# Patient Record
Sex: MALE | Race: WHITE | Employment: OTHER | URBAN - NONMETROPOLITAN AREA
[De-identification: names, ages, dates, MRNs, and addresses within clinical notes are randomized per-mention and may not be internally consistent; named-entity substitution may affect disease eponyms.]

---

## 2017-02-27 ENCOUNTER — OFFICE VISIT (OUTPATIENT)
Dept: NEUROLOGY | Age: 77
End: 2017-02-27
Payer: MEDICARE

## 2017-02-27 VITALS
SYSTOLIC BLOOD PRESSURE: 144 MMHG | BODY MASS INDEX: 33.64 KG/M2 | HEIGHT: 70 IN | WEIGHT: 235 LBS | HEART RATE: 78 BPM | DIASTOLIC BLOOD PRESSURE: 73 MMHG | RESPIRATION RATE: 20 BRPM

## 2017-02-27 DIAGNOSIS — G56.03 BILATERAL CARPAL TUNNEL SYNDROME: ICD-10-CM

## 2017-02-27 DIAGNOSIS — M48.061 LUMBAR SPINAL STENOSIS: ICD-10-CM

## 2017-02-27 DIAGNOSIS — G25.81 RESTLESS LEGS SYNDROME: ICD-10-CM

## 2017-02-27 DIAGNOSIS — E11.42 DIABETIC POLYNEUROPATHY ASSOCIATED WITH TYPE 2 DIABETES MELLITUS (HCC): Primary | ICD-10-CM

## 2017-02-27 DIAGNOSIS — R55 SYNCOPE AND COLLAPSE: ICD-10-CM

## 2017-02-27 DIAGNOSIS — G47.33 SLEEP APNEA, OBSTRUCTIVE: ICD-10-CM

## 2017-02-27 PROCEDURE — G8417 CALC BMI ABV UP PARAM F/U: HCPCS | Performed by: PSYCHIATRY & NEUROLOGY

## 2017-02-27 PROCEDURE — G8484 FLU IMMUNIZE NO ADMIN: HCPCS | Performed by: PSYCHIATRY & NEUROLOGY

## 2017-02-27 PROCEDURE — 4040F PNEUMOC VAC/ADMIN/RCVD: CPT | Performed by: PSYCHIATRY & NEUROLOGY

## 2017-02-27 PROCEDURE — 1123F ACP DISCUSS/DSCN MKR DOCD: CPT | Performed by: PSYCHIATRY & NEUROLOGY

## 2017-02-27 PROCEDURE — 99214 OFFICE O/P EST MOD 30 MIN: CPT | Performed by: PSYCHIATRY & NEUROLOGY

## 2017-02-27 PROCEDURE — G8427 DOCREV CUR MEDS BY ELIG CLIN: HCPCS | Performed by: PSYCHIATRY & NEUROLOGY

## 2017-02-27 PROCEDURE — 1036F TOBACCO NON-USER: CPT | Performed by: PSYCHIATRY & NEUROLOGY

## 2017-02-27 RX ORDER — PRAMIPEXOLE DIHYDROCHLORIDE 1.5 MG/1
1.5 TABLET ORAL 3 TIMES DAILY
Qty: 270 TABLET | Refills: 1 | Status: SHIPPED | OUTPATIENT
Start: 2017-02-27 | End: 2017-10-11 | Stop reason: SDUPTHER

## 2017-02-27 RX ORDER — HYDROCODONE BITARTRATE AND ACETAMINOPHEN 5; 325 MG/1; MG/1
1 TABLET ORAL EVERY 8 HOURS PRN
Qty: 60 TABLET | Refills: 0 | Status: SHIPPED | OUTPATIENT
Start: 2017-02-27 | End: 2017-03-29

## 2017-03-07 ENCOUNTER — TELEPHONE (OUTPATIENT)
Dept: NEUROLOGY | Age: 77
End: 2017-03-07

## 2017-03-10 ENCOUNTER — HOSPITAL ENCOUNTER (OUTPATIENT)
Dept: SLEEP CENTER | Age: 77
Discharge: HOME | End: 2017-03-10
Payer: MEDICARE

## 2017-03-10 PROCEDURE — 95811 POLYSOM 6/>YRS CPAP 4/> PARM: CPT

## 2017-03-10 PROCEDURE — 95811 POLYSOM 6/>YRS CPAP 4/> PARM: CPT | Performed by: PSYCHIATRY & NEUROLOGY

## 2017-03-15 ENCOUNTER — HOSPITAL ENCOUNTER (OUTPATIENT)
Dept: NEUROLOGY | Age: 77
Discharge: HOME | End: 2017-03-15
Payer: MEDICARE

## 2017-03-15 DIAGNOSIS — G56.03 BILATERAL CARPAL TUNNEL SYNDROME: ICD-10-CM

## 2017-03-15 DIAGNOSIS — E11.42 DIABETIC POLYNEUROPATHY ASSOCIATED WITH TYPE 2 DIABETES MELLITUS (HCC): ICD-10-CM

## 2017-03-15 PROCEDURE — 95886 MUSC TEST DONE W/N TEST COMP: CPT | Performed by: PSYCHIATRY & NEUROLOGY

## 2017-03-15 PROCEDURE — 95911 NRV CNDJ TEST 9-10 STUDIES: CPT

## 2017-03-15 PROCEDURE — 95911 NRV CNDJ TEST 9-10 STUDIES: CPT | Performed by: PSYCHIATRY & NEUROLOGY

## 2017-03-15 PROCEDURE — 95886 MUSC TEST DONE W/N TEST COMP: CPT

## 2017-03-19 DIAGNOSIS — G47.33 SLEEP APNEA, OBSTRUCTIVE: Primary | ICD-10-CM

## 2017-03-24 ENCOUNTER — TELEPHONE (OUTPATIENT)
Dept: NEUROLOGY | Age: 77
End: 2017-03-24

## 2017-08-23 ENCOUNTER — OFFICE VISIT (OUTPATIENT)
Dept: ENDOCRINOLOGY | Facility: CLINIC | Age: 77
End: 2017-08-23

## 2017-08-23 ENCOUNTER — TREATMENT (OUTPATIENT)
Dept: ENDOCRINOLOGY | Facility: CLINIC | Age: 77
End: 2017-08-23

## 2017-08-23 VITALS
BODY MASS INDEX: 33.46 KG/M2 | HEART RATE: 80 BPM | HEIGHT: 69 IN | DIASTOLIC BLOOD PRESSURE: 80 MMHG | SYSTOLIC BLOOD PRESSURE: 140 MMHG | WEIGHT: 225.9 LBS

## 2017-08-23 DIAGNOSIS — E55.9 VITAMIN D DEFICIENCY: ICD-10-CM

## 2017-08-23 DIAGNOSIS — E11.59 HYPERTENSION ASSOCIATED WITH DIABETES (HCC): ICD-10-CM

## 2017-08-23 DIAGNOSIS — E11.69 MIXED DIABETIC HYPERLIPIDEMIA ASSOCIATED WITH TYPE 2 DIABETES MELLITUS (HCC): ICD-10-CM

## 2017-08-23 DIAGNOSIS — E53.8 B12 DEFICIENCY: ICD-10-CM

## 2017-08-23 DIAGNOSIS — E11.65 TYPE 2 DIABETES MELLITUS WITH HYPERGLYCEMIA, WITH LONG-TERM CURRENT USE OF INSULIN (HCC): Primary | ICD-10-CM

## 2017-08-23 DIAGNOSIS — Z79.4 TYPE 2 DIABETES MELLITUS WITH HYPERGLYCEMIA, WITH LONG-TERM CURRENT USE OF INSULIN (HCC): Primary | ICD-10-CM

## 2017-08-23 DIAGNOSIS — E78.2 MIXED DIABETIC HYPERLIPIDEMIA ASSOCIATED WITH TYPE 2 DIABETES MELLITUS (HCC): ICD-10-CM

## 2017-08-23 DIAGNOSIS — I15.2 HYPERTENSION ASSOCIATED WITH DIABETES (HCC): ICD-10-CM

## 2017-08-23 LAB — GLUCOSE BLDC GLUCOMTR-MCNC: 205 MG/DL (ref 70–130)

## 2017-08-23 PROCEDURE — PTNOCHG PR CUSTOM PT NO CHARGE VISIT: Performed by: INTERNAL MEDICINE

## 2017-08-23 PROCEDURE — 99204 OFFICE O/P NEW MOD 45 MIN: CPT | Performed by: INTERNAL MEDICINE

## 2017-08-23 PROCEDURE — 95250 CONT GLUC MNTR PHYS/QHP EQP: CPT | Performed by: INTERNAL MEDICINE

## 2017-08-23 PROCEDURE — 82962 GLUCOSE BLOOD TEST: CPT | Performed by: INTERNAL MEDICINE

## 2017-08-23 RX ORDER — METOCLOPRAMIDE 5 MG/1
TABLET ORAL
COMMUNITY
Start: 2016-08-16 | End: 2019-04-18

## 2017-08-23 RX ORDER — ROSUVASTATIN CALCIUM 10 MG/1
TABLET, COATED ORAL
COMMUNITY
End: 2020-02-19

## 2017-08-23 RX ORDER — ESOMEPRAZOLE MAGNESIUM 40 MG/1
40 CAPSULE, DELAYED RELEASE ORAL
COMMUNITY

## 2017-08-23 RX ORDER — FUROSEMIDE 20 MG/1
20 TABLET ORAL EVERY MORNING
COMMUNITY
Start: 2016-02-19

## 2017-08-23 RX ORDER — BENAZEPRIL HYDROCHLORIDE 10 MG/1
TABLET ORAL
COMMUNITY
Start: 2016-08-22 | End: 2019-04-18

## 2017-08-23 RX ORDER — PRAMIPEXOLE DIHYDROCHLORIDE 1.5 MG/1
1.5 TABLET ORAL 3 TIMES DAILY
COMMUNITY
Start: 2017-02-27

## 2017-08-23 RX ORDER — CITALOPRAM 10 MG/1
20 TABLET ORAL DAILY
COMMUNITY

## 2017-08-23 NOTE — PROGRESS NOTES
Atul Jeong is a 77 y.o. male who presents for  evaluation of   Chief Complaint   Patient presents with   • Diabetes       Referring provider    Sang Gayle MD  [address] [id number]    Primary Care Provider    GABRIELLE Tena    Duration more than 10 years    Timing - Diabetes is Constant    Quality -  needs improvement    Severity -  moderate    Complications - peripheral neuropathy    Current symptoms/problems  increase appetite, polydipsia and polyuria     Alleviating Factors: Compliance      Side Effects  none    Current diet  High carbs     Current exercise walking    Current monitoring regimen: home blood tests - 3 times daily  Home blood sugar records:     Hypoglycemia none    Past Medical History:   Diagnosis Date   • B12 deficiency 8/23/2017   • Hypertension associated with diabetes 8/23/2017   • Mixed diabetic hyperlipidemia associated with type 2 diabetes mellitus 8/23/2017   • Type 2 diabetes mellitus with hyperglycemia, with long-term current use of insulin 8/23/2017   • Vitamin D deficiency 8/23/2017     Family History   Problem Relation Age of Onset   • Diabetes Mother      Social History   Substance Use Topics   • Smoking status: Former Smoker   • Smokeless tobacco: Never Used   • Alcohol use None         Current Outpatient Prescriptions:   •  benazepril (LOTENSIN) 10 MG tablet, Take  by mouth., Disp: , Rfl:   •  furosemide (LASIX) 20 MG tablet, Take  by mouth., Disp: , Rfl:   •  Insulin Glargine (LANTUS SOLOSTAR) 100 UNIT/ML injection pen, , Disp: , Rfl:   •  Insulin Lispro (HUMALOG KWIKPEN) 100 UNIT/ML solution pen-injector, , Disp: , Rfl:   •  Insulin Pen Needle (B-D UF III MINI PEN NEEDLES) 31G X 5 MM misc, , Disp: , Rfl:   •  metoclopramide (REGLAN) 5 MG tablet, Take  by mouth., Disp: , Rfl:   •  pramipexole (MIRAPEX) 1.5 MG tablet, Take  by mouth., Disp: , Rfl:   •  citalopram (CeleXA) 10 MG tablet, Take  by mouth., Disp: , Rfl:   •   esomeprazole (nexIUM) 40 MG capsule, Take  by mouth., Disp: , Rfl:   •  rosuvastatin (CRESTOR) 10 MG tablet, Take  by mouth., Disp: , Rfl:     Review of Systems    Review of Systems   Constitutional: Negative for activity change, appetite change, chills, diaphoresis, fatigue, fever and unexpected weight change.   HENT: Negative for congestion, dental problem, drooling, ear discharge, ear pain, facial swelling, mouth sores, postnasal drip, rhinorrhea, sinus pressure, sore throat, tinnitus, trouble swallowing and voice change.    Eyes: Negative for photophobia, pain, discharge, redness, itching and visual disturbance.   Respiratory: Negative for apnea, cough, choking, chest tightness, shortness of breath, wheezing and stridor.    Cardiovascular: Negative for chest pain, palpitations and leg swelling.   Gastrointestinal: Negative for abdominal distention, abdominal pain, constipation, diarrhea, nausea and vomiting.   Endocrine: Negative for cold intolerance, heat intolerance, polydipsia, polyphagia and polyuria.   Genitourinary: Negative for decreased urine volume, difficulty urinating, dysuria, flank pain, frequency, hematuria and urgency.   Musculoskeletal: Negative for arthralgias, back pain, gait problem, joint swelling, myalgias, neck pain and neck stiffness.   Skin: Negative for color change, pallor, rash and wound.   Allergic/Immunologic: Negative for immunocompromised state.   Neurological: Negative for dizziness, tremors, seizures, syncope, facial asymmetry, speech difficulty, weakness, light-headedness, numbness and headaches.   Hematological: Negative for adenopathy.   Psychiatric/Behavioral: Negative for agitation, behavioral problems, confusion, decreased concentration, dysphoric mood, hallucinations, self-injury, sleep disturbance and suicidal ideas. The patient is not nervous/anxious and is not hyperactive.         Objective:   /80 (BP Location: Right arm, Patient Position: Lying, Cuff Size: Adult)  " Pulse 80  Ht 69\" (175.3 cm)  Wt 225 lb 14.4 oz (102 kg)  BMI 33.36 kg/m2    Physical Exam   Constitutional: He is oriented to person, place, and time. He appears well-developed and well-nourished. He is cooperative.   HENT:   Head: Normocephalic and atraumatic.   Right Ear: External ear normal.   Left Ear: External ear normal.   Nose: Nose normal.   Mouth/Throat: Oropharynx is clear and moist. No oropharyngeal exudate.   Eyes: Conjunctivae and EOM are normal. Pupils are equal, round, and reactive to light. No scleral icterus. Right eye exhibits normal extraocular motion. Left eye exhibits normal extraocular motion.   Neck: Neck supple. No JVD present. No muscular tenderness present. No tracheal deviation, no edema and no erythema present. No thyromegaly present.   Cardiovascular: Normal rate, regular rhythm, normal heart sounds and intact distal pulses.  Exam reveals no gallop and no friction rub.    No murmur heard.  Pulmonary/Chest: Effort normal and breath sounds normal. No stridor. No respiratory distress. He has no decreased breath sounds. He has no wheezes. He has no rhonchi. He has no rales. He exhibits no tenderness.   Abdominal: Soft. Bowel sounds are normal. He exhibits no distension and no mass. There is no hepatomegaly. There is no tenderness. There is no rebound and no guarding. No hernia.   Musculoskeletal: Normal range of motion. He exhibits no edema, tenderness or deformity.   Lymphadenopathy:     He has no cervical adenopathy.   Neurological: He is alert and oriented to person, place, and time. He has normal reflexes. No cranial nerve deficit. He exhibits normal muscle tone. Coordination normal.   Skin: Skin is warm. No rash noted. No erythema. No pallor.   Psychiatric: He has a normal mood and affect. His behavior is normal. Judgment and thought content normal.   Nursing note and vitals reviewed.      Lab Review    Results for orders placed or performed in visit on 08/23/17   POC Glucose " Fingerstick   Result Value Ref Range    Glucose 205 (A) 70 - 130 mg/dL           Assessment/Plan       ICD-10-CM ICD-9-CM   1. Type 2 diabetes mellitus with hyperglycemia, with long-term current use of insulin E11.65 250.00    Z79.4 790.29     V58.67   2. Hypertension associated with diabetes E11.59 250.80    I10 401.9   3. Mixed diabetic hyperlipidemia associated with type 2 diabetes mellitus E11.69 250.80    E78.2 272.2   4. Vitamin D deficiency E55.9 268.9   5. B12 deficiency E53.8 266.2       Glycemic Management:   No results found for: HGBA1C  No results found for: GLUCOSE, BUN, CREATININE, EGFRIFNONA, EGFRIFAFRI, BCR, K, CO2, CALCIUM, PROTENTOTREF, ALBUMIN, LABIL2, AST, ALT, ANIONGAP  No results found for: WBC, HGB, HCT, MCV, PLT    lantus 80 twice daily   And humalog 12 units tid  Stop above and     Change to U500 insulin     Do 80 units a.m. 60 units w lunch and 40 units w supper     -----    Has gastroparesis so I am not using glp-1    ----    Start synjardy xr 10/1000 mg w bkfast and next appt consider  januvia           Lipid Management  No results found for: CHOL  No results found for: TRIG  No results found for: HDL  No results found for: LDLCALC  No results found for: LDL  No results found for: LDLDIRECT    On crestor 10 mg three times per week     Last ldl from March 2017, 54     Blood Pressure Management:    Vitals:    08/23/17 0831   BP: 140/80   Pulse: 80     No results found for: GLUCOSE, CALCIUM, NA, K, CO2, CL, BUN, CREATININE, EGFRIFAFRI, EGFRIFNONA, BCR, ANIONGAP      On benazepril    Lasix listed but not taking        Microvascular Complication Monitoring:      Eye Exam Evaluation, June 2017 , no retinopathy    -----------    Last Microalbumin-Proteinuria Assessment    No results found for: MALBCRERATIO    No results found for: UTPCR    -----------      Neuropathy, has neuropathy   I intend to start neurontin 300 mg po tid prn       Weight Related:   Wt Readings from Last 3 Encounters:    08/23/17 225 lb 14.4 oz (102 kg)     Body mass index is 33.36 kg/(m^2).        Diet interventions: moderate (500 kCal/d) deficit diet.      Bone Health    No results found for: PTH, CALCIUM, CAION, PHOS, [id number]    Thyroid Health    No results found for: TSH      Other Diabetes Related Aspects       No results found for: [id number]        Last celiac panel     No results found for: GDPABIGA, TTRANSGLIGA, IGA, [id number]      I reviewed and summarized records from GABRIELLE Tena from 2017 and I reviewed / ordered labs.     Orders Placed This Encounter   Procedures   • POC Glucose Fingerstick         A copy of my note was sent to GABRIELLE Tena    Please see my above opinion and suggestions.

## 2017-08-23 NOTE — PROGRESS NOTES
Atul Jeong is a 77 y.o. male seen by diabetes educator 08/23/2017 for insertion of Michael diagnostic continuous glucose monitor.     Sensor inserted in office. Instructed patient to wear sensor up to 14 days. Patient is to return to clinic in 2 weeks for sensor removal and results. Instructed patient to remove sensor if he has a CT scan, MRI, or X-ray, or if skin irritation occurs.     Birgit Sanders MS, RD, LD, CDE

## 2017-08-28 ENCOUNTER — OFFICE VISIT (OUTPATIENT)
Dept: NEUROLOGY | Age: 77
End: 2017-08-28
Payer: MEDICARE

## 2017-08-28 VITALS
DIASTOLIC BLOOD PRESSURE: 65 MMHG | HEIGHT: 70 IN | BODY MASS INDEX: 32.01 KG/M2 | SYSTOLIC BLOOD PRESSURE: 138 MMHG | WEIGHT: 223.6 LBS | HEART RATE: 82 BPM

## 2017-08-28 DIAGNOSIS — G56.03 BILATERAL CARPAL TUNNEL SYNDROME: ICD-10-CM

## 2017-08-28 DIAGNOSIS — G47.33 OBSTRUCTIVE SLEEP APNEA: Chronic | ICD-10-CM

## 2017-08-28 DIAGNOSIS — E11.42 DIABETIC POLYNEUROPATHY ASSOCIATED WITH TYPE 2 DIABETES MELLITUS (HCC): ICD-10-CM

## 2017-08-28 DIAGNOSIS — G25.81 RESTLESS LEG SYNDROME: Primary | Chronic | ICD-10-CM

## 2017-08-28 PROCEDURE — G8427 DOCREV CUR MEDS BY ELIG CLIN: HCPCS | Performed by: PSYCHIATRY & NEUROLOGY

## 2017-08-28 PROCEDURE — 1036F TOBACCO NON-USER: CPT | Performed by: PSYCHIATRY & NEUROLOGY

## 2017-08-28 PROCEDURE — G8417 CALC BMI ABV UP PARAM F/U: HCPCS | Performed by: PSYCHIATRY & NEUROLOGY

## 2017-08-28 PROCEDURE — 99213 OFFICE O/P EST LOW 20 MIN: CPT | Performed by: PSYCHIATRY & NEUROLOGY

## 2017-08-28 PROCEDURE — 4040F PNEUMOC VAC/ADMIN/RCVD: CPT | Performed by: PSYCHIATRY & NEUROLOGY

## 2017-08-28 PROCEDURE — 1123F ACP DISCUSS/DSCN MKR DOCD: CPT | Performed by: PSYCHIATRY & NEUROLOGY

## 2017-08-28 RX ORDER — HYDROCODONE BITARTRATE AND ACETAMINOPHEN 10; 325 MG/1; MG/1
1 TABLET ORAL EVERY 8 HOURS PRN
COMMUNITY
End: 2017-08-28 | Stop reason: SDUPTHER

## 2017-08-28 RX ORDER — HYDROCODONE BITARTRATE AND ACETAMINOPHEN 10; 325 MG/1; MG/1
1 TABLET ORAL EVERY 8 HOURS PRN
Qty: 90 TABLET | Refills: 0 | Status: ON HOLD | OUTPATIENT
Start: 2017-08-28 | End: 2018-06-11 | Stop reason: HOSPADM

## 2017-08-28 RX ORDER — CARBIDOPA AND LEVODOPA 50; 200 MG/1; MG/1
1 TABLET, EXTENDED RELEASE ORAL 2 TIMES DAILY
Qty: 60 TABLET | Refills: 5 | Status: SHIPPED | OUTPATIENT
Start: 2017-08-28 | End: 2018-03-28 | Stop reason: SDUPTHER

## 2017-09-06 ENCOUNTER — OFFICE VISIT (OUTPATIENT)
Dept: ENDOCRINOLOGY | Facility: CLINIC | Age: 77
End: 2017-09-06

## 2017-09-06 VITALS
DIASTOLIC BLOOD PRESSURE: 64 MMHG | WEIGHT: 224 LBS | HEART RATE: 82 BPM | HEIGHT: 70 IN | SYSTOLIC BLOOD PRESSURE: 120 MMHG | BODY MASS INDEX: 32.07 KG/M2

## 2017-09-06 DIAGNOSIS — E55.9 VITAMIN D DEFICIENCY: ICD-10-CM

## 2017-09-06 DIAGNOSIS — E11.69 MIXED DIABETIC HYPERLIPIDEMIA ASSOCIATED WITH TYPE 2 DIABETES MELLITUS (HCC): ICD-10-CM

## 2017-09-06 DIAGNOSIS — I15.2 HYPERTENSION ASSOCIATED WITH DIABETES (HCC): ICD-10-CM

## 2017-09-06 DIAGNOSIS — E11.65 TYPE 2 DIABETES MELLITUS WITH HYPERGLYCEMIA, WITH LONG-TERM CURRENT USE OF INSULIN (HCC): Primary | ICD-10-CM

## 2017-09-06 DIAGNOSIS — Z79.4 TYPE 2 DIABETES MELLITUS WITH HYPERGLYCEMIA, WITH LONG-TERM CURRENT USE OF INSULIN (HCC): Primary | ICD-10-CM

## 2017-09-06 DIAGNOSIS — E78.2 MIXED DIABETIC HYPERLIPIDEMIA ASSOCIATED WITH TYPE 2 DIABETES MELLITUS (HCC): ICD-10-CM

## 2017-09-06 DIAGNOSIS — E11.59 HYPERTENSION ASSOCIATED WITH DIABETES (HCC): ICD-10-CM

## 2017-09-06 LAB — GLUCOSE BLDC GLUCOMTR-MCNC: 120 MG/DL (ref 70–130)

## 2017-09-06 PROCEDURE — 95250 CONT GLUC MNTR PHYS/QHP EQP: CPT | Performed by: NURSE PRACTITIONER

## 2017-09-06 PROCEDURE — 99214 OFFICE O/P EST MOD 30 MIN: CPT | Performed by: NURSE PRACTITIONER

## 2017-09-06 RX ORDER — HYDROCODONE BITARTRATE AND ACETAMINOPHEN 10; 325 MG/1; MG/1
TABLET ORAL
Status: ON HOLD | COMMUNITY
Start: 2017-08-28 | End: 2020-09-24

## 2017-09-06 RX ORDER — CARBIDOPA AND LEVODOPA 50; 200 MG/1; MG/1
TABLET, EXTENDED RELEASE ORAL
COMMUNITY
Start: 2017-08-28 | End: 2019-08-19 | Stop reason: SINTOL

## 2017-09-06 NOTE — PROGRESS NOTES
Subjective    Atul Jeong is a 77 y.o. male. he is here today for follow-up.    History of Present Illness         Primary Care Provider     GABRIELLE Tena     Duration more than 10 years     Timing - Diabetes is Constant     Quality -  needs improvement     Severity -  moderate     Complications - peripheral neuropathy     Current symptoms/problems  increase appetite, polydipsia and polyuria      Alleviating Factors: Compliance       Side Effects  none     Current diet  High carbs      Current exercise walking     Current monitoring regimen: home blood tests - 3 times daily  Home blood sugar records:     120 up to 160    In office 120     Hypoglycemia none               The following portions of the patient's history were reviewed and updated as appropriate:   Past Medical History:   Diagnosis Date   • B12 deficiency 8/23/2017   • Hypertension associated with diabetes 8/23/2017   • Mixed diabetic hyperlipidemia associated with type 2 diabetes mellitus 8/23/2017   • Type 2 diabetes mellitus with hyperglycemia, with long-term current use of insulin 8/23/2017   • Vitamin D deficiency 8/23/2017     History reviewed. No pertinent surgical history.  Family History   Problem Relation Age of Onset   • Diabetes Mother        Current Outpatient Prescriptions   Medication Sig Dispense Refill   • carbidopa-levodopa CR (SINEMET CR)  MG per CR tablet Take  by mouth.     • HYDROcodone-acetaminophen (NORCO)  MG per tablet Take  by mouth.     • benazepril (LOTENSIN) 10 MG tablet Take  by mouth.     • citalopram (CeleXA) 10 MG tablet Take  by mouth.     • Empagliflozin-Metformin HCl ER  MG tablet sustained-release 24 hour Take 1 tablet by mouth Daily With Breakfast. 30 tablet 11   • esomeprazole (nexIUM) 40 MG capsule Take  by mouth.     • furosemide (LASIX) 20 MG tablet Take  by mouth.     • Insulin Pen Needle (B-D UF III MINI PEN NEEDLES) 31G X 5 MM misc      • Insulin Regular Human, Conc, (HUMULIN  R U-500 KWIKPEN) 500 UNIT/ML solution pen-injector CONCENTRATED injection 80 units w bkfast, 60 units w lunch and 40 units w supper 4 pen 11   • metoclopramide (REGLAN) 5 MG tablet Take  by mouth.     • pramipexole (MIRAPEX) 1.5 MG tablet Take  by mouth.     • rosuvastatin (CRESTOR) 10 MG tablet Take  by mouth.       No current facility-administered medications for this visit.      Allergies   Allergen Reactions   • Metformin      Social History     Social History   • Marital status:      Spouse name: N/A   • Number of children: N/A   • Years of education: N/A     Social History Main Topics   • Smoking status: Former Smoker   • Smokeless tobacco: Never Used   • Alcohol use None   • Drug use: None   • Sexual activity: Not Asked     Other Topics Concern   • None     Social History Narrative       Review of Systems  Review of Systems   Constitutional: Negative for activity change, appetite change, diaphoresis and fatigue.   HENT: Negative for congestion, dental problem, drooling, facial swelling, sneezing, sore throat, tinnitus, trouble swallowing and voice change.    Eyes: Negative for photophobia, pain, discharge, redness, itching and visual disturbance.   Respiratory: Negative for apnea, cough, choking, chest tightness and shortness of breath.    Cardiovascular: Negative for chest pain, palpitations and leg swelling.   Gastrointestinal: Negative for abdominal distention, abdominal pain, constipation, diarrhea, nausea and vomiting.   Endocrine: Negative for cold intolerance, heat intolerance, polydipsia, polyphagia and polyuria.   Genitourinary: Negative for difficulty urinating, dysuria, frequency, hematuria and urgency.   Musculoskeletal: Negative for arthralgias, back pain, gait problem, joint swelling, myalgias, neck pain and neck stiffness.   Skin: Negative for color change, pallor, rash and wound.   Allergic/Immunologic: Negative for environmental allergies, food allergies and immunocompromised state.  "  Neurological: Negative for dizziness, tremors, weakness, light-headedness, numbness and headaches.   Hematological: Negative for adenopathy. Does not bruise/bleed easily.   Psychiatric/Behavioral: Negative for agitation, behavioral problems, confusion and sleep disturbance.        Objective    /64 (BP Location: Right arm, Patient Position: Sitting, Cuff Size: Adult)  Pulse 82  Ht 70\" (177.8 cm)  Wt 224 lb (102 kg)  BMI 32.14 kg/m2  Physical Exam   Constitutional: He is oriented to person, place, and time. He appears well-developed and well-nourished. No distress.   HENT:   Head: Normocephalic and atraumatic.   Right Ear: External ear normal.   Left Ear: External ear normal.   Nose: Nose normal.   Eyes: Conjunctivae and EOM are normal. Pupils are equal, round, and reactive to light.   Neck: Normal range of motion. Neck supple. No tracheal deviation present. No thyromegaly present.   Cardiovascular: Normal rate, regular rhythm and normal heart sounds.    No murmur heard.  Pulmonary/Chest: Effort normal and breath sounds normal. No respiratory distress. He has no wheezes.   Abdominal: Soft. Bowel sounds are normal. There is no tenderness. There is no rebound and no guarding.   Musculoskeletal: Normal range of motion. He exhibits no edema, tenderness or deformity.   Neurological: He is alert and oriented to person, place, and time. No cranial nerve deficit.   Skin: Skin is warm and dry. No rash noted.   Psychiatric: He has a normal mood and affect. His behavior is normal. Judgment and thought content normal.       Lab Review  No results found for: GLUCOSE, NA, K, CL, CO2, BUN, CREATININE, HGBA1C, TRIG, LDL    Assessment/Plan      1. Type 2 diabetes mellitus with hyperglycemia, with long-term current use of insulin    2. Hypertension associated with diabetes    3. Mixed diabetic hyperlipidemia associated with type 2 diabetes mellitus    4. Vitamin D deficiency    .    Medications prescribed:  Outpatient " Encounter Prescriptions as of 9/6/2017   Medication Sig Dispense Refill   • carbidopa-levodopa CR (SINEMET CR)  MG per CR tablet Take  by mouth.     • HYDROcodone-acetaminophen (NORCO)  MG per tablet Take  by mouth.     • benazepril (LOTENSIN) 10 MG tablet Take  by mouth.     • citalopram (CeleXA) 10 MG tablet Take  by mouth.     • Empagliflozin-Metformin HCl ER  MG tablet sustained-release 24 hour Take 1 tablet by mouth Daily With Breakfast. 30 tablet 11   • esomeprazole (nexIUM) 40 MG capsule Take  by mouth.     • furosemide (LASIX) 20 MG tablet Take  by mouth.     • Insulin Pen Needle (B-D UF III MINI PEN NEEDLES) 31G X 5 MM misc      • Insulin Regular Human, Conc, (HUMULIN R U-500 KWIKPEN) 500 UNIT/ML solution pen-injector CONCENTRATED injection 80 units w bkfast, 60 units w lunch and 40 units w supper 4 pen 11   • metoclopramide (REGLAN) 5 MG tablet Take  by mouth.     • pramipexole (MIRAPEX) 1.5 MG tablet Take  by mouth.     • rosuvastatin (CRESTOR) 10 MG tablet Take  by mouth.       No facility-administered encounter medications on file as of 9/6/2017.        Orders placed during this encounter include:  Orders Placed This Encounter   Procedures   • POC Glucose Fingerstick       Glycemic management       lantus 80 twice daily   And humalog 12 units tid  Stop above and      Change to U500 insulin      Do 80 units a.m. 60 units w lunch and 40 units w supper      -----     Has gastroparesis so I am not using glp-1     ----      synjardy xr 10/1000 mg w bkfast          ginger did not read    Wear ginger again today and return in 2 weeks     Lipid Management       On crestor 10 mg three times per week      Last ldl from March 2017, 54      Blood Pressure Management:         On benazepril     Lasix listed but not taking          Microvascular Complication Monitoring:       Eye Exam Evaluation, June 2017 , no retinopathy     -----------     Last Microalbumin-Proteinuria Assessment     No results  found for: MALBCRERAJINGO     No results found for: UTPCR     -----------        Neuropathy, has neuropathy   I intend to start neurontin 300 mg po tid prn         Weight Related:       Wt Readings from Last 3 Encounters:   08/23/17 225 lb 14.4 oz (102 kg)      Body mass index is 33.36 kg/(m^2).           Diet interventions: moderate (500 kCal/d) deficit diet.        Bone Health          Thyroid Health     No results found for: TSH        Other Diabetes Related Aspects                    Last celiac panel         refer to podiatry for foot pain         4. Follow-up: Return in about 6 weeks (around 10/18/2017) for Recheck.

## 2017-09-08 NOTE — PROGRESS NOTES
ICD-10-CM ICD-9-CM   1. Type 2 diabetes mellitus with hyperglycemia, with long-term current use of insulin E11.65 250.00    Z79.4 790.29     V58.67         Uncontrolled diabetes  Hypoglycemia and Hyperglycemia    Insertion of continuous glucose monitor to define pattern    The continuous glucose monitor that was inserted is a ginger glucose monitor

## 2017-09-26 ENCOUNTER — TELEPHONE (OUTPATIENT)
Dept: ENDOCRINOLOGY | Facility: CLINIC | Age: 77
End: 2017-09-26

## 2017-09-26 ENCOUNTER — TREATMENT (OUTPATIENT)
Dept: ENDOCRINOLOGY | Facility: CLINIC | Age: 77
End: 2017-09-26

## 2017-09-26 DIAGNOSIS — E11.65 TYPE 2 DIABETES MELLITUS WITH HYPERGLYCEMIA, WITH LONG-TERM CURRENT USE OF INSULIN (HCC): ICD-10-CM

## 2017-09-26 DIAGNOSIS — Z79.4 TYPE 2 DIABETES MELLITUS WITH HYPERGLYCEMIA, WITH LONG-TERM CURRENT USE OF INSULIN (HCC): ICD-10-CM

## 2017-09-26 PROCEDURE — 95251 CONT GLUC MNTR ANALYSIS I&R: CPT | Performed by: NURSE PRACTITIONER

## 2017-09-26 NOTE — PROGRESS NOTES
Diabetes Mellitus type 2 uncontrolled    Michael downloaded and reviewed    Sept 6 - 15, 2017    Average reading 196     Estimated A1c 8.5%     No low events     Waking up with at goal     Highs at lunch and supper    He takes U- 500    Breakfast 80 units -- increase to 90 units    Lunch 60 units - increase to 65 units      Dinner 40 units - keep

## 2017-09-26 NOTE — TELEPHONE ENCOUNTER
----- Message from GABRIELLE Thibodeaux sent at 9/26/2017  9:37 AM CDT -----  Michael downloaded and reviewed    Sept 6 - 15, 2017    Average reading 196     Estimated A1c 8.5%     No low events     Waking up with at goal     Highs at lunch and supper    He takes U- 500    Breakfast 80 units -- increase to 90 units    Lunch 60 units - increase to 65 units      Dinner 40 units - keep

## 2017-10-18 ENCOUNTER — OFFICE VISIT (OUTPATIENT)
Dept: PODIATRY | Facility: CLINIC | Age: 77
End: 2017-10-18

## 2017-10-18 VITALS
HEIGHT: 70 IN | BODY MASS INDEX: 33.3 KG/M2 | HEART RATE: 83 BPM | WEIGHT: 232.6 LBS | DIASTOLIC BLOOD PRESSURE: 76 MMHG | SYSTOLIC BLOOD PRESSURE: 136 MMHG | OXYGEN SATURATION: 94 %

## 2017-10-18 DIAGNOSIS — B35.1 ONYCHOMYCOSIS: ICD-10-CM

## 2017-10-18 DIAGNOSIS — M79.675 CHRONIC TOE PAIN, BILATERAL: ICD-10-CM

## 2017-10-18 DIAGNOSIS — M79.674 CHRONIC TOE PAIN, BILATERAL: ICD-10-CM

## 2017-10-18 DIAGNOSIS — G89.29 CHRONIC TOE PAIN, BILATERAL: ICD-10-CM

## 2017-10-18 DIAGNOSIS — E11.42 TYPE 2 DIABETES MELLITUS WITH PERIPHERAL NEUROPATHY (HCC): Primary | ICD-10-CM

## 2017-10-18 PROCEDURE — 99203 OFFICE O/P NEW LOW 30 MIN: CPT | Performed by: PODIATRIST

## 2017-10-18 PROCEDURE — 11721 DEBRIDE NAIL 6 OR MORE: CPT | Performed by: PODIATRIST

## 2017-10-18 NOTE — PROGRESS NOTES
Atul Jeong  1940  77 y.o. male   TRACY Jeffrey 09/06/2017  BS-131 per pt    Patient presents today for diabetic foot care.    10/18/2017  Chief Complaint   Patient presents with   • Left Foot - diabetic foot care   • Right Foot - diabetic foot care           History of Present Illness    Atul Jeong is a 77 y.o.male who presents to clinic today for diabetic foot care.  Patient states that his blood sugars are well controlled.  His last glucose was 131 mg/dL.  He admits to occasional numbness, burning and tingling in his lower extremities.  He states his nails the become long and thickened and painful.  They are hard for him to trim because he cannot reach them and they are too thick. He describes the pain as achy. He denies any injuries or trauma to his feet.  He has no other pedal complaints.          Past Medical History:   Diagnosis Date   • B12 deficiency 8/23/2017   • Coronary artery disease    • Foot ulcer    • Hypertension associated with diabetes 8/23/2017   • Ingrown toenail    • Mixed diabetic hyperlipidemia associated with type 2 diabetes mellitus 8/23/2017   • Neuropathy in diabetes    • Type 2 diabetes mellitus with hyperglycemia, with long-term current use of insulin 8/23/2017   • Vitamin D deficiency 8/23/2017         Past Surgical History:   Procedure Laterality Date   • BACK SURGERY     • CATARACT EXTRACTION     • CHOLECYSTECTOMY     • PACEMAKER IMPLANTATION           Family History   Problem Relation Age of Onset   • Diabetes Mother    • Cancer Father    • Cancer Sister    • Cancer Brother    • Heart disease Brother    • Diabetes Brother        Allergies   Allergen Reactions   • Metformin        Social History     Social History   • Marital status:      Spouse name: N/A   • Number of children: N/A   • Years of education: N/A     Occupational History   • Not on file.     Social History Main Topics   • Smoking status: Former Smoker   • Smokeless tobacco: Never Used   • Alcohol use Yes   •  "Drug use: No   • Sexual activity: Defer     Other Topics Concern   • Not on file     Social History Narrative         Current Outpatient Prescriptions   Medication Sig Dispense Refill   • benazepril (LOTENSIN) 10 MG tablet Take  by mouth.     • carbidopa-levodopa CR (SINEMET CR)  MG per CR tablet Take  by mouth.     • citalopram (CeleXA) 10 MG tablet Take  by mouth.     • Empagliflozin-Metformin HCl ER  MG tablet sustained-release 24 hour Take 1 tablet by mouth Daily With Breakfast. 30 tablet 11   • esomeprazole (nexIUM) 40 MG capsule Take  by mouth.     • furosemide (LASIX) 20 MG tablet Take  by mouth.     • HYDROcodone-acetaminophen (NORCO)  MG per tablet Take  by mouth.     • Insulin Pen Needle (B-D UF III MINI PEN NEEDLES) 31G X 5 MM misc      • Insulin Regular Human, Conc, (HUMULIN R U-500 KWIKPEN) 500 UNIT/ML solution pen-injector CONCENTRATED injection 80 units w bkfast, 60 units w lunch and 40 units w supper 4 pen 11   • metoclopramide (REGLAN) 5 MG tablet Take  by mouth.     • pramipexole (MIRAPEX) 1.5 MG tablet Take  by mouth.     • rosuvastatin (CRESTOR) 10 MG tablet Take  by mouth.       No current facility-administered medications for this visit.          OBJECTIVE    /76  Pulse 83  Ht 70\" (177.8 cm)  Wt 232 lb 9.6 oz (106 kg)  SpO2 94%  BMI 33.37 kg/m2      Review of Systems   Constitutional: Negative.    Eyes: Negative.    Respiratory: Negative.    Cardiovascular: Positive for leg swelling.   Gastrointestinal: Negative.    Endocrine: Negative.    Genitourinary: Negative.    Musculoskeletal: Positive for arthralgias and back pain.   Skin: Positive for rash.   Allergic/Immunologic: Negative.    Neurological: Positive for dizziness, numbness and headaches.   Hematological: Negative.    Psychiatric/Behavioral: Negative.          Constitutional: well developed, well nourished    HEENT: Normocephalic and atraumatic, normal hearing    Respiratory: Non labored respirations " noted    Cardiovascular:    DP/PT pulses palpable    CFT brisk  to all digits  Skin temp is warm to warm from proximal tibia to distal digits  Pedal hair growthDiminished.   No erythema or edema noted     Musculoskeletal:  Muscle strength is 5/5 for all muscle groups tested   ROM of the 1st MTP is decreased without pain or crepitus  ROM of the ankle joint is decreased without pain or crepitus    POP to the toenails  Rectus foot type     Dermatological:   Nails 1-5 are thickened, discolored, elongated with subungual debris    Skin is warm, dry and intact    Third and fourth webspaces bilateral are slightly macerated   No subcutaneous nodules or masses noted    No open wounds noted     Neurological:   Protective sensation diminished   Sensation intact to light touch    DTR intact    Psychiatric: A&O x 3 with normal mood and affect. NAD.         Procedures        ASSESSMENT AND PLAN    Atul was seen today for diabetic foot care and diabetic foot care.    Diagnoses and all orders for this visit:    Type 2 diabetes mellitus with peripheral neuropathy    Onychomycosis    Chronic toe pain, bilateral    - A diabetic foot screening exam was performed and the patient was educated on the foot complications related to diabetes,  preventative foot care and what signs and symptoms to watch for.  Instructed to contact our office if any foot problems develop before next visit.  - Nails 1 through 5 bilateral were debrided in length and thickness without incident utilizing nail nippers.  - applied betadine to macerated webspaces. Advised pt to keep webspaces dry  - all questions were answered  - RTC in 3 months            This document has been electronically signed by Elmo Qiu DPM on October 18, 2017 4:24 PM     10/18/2017  4:24 PM

## 2017-11-03 ENCOUNTER — OFFICE VISIT (OUTPATIENT)
Dept: ENDOCRINOLOGY | Facility: CLINIC | Age: 77
End: 2017-11-03

## 2017-11-03 VITALS
SYSTOLIC BLOOD PRESSURE: 138 MMHG | DIASTOLIC BLOOD PRESSURE: 82 MMHG | WEIGHT: 230.4 LBS | BODY MASS INDEX: 32.99 KG/M2 | HEART RATE: 88 BPM | HEIGHT: 70 IN

## 2017-11-03 DIAGNOSIS — E11.69 MIXED DIABETIC HYPERLIPIDEMIA ASSOCIATED WITH TYPE 2 DIABETES MELLITUS (HCC): ICD-10-CM

## 2017-11-03 DIAGNOSIS — E11.59 HYPERTENSION ASSOCIATED WITH DIABETES (HCC): ICD-10-CM

## 2017-11-03 DIAGNOSIS — Z79.4 TYPE 2 DIABETES MELLITUS WITH HYPERGLYCEMIA, WITH LONG-TERM CURRENT USE OF INSULIN (HCC): Primary | ICD-10-CM

## 2017-11-03 DIAGNOSIS — E53.8 B12 DEFICIENCY: ICD-10-CM

## 2017-11-03 DIAGNOSIS — E78.2 MIXED DIABETIC HYPERLIPIDEMIA ASSOCIATED WITH TYPE 2 DIABETES MELLITUS (HCC): ICD-10-CM

## 2017-11-03 DIAGNOSIS — E11.65 TYPE 2 DIABETES MELLITUS WITH HYPERGLYCEMIA, WITH LONG-TERM CURRENT USE OF INSULIN (HCC): Primary | ICD-10-CM

## 2017-11-03 DIAGNOSIS — E55.9 VITAMIN D DEFICIENCY: ICD-10-CM

## 2017-11-03 DIAGNOSIS — I15.2 HYPERTENSION ASSOCIATED WITH DIABETES (HCC): ICD-10-CM

## 2017-11-03 LAB — GLUCOSE BLDC GLUCOMTR-MCNC: 115 MG/DL (ref 70–130)

## 2017-11-03 PROCEDURE — 99214 OFFICE O/P EST MOD 30 MIN: CPT | Performed by: INTERNAL MEDICINE

## 2017-11-03 PROCEDURE — 82962 GLUCOSE BLOOD TEST: CPT | Performed by: INTERNAL MEDICINE

## 2017-11-03 NOTE — PROGRESS NOTES
Atul Jeong is a 77 y.o. male who presents for  evaluation of   Chief Complaint   Patient presents with   • Diabetes       Referring provider    No referring provider defined for this encounter.    Primary Care Provider    [facility]    Duration more than 10 years    Timing - Diabetes is Constant    Quality -  needs improvement - improved     Severity -  moderate    Complications - peripheral neuropathy    Current symptoms/problems  increase appetite, polydipsia and polyuria     Alleviating Factors: Compliance      Side Effects  none    Current diet  High carbs     Current exercise walking    Current monitoring regimen: home blood tests - 3 times daily  Home blood sugar records:  when we met, now mainly in the 100s    Hypoglycemia none    Past Medical History:   Diagnosis Date   • B12 deficiency 8/23/2017   • Coronary artery disease    • Foot ulcer    • Hypertension associated with diabetes 8/23/2017   • Ingrown toenail    • Mixed diabetic hyperlipidemia associated with type 2 diabetes mellitus 8/23/2017   • Neuropathy in diabetes    • Type 2 diabetes mellitus with hyperglycemia, with long-term current use of insulin 8/23/2017   • Vitamin D deficiency 8/23/2017     Family History   Problem Relation Age of Onset   • Diabetes Mother    • Cancer Father    • Cancer Sister    • Cancer Brother    • Heart disease Brother    • Diabetes Brother      Social History   Substance Use Topics   • Smoking status: Former Smoker   • Smokeless tobacco: Never Used   • Alcohol use Yes         Current Outpatient Prescriptions:   •  benazepril (LOTENSIN) 10 MG tablet, Take  by mouth., Disp: , Rfl:   •  carbidopa-levodopa CR (SINEMET CR)  MG per CR tablet, Take  by mouth., Disp: , Rfl:   •  citalopram (CeleXA) 10 MG tablet, Take  by mouth., Disp: , Rfl:   •  Empagliflozin-Metformin HCl ER  MG tablet sustained-release 24 hour, Take 1 tablet by mouth Daily With Breakfast., Disp: 90 tablet, Rfl: 11  •   esomeprazole (nexIUM) 40 MG capsule, Take  by mouth., Disp: , Rfl:   •  furosemide (LASIX) 20 MG tablet, Take  by mouth., Disp: , Rfl:   •  HYDROcodone-acetaminophen (NORCO)  MG per tablet, Take  by mouth., Disp: , Rfl:   •  Insulin Pen Needle (B-D UF III MINI PEN NEEDLES) 31G X 5 MM misc, , Disp: , Rfl:   •  metoclopramide (REGLAN) 5 MG tablet, Take  by mouth., Disp: , Rfl:   •  pramipexole (MIRAPEX) 1.5 MG tablet, Take  by mouth., Disp: , Rfl:   •  rosuvastatin (CRESTOR) 10 MG tablet, Take  by mouth., Disp: , Rfl:   •  Insulin Regular Human, Conc, (HUMULIN R U-500 KWIKPEN) 500 UNIT/ML solution pen-injector CONCENTRATED injection, 80 units w bkfast, 60 units w lunch and 40 units w supper, Disp: 4 pen, Rfl: 11    Review of Systems    Review of Systems   Constitutional: Negative for activity change, appetite change, chills, diaphoresis, fatigue, fever and unexpected weight change.   HENT: Negative for congestion, dental problem, drooling, ear discharge, ear pain, facial swelling, mouth sores, postnasal drip, rhinorrhea, sinus pressure, sore throat, tinnitus, trouble swallowing and voice change.    Eyes: Negative for photophobia, pain, discharge, redness, itching and visual disturbance.   Respiratory: Negative for apnea, cough, choking, chest tightness, shortness of breath, wheezing and stridor.    Cardiovascular: Negative for chest pain, palpitations and leg swelling.   Gastrointestinal: Negative for abdominal distention, abdominal pain, constipation, diarrhea, nausea and vomiting.   Endocrine: Negative for cold intolerance, heat intolerance, polydipsia, polyphagia and polyuria.   Genitourinary: Negative for decreased urine volume, difficulty urinating, dysuria, flank pain, frequency, hematuria and urgency.   Musculoskeletal: Negative for arthralgias, back pain, gait problem, joint swelling, myalgias, neck pain and neck stiffness.   Skin: Negative for color change, pallor, rash and wound.  "  Allergic/Immunologic: Negative for immunocompromised state.   Neurological: Negative for dizziness, tremors, seizures, syncope, facial asymmetry, speech difficulty, weakness, light-headedness, numbness and headaches.   Hematological: Negative for adenopathy.   Psychiatric/Behavioral: Negative for agitation, behavioral problems, confusion, decreased concentration, dysphoric mood, hallucinations, self-injury, sleep disturbance and suicidal ideas. The patient is not nervous/anxious and is not hyperactive.         Objective:   /82 (BP Location: Left arm, Patient Position: Sitting, Cuff Size: Adult)  Pulse 88  Ht 70\" (177.8 cm)  Wt 230 lb 6.4 oz (105 kg)  BMI 33.06 kg/m2    Physical Exam   Constitutional: He is oriented to person, place, and time. He appears well-developed and well-nourished. He is cooperative.   HENT:   Head: Normocephalic and atraumatic.   Right Ear: External ear normal.   Left Ear: External ear normal.   Nose: Nose normal.   Mouth/Throat: Oropharynx is clear and moist. No oropharyngeal exudate.   Eyes: Conjunctivae and EOM are normal. Pupils are equal, round, and reactive to light. No scleral icterus. Right eye exhibits normal extraocular motion. Left eye exhibits normal extraocular motion.   Neck: Neck supple. No JVD present. No muscular tenderness present. No tracheal deviation, no edema and no erythema present. No thyromegaly present.   Cardiovascular: Normal rate, regular rhythm, normal heart sounds and intact distal pulses.  Exam reveals no gallop and no friction rub.    No murmur heard.  Pulmonary/Chest: Effort normal and breath sounds normal. No stridor. No respiratory distress. He has no decreased breath sounds. He has no wheezes. He has no rhonchi. He has no rales. He exhibits no tenderness.   Abdominal: Soft. Bowel sounds are normal. He exhibits no distension and no mass. There is no hepatomegaly. There is no tenderness. There is no rebound and no guarding. No hernia. "   Musculoskeletal: Normal range of motion. He exhibits no edema, tenderness or deformity.   Lymphadenopathy:     He has no cervical adenopathy.   Neurological: He is alert and oriented to person, place, and time. He has normal reflexes. No cranial nerve deficit. He exhibits normal muscle tone. Coordination normal.   Skin: Skin is warm. No rash noted. No erythema. No pallor.   Psychiatric: He has a normal mood and affect. His behavior is normal. Judgment and thought content normal.   Nursing note and vitals reviewed.      Lab Review    Results for orders placed or performed in visit on 11/03/17   POC Glucose Fingerstick   Result Value Ref Range    Glucose 115 70 - 130 mg/dL           Assessment/Plan       ICD-10-CM ICD-9-CM   1. Type 2 diabetes mellitus with hyperglycemia, with long-term current use of insulin E11.65 250.00    Z79.4 790.29     V58.67   2. Hypertension associated with diabetes E11.59 250.80    I10 401.9   3. Mixed diabetic hyperlipidemia associated with type 2 diabetes mellitus E11.69 250.80    E78.2 272.2   4. Vitamin D deficiency E55.9 268.9   5. B12 deficiency E53.8 266.2       Glycemic Management:   No results found for: HGBA1C  No results found for: GLUCOSE, BUN, CREATININE, EGFRIFNONA, EGFRIFAFRI, BCR, K, CO2, CALCIUM, PROTENTOTREF, ALBUMIN, LABIL2, AST, ALT, ANIONGAP  No results found for: WBC, HGB, HCT, MCV, PLT    lantus 80 twice daily   And humalog 12 units tid  Stop above and     Change to U500 insulin     Do 80 units a.m. 60 units w lunch and 40 units w supper     80-90 , 60- 70 , 40    -----    Has gastroparesis so I am not using glp-1    ----    Start synjardy xr 10/1000 mg w bkfast and next appt consider  januvia           Lipid Management  No results found for: CHOL  No results found for: TRIG  No results found for: HDL  No results found for: LDLCALC  No results found for: LDL  No results found for: LDLDIRECT    On crestor 10 mg three times per week     Last ldl from March 2017, 54      Blood Pressure Management:    Vitals:    11/03/17 1519   BP: 138/82   Pulse: 88     No results found for: GLUCOSE, CALCIUM, NA, K, CO2, CL, BUN, CREATININE, EGFRIFAFRI, EGFRIFNONA, BCR, ANIONGAP      On benazepril    Lasix listed but not taking        Microvascular Complication Monitoring:      Eye Exam Evaluation, June 2017 , no retinopathy    -----------    Last Microalbumin-Proteinuria Assessment    No results found for: MALBCRERATIO    No results found for: UTPCR    -----------      Neuropathy, has neuropathy   I intend to start neurontin 300 mg po tid prn       Weight Related:   Wt Readings from Last 3 Encounters:   11/03/17 230 lb 6.4 oz (105 kg)   10/18/17 232 lb 9.6 oz (106 kg)   09/06/17 224 lb (102 kg)     Body mass index is 33.06 kg/(m^2).        Diet interventions: moderate (500 kCal/d) deficit diet.      Bone Health    No results found for: PTH, CALCIUM, CAION, PHOS, [id number]    Thyroid Health    No results found for: TSH      Other Diabetes Related Aspects       No results found for: [id number]        Last celiac panel     No results found for: GDPABIGA, TTRANSGLIGA, IGA, [id number]      I reviewed and summarized records from [facility] from 2017 and I reviewed / ordered labs.     Orders Placed This Encounter   Procedures   • CBC Auto Differential   • Comprehensive Metabolic Panel   • Hemoglobin A1c   • Lipid Panel   • Microalbumin / Creatinine Urine Ratio - Urine, Clean Catch   • TSH   • Vitamin B12   • Vitamin D 25 Hydroxy   • POC Glucose Fingerstick         A copy of my note was sent to [facility]    Please see my above opinion and suggestions.

## 2018-01-04 ENCOUNTER — TELEPHONE (OUTPATIENT)
Dept: PODIATRY | Facility: CLINIC | Age: 78
End: 2018-01-04

## 2018-01-04 NOTE — TELEPHONE ENCOUNTER
PT RIPPED HIS BIG TOE NAIL OFF YESTERDAY. HE WENT TO THE ER LAST NIGHT AND THEY CLEANED IT. BUT THEY TOLD HIM TO GET IN WITH DR MARISOL LOCKHART. HE DOES NOT WANT TO WAIT UNTIL Monday. COULD DR PATTERSON SEE HIM TODAY?

## 2018-01-05 ENCOUNTER — OFFICE VISIT (OUTPATIENT)
Dept: PODIATRY | Facility: CLINIC | Age: 78
End: 2018-01-05

## 2018-01-05 VITALS — HEIGHT: 70 IN | BODY MASS INDEX: 33.07 KG/M2 | WEIGHT: 231 LBS

## 2018-01-05 DIAGNOSIS — S91.209A TRAUMATIC AVULSION OF NAIL PLATE OF TOE, INITIAL ENCOUNTER: Primary | ICD-10-CM

## 2018-01-05 DIAGNOSIS — M79.675 PAIN OF TOE OF LEFT FOOT: ICD-10-CM

## 2018-01-05 DIAGNOSIS — E11.42 TYPE 2 DIABETES MELLITUS WITH PERIPHERAL NEUROPATHY (HCC): ICD-10-CM

## 2018-01-05 DIAGNOSIS — Z79.4 TYPE 2 DIABETES MELLITUS WITH HYPERGLYCEMIA, WITH LONG-TERM CURRENT USE OF INSULIN (HCC): Primary | ICD-10-CM

## 2018-01-05 DIAGNOSIS — E11.65 TYPE 2 DIABETES MELLITUS WITH HYPERGLYCEMIA, WITH LONG-TERM CURRENT USE OF INSULIN (HCC): Primary | ICD-10-CM

## 2018-01-05 PROCEDURE — 99213 OFFICE O/P EST LOW 20 MIN: CPT | Performed by: PODIATRIST

## 2018-01-05 NOTE — PROGRESS NOTES
Atul Jeong  1940  77 y.o. male   TRACY Jeffrey 09/06/2017  BS- 79 this morning per pt        1/14/2018  Chief Complaint   Patient presents with   • Left Foot - Follow-up           History of Present Illness    Atul Jeong is a 77 y.o.male who presents to clinic today for f/u from ED for traumatic avulsion of right hallux nail. Previously seen by Dr. Qiu.        Past Medical History:   Diagnosis Date   • B12 deficiency 8/23/2017   • Coronary artery disease    • Foot ulcer    • Hypertension associated with diabetes 8/23/2017   • Ingrown toenail    • Mixed diabetic hyperlipidemia associated with type 2 diabetes mellitus 8/23/2017   • Neuropathy in diabetes    • Type 2 diabetes mellitus with hyperglycemia, with long-term current use of insulin 8/23/2017   • Vitamin D deficiency 8/23/2017         Past Surgical History:   Procedure Laterality Date   • BACK SURGERY     • CATARACT EXTRACTION     • CHOLECYSTECTOMY     • PACEMAKER IMPLANTATION           Family History   Problem Relation Age of Onset   • Diabetes Mother    • Cancer Father    • Cancer Sister    • Cancer Brother    • Heart disease Brother    • Diabetes Brother        Allergies   Allergen Reactions   • Metformin        Social History     Social History   • Marital status:      Spouse name: N/A   • Number of children: N/A   • Years of education: N/A     Occupational History   • Not on file.     Social History Main Topics   • Smoking status: Former Smoker   • Smokeless tobacco: Never Used   • Alcohol use Yes   • Drug use: No   • Sexual activity: Defer     Other Topics Concern   • Not on file     Social History Narrative         Current Outpatient Prescriptions   Medication Sig Dispense Refill   • benazepril (LOTENSIN) 10 MG tablet Take  by mouth.     • carbidopa-levodopa CR (SINEMET CR)  MG per CR tablet Take  by mouth.     • citalopram (CeleXA) 10 MG tablet Take  by mouth.     • Empagliflozin-Metformin HCl ER  MG tablet sustained-release  "24 hour Take 1 tablet by mouth Daily With Breakfast. 90 tablet 11   • esomeprazole (nexIUM) 40 MG capsule Take  by mouth.     • furosemide (LASIX) 20 MG tablet Take  by mouth.     • HYDROcodone-acetaminophen (NORCO)  MG per tablet Take  by mouth.     • Insulin Pen Needle (B-D UF III MINI PEN NEEDLES) 31G X 5 MM misc      • Insulin Regular Human, Conc, (HUMULIN R U-500 KWIKPEN) 500 UNIT/ML solution pen-injector CONCENTRATED injection 80 units w bkfast, 60 units w lunch and 40 units w supper 4 pen 11   • metoclopramide (REGLAN) 5 MG tablet Take  by mouth.     • pramipexole (MIRAPEX) 1.5 MG tablet Take  by mouth.     • rosuvastatin (CRESTOR) 10 MG tablet Take  by mouth.       No current facility-administered medications for this visit.          OBJECTIVE    Ht 177.8 cm (70\")  Wt 105 kg (231 lb)  BMI 33.15 kg/m2      Review of Systems   Constitutional: Negative.    Eyes: Negative.    Respiratory: Negative.    Cardiovascular: Positive for leg swelling.   Gastrointestinal: Negative.    Endocrine: Negative.    Genitourinary: Negative.    Musculoskeletal: Positive for arthralgias and back pain.   Skin: Positive for rash.   Allergic/Immunologic: Negative.    Neurological: Positive for dizziness, numbness and headaches.   Hematological: Negative.    Psychiatric/Behavioral: Negative.          Constitutional: well developed, well nourished    HEENT: Normocephalic and atraumatic, normal hearing    Respiratory: Non labored respirations noted    Cardiovascular:    DP/PT pulses palpable    CFT brisk  to all digits  Skin temp is warm to warm from proximal tibia to distal digits  Pedal hair growthDiminished.   No erythema or edema noted     Musculoskeletal:  Muscle strength is 5/5 for all muscle groups tested   ROM of the 1st MTP is decreased without pain or crepitus  ROM of the ankle joint is decreased without pain or crepitus    POP to the toenails  Rectus foot type     Dermatological:   Right hallux traumatic nail " avulsion. Nail bed c/d/i. No SOI  Skin is warm, dry and intact    No subcutaneous nodules or masses noted    No open wounds noted     Neurological:   Protective sensation diminished   Sensation intact to light touch    DTR intact    Psychiatric: A&O x 3 with normal mood and affect. NAD.     Right foot radiographs- Normal osseous density. No acute fractures, subluxations or erosions.      Procedures        ASSESSMENT AND PLAN    Atul was seen today for follow-up.    Diagnoses and all orders for this visit:    Traumatic avulsion of nail plate of toe, initial encounter    Pain of toe of left foot  -     XR Foot 3+ View Left    Type 2 diabetes mellitus with peripheral neuropathy    -Traumatic nail avulsion healing well. No nail bed laceration or underlying fracture.  -Advised soaks and bandaging as needed  -Has appt with Dr Qiu in 2 weeks            This document has been electronically signed by Maik Jeong DPM on January 14, 2018 7:27 PM     1/14/2018  7:25 PM

## 2018-01-17 ENCOUNTER — TELEPHONE (OUTPATIENT)
Dept: ENDOCRINOLOGY | Facility: CLINIC | Age: 78
End: 2018-01-17

## 2018-01-24 ENCOUNTER — OFFICE VISIT (OUTPATIENT)
Dept: PODIATRY | Facility: CLINIC | Age: 78
End: 2018-01-24

## 2018-01-24 VITALS — BODY MASS INDEX: 33.07 KG/M2 | WEIGHT: 231 LBS | HEIGHT: 70 IN

## 2018-01-24 DIAGNOSIS — M79.675 CHRONIC TOE PAIN, BILATERAL: Primary | ICD-10-CM

## 2018-01-24 DIAGNOSIS — G89.29 CHRONIC TOE PAIN, BILATERAL: Primary | ICD-10-CM

## 2018-01-24 DIAGNOSIS — B35.1 ONYCHOMYCOSIS: ICD-10-CM

## 2018-01-24 DIAGNOSIS — M79.674 CHRONIC TOE PAIN, BILATERAL: Primary | ICD-10-CM

## 2018-01-24 DIAGNOSIS — E11.42 TYPE 2 DIABETES MELLITUS WITH PERIPHERAL NEUROPATHY (HCC): ICD-10-CM

## 2018-01-24 DIAGNOSIS — E11.65 TYPE 2 DIABETES MELLITUS WITH HYPERGLYCEMIA, WITH LONG-TERM CURRENT USE OF INSULIN (HCC): ICD-10-CM

## 2018-01-24 DIAGNOSIS — Z79.4 TYPE 2 DIABETES MELLITUS WITH HYPERGLYCEMIA, WITH LONG-TERM CURRENT USE OF INSULIN (HCC): ICD-10-CM

## 2018-01-24 PROCEDURE — 11721 DEBRIDE NAIL 6 OR MORE: CPT | Performed by: PODIATRIST

## 2018-01-24 NOTE — PROGRESS NOTES
Atul Jeong  1940  77 y.o. male   TRACY Jeffrey 09/06/2017  BS- 121 this morning per pt,      1/24/2018  Chief Complaint   Patient presents with   • Left Foot - Follow-up           History of Present Illness    Atul Jeong is a 77 y.o.male who presents to clinic today for Routine nail care.  States that his nails become long and painful.  Pain really with debridement.  He has no other pedal complaints        Past Medical History:   Diagnosis Date   • B12 deficiency 8/23/2017   • Coronary artery disease    • Foot ulcer    • Hypertension associated with diabetes 8/23/2017   • Ingrown toenail    • Mixed diabetic hyperlipidemia associated with type 2 diabetes mellitus 8/23/2017   • Neuropathy in diabetes    • Type 2 diabetes mellitus with hyperglycemia, with long-term current use of insulin 8/23/2017   • Vitamin D deficiency 8/23/2017         Past Surgical History:   Procedure Laterality Date   • BACK SURGERY     • CATARACT EXTRACTION     • CHOLECYSTECTOMY     • PACEMAKER IMPLANTATION           Family History   Problem Relation Age of Onset   • Diabetes Mother    • Cancer Father    • Cancer Sister    • Cancer Brother    • Heart disease Brother    • Diabetes Brother        Allergies   Allergen Reactions   • Metformin        Social History     Social History   • Marital status:      Spouse name: N/A   • Number of children: N/A   • Years of education: N/A     Occupational History   • Not on file.     Social History Main Topics   • Smoking status: Former Smoker   • Smokeless tobacco: Never Used   • Alcohol use Yes   • Drug use: No   • Sexual activity: Defer     Other Topics Concern   • Not on file     Social History Narrative         Current Outpatient Prescriptions   Medication Sig Dispense Refill   • benazepril (LOTENSIN) 10 MG tablet Take  by mouth.     • carbidopa-levodopa CR (SINEMET CR)  MG per CR tablet Take  by mouth.     • citalopram (CeleXA) 10 MG tablet Take  by mouth.     •  "Dapagliflozin-Metformin HCl ER  MG tablet sustained-release 24 hour Take 1 tablet by mouth Daily. 30 tablet 11   • Empagliflozin-Metformin HCl ER  MG tablet sustained-release 24 hour Take 1 tablet by mouth Daily With Breakfast. 90 tablet 11   • esomeprazole (nexIUM) 40 MG capsule Take  by mouth.     • furosemide (LASIX) 20 MG tablet Take  by mouth.     • HYDROcodone-acetaminophen (NORCO)  MG per tablet Take  by mouth.     • Insulin Pen Needle (B-D UF III MINI PEN NEEDLES) 31G X 5 MM misc      • Insulin Regular Human, Conc, (HUMULIN R U-500 KWIKPEN) 500 UNIT/ML solution pen-injector CONCENTRATED injection 80 units w bkfast, 60 units w lunch and 40 units w supper 4 pen 11   • metoclopramide (REGLAN) 5 MG tablet Take  by mouth.     • pramipexole (MIRAPEX) 1.5 MG tablet Take  by mouth.     • rosuvastatin (CRESTOR) 10 MG tablet Take  by mouth.       No current facility-administered medications for this visit.          OBJECTIVE    Ht 177.8 cm (70\")  Wt 105 kg (231 lb)  BMI 33.15 kg/m2      Review of Systems   Constitutional: Negative.    Eyes: Negative.    Respiratory: Negative.    Cardiovascular: Positive for leg swelling.   Gastrointestinal: Negative.    Endocrine: Negative.    Genitourinary: Negative.    Musculoskeletal: Positive for arthralgias and back pain.   Skin: Positive for rash.   Allergic/Immunologic: Negative.    Neurological: Positive for dizziness, numbness and headaches.   Hematological: Negative.    Psychiatric/Behavioral: Negative.          Constitutional: well developed, well nourished    HEENT: Normocephalic and atraumatic, normal hearing    Respiratory: Non labored respirations noted    Cardiovascular:    DP/PT pulses palpable    CFT brisk  to all digits  Skin temp is warm to warm from proximal tibia to distal digits  Pedal hair growthDiminished.   No erythema or edema noted     Musculoskeletal:  Muscle strength is 5/5 for all muscle groups tested   ROM of the 1st MTP is " decreased without pain or crepitus  ROM of the ankle joint is decreased without pain or crepitus    POP to the toenails  Rectus foot type     Dermatological:   Nails 1 through 5 right and 2 through 5 left are thickened, discolored and elongated with subungual debris  Previous traumatic nail bed laceration to left hallux nail is healing well with no signs of infection  Skin is warm, dry and intact    No subcutaneous nodules or masses noted    No open wounds noted     Neurological:   Protective sensation diminished   Sensation intact to light touch    DTR intact    Psychiatric: A&O x 3 with normal mood and affect. NAD.       Procedures        ASSESSMENT AND PLAN    Atul was seen today for follow-up.    Diagnoses and all orders for this visit:    Chronic toe pain, bilateral    Onychomycosis    Type 2 diabetes mellitus with peripheral neuropathy    Type 2 diabetes mellitus with hyperglycemia, with long-term current use of insulin    - Nails 1-5 bilateral were debrided in length and thickness with nail nipper and electric  to decrease fungal load and risk of infection.  - all questions were answered  - Return to clinic in 3 months            This document has been electronically signed by Elmo Qiu DPM on January 24, 2018 4:55 PM     1/24/2018  4:55 PM

## 2018-03-01 ENCOUNTER — TELEPHONE (OUTPATIENT)
Dept: ENDOCRINOLOGY | Facility: CLINIC | Age: 78
End: 2018-03-01

## 2018-03-02 ENCOUNTER — TELEPHONE (OUTPATIENT)
Dept: ENDOCRINOLOGY | Facility: CLINIC | Age: 78
End: 2018-03-02

## 2018-03-08 ENCOUNTER — OFFICE VISIT (OUTPATIENT)
Dept: NEUROLOGY | Age: 78
End: 2018-03-08
Payer: MEDICARE

## 2018-03-08 VITALS
SYSTOLIC BLOOD PRESSURE: 135 MMHG | DIASTOLIC BLOOD PRESSURE: 59 MMHG | BODY MASS INDEX: 33.36 KG/M2 | OXYGEN SATURATION: 94 % | WEIGHT: 233 LBS | HEIGHT: 70 IN | HEART RATE: 70 BPM

## 2018-03-08 DIAGNOSIS — G47.33 OBSTRUCTIVE SLEEP APNEA: Primary | ICD-10-CM

## 2018-03-08 DIAGNOSIS — E11.42 DIABETIC POLYNEUROPATHY ASSOCIATED WITH TYPE 2 DIABETES MELLITUS (HCC): ICD-10-CM

## 2018-03-08 DIAGNOSIS — Z99.89 CPAP (CONTINUOUS POSITIVE AIRWAY PRESSURE) DEPENDENCE: ICD-10-CM

## 2018-03-08 DIAGNOSIS — G25.81 RESTLESS LEGS SYNDROME: ICD-10-CM

## 2018-03-08 PROCEDURE — G8417 CALC BMI ABV UP PARAM F/U: HCPCS | Performed by: PHYSICIAN ASSISTANT

## 2018-03-08 PROCEDURE — 1123F ACP DISCUSS/DSCN MKR DOCD: CPT | Performed by: PHYSICIAN ASSISTANT

## 2018-03-08 PROCEDURE — 1036F TOBACCO NON-USER: CPT | Performed by: PHYSICIAN ASSISTANT

## 2018-03-08 PROCEDURE — G8427 DOCREV CUR MEDS BY ELIG CLIN: HCPCS | Performed by: PHYSICIAN ASSISTANT

## 2018-03-08 PROCEDURE — 4040F PNEUMOC VAC/ADMIN/RCVD: CPT | Performed by: PHYSICIAN ASSISTANT

## 2018-03-08 PROCEDURE — G8484 FLU IMMUNIZE NO ADMIN: HCPCS | Performed by: PHYSICIAN ASSISTANT

## 2018-03-08 PROCEDURE — 99213 OFFICE O/P EST LOW 20 MIN: CPT | Performed by: PHYSICIAN ASSISTANT

## 2018-03-08 RX ORDER — HYDROCHLOROTHIAZIDE 12.5 MG/1
12.5 CAPSULE, GELATIN COATED ORAL DAILY
Status: ON HOLD | COMMUNITY
End: 2020-09-21 | Stop reason: HOSPADM

## 2018-03-08 NOTE — PATIENT INSTRUCTIONS
Patient Education   Patient education: Sleep apnea in adults       INTRODUCTION  Normally during sleep, air moves through the throat and in and out of the lungs at a regular rhythm. In a person with sleep apnea, air movement is periodically diminished or stopped. There are two types of sleep apnea: obstructive sleep apnea and central sleep apnea. In obstructive sleep apnea, breathing is abnormal because of narrowing or closure of the throat. In central sleep apnea, breathing is abnormal because of a change in the breathing control and rhythm. Sleep apnea is a serious condition that can affect a person's ability to safely perform normal daily activities and can affect long term health. Approximately 25 percent of adults are at risk for sleep apnea of some degree. Men are more commonly affected than women. Other risk factors include middle and older age, being overweight or obese, and having a small mouth and throat. This topic review focuses on the most common type of sleep apnea in adults, obstructive sleep apnea (SEBASTIEN). HOW SLEEP APNEA OCCURS  The throat is surrounded by muscles that control the airway for speaking, swallowing, and breathing. During sleep, these muscles are less active, and this causes the throat to narrow. In most people, this narrowing does not affect breathing. In others, it can cause snoring, sometimes with reduced or completely blocked airflow. A completely blocked airway without airflow is called an obstructive apnea. Partial obstruction with diminished airflow is called a hypopnea. A person may have apnea and hypopnea during sleep. Insufficient breathing due to apnea or hypopnea causes oxygen levels to fall and carbon dioxide to rise. Because the airway is blocked, breathing faster or harder does not help to improve oxygen levels until the airway is reopened. Typically, the obstruction requires the person to awaken to activate the upper airway muscles.  Once the airway is opened, the management and close monitoring to reduce the risk of a blocked airway. Dental devices  A dental device, called an oral appliance or mandibular advancement device, can reposition the jaw (mandible), bringing the tongue and soft palate forward as well. This may relieve obstruction in some people. This treatment is excellent for reducing snoring, although the effect on SEBASTIEN is sometimes more limited. As a result, dental devices are best used for mild cases of SEBASTIEN when relief of snoring is the main goal. Failure to tolerate and accept CPAP is another indication for dental devices. While dental devices are not as effective as CPAP for SEBASTIEN, some patients prefer a dental device to CPAP. Side effects of dental devices are generally minor but may include changes to the bite with prolonged use. Surgical treatment  Surgery is an alternative therapy for patients who cannot tolerate or do not improve with nonsurgical treatments such as CPAP or oral devices. Surgery can also be used in combination with other nonsurgical treatments. Surgical procedures reshape structures in the upper airways or surgically reposition bone or soft tissue. Uvulopalatopharyngoplasty (UPPP) removes the uvula and excessive tissue in the throat, including the tonsils, if present. Other procedures, such as maxillomandibular advancement (MMA), address both the upper and lower pharyngeal airway more globally. UPPP alone has limited success rates (less than 50 percent) and people can relapse (when SEBASTIEN symptoms return after surgery). As a result, this surgery is only recommended in a minority of people and should be considered with caution. MMA may have a higher success rate, particularly in people with abnormal jaw (maxilla and mandible) anatomy, but it is the most complicated procedure. A newer surgical approach, nerve stimulation to protrude the tongue, has promising success rates in very selected people.   Tracheostomy creates a permanent opening in is called a compliance download. Your PAP supplier will assist you in this matter. Reminder:  Please bring a copy of the compliance download to your next office visit or have your supplier fax it to our office prior to your office visit. Note:  Where applicable, we will utilize PAP device efficiency reports, additional testing, and face-to-face  clinical evaluation subsequent to any treatment, changes in treatment, and continued treatment. Caution:  Please abstain from driving or engaging in other activities which may be hazardous in the presence of diminished alertness or daytime drowsiness. And avoid the use of sedatives or alcohol, which can worsen sleep apnea and daytime drowsiness. Mask suggestions:  - [employer]med Airfit N20 (Nasal) or F20 (Full face mask). They conform to your face, thus decreasing the potential for mask leakage. You might like the [employer] (full face mask). It has a \"memory foam\" like cushion. You might also like to try a nasal mask called a Dreamwear nasal mask or the Dreamwear nasal pillow. One other suggestion is the [facility], it is a minimal contact full face mask. The [employer] incredible under the nose design makes it the only full face mask that won't cause red marks on the bridge of your nose when compared to other Full Face Masks        Patient Education        Neuropathic Pain: Care Instructions  Your Care Instructions  Neuropathic pain is caused by pressure on or damage to your nerves. It's often simply called nerve pain. Some people feel this type of pain all the time. For others, it comes and goes. Diabetes, shingles, or an injury can cause nerve pain. Many people say the pain feels sharp, burning, or stabbing. But some people feel it as a dull ache. In some cases, it makes your skin very sensitive. So touch, pressure, and other sensations that did not hurt before may now cause pain.   It's important to know that this kind of pain is real and can exercises. · Try heat, cold packs, and massage. · Get enough sleep. Constant pain can make you more tired. If the pain makes it hard to sleep, talk with your doctor. · Think positively. Your thoughts can affect your pain. Do fun things to distract yourself from the pain. See a movie, read a book, listen to music, or spend time with a friend. · Keep a pain diary. Try to write down how strong your pain is and what it feels like. Also try to notice and write down how your moods, thoughts, sleep, activities, and medicine affect your pain. These notes can help you and your doctor find the best ways to treat your pain. Reducing constipation caused by pain medicine  Pain medicines often cause constipation. To reduce constipation:  · Include fruits, vegetables, beans, and whole grains in your diet each day. These foods are high in fiber. · Drink plenty of fluids, enough so that your urine is light yellow or clear like water. If you have kidney, heart, or liver disease and have to limit fluids, talk with your doctor before you increase the amount of fluids you drink. · Get some exercise every day. Build up slowly to 30 to 60 minutes a day on 5 or more days of the week. · Take a fiber supplement, such as Citrucel or Metamucil, every day if needed. Read and follow all instructions on the label. · Schedule time each day for a bowel movement. Having a daily routine may help. Take your time and do not strain when having a bowel movement. · Ask your doctor about a laxative. The goal is to have one easy bowel movement every 1 to 2 days. Do not let constipation go untreated for more than 3 days. When should you call for help? Call your doctor now or seek immediate medical care if:  ? · You feel sad, anxious, or hopeless for more than a few days. This could mean you are depressed. Depression is common in people who have a lot of pain. But it can be treated.    ? · You have trouble with bowel movements, such as:  ¨ No bowel

## 2018-04-13 ENCOUNTER — HOSPITAL ENCOUNTER (EMERGENCY)
Age: 78
Discharge: HOME | End: 2018-04-13
Attending: EMERGENCY MEDICINE
Payer: MEDICARE

## 2018-04-13 ENCOUNTER — APPOINTMENT (OUTPATIENT)
Dept: GENERAL RADIOLOGY | Age: 78
End: 2018-04-13
Payer: MEDICARE

## 2018-04-13 ENCOUNTER — APPOINTMENT (OUTPATIENT)
Dept: CT IMAGING | Age: 78
End: 2018-04-13
Payer: MEDICARE

## 2018-04-13 VITALS
BODY MASS INDEX: 33.36 KG/M2 | OXYGEN SATURATION: 91 % | WEIGHT: 233 LBS | DIASTOLIC BLOOD PRESSURE: 77 MMHG | TEMPERATURE: 98 F | HEIGHT: 70 IN | RESPIRATION RATE: 17 BRPM | SYSTOLIC BLOOD PRESSURE: 143 MMHG | HEART RATE: 66 BPM

## 2018-04-13 DIAGNOSIS — M54.2 NECK PAIN: Primary | ICD-10-CM

## 2018-04-13 DIAGNOSIS — M79.2 RADICULAR PAIN IN LEFT ARM: ICD-10-CM

## 2018-04-13 DIAGNOSIS — I35.0 NONRHEUMATIC AORTIC VALVE STENOSIS: ICD-10-CM

## 2018-04-13 LAB
ALBUMIN SERPL-MCNC: 4.5 G/DL (ref 3.5–5.2)
ALP BLD-CCNC: 66 U/L (ref 40–130)
ALT SERPL-CCNC: 7 U/L (ref 5–41)
ANION GAP SERPL CALCULATED.3IONS-SCNC: 16 MMOL/L (ref 7–19)
AST SERPL-CCNC: 25 U/L (ref 5–40)
BASOPHILS ABSOLUTE: 0 K/UL (ref 0–0.2)
BASOPHILS RELATIVE PERCENT: 0.2 % (ref 0–1)
BILIRUB SERPL-MCNC: 0.6 MG/DL (ref 0.2–1.2)
BILIRUBIN URINE: NEGATIVE
BLOOD, URINE: NEGATIVE
BUN BLDV-MCNC: 31 MG/DL (ref 8–23)
CALCIUM SERPL-MCNC: 10 MG/DL (ref 8.8–10.2)
CHLORIDE BLD-SCNC: 98 MMOL/L (ref 98–111)
CLARITY: CLEAR
CO2: 22 MMOL/L (ref 22–29)
COLOR: YELLOW
CREAT SERPL-MCNC: 0.9 MG/DL (ref 0.5–1.2)
EOSINOPHILS ABSOLUTE: 0.2 K/UL (ref 0–0.6)
EOSINOPHILS RELATIVE PERCENT: 2.1 % (ref 0–5)
GFR NON-AFRICAN AMERICAN: >60
GLUCOSE BLD-MCNC: 227 MG/DL (ref 74–109)
GLUCOSE URINE: >=1000 MG/DL
HCT VFR BLD CALC: 51.4 % (ref 42–52)
HEMOGLOBIN: 17.6 G/DL (ref 14–18)
INR BLD: 1.02 (ref 0.88–1.18)
KETONES, URINE: NEGATIVE MG/DL
LEUKOCYTE ESTERASE, URINE: NEGATIVE
LIPASE: 27 U/L (ref 13–60)
LYMPHOCYTES ABSOLUTE: 1.3 K/UL (ref 1.1–4.5)
LYMPHOCYTES RELATIVE PERCENT: 14.4 % (ref 20–40)
MCH RBC QN AUTO: 30.7 PG (ref 27–31)
MCHC RBC AUTO-ENTMCNC: 34.2 G/DL (ref 33–37)
MCV RBC AUTO: 89.7 FL (ref 80–94)
MONOCYTES ABSOLUTE: 0.8 K/UL (ref 0–0.9)
MONOCYTES RELATIVE PERCENT: 9.6 % (ref 0–10)
NEUTROPHILS ABSOLUTE: 6.4 K/UL (ref 1.5–7.5)
NEUTROPHILS RELATIVE PERCENT: 72.7 % (ref 50–65)
NITRITE, URINE: NEGATIVE
PDW BLD-RTO: 13.9 % (ref 11.5–14.5)
PH UA: 6
PLATELET # BLD: 147 K/UL (ref 130–400)
PMV BLD AUTO: 9.8 FL (ref 9.4–12.4)
POTASSIUM SERPL-SCNC: 4.5 MMOL/L (ref 3.5–5)
PRO-BNP: 222 PG/ML (ref 0–1800)
PROTEIN UA: NEGATIVE MG/DL
PROTHROMBIN TIME: 13.3 SEC (ref 12–14.6)
RBC # BLD: 5.73 M/UL (ref 4.7–6.1)
SODIUM BLD-SCNC: 136 MMOL/L (ref 136–145)
SPECIFIC GRAVITY UA: 1.02
TOTAL PROTEIN: 7.3 G/DL (ref 6.6–8.7)
TROPONIN: <0.01 NG/ML (ref 0–0.03)
UROBILINOGEN, URINE: 1 E.U./DL
WBC # BLD: 8.8 K/UL (ref 4.8–10.8)

## 2018-04-13 PROCEDURE — 83690 ASSAY OF LIPASE: CPT

## 2018-04-13 PROCEDURE — 96374 THER/PROPH/DIAG INJ IV PUSH: CPT

## 2018-04-13 PROCEDURE — 85610 PROTHROMBIN TIME: CPT

## 2018-04-13 PROCEDURE — 80053 COMPREHEN METABOLIC PANEL: CPT

## 2018-04-13 PROCEDURE — 83880 ASSAY OF NATRIURETIC PEPTIDE: CPT

## 2018-04-13 PROCEDURE — 93005 ELECTROCARDIOGRAM TRACING: CPT

## 2018-04-13 PROCEDURE — 6360000002 HC RX W HCPCS: Performed by: EMERGENCY MEDICINE

## 2018-04-13 PROCEDURE — 96375 TX/PRO/DX INJ NEW DRUG ADDON: CPT

## 2018-04-13 PROCEDURE — 81003 URINALYSIS AUTO W/O SCOPE: CPT

## 2018-04-13 PROCEDURE — 85025 COMPLETE CBC W/AUTO DIFF WBC: CPT

## 2018-04-13 PROCEDURE — 84484 ASSAY OF TROPONIN QUANT: CPT

## 2018-04-13 PROCEDURE — 36415 COLL VENOUS BLD VENIPUNCTURE: CPT

## 2018-04-13 PROCEDURE — 99284 EMERGENCY DEPT VISIT MOD MDM: CPT

## 2018-04-13 PROCEDURE — 71046 X-RAY EXAM CHEST 2 VIEWS: CPT

## 2018-04-13 PROCEDURE — 72125 CT NECK SPINE W/O DYE: CPT

## 2018-04-13 PROCEDURE — 99285 EMERGENCY DEPT VISIT HI MDM: CPT | Performed by: EMERGENCY MEDICINE

## 2018-04-13 RX ORDER — ATENOLOL 25 MG/1
25 TABLET ORAL DAILY
COMMUNITY

## 2018-04-13 RX ORDER — MORPHINE SULFATE 10 MG/ML
4 INJECTION, SOLUTION INTRAMUSCULAR; INTRAVENOUS ONCE
Status: COMPLETED | OUTPATIENT
Start: 2018-04-13 | End: 2018-04-13

## 2018-04-13 RX ORDER — ONDANSETRON 2 MG/ML
4 INJECTION INTRAMUSCULAR; INTRAVENOUS ONCE
Status: COMPLETED | OUTPATIENT
Start: 2018-04-13 | End: 2018-04-13

## 2018-04-13 RX ADMIN — ONDANSETRON 4 MG: 2 INJECTION INTRAMUSCULAR; INTRAVENOUS at 11:45

## 2018-04-13 RX ADMIN — MORPHINE SULFATE 4 MG: 10 INJECTION INTRAVENOUS at 11:45

## 2018-04-13 ASSESSMENT — ENCOUNTER SYMPTOMS
COUGH: 0
ABDOMINAL PAIN: 0
PHOTOPHOBIA: 0
SHORTNESS OF BREATH: 0
VOMITING: 0

## 2018-04-13 ASSESSMENT — PAIN SCALES - WONG BAKER
WONGBAKER_NUMERICALRESPONSE: 0
WONGBAKER_NUMERICALRESPONSE: 0

## 2018-04-13 ASSESSMENT — PAIN SCALES - GENERAL
PAINLEVEL_OUTOF10: 5
PAINLEVEL_OUTOF10: 8

## 2018-04-13 ASSESSMENT — PAIN DESCRIPTION - PAIN TYPE: TYPE: ACUTE PAIN

## 2018-04-13 ASSESSMENT — PAIN DESCRIPTION - ORIENTATION: ORIENTATION: LEFT

## 2018-04-13 ASSESSMENT — PAIN DESCRIPTION - LOCATION: LOCATION: ARM;NECK

## 2018-04-16 LAB
EKG P AXIS: NORMAL DEGREES
EKG P-R INTERVAL: NORMAL MS
EKG Q-T INTERVAL: 470 MS
EKG QRS DURATION: 184 MS
EKG QTC CALCULATION (BAZETT): 485 MS
EKG T AXIS: 112 DEGREES

## 2018-04-18 ENCOUNTER — HOSPITAL ENCOUNTER (OUTPATIENT)
Dept: GENERAL RADIOLOGY | Age: 78
Discharge: HOME | End: 2018-04-18
Payer: MEDICARE

## 2018-04-18 ENCOUNTER — OFFICE VISIT (OUTPATIENT)
Dept: CARDIOLOGY | Age: 78
End: 2018-04-18
Payer: MEDICARE

## 2018-04-18 ENCOUNTER — HOSPITAL ENCOUNTER (OUTPATIENT)
Dept: NON INVASIVE DIAGNOSTICS | Age: 78
Discharge: HOME | End: 2018-04-18
Payer: MEDICARE

## 2018-04-18 VITALS
HEART RATE: 66 BPM | DIASTOLIC BLOOD PRESSURE: 68 MMHG | WEIGHT: 236 LBS | SYSTOLIC BLOOD PRESSURE: 112 MMHG | HEIGHT: 70 IN | BODY MASS INDEX: 33.79 KG/M2

## 2018-04-18 DIAGNOSIS — I34.2 NON-RHEUMATIC MITRAL VALVE STENOSIS: ICD-10-CM

## 2018-04-18 DIAGNOSIS — R06.09 DOE (DYSPNEA ON EXERTION): ICD-10-CM

## 2018-04-18 DIAGNOSIS — R55 BLACKOUT SPELL: ICD-10-CM

## 2018-04-18 DIAGNOSIS — E11.9 TYPE 2 DIABETES MELLITUS WITHOUT COMPLICATION, UNSPECIFIED LONG TERM INSULIN USE STATUS: ICD-10-CM

## 2018-04-18 DIAGNOSIS — I10 ESSENTIAL HYPERTENSION: Primary | Chronic | ICD-10-CM

## 2018-04-18 DIAGNOSIS — G58.9 PINCHED NERVE IN NECK: ICD-10-CM

## 2018-04-18 DIAGNOSIS — I35.0 NONRHEUMATIC AORTIC VALVE STENOSIS: ICD-10-CM

## 2018-04-18 LAB
ALBUMIN SERPL-MCNC: 4.3 G/DL (ref 3.5–5.2)
ANION GAP SERPL CALCULATED.3IONS-SCNC: 17 MMOL/L (ref 7–19)
BASOPHILS ABSOLUTE: 0 K/UL (ref 0–0.2)
BASOPHILS RELATIVE PERCENT: 0.3 % (ref 0–1)
BUN BLDV-MCNC: 25 MG/DL (ref 8–23)
CALCIUM SERPL-MCNC: 9.8 MG/DL (ref 8.8–10.2)
CHLORIDE BLD-SCNC: 99 MMOL/L (ref 98–111)
CO2: 21 MMOL/L (ref 22–29)
CREAT SERPL-MCNC: 0.9 MG/DL (ref 0.5–1.2)
EOSINOPHILS ABSOLUTE: 0.1 K/UL (ref 0–0.6)
EOSINOPHILS RELATIVE PERCENT: 1.4 % (ref 0–5)
GFR NON-AFRICAN AMERICAN: >60
GLUCOSE BLD-MCNC: 151 MG/DL (ref 74–109)
HBA1C MFR BLD: 7.6 %
HCT VFR BLD CALC: 48.2 % (ref 42–52)
HEMOGLOBIN: 16.3 G/DL (ref 14–18)
INR BLD: 1.03 (ref 0.88–1.18)
LV EF: 50 %
LVEF MODALITY: NORMAL
LYMPHOCYTES ABSOLUTE: 1.1 K/UL (ref 1.1–4.5)
LYMPHOCYTES RELATIVE PERCENT: 16.9 % (ref 20–40)
MCH RBC QN AUTO: 30.8 PG (ref 27–31)
MCHC RBC AUTO-ENTMCNC: 33.8 G/DL (ref 33–37)
MCV RBC AUTO: 91.1 FL (ref 80–94)
MONOCYTES ABSOLUTE: 0.9 K/UL (ref 0–0.9)
MONOCYTES RELATIVE PERCENT: 13.1 % (ref 0–10)
NEUTROPHILS ABSOLUTE: 4.4 K/UL (ref 1.5–7.5)
NEUTROPHILS RELATIVE PERCENT: 66.9 % (ref 50–65)
PDW BLD-RTO: 13.6 % (ref 11.5–14.5)
PLATELET # BLD: 165 K/UL (ref 130–400)
PMV BLD AUTO: 10 FL (ref 9.4–12.4)
POTASSIUM SERPL-SCNC: 4.8 MMOL/L (ref 3.5–5)
PROTHROMBIN TIME: 13.4 SEC (ref 12–14.6)
RBC # BLD: 5.29 M/UL (ref 4.7–6.1)
SODIUM BLD-SCNC: 137 MMOL/L (ref 136–145)
WBC # BLD: 6.6 K/UL (ref 4.8–10.8)

## 2018-04-18 PROCEDURE — 99215 OFFICE O/P EST HI 40 MIN: CPT | Performed by: INTERNAL MEDICINE

## 2018-04-18 PROCEDURE — 93306 TTE W/DOPPLER COMPLETE: CPT

## 2018-04-18 PROCEDURE — 1123F ACP DISCUSS/DSCN MKR DOCD: CPT | Performed by: INTERNAL MEDICINE

## 2018-04-18 PROCEDURE — 93000 ELECTROCARDIOGRAM COMPLETE: CPT | Performed by: INTERNAL MEDICINE

## 2018-04-18 PROCEDURE — G8417 CALC BMI ABV UP PARAM F/U: HCPCS | Performed by: INTERNAL MEDICINE

## 2018-04-18 PROCEDURE — 1036F TOBACCO NON-USER: CPT | Performed by: INTERNAL MEDICINE

## 2018-04-18 PROCEDURE — 4040F PNEUMOC VAC/ADMIN/RCVD: CPT | Performed by: INTERNAL MEDICINE

## 2018-04-18 PROCEDURE — 71046 X-RAY EXAM CHEST 2 VIEWS: CPT

## 2018-04-18 PROCEDURE — G8427 DOCREV CUR MEDS BY ELIG CLIN: HCPCS | Performed by: INTERNAL MEDICINE

## 2018-05-05 ENCOUNTER — APPOINTMENT (OUTPATIENT)
Dept: GENERAL RADIOLOGY | Age: 78
End: 2018-05-05
Payer: MEDICARE

## 2018-05-05 ENCOUNTER — HOSPITAL ENCOUNTER (EMERGENCY)
Age: 78
Discharge: HOME | End: 2018-05-05
Attending: EMERGENCY MEDICINE
Payer: MEDICARE

## 2018-05-05 VITALS
BODY MASS INDEX: 33.36 KG/M2 | HEART RATE: 78 BPM | RESPIRATION RATE: 16 BRPM | HEIGHT: 70 IN | WEIGHT: 233 LBS | SYSTOLIC BLOOD PRESSURE: 126 MMHG | OXYGEN SATURATION: 93 % | DIASTOLIC BLOOD PRESSURE: 78 MMHG | TEMPERATURE: 98.7 F

## 2018-05-05 DIAGNOSIS — I35.0 NONRHEUMATIC AORTIC VALVE STENOSIS: ICD-10-CM

## 2018-05-05 DIAGNOSIS — R42 DIZZINESS: Primary | ICD-10-CM

## 2018-05-05 LAB
ALBUMIN SERPL-MCNC: 4.2 G/DL (ref 3.5–5.2)
ALP BLD-CCNC: 64 U/L (ref 40–130)
ALT SERPL-CCNC: 44 U/L (ref 5–41)
ANION GAP SERPL CALCULATED.3IONS-SCNC: 14 MMOL/L (ref 7–19)
AST SERPL-CCNC: 24 U/L (ref 5–40)
BASOPHILS ABSOLUTE: 0 K/UL (ref 0–0.2)
BASOPHILS RELATIVE PERCENT: 0.3 % (ref 0–1)
BILIRUB SERPL-MCNC: 0.5 MG/DL (ref 0.2–1.2)
BUN BLDV-MCNC: 26 MG/DL (ref 8–23)
CALCIUM SERPL-MCNC: 9.6 MG/DL (ref 8.8–10.2)
CHLORIDE BLD-SCNC: 97 MMOL/L (ref 98–111)
CO2: 23 MMOL/L (ref 22–29)
CREAT SERPL-MCNC: 0.9 MG/DL (ref 0.5–1.2)
EOSINOPHILS ABSOLUTE: 0.2 K/UL (ref 0–0.6)
EOSINOPHILS RELATIVE PERCENT: 2.5 % (ref 0–5)
GFR NON-AFRICAN AMERICAN: >60
GLUCOSE BLD-MCNC: 214 MG/DL (ref 74–109)
HCT VFR BLD CALC: 47.8 % (ref 42–52)
HEMOGLOBIN: 16.3 G/DL (ref 14–18)
LYMPHOCYTES ABSOLUTE: 1 K/UL (ref 1.1–4.5)
LYMPHOCYTES RELATIVE PERCENT: 15.6 % (ref 20–40)
MCH RBC QN AUTO: 31 PG (ref 27–31)
MCHC RBC AUTO-ENTMCNC: 34.1 G/DL (ref 33–37)
MCV RBC AUTO: 90.9 FL (ref 80–94)
MONOCYTES ABSOLUTE: 0.8 K/UL (ref 0–0.9)
MONOCYTES RELATIVE PERCENT: 12.4 % (ref 0–10)
NEUTROPHILS ABSOLUTE: 4.3 K/UL (ref 1.5–7.5)
NEUTROPHILS RELATIVE PERCENT: 68.1 % (ref 50–65)
PDW BLD-RTO: 13.6 % (ref 11.5–14.5)
PERFORMED ON: NORMAL
PERFORMED ON: NORMAL
PLATELET # BLD: 132 K/UL (ref 130–400)
PMV BLD AUTO: 10.4 FL (ref 9.4–12.4)
POC TROPONIN I: 0.01 NG/ML (ref 0–0.08)
POC TROPONIN I: 0.01 NG/ML (ref 0–0.08)
POTASSIUM SERPL-SCNC: 4.5 MMOL/L (ref 3.5–5)
PRO-BNP: 204 PG/ML (ref 0–1800)
RBC # BLD: 5.26 M/UL (ref 4.7–6.1)
SODIUM BLD-SCNC: 134 MMOL/L (ref 136–145)
TOTAL PROTEIN: 6.7 G/DL (ref 6.6–8.7)
WBC # BLD: 6.4 K/UL (ref 4.8–10.8)

## 2018-05-05 PROCEDURE — 83880 ASSAY OF NATRIURETIC PEPTIDE: CPT

## 2018-05-05 PROCEDURE — 93005 ELECTROCARDIOGRAM TRACING: CPT

## 2018-05-05 PROCEDURE — 36415 COLL VENOUS BLD VENIPUNCTURE: CPT

## 2018-05-05 PROCEDURE — 84484 ASSAY OF TROPONIN QUANT: CPT

## 2018-05-05 PROCEDURE — 2580000003 HC RX 258: Performed by: EMERGENCY MEDICINE

## 2018-05-05 PROCEDURE — 99284 EMERGENCY DEPT VISIT MOD MDM: CPT

## 2018-05-05 PROCEDURE — 80053 COMPREHEN METABOLIC PANEL: CPT

## 2018-05-05 PROCEDURE — 99285 EMERGENCY DEPT VISIT HI MDM: CPT | Performed by: EMERGENCY MEDICINE

## 2018-05-05 PROCEDURE — 71045 X-RAY EXAM CHEST 1 VIEW: CPT

## 2018-05-05 PROCEDURE — 85025 COMPLETE CBC W/AUTO DIFF WBC: CPT

## 2018-05-05 RX ORDER — 0.9 % SODIUM CHLORIDE 0.9 %
500 INTRAVENOUS SOLUTION INTRAVENOUS ONCE
Status: COMPLETED | OUTPATIENT
Start: 2018-05-05 | End: 2018-05-05

## 2018-05-05 RX ADMIN — SODIUM CHLORIDE 500 ML: 9 INJECTION, SOLUTION INTRAVENOUS at 07:42

## 2018-05-05 ASSESSMENT — ENCOUNTER SYMPTOMS
COUGH: 0
SHORTNESS OF BREATH: 0
RHINORRHEA: 0
PHOTOPHOBIA: 0
BACK PAIN: 0
VOMITING: 0
SORE THROAT: 0
DIARRHEA: 0
NAUSEA: 0
ABDOMINAL PAIN: 0

## 2018-05-08 ENCOUNTER — OFFICE VISIT (OUTPATIENT)
Dept: CARDIOTHORACIC SURGERY | Age: 78
End: 2018-05-08
Payer: MEDICARE

## 2018-05-08 VITALS
DIASTOLIC BLOOD PRESSURE: 87 MMHG | SYSTOLIC BLOOD PRESSURE: 138 MMHG | WEIGHT: 238 LBS | HEIGHT: 70 IN | HEART RATE: 69 BPM | BODY MASS INDEX: 34.07 KG/M2 | OXYGEN SATURATION: 97 %

## 2018-05-08 DIAGNOSIS — Z87.891 HISTORY OF CIGARETTE SMOKING: ICD-10-CM

## 2018-05-08 DIAGNOSIS — I05.0 RHEUMATIC MITRAL STENOSIS: ICD-10-CM

## 2018-05-08 DIAGNOSIS — I35.0 NONRHEUMATIC AORTIC VALVE STENOSIS: Primary | ICD-10-CM

## 2018-05-08 DIAGNOSIS — I06.0 RHEUMATIC AORTIC STENOSIS: Primary | ICD-10-CM

## 2018-05-08 LAB
EKG P AXIS: NORMAL DEGREES
EKG P-R INTERVAL: NORMAL MS
EKG Q-T INTERVAL: 348 MS
EKG QRS DURATION: 176 MS
EKG QTC CALCULATION (BAZETT): 381 MS
EKG T AXIS: -55 DEGREES

## 2018-05-08 PROCEDURE — G8417 CALC BMI ABV UP PARAM F/U: HCPCS | Performed by: THORACIC SURGERY (CARDIOTHORACIC VASCULAR SURGERY)

## 2018-05-08 PROCEDURE — G8427 DOCREV CUR MEDS BY ELIG CLIN: HCPCS | Performed by: THORACIC SURGERY (CARDIOTHORACIC VASCULAR SURGERY)

## 2018-05-08 PROCEDURE — 1123F ACP DISCUSS/DSCN MKR DOCD: CPT | Performed by: THORACIC SURGERY (CARDIOTHORACIC VASCULAR SURGERY)

## 2018-05-08 PROCEDURE — 1036F TOBACCO NON-USER: CPT | Performed by: THORACIC SURGERY (CARDIOTHORACIC VASCULAR SURGERY)

## 2018-05-08 PROCEDURE — 99205 OFFICE O/P NEW HI 60 MIN: CPT | Performed by: THORACIC SURGERY (CARDIOTHORACIC VASCULAR SURGERY)

## 2018-05-08 PROCEDURE — 4040F PNEUMOC VAC/ADMIN/RCVD: CPT | Performed by: THORACIC SURGERY (CARDIOTHORACIC VASCULAR SURGERY)

## 2018-05-08 RX ORDER — METOCLOPRAMIDE 10 MG/1
10 TABLET ORAL 3 TIMES DAILY
Status: ON HOLD | COMMUNITY
End: 2019-02-22

## 2018-05-10 ENCOUNTER — TELEPHONE (OUTPATIENT)
Dept: CARDIOLOGY | Age: 78
End: 2018-05-10

## 2018-05-14 ENCOUNTER — HOSPITAL ENCOUNTER (OUTPATIENT)
Dept: PULMONOLOGY | Age: 78
Discharge: HOME | End: 2018-05-14
Payer: MEDICARE

## 2018-05-14 DIAGNOSIS — I35.0 NONRHEUMATIC AORTIC VALVE STENOSIS: ICD-10-CM

## 2018-05-14 DIAGNOSIS — Z87.891 HISTORY OF CIGARETTE SMOKING: ICD-10-CM

## 2018-05-14 LAB
BASE EXCESS ARTERIAL: 0.2 MMOL/L (ref -2–2)
CARBOXYHEMOGLOBIN ARTERIAL: 0 % (ref 0–5)
HCO3 ARTERIAL: 24.7 MMOL/L (ref 22–26)
HEMOGLOBIN, ART, EXTENDED: 17 G/DL (ref 14–18)
METHEMOGLOBIN ARTERIAL: 0.2 %
O2 CONTENT ARTERIAL: 22.5 ML/DL
O2 SAT, ARTERIAL: 94.1 %
O2 THERAPY: NORMAL
PCO2 ARTERIAL: 39 MMHG (ref 35–45)
PH ARTERIAL: 7.41 (ref 7.35–7.45)
PO2 ARTERIAL: 81 MMHG (ref 80–100)
POTASSIUM, WHOLE BLOOD: 4.5

## 2018-05-14 PROCEDURE — 94729 DIFFUSING CAPACITY: CPT

## 2018-05-14 PROCEDURE — 82803 BLOOD GASES ANY COMBINATION: CPT

## 2018-05-14 PROCEDURE — 36600 WITHDRAWAL OF ARTERIAL BLOOD: CPT

## 2018-05-14 PROCEDURE — 6360000002 HC RX W HCPCS: Performed by: THORACIC SURGERY (CARDIOTHORACIC VASCULAR SURGERY)

## 2018-05-14 PROCEDURE — 84132 ASSAY OF SERUM POTASSIUM: CPT

## 2018-05-14 PROCEDURE — 94060 EVALUATION OF WHEEZING: CPT

## 2018-05-14 PROCEDURE — 94727 GAS DIL/WSHOT DETER LNG VOL: CPT

## 2018-05-14 RX ORDER — ALBUTEROL SULFATE 2.5 MG/3ML
2.5 SOLUTION RESPIRATORY (INHALATION) EVERY 6 HOURS PRN
Status: DISCONTINUED | OUTPATIENT
Start: 2018-05-14 | End: 2018-05-16 | Stop reason: HOSPADM

## 2018-05-14 RX ADMIN — ALBUTEROL SULFATE 2.5 MG: 2.5 SOLUTION RESPIRATORY (INHALATION) at 09:24

## 2018-05-15 DIAGNOSIS — I35.9 AORTIC VALVE DISEASE: Primary | Chronic | ICD-10-CM

## 2018-05-15 DIAGNOSIS — I35.0 NONRHEUMATIC AORTIC VALVE STENOSIS: ICD-10-CM

## 2018-05-16 ENCOUNTER — HOSPITAL ENCOUNTER (OUTPATIENT)
Dept: CARDIAC CATH/INVASIVE PROCEDURES | Age: 78
Discharge: HOME | End: 2018-05-16
Attending: INTERNAL MEDICINE | Admitting: INTERNAL MEDICINE
Payer: MEDICARE

## 2018-05-16 VITALS
HEIGHT: 70 IN | HEART RATE: 60 BPM | WEIGHT: 237 LBS | RESPIRATION RATE: 19 BRPM | DIASTOLIC BLOOD PRESSURE: 60 MMHG | SYSTOLIC BLOOD PRESSURE: 110 MMHG | BODY MASS INDEX: 33.93 KG/M2 | TEMPERATURE: 98.6 F | OXYGEN SATURATION: 97 %

## 2018-05-16 LAB
GLUCOSE BLD-MCNC: 136 MG/DL (ref 70–99)
PERFORMED ON: ABNORMAL

## 2018-05-16 PROCEDURE — 93312 ECHO TRANSESOPHAGEAL: CPT

## 2018-05-16 PROCEDURE — 93325 DOPPLER ECHO COLOR FLOW MAPG: CPT

## 2018-05-16 PROCEDURE — 6360000002 HC RX W HCPCS

## 2018-05-16 PROCEDURE — 2580000003 HC RX 258: Performed by: INTERNAL MEDICINE

## 2018-05-16 PROCEDURE — 6370000000 HC RX 637 (ALT 250 FOR IP)

## 2018-05-16 PROCEDURE — 82948 REAGENT STRIP/BLOOD GLUCOSE: CPT

## 2018-05-16 RX ORDER — SODIUM CHLORIDE 9 MG/ML
INJECTION, SOLUTION INTRAVENOUS CONTINUOUS
Status: DISCONTINUED | OUTPATIENT
Start: 2018-05-16 | End: 2018-05-16 | Stop reason: HOSPADM

## 2018-05-16 RX ADMIN — SODIUM CHLORIDE: 9 INJECTION, SOLUTION INTRAVENOUS at 10:52

## 2018-05-18 ENCOUNTER — TELEPHONE (OUTPATIENT)
Dept: CARDIOLOGY | Age: 78
End: 2018-05-18

## 2018-05-29 ENCOUNTER — OFFICE VISIT (OUTPATIENT)
Dept: CARDIOTHORACIC SURGERY | Age: 78
End: 2018-05-29
Payer: MEDICARE

## 2018-05-29 VITALS
WEIGHT: 238 LBS | HEART RATE: 68 BPM | SYSTOLIC BLOOD PRESSURE: 138 MMHG | OXYGEN SATURATION: 97 % | BODY MASS INDEX: 34.07 KG/M2 | DIASTOLIC BLOOD PRESSURE: 85 MMHG | HEIGHT: 70 IN

## 2018-05-29 DIAGNOSIS — I06.0 RHEUMATIC AORTIC STENOSIS: Primary | ICD-10-CM

## 2018-05-29 PROCEDURE — G8427 DOCREV CUR MEDS BY ELIG CLIN: HCPCS | Performed by: THORACIC SURGERY (CARDIOTHORACIC VASCULAR SURGERY)

## 2018-05-29 PROCEDURE — 4040F PNEUMOC VAC/ADMIN/RCVD: CPT | Performed by: THORACIC SURGERY (CARDIOTHORACIC VASCULAR SURGERY)

## 2018-05-29 PROCEDURE — G8417 CALC BMI ABV UP PARAM F/U: HCPCS | Performed by: THORACIC SURGERY (CARDIOTHORACIC VASCULAR SURGERY)

## 2018-05-29 PROCEDURE — 1123F ACP DISCUSS/DSCN MKR DOCD: CPT | Performed by: THORACIC SURGERY (CARDIOTHORACIC VASCULAR SURGERY)

## 2018-05-29 PROCEDURE — 99213 OFFICE O/P EST LOW 20 MIN: CPT | Performed by: THORACIC SURGERY (CARDIOTHORACIC VASCULAR SURGERY)

## 2018-05-29 PROCEDURE — 1036F TOBACCO NON-USER: CPT | Performed by: THORACIC SURGERY (CARDIOTHORACIC VASCULAR SURGERY)

## 2018-05-31 ENCOUNTER — TELEPHONE (OUTPATIENT)
Dept: CARDIOTHORACIC SURGERY | Age: 78
End: 2018-05-31

## 2018-06-04 ENCOUNTER — PREP FOR PROCEDURE (OUTPATIENT)
Dept: CARDIOTHORACIC SURGERY | Age: 78
End: 2018-06-04

## 2018-06-04 DIAGNOSIS — I06.0 RHEUMATIC AORTIC STENOSIS: Primary | ICD-10-CM

## 2018-06-04 RX ORDER — SODIUM CHLORIDE, SODIUM LACTATE, POTASSIUM CHLORIDE, CALCIUM CHLORIDE 600; 310; 30; 20 MG/100ML; MG/100ML; MG/100ML; MG/100ML
INJECTION, SOLUTION INTRAVENOUS CONTINUOUS
Status: CANCELLED | OUTPATIENT
Start: 2018-06-04

## 2018-06-04 RX ORDER — CHLORHEXIDINE GLUCONATE 4 G/100ML
SOLUTION TOPICAL ONCE
Status: CANCELLED | OUTPATIENT
Start: 2018-06-06

## 2018-06-05 ENCOUNTER — HOSPITAL ENCOUNTER (OUTPATIENT)
Dept: PREADMISSION TESTING | Age: 78
Discharge: HOME | DRG: 220 | End: 2018-06-09
Payer: MEDICARE

## 2018-06-05 ENCOUNTER — HOSPITAL ENCOUNTER (OUTPATIENT)
Dept: GENERAL RADIOLOGY | Age: 78
Discharge: HOME | DRG: 220 | End: 2018-06-05
Payer: MEDICARE

## 2018-06-05 VITALS — WEIGHT: 240 LBS | HEIGHT: 70 IN | BODY MASS INDEX: 34.36 KG/M2

## 2018-06-05 DIAGNOSIS — I06.0 RHEUMATIC AORTIC STENOSIS: ICD-10-CM

## 2018-06-05 LAB
ABO/RH: NORMAL
ALBUMIN SERPL-MCNC: 4.3 G/DL (ref 3.5–5.2)
ALP BLD-CCNC: 76 U/L (ref 40–130)
ALT SERPL-CCNC: 9 U/L (ref 5–41)
ANION GAP SERPL CALCULATED.3IONS-SCNC: 14 MMOL/L (ref 7–19)
ANTIBODY SCREEN: NORMAL
AST SERPL-CCNC: 28 U/L (ref 5–40)
BASOPHILS ABSOLUTE: 0 K/UL (ref 0–0.2)
BASOPHILS RELATIVE PERCENT: 0.5 % (ref 0–1)
BILIRUB SERPL-MCNC: 0.4 MG/DL (ref 0.2–1.2)
BILIRUBIN URINE: NEGATIVE
BLOOD, URINE: NEGATIVE
BUN BLDV-MCNC: 24 MG/DL (ref 8–23)
CALCIUM SERPL-MCNC: 9.8 MG/DL (ref 8.8–10.2)
CHLORIDE BLD-SCNC: 95 MMOL/L (ref 98–111)
CLARITY: CLEAR
CO2: 26 MMOL/L (ref 22–29)
COLOR: ABNORMAL
CREAT SERPL-MCNC: 1 MG/DL (ref 0.5–1.2)
EOSINOPHILS ABSOLUTE: 0.1 K/UL (ref 0–0.6)
EOSINOPHILS RELATIVE PERCENT: 1.8 % (ref 0–5)
FIBRINOGEN: 364 MG/DL (ref 238–505)
GFR NON-AFRICAN AMERICAN: >60
GLUCOSE BLD-MCNC: 181 MG/DL (ref 74–109)
GLUCOSE URINE: 500 MG/DL
HCT VFR BLD CALC: 48.3 % (ref 42–52)
HEMOGLOBIN: 16.1 G/DL (ref 14–18)
INR BLD: 0.99 (ref 0.88–1.18)
KETONES, URINE: NEGATIVE MG/DL
LEUKOCYTE ESTERASE, URINE: NEGATIVE
LYMPHOCYTES ABSOLUTE: 1.1 K/UL (ref 1.1–4.5)
LYMPHOCYTES RELATIVE PERCENT: 17.6 % (ref 20–40)
MAGNESIUM: 1.9 MG/DL (ref 1.6–2.4)
MCH RBC QN AUTO: 30.7 PG (ref 27–31)
MCHC RBC AUTO-ENTMCNC: 33.3 G/DL (ref 33–37)
MCV RBC AUTO: 92.2 FL (ref 80–94)
MONOCYTES ABSOLUTE: 0.9 K/UL (ref 0–0.9)
MONOCYTES RELATIVE PERCENT: 14.1 % (ref 0–10)
NEUTROPHILS ABSOLUTE: 4 K/UL (ref 1.5–7.5)
NEUTROPHILS RELATIVE PERCENT: 64.9 % (ref 50–65)
NITRITE, URINE: NEGATIVE
PDW BLD-RTO: 14.6 % (ref 11.5–14.5)
PH UA: 6.5
PLATELET # BLD: 146 K/UL (ref 130–400)
PMV BLD AUTO: 10.3 FL (ref 9.4–12.4)
POTASSIUM REFLEX MAGNESIUM: 4.1 MMOL/L (ref 3.5–5)
PROTEIN UA: NEGATIVE MG/DL
PROTHROMBIN TIME: 13 SEC (ref 12–14.6)
RBC # BLD: 5.24 M/UL (ref 4.7–6.1)
SODIUM BLD-SCNC: 135 MMOL/L (ref 136–145)
SPECIFIC GRAVITY UA: 1.02
TOTAL PROTEIN: 7.1 G/DL (ref 6.6–8.7)
UROBILINOGEN, URINE: 1 E.U./DL
WBC # BLD: 6.2 K/UL (ref 4.8–10.8)

## 2018-06-05 PROCEDURE — 87070 CULTURE OTHR SPECIMN AEROBIC: CPT

## 2018-06-05 PROCEDURE — 93005 ELECTROCARDIOGRAM TRACING: CPT

## 2018-06-05 PROCEDURE — 86901 BLOOD TYPING SEROLOGIC RH(D): CPT

## 2018-06-05 PROCEDURE — 85610 PROTHROMBIN TIME: CPT

## 2018-06-05 PROCEDURE — 80053 COMPREHEN METABOLIC PANEL: CPT

## 2018-06-05 PROCEDURE — 85384 FIBRINOGEN ACTIVITY: CPT

## 2018-06-05 PROCEDURE — 85025 COMPLETE CBC W/AUTO DIFF WBC: CPT

## 2018-06-05 PROCEDURE — 71046 X-RAY EXAM CHEST 2 VIEWS: CPT

## 2018-06-05 PROCEDURE — 86850 RBC ANTIBODY SCREEN: CPT

## 2018-06-05 PROCEDURE — 83735 ASSAY OF MAGNESIUM: CPT

## 2018-06-05 PROCEDURE — 81003 URINALYSIS AUTO W/O SCOPE: CPT

## 2018-06-05 PROCEDURE — 86900 BLOOD TYPING SEROLOGIC ABO: CPT

## 2018-06-05 RX ORDER — CHLORHEXIDINE GLUCONATE 4 G/100ML
SOLUTION TOPICAL ONCE
Status: DISCONTINUED | OUTPATIENT
Start: 2018-06-06 | End: 2018-06-11 | Stop reason: HOSPADM

## 2018-06-06 LAB
EKG P AXIS: NORMAL DEGREES
EKG P-R INTERVAL: NORMAL MS
EKG Q-T INTERVAL: 498 MS
EKG QRS DURATION: 190 MS
EKG QTC CALCULATION (BAZETT): 499 MS
EKG T AXIS: 110 DEGREES
MRSA CULTURE ONLY: ABNORMAL
MRSA CULTURE ONLY: ABNORMAL
ORGANISM: ABNORMAL

## 2018-06-07 ENCOUNTER — ANESTHESIA EVENT (OUTPATIENT)
Dept: OPERATING ROOM | Age: 78
DRG: 220 | End: 2018-06-07
Payer: MEDICARE

## 2018-06-07 ENCOUNTER — HOSPITAL ENCOUNTER (INPATIENT)
Age: 78
LOS: 4 days | Discharge: HOME HEALTH | DRG: 220 | End: 2018-06-11
Attending: THORACIC SURGERY (CARDIOTHORACIC VASCULAR SURGERY) | Admitting: THORACIC SURGERY (CARDIOTHORACIC VASCULAR SURGERY)
Payer: MEDICARE

## 2018-06-07 ENCOUNTER — APPOINTMENT (OUTPATIENT)
Dept: GENERAL RADIOLOGY | Age: 78
DRG: 220 | End: 2018-06-07
Attending: THORACIC SURGERY (CARDIOTHORACIC VASCULAR SURGERY)
Payer: MEDICARE

## 2018-06-07 ENCOUNTER — ANESTHESIA (OUTPATIENT)
Dept: OPERATING ROOM | Age: 78
DRG: 220 | End: 2018-06-07
Payer: MEDICARE

## 2018-06-07 VITALS
OXYGEN SATURATION: 100 % | RESPIRATION RATE: 3 BRPM | DIASTOLIC BLOOD PRESSURE: 64 MMHG | SYSTOLIC BLOOD PRESSURE: 146 MMHG | TEMPERATURE: 98.8 F

## 2018-06-07 DIAGNOSIS — G89.18 POSTOPERATIVE PAIN: Primary | ICD-10-CM

## 2018-06-07 PROBLEM — I35.0 CALCIFIC AORTIC STENOSIS: Status: ACTIVE | Noted: 2018-06-07

## 2018-06-07 LAB
ANION GAP SERPL CALCULATED.3IONS-SCNC: 12 MMOL/L (ref 7–19)
APTT: 34.4 SEC (ref 26–36.2)
BASE EXCESS ARTERIAL: -1 (ref -3–3)
BASE EXCESS ARTERIAL: -1.8 MMOL/L (ref -2–2)
BASE EXCESS ARTERIAL: -2 (ref -3–3)
BASE EXCESS ARTERIAL: -2 MMOL/L (ref -2–2)
BASE EXCESS ARTERIAL: 1 (ref -3–3)
BASE EXCESS VENOUS: -1
BUN BLDV-MCNC: 22 MG/DL (ref 8–23)
CALCIUM IONIZED: 1.06 MMOL/L (ref 1.1–1.3)
CALCIUM IONIZED: 1.09 MMOL/L (ref 1.1–1.3)
CALCIUM IONIZED: 1.11 MMOL/L (ref 1.1–1.3)
CALCIUM IONIZED: 1.19 MMOL/L (ref 1.1–1.3)
CALCIUM IONIZED: 1.3 MMOL/L (ref 1.1–1.3)
CALCIUM IONIZED: 1.46 MMOL/L (ref 1.1–1.3)
CALCIUM SERPL-MCNC: 9.5 MG/DL (ref 8.8–10.2)
CARBOXYHEMOGLOBIN ARTERIAL: 0 % (ref 0–5)
CARBOXYHEMOGLOBIN ARTERIAL: 0 % (ref 0–5)
CHLORIDE BLD-SCNC: 103 MMOL/L (ref 98–111)
CO2: 23 MEQ/L (ref 21–32)
CO2: 23 MMOL/L (ref 22–29)
CO2: 24 MEQ/L (ref 21–32)
CO2: 24 MEQ/L (ref 21–32)
CO2: 25 MEQ/L (ref 21–32)
CO2: 26 MEQ/L (ref 21–32)
CO2: 26 MEQ/L (ref 21–32)
CREAT SERPL-MCNC: 1 MG/DL (ref 0.5–1.2)
GFR NON-AFRICAN AMERICAN: >60
GFR NON-AFRICAN AMERICAN: >60
GLUCOSE BLD-MCNC: 114 MG/DL (ref 70–99)
GLUCOSE BLD-MCNC: 120 MG/DL (ref 70–99)
GLUCOSE BLD-MCNC: 129 MG/DL (ref 70–99)
GLUCOSE BLD-MCNC: 132 MG/DL (ref 70–99)
GLUCOSE BLD-MCNC: 138 MG/DL (ref 70–99)
GLUCOSE BLD-MCNC: 138 MG/DL (ref 70–99)
GLUCOSE BLD-MCNC: 139 MG/DL (ref 70–99)
GLUCOSE BLD-MCNC: 139 MG/DL (ref 70–99)
GLUCOSE BLD-MCNC: 167 MG/DL (ref 70–99)
GLUCOSE BLD-MCNC: 182 MG/DL (ref 70–99)
GLUCOSE BLD-MCNC: 203 MG/DL (ref 70–99)
GLUCOSE BLD-MCNC: 222 MG/DL (ref 70–99)
GLUCOSE BLD-MCNC: 255 MG/DL (ref 70–99)
GLUCOSE BLD-MCNC: 265 MG/DL (ref 74–109)
GLUCOSE BLD-MCNC: 266 MG/DL (ref 70–99)
GLUCOSE BLD-MCNC: 279 MG/DL (ref 70–99)
GLUCOSE BLD-MCNC: 281 MG/DL (ref 70–99)
GLUCOSE BLD-MCNC: 288 MG/DL (ref 70–99)
GLUCOSE BLD-MCNC: 304 MG/DL (ref 70–99)
GLUCOSE BLD-MCNC: 308 MG/DL (ref 70–99)
GLUCOSE BLD-MCNC: 339 MG/DL (ref 70–99)
HCO3 ARTERIAL: 24.3 MMOL/L (ref 22–26)
HCO3 ARTERIAL: 24.7 MMOL/L (ref 22–26)
HCT VFR BLD CALC: 40 % (ref 42–52)
HCT VFR BLD CALC: 46.3 % (ref 42–52)
HEMOGLOBIN, ART, EXTENDED: 13.4 G/DL (ref 14–18)
HEMOGLOBIN, ART, EXTENDED: 15.8 G/DL (ref 14–18)
HEMOGLOBIN: 10.9 GM/DL (ref 12–18)
HEMOGLOBIN: 11.1 GM/DL (ref 12–18)
HEMOGLOBIN: 11.3 GM/DL (ref 12–18)
HEMOGLOBIN: 11.7 GM/DL (ref 12–18)
HEMOGLOBIN: 13.1 G/DL (ref 14–18)
HEMOGLOBIN: 14 GM/DL (ref 12–18)
HEMOGLOBIN: 15.3 G/DL (ref 14–18)
HEMOGLOBIN: 15.3 GM/DL (ref 12–18)
MAGNESIUM: 2 MG/DL (ref 1.6–2.4)
MCH RBC QN AUTO: 30.1 PG (ref 27–31)
MCH RBC QN AUTO: 30.4 PG (ref 27–31)
MCHC RBC AUTO-ENTMCNC: 32.8 G/DL (ref 33–37)
MCHC RBC AUTO-ENTMCNC: 33 G/DL (ref 33–37)
MCV RBC AUTO: 92 FL (ref 80–94)
MCV RBC AUTO: 92 FL (ref 80–94)
METHEMOGLOBIN ARTERIAL: 0.1 %
METHEMOGLOBIN ARTERIAL: 0.4 %
O2 CONTENT ARTERIAL: 19 ML/DL
O2 CONTENT ARTERIAL: 20.8 ML/DL
O2 SAT, ARTERIAL: 100 % (ref 93–100)
O2 SAT, ARTERIAL: 93.5 %
O2 SAT, ARTERIAL: 95.9 %
O2 SAT, VEN: 84 %
O2 THERAPY: ABNORMAL
O2 THERAPY: ABNORMAL
PCO2 ARTERIAL: 35 MM HG (ref 35–48)
PCO2 ARTERIAL: 40 MM HG (ref 35–48)
PCO2 ARTERIAL: 42 MM HG (ref 35–48)
PCO2 ARTERIAL: 42 MM HG (ref 35–48)
PCO2 ARTERIAL: 45 MMHG (ref 35–45)
PCO2 ARTERIAL: 46 MM HG (ref 35–48)
PCO2 ARTERIAL: 49 MMHG (ref 35–45)
PCO2, VEN: 50.6 MM HG (ref 40–50)
PDW BLD-RTO: 14.2 % (ref 11.5–14.5)
PDW BLD-RTO: 14.6 % (ref 11.5–14.5)
PERFORMED ON: ABNORMAL
PH ARTERIAL: 7.31 (ref 7.35–7.45)
PH ARTERIAL: 7.34 (ref 7.35–7.45)
PH ARTERIAL: 7.34 (ref 7.3–7.5)
PH ARTERIAL: 7.37 (ref 7.3–7.5)
PH ARTERIAL: 7.38 (ref 7.3–7.5)
PH ARTERIAL: 7.4 (ref 7.3–7.5)
PH ARTERIAL: 7.43 (ref 7.3–7.5)
PH VENOUS: 7.32
PLATELET # BLD: 114 K/UL (ref 130–400)
PLATELET # BLD: 146 K/UL (ref 130–400)
PMV BLD AUTO: 10.2 FL (ref 9.4–12.4)
PMV BLD AUTO: 10.4 FL (ref 9.4–12.4)
PO2 ARTERIAL: 239 MM HG (ref 83–108)
PO2 ARTERIAL: 278 MM HG (ref 83–108)
PO2 ARTERIAL: 347 MMHG (ref 80–100)
PO2 ARTERIAL: 463 MM HG (ref 83–108)
PO2 ARTERIAL: 510 MM HG (ref 83–108)
PO2 ARTERIAL: 622 MM HG (ref 83–108)
PO2 ARTERIAL: 80 MMHG (ref 80–100)
PO2, VEN: 53.9 MM HG
POC CREATININE: 1.1 MG/DL (ref 0.3–1.3)
POC HEMATOCRIT: 32 % (ref 37–52)
POC HEMATOCRIT: 33 % (ref 37–52)
POC HEMATOCRIT: 33 % (ref 37–52)
POC HEMATOCRIT: 34 % (ref 37–52)
POC HEMATOCRIT: 41 % (ref 37–52)
POC HEMATOCRIT: 45 % (ref 37–52)
POC POTASSIUM: 4.7 MEQ/L (ref 3.5–5.1)
POC POTASSIUM: 4.7 MEQ/L (ref 3.5–5.1)
POC POTASSIUM: 4.8 MEQ/L (ref 3.5–5.1)
POC POTASSIUM: 5.4 MEQ/L (ref 3.5–5.1)
POC POTASSIUM: 6.3 MEQ/L (ref 3.5–5.1)
POC POTASSIUM: 6.4 MEQ/L (ref 3.5–5.1)
POC SAMPLE TYPE: ABNORMAL
POC SODIUM: 136 MEQ/L (ref 136–145)
POC SODIUM: 136 MEQ/L (ref 136–145)
POC SODIUM: 138 MEQ/L (ref 136–145)
POC SODIUM: 139 MEQ/L (ref 136–145)
POC SODIUM: 140 MEQ/L (ref 136–145)
POC SODIUM: 140 MEQ/L (ref 136–145)
POTASSIUM SERPL-SCNC: 4.9 MMOL/L (ref 3.5–5)
POTASSIUM SERPL-SCNC: 4.9 MMOL/L (ref 3.5–5)
POTASSIUM, WHOLE BLOOD: 4.7
POTASSIUM, WHOLE BLOOD: 4.7
RBC # BLD: 4.35 M/UL (ref 4.7–6.1)
RBC # BLD: 5.03 M/UL (ref 4.7–6.1)
SODIUM BLD-SCNC: 138 MMOL/L (ref 136–145)
TCO2 ARTERIAL: 24 MMOL/L
TCO2 ARTERIAL: 25 MMOL/L
TCO2 ARTERIAL: 25 MMOL/L
TCO2 ARTERIAL: 26 MMOL/L
TCO2 ARTERIAL: 28 MMOL/L
TCO2 CALC VENOUS: 27 MMOL/L
WBC # BLD: 14.2 K/UL (ref 4.8–10.8)
WBC # BLD: 17.4 K/UL (ref 4.8–10.8)

## 2018-06-07 PROCEDURE — 6360000002 HC RX W HCPCS: Performed by: THORACIC SURGERY (CARDIOTHORACIC VASCULAR SURGERY)

## 2018-06-07 PROCEDURE — 94002 VENT MGMT INPAT INIT DAY: CPT

## 2018-06-07 PROCEDURE — 5A1221Z PERFORMANCE OF CARDIAC OUTPUT, CONTINUOUS: ICD-10-PCS | Performed by: THORACIC SURGERY (CARDIOTHORACIC VASCULAR SURGERY)

## 2018-06-07 PROCEDURE — 36600 WITHDRAWAL OF ARTERIAL BLOOD: CPT

## 2018-06-07 PROCEDURE — 2720000010 HC SURG SUPPLY STERILE: Performed by: THORACIC SURGERY (CARDIOTHORACIC VASCULAR SURGERY)

## 2018-06-07 PROCEDURE — 2000000000 HC ICU R&B

## 2018-06-07 PROCEDURE — 3700000001 HC ADD 15 MINUTES (ANESTHESIA): Performed by: THORACIC SURGERY (CARDIOTHORACIC VASCULAR SURGERY)

## 2018-06-07 PROCEDURE — 3600000090 HC PERFUSION PUMP ON: Performed by: THORACIC SURGERY (CARDIOTHORACIC VASCULAR SURGERY)

## 2018-06-07 PROCEDURE — B246ZZ4 ULTRASONOGRAPHY OF RIGHT AND LEFT HEART, TRANSESOPHAGEAL: ICD-10-PCS | Performed by: THORACIC SURGERY (CARDIOTHORACIC VASCULAR SURGERY)

## 2018-06-07 PROCEDURE — 82803 BLOOD GASES ANY COMBINATION: CPT

## 2018-06-07 PROCEDURE — 82565 ASSAY OF CREATININE: CPT

## 2018-06-07 PROCEDURE — 2580000003 HC RX 258: Performed by: NURSE ANESTHETIST, CERTIFIED REGISTERED

## 2018-06-07 PROCEDURE — C1894 INTRO/SHEATH, NON-LASER: HCPCS | Performed by: THORACIC SURGERY (CARDIOTHORACIC VASCULAR SURGERY)

## 2018-06-07 PROCEDURE — 80048 BASIC METABOLIC PNL TOTAL CA: CPT

## 2018-06-07 PROCEDURE — 2580000003 HC RX 258: Performed by: THORACIC SURGERY (CARDIOTHORACIC VASCULAR SURGERY)

## 2018-06-07 PROCEDURE — 6370000000 HC RX 637 (ALT 250 FOR IP): Performed by: THORACIC SURGERY (CARDIOTHORACIC VASCULAR SURGERY)

## 2018-06-07 PROCEDURE — 82800 BLOOD PH: CPT

## 2018-06-07 PROCEDURE — 2780000006 HC MISC HEART VALVE: Performed by: THORACIC SURGERY (CARDIOTHORACIC VASCULAR SURGERY)

## 2018-06-07 PROCEDURE — 83735 ASSAY OF MAGNESIUM: CPT

## 2018-06-07 PROCEDURE — 88305 TISSUE EXAM BY PATHOLOGIST: CPT

## 2018-06-07 PROCEDURE — 85014 HEMATOCRIT: CPT

## 2018-06-07 PROCEDURE — 82948 REAGENT STRIP/BLOOD GLUCOSE: CPT

## 2018-06-07 PROCEDURE — 2500000003 HC RX 250 WO HCPCS: Performed by: THORACIC SURGERY (CARDIOTHORACIC VASCULAR SURGERY)

## 2018-06-07 PROCEDURE — 6360000002 HC RX W HCPCS: Performed by: NURSE ANESTHETIST, CERTIFIED REGISTERED

## 2018-06-07 PROCEDURE — 85730 THROMBOPLASTIN TIME PARTIAL: CPT

## 2018-06-07 PROCEDURE — 6370000000 HC RX 637 (ALT 250 FOR IP): Performed by: NURSE ANESTHETIST, CERTIFIED REGISTERED

## 2018-06-07 PROCEDURE — 84132 ASSAY OF SERUM POTASSIUM: CPT

## 2018-06-07 PROCEDURE — 85027 COMPLETE CBC AUTOMATED: CPT

## 2018-06-07 PROCEDURE — 3600000018 HC SURGERY OHS ADDTL 15MIN: Performed by: THORACIC SURGERY (CARDIOTHORACIC VASCULAR SURGERY)

## 2018-06-07 PROCEDURE — 2500000003 HC RX 250 WO HCPCS: Performed by: NURSE ANESTHETIST, CERTIFIED REGISTERED

## 2018-06-07 PROCEDURE — 71045 X-RAY EXAM CHEST 1 VIEW: CPT

## 2018-06-07 PROCEDURE — 33405 REPLACEMENT AORTIC VALVE OPN: CPT | Performed by: THORACIC SURGERY (CARDIOTHORACIC VASCULAR SURGERY)

## 2018-06-07 PROCEDURE — 2720000001 HC MISC SURG SUPPLY STERILE $51-500: Performed by: THORACIC SURGERY (CARDIOTHORACIC VASCULAR SURGERY)

## 2018-06-07 PROCEDURE — 36592 COLLECT BLOOD FROM PICC: CPT

## 2018-06-07 PROCEDURE — 84295 ASSAY OF SERUM SODIUM: CPT

## 2018-06-07 PROCEDURE — 3700000000 HC ANESTHESIA ATTENDED CARE: Performed by: THORACIC SURGERY (CARDIOTHORACIC VASCULAR SURGERY)

## 2018-06-07 PROCEDURE — 85347 COAGULATION TIME ACTIVATED: CPT | Performed by: THORACIC SURGERY (CARDIOTHORACIC VASCULAR SURGERY)

## 2018-06-07 PROCEDURE — 82374 ASSAY BLOOD CARBON DIOXIDE: CPT

## 2018-06-07 PROCEDURE — C1729 CATH, DRAINAGE: HCPCS | Performed by: THORACIC SURGERY (CARDIOTHORACIC VASCULAR SURGERY)

## 2018-06-07 PROCEDURE — 02RF08Z REPLACEMENT OF AORTIC VALVE WITH ZOOPLASTIC TISSUE, OPEN APPROACH: ICD-10-PCS | Performed by: THORACIC SURGERY (CARDIOTHORACIC VASCULAR SURGERY)

## 2018-06-07 PROCEDURE — 82810 BLOOD GASES O2 SAT ONLY: CPT

## 2018-06-07 PROCEDURE — 2700000000 HC OXYGEN THERAPY PER DAY

## 2018-06-07 PROCEDURE — 82330 ASSAY OF CALCIUM: CPT

## 2018-06-07 PROCEDURE — 85530 HEPARIN-PROTAMINE TOLERANCE: CPT | Performed by: THORACIC SURGERY (CARDIOTHORACIC VASCULAR SURGERY)

## 2018-06-07 PROCEDURE — 94640 AIRWAY INHALATION TREATMENT: CPT

## 2018-06-07 PROCEDURE — 88300 SURGICAL PATH GROSS: CPT

## 2018-06-07 PROCEDURE — 2720000002 HC MISC SURG SUPPLY STERILE >$500: Performed by: THORACIC SURGERY (CARDIOTHORACIC VASCULAR SURGERY)

## 2018-06-07 PROCEDURE — 94664 DEMO&/EVAL PT USE INHALER: CPT

## 2018-06-07 PROCEDURE — P9045 ALBUMIN (HUMAN), 5%, 250 ML: HCPCS | Performed by: THORACIC SURGERY (CARDIOTHORACIC VASCULAR SURGERY)

## 2018-06-07 PROCEDURE — 3600000008 HC SURGERY OHS BASE: Performed by: THORACIC SURGERY (CARDIOTHORACIC VASCULAR SURGERY)

## 2018-06-07 DEVICE — VALVE AORT DIA21MM PERICARD SUP ANNULAR BOV PERICARD CO: Type: IMPLANTABLE DEVICE | Site: HEART | Status: FUNCTIONAL

## 2018-06-07 RX ORDER — LIDOCAINE HYDROCHLORIDE 10 MG/ML
1 INJECTION, SOLUTION EPIDURAL; INFILTRATION; INTRACAUDAL; PERINEURAL
Status: DISCONTINUED | OUTPATIENT
Start: 2018-06-07 | End: 2018-06-07

## 2018-06-07 RX ORDER — PROTAMINE SULFATE 10 MG/ML
INJECTION, SOLUTION INTRAVENOUS PRN
Status: DISCONTINUED | OUTPATIENT
Start: 2018-06-07 | End: 2018-06-07 | Stop reason: SDUPTHER

## 2018-06-07 RX ORDER — IPRATROPIUM BROMIDE AND ALBUTEROL SULFATE 2.5; .5 MG/3ML; MG/3ML
1 SOLUTION RESPIRATORY (INHALATION)
Status: DISCONTINUED | OUTPATIENT
Start: 2018-06-07 | End: 2018-06-11 | Stop reason: HOSPADM

## 2018-06-07 RX ORDER — SUFENTANIL CITRATE 50 UG/ML
INJECTION EPIDURAL; INTRAVENOUS PRN
Status: DISCONTINUED | OUTPATIENT
Start: 2018-06-07 | End: 2018-06-07 | Stop reason: SDUPTHER

## 2018-06-07 RX ORDER — SCOLOPAMINE TRANSDERMAL SYSTEM 1 MG/1
1 PATCH, EXTENDED RELEASE TRANSDERMAL ONCE
Status: DISCONTINUED | OUTPATIENT
Start: 2018-06-07 | End: 2018-06-07

## 2018-06-07 RX ORDER — LIDOCAINE HYDROCHLORIDE 10 MG/ML
1 INJECTION, SOLUTION EPIDURAL; INFILTRATION; INTRACAUDAL; PERINEURAL ONCE
Status: COMPLETED | OUTPATIENT
Start: 2018-06-07 | End: 2018-06-07

## 2018-06-07 RX ORDER — ASPIRIN 81 MG/1
81 TABLET ORAL DAILY
Status: DISCONTINUED | OUTPATIENT
Start: 2018-06-07 | End: 2018-06-11 | Stop reason: HOSPADM

## 2018-06-07 RX ORDER — MORPHINE SULFATE 4 MG/ML
4 INJECTION, SOLUTION INTRAMUSCULAR; INTRAVENOUS
Status: DISCONTINUED | OUTPATIENT
Start: 2018-06-07 | End: 2018-06-07

## 2018-06-07 RX ORDER — NICOTINE POLACRILEX 4 MG
15 LOZENGE BUCCAL PRN
Status: DISCONTINUED | OUTPATIENT
Start: 2018-06-07 | End: 2018-06-11 | Stop reason: HOSPADM

## 2018-06-07 RX ORDER — DEXTROSE MONOHYDRATE 50 MG/ML
100 INJECTION, SOLUTION INTRAVENOUS PRN
Status: DISCONTINUED | OUTPATIENT
Start: 2018-06-07 | End: 2018-06-11 | Stop reason: HOSPADM

## 2018-06-07 RX ORDER — SODIUM CHLORIDE 0.9 % (FLUSH) 0.9 %
10 SYRINGE (ML) INJECTION PRN
Status: DISCONTINUED | OUTPATIENT
Start: 2018-06-07 | End: 2018-06-11 | Stop reason: HOSPADM

## 2018-06-07 RX ORDER — CLOPIDOGREL BISULFATE 75 MG/1
75 TABLET ORAL DAILY
Status: DISCONTINUED | OUTPATIENT
Start: 2018-06-08 | End: 2018-06-11 | Stop reason: HOSPADM

## 2018-06-07 RX ORDER — POTASSIUM CHLORIDE 29.8 MG/ML
10 INJECTION INTRAVENOUS PRN
Status: DISCONTINUED | OUTPATIENT
Start: 2018-06-07 | End: 2018-06-11 | Stop reason: HOSPADM

## 2018-06-07 RX ORDER — ZOLPIDEM TARTRATE 5 MG/1
5 TABLET ORAL NIGHTLY PRN
Status: DISCONTINUED | OUTPATIENT
Start: 2018-06-08 | End: 2018-06-11 | Stop reason: HOSPADM

## 2018-06-07 RX ORDER — FAMOTIDINE 20 MG/1
20 TABLET, FILM COATED ORAL 2 TIMES DAILY
Status: DISCONTINUED | OUTPATIENT
Start: 2018-06-07 | End: 2018-06-11 | Stop reason: HOSPADM

## 2018-06-07 RX ORDER — MIDAZOLAM HYDROCHLORIDE 1 MG/ML
INJECTION INTRAMUSCULAR; INTRAVENOUS PRN
Status: DISCONTINUED | OUTPATIENT
Start: 2018-06-07 | End: 2018-06-07 | Stop reason: SDUPTHER

## 2018-06-07 RX ORDER — MORPHINE SULFATE 4 MG/ML
2 INJECTION, SOLUTION INTRAMUSCULAR; INTRAVENOUS
Status: DISCONTINUED | OUTPATIENT
Start: 2018-06-07 | End: 2018-06-07 | Stop reason: HOSPADM

## 2018-06-07 RX ORDER — MORPHINE SULFATE 1 MG/ML
4 INJECTION, SOLUTION EPIDURAL; INTRATHECAL; INTRAVENOUS
Status: DISCONTINUED | OUTPATIENT
Start: 2018-06-07 | End: 2018-06-09

## 2018-06-07 RX ORDER — SODIUM CHLORIDE 9 MG/ML
INJECTION, SOLUTION INTRAVENOUS CONTINUOUS
Status: DISCONTINUED | OUTPATIENT
Start: 2018-06-07 | End: 2018-06-09

## 2018-06-07 RX ORDER — SODIUM CHLORIDE 0.9 % (FLUSH) 0.9 %
10 SYRINGE (ML) INJECTION EVERY 12 HOURS SCHEDULED
Status: DISCONTINUED | OUTPATIENT
Start: 2018-06-07 | End: 2018-06-07

## 2018-06-07 RX ORDER — DOCUSATE SODIUM 100 MG/1
100 CAPSULE, LIQUID FILLED ORAL 2 TIMES DAILY
Status: DISCONTINUED | OUTPATIENT
Start: 2018-06-07 | End: 2018-06-11 | Stop reason: HOSPADM

## 2018-06-07 RX ORDER — OXYCODONE HYDROCHLORIDE 5 MG/1
5 TABLET ORAL EVERY 4 HOURS PRN
Status: DISCONTINUED | OUTPATIENT
Start: 2018-06-07 | End: 2018-06-11 | Stop reason: HOSPADM

## 2018-06-07 RX ORDER — 0.9 % SODIUM CHLORIDE 0.9 %
500 INTRAVENOUS SOLUTION INTRAVENOUS CONTINUOUS PRN
Status: DISCONTINUED | OUTPATIENT
Start: 2018-06-07 | End: 2018-06-09

## 2018-06-07 RX ORDER — ONDANSETRON 2 MG/ML
4 INJECTION INTRAMUSCULAR; INTRAVENOUS EVERY 8 HOURS PRN
Status: DISCONTINUED | OUTPATIENT
Start: 2018-06-07 | End: 2018-06-09

## 2018-06-07 RX ORDER — HEPARIN SODIUM 1000 [USP'U]/ML
INJECTION, SOLUTION INTRAVENOUS; SUBCUTANEOUS PRN
Status: DISCONTINUED | OUTPATIENT
Start: 2018-06-07 | End: 2018-06-07 | Stop reason: SDUPTHER

## 2018-06-07 RX ORDER — SODIUM CHLORIDE 9 MG/ML
INJECTION, SOLUTION INTRAVENOUS CONTINUOUS PRN
Status: DISCONTINUED | OUTPATIENT
Start: 2018-06-07 | End: 2018-06-07 | Stop reason: SDUPTHER

## 2018-06-07 RX ORDER — MIDAZOLAM HYDROCHLORIDE 1 MG/ML
2 INJECTION INTRAMUSCULAR; INTRAVENOUS
Status: DISCONTINUED | OUTPATIENT
Start: 2018-06-07 | End: 2018-06-07

## 2018-06-07 RX ORDER — EPHEDRINE SULFATE 50 MG/ML
INJECTION, SOLUTION INTRAVENOUS PRN
Status: DISCONTINUED | OUTPATIENT
Start: 2018-06-07 | End: 2018-06-07 | Stop reason: SDUPTHER

## 2018-06-07 RX ORDER — DEXTROSE MONOHYDRATE 25 G/50ML
12.5 INJECTION, SOLUTION INTRAVENOUS PRN
Status: DISCONTINUED | OUTPATIENT
Start: 2018-06-07 | End: 2018-06-11 | Stop reason: HOSPADM

## 2018-06-07 RX ORDER — ROCURONIUM BROMIDE 10 MG/ML
INJECTION, SOLUTION INTRAVENOUS PRN
Status: DISCONTINUED | OUTPATIENT
Start: 2018-06-07 | End: 2018-06-07 | Stop reason: SDUPTHER

## 2018-06-07 RX ORDER — SODIUM CHLORIDE, SODIUM LACTATE, POTASSIUM CHLORIDE, CALCIUM CHLORIDE 600; 310; 30; 20 MG/100ML; MG/100ML; MG/100ML; MG/100ML
INJECTION, SOLUTION INTRAVENOUS CONTINUOUS
Status: DISCONTINUED | OUTPATIENT
Start: 2018-06-07 | End: 2018-06-07

## 2018-06-07 RX ORDER — ALBUMIN, HUMAN INJ 5% 5 %
12.5 SOLUTION INTRAVENOUS
Status: COMPLETED | OUTPATIENT
Start: 2018-06-07 | End: 2018-06-07

## 2018-06-07 RX ORDER — PROTAMINE SULFATE 10 MG/ML
50 INJECTION, SOLUTION INTRAVENOUS
Status: DISPENSED | OUTPATIENT
Start: 2018-06-07 | End: 2018-06-07

## 2018-06-07 RX ORDER — MEPERIDINE HYDROCHLORIDE 50 MG/ML
25 INJECTION INTRAMUSCULAR; INTRAVENOUS; SUBCUTANEOUS
Status: ACTIVE | OUTPATIENT
Start: 2018-06-07 | End: 2018-06-07

## 2018-06-07 RX ORDER — MORPHINE SULFATE 1 MG/ML
2 INJECTION, SOLUTION EPIDURAL; INTRATHECAL; INTRAVENOUS
Status: DISCONTINUED | OUTPATIENT
Start: 2018-06-07 | End: 2018-06-09

## 2018-06-07 RX ORDER — FENTANYL CITRATE 50 UG/ML
50 INJECTION, SOLUTION INTRAMUSCULAR; INTRAVENOUS
Status: DISCONTINUED | OUTPATIENT
Start: 2018-06-07 | End: 2018-06-07

## 2018-06-07 RX ORDER — DOBUTAMINE HYDROCHLORIDE 200 MG/100ML
INJECTION INTRAVENOUS CONTINUOUS PRN
Status: DISCONTINUED | OUTPATIENT
Start: 2018-06-07 | End: 2018-06-07 | Stop reason: SDUPTHER

## 2018-06-07 RX ORDER — LIDOCAINE HYDROCHLORIDE 10 MG/ML
INJECTION, SOLUTION EPIDURAL; INFILTRATION; INTRACAUDAL; PERINEURAL PRN
Status: DISCONTINUED | OUTPATIENT
Start: 2018-06-07 | End: 2018-06-07 | Stop reason: SDUPTHER

## 2018-06-07 RX ORDER — SODIUM CHLORIDE 0.9 % (FLUSH) 0.9 %
10 SYRINGE (ML) INJECTION PRN
Status: DISCONTINUED | OUTPATIENT
Start: 2018-06-07 | End: 2018-06-07

## 2018-06-07 RX ORDER — INSULIN GLARGINE 100 [IU]/ML
0.15 INJECTION, SOLUTION SUBCUTANEOUS NIGHTLY
Status: DISCONTINUED | OUTPATIENT
Start: 2018-06-08 | End: 2018-06-11 | Stop reason: HOSPADM

## 2018-06-07 RX ORDER — SODIUM CHLORIDE 0.9 % (FLUSH) 0.9 %
10 SYRINGE (ML) INJECTION EVERY 12 HOURS SCHEDULED
Status: DISCONTINUED | OUTPATIENT
Start: 2018-06-07 | End: 2018-06-11 | Stop reason: HOSPADM

## 2018-06-07 RX ORDER — CALCIUM CHLORIDE 100 MG/ML
INJECTION INTRAVENOUS; INTRAVENTRICULAR PRN
Status: DISCONTINUED | OUTPATIENT
Start: 2018-06-07 | End: 2018-06-07 | Stop reason: SDUPTHER

## 2018-06-07 RX ORDER — ACETAMINOPHEN 325 MG/1
650 TABLET ORAL EVERY 4 HOURS PRN
Status: DISCONTINUED | OUTPATIENT
Start: 2018-06-07 | End: 2018-06-11 | Stop reason: HOSPADM

## 2018-06-07 RX ORDER — OXYCODONE HYDROCHLORIDE 5 MG/1
10 TABLET ORAL EVERY 4 HOURS PRN
Status: DISCONTINUED | OUTPATIENT
Start: 2018-06-07 | End: 2018-06-11 | Stop reason: HOSPADM

## 2018-06-07 RX ORDER — MORPHINE SULFATE 4 MG/ML
4 INJECTION, SOLUTION INTRAMUSCULAR; INTRAVENOUS
Status: DISCONTINUED | OUTPATIENT
Start: 2018-06-07 | End: 2018-06-07 | Stop reason: HOSPADM

## 2018-06-07 RX ORDER — ATORVASTATIN CALCIUM 20 MG/1
20 TABLET, FILM COATED ORAL NIGHTLY
Status: DISCONTINUED | OUTPATIENT
Start: 2018-06-08 | End: 2018-06-11 | Stop reason: HOSPADM

## 2018-06-07 RX ORDER — PROPOFOL 10 MG/ML
INJECTION, EMULSION INTRAVENOUS PRN
Status: DISCONTINUED | OUTPATIENT
Start: 2018-06-07 | End: 2018-06-07 | Stop reason: SDUPTHER

## 2018-06-07 RX ADMIN — SODIUM CHLORIDE 500 ML: 9 INJECTION, SOLUTION INTRAVENOUS at 20:11

## 2018-06-07 RX ADMIN — SUFENTANIL CITRATE 30 MCG: 50 INJECTION EPIDURAL; INTRAVENOUS at 11:16

## 2018-06-07 RX ADMIN — SUFENTANIL CITRATE 10 MCG: 50 INJECTION EPIDURAL; INTRAVENOUS at 10:40

## 2018-06-07 RX ADMIN — SUFENTANIL CITRATE 10 MCG: 50 INJECTION EPIDURAL; INTRAVENOUS at 10:50

## 2018-06-07 RX ADMIN — SUFENTANIL CITRATE 10 MCG: 50 INJECTION EPIDURAL; INTRAVENOUS at 07:40

## 2018-06-07 RX ADMIN — EPHEDRINE SULFATE 10 MG: 50 INJECTION, SOLUTION INTRAMUSCULAR; INTRAVENOUS; SUBCUTANEOUS at 10:19

## 2018-06-07 RX ADMIN — EPHEDRINE SULFATE 5 MG: 50 INJECTION, SOLUTION INTRAMUSCULAR; INTRAVENOUS; SUBCUTANEOUS at 08:30

## 2018-06-07 RX ADMIN — HEPARIN SODIUM 40000 UNITS: 1000 INJECTION, SOLUTION INTRAVENOUS; SUBCUTANEOUS at 08:20

## 2018-06-07 RX ADMIN — ROCURONIUM BROMIDE 20 MG: 10 INJECTION INTRAVENOUS at 08:08

## 2018-06-07 RX ADMIN — EPHEDRINE SULFATE 5 MG: 50 INJECTION, SOLUTION INTRAMUSCULAR; INTRAVENOUS; SUBCUTANEOUS at 08:34

## 2018-06-07 RX ADMIN — MIDAZOLAM HYDROCHLORIDE 3 MG: 1 INJECTION, SOLUTION INTRAMUSCULAR; INTRAVENOUS at 09:33

## 2018-06-07 RX ADMIN — EPHEDRINE SULFATE 10 MG: 50 INJECTION, SOLUTION INTRAMUSCULAR; INTRAVENOUS; SUBCUTANEOUS at 10:01

## 2018-06-07 RX ADMIN — SUFENTANIL CITRATE 20 MCG: 50 INJECTION EPIDURAL; INTRAVENOUS at 10:56

## 2018-06-07 RX ADMIN — SODIUM CHLORIDE, POTASSIUM CHLORIDE, SODIUM LACTATE AND CALCIUM CHLORIDE: 600; 310; 30; 20 INJECTION, SOLUTION INTRAVENOUS at 06:46

## 2018-06-07 RX ADMIN — DOCUSATE SODIUM 100 MG: 100 CAPSULE, LIQUID FILLED ORAL at 20:42

## 2018-06-07 RX ADMIN — SUFENTANIL CITRATE 10 MCG: 50 INJECTION EPIDURAL; INTRAVENOUS at 10:25

## 2018-06-07 RX ADMIN — VANCOMYCIN HYDROCHLORIDE 2000 MG: 1 INJECTION, POWDER, LYOPHILIZED, FOR SOLUTION INTRAVENOUS at 07:44

## 2018-06-07 RX ADMIN — SUFENTANIL CITRATE 10 MCG: 50 INJECTION EPIDURAL; INTRAVENOUS at 07:32

## 2018-06-07 RX ADMIN — EPHEDRINE SULFATE 10 MG: 50 INJECTION, SOLUTION INTRAMUSCULAR; INTRAVENOUS; SUBCUTANEOUS at 10:37

## 2018-06-07 RX ADMIN — EPHEDRINE SULFATE 5 MG: 50 INJECTION, SOLUTION INTRAMUSCULAR; INTRAVENOUS; SUBCUTANEOUS at 08:33

## 2018-06-07 RX ADMIN — MIDAZOLAM HYDROCHLORIDE 2 MG: 1 INJECTION, SOLUTION INTRAMUSCULAR; INTRAVENOUS at 09:48

## 2018-06-07 RX ADMIN — MIDAZOLAM HYDROCHLORIDE 1 MG: 1 INJECTION, SOLUTION INTRAMUSCULAR; INTRAVENOUS at 07:25

## 2018-06-07 RX ADMIN — CALCIUM CHLORIDE 0.5 G: 100 INJECTION, SOLUTION INTRAVENOUS at 10:25

## 2018-06-07 RX ADMIN — VANCOMYCIN HYDROCHLORIDE 1250 MG: 10 INJECTION, POWDER, LYOPHILIZED, FOR SOLUTION INTRAVENOUS at 19:42

## 2018-06-07 RX ADMIN — SUFENTANIL CITRATE 20 MCG: 50 INJECTION EPIDURAL; INTRAVENOUS at 08:10

## 2018-06-07 RX ADMIN — ROCURONIUM BROMIDE 30 MG: 10 INJECTION INTRAVENOUS at 07:36

## 2018-06-07 RX ADMIN — ROCURONIUM BROMIDE 20 MG: 10 INJECTION INTRAVENOUS at 07:47

## 2018-06-07 RX ADMIN — SUFENTANIL CITRATE 10 MCG: 50 INJECTION EPIDURAL; INTRAVENOUS at 10:10

## 2018-06-07 RX ADMIN — ALBUMIN (HUMAN) 12.5 G: 12.5 INJECTION, SOLUTION INTRAVENOUS at 21:33

## 2018-06-07 RX ADMIN — SODIUM CHLORIDE: 9 INJECTION, SOLUTION INTRAVENOUS at 08:27

## 2018-06-07 RX ADMIN — PHENYLEPHRINE HYDROCHLORIDE 5 MCG/MIN: 10 INJECTION INTRAVENOUS at 10:41

## 2018-06-07 RX ADMIN — SODIUM CHLORIDE: 9 INJECTION, SOLUTION INTRAVENOUS at 11:50

## 2018-06-07 RX ADMIN — IPRATROPIUM BROMIDE AND ALBUTEROL SULFATE 1 AMPULE: .5; 3 SOLUTION RESPIRATORY (INHALATION) at 14:39

## 2018-06-07 RX ADMIN — SODIUM CHLORIDE: 9 INJECTION, SOLUTION INTRAVENOUS at 08:00

## 2018-06-07 RX ADMIN — SUFENTANIL CITRATE 20 MCG: 50 INJECTION EPIDURAL; INTRAVENOUS at 07:55

## 2018-06-07 RX ADMIN — PROTAMINE SULFATE 250 MG: 10 INJECTION, SOLUTION INTRAVENOUS at 10:21

## 2018-06-07 RX ADMIN — SODIUM CHLORIDE 7.8 UNITS/HR: 9 INJECTION, SOLUTION INTRAVENOUS at 23:38

## 2018-06-07 RX ADMIN — SUFENTANIL CITRATE 10 MCG: 50 INJECTION EPIDURAL; INTRAVENOUS at 07:50

## 2018-06-07 RX ADMIN — PROPOFOL 100 MG: 10 INJECTION, EMULSION INTRAVENOUS at 07:36

## 2018-06-07 RX ADMIN — MIDAZOLAM HYDROCHLORIDE 2 MG: 1 INJECTION, SOLUTION INTRAMUSCULAR; INTRAVENOUS at 07:18

## 2018-06-07 RX ADMIN — ONDANSETRON 4 MG: 2 INJECTION INTRAMUSCULAR; INTRAVENOUS at 13:36

## 2018-06-07 RX ADMIN — ALBUMIN (HUMAN) 12.5 G: 12.5 INJECTION, SOLUTION INTRAVENOUS at 20:43

## 2018-06-07 RX ADMIN — IPRATROPIUM BROMIDE AND ALBUTEROL SULFATE 1 AMPULE: .5; 3 SOLUTION RESPIRATORY (INHALATION) at 19:06

## 2018-06-07 RX ADMIN — CALCIUM CHLORIDE 0.5 G: 100 INJECTION, SOLUTION INTRAVENOUS at 10:27

## 2018-06-07 RX ADMIN — Medication 4 MG: at 13:25

## 2018-06-07 RX ADMIN — EPHEDRINE SULFATE 5 MG: 50 INJECTION, SOLUTION INTRAMUSCULAR; INTRAVENOUS; SUBCUTANEOUS at 08:17

## 2018-06-07 RX ADMIN — DOBUTAMINE HYDROCHLORIDE 5 MCG/KG/MIN: 200 INJECTION INTRAVENOUS at 10:18

## 2018-06-07 RX ADMIN — LIDOCAINE HYDROCHLORIDE 100 MG: 10 INJECTION, SOLUTION EPIDURAL; INFILTRATION; INTRACAUDAL; PERINEURAL at 07:36

## 2018-06-07 RX ADMIN — FAMOTIDINE 20 MG: 20 TABLET ORAL at 20:42

## 2018-06-07 RX ADMIN — SODIUM CHLORIDE 2 UNITS/HR: 9 INJECTION, SOLUTION INTRAVENOUS at 08:46

## 2018-06-07 RX ADMIN — LIDOCAINE HYDROCHLORIDE 1 ML: 10 INJECTION, SOLUTION EPIDURAL; INFILTRATION; INTRACAUDAL; PERINEURAL at 06:46

## 2018-06-07 RX ADMIN — SUFENTANIL CITRATE 10 MCG: 50 INJECTION EPIDURAL; INTRAVENOUS at 10:02

## 2018-06-07 RX ADMIN — ROCURONIUM BROMIDE 30 MG: 10 INJECTION INTRAVENOUS at 09:48

## 2018-06-07 RX ADMIN — SUFENTANIL CITRATE 30 MCG: 50 INJECTION EPIDURAL; INTRAVENOUS at 11:13

## 2018-06-07 ASSESSMENT — PAIN SCALES - GENERAL
PAINLEVEL_OUTOF10: 0
PAINLEVEL_OUTOF10: 10

## 2018-06-07 ASSESSMENT — ENCOUNTER SYMPTOMS: SHORTNESS OF BREATH: 1

## 2018-06-07 ASSESSMENT — PAIN - FUNCTIONAL ASSESSMENT: PAIN_FUNCTIONAL_ASSESSMENT: 0-10

## 2018-06-08 ENCOUNTER — APPOINTMENT (OUTPATIENT)
Dept: GENERAL RADIOLOGY | Age: 78
DRG: 220 | End: 2018-06-08
Attending: THORACIC SURGERY (CARDIOTHORACIC VASCULAR SURGERY)
Payer: MEDICARE

## 2018-06-08 LAB
ANION GAP SERPL CALCULATED.3IONS-SCNC: 13 MMOL/L (ref 7–19)
BASE EXCESS ARTERIAL: -0.5 MMOL/L (ref -2–2)
BUN BLDV-MCNC: 22 MG/DL (ref 8–23)
CALCIUM SERPL-MCNC: 8.7 MG/DL (ref 8.8–10.2)
CARBOXYHEMOGLOBIN ARTERIAL: 0.1 % (ref 0–5)
CHLORIDE BLD-SCNC: 105 MMOL/L (ref 98–111)
CO2: 23 MMOL/L (ref 22–29)
CREAT SERPL-MCNC: 0.9 MG/DL (ref 0.5–1.2)
GFR NON-AFRICAN AMERICAN: >60
GLUCOSE BLD-MCNC: 106 MG/DL (ref 70–99)
GLUCOSE BLD-MCNC: 110 MG/DL (ref 70–99)
GLUCOSE BLD-MCNC: 110 MG/DL (ref 74–109)
GLUCOSE BLD-MCNC: 112 MG/DL (ref 70–99)
GLUCOSE BLD-MCNC: 113 MG/DL (ref 70–99)
GLUCOSE BLD-MCNC: 114 MG/DL (ref 70–99)
GLUCOSE BLD-MCNC: 114 MG/DL (ref 70–99)
GLUCOSE BLD-MCNC: 128 MG/DL (ref 70–99)
GLUCOSE BLD-MCNC: 137 MG/DL (ref 70–99)
GLUCOSE BLD-MCNC: 230 MG/DL (ref 70–99)
GLUCOSE BLD-MCNC: 290 MG/DL (ref 70–99)
GLUCOSE BLD-MCNC: 322 MG/DL (ref 70–99)
HCO3 ARTERIAL: 24.2 MMOL/L (ref 22–26)
HCT VFR BLD CALC: 38.3 % (ref 42–52)
HEMOGLOBIN, ART, EXTENDED: 13 G/DL (ref 14–18)
HEMOGLOBIN: 12.7 G/DL (ref 14–18)
MCH RBC QN AUTO: 30.8 PG (ref 27–31)
MCHC RBC AUTO-ENTMCNC: 33.2 G/DL (ref 33–37)
MCV RBC AUTO: 93 FL (ref 80–94)
METHEMOGLOBIN ARTERIAL: 0.1 %
O2 CONTENT ARTERIAL: 17.2 ML/DL
O2 SAT, ARTERIAL: 94 %
O2 THERAPY: ABNORMAL
PCO2 ARTERIAL: 39 MMHG (ref 35–45)
PDW BLD-RTO: 14.8 % (ref 11.5–14.5)
PERFORMED ON: ABNORMAL
PH ARTERIAL: 7.4 (ref 7.35–7.45)
PLATELET # BLD: 98 K/UL (ref 130–400)
PMV BLD AUTO: 10.4 FL (ref 9.4–12.4)
PO2 ARTERIAL: 75 MMHG (ref 80–100)
POTASSIUM SERPL-SCNC: 4.5 MMOL/L (ref 3.5–5)
POTASSIUM, WHOLE BLOOD: 4.3
RBC # BLD: 4.12 M/UL (ref 4.7–6.1)
SODIUM BLD-SCNC: 141 MMOL/L (ref 136–145)
WBC # BLD: 10.9 K/UL (ref 4.8–10.8)

## 2018-06-08 PROCEDURE — 6360000002 HC RX W HCPCS: Performed by: THORACIC SURGERY (CARDIOTHORACIC VASCULAR SURGERY)

## 2018-06-08 PROCEDURE — 82948 REAGENT STRIP/BLOOD GLUCOSE: CPT

## 2018-06-08 PROCEDURE — 94640 AIRWAY INHALATION TREATMENT: CPT

## 2018-06-08 PROCEDURE — 36600 WITHDRAWAL OF ARTERIAL BLOOD: CPT

## 2018-06-08 PROCEDURE — 71045 X-RAY EXAM CHEST 1 VIEW: CPT

## 2018-06-08 PROCEDURE — 6370000000 HC RX 637 (ALT 250 FOR IP): Performed by: THORACIC SURGERY (CARDIOTHORACIC VASCULAR SURGERY)

## 2018-06-08 PROCEDURE — 93005 ELECTROCARDIOGRAM TRACING: CPT

## 2018-06-08 PROCEDURE — 84132 ASSAY OF SERUM POTASSIUM: CPT

## 2018-06-08 PROCEDURE — 80048 BASIC METABOLIC PNL TOTAL CA: CPT

## 2018-06-08 PROCEDURE — 2000000000 HC ICU R&B

## 2018-06-08 PROCEDURE — 85027 COMPLETE CBC AUTOMATED: CPT

## 2018-06-08 PROCEDURE — 36592 COLLECT BLOOD FROM PICC: CPT

## 2018-06-08 PROCEDURE — 2700000000 HC OXYGEN THERAPY PER DAY

## 2018-06-08 PROCEDURE — 99024 POSTOP FOLLOW-UP VISIT: CPT | Performed by: THORACIC SURGERY (CARDIOTHORACIC VASCULAR SURGERY)

## 2018-06-08 PROCEDURE — 2580000003 HC RX 258: Performed by: THORACIC SURGERY (CARDIOTHORACIC VASCULAR SURGERY)

## 2018-06-08 PROCEDURE — 82803 BLOOD GASES ANY COMBINATION: CPT

## 2018-06-08 RX ORDER — CITALOPRAM 10 MG/1
10 TABLET ORAL DAILY
Status: DISCONTINUED | OUTPATIENT
Start: 2018-06-08 | End: 2018-06-11 | Stop reason: HOSPADM

## 2018-06-08 RX ORDER — METOCLOPRAMIDE 5 MG/1
5 TABLET ORAL DAILY
Status: DISCONTINUED | OUTPATIENT
Start: 2018-06-08 | End: 2018-06-11 | Stop reason: HOSPADM

## 2018-06-08 RX ORDER — ATENOLOL 25 MG/1
25 TABLET ORAL DAILY
Status: DISCONTINUED | OUTPATIENT
Start: 2018-06-08 | End: 2018-06-11 | Stop reason: HOSPADM

## 2018-06-08 RX ORDER — FUROSEMIDE 20 MG/1
20 TABLET ORAL PRN
Status: DISCONTINUED | OUTPATIENT
Start: 2018-06-08 | End: 2018-06-09

## 2018-06-08 RX ORDER — HYDROCHLOROTHIAZIDE 12.5 MG/1
12.5 CAPSULE, GELATIN COATED ORAL DAILY
Status: DISCONTINUED | OUTPATIENT
Start: 2018-06-08 | End: 2018-06-11 | Stop reason: HOSPADM

## 2018-06-08 RX ORDER — CARBIDOPA AND LEVODOPA 50; 200 MG/1; MG/1
1 TABLET, EXTENDED RELEASE ORAL 2 TIMES DAILY
Status: DISCONTINUED | OUTPATIENT
Start: 2018-06-08 | End: 2018-06-11 | Stop reason: HOSPADM

## 2018-06-08 RX ADMIN — Medication 10 ML: at 19:56

## 2018-06-08 RX ADMIN — FAMOTIDINE 20 MG: 20 TABLET ORAL at 08:46

## 2018-06-08 RX ADMIN — MUPIROCIN: 20 OINTMENT TOPICAL at 09:02

## 2018-06-08 RX ADMIN — INSULIN LISPRO 4 UNITS: 100 INJECTION, SOLUTION INTRAVENOUS; SUBCUTANEOUS at 20:46

## 2018-06-08 RX ADMIN — HYDROCHLOROTHIAZIDE 12.5 MG: 12.5 CAPSULE ORAL at 08:46

## 2018-06-08 RX ADMIN — FAMOTIDINE 20 MG: 20 TABLET ORAL at 20:44

## 2018-06-08 RX ADMIN — MUPIROCIN: 20 OINTMENT TOPICAL at 14:16

## 2018-06-08 RX ADMIN — IPRATROPIUM BROMIDE AND ALBUTEROL SULFATE 1 AMPULE: .5; 3 SOLUTION RESPIRATORY (INHALATION) at 14:44

## 2018-06-08 RX ADMIN — CITALOPRAM HYDROBROMIDE 10 MG: 10 TABLET ORAL at 08:45

## 2018-06-08 RX ADMIN — ATORVASTATIN CALCIUM 20 MG: 20 TABLET, FILM COATED ORAL at 20:45

## 2018-06-08 RX ADMIN — VANCOMYCIN HYDROCHLORIDE 1250 MG: 10 INJECTION, POWDER, LYOPHILIZED, FOR SOLUTION INTRAVENOUS at 08:02

## 2018-06-08 RX ADMIN — ASPIRIN 81 MG: 81 TABLET, COATED ORAL at 08:46

## 2018-06-08 RX ADMIN — OXYCODONE HYDROCHLORIDE 5 MG: 5 TABLET ORAL at 13:06

## 2018-06-08 RX ADMIN — DOCUSATE SODIUM 100 MG: 100 CAPSULE, LIQUID FILLED ORAL at 08:46

## 2018-06-08 RX ADMIN — IPRATROPIUM BROMIDE AND ALBUTEROL SULFATE 1 AMPULE: .5; 3 SOLUTION RESPIRATORY (INHALATION) at 18:11

## 2018-06-08 RX ADMIN — IPRATROPIUM BROMIDE AND ALBUTEROL SULFATE 1 AMPULE: .5; 3 SOLUTION RESPIRATORY (INHALATION) at 06:22

## 2018-06-08 RX ADMIN — Medication 10 ML: at 08:56

## 2018-06-08 RX ADMIN — CLOPIDOGREL BISULFATE 75 MG: 75 TABLET ORAL at 08:46

## 2018-06-08 RX ADMIN — DOCUSATE SODIUM 100 MG: 100 CAPSULE, LIQUID FILLED ORAL at 20:44

## 2018-06-08 RX ADMIN — INSULIN GLARGINE 16 UNITS: 100 INJECTION, SOLUTION SUBCUTANEOUS at 20:45

## 2018-06-08 RX ADMIN — MUPIROCIN: 20 OINTMENT TOPICAL at 20:45

## 2018-06-08 RX ADMIN — IPRATROPIUM BROMIDE AND ALBUTEROL SULFATE 1 AMPULE: .5; 3 SOLUTION RESPIRATORY (INHALATION) at 11:00

## 2018-06-08 RX ADMIN — ACETAMINOPHEN 650 MG: 325 TABLET ORAL at 20:58

## 2018-06-08 RX ADMIN — OXYCODONE HYDROCHLORIDE 5 MG: 5 TABLET ORAL at 19:07

## 2018-06-08 RX ADMIN — CARBIDOPA AND LEVODOPA 1 TABLET: 50; 200 TABLET, EXTENDED RELEASE ORAL at 20:44

## 2018-06-08 RX ADMIN — CARBIDOPA AND LEVODOPA 1 TABLET: 50; 200 TABLET, EXTENDED RELEASE ORAL at 11:31

## 2018-06-08 RX ADMIN — OXYCODONE HYDROCHLORIDE 5 MG: 5 TABLET ORAL at 09:02

## 2018-06-08 RX ADMIN — INSULIN LISPRO 6 UNITS: 100 INJECTION, SOLUTION INTRAVENOUS; SUBCUTANEOUS at 16:58

## 2018-06-08 RX ADMIN — VANCOMYCIN HYDROCHLORIDE 1250 MG: 10 INJECTION, POWDER, LYOPHILIZED, FOR SOLUTION INTRAVENOUS at 19:56

## 2018-06-08 RX ADMIN — METOCLOPRAMIDE 5 MG: 5 TABLET ORAL at 08:46

## 2018-06-08 RX ADMIN — INSULIN LISPRO 4 UNITS: 100 INJECTION, SOLUTION INTRAVENOUS; SUBCUTANEOUS at 11:56

## 2018-06-08 RX ADMIN — SODIUM CHLORIDE: 9 INJECTION, SOLUTION INTRAVENOUS at 19:01

## 2018-06-08 RX ADMIN — SODIUM CHLORIDE: 9 INJECTION, SOLUTION INTRAVENOUS at 03:12

## 2018-06-08 RX ADMIN — OXYCODONE HYDROCHLORIDE 5 MG: 5 TABLET ORAL at 05:12

## 2018-06-08 RX ADMIN — ATENOLOL 25 MG: 25 TABLET ORAL at 08:45

## 2018-06-08 ASSESSMENT — PAIN DESCRIPTION - PAIN TYPE
TYPE: SURGICAL PAIN

## 2018-06-08 ASSESSMENT — PAIN SCALES - GENERAL
PAINLEVEL_OUTOF10: 3
PAINLEVEL_OUTOF10: 4
PAINLEVEL_OUTOF10: 0
PAINLEVEL_OUTOF10: 0
PAINLEVEL_OUTOF10: 3
PAINLEVEL_OUTOF10: 4
PAINLEVEL_OUTOF10: 8
PAINLEVEL_OUTOF10: 3
PAINLEVEL_OUTOF10: 6
PAINLEVEL_OUTOF10: 0
PAINLEVEL_OUTOF10: 0
PAINLEVEL_OUTOF10: 4
PAINLEVEL_OUTOF10: 7
PAINLEVEL_OUTOF10: 8

## 2018-06-08 ASSESSMENT — PAIN DESCRIPTION - ORIENTATION
ORIENTATION: MID

## 2018-06-08 ASSESSMENT — PAIN DESCRIPTION - LOCATION
LOCATION: CHEST

## 2018-06-08 ASSESSMENT — PAIN DESCRIPTION - DESCRIPTORS
DESCRIPTORS: ACHING;SORE
DESCRIPTORS: ACHING;SORE

## 2018-06-09 ENCOUNTER — APPOINTMENT (OUTPATIENT)
Dept: GENERAL RADIOLOGY | Age: 78
DRG: 220 | End: 2018-06-09
Attending: THORACIC SURGERY (CARDIOTHORACIC VASCULAR SURGERY)
Payer: MEDICARE

## 2018-06-09 LAB
ANION GAP SERPL CALCULATED.3IONS-SCNC: 14 MMOL/L (ref 7–19)
BUN BLDV-MCNC: 34 MG/DL (ref 8–23)
CALCIUM SERPL-MCNC: 8.3 MG/DL (ref 8.8–10.2)
CHLORIDE BLD-SCNC: 100 MMOL/L (ref 98–111)
CO2: 21 MMOL/L (ref 22–29)
CREAT SERPL-MCNC: 1 MG/DL (ref 0.5–1.2)
EKG P AXIS: NORMAL DEGREES
EKG P-R INTERVAL: NORMAL MS
EKG Q-T INTERVAL: 436 MS
EKG QRS DURATION: 184 MS
EKG QTC CALCULATION (BAZETT): 471 MS
EKG T AXIS: 113 DEGREES
GFR NON-AFRICAN AMERICAN: >60
GLUCOSE BLD-MCNC: 193 MG/DL (ref 70–99)
GLUCOSE BLD-MCNC: 281 MG/DL (ref 70–99)
GLUCOSE BLD-MCNC: 315 MG/DL (ref 70–99)
GLUCOSE BLD-MCNC: 327 MG/DL (ref 74–109)
GLUCOSE BLD-MCNC: 342 MG/DL (ref 70–99)
GLUCOSE BLD-MCNC: 351 MG/DL (ref 70–99)
HCT VFR BLD CALC: 36.3 % (ref 42–52)
HEMOGLOBIN: 12 G/DL (ref 14–18)
MCH RBC QN AUTO: 30.5 PG (ref 27–31)
MCHC RBC AUTO-ENTMCNC: 33.1 G/DL (ref 33–37)
MCV RBC AUTO: 92.4 FL (ref 80–94)
PDW BLD-RTO: 14.8 % (ref 11.5–14.5)
PERFORMED ON: ABNORMAL
PLATELET # BLD: 93 K/UL (ref 130–400)
PMV BLD AUTO: 10.5 FL (ref 9.4–12.4)
POTASSIUM SERPL-SCNC: 4.8 MMOL/L (ref 3.5–5)
RBC # BLD: 3.93 M/UL (ref 4.7–6.1)
SODIUM BLD-SCNC: 135 MMOL/L (ref 136–145)
WBC # BLD: 11.7 K/UL (ref 4.8–10.8)

## 2018-06-09 PROCEDURE — 2700000000 HC OXYGEN THERAPY PER DAY

## 2018-06-09 PROCEDURE — 6370000000 HC RX 637 (ALT 250 FOR IP): Performed by: THORACIC SURGERY (CARDIOTHORACIC VASCULAR SURGERY)

## 2018-06-09 PROCEDURE — 80048 BASIC METABOLIC PNL TOTAL CA: CPT

## 2018-06-09 PROCEDURE — 2140000000 HC CCU INTERMEDIATE R&B

## 2018-06-09 PROCEDURE — 2580000003 HC RX 258: Performed by: THORACIC SURGERY (CARDIOTHORACIC VASCULAR SURGERY)

## 2018-06-09 PROCEDURE — 82948 REAGENT STRIP/BLOOD GLUCOSE: CPT

## 2018-06-09 PROCEDURE — 36592 COLLECT BLOOD FROM PICC: CPT

## 2018-06-09 PROCEDURE — 71045 X-RAY EXAM CHEST 1 VIEW: CPT

## 2018-06-09 PROCEDURE — 99024 POSTOP FOLLOW-UP VISIT: CPT | Performed by: THORACIC SURGERY (CARDIOTHORACIC VASCULAR SURGERY)

## 2018-06-09 PROCEDURE — 94640 AIRWAY INHALATION TREATMENT: CPT

## 2018-06-09 PROCEDURE — 85027 COMPLETE CBC AUTOMATED: CPT

## 2018-06-09 RX ORDER — METFORMIN HYDROCHLORIDE 500 MG/1
1000 TABLET, EXTENDED RELEASE ORAL DAILY
Status: DISCONTINUED | OUTPATIENT
Start: 2018-06-09 | End: 2018-06-11 | Stop reason: HOSPADM

## 2018-06-09 RX ORDER — NAPROXEN 500 MG/1
500 TABLET ORAL 2 TIMES DAILY WITH MEALS
Status: ON HOLD | COMMUNITY
End: 2018-06-11 | Stop reason: HOSPADM

## 2018-06-09 RX ORDER — BENAZEPRIL HYDROCHLORIDE 10 MG/1
10 TABLET ORAL DAILY
Status: ON HOLD | COMMUNITY
End: 2018-06-11 | Stop reason: HOSPADM

## 2018-06-09 RX ADMIN — ATENOLOL 25 MG: 25 TABLET ORAL at 08:56

## 2018-06-09 RX ADMIN — INSULIN HUMAN 60 UNITS: 100 INJECTION, SOLUTION PARENTERAL at 10:47

## 2018-06-09 RX ADMIN — ASPIRIN 81 MG: 81 TABLET, COATED ORAL at 08:57

## 2018-06-09 RX ADMIN — Medication 10 ML: at 08:58

## 2018-06-09 RX ADMIN — IPRATROPIUM BROMIDE AND ALBUTEROL SULFATE 1 AMPULE: .5; 3 SOLUTION RESPIRATORY (INHALATION) at 15:34

## 2018-06-09 RX ADMIN — INSULIN LISPRO 2 UNITS: 100 INJECTION, SOLUTION INTRAVENOUS; SUBCUTANEOUS at 17:45

## 2018-06-09 RX ADMIN — CLOPIDOGREL BISULFATE 75 MG: 75 TABLET ORAL at 08:56

## 2018-06-09 RX ADMIN — MUPIROCIN: 20 OINTMENT TOPICAL at 08:57

## 2018-06-09 RX ADMIN — OXYCODONE HYDROCHLORIDE 10 MG: 5 TABLET ORAL at 15:17

## 2018-06-09 RX ADMIN — INSULIN GLARGINE 16 UNITS: 100 INJECTION, SOLUTION SUBCUTANEOUS at 22:05

## 2018-06-09 RX ADMIN — ATORVASTATIN CALCIUM 20 MG: 20 TABLET, FILM COATED ORAL at 20:00

## 2018-06-09 RX ADMIN — DOCUSATE SODIUM 100 MG: 100 CAPSULE, LIQUID FILLED ORAL at 20:00

## 2018-06-09 RX ADMIN — IPRATROPIUM BROMIDE AND ALBUTEROL SULFATE 1 AMPULE: .5; 3 SOLUTION RESPIRATORY (INHALATION) at 19:01

## 2018-06-09 RX ADMIN — DOCUSATE SODIUM 100 MG: 100 CAPSULE, LIQUID FILLED ORAL at 08:56

## 2018-06-09 RX ADMIN — INSULIN LISPRO 10 UNITS: 100 INJECTION, SOLUTION INTRAVENOUS; SUBCUTANEOUS at 07:18

## 2018-06-09 RX ADMIN — METOCLOPRAMIDE 5 MG: 5 TABLET ORAL at 08:56

## 2018-06-09 RX ADMIN — CARBIDOPA AND LEVODOPA 1 TABLET: 50; 200 TABLET, EXTENDED RELEASE ORAL at 08:56

## 2018-06-09 RX ADMIN — PRAMIPEXOLE DIHYDROCHLORIDE 1.5 MG: 1 TABLET ORAL at 10:50

## 2018-06-09 RX ADMIN — MUPIROCIN: 20 OINTMENT TOPICAL at 15:11

## 2018-06-09 RX ADMIN — PRAMIPEXOLE DIHYDROCHLORIDE 1.5 MG: 1 TABLET ORAL at 15:11

## 2018-06-09 RX ADMIN — MUPIROCIN: 20 OINTMENT TOPICAL at 22:06

## 2018-06-09 RX ADMIN — IPRATROPIUM BROMIDE AND ALBUTEROL SULFATE 1 AMPULE: .5; 3 SOLUTION RESPIRATORY (INHALATION) at 10:09

## 2018-06-09 RX ADMIN — OXYCODONE HYDROCHLORIDE 5 MG: 5 TABLET ORAL at 01:46

## 2018-06-09 RX ADMIN — OXYCODONE HYDROCHLORIDE 5 MG: 5 TABLET ORAL at 10:56

## 2018-06-09 RX ADMIN — FAMOTIDINE 20 MG: 20 TABLET ORAL at 08:56

## 2018-06-09 RX ADMIN — FAMOTIDINE 20 MG: 20 TABLET ORAL at 20:00

## 2018-06-09 RX ADMIN — SACUBITRIL AND VALSARTAN 1 TABLET: 24; 26 TABLET, FILM COATED ORAL at 08:56

## 2018-06-09 RX ADMIN — METFORMIN HYDROCHLORIDE 1000 MG: 500 TABLET, EXTENDED RELEASE ORAL at 08:56

## 2018-06-09 RX ADMIN — CARBIDOPA AND LEVODOPA 1 TABLET: 50; 200 TABLET, EXTENDED RELEASE ORAL at 19:59

## 2018-06-09 RX ADMIN — PRAMIPEXOLE DIHYDROCHLORIDE 1.5 MG: 1 TABLET ORAL at 20:00

## 2018-06-09 RX ADMIN — HYDROCHLOROTHIAZIDE 12.5 MG: 12.5 CAPSULE ORAL at 08:57

## 2018-06-09 RX ADMIN — IPRATROPIUM BROMIDE AND ALBUTEROL SULFATE 1 AMPULE: .5; 3 SOLUTION RESPIRATORY (INHALATION) at 06:14

## 2018-06-09 RX ADMIN — CITALOPRAM HYDROBROMIDE 10 MG: 10 TABLET ORAL at 08:57

## 2018-06-09 RX ADMIN — Medication 10 ML: at 22:06

## 2018-06-09 RX ADMIN — INSULIN LISPRO 8 UNITS: 100 INJECTION, SOLUTION INTRAVENOUS; SUBCUTANEOUS at 12:38

## 2018-06-09 ASSESSMENT — PAIN DESCRIPTION - PAIN TYPE: TYPE: SURGICAL PAIN

## 2018-06-09 ASSESSMENT — PAIN SCALES - GENERAL
PAINLEVEL_OUTOF10: 8
PAINLEVEL_OUTOF10: 0
PAINLEVEL_OUTOF10: 3

## 2018-06-09 ASSESSMENT — PAIN DESCRIPTION - LOCATION: LOCATION: CHEST

## 2018-06-09 ASSESSMENT — PAIN DESCRIPTION - FREQUENCY: FREQUENCY: INTERMITTENT

## 2018-06-10 ENCOUNTER — APPOINTMENT (OUTPATIENT)
Dept: GENERAL RADIOLOGY | Age: 78
DRG: 220 | End: 2018-06-10
Attending: THORACIC SURGERY (CARDIOTHORACIC VASCULAR SURGERY)
Payer: MEDICARE

## 2018-06-10 LAB
ALBUMIN SERPL-MCNC: 2.6 G/DL (ref 3.5–5.2)
ALP BLD-CCNC: 56 U/L (ref 40–130)
ALT SERPL-CCNC: 15 U/L (ref 5–41)
ANION GAP SERPL CALCULATED.3IONS-SCNC: 14 MMOL/L (ref 7–19)
AST SERPL-CCNC: 46 U/L (ref 5–40)
BILIRUB SERPL-MCNC: 1 MG/DL (ref 0.2–1.2)
BUN BLDV-MCNC: 39 MG/DL (ref 8–23)
CALCIUM SERPL-MCNC: 8.3 MG/DL (ref 8.8–10.2)
CHLORIDE BLD-SCNC: 97 MMOL/L (ref 98–111)
CO2: 16 MMOL/L (ref 22–29)
CREAT SERPL-MCNC: 0.9 MG/DL (ref 0.5–1.2)
GFR NON-AFRICAN AMERICAN: >60
GLUCOSE BLD-MCNC: 110 MG/DL (ref 70–99)
GLUCOSE BLD-MCNC: 205 MG/DL (ref 70–99)
GLUCOSE BLD-MCNC: 215 MG/DL (ref 74–109)
GLUCOSE BLD-MCNC: 262 MG/DL (ref 70–99)
GLUCOSE BLD-MCNC: 64 MG/DL (ref 70–99)
GLUCOSE BLD-MCNC: 69 MG/DL (ref 70–99)
GLUCOSE BLD-MCNC: 91 MG/DL (ref 70–99)
HCT VFR BLD CALC: 40.4 % (ref 42–52)
HEMOGLOBIN: 12.2 G/DL (ref 14–18)
MCH RBC QN AUTO: 31 PG (ref 27–31)
MCHC RBC AUTO-ENTMCNC: 30.2 G/DL (ref 33–37)
MCV RBC AUTO: 102.5 FL (ref 80–94)
PDW BLD-RTO: 14.8 % (ref 11.5–14.5)
PERFORMED ON: ABNORMAL
PERFORMED ON: NORMAL
PLATELET # BLD: 102 K/UL (ref 130–400)
PMV BLD AUTO: 10.7 FL (ref 9.4–12.4)
POTASSIUM SERPL-SCNC: 4.7 MMOL/L (ref 3.5–5)
RBC # BLD: 3.94 M/UL (ref 4.7–6.1)
SODIUM BLD-SCNC: 127 MMOL/L (ref 136–145)
TOTAL PROTEIN: 5.7 G/DL (ref 6.6–8.7)
WBC # BLD: 11.4 K/UL (ref 4.8–10.8)

## 2018-06-10 PROCEDURE — 6370000000 HC RX 637 (ALT 250 FOR IP): Performed by: THORACIC SURGERY (CARDIOTHORACIC VASCULAR SURGERY)

## 2018-06-10 PROCEDURE — 36415 COLL VENOUS BLD VENIPUNCTURE: CPT

## 2018-06-10 PROCEDURE — 2580000003 HC RX 258: Performed by: THORACIC SURGERY (CARDIOTHORACIC VASCULAR SURGERY)

## 2018-06-10 PROCEDURE — 80053 COMPREHEN METABOLIC PANEL: CPT

## 2018-06-10 PROCEDURE — 71046 X-RAY EXAM CHEST 2 VIEWS: CPT

## 2018-06-10 PROCEDURE — 99024 POSTOP FOLLOW-UP VISIT: CPT | Performed by: THORACIC SURGERY (CARDIOTHORACIC VASCULAR SURGERY)

## 2018-06-10 PROCEDURE — 2140000000 HC CCU INTERMEDIATE R&B

## 2018-06-10 PROCEDURE — 85027 COMPLETE CBC AUTOMATED: CPT

## 2018-06-10 PROCEDURE — 94640 AIRWAY INHALATION TREATMENT: CPT

## 2018-06-10 PROCEDURE — 82948 REAGENT STRIP/BLOOD GLUCOSE: CPT

## 2018-06-10 RX ORDER — SODIUM CHLORIDE 9 MG/ML
INJECTION, SOLUTION INTRAVENOUS CONTINUOUS
Status: DISCONTINUED | OUTPATIENT
Start: 2018-06-10 | End: 2018-06-11 | Stop reason: HOSPADM

## 2018-06-10 RX ADMIN — MUPIROCIN: 20 OINTMENT TOPICAL at 22:10

## 2018-06-10 RX ADMIN — SACUBITRIL AND VALSARTAN 1 TABLET: 24; 26 TABLET, FILM COATED ORAL at 08:51

## 2018-06-10 RX ADMIN — IPRATROPIUM BROMIDE AND ALBUTEROL SULFATE 1 AMPULE: .5; 3 SOLUTION RESPIRATORY (INHALATION) at 07:25

## 2018-06-10 RX ADMIN — OXYCODONE HYDROCHLORIDE 5 MG: 5 TABLET ORAL at 22:09

## 2018-06-10 RX ADMIN — SODIUM CHLORIDE 75 ML/HR: 9 INJECTION, SOLUTION INTRAVENOUS at 07:50

## 2018-06-10 RX ADMIN — IPRATROPIUM BROMIDE AND ALBUTEROL SULFATE 1 AMPULE: .5; 3 SOLUTION RESPIRATORY (INHALATION) at 11:00

## 2018-06-10 RX ADMIN — DOCUSATE SODIUM 100 MG: 100 CAPSULE, LIQUID FILLED ORAL at 21:24

## 2018-06-10 RX ADMIN — HYDROCHLOROTHIAZIDE 12.5 MG: 12.5 CAPSULE ORAL at 08:51

## 2018-06-10 RX ADMIN — METOCLOPRAMIDE 5 MG: 5 TABLET ORAL at 08:51

## 2018-06-10 RX ADMIN — ASPIRIN 81 MG: 81 TABLET, COATED ORAL at 08:51

## 2018-06-10 RX ADMIN — DOCUSATE SODIUM 100 MG: 100 CAPSULE, LIQUID FILLED ORAL at 08:51

## 2018-06-10 RX ADMIN — CITALOPRAM HYDROBROMIDE 10 MG: 10 TABLET ORAL at 08:51

## 2018-06-10 RX ADMIN — SODIUM CHLORIDE: 9 INJECTION, SOLUTION INTRAVENOUS at 21:26

## 2018-06-10 RX ADMIN — Medication 10 ML: at 21:24

## 2018-06-10 RX ADMIN — MUPIROCIN: 20 OINTMENT TOPICAL at 09:08

## 2018-06-10 RX ADMIN — ATENOLOL 25 MG: 25 TABLET ORAL at 08:51

## 2018-06-10 RX ADMIN — ATORVASTATIN CALCIUM 20 MG: 20 TABLET, FILM COATED ORAL at 21:21

## 2018-06-10 RX ADMIN — INSULIN HUMAN 90 UNITS: 100 INJECTION, SOLUTION PARENTERAL at 08:58

## 2018-06-10 RX ADMIN — Medication 10 ML: at 09:11

## 2018-06-10 RX ADMIN — OXYCODONE HYDROCHLORIDE 10 MG: 5 TABLET ORAL at 09:25

## 2018-06-10 RX ADMIN — METFORMIN HYDROCHLORIDE 1000 MG: 500 TABLET, EXTENDED RELEASE ORAL at 08:51

## 2018-06-10 RX ADMIN — INSULIN HUMAN 70 UNITS: 100 INJECTION, SOLUTION PARENTERAL at 13:07

## 2018-06-10 RX ADMIN — IPRATROPIUM BROMIDE AND ALBUTEROL SULFATE 1 AMPULE: .5; 3 SOLUTION RESPIRATORY (INHALATION) at 20:03

## 2018-06-10 RX ADMIN — OXYCODONE HYDROCHLORIDE 10 MG: 5 TABLET ORAL at 04:34

## 2018-06-10 RX ADMIN — CLOPIDOGREL BISULFATE 75 MG: 75 TABLET ORAL at 08:51

## 2018-06-10 RX ADMIN — PRAMIPEXOLE DIHYDROCHLORIDE 1.5 MG: 1 TABLET ORAL at 13:07

## 2018-06-10 RX ADMIN — OXYCODONE HYDROCHLORIDE 5 MG: 5 TABLET ORAL at 23:37

## 2018-06-10 RX ADMIN — FAMOTIDINE 20 MG: 20 TABLET ORAL at 08:51

## 2018-06-10 RX ADMIN — CARBIDOPA AND LEVODOPA 1 TABLET: 50; 200 TABLET, EXTENDED RELEASE ORAL at 21:21

## 2018-06-10 RX ADMIN — FAMOTIDINE 20 MG: 20 TABLET ORAL at 21:21

## 2018-06-10 RX ADMIN — PRAMIPEXOLE DIHYDROCHLORIDE 1.5 MG: 1 TABLET ORAL at 21:21

## 2018-06-10 RX ADMIN — PRAMIPEXOLE DIHYDROCHLORIDE 1.5 MG: 1 TABLET ORAL at 08:51

## 2018-06-10 RX ADMIN — INSULIN LISPRO 6 UNITS: 100 INJECTION, SOLUTION INTRAVENOUS; SUBCUTANEOUS at 12:18

## 2018-06-10 RX ADMIN — CARBIDOPA AND LEVODOPA 1 TABLET: 50; 200 TABLET, EXTENDED RELEASE ORAL at 08:51

## 2018-06-10 ASSESSMENT — PAIN SCALES - GENERAL
PAINLEVEL_OUTOF10: 5
PAINLEVEL_OUTOF10: 0
PAINLEVEL_OUTOF10: 0
PAINLEVEL_OUTOF10: 7
PAINLEVEL_OUTOF10: 8
PAINLEVEL_OUTOF10: 5
PAINLEVEL_OUTOF10: 8

## 2018-06-11 VITALS
HEIGHT: 70 IN | RESPIRATION RATE: 16 BRPM | HEART RATE: 62 BPM | TEMPERATURE: 97.2 F | BODY MASS INDEX: 35.45 KG/M2 | SYSTOLIC BLOOD PRESSURE: 114 MMHG | OXYGEN SATURATION: 96 % | DIASTOLIC BLOOD PRESSURE: 64 MMHG | WEIGHT: 247.6 LBS

## 2018-06-11 PROBLEM — E87.1 HYPONATREMIA: Chronic | Status: ACTIVE | Noted: 2018-06-11

## 2018-06-11 PROBLEM — E78.2 HYPERCHOLESTEROLEMIA WITH HYPERTRIGLYCERIDEMIA: Chronic | Status: ACTIVE | Noted: 2018-06-11

## 2018-06-11 PROBLEM — E87.1 HYPONATREMIA: Status: ACTIVE | Noted: 2018-06-11

## 2018-06-11 LAB
ANION GAP SERPL CALCULATED.3IONS-SCNC: 14 MMOL/L (ref 7–19)
BUN BLDV-MCNC: 33 MG/DL (ref 8–23)
CALCIUM SERPL-MCNC: 8.4 MG/DL (ref 8.8–10.2)
CHLORIDE BLD-SCNC: 97 MMOL/L (ref 98–111)
CHOLESTEROL, TOTAL: 94 MG/DL (ref 160–199)
CO2: 22 MMOL/L (ref 22–29)
CREAT SERPL-MCNC: 0.9 MG/DL (ref 0.5–1.2)
GFR NON-AFRICAN AMERICAN: >60
GLUCOSE BLD-MCNC: 144 MG/DL (ref 70–99)
GLUCOSE BLD-MCNC: 71 MG/DL (ref 70–99)
GLUCOSE BLD-MCNC: 90 MG/DL (ref 74–109)
HDLC SERPL-MCNC: 40 MG/DL (ref 55–121)
LDL CHOLESTEROL CALCULATED: 33 MG/DL
PERFORMED ON: ABNORMAL
PERFORMED ON: NORMAL
POTASSIUM SERPL-SCNC: 4.1 MMOL/L (ref 3.5–5)
SODIUM BLD-SCNC: 133 MMOL/L (ref 136–145)
TRIGL SERPL-MCNC: 103 MG/DL (ref 0–149)

## 2018-06-11 PROCEDURE — 36415 COLL VENOUS BLD VENIPUNCTURE: CPT

## 2018-06-11 PROCEDURE — 94640 AIRWAY INHALATION TREATMENT: CPT

## 2018-06-11 PROCEDURE — 99024 POSTOP FOLLOW-UP VISIT: CPT | Performed by: THORACIC SURGERY (CARDIOTHORACIC VASCULAR SURGERY)

## 2018-06-11 PROCEDURE — 80061 LIPID PANEL: CPT

## 2018-06-11 PROCEDURE — 80048 BASIC METABOLIC PNL TOTAL CA: CPT

## 2018-06-11 PROCEDURE — 6370000000 HC RX 637 (ALT 250 FOR IP): Performed by: THORACIC SURGERY (CARDIOTHORACIC VASCULAR SURGERY)

## 2018-06-11 PROCEDURE — 82948 REAGENT STRIP/BLOOD GLUCOSE: CPT

## 2018-06-11 RX ORDER — OXYCODONE HYDROCHLORIDE 5 MG/1
5 TABLET ORAL EVERY 6 HOURS PRN
Qty: 50 TABLET | Refills: 0
Start: 2018-06-11 | End: 2018-06-25

## 2018-06-11 RX ORDER — ASPIRIN 81 MG/1
81 TABLET ORAL DAILY
Qty: 30 TABLET | Refills: 3 | Status: ON HOLD | COMMUNITY
Start: 2018-06-11 | End: 2020-09-14 | Stop reason: SDUPTHER

## 2018-06-11 RX ORDER — CLOPIDOGREL BISULFATE 75 MG/1
75 TABLET ORAL DAILY
Qty: 30 TABLET | Refills: 1 | Status: ON HOLD | OUTPATIENT
Start: 2018-06-11 | End: 2019-02-21

## 2018-06-11 RX ADMIN — DOCUSATE SODIUM 100 MG: 100 CAPSULE, LIQUID FILLED ORAL at 08:35

## 2018-06-11 RX ADMIN — PRAMIPEXOLE DIHYDROCHLORIDE 1.5 MG: 1 TABLET ORAL at 08:35

## 2018-06-11 RX ADMIN — ATENOLOL 25 MG: 25 TABLET ORAL at 08:35

## 2018-06-11 RX ADMIN — ASPIRIN 81 MG: 81 TABLET, COATED ORAL at 08:35

## 2018-06-11 RX ADMIN — MUPIROCIN: 20 OINTMENT TOPICAL at 08:36

## 2018-06-11 RX ADMIN — OXYCODONE HYDROCHLORIDE 5 MG: 5 TABLET ORAL at 08:41

## 2018-06-11 RX ADMIN — INSULIN HUMAN 90 UNITS: 100 INJECTION, SOLUTION PARENTERAL at 08:42

## 2018-06-11 RX ADMIN — CLOPIDOGREL BISULFATE 75 MG: 75 TABLET ORAL at 08:36

## 2018-06-11 RX ADMIN — OXYCODONE HYDROCHLORIDE 5 MG: 5 TABLET ORAL at 10:22

## 2018-06-11 RX ADMIN — METFORMIN HYDROCHLORIDE 1000 MG: 500 TABLET, EXTENDED RELEASE ORAL at 08:37

## 2018-06-11 RX ADMIN — SACUBITRIL AND VALSARTAN 1 TABLET: 24; 26 TABLET, FILM COATED ORAL at 08:35

## 2018-06-11 RX ADMIN — HYDROCHLOROTHIAZIDE 12.5 MG: 12.5 CAPSULE ORAL at 08:35

## 2018-06-11 RX ADMIN — CARBIDOPA AND LEVODOPA 1 TABLET: 50; 200 TABLET, EXTENDED RELEASE ORAL at 08:34

## 2018-06-11 RX ADMIN — IPRATROPIUM BROMIDE AND ALBUTEROL SULFATE 1 AMPULE: .5; 3 SOLUTION RESPIRATORY (INHALATION) at 07:45

## 2018-06-11 RX ADMIN — CITALOPRAM HYDROBROMIDE 10 MG: 10 TABLET ORAL at 08:34

## 2018-06-11 RX ADMIN — METOCLOPRAMIDE 5 MG: 5 TABLET ORAL at 08:35

## 2018-06-11 RX ADMIN — FAMOTIDINE 20 MG: 20 TABLET ORAL at 08:36

## 2018-06-11 ASSESSMENT — PAIN SCALES - GENERAL
PAINLEVEL_OUTOF10: 0
PAINLEVEL_OUTOF10: 6
PAINLEVEL_OUTOF10: 5

## 2018-06-27 ENCOUNTER — OFFICE VISIT (OUTPATIENT)
Dept: ENDOCRINOLOGY | Facility: CLINIC | Age: 78
End: 2018-06-27

## 2018-06-27 VITALS
WEIGHT: 231.7 LBS | BODY MASS INDEX: 33.17 KG/M2 | HEART RATE: 84 BPM | OXYGEN SATURATION: 97 % | SYSTOLIC BLOOD PRESSURE: 128 MMHG | DIASTOLIC BLOOD PRESSURE: 64 MMHG | HEIGHT: 70 IN

## 2018-06-27 DIAGNOSIS — E78.2 MIXED DIABETIC HYPERLIPIDEMIA ASSOCIATED WITH TYPE 2 DIABETES MELLITUS (HCC): Primary | ICD-10-CM

## 2018-06-27 DIAGNOSIS — E11.59 HYPERTENSION ASSOCIATED WITH DIABETES (HCC): ICD-10-CM

## 2018-06-27 DIAGNOSIS — E11.69 MIXED DIABETIC HYPERLIPIDEMIA ASSOCIATED WITH TYPE 2 DIABETES MELLITUS (HCC): Primary | ICD-10-CM

## 2018-06-27 DIAGNOSIS — E11.65 TYPE 2 DIABETES MELLITUS WITH HYPERGLYCEMIA, WITH LONG-TERM CURRENT USE OF INSULIN (HCC): ICD-10-CM

## 2018-06-27 DIAGNOSIS — E53.8 B12 DEFICIENCY: ICD-10-CM

## 2018-06-27 DIAGNOSIS — Z79.4 TYPE 2 DIABETES MELLITUS WITH HYPERGLYCEMIA, WITH LONG-TERM CURRENT USE OF INSULIN (HCC): ICD-10-CM

## 2018-06-27 DIAGNOSIS — I15.2 HYPERTENSION ASSOCIATED WITH DIABETES (HCC): ICD-10-CM

## 2018-06-27 DIAGNOSIS — E55.9 VITAMIN D DEFICIENCY: ICD-10-CM

## 2018-06-27 LAB — GLUCOSE BLDC GLUCOMTR-MCNC: 125 MG/DL (ref 70–130)

## 2018-06-27 PROCEDURE — 99214 OFFICE O/P EST MOD 30 MIN: CPT | Performed by: INTERNAL MEDICINE

## 2018-06-27 PROCEDURE — 82962 GLUCOSE BLOOD TEST: CPT | Performed by: INTERNAL MEDICINE

## 2018-06-27 RX ORDER — CLOPIDOGREL BISULFATE 75 MG/1
75 TABLET ORAL DAILY
COMMUNITY
End: 2019-04-18

## 2018-06-27 RX ORDER — ASPIRIN 81 MG/1
81 TABLET, CHEWABLE ORAL DAILY
COMMUNITY
End: 2019-04-18

## 2018-06-29 ENCOUNTER — HOSPITAL ENCOUNTER (EMERGENCY)
Age: 78
Discharge: HOME | End: 2018-06-29
Attending: EMERGENCY MEDICINE
Payer: MEDICARE

## 2018-06-29 ENCOUNTER — APPOINTMENT (OUTPATIENT)
Dept: GENERAL RADIOLOGY | Age: 78
End: 2018-06-29
Payer: MEDICARE

## 2018-06-29 VITALS
BODY MASS INDEX: 32.64 KG/M2 | HEIGHT: 70 IN | HEART RATE: 62 BPM | TEMPERATURE: 98.6 F | WEIGHT: 228 LBS | DIASTOLIC BLOOD PRESSURE: 52 MMHG | SYSTOLIC BLOOD PRESSURE: 126 MMHG | RESPIRATION RATE: 15 BRPM | OXYGEN SATURATION: 93 %

## 2018-06-29 DIAGNOSIS — R07.89 ATYPICAL CHEST PAIN: ICD-10-CM

## 2018-06-29 DIAGNOSIS — R07.89 CHEST WALL PAIN: Primary | ICD-10-CM

## 2018-06-29 LAB
ALBUMIN SERPL-MCNC: 3.7 G/DL (ref 3.5–5.2)
ALP BLD-CCNC: 105 U/L (ref 40–130)
ALT SERPL-CCNC: 28 U/L (ref 5–41)
ANION GAP SERPL CALCULATED.3IONS-SCNC: 15 MMOL/L (ref 7–19)
AST SERPL-CCNC: 27 U/L (ref 5–40)
BASOPHILS ABSOLUTE: 0 K/UL (ref 0–0.2)
BASOPHILS RELATIVE PERCENT: 0.5 % (ref 0–1)
BILIRUB SERPL-MCNC: 0.4 MG/DL (ref 0.2–1.2)
BILIRUBIN URINE: NEGATIVE
BLOOD, URINE: NEGATIVE
BUN BLDV-MCNC: 21 MG/DL (ref 8–23)
CALCIUM SERPL-MCNC: 9.2 MG/DL (ref 8.8–10.2)
CHLORIDE BLD-SCNC: 99 MMOL/L (ref 98–111)
CLARITY: CLEAR
CO2: 23 MMOL/L (ref 22–29)
COLOR: YELLOW
CREAT SERPL-MCNC: 1 MG/DL (ref 0.5–1.2)
EOSINOPHILS ABSOLUTE: 0.3 K/UL (ref 0–0.6)
EOSINOPHILS RELATIVE PERCENT: 5 % (ref 0–5)
GFR NON-AFRICAN AMERICAN: >60
GLUCOSE BLD-MCNC: 242 MG/DL (ref 74–109)
GLUCOSE URINE: >=1000 MG/DL
HCT VFR BLD CALC: 39.4 % (ref 42–52)
HEMOGLOBIN: 12.9 G/DL (ref 14–18)
INR BLD: 1.08 (ref 0.88–1.18)
KETONES, URINE: NEGATIVE MG/DL
LEUKOCYTE ESTERASE, URINE: NEGATIVE
LIPASE: 32 U/L (ref 13–60)
LYMPHOCYTES ABSOLUTE: 0.9 K/UL (ref 1.1–4.5)
LYMPHOCYTES RELATIVE PERCENT: 14.7 % (ref 20–40)
MCH RBC QN AUTO: 29.8 PG (ref 27–31)
MCHC RBC AUTO-ENTMCNC: 32.7 G/DL (ref 33–37)
MCV RBC AUTO: 91 FL (ref 80–94)
MONOCYTES ABSOLUTE: 0.7 K/UL (ref 0–0.9)
MONOCYTES RELATIVE PERCENT: 10.9 % (ref 0–10)
NEUTROPHILS ABSOLUTE: 4.1 K/UL (ref 1.5–7.5)
NEUTROPHILS RELATIVE PERCENT: 68.4 % (ref 50–65)
NITRITE, URINE: NEGATIVE
PDW BLD-RTO: 15.1 % (ref 11.5–14.5)
PERFORMED ON: NORMAL
PH UA: 7
PLATELET # BLD: 140 K/UL (ref 130–400)
PMV BLD AUTO: 9.6 FL (ref 9.4–12.4)
POC TROPONIN I: 0.03 NG/ML (ref 0–0.08)
POTASSIUM SERPL-SCNC: 4.8 MMOL/L (ref 3.5–5)
PRO-BNP: 657 PG/ML (ref 0–1800)
PROTEIN UA: NEGATIVE MG/DL
PROTHROMBIN TIME: 13.9 SEC (ref 12–14.6)
RBC # BLD: 4.33 M/UL (ref 4.7–6.1)
SODIUM BLD-SCNC: 137 MMOL/L (ref 136–145)
SPECIFIC GRAVITY UA: 1.02
TOTAL PROTEIN: 6.6 G/DL (ref 6.6–8.7)
TROPONIN: 0.04 NG/ML (ref 0–0.03)
URINE REFLEX TO CULTURE: ABNORMAL
UROBILINOGEN, URINE: 1 E.U./DL
WBC # BLD: 6 K/UL (ref 4.8–10.8)

## 2018-06-29 PROCEDURE — 36415 COLL VENOUS BLD VENIPUNCTURE: CPT

## 2018-06-29 PROCEDURE — 71046 X-RAY EXAM CHEST 2 VIEWS: CPT

## 2018-06-29 PROCEDURE — 84484 ASSAY OF TROPONIN QUANT: CPT

## 2018-06-29 PROCEDURE — 85610 PROTHROMBIN TIME: CPT

## 2018-06-29 PROCEDURE — 83690 ASSAY OF LIPASE: CPT

## 2018-06-29 PROCEDURE — 99285 EMERGENCY DEPT VISIT HI MDM: CPT

## 2018-06-29 PROCEDURE — 80053 COMPREHEN METABOLIC PANEL: CPT

## 2018-06-29 PROCEDURE — 85025 COMPLETE CBC W/AUTO DIFF WBC: CPT

## 2018-06-29 PROCEDURE — 99285 EMERGENCY DEPT VISIT HI MDM: CPT | Performed by: INTERNAL MEDICINE

## 2018-06-29 PROCEDURE — 93005 ELECTROCARDIOGRAM TRACING: CPT

## 2018-06-29 PROCEDURE — 83880 ASSAY OF NATRIURETIC PEPTIDE: CPT

## 2018-06-29 PROCEDURE — 81003 URINALYSIS AUTO W/O SCOPE: CPT

## 2018-06-29 PROCEDURE — 99285 EMERGENCY DEPT VISIT HI MDM: CPT | Performed by: EMERGENCY MEDICINE

## 2018-06-29 ASSESSMENT — ENCOUNTER SYMPTOMS
ABDOMINAL PAIN: 0
BACK PAIN: 0
SHORTNESS OF BREATH: 0

## 2018-06-29 NOTE — CONSULTS
[facility]       Cardiology Consultation      I personally saw the patient and rounded with:  Deyvi Lopez RN nurse in the ED      Date of Admission:  6/29/2018  2:28 PM    Date of Initially Being Seen / Consultation:  6/29/18    Cardiologist:  Bill Balderrama MD     Cardiology Attending: [facility] Attending: ED     PCP:  RITESH Houser CNP    Reason for Consultation or Admission / Chief Complaint:  Chest pain    SUBJECTIVE AND HISTORY OF PRESENT ILLNESS:    Source of the history:  Patient, family, previous inpatient and outpatient records in [place] is a 77 y.o. male who presents to [facility] ED # 9 with symptoms / signs / problem or diagnosis of chest discomfort. He just underwent OHS ( On 06/07/2018, Aortic Valve Replacement, Implanting a 21 mm [place] Magna Ease Pericardial Tissue Valve by Dr. Leora Najjar). This morning, the home health care nurse as him to come to the ED when he reported right sided chest discomfort. It was a sharp like pain. Perhaps worse with a deep breath. He says its \"not bad\". It is also a burning like quality. From the discharge summary from the OHS, it is noted that there was no significant coronary artery disease. When being examined this afternoon, he has had no symptoms of exertional chest discomfort, unusual or change in shortness of air, presyncope or syncope.        Family present:  Yes: wife and son      CARDIAC RISK PROFILE:    Risk Factor Yes / No / Unknown       Gender Male   Cigarette Use No, but did use in the past    Family History of Cardiovascular Disease Yes: heart disease and diabetes   Diabetes Mellitus yes   Hypercholesteremia yes   Hypertension yes          Cardiac Specific Problems:    Specialty Problems        Cardiology Problems    Essential hypertension        Orthostasis        Calcific aortic stenosis        Rheumatic aortic valve insufficiency        Rheumatic mitral stenosis PRIOR CARDIAC PROBLEM LIST  (IF APPLICABLE):    7/69/79  cardiolite  Positive for inferior myocardial ischemia, EF 52%  4/30/12  Cath  Mild to moderate CAD, normal LVFX  4/9/2015  Echo  Moderate AI, normal LVFX  2/17/2016  Echo  Moderate AI, DEE 1.3 cm2  4/18/2018  Echo AS / AI  5/16/2018  AS with moderate AI, decreased LVFX  Cath  Mild CAD  06/07/2018, Aortic Valve Replacement, Implanting a 21 mm [place] Magna Ease Pericardial Tissue Valve by Dr. Carson Girard        Past Medical History:    Past Medical History:   Diagnosis Date    Aortic valve stenosis     Chest tightness, discomfort, or pressure 4/30/2012    Chronic back pain     CKD (chronic kidney disease)     CPAP (continuous positive airway pressure) dependence     13cm    Diabetes ([facility]     Diabetic polyneuropathy associated with type 2 diabetes mellitus ([facility] 8/29/2016    HTN (hypertension)     Hyperlipemia     Left ventricular diastolic dysfunction     Mitral stenosis     Mitral valve stenosis     Obstructive sleep apnea     AHI: 34.5 per PSG, 6/2013; AHI: 36.9 per PSG, 7/2015; AHI: 54.5 per PSG, 3/2017    Pinched nerve in neck     Restless legs syndrome 2/17/2016         Past Surgical History:    Past Surgical History:   Procedure Laterality Date    APPENDECTOMY      BACK SURGERY      4 Back surgeries    BLADDER SURGERY      CARDIAC CATHETERIZATION  4/30/12    EF > 50%    CATARACT REMOVAL      CHOLECYSTECTOMY      PACEMAKER PLACEMENT      DC RPLCMT PROST AORTIC VALVE OPEN XCP HOMOGRF/STENT N/A 6/7/2018    AORTIC VALVE REPLACEMENT TRANSESOPHAGEAL ECHOCARDIOGRAM performed by Carlos Zaman MD at [facility] Medications:   Prior to Admission medications    Medication Sig Start Date End Date Taking?  Authorizing Provider   aspirin 81 MG EC tablet Take 1 tablet by mouth daily 6/11/18   RITESH Mack   clopidogrel (PLAVIX) 75 MG tablet Take 1 tablet by mouth daily 6/11/18   Adiel Jacome status: Former Smoker     Quit date: 7/16/1977    Smokeless tobacco: Never Used    Alcohol use No    Drug use: No    Sexual activity: Not on file     Other Topics Concern    Not on file     Social History Narrative    No narrative on file       Family History:     Family History   Problem Relation Age of Onset    Diabetes Mother     Cancer Father     Cancer Sister     Heart Disease Brother     Cancer Brother          REVIEW OF SYSTEMS:     Except as noted in the HPI, all other systems are negative        PHYSICAL EXAMINATION:     BP (!) 139/58   Pulse 72   Temp 98.2 °F (36.8 °C)   Resp 16   Ht 5' 10\" (1.778 m)   Wt 228 lb (103.4 kg)   SpO2 92%   BMI 32.71 kg/m²     GENERAL - well developed and well nourished, in mild amount of generalized distress  HEENT -  PERRLA, Hearing appears normal, conjunctiva and lids are normal, ears and nose appear normal  NECK - no thyromegaly, no JVD, trachea is in the midline  CARDIOVASCULAR - PMI is in the left mid line clavicular position, Normal S1 and S2 with a grade 1/6 systolic murmur. No S3 or S4    PULMONARY - No respiratory distress. scattered wheezes and rales.   Breath sounds in both  lung fields are Decreased  ABDOMEN  - soft, non tender, no rebound, no hepatomegaly or splenomegaly  MUSCULOSKELETAL  - Prone/Supine, digitals and nails are without clubbing or cyanosis  EXTREMITIES - trace edema  NEUROLOGIC - cranial nerves, II-XII, are normal  SKIN - turgor is normal, no rash  PSYCHIATRIC - normal mood and affect, alert and orientated x 3, judgement and insight appear appropriate      LABORATORY EVALUATION & TESTING:    I have personally reviewed and interpreted the results of the following diagnostic testing      EKG and or Telemetry:  which was personally reviewed me:  Paced rhythm,   Pulse Readings from Last 1 Encounters:   06/29/18 72    bpm,  without Acute changes    Troponin:  positive for  myocardial necrosis (  0.04); the creatinine is normal    CBC:

## 2018-06-29 NOTE — ED NOTES
Patient placed in a gown Patient placed on cardiac monitor, continuous pulse oximeter, and NIBP monitor.  Monitor alarms on.       Esther Villeda RN  06/29/18 [id number]

## 2018-06-29 NOTE — ED PROVIDER NOTES
[facility]  eMERGENCY dEPARTMENT eNCOUnter      Pt Name: Betito Aquino  MRN: [id number]  [place] 1940  Date of evaluation: 6/29/2018  Provider: Manny Olivares MD    CHIEF COMPLAINT       Chief Complaint   Patient presents with    Chest Pain         HISTORY OF PRESENT ILLNESS   (Location/Symptom, Timing/Onset, Context/Setting, Quality, Duration, Modifying Factors, Severity)  Note limiting factors. Betito Aquino is a 77 y.o. male who presents to the emergency department With chest pain. This patient states he has sharp burning left sided chest pain is the 1st of this week. Last 4-5 days. He states his been constant since exam this morning. The patient denies any shortness of breath nausea vomiting abdominal pain diaphoresis. He has a minimal cough. The patient denies any fevers. He  Has been doing his rehab and states that he's walking 4. He states \"I am breathing well. \" The patient had bowel surgery with Dr. Ricardo Lawrence on the 7th. The patient was told come to the ER for further evaluation based on having chest pain. The history is provided by the patient, medical records and a relative. Nursing Notes were reviewed. REVIEW OF SYSTEMS    (2-9 systems for level 4, 10 or more for level 5)     Review of Systems   Constitutional: Negative for fever. Respiratory: Negative for shortness of breath. Cardiovascular: Positive for chest pain. Negative for palpitations. Gastrointestinal: Negative for abdominal pain. Musculoskeletal: Negative for back pain. Skin: Negative for rash. Neurological: Negative for seizures and syncope. Psychiatric/Behavioral: Negative for confusion. A complete review of systems was performed and is negative except as noted above in the HPI.        PAST MEDICAL HISTORY     Past Medical History:   Diagnosis Date    Aortic valve stenosis     Chest tightness, discomfort, or pressure 4/30/2012    Chronic back pain     CKD (chronic kidney disease)     CPAP TABLET    Take 10 mg by mouth 3 times daily     MUPIROCIN (BACTROBAN) 2 % NASAL OINTMENT    by Nasal route 3 times daily Take by Nasal route 2 times daily. PRAMIPEXOLE (MIRAPEX) 1.5 MG TABLET    TAKE ONE TABLET BY MOUTH THREE TIMES DAILY ** MAY CAUSE DROWSINESS    ROSUVASTATIN (CRESTOR) 10 MG TABLET    Take 10 mg by mouth daily Takes Mon, Weds, Fri    SACUBITRIL-VALSARTAN (ENTRESTO) 24-26 MG PER TABLET    Take 1 tablet by mouth daily       ALLERGIES     Patient has no known allergies. FAMILY HISTORY       Family History   Problem Relation Age of Onset    Diabetes Mother     Cancer Father     Cancer Sister     Heart Disease Brother     Cancer Brother           SOCIAL HISTORY       Social History     Social History    Marital status:      Spouse name: N/A    Number of children: N/A    Years of education: N/A     Social History Main Topics    Smoking status: Former Smoker     Quit date: 7/16/1977    Smokeless tobacco: Never Used    Alcohol use No    Drug use: No    Sexual activity: Not Asked     Other Topics Concern    None     Social History Narrative    None       SCREENINGS             PHYSICAL EXAM    (up to 7 for level 4, 8 or more for level 5)     ED Triage Vitals [06/29/18 1428]   BP Temp Temp src Pulse Resp SpO2 Height Weight   (!) 139/58 98.2 °F (36.8 °C) -- 72 16 92 % 5' 10\" (1.778 m) 228 lb (103.4 kg)       Physical Exam   Constitutional: He is oriented to person, place, and time. He appears well-developed and well-nourished. No distress. HENT:   Head: Normocephalic and atraumatic. Eyes: EOM are normal. Pupils are equal, round, and reactive to light. Neck: Normal range of motion. Neck supple. Cardiovascular: Normal rate and regular rhythm. Pulmonary/Chest: Effort normal and breath sounds normal. No respiratory distress. He has no wheezes. Midline scar healing well Steri-Strips no sign of drainage. Abdominal: Soft. Bowel sounds are normal. He exhibits no distension. There is no tenderness. Musculoskeletal: Normal range of motion. He exhibits no edema or tenderness. Neurological: He is alert and oriented to person, place, and time. Skin: Skin is warm and dry. Psychiatric: He has a normal mood and affect. His behavior is normal.   Nursing note and vitals reviewed. DIAGNOSTIC RESULTS     EKG: All EKG's are interpreted by the Emergency Department Physician who either signs or Co-signs this chart in the absence of a cardiologist.    Paced   Good capture      RADIOLOGY:   Non-plain film images such as CT, Ultrasound and MRI are read by the radiologist. Plain radiographic images are visualized and preliminarily interpreted by the emergency physician with the below findings:        Interpretation per the Radiologist below, if available at the time of this note:    XR CHEST STANDARD (2 VW)   Final Result   Impression:   1. Generous heart size. No acute lung infiltrates or radiographic   evidence of heart failure.    Signed by Dr Elmer Grimm on 6/29/2018 3:30 PM            ED BEDSIDE ULTRASOUND:   Performed by ED Physician - none    LABS:  Labs Reviewed   COMPREHENSIVE METABOLIC PANEL - Abnormal; Notable for the following:        Result Value    Glucose 242 (*)     All other components within normal limits   CBC WITH AUTO DIFFERENTIAL - Abnormal; Notable for the following:     RBC 4.33 (*)     Hemoglobin 12.9 (*)     Hematocrit 39.4 (*)     MCHC 32.7 (*)     RDW 15.1 (*)     Neutrophils % 68.4 (*)     Lymphocytes % 14.7 (*)     Monocytes % 10.9 (*)     Lymphocytes # 0.9 (*)     All other components within normal limits   URINE RT REFLEX TO CULTURE - Abnormal; Notable for the following:     Glucose, Ur >=1000 (*)     All other components within normal limits   TROPONIN - Abnormal; Notable for the following:     Troponin 0.04 (*)     All other components within normal limits   LIPASE   PROTIME-INR   BRAIN NATRIURETIC PEPTIDE   POCT VENOUS       All other labs were within

## 2018-07-02 ENCOUNTER — OFFICE VISIT (OUTPATIENT)
Dept: NEUROLOGY | Age: 78
End: 2018-07-02
Payer: MEDICARE

## 2018-07-02 VITALS
BODY MASS INDEX: 32.35 KG/M2 | HEIGHT: 70 IN | WEIGHT: 226 LBS | DIASTOLIC BLOOD PRESSURE: 66 MMHG | HEART RATE: 89 BPM | SYSTOLIC BLOOD PRESSURE: 119 MMHG

## 2018-07-02 DIAGNOSIS — G25.81 RESTLESS LEGS SYNDROME: Chronic | ICD-10-CM

## 2018-07-02 DIAGNOSIS — G47.33 OBSTRUCTIVE SLEEP APNEA: Chronic | ICD-10-CM

## 2018-07-02 DIAGNOSIS — E11.42 DIABETIC POLYNEUROPATHY ASSOCIATED WITH TYPE 2 DIABETES MELLITUS (HCC): Primary | Chronic | ICD-10-CM

## 2018-07-02 LAB
ALBUMIN SERPL-MCNC: 4.4 G/DL (ref 3.5–5.2)
ANION GAP SERPL CALCULATED.3IONS-SCNC: 14 MMOL/L (ref 7–19)
BUN BLDV-MCNC: 24 MG/DL (ref 8–23)
CALCIUM SERPL-MCNC: 9.9 MG/DL (ref 8.8–10.2)
CHLORIDE BLD-SCNC: 97 MMOL/L (ref 98–111)
CO2: 26 MMOL/L (ref 22–29)
CREAT SERPL-MCNC: 0.9 MG/DL (ref 0.5–1.2)
CREATININE URINE: 60.4 MG/DL (ref 4.2–622)
GFR NON-AFRICAN AMERICAN: >60
GLUCOSE BLD-MCNC: 117 MG/DL (ref 74–109)
PHOSPHORUS: 3.9 MG/DL (ref 2.5–4.5)
POTASSIUM SERPL-SCNC: 4.4 MMOL/L (ref 3.5–5)
PROTEIN PROTEIN: 8 MG/DL (ref 15–45)
SODIUM BLD-SCNC: 137 MMOL/L (ref 136–145)
URIC ACID, SERUM: 5.3 MG/DL (ref 3.4–7)

## 2018-07-02 PROCEDURE — 1101F PT FALLS ASSESS-DOCD LE1/YR: CPT | Performed by: PSYCHIATRY & NEUROLOGY

## 2018-07-02 PROCEDURE — G8427 DOCREV CUR MEDS BY ELIG CLIN: HCPCS | Performed by: PSYCHIATRY & NEUROLOGY

## 2018-07-02 PROCEDURE — G8417 CALC BMI ABV UP PARAM F/U: HCPCS | Performed by: PSYCHIATRY & NEUROLOGY

## 2018-07-02 PROCEDURE — 99213 OFFICE O/P EST LOW 20 MIN: CPT | Performed by: PSYCHIATRY & NEUROLOGY

## 2018-07-02 PROCEDURE — 4040F PNEUMOC VAC/ADMIN/RCVD: CPT | Performed by: PSYCHIATRY & NEUROLOGY

## 2018-07-02 PROCEDURE — 1123F ACP DISCUSS/DSCN MKR DOCD: CPT | Performed by: PSYCHIATRY & NEUROLOGY

## 2018-07-02 PROCEDURE — 1036F TOBACCO NON-USER: CPT | Performed by: PSYCHIATRY & NEUROLOGY

## 2018-07-02 PROCEDURE — 1111F DSCHRG MED/CURRENT MED MERGE: CPT | Performed by: PSYCHIATRY & NEUROLOGY

## 2018-07-02 NOTE — PROGRESS NOTES
[id number] [facility] Neurology  [address] [place] [address], [address]  [place], [address]  Phone [phone]  Fax [phone] [address] [facility] Neurology Follow Up Encounter  2018 2:03 PM    Information:   Patient Name: Erick Christopher  :   1940  Age:   77 y.o. MRN:   [id number]  Account #:  [de-identified]  Today:  18    Provider: Marcela Green M.D. Chief Complaint:   Chief Complaint   Patient presents with    3 Month Follow-Up       Subjective: Erick Christopher is a 77 y.o. man with a history of diabetic peripheral neuropathy, restless legs syndrome, and sleep apnea  who is following up. He has been doing well. He has lost some weight. His RLS is doing reasonably well. He has chronic numbness in her feet and lower legs. He had AVR and feels much better. He has had no syncope. His breathing is better. He is attending cardiac rehab.         Objective:     Past Medical History:  Past Medical History:   Diagnosis Date    Aortic valve stenosis     Chest tightness, discomfort, or pressure 2012    Chronic back pain     CKD (chronic kidney disease)     CPAP (continuous positive airway pressure) dependence     13cm    Diabetes [facility] [address]     Diabetic polyneuropathy associated with type 2 diabetes mellitus ([facility] [address] 2016    HTN (hypertension)     Hyperlipemia     Left ventricular diastolic dysfunction     Mitral stenosis     Mitral valve stenosis     Obstructive sleep apnea     AHI: 34.5 per PSG, 2013; AHI: 36.9 per PSG, 2015; AHI: 54.5 per PSG, 3/2017    Pinched nerve in neck     Restless legs syndrome 2016       Past Surgical History:   Procedure Laterality Date    APPENDECTOMY      BACK SURGERY      4 Back surgeries    BLADDER SURGERY      CARDIAC CATHETERIZATION  12    EF > 50%    CATARACT REMOVAL      CHOLECYSTECTOMY      PACEMAKER PLACEMENT      [place] RPLCMT PROST AORTIC VALVE OPEN XCP HOMOGRF/STENT N/A 2018    AORTIC VALVE REPLACEMENT TRANSESOPHAGEAL ECHOCARDIOGRAM performed Weds, Fri      esomeprazole (NEXIUM) 40 MG capsule Take 40 mg by mouth every morning (before breakfast). No current facility-administered medications for this visit. Allergies: Allergies as of 07/02/2018    (No Known Allergies)       Review of Systems:  General ROS: negative for - chills or fever  Psychological ROS: negative for - anxiety or depression  ENT ROS: negative for - headaches or sinus pain  Hematological and Lymphatic ROS: negative for - bleeding problems, bruising or swollen lymph nodes  Respiratory ROS: negative for - cough, hemoptysis or shortness of breath  Cardiovascular ROS: negative for - chest pain or palpitations  Gastrointestinal ROS: negative for - blood in stools, constipation, diarrhea or nausea/vomiting  Genito-Urinary ROS: negative for - hematuria or urinary frequency/urgency  Musculoskeletal ROS: positive for - joint pain, joint stiffness or joint swelling  Neurological ROS: negative for - memory loss, weakness   Positive for - numbness and tingling    Examination:  Vitals:  /66   Pulse 89   Ht 5' 10\" (1.778 m)   Wt 226 lb (102.5 kg)   BMI 32.43 kg/m²   General appearance: alert and cooperative with exam  HEENT: Sclera clear, anicteric, Oropharynx clear, no lesions, Neck supple with midline trachea, Thyroid without masses and Trachea midline  Heart:: regular rate and rhythm, S1, S2 normal, no murmur, click, rub or gallop  Lungs: clear to auscultation bilaterally  Extremities: extremities normal, atraumatic, no cyanosis or edema  Neurologic: Extraocular movements are intact without nystagmus. Visual fields are full to confrontation. Facial movements are symmetrical and normal. Speech is precise. Extremity strength is normal in both uppers and lowers. Deep tendon reflexes are absent. Rapid alternating movements are unimpaired. Finger-to-nose testing is performed well, without dysmetria.  Gait is normal.    Pertinent Diagnostic Studies:  [facility]

## 2018-07-03 ENCOUNTER — TELEPHONE (OUTPATIENT)
Dept: ENDOCRINOLOGY | Facility: CLINIC | Age: 78
End: 2018-07-03

## 2018-07-04 LAB
EKG P AXIS: NORMAL DEGREES
EKG P-R INTERVAL: NORMAL MS
EKG Q-T INTERVAL: 404 MS
EKG QRS DURATION: 166 MS
EKG QTC CALCULATION (BAZETT): 453 MS
EKG T AXIS: 105 DEGREES

## 2018-07-12 ENCOUNTER — TELEPHONE (OUTPATIENT)
Dept: ENDOCRINOLOGY | Facility: CLINIC | Age: 78
End: 2018-07-12

## 2018-07-12 NOTE — TELEPHONE ENCOUNTER
Spoke with patient 7/12/18. Patient's supper readings are elevated. Fasting readings were at goal.     Instructed patient to increase breakfast dose of Humulin R U-500 from 55 units to 60 units and continue 55 units for supper.

## 2018-07-17 ENCOUNTER — OFFICE VISIT (OUTPATIENT)
Dept: CARDIOTHORACIC SURGERY | Age: 78
End: 2018-07-17

## 2018-07-17 VITALS
HEART RATE: 69 BPM | BODY MASS INDEX: 32.93 KG/M2 | OXYGEN SATURATION: 97 % | WEIGHT: 230 LBS | SYSTOLIC BLOOD PRESSURE: 139 MMHG | DIASTOLIC BLOOD PRESSURE: 76 MMHG | HEIGHT: 70 IN

## 2018-07-17 DIAGNOSIS — Z95.3 STATUS POST AORTIC VALVE REPLACEMENT WITH BIOPROSTHETIC VALVE: ICD-10-CM

## 2018-07-17 DIAGNOSIS — I06.0 RHEUMATIC AORTIC STENOSIS: Primary | ICD-10-CM

## 2018-07-17 PROCEDURE — 99024 POSTOP FOLLOW-UP VISIT: CPT | Performed by: THORACIC SURGERY (CARDIOTHORACIC VASCULAR SURGERY)

## 2018-07-17 RX ORDER — OXYCODONE HYDROCHLORIDE 5 MG/1
5 TABLET ORAL EVERY 4 HOURS PRN
Status: ON HOLD | COMMUNITY
End: 2020-09-21 | Stop reason: HOSPADM

## 2018-07-18 NOTE — PROGRESS NOTES
carbidopa-levodopa (SINEMET CR)  MG per extended release tablet TAKE ONE TABLET BY MOUTH TWICE A DAY 60 tablet 5    hydrochlorothiazide (MICROZIDE) 12.5 MG capsule Take 12.5 mg by mouth daily      pramipexole (MIRAPEX) 1.5 MG tablet TAKE ONE TABLET BY MOUTH THREE TIMES DAILY ** MAY CAUSE DROWSINESS 270 tablet 3    B-D UF III MINI PEN NEEDLES 31G X 5 MM MISC       citalopram (CELEXA) 10 MG tablet Take 10 mg by mouth daily      furosemide (LASIX) 20 MG tablet Take 1 tablet by mouth as needed (For weight gain greater than 2.5 lbs in 24 hours. ) 30 tablet 0    rosuvastatin (CRESTOR) 10 MG tablet Take 10 mg by mouth daily Takes Mon, Weds, Fri      esomeprazole (NEXIUM) 40 MG capsule Take 40 mg by mouth every morning (before breakfast).  metoclopramide (REGLAN) 10 MG tablet Take 10 mg by mouth 3 times daily        No current facility-administered medications for this visit. He is to stop the Plavix after 60 days. Review of Systems:   General: Denies any fever or chills. Energy level and endurance are returning to                              normal.    Respiratory: Denies any cough or hoarseness. Denies any SOB or JETER. Cardiac: Denies any chest pain or pressure. Denies any palpitations. Denies any                             presyncope or syncope. Denies any orthopnea or PND. Physical Examination:   Vital signs: Blood pressure 139/76, pulse 69, height 5' 10\" (1.778 m), weight 230 lb (104.3 kg), SpO2 97 %. Lungs: Both lungs are clear with equal breath sounds. Cardiac: Demonstrates a regular rate and rhythm without murmurs, rubs, or                              gallops. Chest: The sternal incision has healed well, and the sternum appears to be                           stable. Lower Extremities: No edema    Assessment: At this time, Mr. Jono Jeong is progressing very well following his aortic valve replacement surgery.         Plan:   He may resume driving and may lift

## 2018-08-08 ENCOUNTER — OFFICE VISIT (OUTPATIENT)
Dept: CARDIOLOGY | Age: 78
End: 2018-08-08
Payer: MEDICARE

## 2018-08-08 VITALS
HEART RATE: 65 BPM | SYSTOLIC BLOOD PRESSURE: 122 MMHG | BODY MASS INDEX: 33.07 KG/M2 | DIASTOLIC BLOOD PRESSURE: 68 MMHG | WEIGHT: 231 LBS | HEIGHT: 70 IN

## 2018-08-08 DIAGNOSIS — I10 ESSENTIAL HYPERTENSION: Primary | Chronic | ICD-10-CM

## 2018-08-08 PROCEDURE — G8417 CALC BMI ABV UP PARAM F/U: HCPCS | Performed by: INTERNAL MEDICINE

## 2018-08-08 PROCEDURE — G8427 DOCREV CUR MEDS BY ELIG CLIN: HCPCS | Performed by: INTERNAL MEDICINE

## 2018-08-08 PROCEDURE — 1036F TOBACCO NON-USER: CPT | Performed by: INTERNAL MEDICINE

## 2018-08-08 PROCEDURE — 4040F PNEUMOC VAC/ADMIN/RCVD: CPT | Performed by: INTERNAL MEDICINE

## 2018-08-08 PROCEDURE — 1123F ACP DISCUSS/DSCN MKR DOCD: CPT | Performed by: INTERNAL MEDICINE

## 2018-08-08 PROCEDURE — 93000 ELECTROCARDIOGRAM COMPLETE: CPT | Performed by: INTERNAL MEDICINE

## 2018-08-08 PROCEDURE — 1101F PT FALLS ASSESS-DOCD LE1/YR: CPT | Performed by: INTERNAL MEDICINE

## 2018-08-08 PROCEDURE — 99214 OFFICE O/P EST MOD 30 MIN: CPT | Performed by: INTERNAL MEDICINE

## 2018-08-08 ASSESSMENT — ENCOUNTER SYMPTOMS: SHORTNESS OF BREATH: 0

## 2018-08-08 NOTE — PROGRESS NOTES
daily , Disp: , Rfl:     carbidopa-levodopa (SINEMET CR)  MG per extended release tablet, TAKE ONE TABLET BY MOUTH TWICE A DAY, Disp: 60 tablet, Rfl: 5    hydrochlorothiazide (MICROZIDE) 12.5 MG capsule, Take 12.5 mg by mouth daily, Disp: , Rfl:     pramipexole (MIRAPEX) 1.5 MG tablet, TAKE ONE TABLET BY MOUTH THREE TIMES DAILY ** MAY CAUSE DROWSINESS, Disp: 270 tablet, Rfl: 3    B-D UF III MINI PEN NEEDLES 31G X 5 MM MISC, , Disp: , Rfl:     citalopram (CELEXA) 10 MG tablet, Take 10 mg by mouth daily, Disp: , Rfl:     furosemide (LASIX) 20 MG tablet, Take 1 tablet by mouth as needed (For weight gain greater than 2.5 lbs in 24 hours.), Disp: 30 tablet, Rfl: 0    rosuvastatin (CRESTOR) 10 MG tablet, Take 10 mg by mouth daily Takes Mon, Weds, Fri, Disp: , Rfl:     esomeprazole (NEXIUM) 40 MG capsule, Take 40 mg by mouth every morning (before breakfast). , Disp: , Rfl:     PE:  Vitals:    08/08/18 1516   BP: 122/68   Pulse: 65       Estimated body mass index is 33.15 kg/m² as calculated from the following:    Height as of this encounter: 5' 10\" (1.778 m). Weight as of this encounter: 231 lb (104.8 kg).     General - No acute distress  Eyes - PERRL, anicteric sclerae; no lid-lag  ENMT - Atraumatic; Mucous membranes moist, oropharynx clear  Neck - trachea midline, thyroid non-tender  Cardio - No jugular venous distension, decreased carotid impulse is                 Clear s1 s2, No gallops, rubs, 1/6 systolic ejection murmur             No edema, normal pulses  Resp - Normal effort, Clear to auscultation bilaterally  GI - abdomen soft, non-tender, no hepatosplenomegaly  Skin - warm and dry; no rashes  Psych - A+O x 3, normal affect    Lab Results   Component Value Date    CREATININE 0.9 07/02/2018    CREATININE 1.0 06/29/2018    CREATININE 0.9 06/11/2018    CREATININE 1.0 06/02/2012    CREATININE 1.2 06/01/2012    CREATININE 1.5 05/31/2012    HGB 12.9 06/29/2018    HGB 12.2 06/10/2018    HGB 12.0

## 2018-08-13 RX ORDER — EMPAGLIFLOZIN, METFORMIN HYDROCHLORIDE 10; 1000 MG/1; MG/1
TABLET, EXTENDED RELEASE ORAL
Qty: 30 TABLET | Refills: 11 | Status: SHIPPED | OUTPATIENT
Start: 2018-08-13 | End: 2019-08-03 | Stop reason: SDUPTHER

## 2018-08-21 ENCOUNTER — TELEPHONE (OUTPATIENT)
Dept: ENDOCRINOLOGY | Facility: CLINIC | Age: 78
End: 2018-08-21

## 2018-08-21 NOTE — TELEPHONE ENCOUNTER
Patient called 8/21/18 c/o hyperglycemia. Discussed bg logs and patient is 160-330 mg/dl consistently. Patient takes Humulin R U-500 insulin with Kwikpens.     Instructed patient to increase breakfast dose to 65 units and supper dose to 60 units.

## 2018-09-07 ENCOUNTER — TELEPHONE (OUTPATIENT)
Dept: ENDOCRINOLOGY | Facility: CLINIC | Age: 78
End: 2018-09-07

## 2018-09-07 NOTE — TELEPHONE ENCOUNTER
Patient states sugars are staying 180-200's in morning and evening. Patient has been using Humulin R U-500 kwikpens and has been taking 65 units qam and 60 units pm. Instructed patient to increase both doses by 5 units.

## 2018-09-15 ENCOUNTER — APPOINTMENT (OUTPATIENT)
Dept: CT IMAGING | Age: 78
End: 2018-09-15
Payer: MEDICARE

## 2018-09-15 ENCOUNTER — HOSPITAL ENCOUNTER (EMERGENCY)
Age: 78
Discharge: HOME | End: 2018-09-15
Attending: EMERGENCY MEDICINE
Payer: MEDICARE

## 2018-09-15 VITALS
SYSTOLIC BLOOD PRESSURE: 120 MMHG | BODY MASS INDEX: 32.93 KG/M2 | DIASTOLIC BLOOD PRESSURE: 74 MMHG | RESPIRATION RATE: 20 BRPM | HEART RATE: 80 BPM | OXYGEN SATURATION: 94 % | TEMPERATURE: 97.4 F | HEIGHT: 70 IN | WEIGHT: 230 LBS

## 2018-09-15 DIAGNOSIS — R73.9 HYPERGLYCEMIA: ICD-10-CM

## 2018-09-15 DIAGNOSIS — R42 DIZZINESS: Primary | ICD-10-CM

## 2018-09-15 LAB
ALBUMIN SERPL-MCNC: 4.6 G/DL (ref 3.5–5.2)
ALP BLD-CCNC: 78 U/L (ref 40–130)
ALT SERPL-CCNC: 27 U/L (ref 5–41)
ANION GAP SERPL CALCULATED.3IONS-SCNC: 12 MMOL/L (ref 7–19)
AST SERPL-CCNC: 19 U/L (ref 5–40)
BILIRUB SERPL-MCNC: 0.4 MG/DL (ref 0.2–1.2)
BILIRUBIN URINE: NEGATIVE
BLOOD, URINE: NEGATIVE
BUN BLDV-MCNC: 20 MG/DL (ref 8–23)
CALCIUM SERPL-MCNC: 9.6 MG/DL (ref 8.8–10.2)
CHLORIDE BLD-SCNC: 99 MMOL/L (ref 98–111)
CLARITY: CLEAR
CO2: 25 MMOL/L (ref 22–29)
COLOR: YELLOW
CREAT SERPL-MCNC: 1 MG/DL (ref 0.5–1.2)
GFR NON-AFRICAN AMERICAN: >60
GLUCOSE BLD-MCNC: 199 MG/DL (ref 74–109)
GLUCOSE URINE: >=1000 MG/DL
HCT VFR BLD CALC: 48.5 % (ref 42–52)
HEMOGLOBIN: 16.4 G/DL (ref 14–18)
KETONES, URINE: NEGATIVE MG/DL
LEUKOCYTE ESTERASE, URINE: NEGATIVE
MCH RBC QN AUTO: 29.8 PG (ref 27–31)
MCHC RBC AUTO-ENTMCNC: 33.8 G/DL (ref 33–37)
MCV RBC AUTO: 88.2 FL (ref 80–94)
NITRITE, URINE: NEGATIVE
PDW BLD-RTO: 14.2 % (ref 11.5–14.5)
PH UA: 6.5
PLATELET # BLD: 120 K/UL (ref 130–400)
PMV BLD AUTO: 10.1 FL (ref 9.4–12.4)
POTASSIUM SERPL-SCNC: 4.5 MMOL/L (ref 3.5–5)
PROTEIN UA: NEGATIVE MG/DL
RBC # BLD: 5.5 M/UL (ref 4.7–6.1)
SODIUM BLD-SCNC: 136 MMOL/L (ref 136–145)
SPECIFIC GRAVITY UA: 1.02
TOTAL PROTEIN: 7.3 G/DL (ref 6.6–8.7)
TROPONIN: 0.03 NG/ML (ref 0–0.03)
TSH SERPL DL<=0.05 MIU/L-ACNC: 3.61 UIU/ML (ref 0.27–4.2)
URINE REFLEX TO CULTURE: ABNORMAL
UROBILINOGEN, URINE: 1 E.U./DL
WBC # BLD: 4.5 K/UL (ref 4.8–10.8)

## 2018-09-15 PROCEDURE — 99284 EMERGENCY DEPT VISIT MOD MDM: CPT

## 2018-09-15 PROCEDURE — 70450 CT HEAD/BRAIN W/O DYE: CPT

## 2018-09-15 PROCEDURE — 93005 ELECTROCARDIOGRAM TRACING: CPT

## 2018-09-15 PROCEDURE — 84484 ASSAY OF TROPONIN QUANT: CPT

## 2018-09-15 PROCEDURE — 85027 COMPLETE CBC AUTOMATED: CPT

## 2018-09-15 PROCEDURE — 80053 COMPREHEN METABOLIC PANEL: CPT

## 2018-09-15 PROCEDURE — 6370000000 HC RX 637 (ALT 250 FOR IP): Performed by: EMERGENCY MEDICINE

## 2018-09-15 PROCEDURE — 99285 EMERGENCY DEPT VISIT HI MDM: CPT | Performed by: EMERGENCY MEDICINE

## 2018-09-15 PROCEDURE — 36415 COLL VENOUS BLD VENIPUNCTURE: CPT

## 2018-09-15 PROCEDURE — 84443 ASSAY THYROID STIM HORMONE: CPT

## 2018-09-15 PROCEDURE — 81003 URINALYSIS AUTO W/O SCOPE: CPT

## 2018-09-15 RX ORDER — MECLIZINE HYDROCHLORIDE 25 MG/1
25 TABLET ORAL 3 TIMES DAILY PRN
Qty: 20 TABLET | Refills: 0 | Status: SHIPPED | OUTPATIENT
Start: 2018-09-15 | End: 2018-09-25

## 2018-09-15 RX ORDER — MECLIZINE HCL 12.5 MG/1
25 TABLET ORAL ONCE
Status: COMPLETED | OUTPATIENT
Start: 2018-09-15 | End: 2018-09-15

## 2018-09-15 RX ADMIN — PRAMIPEXOLE DIHYDROCHLORIDE 1.5 MG: 1 TABLET ORAL at 05:03

## 2018-09-15 RX ADMIN — MECLIZINE 25 MG: 12.5 TABLET ORAL at 04:18

## 2018-09-15 ASSESSMENT — ENCOUNTER SYMPTOMS
VOMITING: 0
DIARRHEA: 0
ABDOMINAL PAIN: 0
SHORTNESS OF BREATH: 0
EYE PAIN: 0

## 2018-09-15 NOTE — ED PROVIDER NOTES
[facility] EMERGENCY DEPT  eMERGENCY dEPARTMENT eNCOUnter      Pt Name: Eliud Mcwilliams  MRN: [id number]  [place] 1940  Date of evaluation: 9/15/2018  Provider: Hiram aCbrera MD    [address] [place] [address]       Chief Complaint   Patient presents with    Dizziness         HISTORY OF PRESENT ILLNESS   (Location/Symptom, Timing/Onset, Context/Setting, Quality, Duration, Modifying Factors, Severity)  Note limiting factors. Eliud Mcwilliams is a 77 y.o. male who presents to the emergency department Complaining of dizziness. Patient said he felt fine when he went to bed around 10 PM last night. Said he got up around 3 AM to use the bathroom this morning and felt like he was off balance. Has had problems with dizziness previously but said those symptoms were different. He said he felt lightheaded and was found to have aortic valve problem. Underwent aortic valve replacement earlier this year. Current symptoms feel different. Describes it as a sensation of being off balance when he walks. No vision changes. No numbness or weakness. No headache. No room spinning sensation. No chest pain. No palpitations. No difficulty breathing. No abdominal pain. No vomiting or diarrhea. Has history of having a mass removed from his inner ear many years ago. 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 HPI    Nursing Notes were reviewed. REVIEW OF SYSTEMS    (2-9 systems for level 4, 10 or more for level 5)     Review of Systems   Constitutional: Positive for diaphoresis (none currently but states he's has been sweating more than usual for past 2 weeks). Negative for fever. Eyes: Negative for pain and visual disturbance. Respiratory: Negative for shortness of breath. Cardiovascular: Negative for chest pain and palpitations. Gastrointestinal: Negative for abdominal pain, diarrhea and vomiting. Genitourinary: Negative for dysuria. Skin: Negative for rash. Neurological: Positive for dizziness.  Negative for seizures, syncope, facial EMPAGLIFLOZIN-METFORMIN HCL ER (SYNJARDY XR)  MG TB24    Take 1 tablet by mouth daily (with breakfast)    ESOMEPRAZOLE (NEXIUM) 40 MG CAPSULE    Take 40 mg by mouth every morning (before breakfast). FUROSEMIDE (LASIX) 20 MG TABLET    Take 1 tablet by mouth as needed (For weight gain greater than 2.5 lbs in 24 hours.)    HYDROCHLOROTHIAZIDE (MICROZIDE) 12.5 MG CAPSULE    Take 12.5 mg by mouth daily    INSULIN REGULAR (HUMULIN R;NOVOLIN R) 100 UNIT/ML INJECTION    Inject into the skin See Admin Instructions 60 in am and 55 in pm    METOCLOPRAMIDE (REGLAN) 10 MG TABLET    Take 10 mg by mouth 3 times daily     MUPIROCIN (BACTROBAN) 2 % NASAL OINTMENT    by Nasal route 3 times daily Take by Nasal route 2 times daily. OXYCODONE (ROXICODONE) 5 MG IMMEDIATE RELEASE TABLET    Take 5 mg by mouth every 4 hours as needed for Pain. Yaquelin Mo PRAMIPEXOLE (MIRAPEX) 1.5 MG TABLET    TAKE ONE TABLET BY MOUTH THREE TIMES DAILY ** MAY CAUSE DROWSINESS    ROSUVASTATIN (CRESTOR) 10 MG TABLET    Take 10 mg by mouth daily Takes Mon, Weds, Fri    SACUBITRIL-VALSARTAN (ENTRESTO) 24-26 MG PER TABLET    Take 1 tablet by mouth daily       ALLERGIES     Patient has no known allergies. FAMILY HISTORY       Family History   Problem Relation Age of Onset    Diabetes Mother     Cancer Father     Cancer Sister     Heart Disease Brother     Cancer Brother           SOCIAL HISTORY       Social History     Social History    Marital status:       Spouse name: N/A    Number of children: N/A    Years of education: N/A     Social History Main Topics    Smoking status: Former Smoker     Quit date: 7/16/1977    Smokeless tobacco: Never Used    Alcohol use No    Drug use: No    Sexual activity: Not Asked     Other Topics Concern    None     Social History Narrative    None       SCREENINGS    Mather Coma Scale  Eye Opening: Spontaneous  Best Verbal Response: Oriented  Best Motor Response: Obeys commands  Mather Coma Scale preliminarily interpreted by the emergency physician with the below findings:    Interpretation per the Radiologist below, if available at the time of this note:    CT Head WO Contrast    (Results Pending)     CT HEAD:  Comparison 7/23/16    No acute intracranial hemorrhage, edema, or mass effect. Global cortical involutional changes. Stable postsurgical changes of the left mastoid. Radiologist: Isabell Aceves MD         LABS:  Labs Reviewed   CBC - Abnormal; Notable for the following:        Result Value    WBC 4.5 (*)     Platelets 683 (*)     All other components within normal limits   COMPREHENSIVE METABOLIC PANEL - Abnormal; Notable for the following:     Glucose 199 (*)     All other components within normal limits   URINE RT REFLEX TO CULTURE - Abnormal; Notable for the following:     Glucose, Ur >=1000 (*)     All other components within normal limits   TROPONIN   TSH WITHOUT REFLEX       All other labs were within normal range or not returned as of this dictation. EMERGENCY DEPARTMENT COURSE and DIFFERENTIAL DIAGNOSIS/MDM:   Vitals:    Vitals:    09/15/18 0354   BP: (!) 149/82   Pulse: 82   Resp: 20   Temp: 97.4 °F (36.3 °C)   TempSrc: Oral   SpO2: 94%   Weight: 230 lb (104.3 kg)   Height: 5' 10\" (1.778 m)       MDM  Patient's pacer interrogated. Spoke with [facility] after this. They said that since last device check on August 1 there have been no cardiac events recorded. Patient requesting a dose of his Mirapex which he takes for restless legs. We will order this. Patient resting comfortably. Dizziness resolved. He is asymptomatic. Patient ambulating without any problems at all. No neurologic deficits on exam. I feel he is stable for discharge. I will discharge him home with prescription for Antivert as this seemed to help his dizziness quite a bit. Told him I cannot be certain but I suspect his dizziness is probably related to inner ear issues.  Told him to follow up with his primary

## 2018-09-17 LAB
EKG P AXIS: NORMAL DEGREES
EKG P-R INTERVAL: NORMAL MS
EKG Q-T INTERVAL: 452 MS
EKG QRS DURATION: 170 MS
EKG QTC CALCULATION (BAZETT): 466 MS
EKG T AXIS: 116 DEGREES

## 2018-10-23 RX ORDER — CARBIDOPA AND LEVODOPA 50; 200 MG/1; MG/1
TABLET, EXTENDED RELEASE ORAL
Qty: 60 TABLET | Refills: 5 | Status: SHIPPED | OUTPATIENT
Start: 2018-10-23 | End: 2019-07-05 | Stop reason: SDUPTHER

## 2018-12-13 ENCOUNTER — OFFICE VISIT (OUTPATIENT)
Dept: ENDOCRINOLOGY | Facility: CLINIC | Age: 78
End: 2018-12-13

## 2018-12-13 VITALS
SYSTOLIC BLOOD PRESSURE: 126 MMHG | HEART RATE: 74 BPM | HEIGHT: 70 IN | DIASTOLIC BLOOD PRESSURE: 74 MMHG | BODY MASS INDEX: 34.59 KG/M2 | WEIGHT: 241.6 LBS | OXYGEN SATURATION: 96 %

## 2018-12-13 DIAGNOSIS — E78.2 MIXED DIABETIC HYPERLIPIDEMIA ASSOCIATED WITH TYPE 2 DIABETES MELLITUS (HCC): ICD-10-CM

## 2018-12-13 DIAGNOSIS — E11.65 TYPE 2 DIABETES MELLITUS WITH HYPERGLYCEMIA, WITH LONG-TERM CURRENT USE OF INSULIN (HCC): Primary | ICD-10-CM

## 2018-12-13 DIAGNOSIS — E11.59 HYPERTENSION ASSOCIATED WITH DIABETES (HCC): ICD-10-CM

## 2018-12-13 DIAGNOSIS — Z79.4 TYPE 2 DIABETES MELLITUS WITH HYPERGLYCEMIA, WITH LONG-TERM CURRENT USE OF INSULIN (HCC): Primary | ICD-10-CM

## 2018-12-13 DIAGNOSIS — E11.69 MIXED DIABETIC HYPERLIPIDEMIA ASSOCIATED WITH TYPE 2 DIABETES MELLITUS (HCC): ICD-10-CM

## 2018-12-13 DIAGNOSIS — I15.2 HYPERTENSION ASSOCIATED WITH DIABETES (HCC): ICD-10-CM

## 2018-12-13 DIAGNOSIS — E55.9 VITAMIN D DEFICIENCY: ICD-10-CM

## 2018-12-13 DIAGNOSIS — E53.8 B12 DEFICIENCY: ICD-10-CM

## 2018-12-13 LAB
GLUCOSE BLDC GLUCOMTR-MCNC: 159 MG/DL (ref 70–130)
HBA1C MFR BLD: 8.2 %

## 2018-12-13 PROCEDURE — 82962 GLUCOSE BLOOD TEST: CPT | Performed by: INTERNAL MEDICINE

## 2018-12-13 PROCEDURE — 99214 OFFICE O/P EST MOD 30 MIN: CPT | Performed by: INTERNAL MEDICINE

## 2018-12-13 NOTE — PROGRESS NOTES
Atul Jeong is a 78 y.o. male who presents for  evaluation of   Chief Complaint   Patient presents with   • Diabetes       Referring provider    Primary Care Provider    Margareth Muniz APRN    Duration more than 10 years    Timing - Diabetes is Constant    Quality -  needs improvement - improved     Severity -  moderate    Complications - peripheral neuropathy     Had AVR 2018     Current symptoms/problems  increase appetite, polydipsia and polyuria     Alleviating Factors: Compliance      Side Effects  none    Current diet  High carbs     Current exercise walking    Current monitoring regimen: home blood tests - 3 times daily  Home blood sugar records:  when we met, now mainly in the 100s    Hypoglycemia none    Past Medical History:   Diagnosis Date   • B12 deficiency 8/23/2017   • Coronary artery disease    • Foot ulcer (CMS/[facility])    • Hypertension associated with diabetes (CMS/[facility]) 8/23/2017   • Ingrown toenail    • Mixed diabetic hyperlipidemia associated with type 2 diabetes mellitus (CMS/[facility]) 8/23/2017   • Neuropathy in diabetes (CMS/[facility])    • Type 2 diabetes mellitus with hyperglycemia, with long-term current use of insulin (CMS/[facility]) 8/23/2017   • Vitamin D deficiency 8/23/2017     Family History   Problem Relation Age of Onset   • Diabetes Mother    • Cancer Father    • Cancer Sister    • Cancer Brother    • Heart disease Brother    • Diabetes Brother      Social History     Tobacco Use   • Smoking status: Former Smoker   • Smokeless tobacco: Never Used   Substance Use Topics   • Alcohol use: Yes   • Drug use: No         Current Outpatient Medications:   •  aspirin 81 MG chewable tablet, Chew 81 mg Daily., Disp: , Rfl:   •  benazepril (LOTENSIN) 10 MG tablet, Take  by mouth., Disp: , Rfl:   •  carbidopa-levodopa CR (SINEMET CR)  MG per CR tablet, Take  by mouth., Disp: , Rfl:   •  citalopram (CeleXA) 10 MG tablet, Take  by mouth., Disp: , Rfl:   •  clopidogrel (PLAVIX) 75 MG tablet, Take  75 mg by mouth Daily., Disp: , Rfl:   •  esomeprazole (nexIUM) 40 MG capsule, Take  by mouth., Disp: , Rfl:   •  furosemide (LASIX) 20 MG tablet, Take  by mouth., Disp: , Rfl:   •  HYDROcodone-acetaminophen (NORCO)  MG per tablet, Take  by mouth., Disp: , Rfl:   •  Insulin Pen Needle (B-D UF III MINI PEN NEEDLES) 31G X 5 MM misc, , Disp: , Rfl:   •  Insulin Regular Human, Conc, (HUMULIN R U-500 KWIKPEN) 500 UNIT/ML solution pen-injector CONCENTRATED injection, 70 units , 30 minutes before bkfast and 65 units, 30 minutes before supper (Patient taking differently: Up to 100 units , 30 minutes before bkfast and up to 85 units, 30 minutes before supper), Disp: 3 pen, Rfl: 11  •  metoclopramide (REGLAN) 5 MG tablet, Take  by mouth., Disp: , Rfl:   •  pramipexole (MIRAPEX) 1.5 MG tablet, Take  by mouth., Disp: , Rfl:   •  rosuvastatin (CRESTOR) 10 MG tablet, Take  by mouth., Disp: , Rfl:   •  SYNJARDY XR  MG tablet sustained-release 24 hour, TAKE ONE TABLET BY MOUTH DAILY WITH BREAKFAST, Disp: 30 tablet, Rfl: 11    Review of Systems    Review of Systems   Constitutional: Negative for activity change, appetite change, chills, diaphoresis, fatigue, fever and unexpected weight change.   HENT: Negative for congestion, dental problem, drooling, ear discharge, ear pain, facial swelling, mouth sores, postnasal drip, rhinorrhea, sinus pressure, sore throat, tinnitus, trouble swallowing and voice change.    Eyes: Negative for photophobia, pain, discharge, redness, itching and visual disturbance.   Respiratory: Negative for apnea, cough, choking, chest tightness, shortness of breath, wheezing and stridor.    Cardiovascular: Negative for chest pain, palpitations and leg swelling.   Gastrointestinal: Negative for abdominal distention, abdominal pain, constipation, diarrhea, nausea and vomiting.   Endocrine: Negative for cold intolerance, heat intolerance, polydipsia, polyphagia and polyuria.   Genitourinary: Negative for  "decreased urine volume, difficulty urinating, dysuria, flank pain, frequency, hematuria and urgency.   Musculoskeletal: Negative for arthralgias, back pain, gait problem, joint swelling, myalgias, neck pain and neck stiffness.   Skin: Negative for color change, pallor, rash and wound.   Allergic/Immunologic: Negative for immunocompromised state.   Neurological: Negative for dizziness, tremors, seizures, syncope, facial asymmetry, speech difficulty, weakness, light-headedness, numbness and headaches.   Hematological: Negative for adenopathy.   Psychiatric/Behavioral: Negative for agitation, behavioral problems, confusion, decreased concentration, dysphoric mood, hallucinations, self-injury, sleep disturbance and suicidal ideas. The patient is not nervous/anxious and is not hyperactive.         Objective:   /74   Pulse 74   Ht 177.8 cm (70\")   Wt 110 kg (241 lb 9.6 oz)   SpO2 96%   BMI 34.67 kg/m²     Physical Exam   Constitutional: He is oriented to person, place, and time. He appears well-developed and well-nourished. He is cooperative.   HENT:   Head: Normocephalic and atraumatic.   Right Ear: External ear normal.   Left Ear: External ear normal.   Nose: Nose normal.   Mouth/Throat: Oropharynx is clear and moist. No oropharyngeal exudate.   Eyes: Conjunctivae and EOM are normal. Pupils are equal, round, and reactive to light. No scleral icterus. Right eye exhibits normal extraocular motion. Left eye exhibits normal extraocular motion.   Neck: Neck supple. No JVD present. No muscular tenderness present. No tracheal deviation, no edema and no erythema present. No thyromegaly present.   Cardiovascular: Normal rate, regular rhythm, normal heart sounds and intact distal pulses. Exam reveals no gallop and no friction rub.   No murmur heard.  Pulmonary/Chest: Effort normal and breath sounds normal. No stridor. No respiratory distress. He has no decreased breath sounds. He has no wheezes. He has no rhonchi. He " has no rales. He exhibits no tenderness.   Abdominal: Soft. Bowel sounds are normal. He exhibits no distension and no mass. There is no hepatomegaly. There is no tenderness. There is no rebound and no guarding. No hernia.   Musculoskeletal: Normal range of motion. He exhibits no edema, tenderness or deformity.   Lymphadenopathy:     He has no cervical adenopathy.   Neurological: He is alert and oriented to person, place, and time. He has normal reflexes. No cranial nerve deficit. He exhibits normal muscle tone. Coordination normal.   Skin: Skin is warm. No rash noted. No erythema. No pallor.   Psychiatric: He has a normal mood and affect. His behavior is normal. Judgment and thought content normal.   Nursing note and vitals reviewed.      Lab Review    Results for orders placed or performed in visit on 12/13/18   POC Glucose   Result Value Ref Range    Glucose 159 (A) 70 - 130 mg/dL           Assessment/Plan       ICD-10-CM ICD-9-CM   1. Type 2 diabetes mellitus with hyperglycemia, with long-term current use of insulin (CMS/[facility]) E11.65 250.00    Z79.4 790.29     V58.67   2. Hypertension associated with diabetes (CMS/[facility]) E11.59 250.80    I10 401.9   3. Mixed diabetic hyperlipidemia associated with type 2 diabetes mellitus (CMS/[facility]) E11.69 250.80    E78.2 272.2   4. B12 deficiency E53.8 266.2   5. Vitamin D deficiency E55.9 268.9       Glycemic Management:   No results found for: HGBA1C  No results found for: GLUCOSE, BUN, CREATININE, EGFRIFNONA, EGFRIFAFRI, BCR, K, CO2, CALCIUM, PROTENTOTREF, ALBUMIN, LABIL2, AST, ALT, ANIONGAP  No results found for: WBC, HGB, HCT, MCV, PLT      U500    70 units for bkfast --80     65 units for supper - 70       -----    Has gastroparesis so I am not using glp-1    ----    Synjardy xr 10/1000 mg w bkfast and next appt consider  januvia           Lipid Management  No results found for: CHOL  No results found for: TRIG  No results found for: HDL  No components found for: LDLCALC  No  results found for: LDL  No results found for: LDLDIRECT    On crestor 10 mg three times per week     Last ldl from March 2018, 54     Blood Pressure Management:    Vitals:    12/13/18 0935   BP: 126/74   Pulse: 74   SpO2: 96%     No results found for: GLUCOSE, CALCIUM, NA, K, CO2, CL, BUN, CREATININE, EGFRIFAFRI, EGFRIFNONA, BCR, ANIONGAP      On benazepril    Lasix listed but not taking        Microvascular Complication Monitoring:      Eye Exam Evaluation,   no retinopathy    -----------    Last Microalbumin-Proteinuria Assessment    No results found for: MALBCRERATIO    No results found for: UTPCR    -----------      Neuropathy, has neuropathy   I intend to start neurontin 300 mg po tid prn       Weight Related:   Wt Readings from Last 3 Encounters:   12/13/18 110 kg (241 lb 9.6 oz)   06/27/18 105 kg (231 lb 11.2 oz)   01/24/18 105 kg (231 lb)     Body mass index is 34.67 kg/m².        Diet interventions: moderate (500 kCal/d) deficit diet.      Bone Health    No results found for: PTH, CALCIUM, CAION, PHOS, [id number]    Thyroid Health    No results found for: TSH      Other Diabetes Related Aspects       No results found for: [id number]        Last celiac panel     No results found for: GDPABIGA, TTRANSGLIGA, IGA, [id number]      I reviewed and summarized records from Margareth Muniz APRN from 2017 and I reviewed / ordered labs.     Orders Placed This Encounter   Procedures   • POC Glucose         A copy of my note was sent to Margareth Muniz APRN    Please see my above opinion and suggestions.

## 2019-01-03 ENCOUNTER — OFFICE VISIT (OUTPATIENT)
Dept: NEUROLOGY | Age: 79
End: 2019-01-03
Payer: MEDICARE

## 2019-01-03 VITALS
DIASTOLIC BLOOD PRESSURE: 68 MMHG | OXYGEN SATURATION: 91 % | BODY MASS INDEX: 34.07 KG/M2 | SYSTOLIC BLOOD PRESSURE: 124 MMHG | WEIGHT: 238 LBS | HEIGHT: 70 IN | HEART RATE: 75 BPM

## 2019-01-03 DIAGNOSIS — G25.81 RESTLESS LEGS SYNDROME: Chronic | ICD-10-CM

## 2019-01-03 DIAGNOSIS — G47.33 OBSTRUCTIVE SLEEP APNEA: Chronic | ICD-10-CM

## 2019-01-03 DIAGNOSIS — E11.42 DIABETIC POLYNEUROPATHY ASSOCIATED WITH TYPE 2 DIABETES MELLITUS (HCC): Primary | Chronic | ICD-10-CM

## 2019-01-03 PROCEDURE — 99213 OFFICE O/P EST LOW 20 MIN: CPT | Performed by: PSYCHIATRY & NEUROLOGY

## 2019-01-03 PROCEDURE — G8484 FLU IMMUNIZE NO ADMIN: HCPCS | Performed by: PSYCHIATRY & NEUROLOGY

## 2019-01-03 PROCEDURE — 4040F PNEUMOC VAC/ADMIN/RCVD: CPT | Performed by: PSYCHIATRY & NEUROLOGY

## 2019-01-03 PROCEDURE — 1036F TOBACCO NON-USER: CPT | Performed by: PSYCHIATRY & NEUROLOGY

## 2019-01-03 PROCEDURE — G8417 CALC BMI ABV UP PARAM F/U: HCPCS | Performed by: PSYCHIATRY & NEUROLOGY

## 2019-01-03 PROCEDURE — G8427 DOCREV CUR MEDS BY ELIG CLIN: HCPCS | Performed by: PSYCHIATRY & NEUROLOGY

## 2019-01-03 PROCEDURE — 1123F ACP DISCUSS/DSCN MKR DOCD: CPT | Performed by: PSYCHIATRY & NEUROLOGY

## 2019-01-03 PROCEDURE — 1101F PT FALLS ASSESS-DOCD LE1/YR: CPT | Performed by: PSYCHIATRY & NEUROLOGY

## 2019-01-04 RX ORDER — PREGABALIN 75 MG/1
75 CAPSULE ORAL 3 TIMES DAILY
Qty: 60 CAPSULE | Refills: 5 | Status: ON HOLD | OUTPATIENT
Start: 2019-01-04 | End: 2019-02-21

## 2019-01-08 RX ORDER — PRAMIPEXOLE DIHYDROCHLORIDE 1.5 MG/1
TABLET ORAL
Qty: 270 TABLET | Refills: 3 | Status: SHIPPED | OUTPATIENT
Start: 2019-01-08 | End: 2020-08-05

## 2019-01-16 ENCOUNTER — TELEPHONE (OUTPATIENT)
Dept: NEUROLOGY | Age: 79
End: 2019-01-16

## 2019-01-16 DIAGNOSIS — G89.29 CHRONIC BILATERAL LOW BACK PAIN WITHOUT SCIATICA: Chronic | ICD-10-CM

## 2019-01-16 DIAGNOSIS — M54.50 CHRONIC BILATERAL LOW BACK PAIN WITHOUT SCIATICA: Chronic | ICD-10-CM

## 2019-01-16 DIAGNOSIS — E11.42 DIABETIC POLYNEUROPATHY ASSOCIATED WITH TYPE 2 DIABETES MELLITUS (HCC): Primary | Chronic | ICD-10-CM

## 2019-01-20 RX ORDER — HYDROCODONE BITARTRATE AND ACETAMINOPHEN 10; 325 MG/1; MG/1
1 TABLET ORAL EVERY 8 HOURS PRN
Qty: 90 TABLET | Refills: 0 | Status: SHIPPED | OUTPATIENT
Start: 2019-01-20 | End: 2019-12-04 | Stop reason: SDUPTHER

## 2019-02-14 ENCOUNTER — TELEPHONE (OUTPATIENT)
Dept: NEUROLOGY | Age: 79
End: 2019-02-14

## 2019-02-21 ENCOUNTER — APPOINTMENT (OUTPATIENT)
Dept: GENERAL RADIOLOGY | Age: 79
End: 2019-02-21
Payer: MEDICARE

## 2019-02-21 ENCOUNTER — APPOINTMENT (OUTPATIENT)
Dept: CT IMAGING | Age: 79
End: 2019-02-21
Payer: MEDICARE

## 2019-02-21 ENCOUNTER — HOSPITAL ENCOUNTER (OUTPATIENT)
Age: 79
Setting detail: OBSERVATION
Discharge: HOME | End: 2019-02-23
Attending: EMERGENCY MEDICINE | Admitting: FAMILY MEDICINE
Payer: MEDICARE

## 2019-02-21 DIAGNOSIS — R55 SYNCOPE AND COLLAPSE: Primary | ICD-10-CM

## 2019-02-21 LAB
ALBUMIN SERPL-MCNC: 4.7 G/DL (ref 3.5–5.2)
ALP BLD-CCNC: 70 U/L (ref 40–130)
ALT SERPL-CCNC: 6 U/L (ref 5–41)
ANION GAP SERPL CALCULATED.3IONS-SCNC: 14 MMOL/L (ref 7–19)
AST SERPL-CCNC: 21 U/L (ref 5–40)
BASOPHILS ABSOLUTE: 0 K/UL (ref 0–0.2)
BASOPHILS RELATIVE PERCENT: 0.4 % (ref 0–1)
BILIRUB SERPL-MCNC: 0.4 MG/DL (ref 0.2–1.2)
BUN BLDV-MCNC: 23 MG/DL (ref 8–23)
CALCIUM SERPL-MCNC: 9.8 MG/DL (ref 8.8–10.2)
CHLORIDE BLD-SCNC: 100 MMOL/L (ref 98–111)
CO2: 24 MMOL/L (ref 22–29)
CREAT SERPL-MCNC: 1 MG/DL (ref 0.5–1.2)
EOSINOPHILS ABSOLUTE: 0.1 K/UL (ref 0–0.6)
EOSINOPHILS RELATIVE PERCENT: 1.5 % (ref 0–5)
GFR NON-AFRICAN AMERICAN: >60
GLUCOSE BLD-MCNC: 143 MG/DL (ref 70–99)
GLUCOSE BLD-MCNC: 157 MG/DL (ref 70–99)
GLUCOSE BLD-MCNC: 165 MG/DL (ref 74–109)
HCT VFR BLD CALC: 47.9 % (ref 42–52)
HEMOGLOBIN: 16.2 G/DL (ref 14–18)
LYMPHOCYTES ABSOLUTE: 1.7 K/UL (ref 1.1–4.5)
LYMPHOCYTES RELATIVE PERCENT: 22.8 % (ref 20–40)
MCH RBC QN AUTO: 30.8 PG (ref 27–31)
MCHC RBC AUTO-ENTMCNC: 33.8 G/DL (ref 33–37)
MCV RBC AUTO: 91.1 FL (ref 80–94)
MONOCYTES ABSOLUTE: 0.9 K/UL (ref 0–0.9)
MONOCYTES RELATIVE PERCENT: 12.5 % (ref 0–10)
NEUTROPHILS ABSOLUTE: 4.5 K/UL (ref 1.5–7.5)
NEUTROPHILS RELATIVE PERCENT: 61.6 % (ref 50–65)
PDW BLD-RTO: 13.6 % (ref 11.5–14.5)
PERFORMED ON: ABNORMAL
PERFORMED ON: ABNORMAL
PLATELET # BLD: 139 K/UL (ref 130–400)
PMV BLD AUTO: 10.4 FL (ref 9.4–12.4)
POTASSIUM SERPL-SCNC: 4.4 MMOL/L (ref 3.5–5)
PRO-BNP: 315 PG/ML (ref 0–1800)
RBC # BLD: 5.26 M/UL (ref 4.7–6.1)
SODIUM BLD-SCNC: 138 MMOL/L (ref 136–145)
TOTAL PROTEIN: 7.3 G/DL (ref 6.6–8.7)
TROPONIN: <0.01 NG/ML (ref 0–0.03)
WBC # BLD: 7.3 K/UL (ref 4.8–10.8)

## 2019-02-21 PROCEDURE — 99219 PR INITIAL OBSERVATION CARE/DAY 50 MINUTES: CPT | Performed by: FAMILY MEDICINE

## 2019-02-21 PROCEDURE — 99285 EMERGENCY DEPT VISIT HI MDM: CPT | Performed by: EMERGENCY MEDICINE

## 2019-02-21 PROCEDURE — 6360000002 HC RX W HCPCS: Performed by: FAMILY MEDICINE

## 2019-02-21 PROCEDURE — 36415 COLL VENOUS BLD VENIPUNCTURE: CPT

## 2019-02-21 PROCEDURE — 2580000003 HC RX 258: Performed by: FAMILY MEDICINE

## 2019-02-21 PROCEDURE — 93005 ELECTROCARDIOGRAM TRACING: CPT

## 2019-02-21 PROCEDURE — 71045 X-RAY EXAM CHEST 1 VIEW: CPT

## 2019-02-21 PROCEDURE — 84484 ASSAY OF TROPONIN QUANT: CPT

## 2019-02-21 PROCEDURE — G0378 HOSPITAL OBSERVATION PER HR: HCPCS

## 2019-02-21 PROCEDURE — 70450 CT HEAD/BRAIN W/O DYE: CPT

## 2019-02-21 PROCEDURE — 80053 COMPREHEN METABOLIC PANEL: CPT

## 2019-02-21 PROCEDURE — 6370000000 HC RX 637 (ALT 250 FOR IP): Performed by: FAMILY MEDICINE

## 2019-02-21 PROCEDURE — 82948 REAGENT STRIP/BLOOD GLUCOSE: CPT

## 2019-02-21 PROCEDURE — 99285 EMERGENCY DEPT VISIT HI MDM: CPT

## 2019-02-21 PROCEDURE — 85025 COMPLETE CBC W/AUTO DIFF WBC: CPT

## 2019-02-21 PROCEDURE — 83880 ASSAY OF NATRIURETIC PEPTIDE: CPT

## 2019-02-21 PROCEDURE — 2580000003 HC RX 258: Performed by: EMERGENCY MEDICINE

## 2019-02-21 PROCEDURE — 96372 THER/PROPH/DIAG INJ SC/IM: CPT

## 2019-02-21 RX ORDER — INSULIN GLARGINE 100 [IU]/ML
60 INJECTION, SOLUTION SUBCUTANEOUS NIGHTLY
Status: DISCONTINUED | OUTPATIENT
Start: 2019-02-21 | End: 2019-02-22

## 2019-02-21 RX ORDER — SODIUM CHLORIDE 0.9 % (FLUSH) 0.9 %
10 SYRINGE (ML) INJECTION PRN
Status: DISCONTINUED | OUTPATIENT
Start: 2019-02-21 | End: 2019-02-23 | Stop reason: HOSPADM

## 2019-02-21 RX ORDER — CITALOPRAM 10 MG/1
10 TABLET ORAL DAILY
Status: DISCONTINUED | OUTPATIENT
Start: 2019-02-22 | End: 2019-02-23 | Stop reason: HOSPADM

## 2019-02-21 RX ORDER — PREGABALIN 75 MG/1
75 CAPSULE ORAL 3 TIMES DAILY
Status: DISCONTINUED | OUTPATIENT
Start: 2019-02-21 | End: 2019-02-23 | Stop reason: HOSPADM

## 2019-02-21 RX ORDER — ATENOLOL 25 MG/1
25 TABLET ORAL DAILY
Status: DISCONTINUED | OUTPATIENT
Start: 2019-02-22 | End: 2019-02-23 | Stop reason: HOSPADM

## 2019-02-21 RX ORDER — ONDANSETRON 2 MG/ML
4 INJECTION INTRAMUSCULAR; INTRAVENOUS EVERY 6 HOURS PRN
Status: DISCONTINUED | OUTPATIENT
Start: 2019-02-21 | End: 2019-02-23 | Stop reason: HOSPADM

## 2019-02-21 RX ORDER — 0.9 % SODIUM CHLORIDE 0.9 %
500 INTRAVENOUS SOLUTION INTRAVENOUS ONCE
Status: COMPLETED | OUTPATIENT
Start: 2019-02-21 | End: 2019-02-21

## 2019-02-21 RX ORDER — PANTOPRAZOLE SODIUM 40 MG/1
40 TABLET, DELAYED RELEASE ORAL
Status: DISCONTINUED | OUTPATIENT
Start: 2019-02-22 | End: 2019-02-23 | Stop reason: HOSPADM

## 2019-02-21 RX ORDER — LABETALOL HYDROCHLORIDE 5 MG/ML
10 INJECTION, SOLUTION INTRAVENOUS EVERY 10 MIN PRN
Status: DISCONTINUED | OUTPATIENT
Start: 2019-02-21 | End: 2019-02-23 | Stop reason: HOSPADM

## 2019-02-21 RX ORDER — CLOPIDOGREL BISULFATE 75 MG/1
75 TABLET ORAL DAILY
Status: DISCONTINUED | OUTPATIENT
Start: 2019-02-22 | End: 2019-02-23 | Stop reason: HOSPADM

## 2019-02-21 RX ORDER — ASPIRIN 81 MG/1
81 TABLET ORAL DAILY
Status: DISCONTINUED | OUTPATIENT
Start: 2019-02-22 | End: 2019-02-23 | Stop reason: HOSPADM

## 2019-02-21 RX ORDER — SODIUM CHLORIDE 0.9 % (FLUSH) 0.9 %
10 SYRINGE (ML) INJECTION EVERY 12 HOURS SCHEDULED
Status: DISCONTINUED | OUTPATIENT
Start: 2019-02-21 | End: 2019-02-23 | Stop reason: HOSPADM

## 2019-02-21 RX ORDER — ROSUVASTATIN CALCIUM 10 MG/1
10 TABLET, COATED ORAL DAILY
Status: DISCONTINUED | OUTPATIENT
Start: 2019-02-21 | End: 2019-02-23 | Stop reason: HOSPADM

## 2019-02-21 RX ORDER — ACETAMINOPHEN 325 MG/1
650 TABLET ORAL EVERY 4 HOURS PRN
Status: DISCONTINUED | OUTPATIENT
Start: 2019-02-21 | End: 2019-02-23 | Stop reason: HOSPADM

## 2019-02-21 RX ADMIN — Medication 10 ML: at 22:18

## 2019-02-21 RX ADMIN — Medication 60 UNITS: at 22:11

## 2019-02-21 RX ADMIN — SODIUM CHLORIDE 500 ML: 9 INJECTION, SOLUTION INTRAVENOUS at 14:25

## 2019-02-21 RX ADMIN — ENOXAPARIN SODIUM 40 MG: 40 INJECTION SUBCUTANEOUS at 22:11

## 2019-02-21 ASSESSMENT — ENCOUNTER SYMPTOMS
NAUSEA: 0
SHORTNESS OF BREATH: 0
BACK PAIN: 0
ABDOMINAL PAIN: 0
SORE THROAT: 0
DIARRHEA: 0
RHINORRHEA: 0
VOMITING: 0

## 2019-02-22 ENCOUNTER — APPOINTMENT (OUTPATIENT)
Dept: MRI IMAGING | Age: 79
End: 2019-02-22
Payer: MEDICARE

## 2019-02-22 LAB
ANION GAP SERPL CALCULATED.3IONS-SCNC: 16 MMOL/L (ref 7–19)
BASOPHILS ABSOLUTE: 0 K/UL (ref 0–0.2)
BASOPHILS RELATIVE PERCENT: 0.3 % (ref 0–1)
BUN BLDV-MCNC: 23 MG/DL (ref 8–23)
CALCIUM SERPL-MCNC: 9.4 MG/DL (ref 8.8–10.2)
CHLORIDE BLD-SCNC: 97 MMOL/L (ref 98–111)
CHOLESTEROL, TOTAL: 139 MG/DL (ref 160–199)
CO2: 23 MMOL/L (ref 22–29)
CREAT SERPL-MCNC: 1.1 MG/DL (ref 0.5–1.2)
EKG P AXIS: NORMAL DEGREES
EKG P-R INTERVAL: NORMAL MS
EKG Q-T INTERVAL: 434 MS
EKG QRS DURATION: 176 MS
EKG QTC CALCULATION (BAZETT): 455 MS
EKG T AXIS: 115 DEGREES
EOSINOPHILS ABSOLUTE: 0.1 K/UL (ref 0–0.6)
EOSINOPHILS RELATIVE PERCENT: 1.3 % (ref 0–5)
GFR NON-AFRICAN AMERICAN: >60
GLUCOSE BLD-MCNC: 197 MG/DL (ref 70–99)
GLUCOSE BLD-MCNC: 237 MG/DL (ref 70–99)
GLUCOSE BLD-MCNC: 260 MG/DL (ref 70–99)
GLUCOSE BLD-MCNC: 268 MG/DL (ref 74–109)
GLUCOSE BLD-MCNC: 271 MG/DL (ref 70–99)
HCT VFR BLD CALC: 46 % (ref 42–52)
HDLC SERPL-MCNC: 42 MG/DL (ref 55–121)
HEMOGLOBIN: 15.5 G/DL (ref 14–18)
LDL CHOLESTEROL CALCULATED: 22 MG/DL
LV EF: 53 %
LVEF MODALITY: NORMAL
LYMPHOCYTES ABSOLUTE: 1.1 K/UL (ref 1.1–4.5)
LYMPHOCYTES RELATIVE PERCENT: 17 % (ref 20–40)
MCH RBC QN AUTO: 30.9 PG (ref 27–31)
MCHC RBC AUTO-ENTMCNC: 33.7 G/DL (ref 33–37)
MCV RBC AUTO: 91.6 FL (ref 80–94)
MONOCYTES ABSOLUTE: 0.8 K/UL (ref 0–0.9)
MONOCYTES RELATIVE PERCENT: 12.1 % (ref 0–10)
NEUTROPHILS ABSOLUTE: 4.3 K/UL (ref 1.5–7.5)
NEUTROPHILS RELATIVE PERCENT: 68.2 % (ref 50–65)
PDW BLD-RTO: 13.6 % (ref 11.5–14.5)
PERFORMED ON: ABNORMAL
PLATELET # BLD: 122 K/UL (ref 130–400)
PMV BLD AUTO: 10.6 FL (ref 9.4–12.4)
POTASSIUM REFLEX MAGNESIUM: 4.5 MMOL/L (ref 3.5–5)
RBC # BLD: 5.02 M/UL (ref 4.7–6.1)
SODIUM BLD-SCNC: 136 MMOL/L (ref 136–145)
T4 FREE: 1 NG/DL (ref 0.9–1.7)
TRIGL SERPL-MCNC: 377 MG/DL (ref 0–149)
TSH SERPL DL<=0.05 MIU/L-ACNC: 3.92 UIU/ML (ref 0.27–4.2)
VITAMIN B-12: 487 PG/ML (ref 211–946)
WBC # BLD: 6.4 K/UL (ref 4.8–10.8)

## 2019-02-22 PROCEDURE — 70553 MRI BRAIN STEM W/O & W/DYE: CPT

## 2019-02-22 PROCEDURE — 6370000000 HC RX 637 (ALT 250 FOR IP): Performed by: PSYCHIATRY & NEUROLOGY

## 2019-02-22 PROCEDURE — 99220 PR INITIAL OBSERVATION CARE/DAY 70 MINUTES: CPT | Performed by: PSYCHIATRY & NEUROLOGY

## 2019-02-22 PROCEDURE — 96372 THER/PROPH/DIAG INJ SC/IM: CPT

## 2019-02-22 PROCEDURE — G0378 HOSPITAL OBSERVATION PER HR: HCPCS

## 2019-02-22 PROCEDURE — 6360000004 HC RX CONTRAST MEDICATION: Performed by: PSYCHIATRY & NEUROLOGY

## 2019-02-22 PROCEDURE — C8929 TTE W OR WO FOL WCON,DOPPLER: HCPCS

## 2019-02-22 PROCEDURE — 85025 COMPLETE CBC W/AUTO DIFF WBC: CPT

## 2019-02-22 PROCEDURE — 99226 PR SBSQ OBSERVATION CARE/DAY 35 MINUTES: CPT | Performed by: FAMILY MEDICINE

## 2019-02-22 PROCEDURE — 80061 LIPID PANEL: CPT

## 2019-02-22 PROCEDURE — 36415 COLL VENOUS BLD VENIPUNCTURE: CPT

## 2019-02-22 PROCEDURE — 80048 BASIC METABOLIC PNL TOTAL CA: CPT

## 2019-02-22 PROCEDURE — 84443 ASSAY THYROID STIM HORMONE: CPT

## 2019-02-22 PROCEDURE — 6360000002 HC RX W HCPCS: Performed by: FAMILY MEDICINE

## 2019-02-22 PROCEDURE — 6370000000 HC RX 637 (ALT 250 FOR IP): Performed by: FAMILY MEDICINE

## 2019-02-22 PROCEDURE — 82607 VITAMIN B-12: CPT

## 2019-02-22 PROCEDURE — A9577 INJ MULTIHANCE: HCPCS | Performed by: PSYCHIATRY & NEUROLOGY

## 2019-02-22 PROCEDURE — 84439 ASSAY OF FREE THYROXINE: CPT

## 2019-02-22 PROCEDURE — 2580000003 HC RX 258: Performed by: FAMILY MEDICINE

## 2019-02-22 PROCEDURE — 92610 EVALUATE SWALLOWING FUNCTION: CPT

## 2019-02-22 PROCEDURE — 82948 REAGENT STRIP/BLOOD GLUCOSE: CPT

## 2019-02-22 RX ORDER — DEXTROSE MONOHYDRATE 25 G/50ML
12.5 INJECTION, SOLUTION INTRAVENOUS PRN
Status: DISCONTINUED | OUTPATIENT
Start: 2019-02-22 | End: 2019-02-23 | Stop reason: HOSPADM

## 2019-02-22 RX ORDER — INSULIN GLARGINE 100 [IU]/ML
85 INJECTION, SOLUTION SUBCUTANEOUS NIGHTLY
Status: DISCONTINUED | OUTPATIENT
Start: 2019-02-22 | End: 2019-02-23 | Stop reason: HOSPADM

## 2019-02-22 RX ORDER — CARBIDOPA AND LEVODOPA 50; 200 MG/1; MG/1
1 TABLET, EXTENDED RELEASE ORAL 2 TIMES DAILY
Status: DISCONTINUED | OUTPATIENT
Start: 2019-02-22 | End: 2019-02-23 | Stop reason: HOSPADM

## 2019-02-22 RX ORDER — DEXTROSE MONOHYDRATE 50 MG/ML
100 INJECTION, SOLUTION INTRAVENOUS PRN
Status: DISCONTINUED | OUTPATIENT
Start: 2019-02-22 | End: 2019-02-23 | Stop reason: HOSPADM

## 2019-02-22 RX ORDER — PRAMIPEXOLE DIHYDROCHLORIDE 1 MG/1
1.5 TABLET ORAL 3 TIMES DAILY
Status: DISCONTINUED | OUTPATIENT
Start: 2019-02-22 | End: 2019-02-23 | Stop reason: HOSPADM

## 2019-02-22 RX ORDER — CLOPIDOGREL BISULFATE 75 MG/1
75 TABLET ORAL DAILY
COMMUNITY
End: 2019-05-14 | Stop reason: SDUPTHER

## 2019-02-22 RX ORDER — NICOTINE POLACRILEX 4 MG
15 LOZENGE BUCCAL PRN
Status: DISCONTINUED | OUTPATIENT
Start: 2019-02-22 | End: 2019-02-23 | Stop reason: HOSPADM

## 2019-02-22 RX ADMIN — INSULIN LISPRO 6 UNITS: 100 INJECTION, SOLUTION INTRAVENOUS; SUBCUTANEOUS at 13:39

## 2019-02-22 RX ADMIN — ENOXAPARIN SODIUM 40 MG: 40 INJECTION SUBCUTANEOUS at 18:51

## 2019-02-22 RX ADMIN — GADOBENATE DIMEGLUMINE 20 ML: 529 INJECTION, SOLUTION INTRAVENOUS at 10:43

## 2019-02-22 RX ADMIN — Medication 10 ML: at 08:04

## 2019-02-22 RX ADMIN — Medication 10 ML: at 21:34

## 2019-02-22 RX ADMIN — INSULIN LISPRO 2 UNITS: 100 INJECTION, SOLUTION INTRAVENOUS; SUBCUTANEOUS at 16:37

## 2019-02-22 RX ADMIN — INSULIN GLARGINE 85 UNITS: 100 INJECTION, SOLUTION SUBCUTANEOUS at 21:34

## 2019-02-22 RX ADMIN — PANTOPRAZOLE SODIUM 40 MG: 40 TABLET, DELAYED RELEASE ORAL at 06:25

## 2019-02-22 RX ADMIN — ROSUVASTATIN CALCIUM 10 MG: 10 TABLET, FILM COATED ORAL at 18:51

## 2019-02-22 RX ADMIN — CLOPIDOGREL BISULFATE 75 MG: 75 TABLET, FILM COATED ORAL at 13:40

## 2019-02-22 RX ADMIN — PRAMIPEXOLE DIHYDROCHLORIDE 1.5 MG: 1 TABLET ORAL at 21:36

## 2019-02-22 RX ADMIN — ASPIRIN 81 MG: 81 TABLET ORAL at 08:04

## 2019-02-22 RX ADMIN — ATENOLOL 25 MG: 25 TABLET ORAL at 08:04

## 2019-02-22 RX ADMIN — CARBIDOPA AND LEVODOPA 1 TABLET: 50; 200 TABLET, EXTENDED RELEASE ORAL at 18:51

## 2019-02-22 RX ADMIN — CITALOPRAM HYDROBROMIDE 10 MG: 10 TABLET ORAL at 08:04

## 2019-02-22 RX ADMIN — PRAMIPEXOLE DIHYDROCHLORIDE 1.5 MG: 1 TABLET ORAL at 14:47

## 2019-02-23 VITALS
SYSTOLIC BLOOD PRESSURE: 129 MMHG | RESPIRATION RATE: 16 BRPM | TEMPERATURE: 97.8 F | DIASTOLIC BLOOD PRESSURE: 62 MMHG | OXYGEN SATURATION: 95 % | BODY MASS INDEX: 34.36 KG/M2 | HEIGHT: 70 IN | HEART RATE: 60 BPM | WEIGHT: 240 LBS

## 2019-02-23 LAB
ANION GAP SERPL CALCULATED.3IONS-SCNC: 15 MMOL/L (ref 7–19)
BASOPHILS ABSOLUTE: 0 K/UL (ref 0–0.2)
BASOPHILS RELATIVE PERCENT: 0.4 % (ref 0–1)
BUN BLDV-MCNC: 23 MG/DL (ref 8–23)
CALCIUM SERPL-MCNC: 9.4 MG/DL (ref 8.8–10.2)
CHLORIDE BLD-SCNC: 97 MMOL/L (ref 98–111)
CO2: 23 MMOL/L (ref 22–29)
CREAT SERPL-MCNC: 1 MG/DL (ref 0.5–1.2)
EOSINOPHILS ABSOLUTE: 0.1 K/UL (ref 0–0.6)
EOSINOPHILS RELATIVE PERCENT: 1.3 % (ref 0–5)
GFR NON-AFRICAN AMERICAN: >60
GLUCOSE BLD-MCNC: 193 MG/DL (ref 70–99)
GLUCOSE BLD-MCNC: 230 MG/DL (ref 74–109)
GLUCOSE BLD-MCNC: 271 MG/DL (ref 70–99)
GLUCOSE BLD-MCNC: 278 MG/DL (ref 70–99)
HCT VFR BLD CALC: 46.3 % (ref 42–52)
HEMOGLOBIN: 15.8 G/DL (ref 14–18)
LYMPHOCYTES ABSOLUTE: 0.6 K/UL (ref 1.1–4.5)
LYMPHOCYTES RELATIVE PERCENT: 11.2 % (ref 20–40)
MCH RBC QN AUTO: 30.7 PG (ref 27–31)
MCHC RBC AUTO-ENTMCNC: 34.1 G/DL (ref 33–37)
MCV RBC AUTO: 89.9 FL (ref 80–94)
MONOCYTES ABSOLUTE: 0.7 K/UL (ref 0–0.9)
MONOCYTES RELATIVE PERCENT: 13.6 % (ref 0–10)
NEUTROPHILS ABSOLUTE: 3.9 K/UL (ref 1.5–7.5)
NEUTROPHILS RELATIVE PERCENT: 72.4 % (ref 50–65)
PDW BLD-RTO: 13.6 % (ref 11.5–14.5)
PERFORMED ON: ABNORMAL
PLATELET # BLD: 123 K/UL (ref 130–400)
PMV BLD AUTO: 10.6 FL (ref 9.4–12.4)
POTASSIUM REFLEX MAGNESIUM: 4.3 MMOL/L (ref 3.5–5)
RBC # BLD: 5.15 M/UL (ref 4.7–6.1)
SODIUM BLD-SCNC: 135 MMOL/L (ref 136–145)
WBC # BLD: 5.4 K/UL (ref 4.8–10.8)

## 2019-02-23 PROCEDURE — 2580000003 HC RX 258: Performed by: FAMILY MEDICINE

## 2019-02-23 PROCEDURE — 97161 PT EVAL LOW COMPLEX 20 MIN: CPT

## 2019-02-23 PROCEDURE — 95816 EEG AWAKE AND DROWSY: CPT

## 2019-02-23 PROCEDURE — 82948 REAGENT STRIP/BLOOD GLUCOSE: CPT

## 2019-02-23 PROCEDURE — 85025 COMPLETE CBC W/AUTO DIFF WBC: CPT

## 2019-02-23 PROCEDURE — G0378 HOSPITAL OBSERVATION PER HR: HCPCS

## 2019-02-23 PROCEDURE — 95819 EEG AWAKE AND ASLEEP: CPT | Performed by: PSYCHIATRY & NEUROLOGY

## 2019-02-23 PROCEDURE — 97750 PHYSICAL PERFORMANCE TEST: CPT

## 2019-02-23 PROCEDURE — 36415 COLL VENOUS BLD VENIPUNCTURE: CPT

## 2019-02-23 PROCEDURE — 6370000000 HC RX 637 (ALT 250 FOR IP): Performed by: PSYCHIATRY & NEUROLOGY

## 2019-02-23 PROCEDURE — 99217 PR OBSERVATION CARE DISCHARGE MANAGEMENT: CPT | Performed by: FAMILY MEDICINE

## 2019-02-23 PROCEDURE — 99225 PR SBSQ OBSERVATION CARE/DAY 25 MINUTES: CPT | Performed by: PSYCHIATRY & NEUROLOGY

## 2019-02-23 PROCEDURE — 80048 BASIC METABOLIC PNL TOTAL CA: CPT

## 2019-02-23 PROCEDURE — 6370000000 HC RX 637 (ALT 250 FOR IP): Performed by: FAMILY MEDICINE

## 2019-02-23 RX ORDER — INSULIN GLARGINE 100 [IU]/ML
85 INJECTION, SOLUTION SUBCUTANEOUS NIGHTLY
Qty: 3 VIAL | Refills: 0 | Status: ON HOLD | OUTPATIENT
Start: 2019-02-23 | End: 2020-09-21 | Stop reason: HOSPADM

## 2019-02-23 RX ADMIN — ASPIRIN 81 MG: 81 TABLET ORAL at 10:02

## 2019-02-23 RX ADMIN — INSULIN LISPRO 6 UNITS: 100 INJECTION, SOLUTION INTRAVENOUS; SUBCUTANEOUS at 12:46

## 2019-02-23 RX ADMIN — SACUBITRIL AND VALSARTAN 1 TABLET: 24; 26 TABLET, FILM COATED ORAL at 10:01

## 2019-02-23 RX ADMIN — PRAMIPEXOLE DIHYDROCHLORIDE 1.5 MG: 1 TABLET ORAL at 15:14

## 2019-02-23 RX ADMIN — CITALOPRAM HYDROBROMIDE 10 MG: 10 TABLET ORAL at 10:01

## 2019-02-23 RX ADMIN — PANTOPRAZOLE SODIUM 40 MG: 40 TABLET, DELAYED RELEASE ORAL at 06:36

## 2019-02-23 RX ADMIN — CARBIDOPA AND LEVODOPA 1 TABLET: 50; 200 TABLET, EXTENDED RELEASE ORAL at 10:01

## 2019-02-23 RX ADMIN — PRAMIPEXOLE DIHYDROCHLORIDE 1.5 MG: 1 TABLET ORAL at 10:01

## 2019-02-23 RX ADMIN — Medication 10 ML: at 10:03

## 2019-02-23 RX ADMIN — CLOPIDOGREL BISULFATE 75 MG: 75 TABLET, FILM COATED ORAL at 10:01

## 2019-02-25 ENCOUNTER — TELEPHONE (OUTPATIENT)
Dept: NEUROSURGERY | Age: 79
End: 2019-02-25

## 2019-02-25 NOTE — TELEPHONE ENCOUNTER
pt called stated he was to diya several test per Dr Qureshi Lipoma D/C note in chart. Please call the pt and advise.

## 2019-03-08 ENCOUNTER — TELEPHONE (OUTPATIENT)
Dept: NEUROLOGY | Age: 79
End: 2019-03-08

## 2019-03-13 ENCOUNTER — TELEPHONE (OUTPATIENT)
Dept: NEUROSURGERY | Age: 79
End: 2019-03-13

## 2019-03-13 RX ORDER — CARBIDOPA AND LEVODOPA 50; 200 MG/1; MG/1
TABLET, EXTENDED RELEASE ORAL
Qty: 60 TABLET | Refills: 5 | Status: CANCELLED | OUTPATIENT
Start: 2019-03-13

## 2019-03-26 ENCOUNTER — OFFICE VISIT (OUTPATIENT)
Dept: NEUROLOGY | Age: 79
End: 2019-03-26
Payer: MEDICARE

## 2019-03-26 VITALS
HEIGHT: 70 IN | BODY MASS INDEX: 34.5 KG/M2 | HEART RATE: 81 BPM | SYSTOLIC BLOOD PRESSURE: 130 MMHG | DIASTOLIC BLOOD PRESSURE: 78 MMHG | WEIGHT: 241 LBS | RESPIRATION RATE: 20 BRPM

## 2019-03-26 DIAGNOSIS — E11.42 DIABETIC POLYNEUROPATHY ASSOCIATED WITH TYPE 2 DIABETES MELLITUS (HCC): Primary | Chronic | ICD-10-CM

## 2019-03-26 DIAGNOSIS — G25.81 RESTLESS LEGS SYNDROME: Chronic | ICD-10-CM

## 2019-03-26 DIAGNOSIS — G47.33 OBSTRUCTIVE SLEEP APNEA: Chronic | ICD-10-CM

## 2019-03-26 DIAGNOSIS — G45.0 VERTEBRO BASILAR INSUFFICIENCY: ICD-10-CM

## 2019-03-26 DIAGNOSIS — R55 SYNCOPE AND COLLAPSE: ICD-10-CM

## 2019-03-26 PROCEDURE — 99214 OFFICE O/P EST MOD 30 MIN: CPT | Performed by: PSYCHIATRY & NEUROLOGY

## 2019-03-26 PROCEDURE — 4040F PNEUMOC VAC/ADMIN/RCVD: CPT | Performed by: PSYCHIATRY & NEUROLOGY

## 2019-03-26 PROCEDURE — G8417 CALC BMI ABV UP PARAM F/U: HCPCS | Performed by: PSYCHIATRY & NEUROLOGY

## 2019-03-26 PROCEDURE — G8484 FLU IMMUNIZE NO ADMIN: HCPCS | Performed by: PSYCHIATRY & NEUROLOGY

## 2019-03-26 PROCEDURE — G8427 DOCREV CUR MEDS BY ELIG CLIN: HCPCS | Performed by: PSYCHIATRY & NEUROLOGY

## 2019-03-26 PROCEDURE — 1036F TOBACCO NON-USER: CPT | Performed by: PSYCHIATRY & NEUROLOGY

## 2019-03-26 PROCEDURE — 1123F ACP DISCUSS/DSCN MKR DOCD: CPT | Performed by: PSYCHIATRY & NEUROLOGY

## 2019-03-26 RX ORDER — HYDROCODONE BITARTRATE AND ACETAMINOPHEN 10; 325 MG/1; MG/1
TABLET ORAL
COMMUNITY
Start: 2017-08-28 | End: 2019-08-22

## 2019-03-26 NOTE — PROGRESS NOTES
[facility]  [address] [place] [address] Drive, [address]  [place], [place]  Phone [phone]  Fax [phone]     [facility] Follow Up Encounter  3/26/2019 10:42 AM    Information:   Patient Name: Jose Alfaro  :   1940  Age:   77 y.o. MRN:   [id number]  Account #:  [de-identified]  Today:  3/26/19    Provider: Renita Lomeli M.D. Chief Complaint:   Chief Complaint   Patient presents with    Follow-Up from Hospital       Subjective: Jose Alfaro is a 77 y.o. man  with a history of diabetic PN, SEBASTIEN, RLS, recurrent syncope, and pacemaker who is following up. He is using his CPAP. He has severe RLS and has been on a number of medications over the years - Sinemet, Requip, Mirapex, gabapentin, Lyrica, hydrocodone. He has had intermittent syncope for about 7 years and has been previously evaluated. He did get a pacemaker and AVR. He continued to have syncope. He has never had a documented low blood pressure or orthostasis and some events have occurred sitting. He has remotely had MRI, MRA, NICS. He had a negative tilt table test years ago and again in . He was admitted in 2019 after having sudden vertigo. Evaluation including MRI head, echocardiogram, pacemaker interrogation was negative. He has had no further vertigo. He denies dysarthria, dysphagia, diplopia. He was taken off Sinemet for fear of orthostatic hypotension although none has been documented.         Objective:     Past Medical History:  Past Medical History:   Diagnosis Date    Aortic valve stenosis     Chest tightness, discomfort, or pressure 2012    Chronic back pain     CKD (chronic kidney disease)     CPAP (continuous positive airway pressure) dependence     13cm    [facility]     Diabetic polyneuropathy associated with type 2 diabetes mellitus ([facility] 2016    HTN (hypertension)     Hyperlipemia     Left ventricular diastolic dysfunction     Mitral stenosis     Mitral valve stenosis     Obstructive sleep apnea     AHI: 34.5 per PSG, 6/2013; AHI: 36.9 per PSG, 7/2015; AHI: 54.5 per PSG, 3/2017    Pinched nerve in neck     Restless legs syndrome 2/17/2016       Past Surgical History:   Procedure Laterality Date    APPENDECTOMY      BACK SURGERY      4 Back surgeries    BLADDER SURGERY      CARDIAC CATHETERIZATION  4/30/12    EF > 50%    CATARACT REMOVAL      CHOLECYSTECTOMY      PACEMAKER PLACEMENT      MA RPLCMT PROST AORTIC VALVE OPEN XCP HOMOGRF/STENT N/A 6/7/2018    AORTIC VALVE REPLACEMENT TRANSESOPHAGEAL ECHOCARDIOGRAM performed by Natali Bass MD at [address] [place] [address]  · None    Significant Injuries  · None    Habits  Esau Brown reports that he quit smoking about 41 years ago. He has never used smokeless tobacco. He reports that he does not drink alcohol or use drugs. Family History   Problem Relation Age of Onset    Diabetes Mother     Cancer Father     Cancer Sister     Heart Disease Brother     Cancer Brother        Social History  Esau Brown is single, lives in [place], [place], and is retired. Medications:  Current Outpatient Medications   Medication Sig Dispense Refill    HYDROcodone-acetaminophen (NORCO)  MG per tablet hydrocodone 10 mg-acetaminophen 325 mg tablet   prn      insulin glargine (LANTUS) 100 UNIT/ML injection vial Inject 85 Units into the skin nightly 3 vial 0    pramipexole (MIRAPEX) 1.5 MG tablet TAKE ONE TABLET BY MOUTH THREE TIMES DAILY ** MAY CAUSE DROWSINESS (Patient taking differently: Taking BID) 270 tablet 3    Empagliflozin-Metformin HCl ER (SYNJARDY XR)  MG TB24 Take 1 tablet by mouth daily (with breakfast)      oxyCODONE (ROXICODONE) 5 MG immediate release tablet Take 5 mg by mouth every 4 hours as needed for Pain. Marisa Larios aspirin 81 MG EC tablet Take 1 tablet by mouth daily 30 tablet 3    atenolol (TENORMIN) 25 MG tablet Take 25 mg by mouth daily      hydrochlorothiazide (MICROZIDE) 12.5 MG capsule Take 12.5 mg by mouth daily      B-D UF III MINI PEN NEEDLES 31G X 5 MM MISC       citalopram (CELEXA) 10 MG tablet Take 10 mg by mouth daily      furosemide (LASIX) 20 MG tablet Take 1 tablet by mouth as needed (For weight gain greater than 2.5 lbs in 24 hours. ) 30 tablet 0    rosuvastatin (CRESTOR) 10 MG tablet Take 10 mg by mouth daily Takes Mon, Weds, Fri      esomeprazole (NEXIUM) 40 MG capsule Take 40 mg by mouth every morning (before breakfast).  clopidogrel (PLAVIX) 75 MG tablet Take 75 mg by mouth daily      carbidopa-levodopa (SINEMET CR)  MG per extended release tablet TAKE ONE TABLET BY MOUTH TWICE A DAY 60 tablet 5     No current facility-administered medications for this visit. Allergies:   Allergies as of 03/26/2019    (No Known Allergies)       Review of Systems:  General ROS: negative for - chills or fever  Psychological ROS: negative for - anxiety or depression  ENT ROS: negative for - headaches or sinus pain  Hematological and Lymphatic ROS: negative for - bleeding problems, bruising or swollen lymph nodes  Respiratory ROS: negative for - cough, hemoptysis or shortness of breath  Cardiovascular ROS: negative for - chest pain or palpitations  Gastrointestinal ROS: negative for - blood in stools, constipation, diarrhea or nausea/vomiting  Genito-Urinary ROS: negative for - hematuria or urinary frequency/urgency  Musculoskeletal ROS: negative for - joint pain, joint stiffness or joint swelling  Neurological ROS: negative for - memory loss, numbness/tingling or weakness     Examination:  Vitals:  /78   Pulse 81   Resp 20   Ht 5' 10\" (1.778 m)   Wt 241 lb (109.3 kg)   BMI 34.58 kg/m²   General appearance: alert and cooperative with exam  HEENT: Sclera clear, anicteric, Oropharynx clear, no lesions, Neck supple with midline trachea, Thyroid without masses and Trachea midline  Heart:: regular rate and rhythm, S1, S2 normal, no murmur, click, rub or gallop  Lungs: clear to auscultation bilaterally  Extremities: extremities normal, atraumatic, no cyanosis or edema  Neurologic: Extraocular movements are intact without nystagmus. Visual fields are full to confrontation. Facial movements are symmetrical and normal. Speech is precise. Extremity strength is normal in both uppers and lowers. Deep tendon reflexes are absent. Rapid alternating movements are unimpaired. Finger-to-nose testing is performed well, without dysmetria. Gait is normal.    Pertinent Diagnostic Studies:  CARDIOLOGY DEPARTMENT REPORT     CARDIOLOGIST:  Sigifredo Newton MD     PROCEDURE DATE:  02/18/2016     PROCEDURE   Tilt table test.       METHODS   Baseline monitoring was carried out for 10 minutes in the supine position. The patient was then placed in the upright position for 10 minutes of  monitoring. Sublingual nitroglycerin was instilled, followed by initial 10  minutes of monitoring.     RESULTS   1. Sinus rhythm, with dual-chamber pacing on demand throughout the test.    2.  No syncope or presyncope. 3.  Normal heart rate and blood pressure response. 4.  No significant orthostatic hypotension. 5.  No cardio inhibition exhibited (patient known to have AV sequential  pacemaker). 6.  No significant vasodilatory response.       IMPRESSION   Normal tilt table test in a patient with atrioventricular sequential  pacemaker. ________________________________  Sigifredo Newton MD    Narrative   MRI BRAIN W WO CONTRAST 2/22/2019 8:00 AM   HISTORY: Vertigo   Comparison: CT scan dated 2/21/2019     Technique: Multiplanar imaging of the brain was performed in a routine   fashion before and after the intravenous injection of gadolinium   contrast. 20 mL MultiHance IV contrast.   Findings: There is mild global cortical atrophy with prominence of the   subarachnoid space. There is no diffusion signal abnormality to   suggest acute infarct.  There is no intra-axial or extra-axial   hemorrhage. No visualized mass lesion or pathologic enhancement. Normal size and configuration of the ventricular system for patient   age. The posterior fossa structures are unremarkable. The pituitary   gland and sella are unremarkable. The major intracranial flow voids   are preserved. The orbits are unremarkable. The paranasal sinuses and mastoids are   clear. Marrow signal in the calvarium and skull base appears normal.       Impression   Impression:    1. No acute intracranial process. No evidence of acute ischemia. 2. Underlying mild global cortical atrophy with prominence of the   subarachnoid space. Signed by Dr Theresa Riggs on 2/22/2019 10:53 AM     Narrative     Transthoracic Echocardiography Report (TTE)     Demographics      Patient Name Loree Estrada Date of Study         02/22/2019      MRN           [id number]          Gender                Male      Date of Birth 1940      Room Number           [id number]      Age           78 year(s)      Height:       70 inches       Referring Physician   Sandrine Palacios      Weight:       240.01 pounds   Sonographer           [facility]      BSA:          2.26 m^2        Interpreting          Segun Cameron MD                                 Physician      BMI:          34.44 kg/m^2     Procedure    Type of Study      TTE procedure:ECHO W DOP/COLOR W OR W/WO CON.     Study Location: Echo Lab  Technical Quality: Limited visualization due to body habitus. Patient Status: Inpatient    Contrast Medium: Definity.     HR: 60 bpm    Indications:S/P AVR and Syncope.     Conclusions      Summary   LV ejection fraction 50-55%   Paradoxical septal motion noted   Pacemaker lead noted in right ventricle   Grade 1 diastolic dysfunction   Aortic bioprosthesis with no significant aortic regurgitation   Mild aortic stenosis possibly normal for bioprosthesis   Mitral valve leaflets are thickened, calcified with reduced

## 2019-04-11 ENCOUNTER — TELEPHONE (OUTPATIENT)
Dept: FAMILY MEDICINE CLINIC | Facility: CLINIC | Age: 79
End: 2019-04-11

## 2019-04-11 NOTE — TELEPHONE ENCOUNTER
DASHA PATTERSON IS WANTING TO GET HIS LABS DONE AT THE [facility] TOMORROW.,,HE IS NEEDING ORDERS FX OVER THERE PLEASE . AND CALL HIM BACK  [phone]

## 2019-04-18 ENCOUNTER — OFFICE VISIT (OUTPATIENT)
Dept: ENDOCRINOLOGY | Facility: CLINIC | Age: 79
End: 2019-04-18

## 2019-04-18 VITALS
SYSTOLIC BLOOD PRESSURE: 126 MMHG | HEART RATE: 78 BPM | HEIGHT: 70 IN | BODY MASS INDEX: 35.73 KG/M2 | OXYGEN SATURATION: 97 % | DIASTOLIC BLOOD PRESSURE: 72 MMHG | WEIGHT: 249.6 LBS

## 2019-04-18 DIAGNOSIS — Z79.4 TYPE 2 DIABETES MELLITUS WITH HYPERGLYCEMIA, WITH LONG-TERM CURRENT USE OF INSULIN (HCC): Primary | ICD-10-CM

## 2019-04-18 DIAGNOSIS — E55.9 VITAMIN D DEFICIENCY: ICD-10-CM

## 2019-04-18 DIAGNOSIS — E11.69 MIXED DIABETIC HYPERLIPIDEMIA ASSOCIATED WITH TYPE 2 DIABETES MELLITUS (HCC): ICD-10-CM

## 2019-04-18 DIAGNOSIS — I15.2 HYPERTENSION ASSOCIATED WITH DIABETES (HCC): ICD-10-CM

## 2019-04-18 DIAGNOSIS — E78.2 MIXED DIABETIC HYPERLIPIDEMIA ASSOCIATED WITH TYPE 2 DIABETES MELLITUS (HCC): ICD-10-CM

## 2019-04-18 DIAGNOSIS — E11.59 HYPERTENSION ASSOCIATED WITH DIABETES (HCC): ICD-10-CM

## 2019-04-18 DIAGNOSIS — E53.8 B12 DEFICIENCY: ICD-10-CM

## 2019-04-18 DIAGNOSIS — E11.65 TYPE 2 DIABETES MELLITUS WITH HYPERGLYCEMIA, WITH LONG-TERM CURRENT USE OF INSULIN (HCC): Primary | ICD-10-CM

## 2019-04-18 LAB — HBA1C MFR BLD: 7.9 %

## 2019-04-18 PROCEDURE — 99214 OFFICE O/P EST MOD 30 MIN: CPT | Performed by: INTERNAL MEDICINE

## 2019-04-18 RX ORDER — ATENOLOL 25 MG/1
25 TABLET ORAL DAILY
COMMUNITY
End: 2020-02-19

## 2019-04-18 RX ORDER — ASPIRIN 81 MG/1
81 TABLET, CHEWABLE ORAL DAILY
COMMUNITY

## 2019-04-18 NOTE — PROGRESS NOTES
Atul Jeong is a 78 y.o. male who presents for  evaluation of   Chief Complaint   Patient presents with   • Diabetes     bs 121 this am       Referring provider    Primary Care Provider    Margareth Muniz APRN    Duration more than 10 years    Timing - Diabetes is Constant    Quality -  needs improvement - improved     Severity -  moderate    Complications - peripheral neuropathy     Had AVR 2018     Current symptoms/problems  increase appetite, polydipsia and polyuria     Alleviating Factors: Compliance      Side Effects  none    Current diet  High carbs     Current exercise walking    Current monitoring regimen: home blood tests - 3 times daily  Home blood sugar records:  when we met, now mainly in the 100s    Hypoglycemia rarely , nocturnal     Past Medical History:   Diagnosis Date   • B12 deficiency 8/23/2017   • Coronary artery disease    • Foot ulcer (CMS/[facility])    • Hypertension associated with diabetes (CMS/[facility]) 8/23/2017   • Ingrown toenail    • Mixed diabetic hyperlipidemia associated with type 2 diabetes mellitus (CMS/[facility]) 8/23/2017   • Neuropathy in diabetes (CMS/[facility])    • Type 2 diabetes mellitus with hyperglycemia, with long-term current use of insulin (CMS/[facility]) 8/23/2017   • Vitamin D deficiency 8/23/2017     Family History   Problem Relation Age of Onset   • Diabetes Mother    • Cancer Father    • Cancer Sister    • Cancer Brother    • Heart disease Brother    • Diabetes Brother      Social History     Tobacco Use   • Smoking status: Former Smoker   • Smokeless tobacco: Never Used   Substance Use Topics   • Alcohol use: Yes   • Drug use: No         Current Outpatient Medications:   •  aspirin 81 MG chewable tablet, Chew 81 mg Daily., Disp: , Rfl:   •  atenolol (TENORMIN) 25 MG tablet, Take 25 mg by mouth Daily., Disp: , Rfl:   •  carbidopa-levodopa CR (SINEMET CR)  MG per CR tablet, Take  by mouth., Disp: , Rfl:   •  citalopram (CeleXA) 10 MG tablet, Take  by mouth., Disp: , Rfl:    •  esomeprazole (nexIUM) 40 MG capsule, Take  by mouth., Disp: , Rfl:   •  furosemide (LASIX) 20 MG tablet, Take  by mouth., Disp: , Rfl:   •  HYDROcodone-acetaminophen (NORCO)  MG per tablet, Take  by mouth., Disp: , Rfl:   •  Insulin Pen Needle (B-D UF III MINI PEN NEEDLES) 31G X 5 MM misc, , Disp: , Rfl:   •  Insulin Regular Human, Conc, (HUMULIN R U-500 KWIKPEN) 500 UNIT/ML solution pen-injector CONCENTRATED injection, Up to 100 units , 30 minutes before bkfast and up to 85 units, 30 minutes before supper, Disp: 4 pen, Rfl: 11  •  pramipexole (MIRAPEX) 1.5 MG tablet, Take  by mouth., Disp: , Rfl:   •  rosuvastatin (CRESTOR) 10 MG tablet, Take  by mouth., Disp: , Rfl:   •  SYNJARDY XR  MG tablet sustained-release 24 hour, TAKE ONE TABLET BY MOUTH DAILY WITH BREAKFAST, Disp: 30 tablet, Rfl: 11    Review of Systems    Review of Systems   Constitutional: Negative for activity change, appetite change, chills, diaphoresis, fatigue, fever and unexpected weight change.   HENT: Negative for congestion, dental problem, drooling, ear discharge, ear pain, facial swelling, mouth sores, postnasal drip, rhinorrhea, sinus pressure, sore throat, tinnitus, trouble swallowing and voice change.    Eyes: Negative for photophobia, pain, discharge, redness, itching and visual disturbance.   Respiratory: Negative for apnea, cough, choking, chest tightness, shortness of breath, wheezing and stridor.    Cardiovascular: Negative for chest pain, palpitations and leg swelling.   Gastrointestinal: Negative for abdominal distention, abdominal pain, constipation, diarrhea, nausea and vomiting.   Endocrine: Negative for cold intolerance, heat intolerance, polydipsia, polyphagia and polyuria.   Genitourinary: Negative for decreased urine volume, difficulty urinating, dysuria, flank pain, frequency, hematuria and urgency.   Musculoskeletal: Negative for arthralgias, back pain, gait problem, joint swelling, myalgias, neck pain and  "neck stiffness.   Skin: Negative for color change, pallor, rash and wound.   Allergic/Immunologic: Negative for immunocompromised state.   Neurological: Negative for dizziness, tremors, seizures, syncope, facial asymmetry, speech difficulty, weakness, light-headedness, numbness and headaches.   Hematological: Negative for adenopathy.   Psychiatric/Behavioral: Negative for agitation, behavioral problems, confusion, decreased concentration, dysphoric mood, hallucinations, self-injury, sleep disturbance and suicidal ideas. The patient is not nervous/anxious and is not hyperactive.         Objective:   /72   Pulse 78   Ht 177.8 cm (70\")   Wt 113 kg (249 lb 9.6 oz)   SpO2 97%   BMI 35.81 kg/m²     Physical Exam   Constitutional: He is oriented to person, place, and time. He appears well-developed and well-nourished. He is cooperative.   HENT:   Head: Normocephalic and atraumatic.   Right Ear: External ear normal.   Left Ear: External ear normal.   Nose: Nose normal.   Mouth/Throat: Oropharynx is clear and moist. No oropharyngeal exudate.   Eyes: Conjunctivae and EOM are normal. Pupils are equal, round, and reactive to light. No scleral icterus. Right eye exhibits normal extraocular motion. Left eye exhibits normal extraocular motion.   Neck: Neck supple. No JVD present. No muscular tenderness present. No tracheal deviation, no edema and no erythema present. No thyromegaly present.   Cardiovascular: Normal rate, regular rhythm, normal heart sounds and intact distal pulses. Exam reveals no gallop and no friction rub.   No murmur heard.  Pulmonary/Chest: Effort normal and breath sounds normal. No stridor. No respiratory distress. He has no decreased breath sounds. He has no wheezes. He has no rhonchi. He has no rales. He exhibits no tenderness.   Abdominal: Soft. Bowel sounds are normal. He exhibits no distension and no mass. There is no hepatomegaly. There is no tenderness. There is no rebound and no guarding. " No hernia.   Musculoskeletal: Normal range of motion. He exhibits no edema, tenderness or deformity.   Lymphadenopathy:     He has no cervical adenopathy.   Neurological: He is alert and oriented to person, place, and time. He has normal reflexes. No cranial nerve deficit. He exhibits normal muscle tone. Coordination normal.   Skin: Skin is warm. No rash noted. No erythema. No pallor.   Psychiatric: He has a normal mood and affect. His behavior is normal. Judgment and thought content normal.   Nursing note and vitals reviewed.      Lab Review    Results for orders placed or performed in visit on 04/18/19   Hemoglobin A1c   Result Value Ref Range    Hemoglobin A1C 7.9            Assessment/Plan       ICD-10-CM ICD-9-CM   1. Type 2 diabetes mellitus with hyperglycemia, with long-term current use of insulin (CMS/HCC) E11.65 250.00    Z79.4 790.29     V58.67   2. Hypertension associated with diabetes (CMS/HCC) E11.59 250.80    I10 401.9   3. Mixed diabetic hyperlipidemia associated with type 2 diabetes mellitus (CMS/HCC) E11.69 250.80    E78.2 272.2   4. B12 deficiency E53.8 266.2   5. Vitamin D deficiency E55.9 268.9       Glycemic Management:   Lab Results   Component Value Date    HGBA1C 7.9 04/13/2019    HGBA1C 8.2 10/15/2018     No results found for: GLUCOSE, BUN, CREATININE, EGFRIFNONA, EGFRIFAFRI, BCR, K, CO2, CALCIUM, PROTENTOTREF, ALBUMIN, LABIL2, AST, ALT, ANIONGAP  No results found for: WBC, HGB, HCT, MCV, PLT      U500    70 units for bkfast --80     65 units for supper - 70     Doing 85 BID       -----    Has gastroparesis so I am not using glp-1    ----    Synjardy xr 10/1000 mg w bkfast and next appt consider  januvia           Lipid Management  No results found for: CHOL  No results found for: TRIG  No results found for: HDL  No components found for: LDLCALC  No results found for: LDL  No results found for: LDLDIRECT    On crestor 10 mg three times per week     LDL from 4/19  Was 67    Blood Pressure  Management:    Vitals:    04/18/19 0939   BP: 126/72   Pulse: 78   SpO2: 97%     No results found for: GLUCOSE, CALCIUM, NA, K, CO2, CL, BUN, CREATININE, EGFRIFAFRI, EGFRIFNONA, BCR, ANIONGAP      On benazepril    Lasix listed but not taking        Microvascular Complication Monitoring:      Eye Exam Evaluation,   no retinopathy    -----------    Last Microalbumin-Proteinuria Assessment    No results found for: MALBCRERATIO    No results found for: UTPCR    -----------      Neuropathy, has neuropathy   I intend to start neurontin 300 mg po tid prn       Weight Related:   Wt Readings from Last 3 Encounters:   04/18/19 113 kg (249 lb 9.6 oz)   12/13/18 110 kg (241 lb 9.6 oz)   06/27/18 105 kg (231 lb 11.2 oz)     Body mass index is 35.81 kg/m².        Diet interventions: moderate (500 kCal/d) deficit diet.      Bone Health    No results found for: PTH, CALCIUM, CAION, PHOS, [id number]     Vit D 28 from 4-19     Start vit D 50 th u monthly     Thyroid Health    No results found for: TSH     TSH from 4-19 and normal at 3.6       Other Diabetes Related Aspects       No results found for: [id number]        Last celiac panel     No results found for: GDPABIGA, TTRANSGLIGA, IGA, [id number]      I reviewed and summarized records from Margareth Muniz APRN from 2017 and I reviewed / ordered labs.     Orders Placed This Encounter   Procedures   • Hemoglobin A1c     This order was created through External Result Entry         A copy of my note was sent to Margareth Muniz APRN    Please see my above opinion and suggestions.

## 2019-04-19 ENCOUNTER — HOSPITAL ENCOUNTER (OUTPATIENT)
Dept: MRI IMAGING | Age: 79
Discharge: HOME | End: 2019-04-09
Payer: MEDICARE

## 2019-04-19 ENCOUNTER — HOSPITAL ENCOUNTER (OUTPATIENT)
Dept: MRI IMAGING | Age: 79
Discharge: HOME | End: 2019-04-19
Payer: MEDICARE

## 2019-04-19 DIAGNOSIS — G45.0 VERTEBRO BASILAR INSUFFICIENCY: ICD-10-CM

## 2019-04-19 DIAGNOSIS — R55 SYNCOPE AND COLLAPSE: ICD-10-CM

## 2019-04-19 DIAGNOSIS — G45.0 VERTEBRAL ARTERY INSUFFICIENCY: ICD-10-CM

## 2019-04-19 PROCEDURE — 70544 MR ANGIOGRAPHY HEAD W/O DYE: CPT

## 2019-04-19 PROCEDURE — 70547 MR ANGIOGRAPHY NECK W/O DYE: CPT

## 2019-04-22 ENCOUNTER — TELEPHONE (OUTPATIENT)
Dept: NEUROLOGY | Age: 79
End: 2019-04-22

## 2019-04-23 ENCOUNTER — OFFICE VISIT (OUTPATIENT)
Dept: NEUROLOGY | Age: 79
End: 2019-04-23
Payer: MEDICARE

## 2019-04-23 VITALS
SYSTOLIC BLOOD PRESSURE: 137 MMHG | BODY MASS INDEX: 35.22 KG/M2 | HEART RATE: 80 BPM | RESPIRATION RATE: 20 BRPM | HEIGHT: 70 IN | DIASTOLIC BLOOD PRESSURE: 84 MMHG | WEIGHT: 246 LBS

## 2019-04-23 DIAGNOSIS — G25.81 RESTLESS LEGS SYNDROME: Chronic | ICD-10-CM

## 2019-04-23 DIAGNOSIS — E11.42 DIABETIC POLYNEUROPATHY ASSOCIATED WITH TYPE 2 DIABETES MELLITUS (HCC): Chronic | ICD-10-CM

## 2019-04-23 DIAGNOSIS — R55 SYNCOPE AND COLLAPSE: ICD-10-CM

## 2019-04-23 DIAGNOSIS — G45.0 VERTEBROBASILAR INSUFFICIENCY: Primary | ICD-10-CM

## 2019-04-23 DIAGNOSIS — G47.33 OBSTRUCTIVE SLEEP APNEA: Chronic | ICD-10-CM

## 2019-04-23 PROCEDURE — 1036F TOBACCO NON-USER: CPT | Performed by: PSYCHIATRY & NEUROLOGY

## 2019-04-23 PROCEDURE — 4040F PNEUMOC VAC/ADMIN/RCVD: CPT | Performed by: PSYCHIATRY & NEUROLOGY

## 2019-04-23 PROCEDURE — G8427 DOCREV CUR MEDS BY ELIG CLIN: HCPCS | Performed by: PSYCHIATRY & NEUROLOGY

## 2019-04-23 PROCEDURE — 99214 OFFICE O/P EST MOD 30 MIN: CPT | Performed by: PSYCHIATRY & NEUROLOGY

## 2019-04-23 PROCEDURE — G8417 CALC BMI ABV UP PARAM F/U: HCPCS | Performed by: PSYCHIATRY & NEUROLOGY

## 2019-04-23 PROCEDURE — 1123F ACP DISCUSS/DSCN MKR DOCD: CPT | Performed by: PSYCHIATRY & NEUROLOGY

## 2019-04-23 RX ORDER — CHOLECALCIFEROL (VITAMIN D3) 1250 MCG
CAPSULE ORAL
Refills: 11 | COMMUNITY
Start: 2019-04-18

## 2019-04-23 RX ORDER — CITALOPRAM 20 MG/1
20 TABLET ORAL DAILY
Qty: 30 TABLET | Refills: 5 | Status: SHIPPED | OUTPATIENT
Start: 2019-04-23 | End: 2019-09-26 | Stop reason: SDUPTHER

## 2019-04-23 RX ORDER — INSULIN HUMAN 500 [IU]/ML
INJECTION, SOLUTION SUBCUTANEOUS
Refills: 11 | COMMUNITY
Start: 2019-04-20

## 2019-04-23 NOTE — PROGRESS NOTES
TABLET BY MOUTH TWICE A DAY 60 tablet 5    Empagliflozin-Metformin HCl ER (SYNJARDY XR)  MG TB24 Take 1 tablet by mouth daily (with breakfast)      oxyCODONE (ROXICODONE) 5 MG immediate release tablet Take 5 mg by mouth every 4 hours as needed for Pain. Nile Dark aspirin 81 MG EC tablet Take 1 tablet by mouth daily 30 tablet 3    atenolol (TENORMIN) 25 MG tablet Take 25 mg by mouth daily      hydrochlorothiazide (MICROZIDE) 12.5 MG capsule Take 12.5 mg by mouth daily      B-D UF III MINI PEN NEEDLES 31G X 5 MM MISC       citalopram (CELEXA) 10 MG tablet Take 10 mg by mouth daily      furosemide (LASIX) 20 MG tablet Take 1 tablet by mouth as needed (For weight gain greater than 2.5 lbs in 24 hours. ) 30 tablet 0    rosuvastatin (CRESTOR) 10 MG tablet Take 10 mg by mouth daily Takes Mon, Weds, Fri      esomeprazole (NEXIUM) 40 MG capsule Take 40 mg by mouth every morning (before breakfast).  HYDROcodone-acetaminophen (NORCO)  MG per tablet hydrocodone 10 mg-acetaminophen 325 mg tablet   prn       No current facility-administered medications for this visit. Allergies:   Allergies as of 04/23/2019    (No Known Allergies)       Review of Systems:  General ROS: negative for - chills or fever  Psychological ROS: negative for - anxiety or depression  ENT ROS: negative for - headaches or sinus pain  Hematological and Lymphatic ROS: negative for - bleeding problems, bruising or swollen lymph nodes  Respiratory ROS: negative for - cough, hemoptysis or shortness of breath  Cardiovascular ROS: negative for - chest pain or palpitations  Gastrointestinal ROS: negative for - blood in stools, constipation, diarrhea or nausea/vomiting  Genito-Urinary ROS: negative for - hematuria or urinary frequency/urgency  Musculoskeletal ROS: positive for - joint pain, joint stiffness or joint swelling  Neurological ROS: positive for - memory loss, numbness/tingling or weakness     Examination:  Vitals:  /84 Pulse 80   Resp 20   Ht 5' 10\" (1.778 m)   Wt 246 lb (111.6 kg)   BMI 35.30 kg/m²   General appearance: alert and cooperative with exam  HEENT: Sclera clear, anicteric, Oropharynx clear, no lesions, Neck supple with midline trachea, Thyroid without masses and Trachea midline  Heart:: regular rate and rhythm, S1, S2 normal, no murmur, click, rub or gallop  Lungs: clear to auscultation bilaterally  Extremities: extremities normal, atraumatic, no cyanosis or edema  Neurologic: Extraocular movements are intact without nystagmus. Visual fields are full to confrontation. Facial movements are symmetrical and normal. Speech is precise. Extremity strength is normal in both uppers and lowers. Deep tendon reflexes are absent. Rapid alternating movements are unimpaired. Finger-to-nose testing is performed well, without dysmetria. Gait is normal.    Pertinent Diagnostic Studies:  Narrative   EXAMINATION: MR brain angiogram on 4/19/2019   COMPARISON:   None. HISTORY:  Syncope, collapse   TECHNIQUE: 3-D time of flight images were obtained of the arteries of   the [place].  MIP images of the right and left carotid   arteries and the vertebral basilar arteries were then generated.      FINDINGS:    The right internal carotid artery demonstrates normal course and   caliber without evidence of stenosis or occlusion. The major branch   vessels to the right anterior and middle cerebral arteries are seen   without evidence of stenosis or occlusion. There is no evidence of   aneurysm or vascular malformation. The left internal carotid artery demonstrates normal course and   caliber without evidence of stenosis or occlusion. The left A1 is   hypoplastic and there is an anterior communicating artery supplied the   left A2. The remaining major branch vessels to the left anterior and   middle cerebral arteries are seen without evidence of stenosis or   occlusion. There is no evidence of aneurysm or vascular malformation. Fetal-type left PCA. Evaluation of the remaining posterior circulation   demonstrates normal caliber of the vertebral arteries, basilar artery,   and major branch vessels of the posterior cerebral arteries   bilaterally without evidence of stenosis or occlusion. There is no   evidence of aneurysm or vascular malformation.        Impression   1.  No focal flow-limiting stenosis or occlusion. 2.  Hypoplastic left A1 and P1 as described above. Signed by Dr Jeannie Chilel on 4/19/2019 2:57 PM     Narrative   MRA NECK WO CONTRAST 4/19/2019 11:42 AM   HISTORY: R55   COMPARISON: NONE    Technique: Noncontrast 3D TOF images of the neck were obtained with   3-D and MIP post processing and reconstruction. Findings: Aortic arch appears to be normal in caliber. Three-vessel branching   pattern off the aortic arch. Poor signal at the left common carotid   and left subclavian artery origins, however, they appear to be normal   in caliber. Just distal to this the common carotid arteries appear to   be normal in caliber and symmetric in signal. No flow-limiting   stenosis at the carotid bulbs. The internal carotid arteries are   normal in caliber. The vertebral arteries arise from the subclavian   arteries. The right vertebral artery is dominant. The left vertebral   artery is faint at its origin and the cervical segment of the left   vertebral artery is essentially completely attenuated.       Impression   Impression:    1. No flow-limiting stenosis in the carotid circulation. 2. The left vertebral artery signal is completely attenuated at and   beyond the foraminal segment. This may be secondary to atheromatous   occlusion or it could be aplastic. 3. Right vertebral artery is unremarkable. Signed by Dr Cyndie Lawrence on 4/19/2019 2:53 PM       Assessment:       ICD-10-CM    1. Vertebrobasilar insufficiency G45.0    2. Diabetic polyneuropathy associated with type 2 diabetes mellitus (HCC) E11.42    3.  Restless legs syndrome G25.81    4. Obstructive sleep apnea G47.33    5. Syncope and collapse R55    He says his new heart doctor in [facility] took him off Plavix. He does not really know what he is taking and we spent much time trying to figure it out. Plan:   1. Occluded left vertebral artery/vertebrobasilar insufficiency  2. Diabetic polyneuropathy, contributes to the balance difficulties  3. Increase the Celexa, citalopram to 20 mg daily  4. Restart Plavix 75 mg daily. Will have you check a blood test to make sure the Plavix is working. 5. May have to consider anticoagulation  6. Restart Sinemet ER 50/200 BID.       Electronically signed by Amaris Sanchez MD on 4/23/2019

## 2019-04-23 NOTE — PATIENT INSTRUCTIONS
Diagnoses:  1. Occluded left vertebral artery/vertebrobasilar insufficiency  2. Diabetic polyneuropathy, contributes to the balance difficulties  3. Restless legs syndrome    Instructions:  1. Increase citalopram (Celexa) to 20 mg daily  2. Restart Plavix (clopidigrel) 75 mg every day  3.  Will have you check a blood test to make sure the Plavix is working after you are back on it for a week or more

## 2019-04-30 ENCOUNTER — TELEPHONE (OUTPATIENT)
Dept: FAMILY MEDICINE CLINIC | Facility: CLINIC | Age: 79
End: 2019-04-30

## 2019-05-10 ENCOUNTER — TELEPHONE (OUTPATIENT)
Dept: NEUROLOGY | Age: 79
End: 2019-05-10

## 2019-05-10 ENCOUNTER — LAB REQUISITION (OUTPATIENT)
Dept: LAB | Facility: HOSPITAL | Age: 79
End: 2019-05-10

## 2019-05-10 DIAGNOSIS — G45.0 VERTEBROBASILAR INSUFFICIENCY: ICD-10-CM

## 2019-05-10 DIAGNOSIS — Z00.00 ROUTINE GENERAL MEDICAL EXAMINATION AT A HEALTH CARE FACILITY: ICD-10-CM

## 2019-05-10 LAB
ALBUMIN SERPL-MCNC: 5 G/DL (ref 3.5–5.2)
ALP BLD-CCNC: 78 U/L (ref 40–130)
ALT SERPL-CCNC: 47 U/L (ref 5–41)
ANION GAP SERPL CALCULATED.3IONS-SCNC: 13 MMOL/L (ref 7–19)
AST SERPL-CCNC: 48 U/L (ref 5–40)
BASOPHILS ABSOLUTE: 0 K/UL (ref 0–0.2)
BASOPHILS RELATIVE PERCENT: 0.3 % (ref 0–1)
BILIRUB SERPL-MCNC: 0.8 MG/DL (ref 0.2–1.2)
BUN BLDV-MCNC: 28 MG/DL (ref 8–23)
CALCIUM SERPL-MCNC: 10.3 MG/DL (ref 8.8–10.2)
CHLORIDE BLD-SCNC: 99 MMOL/L (ref 98–111)
CHOLESTEROL, TOTAL: 159 MG/DL (ref 160–199)
CO2: 28 MMOL/L (ref 22–29)
CREAT SERPL-MCNC: 1.1 MG/DL (ref 0.5–1.2)
EOSINOPHILS ABSOLUTE: 0.1 K/UL (ref 0–0.6)
EOSINOPHILS RELATIVE PERCENT: 2 % (ref 0–5)
GFR NON-AFRICAN AMERICAN: >60
GLUCOSE BLD-MCNC: 132 MG/DL (ref 74–109)
HBA1C MFR BLD: 7.7 % (ref 4–6)
HCT VFR BLD AUTO: NORMAL % (ref 37.5–51)
HCT VFR BLD CALC: 52.9 % (ref 42–52)
HCT VFR BLD CALC: 53.2 % (ref 42–52)
HDLC SERPL-MCNC: 54 MG/DL (ref 55–121)
HEMOGLOBIN: 18.2 G/DL (ref 14–18)
LDL CHOLESTEROL CALCULATED: 79 MG/DL
LYMPHOCYTES ABSOLUTE: 1 K/UL (ref 1.1–4.5)
LYMPHOCYTES RELATIVE PERCENT: 15.7 % (ref 20–40)
MCH RBC QN AUTO: 31.3 PG (ref 27–31)
MCHC RBC AUTO-ENTMCNC: 34.2 G/DL (ref 33–37)
MCV RBC AUTO: 91.6 FL (ref 80–94)
MONOCYTES ABSOLUTE: 0.7 K/UL (ref 0–0.9)
MONOCYTES RELATIVE PERCENT: 11.9 % (ref 0–10)
NEUTROPHILS ABSOLUTE: 4.2 K/UL (ref 1.5–7.5)
NEUTROPHILS RELATIVE PERCENT: 69.4 % (ref 50–65)
P2Y12 RESULT: 111 PRU (ref 194–418)
PA ADP PRP-ACNC: 111 PRU
PDW BLD-RTO: 13.6 % (ref 11.5–14.5)
PLATELET # BLD AUTO: NORMAL 10*3/MM3 (ref 140–450)
PLATELET # BLD: 152 K/UL (ref 130–400)
PLATELET # BLD: 154 K/UL (ref 130–400)
PMV BLD AUTO: 10.3 FL (ref 9.4–12.4)
POTASSIUM SERPL-SCNC: 5.4 MMOL/L (ref 3.5–5)
RBC # BLD: 5.81 M/UL (ref 4.7–6.1)
SODIUM BLD-SCNC: 140 MMOL/L (ref 136–145)
TOTAL PROTEIN: 8.1 G/DL (ref 6.6–8.7)
TRIGL SERPL-MCNC: 129 MG/DL (ref 0–149)
WBC # BLD: 6.1 K/UL (ref 4.8–10.8)

## 2019-05-10 PROCEDURE — 85576 BLOOD PLATELET AGGREGATION: CPT

## 2019-05-13 ENCOUNTER — TELEPHONE (OUTPATIENT)
Dept: NEUROLOGY | Age: 79
End: 2019-05-13

## 2019-05-14 RX ORDER — CLOPIDOGREL BISULFATE 75 MG/1
75 TABLET ORAL DAILY
Qty: 30 TABLET | Refills: 5 | Status: SHIPPED | OUTPATIENT
Start: 2019-05-14 | End: 2019-09-16 | Stop reason: SDUPTHER

## 2019-05-28 ENCOUNTER — OFFICE VISIT (OUTPATIENT)
Dept: NEUROLOGY | Age: 79
End: 2019-05-28
Payer: MEDICARE

## 2019-05-28 VITALS
HEIGHT: 70 IN | HEART RATE: 83 BPM | DIASTOLIC BLOOD PRESSURE: 76 MMHG | SYSTOLIC BLOOD PRESSURE: 136 MMHG | BODY MASS INDEX: 33.5 KG/M2 | WEIGHT: 234 LBS

## 2019-05-28 DIAGNOSIS — E11.42 DIABETIC POLYNEUROPATHY ASSOCIATED WITH TYPE 2 DIABETES MELLITUS (HCC): Chronic | ICD-10-CM

## 2019-05-28 DIAGNOSIS — R55 SYNCOPE AND COLLAPSE: ICD-10-CM

## 2019-05-28 DIAGNOSIS — G25.81 RESTLESS LEGS SYNDROME: Chronic | ICD-10-CM

## 2019-05-28 DIAGNOSIS — G45.0 VERTEBROBASILAR ARTERY INSUFFICIENCY: Primary | ICD-10-CM

## 2019-05-28 DIAGNOSIS — G47.33 OBSTRUCTIVE SLEEP APNEA: Chronic | ICD-10-CM

## 2019-05-28 PROCEDURE — 4040F PNEUMOC VAC/ADMIN/RCVD: CPT | Performed by: PSYCHIATRY & NEUROLOGY

## 2019-05-28 PROCEDURE — G8427 DOCREV CUR MEDS BY ELIG CLIN: HCPCS | Performed by: PSYCHIATRY & NEUROLOGY

## 2019-05-28 PROCEDURE — 1123F ACP DISCUSS/DSCN MKR DOCD: CPT | Performed by: PSYCHIATRY & NEUROLOGY

## 2019-05-28 PROCEDURE — G8417 CALC BMI ABV UP PARAM F/U: HCPCS | Performed by: PSYCHIATRY & NEUROLOGY

## 2019-05-28 PROCEDURE — 99213 OFFICE O/P EST LOW 20 MIN: CPT | Performed by: PSYCHIATRY & NEUROLOGY

## 2019-05-28 PROCEDURE — 1036F TOBACCO NON-USER: CPT | Performed by: PSYCHIATRY & NEUROLOGY
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
## 2019-07-05 RX ORDER — CARBIDOPA AND LEVODOPA 50; 200 MG/1; MG/1
TABLET, EXTENDED RELEASE ORAL
Qty: 60 TABLET | Refills: 5 | Status: SHIPPED | OUTPATIENT
Start: 2019-07-05 | End: 2019-08-22

## 2019-08-05 RX ORDER — EMPAGLIFLOZIN, METFORMIN HYDROCHLORIDE 10; 1000 MG/1; MG/1
TABLET, EXTENDED RELEASE ORAL
Qty: 30 TABLET | Refills: 11 | Status: SHIPPED | OUTPATIENT
Start: 2019-08-05 | End: 2021-06-08

## 2019-08-19 ENCOUNTER — OFFICE VISIT (OUTPATIENT)
Dept: ENDOCRINOLOGY | Facility: CLINIC | Age: 79
End: 2019-08-19

## 2019-08-19 VITALS
SYSTOLIC BLOOD PRESSURE: 120 MMHG | OXYGEN SATURATION: 98 % | HEART RATE: 77 BPM | BODY MASS INDEX: 33.63 KG/M2 | WEIGHT: 234.9 LBS | HEIGHT: 70 IN | DIASTOLIC BLOOD PRESSURE: 64 MMHG

## 2019-08-19 DIAGNOSIS — E78.2 MIXED DIABETIC HYPERLIPIDEMIA ASSOCIATED WITH TYPE 2 DIABETES MELLITUS (HCC): ICD-10-CM

## 2019-08-19 DIAGNOSIS — E11.65 TYPE 2 DIABETES MELLITUS WITH HYPERGLYCEMIA, WITH LONG-TERM CURRENT USE OF INSULIN (HCC): Primary | ICD-10-CM

## 2019-08-19 DIAGNOSIS — E55.9 VITAMIN D DEFICIENCY: ICD-10-CM

## 2019-08-19 DIAGNOSIS — Z79.4 TYPE 2 DIABETES MELLITUS WITH HYPERGLYCEMIA, WITH LONG-TERM CURRENT USE OF INSULIN (HCC): Primary | ICD-10-CM

## 2019-08-19 DIAGNOSIS — E11.69 MIXED DIABETIC HYPERLIPIDEMIA ASSOCIATED WITH TYPE 2 DIABETES MELLITUS (HCC): ICD-10-CM

## 2019-08-19 DIAGNOSIS — E11.59 HYPERTENSION ASSOCIATED WITH DIABETES (HCC): ICD-10-CM

## 2019-08-19 DIAGNOSIS — E53.8 B12 DEFICIENCY: ICD-10-CM

## 2019-08-19 DIAGNOSIS — I15.2 HYPERTENSION ASSOCIATED WITH DIABETES (HCC): ICD-10-CM

## 2019-08-19 LAB — HBA1C MFR BLD: 7.4 %

## 2019-08-19 PROCEDURE — 99214 OFFICE O/P EST MOD 30 MIN: CPT | Performed by: INTERNAL MEDICINE

## 2019-08-19 RX ORDER — HYDROCHLOROTHIAZIDE 12.5 MG/1
12.5 TABLET ORAL DAILY
Status: ON HOLD | COMMUNITY
End: 2020-09-24

## 2019-08-19 NOTE — PROGRESS NOTES
Atul Jeong is a 79 y.o. male who presents for  evaluation of   Chief Complaint   Patient presents with   • Diabetes       Referring provider    Primary Care Provider    Margareth Muniz APRN    Duration more than 10 years    Timing - Diabetes is Constant    Quality -  Near goal     Severity -  moderate    Complications - peripheral neuropathy     Had AVR 2018     Current symptoms/problems  increase appetite, polydipsia and polyuria     Alleviating Factors: Compliance      Side Effects  none    Current diet  High carbs     Current exercise walking    Current monitoring regimen: home blood tests - 3 times daily  Home blood sugar records:  when we met, now mainly in the 100s    Hypoglycemia rarely , nocturnal     Past Medical History:   Diagnosis Date   • B12 deficiency 8/23/2017   • Coronary artery disease    • Foot ulcer (CMS/[facility])    • Hypertension associated with diabetes (CMS/[facility]) 8/23/2017   • Ingrown toenail    • Mixed diabetic hyperlipidemia associated with type 2 diabetes mellitus (CMS/[facility]) 8/23/2017   • Neuropathy in diabetes (CMS/[facility])    • Type 2 diabetes mellitus with hyperglycemia, with long-term current use of insulin (CMS/[facility]) 8/23/2017   • Vitamin D deficiency 8/23/2017     Family History   Problem Relation Age of Onset   • Diabetes Mother    • Cancer Father    • Cancer Sister    • Cancer Brother    • Heart disease Brother    • Diabetes Brother      Social History     Tobacco Use   • Smoking status: Former Smoker   • Smokeless tobacco: Never Used   Substance Use Topics   • Alcohol use: Yes   • Drug use: No         Current Outpatient Medications:   •  aspirin 81 MG chewable tablet, Chew 81 mg Daily., Disp: , Rfl:   •  atenolol (TENORMIN) 25 MG tablet, Take 25 mg by mouth Daily., Disp: , Rfl:   •  carbidopa-levodopa CR (SINEMET CR)  MG per CR tablet, Take  by mouth., Disp: , Rfl:   •  Cholecalciferol 19497 units tablet, 50 thousand units po q 4 weeks, Disp: 1 tablet, Rfl: 11  •   citalopram (CeleXA) 10 MG tablet, Take  by mouth., Disp: , Rfl:   •  esomeprazole (nexIUM) 40 MG capsule, Take  by mouth., Disp: , Rfl:   •  furosemide (LASIX) 20 MG tablet, Take  by mouth., Disp: , Rfl:   •  HYDROcodone-acetaminophen (NORCO)  MG per tablet, Take  by mouth., Disp: , Rfl:   •  Insulin Pen Needle (B-D UF III MINI PEN NEEDLES) 31G X 5 MM misc, , Disp: , Rfl:   •  Insulin Regular Human, Conc, (HUMULIN R U-500 KWIKPEN) 500 UNIT/ML solution pen-injector CONCENTRATED injection, Up to 100 units , 30 minutes before bkfast and up to 85 units, 30 minutes before supper, Disp: 4 pen, Rfl: 11  •  pramipexole (MIRAPEX) 1.5 MG tablet, Take  by mouth., Disp: , Rfl:   •  rosuvastatin (CRESTOR) 10 MG tablet, Take  by mouth., Disp: , Rfl:   •  SYNJARDY XR  MG tablet sustained-release 24 hour, TAKE ONE TABLET BY MOUTH DAILY WITH BREAKFAST, Disp: 30 tablet, Rfl: 11    Review of Systems    Review of Systems   Constitutional: Negative for activity change, appetite change, chills, diaphoresis, fatigue, fever and unexpected weight change.   HENT: Negative for congestion, dental problem, drooling, ear discharge, ear pain, facial swelling, mouth sores, postnasal drip, rhinorrhea, sinus pressure, sore throat, tinnitus, trouble swallowing and voice change.    Eyes: Negative for photophobia, pain, discharge, redness, itching and visual disturbance.   Respiratory: Negative for apnea, cough, choking, chest tightness, shortness of breath, wheezing and stridor.    Cardiovascular: Negative for chest pain, palpitations and leg swelling.   Gastrointestinal: Negative for abdominal distention, abdominal pain, constipation, diarrhea, nausea and vomiting.   Endocrine: Negative for cold intolerance, heat intolerance, polydipsia, polyphagia and polyuria.   Genitourinary: Negative for decreased urine volume, difficulty urinating, dysuria, flank pain, frequency, hematuria and urgency.   Musculoskeletal: Negative for arthralgias,  "back pain, gait problem, joint swelling, myalgias, neck pain and neck stiffness.   Skin: Negative for color change, pallor, rash and wound.   Allergic/Immunologic: Negative for immunocompromised state.   Neurological: Negative for dizziness, tremors, seizures, syncope, facial asymmetry, speech difficulty, weakness, light-headedness, numbness and headaches.   Hematological: Negative for adenopathy.   Psychiatric/Behavioral: Negative for agitation, behavioral problems, confusion, decreased concentration, dysphoric mood, hallucinations, self-injury, sleep disturbance and suicidal ideas. The patient is not nervous/anxious and is not hyperactive.         Objective:   /64   Pulse 77   Ht 177.8 cm (70\")   Wt 107 kg (234 lb 14.4 oz)   SpO2 98%   BMI 33.70 kg/m²     Physical Exam   Constitutional: He is oriented to person, place, and time. He appears well-developed and well-nourished. He is cooperative.   HENT:   Head: Normocephalic and atraumatic.   Right Ear: External ear normal.   Left Ear: External ear normal.   Nose: Nose normal.   Mouth/Throat: Oropharynx is clear and moist. No oropharyngeal exudate.   Eyes: Conjunctivae and EOM are normal. Pupils are equal, round, and reactive to light. No scleral icterus. Right eye exhibits normal extraocular motion. Left eye exhibits normal extraocular motion.   Neck: Neck supple. No JVD present. No muscular tenderness present. No tracheal deviation, no edema and no erythema present. No thyromegaly present.   Cardiovascular: Normal rate, regular rhythm, normal heart sounds and intact distal pulses. Exam reveals no gallop and no friction rub.   No murmur heard.  Pulmonary/Chest: Effort normal and breath sounds normal. No stridor. No respiratory distress. He has no decreased breath sounds. He has no wheezes. He has no rhonchi. He has no rales. He exhibits no tenderness.   Abdominal: Soft. Bowel sounds are normal. He exhibits no distension and no mass. There is no " hepatomegaly. There is no tenderness. There is no rebound and no guarding. No hernia.   Musculoskeletal: Normal range of motion. He exhibits no edema, tenderness or deformity.   Lymphadenopathy:     He has no cervical adenopathy.   Neurological: He is alert and oriented to person, place, and time. He has normal reflexes. No cranial nerve deficit. He exhibits normal muscle tone. Coordination normal.   Skin: Skin is warm. No rash noted. No erythema. No pallor.   Psychiatric: He has a normal mood and affect. His behavior is normal. Judgment and thought content normal.   Nursing note and vitals reviewed.      Lab Review    Results for orders placed or performed in visit on 08/19/19   Hemoglobin A1c   Result Value Ref Range    Hemoglobin A1C 7.4            Assessment/Plan       ICD-10-CM ICD-9-CM   1. Type 2 diabetes mellitus with hyperglycemia, with long-term current use of insulin (CMS/HCC) E11.65 250.00    Z79.4 790.29     V58.67   2. Hypertension associated with diabetes (CMS/HCC) E11.59 250.80    I10 401.9   3. Mixed diabetic hyperlipidemia associated with type 2 diabetes mellitus (CMS/HCC) E11.69 250.80    E78.2 272.2   4. B12 deficiency E53.8 266.2   5. Vitamin D deficiency E55.9 268.9       Glycemic Management:   Lab Results   Component Value Date    HGBA1C 7.4 08/14/2019    HGBA1C 7.7 (H) 05/10/2019    HGBA1C 7.9 04/13/2019     Lab Results   Component Value Date    GLUCOSE 132 (H) 05/10/2019    BUN 28 (H) 05/10/2019    CREATININE 1.1 05/10/2019    EGFRIFNONA >60 05/10/2019    K 5.4 (H) 05/10/2019    CO2 28 05/10/2019    CALCIUM 10.3 (H) 05/10/2019    ALBUMIN 5 05/10/2019    AST 48 (H) 05/10/2019    ALT 47 (H) 05/10/2019    ANIONGAP 13 05/10/2019     Lab Results   Component Value Date    WBC 6.1 05/10/2019    HGB 18.2 (H) 05/10/2019    HCT  05/10/2019      Comment:      Performed at [facility]    MCV 91.6 05/10/2019    PLT  05/10/2019      Comment:      Performed at [facility]         U500    40-65 units for bkfast      40-65  units for supper -            -----    Has gastroparesis so I am not using glp-1    ----    Synjardy xr 10/1000 mg w bkfast and next appt consider  januvia     Low b12, start otc b12 complex       Lipid Management  No results found for: CHOL  Lab Results   Component Value Date    TRIG 129 05/10/2019    TRIG 103 06/11/2018     Lab Results   Component Value Date    HDL 54 (L) 05/10/2019    HDL 40 (L) 06/11/2018     No components found for: LDLCALC  Lab Results   Component Value Date    LDL 79 05/10/2019    LDL 33 06/11/2018     No results found for: LDLDIRECT    On crestor 10 mg three times per week     LDL from 4/19  Was 67    Blood Pressure Management:    Vitals:    08/19/19 1048   BP: 120/64   Pulse: 77   SpO2: 98%     Lab Results   Component Value Date    GLUCOSE 132 (H) 05/10/2019    CALCIUM 10.3 (H) 05/10/2019     05/10/2019    K 5.4 (H) 05/10/2019    CO2 28 05/10/2019    CL 99 05/10/2019    BUN 28 (H) 05/10/2019    CREATININE 1.1 05/10/2019    EGFRIFNONA >60 05/10/2019    ANIONGAP 13 05/10/2019         On benazepril    Lasix listed but not taking      Calcium from 8-19 nl       Microvascular Complication Monitoring:      Eye Exam Evaluation,   no retinopathy    -----------    Last Microalbumin-Proteinuria Assessment    No results found for: MALBCRERATIO    No results found for: UTPCR    -----------      Neuropathy, has neuropathy   I intend to start neurontin 300 mg po tid prn       Weight Related:   Wt Readings from Last 3 Encounters:   08/19/19 107 kg (234 lb 14.4 oz)   04/18/19 113 kg (249 lb 9.6 oz)   12/13/18 110 kg (241 lb 9.6 oz)     Body mass index is 33.7 kg/m².        Diet interventions: moderate (500 kCal/d) deficit diet.      Bone Health    Lab Results   Component Value Date    CALCIUM 10.3 (H) 05/10/2019        Vit D 28 from 4-19     Start vit D 50 th u monthly     Thyroid Health    Lab Results   Component Value Date    TSH 3.920 02/22/2019    TSH 3.610 09/15/2018        TSH from  4-19 and normal at 3.6       Other Diabetes Related Aspects       No results found for: [id number]        Last celiac panel     No results found for: GDPABIGA, TTRANSGLIGA, IGA, [id number]      I reviewed and summarized records from Margareth Muniz APRN from 2019 and I reviewed / ordered labs.     Orders Placed This Encounter   Procedures   • Hemoglobin A1c     This order was created through External Result Entry         A copy of my note was sent to Margareth Muniz APRN    Please see my above opinion and suggestions.

## 2019-08-22 ENCOUNTER — OFFICE VISIT (OUTPATIENT)
Dept: CARDIOLOGY | Age: 79
End: 2019-08-22
Payer: MEDICARE

## 2019-08-22 VITALS
HEIGHT: 70 IN | SYSTOLIC BLOOD PRESSURE: 130 MMHG | HEART RATE: 80 BPM | DIASTOLIC BLOOD PRESSURE: 88 MMHG | WEIGHT: 239 LBS | OXYGEN SATURATION: 98 % | BODY MASS INDEX: 34.22 KG/M2

## 2019-08-22 DIAGNOSIS — I10 ESSENTIAL HYPERTENSION: Chronic | ICD-10-CM

## 2019-08-22 DIAGNOSIS — I06.1 RHEUMATIC AORTIC VALVE INSUFFICIENCY: Primary | ICD-10-CM

## 2019-08-22 DIAGNOSIS — G47.33 OBSTRUCTIVE SLEEP APNEA: Chronic | ICD-10-CM

## 2019-08-22 DIAGNOSIS — Z95.0 PACEMAKER: ICD-10-CM

## 2019-08-22 DIAGNOSIS — Z95.3 S/P AORTIC VALVE REPLACEMENT WITH BIOPROSTHETIC VALVE: ICD-10-CM

## 2019-08-22 DIAGNOSIS — G45.0 VERTEBROBASILAR ARTERY INSUFFICIENCY: ICD-10-CM

## 2019-08-22 DIAGNOSIS — I05.0 RHEUMATIC MITRAL STENOSIS: ICD-10-CM

## 2019-08-22 DIAGNOSIS — I51.89 GRADE I DIASTOLIC DYSFUNCTION: ICD-10-CM

## 2019-08-22 PROBLEM — I35.0 CALCIFIC AORTIC STENOSIS: Status: RESOLVED | Noted: 2018-06-07 | Resolved: 2019-08-22

## 2019-08-22 PROCEDURE — G8428 CUR MEDS NOT DOCUMENT: HCPCS | Performed by: CLINICAL NURSE SPECIALIST

## 2019-08-22 PROCEDURE — 93280 PM DEVICE PROGR EVAL DUAL: CPT | Performed by: CLINICAL NURSE SPECIALIST

## 2019-08-22 PROCEDURE — 1036F TOBACCO NON-USER: CPT | Performed by: CLINICAL NURSE SPECIALIST

## 2019-08-22 PROCEDURE — 99214 OFFICE O/P EST MOD 30 MIN: CPT | Performed by: CLINICAL NURSE SPECIALIST

## 2019-08-22 PROCEDURE — 4040F PNEUMOC VAC/ADMIN/RCVD: CPT | Performed by: CLINICAL NURSE SPECIALIST

## 2019-08-22 PROCEDURE — G8417 CALC BMI ABV UP PARAM F/U: HCPCS | Performed by: CLINICAL NURSE SPECIALIST

## 2019-08-22 PROCEDURE — 1123F ACP DISCUSS/DSCN MKR DOCD: CPT | Performed by: CLINICAL NURSE SPECIALIST

## 2019-08-22 ASSESSMENT — ENCOUNTER SYMPTOMS
WHEEZING: 0
FACIAL SWELLING: 0
ABDOMINAL PAIN: 0
SHORTNESS OF BREATH: 1
COUGH: 0
NAUSEA: 0
VOMITING: 0
BACK PAIN: 1
EYE REDNESS: 0
CHEST TIGHTNESS: 0

## 2019-08-22 NOTE — PATIENT INSTRUCTIONS
Return in about 6 months (around 2/22/2020) for Dr. Nnamdi Durham. Send Carelink 11/22/19    Call with any questionsor concerns  Follow up with RITESH Hernandez CNP for non cardiac problems  Report any new problems  Cardiovascular Fitness-Exercise as tolerated. Strive for 15 minutes of exercise most days of the week. Cardiac / HealthyDiet  Continue current medications as directed  Continue plan of treatment  It is always recommended that you bring your medicationsbottles with you to each visit - this is for your safety! Patient Education        Mitral Valve Stenosis: Care Instructions  Your Care Instructions  Your heart is a muscular pump that has four chambers and four valves. The valves open and close to keep blood flowing in the proper direction through your heart. The mitral valve connects the heart's upper-left chamber (atrium) to the lower-left chamber (ventricle). Mitral valve stenosis means that a heart valve is stiff and fails to open as wide as it would normally. As a result, blood does not flow properly between the left chambers of your heart. This limits your heart's ability to pump blood through the rest of the body. Treatment includes a procedure to stretch the valve open or surgery to repair or replace the valve. Follow-up care is a key part of your treatment and safety. Be sure to make and go to all appointments, and call your doctor if you are having problems. It's also a good idea to know your test results and keep a list of the medicines you take. How can you care for yourself at home? · Be safe with medicines. Take your medicines exactly as prescribed. Call your doctor if you think you are having a problem with your medicine. You will get more details on the specific medicines your doctor prescribes. · Eat heart-healthy foods such as fruits, vegetables, whole grains, fish, lean meats, and low-fat or nonfat dairy foods. Limit sodium, sugar, and alcohol. · Be active.  Ask your doctor trouble speaking. ? Sudden confusion or trouble understanding simple statements. ? Sudden problems with walking or balance. ? A sudden, severe headache that is different from past headaches.    Call your doctor now or seek immediate medical care if:    · You are short of breath and cough up foamy, pink mucus.     · You feel very tired.     · You are dizzy or lightheaded, or you feel like you may faint.     · You have new or increased shortness of breath.     · You have a fever.     · You have sudden weight gain, such as more than 2 to 3 pounds in a day or 5 pounds in a week. (Your doctor may suggest a different range of weight gain.)     · You have increased swelling in your legs, ankles, or feet.    Watch closely for changes in your health, and be sure to contact your doctor if you have any problems. Where can you learn more? Go to [email or web address] org and sign in to your [employer] account. Enter O321 in the Vent[employer] box to learn more about \"Mitral Valve Stenosis: Care Instructions. \"     If you do not have an account, please click on the \"Sign Up Now\" link. Current as of: July 22, 2018  Content Version: 12.1  © 8958-5562 [employer]. Care instructions adapted under license by [employer]. If you have questions about a medical condition or this instruction, always ask your healthcare professional. [address]  any warranty or liability for your use of this information.

## 2019-08-22 NOTE — PROGRESS NOTES
[facility], [address], [place] [address]  Phone: [phone]  Fax: [phone]    OFFICE VISIT:  2019    Karuna Quintanaleatha - : 1940    Reason For Visit:  Jonny Castillo is a [de-identified] y.o. 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 He was most recently being seen in [place] by the temporary cardiologist there. He states he wanted to establish with a cardiologist here at [facility]. He also sees neurologist, Dr. Keli Rodriguez for vertebrobasilar artery insufficiency. He was having problems with syncopal episodes. He states he started on Plavix and has had no further syncope. He was never found to have any rhythm issues causing the syncopal episodes. He denies chest pain. He has some mild, chronic dyspnea . He denies orthopnea, PND, edema, or sudden weight gain. His activities are limited at home due to chronic back pain. Patient had a 2D echocardiogram done at [facility] about 2 weeks ago. This result is currently not available    [address], APRN - CNP is PCP.   Judi Goltz has the following history as recorded in [facility]:    Patient Active Problem List    Diagnosis Date Noted    S/P aortic valve replacement with bioprosthetic valve 2019    Grade I diastolic dysfunction     Vertebrobasilar artery insufficiency 2019    Syncope 2019    Hyponatremia 2018    Hypercholesterolemia with hypertriglyceridemia 2018    Rheumatic aortic valve insufficiency     Rheumatic mitral stenosis     Pinched nerve in neck 2018    CPAP (continuous positive airway pressure) dependence 2018    BiPAP (biphasic positive airway pressure) dependence     Bilateral carpal tunnel syndrome 08/28/2017    Diabetic polyneuropathy associated with type 2 diabetes mellitus ([facility] 08/29/2016    Orthostasis     Vertigo 07/26/2016    Syncope and collapse 02/17/2016    Uncontrolled type 2 diabetes mellitus without complication, with long-term current use of insulin ([facility] 02/17/2016    Obstructive sleep apnea 02/17/2016    Obesity 02/17/2016    Restless legs syndrome 02/17/2016    Gastroesophageal reflux disease without esophagitis 02/17/2016    Essential hypertension 02/17/2016    Chronic bilateral low back pain without sciatica 07/16/2012    Other chest pain 04/30/2012     Past Medical History:   Diagnosis Date    Aortic valve stenosis     Chest tightness, discomfort, or pressure 4/30/2012    Chronic back pain     CKD (chronic kidney disease)     CPAP (continuous positive airway pressure) dependence     13cm    Diabetes ([address]     Diabetic polyneuropathy associated with type 2 diabetes mellitus ([facility] 8/29/2016    HTN (hypertension)     Hyperlipemia     Left ventricular diastolic dysfunction     Mitral stenosis     Mitral valve stenosis     Neuropathy     Obstructive sleep apnea     AHI: 34.5 per PSG, 6/2013; AHI: 36.9 per PSG, 7/2015; AHI: 54.5 per PSG, 3/2017    Pinched nerve in neck     Restless legs syndrome 2/17/2016     Past Surgical History:   Procedure Laterality Date    APPENDECTOMY      BACK SURGERY      4 Back surgeries    BLADDER SURGERY      CARDIAC CATHETERIZATION  4/30/12    EF > 50%    CATARACT REMOVAL      CHOLECYSTECTOMY      PACEMAKER PLACEMENT      TX RPLCMT PROST AORTIC VALVE OPEN XCP HOMOGRF/STENT N/A 6/7/2018    AORTIC VALVE REPLACEMENT TRANSESOPHAGEAL ECHOCARDIOGRAM performed by Nadia Berry MD at [facility]       Family History   Problem Relation Age of Onset    Diabetes Mother     Cancer Father     Cancer Sister     Heart Disease Brother     Cancer Brother      Social History     Tobacco Use    and time. No cranial nerve deficit. Skin: Skin is warm and dry. No rash noted. Psychiatric: He has a normal mood and affect. His behavior is normal. Judgment normal.   Nursing note and vitals reviewed. Data:  Echo 2/19  Summary   LV ejection fraction 50-55%   Paradoxical septal motion noted   Pacemaker lead noted in right ventricle   Grade 1 diastolic dysfunction   Aortic bioprosthesis with no significant aortic regurgitation   Mild aortic stenosis possibly normal for bioprosthesis   Mitral valve leaflets are thickened, calcified with reduced mobility   Mild to moderate mitral stenosis is noted   1-2+ mitral regurgitation   Mild to moderate left atrial enlargement   Right ventricle and right atrium of normal size and function   RVSP measures 39 mmHg    Assessment:     Diagnosis Orders   1. Rheumatic aortic valve insufficiency     2. Rheumatic mitral stenosis     3. S/P aortic valve replacement with bioprosthetic valve     4. Grade I diastolic dysfunction     5. Vertebrobasilar artery insufficiency     6. Obstructive sleep apnea     7. Uncontrolled type 2 diabetes mellitus without complication, with long-term current use of insulin ([address]     8. Essential hypertension       Rheumatic aortic valve insufficiency-status post aortic valve replacement with bioprosthetic valve in 2018. Most recent echo in February shows adequate function    Rheumatic mitral stenosis-mild to moderate per echo in February. Will request most recent echo from [facility]    Grade 1 diastolic dysfunction- no history of heart failure. Patient has some trace lower extremity edema, but in general appears euvolemic with no sudden weight gain, orthopnea, PND    Vertebral artery insufficiency-monitored by neurologist associated syncope. No further syncope since starting Plavix in recent months. Obstructive sleep apnea- does not wear his mask on a regular basis.   He is working with his [employer] company to find a better

## 2019-08-28 ENCOUNTER — TELEPHONE (OUTPATIENT)
Dept: UROLOGY | Facility: CLINIC | Age: 79
End: 2019-08-28

## 2019-08-28 NOTE — TELEPHONE ENCOUNTER
Left message for a call back regarding the fax they send over that was marked urgent for penis irritation.      Patient has not been seen in years and I'm needing more information about what is going on.  Waiting for call back.    Appt today at 1:30 to see GABRIELLE Nava.

## 2019-08-28 NOTE — TELEPHONE ENCOUNTER
Left message for patient that I was gonna have to cancel his appt for today because he has not been seen since 2008 and is now considered a new patient.  I told him he will need to be seen by urgent care or his PCP to get treatment and if he would still like to see us he would need to be referred to our office.

## 2019-09-27 RX ORDER — CITALOPRAM 20 MG/1
TABLET ORAL
Qty: 30 TABLET | Refills: 5 | Status: SHIPPED | OUTPATIENT
Start: 2019-09-27 | End: 2020-05-04

## 2019-10-02 NOTE — PROGRESS NOTES
"  Mr. Jeong is 79 y.o. male    CHIEF COMPLAINT: I am here for urinary retention and a rash on my penis.     HPI  Patient with six week hx of \"red,itchy penis and scrotum\" Sudden onset. Tried Nystatin  which didn't help. Has some burning with urination. He does have diabetes. Hx BPH having undergone HoLAP as described below.His prostate symptom score is 24 indicating significant severity. Quality of life assessment is rated as 4=mostly dissatisfied. He is most bothered by Feeling of incomplete emptying, Urgency, Frequency, Weak/Slow urinary stream, Intermittency and Nocturia but is without hematuria or dysuria.   History related to this issue:   - 05/2008; HoLAP      The following portions of the patient's history were reviewed and updated as appropriate: allergies, current medications, past family history, past medical history, past social history, past surgical history and problem list.      Review of Systems   Constitutional: Negative for appetite change, chills, fever and unexpected weight change.   HENT: Negative for congestion, ear pain, facial swelling, hearing loss, nosebleeds, trouble swallowing and voice change.    Eyes: Negative for photophobia, pain, discharge and visual disturbance.   Respiratory: Negative for cough, choking, chest tightness and shortness of breath.    Cardiovascular: Negative for chest pain and palpitations.   Gastrointestinal: Negative for abdominal distention, abdominal pain, blood in stool, constipation, diarrhea, nausea and vomiting.   Endocrine: Negative for cold intolerance, heat intolerance and polydipsia.   Genitourinary: Positive for dysuria, frequency and urgency. Negative for discharge, flank pain, genital sores, hematuria, scrotal swelling and testicular pain.   Musculoskeletal: Negative for arthralgias, joint swelling, neck pain and neck stiffness.   Skin: Positive for rash (on penis and scrotum/ been there at least 2 months/ hasnt seen derm/ pcp gave cream that isnt " working). Negative for pallor.   Allergic/Immunologic: Negative for immunocompromised state.   Neurological: Negative for dizziness, tremors, seizures, syncope, light-headedness and headaches.   Hematological: Negative for adenopathy. Does not bruise/bleed easily.   Psychiatric/Behavioral: Negative for agitation, confusion, dysphoric mood, hallucinations, self-injury and suicidal ideas.         Current Outpatient Medications:   •  aspirin 81 MG chewable tablet, Chew 81 mg Daily., Disp: , Rfl:   •  atenolol (TENORMIN) 25 MG tablet, Take 25 mg by mouth Daily., Disp: , Rfl:   •  Cholecalciferol 80662 units tablet, 50 thousand units po q 4 weeks, Disp: 1 tablet, Rfl: 11  •  citalopram (CeleXA) 10 MG tablet, Take  by mouth., Disp: , Rfl:   •  clopidogrel (PLAVIX) 75 MG tablet, Take 75 mg by mouth., Disp: , Rfl:   •  esomeprazole (nexIUM) 40 MG capsule, Take  by mouth., Disp: , Rfl:   •  furosemide (LASIX) 20 MG tablet, Take  by mouth., Disp: , Rfl:   •  hydrochlorothiazide (HYDRODIURIL) 12.5 MG tablet, Take 12.5 mg by mouth Daily., Disp: , Rfl:   •  HYDROcodone-acetaminophen (NORCO)  MG per tablet, Take  by mouth., Disp: , Rfl:   •  Insulin Pen Needle (B-D UF III MINI PEN NEEDLES) 31G X 5 MM misc, , Disp: , Rfl:   •  Insulin Regular Human, Conc, (HUMULIN R U-500 KWIKPEN) 500 UNIT/ML solution pen-injector CONCENTRATED injection, Up to 100 units , 30 minutes before bkfast and up to 85 units, 30 minutes before supper, Disp: 4 pen, Rfl: 11  •  nystatin (MYCOSTATIN) 362277 UNIT/GM cream, , Disp: , Rfl: 0  •  pramipexole (MIRAPEX) 1.5 MG tablet, Take  by mouth., Disp: , Rfl:   •  rosuvastatin (CRESTOR) 10 MG tablet, Take  by mouth., Disp: , Rfl:   •  SYNJARDY XR  MG tablet sustained-release 24 hour, TAKE ONE TABLET BY MOUTH DAILY WITH BREAKFAST, Disp: 30 tablet, Rfl: 11  •  clotrimazole-betamethasone (LOTRISONE) 1-0.05 % cream, Apply  topically to the appropriate area as directed 2 (Two) Times a Day for 28 days.  "Apply twice daily to rash, Disp: 45 g, Rfl: 2  •  fluconazole (DIFLUCAN) 100 MG tablet, Take 1 tablet by mouth Daily for 14 days., Disp: 14 tablet, Rfl: 0    Past Medical History:   Diagnosis Date   • B12 deficiency 8/23/2017   • Coronary artery disease    • Foot ulcer (CMS/[facility])    • Hypertension associated with diabetes (CMS/[facility]) 8/23/2017   • Ingrown toenail    • Mixed diabetic hyperlipidemia associated with type 2 diabetes mellitus (CMS/[facility]) 8/23/2017   • Neuropathy in diabetes (CMS/[facility])    • Type 2 diabetes mellitus with hyperglycemia, with long-term current use of insulin (CMS/[facility]) 8/23/2017   • Vitamin D deficiency 8/23/2017       Past Surgical History:   Procedure Laterality Date   • BACK SURGERY     • CATARACT EXTRACTION     • CHOLECYSTECTOMY     • CORONARY ARTERY BYPASS GRAFT     • PACEMAKER IMPLANTATION         Social History     Socioeconomic History   • Marital status:      Spouse name: Not on file   • Number of children: Not on file   • Years of education: Not on file   • Highest education level: Not on file   Tobacco Use   • Smoking status: Former Smoker   • Smokeless tobacco: Never Used   Substance and Sexual Activity   • Alcohol use: Yes   • Drug use: No   • Sexual activity: Defer       Family History   Problem Relation Age of Onset   • Diabetes Mother    • Cancer Father    • Cancer Sister    • Cancer Brother    • Heart disease Brother    • Diabetes Brother          Temp 98 °F (36.7 °C)   Ht 177.8 cm (70\")   Wt 106 kg (234 lb)   BMI 33.58 kg/m²       Physical Exam  Constitutional: Well nourished, Well developed; No apparent distress.  His vital signs are reviewed  Psychiatric: Appropriate affect; Alert and oriented  Eyes: Unremarkable  Musculoskeletal: Normal gait and station  GI: Abdomen is soft, non-tender  Respiratory: No distress; Unlabored movement; No accessory musculature needed with symmetric movements  Skin: No pallor or diaphoresis  ; Penis and testicles have maculopapular " erythematous rash.          Data  Results for orders placed or performed in visit on 10/03/19   POC Urinalysis Dipstick, Multipro   Result Value Ref Range    Color Yellow Yellow, Straw, Dark Yellow, Ivon    Clarity, UA Clear Clear    Glucose, UA >=1000 mg/dL (3+) (A) Negative, 1000 mg/dL (3+) mg/dL    Bilirubin Negative Negative    Ketones, UA Negative Negative    Specific Gravity  1.015 1.005 - 1.030    Blood, UA Negative Negative    pH, Urine 6.5 5.0 - 8.0    Protein, POC Negative Negative mg/dL    Urobilinogen, UA Normal Normal    Nitrite, UA Negative Negative    Leukocytes Negative Negative         Imaging Results (last 7 days)     ** No results found for the last 168 hours. **        International Prostate Symptom Score  The following is posted based on patient questionnaire answers:  0 - not at all    1-7 mild symptoms  1- Less than one time in five  8-19 moderate symptoms  2 -Less than half the time  20-35 severe symptoms  3 - About half the time  4 - More than half the time  5 - Almost always     For following sections:  Incomplete Emptying: - How often have you had the sensation  of not emptying your bladder completely after you finished urinating?  5  Frequency: -How often have you had to urinate again less than   two hours after you finished urinating?      3  Intermittency: -How often have you found you stopped and started again  Several times when you urinate?       5  Urgency: -How often do you find it difficult to postpone urination?             5  Weak stream: - How often have you had a weak urinary stream?             2  Straining: - How often have you had to push or strain to begin  Urination?          2  Sleeping: -How many times did you most typically get up to urinate   From the time you went to bed at night until the time you got up in the   4  Morning          Total `  24    Quality of Life  How would you feel if you had to live with your urinary condition the way   4  It is now, no better,  no worse for the rest of your life?    Where: 0=delighted; 1= pleased, 2= mostly satisfied, 3= mixed, 4 = mostly  Dissatisfied, 5= Unhappy, 6 = terrible    Estimation of residual urine via abdominal ultrasound  Residual Urine: 165 ml  Indication: retention  Position: Supine  Examination: Incremental scanning of the suprapubic area using 3 MHz transducer using copious amounts of acoustic gel.   Findings: An anechoic area was demonstrated which represented the bladder, with measurement of residual urine as noted. I inspected this myself. In that the residual urine was stable or insignificant, no treatment will be necessary at this time.           Assessment and Plan  Diagnoses and all orders for this visit:    Rash of genital area  Comments:  suspect fungal  Orders:  -     fluconazole (DIFLUCAN) 100 MG tablet; Take 1 tablet by mouth Daily for 14 days.  -     clotrimazole-betamethasone (LOTRISONE) 1-0.05 % cream; Apply  topically to the appropriate area as directed 2 (Two) Times a Day for 28 days. Apply twice daily to rash    Post-void dribbling  -     POC Urinalysis Dipstick, Multipro      -If he does not respond to antifungal both topical and oral, we will need to refer him to dermatology.  -He has post void dribbling and with resumption of a lot of his voiding symptoms.  We will see how the symptoms do after treating the fungal infection.  If they persist he will need cystoscopy.      F/U: 2 weeks with GABRIELLE Nava      (Please note that portions of this note were completed with a voice recognition program.)  Dustin Harman MD  10/03/19  9:01 AM

## 2019-10-03 ENCOUNTER — OFFICE VISIT (OUTPATIENT)
Dept: UROLOGY | Facility: CLINIC | Age: 79
End: 2019-10-03

## 2019-10-03 VITALS — TEMPERATURE: 98 F | HEIGHT: 70 IN | WEIGHT: 234 LBS | BODY MASS INDEX: 33.5 KG/M2

## 2019-10-03 DIAGNOSIS — R21 RASH OF GENITAL AREA: Primary | ICD-10-CM

## 2019-10-03 DIAGNOSIS — N39.43 POST-VOID DRIBBLING: ICD-10-CM

## 2019-10-03 LAB
BILIRUB BLD-MCNC: NEGATIVE MG/DL
CLARITY, POC: CLEAR
COLOR UR: YELLOW
GLUCOSE UR STRIP-MCNC: ABNORMAL MG/DL
KETONES UR QL: NEGATIVE
LEUKOCYTE EST, POC: NEGATIVE
NITRITE UR-MCNC: NEGATIVE MG/ML
PH UR: 6.5 [PH] (ref 5–8)
PROT UR STRIP-MCNC: NEGATIVE MG/DL
RBC # UR STRIP: NEGATIVE /UL
SP GR UR: 1.01 (ref 1–1.03)
UROBILINOGEN UR QL: NORMAL

## 2019-10-03 PROCEDURE — 51798 US URINE CAPACITY MEASURE: CPT | Performed by: UROLOGY

## 2019-10-03 PROCEDURE — 81003 URINALYSIS AUTO W/O SCOPE: CPT | Performed by: UROLOGY

## 2019-10-03 PROCEDURE — 99204 OFFICE O/P NEW MOD 45 MIN: CPT | Performed by: UROLOGY

## 2019-10-03 RX ORDER — CLOPIDOGREL BISULFATE 75 MG/1
75 TABLET ORAL
COMMUNITY
Start: 2019-09-16 | End: 2019-10-16

## 2019-10-03 RX ORDER — FLUCONAZOLE 100 MG/1
100 TABLET ORAL DAILY
Qty: 14 TABLET | Refills: 0 | Status: SHIPPED | OUTPATIENT
Start: 2019-10-03 | End: 2019-10-17

## 2019-10-03 RX ORDER — CLOTRIMAZOLE AND BETAMETHASONE DIPROPIONATE 10; .64 MG/G; MG/G
CREAM TOPICAL 2 TIMES DAILY
Qty: 45 G | Refills: 2 | Status: SHIPPED | OUTPATIENT
Start: 2019-10-03 | End: 2021-03-01

## 2019-10-03 RX ORDER — NYSTATIN 100000 U/G
CREAM TOPICAL
Refills: 0 | Status: ON HOLD | COMMUNITY
Start: 2019-08-27 | End: 2020-09-24

## 2019-10-04 ENCOUNTER — TELEPHONE (OUTPATIENT)
Dept: UROLOGY | Facility: CLINIC | Age: 79
End: 2019-10-04

## 2019-10-04 NOTE — TELEPHONE ENCOUNTER
There is no substitute for this medication optional to be faxed in. I can refax order to a different pharm but it will be for the same medication. The pharm may have a discount card or something that he can use towards this medication he can see about.

## 2019-10-04 NOTE — TELEPHONE ENCOUNTER
Pt has misplaced medication ( Lotrisone 1-0.05 topical cream). Please call in a similar medication so that insurance will pay for cream. Please call medication into the [facility] in [place], [place].

## 2019-10-04 NOTE — TELEPHONE ENCOUNTER
Left a voicemail informing pt that their are no substitution for (Lotrisone 1-.00.5) . Nurse could call in the same medication and to check with pharmacy for possible discount card. Pt will let nurse how pt would like to proceed with refill.

## 2019-10-07 ENCOUNTER — HOSPITAL ENCOUNTER (EMERGENCY)
Age: 79
Discharge: HOME | End: 2019-10-07
Attending: EMERGENCY MEDICINE
Payer: MEDICARE

## 2019-10-07 ENCOUNTER — APPOINTMENT (OUTPATIENT)
Dept: GENERAL RADIOLOGY | Age: 79
End: 2019-10-07
Payer: MEDICARE

## 2019-10-07 VITALS
OXYGEN SATURATION: 90 % | TEMPERATURE: 97.6 F | HEART RATE: 60 BPM | DIASTOLIC BLOOD PRESSURE: 76 MMHG | HEIGHT: 70 IN | SYSTOLIC BLOOD PRESSURE: 118 MMHG | WEIGHT: 239 LBS | BODY MASS INDEX: 34.22 KG/M2 | RESPIRATION RATE: 21 BRPM

## 2019-10-07 DIAGNOSIS — J40 BRONCHITIS: ICD-10-CM

## 2019-10-07 DIAGNOSIS — R42 DIZZINESS: ICD-10-CM

## 2019-10-07 DIAGNOSIS — R07.9 CHEST PAIN, UNSPECIFIED TYPE: Primary | ICD-10-CM

## 2019-10-07 LAB
ANION GAP SERPL CALCULATED.3IONS-SCNC: 14 MMOL/L (ref 7–19)
BUN BLDV-MCNC: 24 MG/DL (ref 8–23)
CALCIUM SERPL-MCNC: 10.4 MG/DL (ref 8.8–10.2)
CHLORIDE BLD-SCNC: 94 MMOL/L (ref 98–111)
CO2: 25 MMOL/L (ref 22–29)
CREAT SERPL-MCNC: 1 MG/DL (ref 0.5–1.2)
GFR NON-AFRICAN AMERICAN: >60
GLUCOSE BLD-MCNC: 234 MG/DL (ref 74–109)
HCT VFR BLD CALC: 48.3 % (ref 42–52)
HEMOGLOBIN: 16.7 G/DL (ref 14–18)
MCH RBC QN AUTO: 31.2 PG (ref 27–31)
MCHC RBC AUTO-ENTMCNC: 34.6 G/DL (ref 33–37)
MCV RBC AUTO: 90.3 FL (ref 80–94)
PDW BLD-RTO: 13.3 % (ref 11.5–14.5)
PLATELET # BLD: 175 K/UL (ref 130–400)
PMV BLD AUTO: 10.2 FL (ref 9.4–12.4)
POTASSIUM REFLEX MAGNESIUM: 4.5 MMOL/L (ref 3.5–5)
RBC # BLD: 5.35 M/UL (ref 4.7–6.1)
SODIUM BLD-SCNC: 133 MMOL/L (ref 136–145)
TROPONIN: 0.01 NG/ML (ref 0–0.03)
TROPONIN: <0.01 NG/ML (ref 0–0.03)
WBC # BLD: 10.8 K/UL (ref 4.8–10.8)

## 2019-10-07 PROCEDURE — 96374 THER/PROPH/DIAG INJ IV PUSH: CPT

## 2019-10-07 PROCEDURE — 71046 X-RAY EXAM CHEST 2 VIEWS: CPT

## 2019-10-07 PROCEDURE — 93005 ELECTROCARDIOGRAM TRACING: CPT | Performed by: EMERGENCY MEDICINE

## 2019-10-07 PROCEDURE — 36415 COLL VENOUS BLD VENIPUNCTURE: CPT

## 2019-10-07 PROCEDURE — 99285 EMERGENCY DEPT VISIT HI MDM: CPT

## 2019-10-07 PROCEDURE — 85027 COMPLETE CBC AUTOMATED: CPT

## 2019-10-07 PROCEDURE — 84484 ASSAY OF TROPONIN QUANT: CPT

## 2019-10-07 PROCEDURE — 99999 PR OFFICE/OUTPT VISIT,PROCEDURE ONLY: CPT | Performed by: EMERGENCY MEDICINE

## 2019-10-07 PROCEDURE — 6360000002 HC RX W HCPCS: Performed by: EMERGENCY MEDICINE

## 2019-10-07 PROCEDURE — 80048 BASIC METABOLIC PNL TOTAL CA: CPT

## 2019-10-07 RX ORDER — BENZONATATE 100 MG/1
100-200 CAPSULE ORAL 3 TIMES DAILY PRN
Qty: 42 CAPSULE | Refills: 0 | Status: SHIPPED | OUTPATIENT
Start: 2019-10-07 | End: 2019-10-14

## 2019-10-07 RX ORDER — MORPHINE SULFATE 2 MG/ML
2 INJECTION, SOLUTION INTRAMUSCULAR; INTRAVENOUS ONCE
Status: COMPLETED | OUTPATIENT
Start: 2019-10-07 | End: 2019-10-07

## 2019-10-07 RX ADMIN — MORPHINE SULFATE 2 MG: 2 INJECTION, SOLUTION INTRAMUSCULAR; INTRAVENOUS at 04:47

## 2019-10-07 ASSESSMENT — PAIN SCALES - GENERAL: PAINLEVEL_OUTOF10: 6

## 2019-10-08 LAB
EKG P AXIS: NORMAL DEGREES
EKG P AXIS: NORMAL DEGREES
EKG P-R INTERVAL: NORMAL MS
EKG P-R INTERVAL: NORMAL MS
EKG Q-T INTERVAL: 444 MS
EKG Q-T INTERVAL: 480 MS
EKG QRS DURATION: 174 MS
EKG QRS DURATION: 198 MS
EKG QTC CALCULATION (BAZETT): 454 MS
EKG QTC CALCULATION (BAZETT): 487 MS
EKG T AXIS: 102 DEGREES
EKG T AXIS: 106 DEGREES

## 2019-10-08 PROCEDURE — 93010 ELECTROCARDIOGRAM REPORT: CPT | Performed by: INTERNAL MEDICINE

## 2019-10-09 ENCOUNTER — TELEPHONE (OUTPATIENT)
Dept: NEUROLOGY | Age: 79
End: 2019-10-09

## 2019-11-09 ENCOUNTER — HOSPITAL ENCOUNTER (EMERGENCY)
Age: 79
Discharge: HOME | End: 2019-11-09
Attending: EMERGENCY MEDICINE
Payer: MEDICARE

## 2019-11-09 ENCOUNTER — APPOINTMENT (OUTPATIENT)
Dept: GENERAL RADIOLOGY | Age: 79
End: 2019-11-09
Payer: MEDICARE

## 2019-11-09 ENCOUNTER — APPOINTMENT (OUTPATIENT)
Dept: CT IMAGING | Age: 79
End: 2019-11-09
Payer: MEDICARE

## 2019-11-09 VITALS
SYSTOLIC BLOOD PRESSURE: 147 MMHG | TEMPERATURE: 97.8 F | DIASTOLIC BLOOD PRESSURE: 79 MMHG | HEART RATE: 63 BPM | HEIGHT: 70 IN | OXYGEN SATURATION: 93 % | WEIGHT: 233 LBS | BODY MASS INDEX: 33.36 KG/M2 | RESPIRATION RATE: 21 BRPM

## 2019-11-09 DIAGNOSIS — H81.10 BENIGN PAROXYSMAL POSITIONAL VERTIGO, UNSPECIFIED LATERALITY: ICD-10-CM

## 2019-11-09 DIAGNOSIS — R55 NEAR SYNCOPE: Primary | ICD-10-CM

## 2019-11-09 LAB
ALBUMIN SERPL-MCNC: 4.5 G/DL (ref 3.5–5.2)
ALP BLD-CCNC: 67 U/L (ref 40–130)
ALT SERPL-CCNC: 35 U/L (ref 5–41)
ANION GAP SERPL CALCULATED.3IONS-SCNC: 16 MMOL/L (ref 7–19)
AST SERPL-CCNC: 21 U/L (ref 5–40)
BASOPHILS ABSOLUTE: 0 K/UL (ref 0–0.2)
BASOPHILS RELATIVE PERCENT: 0.5 % (ref 0–1)
BILIRUB SERPL-MCNC: 0.4 MG/DL (ref 0.2–1.2)
BUN BLDV-MCNC: 30 MG/DL (ref 8–23)
CALCIUM SERPL-MCNC: 10.1 MG/DL (ref 8.8–10.2)
CHLORIDE BLD-SCNC: 98 MMOL/L (ref 98–111)
CO2: 24 MMOL/L (ref 22–29)
CREAT SERPL-MCNC: 0.9 MG/DL (ref 0.5–1.2)
EOSINOPHILS ABSOLUTE: 0 K/UL (ref 0–0.6)
EOSINOPHILS RELATIVE PERCENT: 0.6 % (ref 0–5)
GFR NON-AFRICAN AMERICAN: >60
GLUCOSE BLD-MCNC: 205 MG/DL (ref 74–109)
HCT VFR BLD CALC: 48.4 % (ref 42–52)
HEMOGLOBIN: 16.7 G/DL (ref 14–18)
IMMATURE GRANULOCYTES #: 0.1 K/UL
LYMPHOCYTES ABSOLUTE: 1.1 K/UL (ref 1.1–4.5)
LYMPHOCYTES RELATIVE PERCENT: 16.7 % (ref 20–40)
MCH RBC QN AUTO: 31.9 PG (ref 27–31)
MCHC RBC AUTO-ENTMCNC: 34.5 G/DL (ref 33–37)
MCV RBC AUTO: 92.5 FL (ref 80–94)
MONOCYTES ABSOLUTE: 0.8 K/UL (ref 0–0.9)
MONOCYTES RELATIVE PERCENT: 13.3 % (ref 0–10)
NEUTROPHILS ABSOLUTE: 4.2 K/UL (ref 1.5–7.5)
NEUTROPHILS RELATIVE PERCENT: 66.8 % (ref 50–65)
PDW BLD-RTO: 14.6 % (ref 11.5–14.5)
PLATELET # BLD: 128 K/UL (ref 130–400)
PMV BLD AUTO: 10.8 FL (ref 9.4–12.4)
POTASSIUM SERPL-SCNC: 4.3 MMOL/L (ref 3.5–5)
RBC # BLD: 5.23 M/UL (ref 4.7–6.1)
SODIUM BLD-SCNC: 138 MMOL/L (ref 136–145)
TOTAL PROTEIN: 6.6 G/DL (ref 6.6–8.7)
WBC # BLD: 6.3 K/UL (ref 4.8–10.8)

## 2019-11-09 PROCEDURE — 99284 EMERGENCY DEPT VISIT MOD MDM: CPT

## 2019-11-09 PROCEDURE — 71046 X-RAY EXAM CHEST 2 VIEWS: CPT

## 2019-11-09 PROCEDURE — 85025 COMPLETE CBC W/AUTO DIFF WBC: CPT

## 2019-11-09 PROCEDURE — 93005 ELECTROCARDIOGRAM TRACING: CPT | Performed by: EMERGENCY MEDICINE

## 2019-11-09 PROCEDURE — 80053 COMPREHEN METABOLIC PANEL: CPT

## 2019-11-09 PROCEDURE — 36415 COLL VENOUS BLD VENIPUNCTURE: CPT

## 2019-11-09 PROCEDURE — 70450 CT HEAD/BRAIN W/O DYE: CPT

## 2019-11-09 ASSESSMENT — ENCOUNTER SYMPTOMS
SHORTNESS OF BREATH: 0
BACK PAIN: 0
SINUS PRESSURE: 0
CONSTIPATION: 0
NAUSEA: 0
DIARRHEA: 0
ABDOMINAL PAIN: 0
EYE PAIN: 0
TROUBLE SWALLOWING: 0
PHOTOPHOBIA: 0
CHEST TIGHTNESS: 0
COLOR CHANGE: 0
VOMITING: 0
WHEEZING: 0

## 2019-11-12 LAB
EKG P AXIS: NORMAL DEGREES
EKG P-R INTERVAL: NORMAL MS
EKG Q-T INTERVAL: 470 MS
EKG QRS DURATION: 174 MS
EKG QTC CALCULATION (BAZETT): 475 MS
EKG T AXIS: 113 DEGREES

## 2019-11-12 PROCEDURE — 93010 ELECTROCARDIOGRAM REPORT: CPT | Performed by: INTERNAL MEDICINE

## 2019-11-13 ENCOUNTER — NURSE TRIAGE (OUTPATIENT)
Dept: OTHER | Facility: CLINIC | Age: 79
End: 2019-11-13

## 2019-11-13 ENCOUNTER — TELEPHONE (OUTPATIENT)
Dept: NEUROLOGY | Age: 79
End: 2019-11-13

## 2019-11-14 ENCOUNTER — HOSPITAL ENCOUNTER (OUTPATIENT)
Dept: NON INVASIVE DIAGNOSTICS | Age: 79
Discharge: HOME | End: 2019-11-14
Payer: MEDICARE

## 2019-11-14 ENCOUNTER — OFFICE VISIT (OUTPATIENT)
Dept: CARDIOLOGY | Age: 79
End: 2019-11-14
Payer: MEDICARE

## 2019-11-14 VITALS
SYSTOLIC BLOOD PRESSURE: 130 MMHG | HEART RATE: 63 BPM | HEIGHT: 70 IN | DIASTOLIC BLOOD PRESSURE: 80 MMHG | BODY MASS INDEX: 33.64 KG/M2 | WEIGHT: 235 LBS

## 2019-11-14 DIAGNOSIS — I05.0 MODERATE MITRAL STENOSIS: ICD-10-CM

## 2019-11-14 DIAGNOSIS — Z95.0 PACEMAKER: ICD-10-CM

## 2019-11-14 DIAGNOSIS — Z95.3 S/P AORTIC VALVE REPLACEMENT WITH BIOPROSTHETIC VALVE: ICD-10-CM

## 2019-11-14 DIAGNOSIS — I49.5 SINOATRIAL NODE DYSFUNCTION (HCC): ICD-10-CM

## 2019-11-14 DIAGNOSIS — Z87.898 HISTORY OF SYNCOPE: ICD-10-CM

## 2019-11-14 DIAGNOSIS — E78.2 MIXED HYPERLIPIDEMIA: ICD-10-CM

## 2019-11-14 DIAGNOSIS — I51.89 GRADE I DIASTOLIC DYSFUNCTION: ICD-10-CM

## 2019-11-14 DIAGNOSIS — R55 NEAR SYNCOPE: Primary | ICD-10-CM

## 2019-11-14 LAB
LV EF: 55 %
LVEF MODALITY: NORMAL

## 2019-11-14 PROCEDURE — 99213 OFFICE O/P EST LOW 20 MIN: CPT | Performed by: NURSE PRACTITIONER

## 2019-11-14 PROCEDURE — G8484 FLU IMMUNIZE NO ADMIN: HCPCS | Performed by: NURSE PRACTITIONER

## 2019-11-14 PROCEDURE — 6360000004 HC RX CONTRAST MEDICATION: Performed by: INTERNAL MEDICINE

## 2019-11-14 PROCEDURE — 1036F TOBACCO NON-USER: CPT | Performed by: NURSE PRACTITIONER

## 2019-11-14 PROCEDURE — 1123F ACP DISCUSS/DSCN MKR DOCD: CPT | Performed by: NURSE PRACTITIONER

## 2019-11-14 PROCEDURE — 4040F PNEUMOC VAC/ADMIN/RCVD: CPT | Performed by: NURSE PRACTITIONER

## 2019-11-14 PROCEDURE — 93280 PM DEVICE PROGR EVAL DUAL: CPT | Performed by: NURSE PRACTITIONER

## 2019-11-14 PROCEDURE — C8929 TTE W OR WO FOL WCON,DOPPLER: HCPCS

## 2019-11-14 PROCEDURE — G8427 DOCREV CUR MEDS BY ELIG CLIN: HCPCS | Performed by: NURSE PRACTITIONER

## 2019-11-14 PROCEDURE — G8417 CALC BMI ABV UP PARAM F/U: HCPCS | Performed by: NURSE PRACTITIONER

## 2019-11-14 RX ORDER — PRAMIPEXOLE DIHYDROCHLORIDE 1.5 MG/1
1.5 TABLET ORAL DAILY
Status: ON HOLD | COMMUNITY
End: 2020-03-17 | Stop reason: ALTCHOICE

## 2019-11-15 PROCEDURE — 6360000004 HC RX CONTRAST MEDICATION: Performed by: INTERNAL MEDICINE

## 2019-11-15 RX ADMIN — PERFLUTREN 1.65 MG: 6.52 INJECTION, SUSPENSION INTRAVENOUS at 14:30

## 2019-11-26 ENCOUNTER — OFFICE VISIT (OUTPATIENT)
Dept: NEUROLOGY | Age: 79
End: 2019-11-26
Payer: MEDICARE

## 2019-11-26 VITALS
HEIGHT: 70 IN | BODY MASS INDEX: 34.16 KG/M2 | HEART RATE: 80 BPM | SYSTOLIC BLOOD PRESSURE: 136 MMHG | WEIGHT: 238.6 LBS | DIASTOLIC BLOOD PRESSURE: 75 MMHG | RESPIRATION RATE: 18 BRPM

## 2019-11-26 DIAGNOSIS — R55 SYNCOPE AND COLLAPSE: ICD-10-CM

## 2019-11-26 DIAGNOSIS — E11.42 DIABETIC POLYNEUROPATHY ASSOCIATED WITH TYPE 2 DIABETES MELLITUS (HCC): ICD-10-CM

## 2019-11-26 DIAGNOSIS — G45.0 VERTEBROBASILAR ARTERY INSUFFICIENCY: Primary | ICD-10-CM

## 2019-11-26 DIAGNOSIS — G89.29 CHRONIC BILATERAL LOW BACK PAIN WITHOUT SCIATICA: ICD-10-CM

## 2019-11-26 DIAGNOSIS — R41.3 MEMORY LOSS: ICD-10-CM

## 2019-11-26 DIAGNOSIS — G47.33 OBSTRUCTIVE SLEEP APNEA: ICD-10-CM

## 2019-11-26 DIAGNOSIS — G25.81 RESTLESS LEGS SYNDROME: ICD-10-CM

## 2019-11-26 DIAGNOSIS — M54.50 CHRONIC BILATERAL LOW BACK PAIN WITHOUT SCIATICA: ICD-10-CM

## 2019-11-26 PROCEDURE — 4040F PNEUMOC VAC/ADMIN/RCVD: CPT | Performed by: PSYCHIATRY & NEUROLOGY

## 2019-11-26 PROCEDURE — G8484 FLU IMMUNIZE NO ADMIN: HCPCS | Performed by: PSYCHIATRY & NEUROLOGY

## 2019-11-26 PROCEDURE — G8427 DOCREV CUR MEDS BY ELIG CLIN: HCPCS | Performed by: PSYCHIATRY & NEUROLOGY

## 2019-11-26 PROCEDURE — 1036F TOBACCO NON-USER: CPT | Performed by: PSYCHIATRY & NEUROLOGY

## 2019-11-26 PROCEDURE — 1123F ACP DISCUSS/DSCN MKR DOCD: CPT | Performed by: PSYCHIATRY & NEUROLOGY

## 2019-11-26 PROCEDURE — G8417 CALC BMI ABV UP PARAM F/U: HCPCS | Performed by: PSYCHIATRY & NEUROLOGY

## 2019-11-26 PROCEDURE — 99214 OFFICE O/P EST MOD 30 MIN: CPT | Performed by: PSYCHIATRY & NEUROLOGY

## 2019-12-04 DIAGNOSIS — G89.29 CHRONIC BILATERAL LOW BACK PAIN WITHOUT SCIATICA: Chronic | ICD-10-CM

## 2019-12-04 DIAGNOSIS — E11.42 DIABETIC POLYNEUROPATHY ASSOCIATED WITH TYPE 2 DIABETES MELLITUS (HCC): Chronic | ICD-10-CM

## 2019-12-04 DIAGNOSIS — M54.50 CHRONIC BILATERAL LOW BACK PAIN WITHOUT SCIATICA: Chronic | ICD-10-CM

## 2019-12-04 RX ORDER — HYDROCODONE BITARTRATE AND ACETAMINOPHEN 10; 325 MG/1; MG/1
1 TABLET ORAL EVERY 8 HOURS PRN
Qty: 90 TABLET | Refills: 0 | Status: SHIPPED | OUTPATIENT
Start: 2019-12-04 | End: 2019-12-30

## 2019-12-06 ENCOUNTER — HOSPITAL ENCOUNTER (OUTPATIENT)
Dept: NEUROLOGY | Age: 79
Discharge: HOME | End: 2019-12-06
Payer: MEDICARE

## 2019-12-06 DIAGNOSIS — R55 SYNCOPE AND COLLAPSE: ICD-10-CM

## 2019-12-06 PROCEDURE — 95819 EEG AWAKE AND ASLEEP: CPT | Performed by: PSYCHIATRY & NEUROLOGY

## 2019-12-06 PROCEDURE — 95819 EEG AWAKE AND ASLEEP: CPT

## 2019-12-10 ENCOUNTER — TELEPHONE (OUTPATIENT)
Dept: NEUROLOGY | Age: 79
End: 2019-12-10

## 2019-12-17 ENCOUNTER — TELEPHONE (OUTPATIENT)
Dept: CARDIOLOGY | Age: 79
End: 2019-12-17

## 2019-12-18 DIAGNOSIS — Z95.0 PACEMAKER: Primary | ICD-10-CM

## 2019-12-18 DIAGNOSIS — I49.5 SINOATRIAL NODE DYSFUNCTION (HCC): ICD-10-CM

## 2019-12-18 PROCEDURE — 93296 REM INTERROG EVL PM/IDS: CPT | Performed by: NURSE PRACTITIONER

## 2019-12-18 PROCEDURE — 93294 REM INTERROG EVL PM/LDLS PM: CPT | Performed by: NURSE PRACTITIONER

## 2019-12-30 ENCOUNTER — OFFICE VISIT (OUTPATIENT)
Dept: CARDIOLOGY | Age: 79
End: 2019-12-30
Payer: MEDICARE

## 2019-12-30 VITALS
SYSTOLIC BLOOD PRESSURE: 130 MMHG | HEART RATE: 80 BPM | DIASTOLIC BLOOD PRESSURE: 78 MMHG | HEIGHT: 70 IN | WEIGHT: 238 LBS | BODY MASS INDEX: 34.07 KG/M2

## 2019-12-30 PROCEDURE — 93280 PM DEVICE PROGR EVAL DUAL: CPT | Performed by: NURSE PRACTITIONER

## 2019-12-30 PROCEDURE — 1123F ACP DISCUSS/DSCN MKR DOCD: CPT | Performed by: NURSE PRACTITIONER

## 2019-12-30 PROCEDURE — 1036F TOBACCO NON-USER: CPT | Performed by: NURSE PRACTITIONER

## 2019-12-30 PROCEDURE — G8417 CALC BMI ABV UP PARAM F/U: HCPCS | Performed by: NURSE PRACTITIONER

## 2019-12-30 PROCEDURE — 99213 OFFICE O/P EST LOW 20 MIN: CPT | Performed by: NURSE PRACTITIONER

## 2019-12-30 PROCEDURE — 4040F PNEUMOC VAC/ADMIN/RCVD: CPT | Performed by: NURSE PRACTITIONER

## 2019-12-30 PROCEDURE — G8484 FLU IMMUNIZE NO ADMIN: HCPCS | Performed by: NURSE PRACTITIONER

## 2019-12-30 PROCEDURE — G8427 DOCREV CUR MEDS BY ELIG CLIN: HCPCS | Performed by: NURSE PRACTITIONER

## 2019-12-30 RX ORDER — CITALOPRAM 20 MG/1
20 TABLET ORAL DAILY
Status: ON HOLD | COMMUNITY
End: 2020-03-17 | Stop reason: ALTCHOICE

## 2019-12-30 RX ORDER — FLUCONAZOLE 100 MG/1
100 TABLET ORAL DAILY
Status: ON HOLD | COMMUNITY
End: 2020-03-20 | Stop reason: HOSPADM

## 2019-12-30 RX ORDER — PRAMIPEXOLE DIHYDROCHLORIDE 1.5 MG/1
1.5 TABLET ORAL DAILY
Status: ON HOLD | COMMUNITY
End: 2020-03-17 | Stop reason: ALTCHOICE

## 2019-12-30 NOTE — PATIENT INSTRUCTIONS
New instructions for today:    Patient Instructions:  Continue current medications as prescribed. Always keep a current medication list. Bring your medications to every office visit. Continue to follow up with primary care provider for non cardiac medical problems. Call the office with any problems, questions or concerns at [phone]. If you have been asked to keep a blood pressure log, do so for 2 weeks. Call the office to report readings to the triage nurse at [phone]. Follow up with cardiologist as scheduled. The following educational material has been included in this after visit summary for your review: Life simple 7. Heart health. Life simple 7  1) Manage blood pressure - high blood pressure is a major risk factor for heart disease and stroke. Keeping blood pressure in health range reduces strain on your heart, arteries and kidneys. Blood pressure goal is less than 130/80. 2) Control cholesterol - contributes to plaque, which can clog arteries and lead to heart disease and stroke. When you control your cholesterol you are giving your arteries their best chance to remain clear. It is recommended that you get cholesterol lab work done once a year. 3) Reduce blood sugar - most of the food we eat is turning into glucose or blood sugar that our body uses for energy. Over time, high levels of blood sugar can damage your heart, kidneys, eyes and nerves. 4) Get active - living an active life is one of the most rewarding gifts you can give yourself and those you love. Simply put, daily physical activity increases your length and quality of life. Strive to exercise 15 minutes most days of the week. 5)  Eat better - A healthy diet is one of your best weapons for fighting cardiovascular disease. When you eat a heart healthy diet, you improve your chances for feeling good and staying healthy for life.   6)  Lose weight - when you shed extra fat an unnecessary pounds, you reduce the burden on your hear, lungs, blood vessels and skeleton. You give yourself the gift of active living, you lower your blood pressure and help yourself feel better. 7) Stop smoking - cigarette smokers have a higher risk of developing cardiovascular disease. If  You smoke, quitting is the best thing you can do for your health. Check American Heart Association on line for more information on Life's Simple 7 and tips for healthy living. Carelink pacemaker transmission:    1)  Your next scheduled transmission from home is 3/30/19. The pacemaker nurse will call you after the report has been reviewed. Follow the steps for sending your first transmission every time you are scheduled to send information to the clinic. 2) If you have questions about your monitor, call the office during hours of operation at [phone]. You can also call [facility] at [phone], Monday - Friday 7AM-6PM Central Time. 3)  If you have a new pacemaker, you should receive the monitor within a few weeks. If you have not received the box within 30 days, notify the office. Instructions for use will be included in the box. Patient Education        A Healthy Heart: Care Instructions  Your Care Instructions    Heart disease occurs when a substance called plaque builds up in the vessels that supply oxygen-rich blood to your heart. This can narrow the blood vessels and reduce blood flow. A heart attack happens when blood flow is completely blocked. A high-fat diet, smoking, and other factors increase the risk of heart disease. Your doctor has found that you have a chance of having heart disease. You can do lots of things to keep your heart healthy. It may not be easy, but you can change your diet, exercise more, and quit smoking. These steps really work to lower your chance of heart disease. Follow-up care is a key part of your treatment and safety.  Be sure to make and go to all appointments, and call your doctor if you are having problems. It's also a good idea to know your test results and keep a list of the medicines you take. How can you care for yourself at home? Diet    · Use less salt when you cook and eat. This helps lower your blood pressure. Taste food before salting. Add only a little salt when you think you need it. With time, your taste buds will adjust to less salt.     · Eat fewer snack items, fast foods, canned soups, and other high-salt, high-fat, processed foods.     · Read food labels and try to avoid saturated and trans fats. They increase your risk of heart disease by raising cholesterol levels.     · Limit the amount of solid fat-butter, margarine, and shortening-you eat. Use olive, peanut, or canola oil when you cook. Bake, broil, and steam foods instead of frying them.     · Eating fish can lower your risk for heart disease. Eat at least 2 servings of fish a week. [place], mackerel, herring, sardines, and chunk light tuna are very good choices. These fish contain omega-3 fatty acids.     · Eat a variety of fruit and vegetables every day. Dark green, deep orange, red, or yellow fruits and vegetables are especially good for you. Examples include spinach, carrots, peaches, and berries.     · Foods high in fiber can reduce your cholesterol and provide important vitamins and minerals. High-fiber foods include whole-grain cereals and breads, oatmeal, beans, brown rice, citrus fruits, and apples.     · Limit drinks and foods with added sugar. These include candy, desserts, and soda pop.    Lifestyle changes    · If your doctor recommends it, get more exercise. Walking is a good choice. Bit by bit, increase the amount you walk every day. Try for at least 30 minutes on most days of the week. You also may want to swim, bike, or do other activities.     · Do not smoke. If you need help quitting, talk to your doctor about stop-smoking programs and medicines. These can increase your chances of quitting for good. Quitting smoking may be the most important step you can take to protect your heart. It is never too late to quit. You will get health benefits right away.     · Limit alcohol to 2 drinks a day for men and 1 drink a day for women. Too much alcohol can cause health problems. Medicines    · Take your medicines exactly as prescribed. Call your doctor if you think you are having a problem with your medicine.     · If your doctor recommends aspirin, take the amount directed each day. Make sure you take aspirin and not another kind of pain reliever, such as acetaminophen (Tylenol). If you take ibuprofen (such as Advil or Motrin) for other problems, take aspirin at least 2 hours before taking ibuprofen. When should you call for help? Call 911 if you have symptoms of a heart attack. These may include:    · Chest pain or pressure, or a strange feeling in the chest.     · Sweating.     · Shortness of breath.     · Pain, pressure, or a strange feeling in the back, neck, jaw, or upper belly or in one or both shoulders or arms.     · Lightheadedness or sudden weakness.     · A fast or irregular heartbeat.    After you call 911, the  may tell you to chew 1 adult-strength or 2 to 4 low-dose aspirin. Wait for an ambulance. Do not try to drive yourself.   Watch closely for changes in your health, and be sure to contact your doctor if you have any problems. Where can you learn more? Go to https://[employer]. org and sign in to your [employer] account. Enter C067 in the [facility] box to learn more about \"A Healthy Heart: Care Instructions. \"     If you do not have an account, please click on the \"Sign Up Now\" link. Current as of: July 22, 2018  Content Version: 12.1  © 5354-9261 [employer]. Care instructions adapted under license by [facility] ([facility]).  If you have questions about a medical condition or this instruction, always ask your healthcare professional. Read [employer] disclaims any warranty or liability for your use of this information.

## 2019-12-30 NOTE — PROGRESS NOTES
[facility], [place]. [address] [address], [address] [facility]  [phone] office  [phone] fax      OFFICE VISIT:  2019    Cruz Nunes - : 1940  Reason For Visit:  Ja Castano is a [de-identified] y.o. male who is here for 1 Month Follow-Up (no cardiac symptoms) and Hypertension    History:   Diagnosis Orders   1. Essential hypertension     2. Sinoatrial node dysfunction (HCC)     3. S/P aortic valve replacement with bioprosthetic valve     4. Pacemaker     5. Mixed hyperlipidemia       The patient presents today for cardiology follow up and pacer check doing well. The patient denies symptoms to suggest myocardial ischemia, heart failure or arrhythmia. BP is well controlled on current regimen. The patient's PCP monitors cholesterol. Betty Clark denies exertional chest pain, shortness of breath, orthopnea, paroxysmal nocturnal dyspnea, syncope, presyncope, sensed arrhythmia, edema and fatigue. The patient denies numbness or weakness to suggest cerebrovascular accident or transient ischemic attack.     Cruz Nunes has the following history as recorded in [facility]:  Patient Active Problem List   Diagnosis Code    Other chest pain R07.89    Chronic bilateral low back pain without sciatica M54.5, G89.29    Syncope and collapse R55    Uncontrolled type 2 diabetes mellitus without complication, with long-term current use of insulin ([facility]) E11.65, Z79.4    Obstructive sleep apnea G47.33    Obesity E66.9    Restless legs syndrome G25.81    Gastroesophageal reflux disease without esophagitis K21.9    Essential hypertension I10    Vertigo R42    Orthostasis I95.1    Diabetic polyneuropathy associated with type 2 diabetes mellitus (HCC) E11.42    Bilateral carpal tunnel syndrome G56.03    CPAP (continuous positive airway pressure) dependence Z99.89    BiPAP (biphasic positive airway pressure) dependence Z99.89    Pinched User   Substance Use Topics    Alcohol use: No      Current Outpatient Medications   Medication Sig Dispense Refill    pramipexole (MIRAPEX) 1.5 MG tablet Take 1.5 mg by mouth daily      fluconazole (DIFLUCAN) 100 MG tablet Take 100 mg by mouth daily      pramipexole (MIRAPEX) 1.5 MG tablet Take 1.5 mg by mouth daily      citalopram (CELEXA) 20 MG tablet TAKE ONE TABLET BY MOUTH DAILY 30 tablet 5    clopidogrel (PLAVIX) 75 MG tablet TAKE 1 TABLET BY MOUTH DAILY 30 tablet 11    Cholecalciferol (VITAMIN D3) 50033 units CAPS   11    HUMULIN R U-500 KWIKPEN 500 UNIT/ML SOPN concentrated injection pen   11    insulin glargine (LANTUS) 100 UNIT/ML injection vial Inject 85 Units into the skin nightly 3 vial 0    Empagliflozin-Metformin HCl ER (SYNJARDY XR)  MG TB24 Take 1 tablet by mouth daily (with breakfast)      oxyCODONE (ROXICODONE) 5 MG immediate release tablet Take 5 mg by mouth every 4 hours as needed for Pain. Ric Warren aspirin 81 MG EC tablet Take 1 tablet by mouth daily 30 tablet 3    atenolol (TENORMIN) 25 MG tablet Take 25 mg by mouth daily      hydrochlorothiazide (MICROZIDE) 12.5 MG capsule Take 12.5 mg by mouth daily      B-D UF III MINI PEN NEEDLES 31G X 5 MM MISC       esomeprazole (NEXIUM) 40 MG capsule Take 40 mg by mouth every morning (before breakfast).  citalopram (CELEXA) 20 MG tablet Take 20 mg by mouth daily      HYDROcodone-acetaminophen (LORCET HD)  MG per tablet Take 1 tablet by mouth every 8 hours as needed for Pain for up to 30 days.  Intended supply: 30 days (Patient not taking: Reported on 12/30/2019) 90 tablet 0    pramipexole (MIRAPEX) 1.5 MG tablet TAKE ONE TABLET BY MOUTH THREE TIMES DAILY ** MAY CAUSE DROWSINESS (Patient not taking: No sig reported) 270 tablet 3    furosemide (LASIX) 20 MG tablet Take 1 tablet by mouth as needed (For weight gain greater than 2.5 lbs in 24 hours.) (Patient not taking: Reported on 12/30/2019) 30 tablet 0    rosuvastatin (CRESTOR) 10 MG tablet Take 10 mg by mouth daily Takes Mon, Weds, Fri       No current facility-administered medications for this visit. Allergies: Patient has no known allergies. Review of Systems  Constitutional - no appetite change, or unexpected weight change. No fever, chills or diaphoresis. No significant change in activity level or new onset of fatigue. HEENT - no significant rhinorrhea or epistaxis. No tinnitus or significant hearing loss. Eyes - no sudden vision change or amaurosis. No corneal arcus, xantholasma, subconjunctival hemorrhage or discharge. Respiratory - no significant wheezing, stridor, apnea or cough. No dyspnea on exertion or shortness of air. Cardiovascular - no exertional chest pain to suggest myocardial ischemia. No orthopnea or PND. No sensation of sustained arrythmia. No occurrence of slow heart rate. No palpitations. No claudication. Gastrointestinal - no abdominal swelling or pain. No blood in stool. No severe constipation, diarrhea, nausea, or vomiting. Genitourinary - no dysuria, frequency, or urgency. No flank pain or hematuria. Musculoskeletal - no back pain or myalgia. No problems with gait. Extremities - no clubbing, cyanosis or extremity edema. Skin - no color change or rash. No pallor. No new surgical incision. Neurologic - no speech difficulty, facial asymmetry or lateralizing weakness. No seizures, presyncope or syncope. No significant dizziness. Hematologic - no easy bruising or excessive bleeding. Psychiatric - no severe anxiety or insomnia. No confusion. All other review of systems are negative. Objective  Vital Signs - /78   Pulse 80   Ht 5' 10\" (1.778 m)   Wt 238 lb (108 kg)   BMI 34.15 kg/m²   General - Joanie Naval is alert, cooperative, and pleasant. Well groomed. No acute distress. Body habitus - Body mass index is 34.15 kg/m². HEENT - Head is normocephalic. No circumoral cyanosis.   Dentition is normal.  EYES mitral stenosis   [calculated valve area by the continuity equation 1.55 cm^2]   Mild mitral regurgitation   Pulmonic valve not well visualized   Mildly dilated right atrium with normal right ventricular size and   systolic function   Pacer lead seen traversing right sided chambers   Mild tricuspid regurgitation with normal RVSP estimate 28 mmHg   Normal IVC consistent with normal right atrial filling pressures of 5-10   mmHg   No pericardial effusion and aortic dimensions are within normal limits    Signature    ----------------------------------------------------------------   Electronically signed by Neil Gallagher MD(Interpreting physician)   on 2019 03:42 PM   ----------------------------------------------------------------    Pacemaker interrogation:  Pacemaker check showed adequate battery status @ 2.88 V. Mode: DDDR. Lead impedances are stable. Pacin% . Reprogramming for sensitivity and threshold testing. Appropriate diagnostics and safety margins noted. A output 1.00 V at 0.40 ms; P wave 1.4 mV. V output 1.00 V at 0.40 ms, R wave >20 mV. Sustained arrythmia:  none. Next Care[facility] remote transmission:  3 months. Stable CV status without overt heart failure, sensed arrhythmia or angina. HTN - good BP control on current regimen. S/p AVR - follow with periodic echo. Recent echo mild to moderate AS and mild MS. Pacer - interrogation showed appropriate battery status, diagnostics and safety margins. Hyperlipidemia - followed by PCP. Patient is compliant with medication regimen. BP Readings from Last 3 Encounters:   19 130/78   19 136/75   19 130/80    Pulse Readings from Last 3 Encounters:   19 80   19 80   19 63        Wt Readings from Last 3 Encounters:   19 238 lb (108 kg)   19 238 lb 9.6 oz (108.2 kg)   19 235 lb (106.6 kg)     Plan  Previous cardiac history and records reviewed. Continue current medical management.

## 2020-01-16 ENCOUNTER — APPOINTMENT (OUTPATIENT)
Dept: CT IMAGING | Age: 80
End: 2020-01-16
Payer: MEDICARE

## 2020-01-16 ENCOUNTER — HOSPITAL ENCOUNTER (EMERGENCY)
Age: 80
Discharge: HOME | End: 2020-01-16
Attending: EMERGENCY MEDICINE
Payer: MEDICARE

## 2020-01-16 ENCOUNTER — APPOINTMENT (OUTPATIENT)
Dept: GENERAL RADIOLOGY | Age: 80
End: 2020-01-16
Payer: MEDICARE

## 2020-01-16 VITALS
DIASTOLIC BLOOD PRESSURE: 86 MMHG | WEIGHT: 240 LBS | TEMPERATURE: 98.2 F | HEART RATE: 82 BPM | HEIGHT: 70 IN | SYSTOLIC BLOOD PRESSURE: 152 MMHG | RESPIRATION RATE: 18 BRPM | OXYGEN SATURATION: 98 % | BODY MASS INDEX: 34.36 KG/M2

## 2020-01-16 LAB
ANION GAP SERPL CALCULATED.3IONS-SCNC: 13 MMOL/L (ref 7–19)
APTT: 26.4 SEC (ref 26–36.2)
BASOPHILS ABSOLUTE: 0 K/UL (ref 0–0.2)
BASOPHILS RELATIVE PERCENT: 0.4 % (ref 0–1)
BUN BLDV-MCNC: 24 MG/DL (ref 8–23)
CALCIUM SERPL-MCNC: 9.4 MG/DL (ref 8.8–10.2)
CHLORIDE BLD-SCNC: 98 MMOL/L (ref 98–111)
CO2: 24 MMOL/L (ref 22–29)
CREAT SERPL-MCNC: 1.2 MG/DL (ref 0.5–1.2)
EOSINOPHILS ABSOLUTE: 0.1 K/UL (ref 0–0.6)
EOSINOPHILS RELATIVE PERCENT: 1.5 % (ref 0–5)
GFR NON-AFRICAN AMERICAN: 58
GLUCOSE BLD-MCNC: 187 MG/DL (ref 74–109)
HCT VFR BLD CALC: 48.5 % (ref 42–52)
HEMOGLOBIN: 16.5 G/DL (ref 14–18)
IMMATURE GRANULOCYTES #: 0.1 K/UL
INR BLD: 1.07 (ref 0.88–1.18)
LYMPHOCYTES ABSOLUTE: 0.7 K/UL (ref 1.1–4.5)
LYMPHOCYTES RELATIVE PERCENT: 13.4 % (ref 20–40)
MCH RBC QN AUTO: 31.7 PG (ref 27–31)
MCHC RBC AUTO-ENTMCNC: 34 G/DL (ref 33–37)
MCV RBC AUTO: 93.1 FL (ref 80–94)
MONOCYTES ABSOLUTE: 0.7 K/UL (ref 0–0.9)
MONOCYTES RELATIVE PERCENT: 12.3 % (ref 0–10)
NEUTROPHILS ABSOLUTE: 3.9 K/UL (ref 1.5–7.5)
NEUTROPHILS RELATIVE PERCENT: 71.3 % (ref 50–65)
PDW BLD-RTO: 13.6 % (ref 11.5–14.5)
PLATELET # BLD: 123 K/UL (ref 130–400)
PMV BLD AUTO: 10.7 FL (ref 9.4–12.4)
POTASSIUM REFLEX MAGNESIUM: 4.4 MMOL/L (ref 3.5–5)
PROTHROMBIN TIME: 13.3 SEC (ref 12–14.6)
RBC # BLD: 5.21 M/UL (ref 4.7–6.1)
SODIUM BLD-SCNC: 135 MMOL/L (ref 136–145)
TROPONIN: <0.01 NG/ML (ref 0–0.03)
TROPONIN: <0.01 NG/ML (ref 0–0.03)
TSH REFLEX FT4: 1.71 UIU/ML (ref 0.35–5.5)
WBC # BLD: 5.5 K/UL (ref 4.8–10.8)

## 2020-01-16 PROCEDURE — 84443 ASSAY THYROID STIM HORMONE: CPT

## 2020-01-16 PROCEDURE — 36415 COLL VENOUS BLD VENIPUNCTURE: CPT

## 2020-01-16 PROCEDURE — 84484 ASSAY OF TROPONIN QUANT: CPT

## 2020-01-16 PROCEDURE — 85730 THROMBOPLASTIN TIME PARTIAL: CPT

## 2020-01-16 PROCEDURE — 71045 X-RAY EXAM CHEST 1 VIEW: CPT

## 2020-01-16 PROCEDURE — 99285 EMERGENCY DEPT VISIT HI MDM: CPT

## 2020-01-16 PROCEDURE — 80048 BASIC METABOLIC PNL TOTAL CA: CPT

## 2020-01-16 PROCEDURE — 93005 ELECTROCARDIOGRAM TRACING: CPT | Performed by: EMERGENCY MEDICINE

## 2020-01-16 PROCEDURE — 2580000003 HC RX 258: Performed by: EMERGENCY MEDICINE

## 2020-01-16 PROCEDURE — 85025 COMPLETE CBC W/AUTO DIFF WBC: CPT

## 2020-01-16 PROCEDURE — 85610 PROTHROMBIN TIME: CPT

## 2020-01-16 PROCEDURE — 70450 CT HEAD/BRAIN W/O DYE: CPT

## 2020-01-16 RX ORDER — SODIUM CHLORIDE, SODIUM LACTATE, POTASSIUM CHLORIDE, CALCIUM CHLORIDE 600; 310; 30; 20 MG/100ML; MG/100ML; MG/100ML; MG/100ML
1000 INJECTION, SOLUTION INTRAVENOUS ONCE
Status: COMPLETED | OUTPATIENT
Start: 2020-01-16 | End: 2020-01-16

## 2020-01-16 RX ADMIN — SODIUM CHLORIDE, POTASSIUM CHLORIDE, SODIUM LACTATE AND CALCIUM CHLORIDE 1000 ML: 600; 310; 30; 20 INJECTION, SOLUTION INTRAVENOUS at 18:38

## 2020-01-16 ASSESSMENT — ENCOUNTER SYMPTOMS
CHEST TIGHTNESS: 0
ABDOMINAL DISTENTION: 0
COUGH: 0
SORE THROAT: 0
TROUBLE SWALLOWING: 0
BACK PAIN: 0
ABDOMINAL PAIN: 0
NAUSEA: 0
SHORTNESS OF BREATH: 0
CONSTIPATION: 0
VOMITING: 0
BLOOD IN STOOL: 0
RHINORRHEA: 0
DIARRHEA: 0

## 2020-01-17 LAB
EKG P AXIS: NORMAL DEGREES
EKG P AXIS: NORMAL DEGREES
EKG P-R INTERVAL: NORMAL MS
EKG P-R INTERVAL: NORMAL MS
EKG Q-T INTERVAL: 470 MS
EKG Q-T INTERVAL: 472 MS
EKG QRS DURATION: 168 MS
EKG QRS DURATION: 176 MS
EKG QTC CALCULATION (BAZETT): 478 MS
EKG QTC CALCULATION (BAZETT): 480 MS
EKG T AXIS: 112 DEGREES
EKG T AXIS: 114 DEGREES

## 2020-01-17 PROCEDURE — 93010 ELECTROCARDIOGRAM REPORT: CPT | Performed by: INTERNAL MEDICINE

## 2020-01-17 NOTE — ED PROVIDER NOTES
[facility] EMERGENCY DEPT  EMERGENCY DEPARTMENT ENCOUNTER      Pt Name: Artemio Nazario  MRN: [id number]  [place] 1940  Date of evaluation: 1/16/2020  Provider: Souleymane Walls MD    CHIEF COMPLAINT       Chief Complaint   Patient presents with    Chest Pain     presents with c/o c/p       HISTORY OF PRESENT ILLNESS   (Location/Symptom, Timing/Onset, Context/Setting, Quality, Duration, Modifying Factors, Severity)  Note limiting factors. Artemio Nazario is a [de-identified] y.o. male who presents to the emergency department with     Weakness and near syncopal event. Patient was playing cards at the table approximately 5 PM this evening started to feel lightheaded as though he was going to pass out. He started to sweat and felt like he needed to go the bathroom. When he tried to get up to the bathroom he felt really weak and could not get up by himself. He needed help to go to the bathroom. He had a bowel movement in the bathroom. Started to feel somewhat better. EMS was called. Patient to 2 aspirins. He was given nitroglycerin under the tongue by EMS. He states that he started having chest pain when he was in the ambulance. It is central chest pain. It did not radiate. Nothing he did made it better or worse. It went away on its own. Patient denies any pain or lightheadedness at this point in time. Nursing Notes were reviewed. REVIEW OF SYSTEMS    (2-9 systems for level 4,10 or more for level 5)     Review of Systems   Constitutional: Negative for activity change, appetite change, chills and fever. HENT: Negative for congestion, ear pain, nosebleeds, rhinorrhea, sore throat and trouble swallowing. Respiratory: Negative for cough, chest tightness and shortness of breath. Cardiovascular: Positive for chest pain. Negative for palpitations. Gastrointestinal: Negative for abdominal distention, abdominal pain, blood in stool, constipation, diarrhea, nausea and vomiting.    Genitourinary: Negative for difficulty urinating, dysuria and hematuria. Musculoskeletal: Negative for arthralgias, back pain, joint swelling, myalgias and neck pain. Skin: Negative for rash. Neurological: Positive for weakness and light-headedness. Negative for dizziness and headaches. Psychiatric/Behavioral: Negative for confusion, hallucinations, self-injury and suicidal ideas.         PAST MEDICAL HISTORY     Past Medical History:   Diagnosis Date    Aortic valve stenosis     Chest tightness, discomfort, or pressure 4/30/2012    Chronic back pain     CKD (chronic kidney disease)     CPAP (continuous positive airway pressure) dependence     13cm    Diabetes ([facility]     Diabetic polyneuropathy associated with type 2 diabetes mellitus ([facility] [address] 8/29/2016    HTN (hypertension)     Hyperlipemia     Left ventricular diastolic dysfunction     Mitral stenosis     Mitral valve stenosis     Neuropathy     Obstructive sleep apnea     AHI: 34.5 per PSG, 6/2013; AHI: 36.9 per PSG, 7/2015; AHI: 54.5 per PSG, 3/2017    Pinched nerve in neck     Restless legs syndrome 2/17/2016       SURGICAL HISTORY       Past Surgical History:   Procedure Laterality Date    APPENDECTOMY      BACK SURGERY      4 Back surgeries    BLADDER SURGERY      CARDIAC CATHETERIZATION  4/30/12    EF > 50%    CATARACT REMOVAL      CHOLECYSTECTOMY      PACEMAKER PLACEMENT      IA RPLCMT PROST AORTIC VALVE OPEN XCP HOMOGRF/STENT N/A 6/7/2018    AORTIC VALVE REPLACEMENT TRANSESOPHAGEAL ECHOCARDIOGRAM performed by Klever White MD at [address] [place] [address]       Discharge Medication List as of 1/16/2020 10:04 PM      CONTINUE these medications which have NOT CHANGED    Details   !! citalopram (CELEXA) 20 MG tablet TAKE ONE TABLET BY MOUTH DAILY, Disp-30 tablet, R-5Normal      clopidogrel (PLAVIX) 75 MG tablet TAKE 1 TABLET BY MOUTH DAILY, Disp-30 tablet, R-11Normal      HUMULIN R U-500 KWIKPEN 500 UNIT/ML SOPN concentrated injection pen R-11, DAWHistorical Med      insulin glargine (LANTUS) 100 UNIT/ML injection vial Inject 85 Units into the skin nightly, Disp-3 vial, R-0Normal      !! pramipexole (MIRAPEX) 1.5 MG tablet TAKE ONE TABLET BY MOUTH THREE TIMES DAILY ** MAY CAUSE DROWSINESS, Disp-270 tablet, R-3Normal      Empagliflozin-Metformin HCl ER (SYNJARDY XR)  MG TB24 Take 1 tablet by mouth daily (with breakfast)Historical Med      oxyCODONE (ROXICODONE) 5 MG immediate release tablet Take 5 mg by mouth every 4 hours as needed for Pain. Mikaela Phelps Historical Med      aspirin 81 MG EC tablet Take 1 tablet by mouth daily, Disp-30 tablet, R-3OTC      atenolol (TENORMIN) 25 MG tablet Take 25 mg by mouth dailyHistorical Med      furosemide (LASIX) 20 MG tablet Take 1 tablet by mouth as needed (For weight gain greater than 2.5 lbs in 24 hours.), Disp-30 tablet, R-0      esomeprazole (NEXIUM) 40 MG capsule Take 40 mg by mouth every morning (before breakfast). !! pramipexole (MIRAPEX) 1.5 MG tablet Take 1.5 mg by mouth dailyHistorical Med      fluconazole (DIFLUCAN) 100 MG tablet Take 100 mg by mouth dailyHistorical Med      !! citalopram (CELEXA) 20 MG tablet Take 20 mg by mouth dailyHistorical Med      !! pramipexole (MIRAPEX) 1.5 MG tablet Take 1.5 mg by mouth dailyHistorical Med      Cholecalciferol (VITAMIN D3) 80706 units CAPS R-11Historical Med      hydrochlorothiazide (MICROZIDE) 12.5 MG capsule Take 12.5 mg by mouth dailyHistorical Med      B-D UF III MINI PEN NEEDLES 31G X 5 MM MISC Starting 8/20/2016, Until Discontinued, SHARRI, Historical Med       !! - Potential duplicate medications found. Please discuss with provider. ALLERGIES     Patient has no known allergies.     FAMILY HISTORY       Family History   Problem Relation Age of Onset    Diabetes Mother     Cancer Father     Cancer Sister     Heart Disease Brother     Cancer Brother        SOCIAL HISTORY       Social History     Socioeconomic History    Marital status:      Spouse name: None    Number of children: None    Years of education: None    Highest education level: None   Occupational History    None   Social Needs    Financial resource strain: None    Food insecurity:     Worry: None     Inability: None    Transportation needs:     Medical: None     Non-medical: None   Tobacco Use    Smoking status: Former Smoker     Last attempt to quit: 1977     Years since quittin.5    Smokeless tobacco: Former User   Substance and Sexual Activity    Alcohol use: No    Drug use: No    Sexual activity: None   Lifestyle    Physical activity:     Days per week: None     Minutes per session: None    Stress: None   Relationships    Social connections:     Talks on phone: None     Gets together: None     Attends [employer] service: None     Active member of club or organization: None     Attends meetings of clubs or organizations: None     Relationship status: None    Intimate partner violence:     Fear of current or ex partner: None     Emotionally abused: None     Physically abused: None     Forced sexual activity: None   Other Topics Concern    None   Social History Narrative    None       SCREENINGS           PHYSICAL EXAM    (up to 7 for level 4, 8 or more for level 5)     ED Triage Vitals [20 1809]   BP Temp Temp src Pulse Resp SpO2 Height Weight   (!) 152/86 98.2 °F (36.8 °C) -- 82 18 98 % 5' 10\" (1.778 m) 240 lb (108.9 kg)       Physical Exam  Vitals signs and nursing note reviewed. Constitutional:       General: He is not in acute distress. Appearance: He is well-developed. HENT:      Head: Normocephalic and atraumatic. Right Ear: External ear normal.      Left Ear: External ear normal.      Nose: Nose normal.      Mouth/Throat:      Mouth: Mucous membranes are moist.      Pharynx: Oropharynx is clear. Eyes:      General: No scleral icterus. Pupils: Pupils are equal, round, and reactive to light.    Neck: cardiomegaly, no infiltrates, osseous structures appear intact without any abnormalities. Pacer is intact. LABS:  Labs Reviewed   BASIC METABOLIC PANEL W/ REFLEX TO MG FOR LOW K - Abnormal; Notable for the following components:       Result Value    Sodium 135 (*)     Glucose 187 (*)     BUN 24 (*)     GFR Non- 58 (*)     All other components within normal limits   CBC WITH AUTO DIFFERENTIAL - Abnormal; Notable for the following components:    MCH 31.7 (*)     Platelets 531 (*)     Neutrophils % 71.3 (*)     Lymphocytes % 13.4 (*)     Monocytes % 12.3 (*)     Lymphocytes Absolute 0.7 (*)     All other components within normal limits   TROPONIN   TROPONIN   APTT   PROTIME-INR   TSH WITH REFLEX TO FT4     other labs were within normal range or not returned as of this dictation. EMERGENCY DEPARTMENT COURSE and DIFFERENTIAL DIAGNOSIS/MDM:   Vitals:    Vitals:    01/16/20 1809   BP: (!) 152/86   Pulse: 82   Resp: 18   Temp: 98.2 °F (36.8 °C)   SpO2: 98%   Weight: 240 lb (108.9 kg)   Height: 5' 10\" (1.778 m)       MDM  Number of Diagnoses or Management Options  Diagnosis management comments: 21-year-old male coming in with near syncope, generalized weakness, and episode of chest pain. Plan for serial EKGs, serial troponins, chest x-ray patient took aspirin prior to coming to the ED. No pain or symptoms currently. Will interrogate pacemaker and check orthostatics. ED Course  ED Course as of Jan 16 2345   Thu Jan 16, 2020 1940 Pacemaker interrogated. Last check was on December 30. No arrhythmias since last interrogation.    [EP]   1940 Orthostatics negative    [EP]      ED Course User Index  [EP] Digna Bundy MD       Patient evaluated for above stated complaints. Work-up initiated. On reevaluation patient mentioned that he restarted gabapentin.   He states that when he was off this medication for 2 months he was feeling good and had no problems and then restarted it at 600 mg rather

## 2020-01-17 NOTE — DISCHARGE INSTR - COC
Bilateral carpal tunnel syndrome G56.03    CPAP (continuous positive airway pressure) dependence Z99.89    BiPAP (biphasic positive airway pressure) dependence Z99.89    Pinched nerve in neck S14. 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    S/P aortic valve replacement with bioprosthetic valve Z95.3    Grade I diastolic dysfunction I09.9    Pacemaker Z95.0    Near syncope R55    History of syncope Z87.898    Sinoatrial node dysfunction (HCC) I49.5    Mixed hyperlipidemia E78.2       Isolation/Infection:   Isolation          No Isolation        Patient Infection Status     Infection Onset Added Last Indicated Last Indicated By Review Planned Expiration Resolved Resolved By    MRSA 06/05/18 06/07/18 06/07/18 Tatiana Corea RN 06/07/18 6/5/2018 MRSA Screening Culture (nares):  MRSA          Nurse Assessment:  Last Vital Signs: BP (!) 152/86   Pulse 82   Temp 98.2 °F (36.8 °C)   Resp 18   Ht 5' 10\" (1.778 m)   Wt 240 lb (108.9 kg)   SpO2 98%   BMI 34.44 kg/m²     Last documented pain score (0-10 scale):    Last Weight:   Wt Readings from Last 1 Encounters:   01/16/20 240 lb (108.9 kg)     Mental Status:  {IP PT MENTAL STATUS:20030}    IV Access:  {[facility] IV ACCESS:065397079}    Nursing Mobility/ADLs:  Walking   {[facility] DME MCSF:298219797}  Transfer  {[facility] DME PFXN:642594878}  Bathing  {[facility] DME YYZZ:733695651}  Dressing  {[facility] DME CJYR:742976949}  Toileting  {[facility] DME OMLD:926473059}  Feeding  {[facility] DME REAI:348146990}  Med Admin  {[facility] DME WZQD:470608831}  Med Delivery   { EREN MED Delivery:049529122}    Wound Care Documentation and Therapy:  Incision 06/07/18 Sternum (Active)   Number of days: 588        Elimination:  Continence:   · Bowel: {YES / LV:05854}  · Bladder: {YES / PL:28857}  Urinary Catheter: {Urinary Catheter:183503157}   Colostomy/Ileostomy/Ileal Conduit: {YES / XM:07545}       Date of Last BM: ***  No intake or output data in the 24 hours ending 20 2343  No intake/output data recorded.     Safety Concerns:     [address] Safety Concerns:170933902}    Impairments/Disabilities:      [address] Impairments/Disabilities:238046910}    Nutrition Therapy:  Current Nutrition Therapy:   [address] [facility] Diet List:967662421}    Routes of Feeding: {CHP DME Other Feedings:590424671}  Liquids: {Slp liquid thickness:56108}  Daily Fluid Restriction: {CHP DME Yes amt example:491677314}  Last Modified Barium Swallow with Video (Video Swallowing Test): {Done Not Done ORDM:247369140}    Treatments at the Time of Hospital Discharge:   Respiratory Treatments: ***  Oxygen Therapy:  {Therapy; copd oxygen:38146}  Ventilator:    { CC Vent ZLWZ:966634343}    Rehab Therapies: {THERAPEUTIC INTERVENTION:7768892631}  Weight Bearing Status/Restrictions: [address]  Weight Bearin}  Other Medical Equipment (for information only, NOT a DME order):  {EQUIPMENT:866688645}  Other Treatments: ***    Patient's personal belongings (please select all that are sent with patient):  {P DME Belongings:933203902}    RN SIGNATURE:  {Esignature:666973911}    CASE MANAGEMENT/SOCIAL WORK SECTION    Inpatient Status Date: ***    Readmission Risk Assessment Score:  Readmission Risk              Risk of Unplanned Readmission:        0           Discharging to Facility/ Agency   · Name:   · Address:  · Phone:  · Fax:    Dialysis Facility (if applicable)   · Name:  · Address:  · Dialysis Schedule:  · Phone:  · Fax:    / signature: {Esignature:208457303}    PHYSICIAN SECTION    Prognosis: {Prognosis:0922355715}    Condition at Discharge: [address] Patient Condition:687245300}    Rehab Potential (if transferring to Rehab): {Prognosis:4998183490}    Recommended Labs or Other Treatments After Discharge: ***    Physician Certification: I certify the above information and transfer of Hallie Goldberg  is necessary for the continuing treatment of the diagnosis listed and that he requires {Admit to Appropriate Level of Care:86877} for {GREATER/LESS:156741189} 30 days.      Update Admission H&P: {CHP DME Changes in [facility]:110529528}    PHYSICIAN SIGNATURE:  {Esignature:056400078}

## 2020-02-13 ENCOUNTER — TELEPHONE (OUTPATIENT)
Dept: ENDOCRINOLOGY | Facility: CLINIC | Age: 80
End: 2020-02-13

## 2020-02-19 ENCOUNTER — OFFICE VISIT (OUTPATIENT)
Dept: ENDOCRINOLOGY | Facility: CLINIC | Age: 80
End: 2020-02-19

## 2020-02-19 VITALS
WEIGHT: 239.9 LBS | BODY MASS INDEX: 34.35 KG/M2 | DIASTOLIC BLOOD PRESSURE: 78 MMHG | OXYGEN SATURATION: 97 % | HEIGHT: 70 IN | HEART RATE: 81 BPM | SYSTOLIC BLOOD PRESSURE: 130 MMHG

## 2020-02-19 DIAGNOSIS — E11.59 HYPERTENSION ASSOCIATED WITH DIABETES (HCC): ICD-10-CM

## 2020-02-19 DIAGNOSIS — E78.2 MIXED DIABETIC HYPERLIPIDEMIA ASSOCIATED WITH TYPE 2 DIABETES MELLITUS (HCC): ICD-10-CM

## 2020-02-19 DIAGNOSIS — E11.65 TYPE 2 DIABETES MELLITUS WITH HYPERGLYCEMIA, WITH LONG-TERM CURRENT USE OF INSULIN (HCC): Primary | ICD-10-CM

## 2020-02-19 DIAGNOSIS — E53.8 B12 DEFICIENCY: ICD-10-CM

## 2020-02-19 DIAGNOSIS — Z79.4 TYPE 2 DIABETES MELLITUS WITH HYPERGLYCEMIA, WITH LONG-TERM CURRENT USE OF INSULIN (HCC): Primary | ICD-10-CM

## 2020-02-19 DIAGNOSIS — E55.9 VITAMIN D DEFICIENCY: ICD-10-CM

## 2020-02-19 DIAGNOSIS — I15.2 HYPERTENSION ASSOCIATED WITH DIABETES (HCC): ICD-10-CM

## 2020-02-19 DIAGNOSIS — E11.69 MIXED DIABETIC HYPERLIPIDEMIA ASSOCIATED WITH TYPE 2 DIABETES MELLITUS (HCC): ICD-10-CM

## 2020-02-19 LAB — HBA1C MFR BLD: 8.1 %

## 2020-02-19 PROCEDURE — 99214 OFFICE O/P EST MOD 30 MIN: CPT | Performed by: INTERNAL MEDICINE

## 2020-02-19 RX ORDER — EZETIMIBE 10 MG/1
10 TABLET ORAL DAILY
COMMUNITY

## 2020-02-19 NOTE — PROGRESS NOTES
Atul Jeong is a 79 y.o. male who presents for  evaluation of   Chief Complaint   Patient presents with   • Diabetes       Referring provider    Primary Care Provider    Margareth Muniz APRN    Duration more than 10 years    Timing - Diabetes is Constant    Quality -  Near goal     Severity -  moderate    Complications - peripheral neuropathy     Had AVR 2018     Current symptoms/problems  increase appetite, polydipsia and polyuria     Alleviating Factors: Compliance      Side Effects  none    Current diet  High carbs     Current exercise walking    Current monitoring regimen: home blood tests - 3 times daily  Home blood sugar records:  when we met, now mainly in the 100s    Hypoglycemia rarely , nocturnal     Past Medical History:   Diagnosis Date   • B12 deficiency 8/23/2017   • Coronary artery disease    • Foot ulcer (CMS/[facility])    • Hypertension associated with diabetes (CMS/[facility]ly [facility]) 8/23/2017   • Ingrown toenail    • Mixed diabetic hyperlipidemia associated with type 2 diabetes mellitus (CMS/[facility]) 8/23/2017   • Neuropathy in diabetes (CMS/[facility])    • Type 2 diabetes mellitus with hyperglycemia, with long-term current use of insulin (CMS/[facility]) 8/23/2017   • Vitamin D deficiency 8/23/2017     Family History   Problem Relation Age of Onset   • Diabetes Mother    • Cancer Father    • Cancer Sister    • Cancer Brother    • Heart disease Brother    • Diabetes Brother      Social History     Tobacco Use   • Smoking status: Former Smoker   • Smokeless tobacco: Never Used   Substance Use Topics   • Alcohol use: Yes   • Drug use: No         Current Outpatient Medications:   •  aspirin 81 MG chewable tablet, Chew 81 mg Daily., Disp: , Rfl:   •  citalopram (CeleXA) 10 MG tablet, Take  by mouth., Disp: , Rfl:   •  esomeprazole (nexIUM) 40 MG capsule, Take  by mouth., Disp: , Rfl:   •  ezetimibe (ZETIA) 10 MG tablet, Take 10 mg by mouth Daily., Disp: , Rfl:   •  furosemide (LASIX) 20 MG tablet, Take  by mouth., Disp: ,  Rfl:   •  hydrochlorothiazide (HYDRODIURIL) 12.5 MG tablet, Take 12.5 mg by mouth Daily., Disp: , Rfl:   •  HYDROcodone-acetaminophen (NORCO)  MG per tablet, Take  by mouth., Disp: , Rfl:   •  Insulin Pen Needle (B-D UF III MINI PEN NEEDLES) 31G X 5 MM misc, , Disp: , Rfl:   •  Insulin Regular Human, Conc, (HUMULIN R U-500 KWIKPEN) 500 UNIT/ML solution pen-injector CONCENTRATED injection, Up to 100 units , 30 minutes before bkfast and up to 85 units, 30 minutes before supper, Disp: 4 pen, Rfl: 5  •  pramipexole (MIRAPEX) 1.5 MG tablet, Take  by mouth., Disp: , Rfl:   •  SYNJARDY XR  MG tablet sustained-release 24 hour, TAKE ONE TABLET BY MOUTH DAILY WITH BREAKFAST, Disp: 30 tablet, Rfl: 11  •  nystatin (MYCOSTATIN) 798571 UNIT/GM cream, , Disp: , Rfl: 0    Review of Systems    Review of Systems   Constitutional: Negative for activity change, appetite change, chills, diaphoresis, fatigue, fever and unexpected weight change.   HENT: Negative for congestion, dental problem, drooling, ear discharge, ear pain, facial swelling, mouth sores, postnasal drip, rhinorrhea, sinus pressure, sore throat, tinnitus, trouble swallowing and voice change.    Eyes: Negative for photophobia, pain, discharge, redness, itching and visual disturbance.   Respiratory: Negative for apnea, cough, choking, chest tightness, shortness of breath, wheezing and stridor.    Cardiovascular: Negative for chest pain, palpitations and leg swelling.   Gastrointestinal: Negative for abdominal distention, abdominal pain, constipation, diarrhea, nausea and vomiting.   Endocrine: Negative for cold intolerance, heat intolerance, polydipsia, polyphagia and polyuria.   Genitourinary: Negative for decreased urine volume, difficulty urinating, dysuria, flank pain, frequency, hematuria and urgency.   Musculoskeletal: Negative for arthralgias, back pain, gait problem, joint swelling, myalgias, neck pain and neck stiffness.   Skin: Negative for color  "change, pallor, rash and wound.   Allergic/Immunologic: Negative for immunocompromised state.   Neurological: Negative for dizziness, tremors, seizures, syncope, facial asymmetry, speech difficulty, weakness, light-headedness, numbness and headaches.   Hematological: Negative for adenopathy.   Psychiatric/Behavioral: Negative for agitation, behavioral problems, confusion, decreased concentration, dysphoric mood, hallucinations, self-injury, sleep disturbance and suicidal ideas. The patient is not nervous/anxious and is not hyperactive.         Objective:   /78   Pulse 81   Ht 177.8 cm (70\")   Wt 109 kg (239 lb 14.4 oz)   SpO2 97%   BMI 34.42 kg/m²     Physical Exam   Constitutional: He is oriented to person, place, and time. He appears well-developed and well-nourished. He is cooperative.   HENT:   Head: Normocephalic and atraumatic.   Right Ear: External ear normal.   Left Ear: External ear normal.   Nose: Nose normal.   Mouth/Throat: Oropharynx is clear and moist. No oropharyngeal exudate.   Eyes: Pupils are equal, round, and reactive to light. Conjunctivae and EOM are normal. No scleral icterus. Right eye exhibits normal extraocular motion. Left eye exhibits normal extraocular motion.   Neck: Neck supple. No JVD present. No muscular tenderness present. No tracheal deviation, no edema and no erythema present. No thyromegaly present.   Cardiovascular: Normal rate, regular rhythm, normal heart sounds and intact distal pulses. Exam reveals no gallop and no friction rub.   No murmur heard.  Pulmonary/Chest: Effort normal and breath sounds normal. No stridor. No respiratory distress. He has no decreased breath sounds. He has no wheezes. He has no rhonchi. He has no rales. He exhibits no tenderness.   Abdominal: Soft. Bowel sounds are normal. He exhibits no distension and no mass. There is no hepatomegaly. There is no tenderness. There is no rebound and no guarding. No hernia.   Musculoskeletal: Normal range " of motion. He exhibits no edema, tenderness or deformity.   Lymphadenopathy:     He has no cervical adenopathy.   Neurological: He is alert and oriented to person, place, and time. He has normal reflexes. No cranial nerve deficit. He exhibits normal muscle tone. Coordination normal.   Skin: Skin is warm. No rash noted. No erythema. No pallor.   Psychiatric: He has a normal mood and affect. His behavior is normal. Judgment and thought content normal.   Nursing note and vitals reviewed.      Lab Review    Results for orders placed or performed in visit on 10/03/19   POC Urinalysis Dipstick, Multipro   Result Value Ref Range    Color Yellow Yellow, Straw, Dark Yellow, Ivon    Clarity, UA Clear Clear    Glucose, UA >=1000 mg/dL (3+) (A) Negative, 1000 mg/dL (3+) mg/dL    Bilirubin Negative Negative    Ketones, UA Negative Negative    Specific Gravity  1.015 1.005 - 1.030    Blood, UA Negative Negative    pH, Urine 6.5 5.0 - 8.0    Protein, POC Negative Negative mg/dL    Urobilinogen, UA Normal Normal    Nitrite, UA Negative Negative    Leukocytes Negative Negative           Assessment/Plan       ICD-10-CM ICD-9-CM   1. Type 2 diabetes mellitus with hyperglycemia, with long-term current use of insulin (CMS/HCC) E11.65 250.00    Z79.4 790.29     V58.67   2. Hypertension associated with diabetes (CMS/HCC) E11.59 250.80    I10 401.9   3. Mixed diabetic hyperlipidemia associated with type 2 diabetes mellitus (CMS/HCC) E11.69 250.80    E78.2 272.2   4. B12 deficiency E53.8 266.2   5. Vitamin D deficiency E55.9 268.9       Glycemic Management:   Lab Results   Component Value Date    HGBA1C 7.4 08/14/2019    HGBA1C 7.7 (H) 05/10/2019    HGBA1C 7.9 04/13/2019     Lab Results   Component Value Date    GLUCOSE 187 (H) 01/16/2020    BUN 24 (H) 01/16/2020    CREATININE 1.2 01/16/2020    EGFRIFNONA 58 (A) 01/16/2020    K 4.3 11/09/2019    CO2 24 01/16/2020    CALCIUM 9.4 01/16/2020    ALBUMIN 4.5 11/09/2019    AST 21 11/09/2019     ALT 35 11/09/2019    ANIONGAP 13 01/16/2020     Lab Results   Component Value Date    WBC 5.5 01/16/2020    HGB 16.5 01/16/2020    HCT 48.5 01/16/2020    MCV 93.1 01/16/2020     (L) 01/16/2020         U500    40-80 units for bkfast     40-80 units for supper -            -----    Has gastroparesis so I am not using glp-1    ----    Synjardy xr 10/1000 mg w bkfast and next appt consider  januvia     Low b12, start otc b12 complex       Lipid Management  No results found for: CHOL  Lab Results   Component Value Date    TRIG 129 05/10/2019    TRIG 377 (H) 02/22/2019    TRIG 103 06/11/2018     Lab Results   Component Value Date    HDL 54 (L) 05/10/2019    HDL 42 (L) 02/22/2019    HDL 40 (L) 06/11/2018     No components found for: LDLCALC  Lab Results   Component Value Date    LDL 79 05/10/2019    LDL 22 02/22/2019    LDL 33 06/11/2018     No results found for: LDLDIRECT    On crestor 10 mg three times per week - had to stop due to myalgias     LDL from 4/19  Was 67    Now on zetia 10 mg daily     LDL now 98 from Feb 2020    Blood Pressure Management:    Vitals:    02/19/20 0849   BP: 130/78   Pulse: 81   SpO2: 97%     Lab Results   Component Value Date    GLUCOSE 187 (H) 01/16/2020    CALCIUM 9.4 01/16/2020     (L) 01/16/2020    K 4.3 11/09/2019    CO2 24 01/16/2020    CL 98 01/16/2020    BUN 24 (H) 01/16/2020    CREATININE 1.2 01/16/2020    EGFRIFNONA 58 (A) 01/16/2020    ANIONGAP 13 01/16/2020         On benazepril    Lasix listed but not taking      Calcium from 8-19 nl       Microvascular Complication Monitoring:      Eye Exam Evaluation,   no retinopathy    -----------    Last Microalbumin-Proteinuria Assessment    No results found for: MALBCRERATIO    No results found for: UTPCR    -----------      Neuropathy, has neuropathy   I intend to start neurontin 300 mg po tid prn       Weight Related:   Wt Readings from Last 3 Encounters:   02/19/20 109 kg (239 lb 14.4 oz)   10/03/19 106 kg (234 lb)    08/19/19 107 kg (234 lb 14.4 oz)     Body mass index is 34.42 kg/m².        Diet interventions: moderate (500 kCal/d) deficit diet.      Bone Health    Lab Results   Component Value Date    CALCIUM 9.4 01/16/2020        Vit D of 148 from Feb 2020   I wrote 50 th weekly     He took 5 th daily and now off of it     Thyroid Health    Lab Results   Component Value Date    TSH 3.920 02/22/2019    TSH 3.610 09/15/2018        TSH from 2-20 and normal at 2.7      Other Diabetes Related Aspects       Lab Results   Component Value Date    ABDCFWTL11 487 02/22/2019           Last celiac panel     No results found for: GDPABIGA, TTRANSGLIGA, IGA, PFWGF19GGVT      I reviewed and summarized records from Margareth Muniz APRN from 2019 and I reviewed / ordered labs.     No orders of the defined types were placed in this encounter.        A copy of my note was sent to Margareth Muniz APRN    Please see my above opinion and suggestions.

## 2020-02-26 ENCOUNTER — TELEPHONE (OUTPATIENT)
Dept: NEUROLOGY | Age: 80
End: 2020-02-26

## 2020-02-27 ENCOUNTER — TELEPHONE (OUTPATIENT)
Dept: NEUROLOGY | Age: 80
End: 2020-02-27

## 2020-02-27 NOTE — TELEPHONE ENCOUNTER
Patient called and was wanting to be released back to driving. He stated that he has not had a seizure since Nov 2019.

## 2020-03-02 NOTE — TELEPHONE ENCOUNTER
Called patient and left vm regarding release of driving. Instructed pt to call back with any questions or concerns.

## 2020-03-17 ENCOUNTER — APPOINTMENT (OUTPATIENT)
Dept: GENERAL RADIOLOGY | Age: 80
DRG: 247 | End: 2020-03-17
Payer: MEDICARE

## 2020-03-17 ENCOUNTER — HOSPITAL ENCOUNTER (INPATIENT)
Age: 80
LOS: 1 days | Discharge: HOME | DRG: 247 | End: 2020-03-20
Attending: EMERGENCY MEDICINE | Admitting: INTERNAL MEDICINE
Payer: MEDICARE

## 2020-03-17 PROBLEM — R55 PRE-SYNCOPE: Status: ACTIVE | Noted: 2020-03-17

## 2020-03-17 PROBLEM — R00.1 BRADYCARDIA: Status: ACTIVE | Noted: 2020-03-17

## 2020-03-17 LAB
ALBUMIN SERPL-MCNC: 4.3 G/DL (ref 3.5–5.2)
ALP BLD-CCNC: 75 U/L (ref 40–130)
ALT SERPL-CCNC: 63 U/L (ref 5–41)
ANION GAP SERPL CALCULATED.3IONS-SCNC: 17 MMOL/L (ref 7–19)
AST SERPL-CCNC: 54 U/L (ref 5–40)
BASOPHILS ABSOLUTE: 0 K/UL (ref 0–0.2)
BASOPHILS RELATIVE PERCENT: 0.5 % (ref 0–1)
BILIRUB SERPL-MCNC: 0.4 MG/DL (ref 0.2–1.2)
BUN BLDV-MCNC: 31 MG/DL (ref 8–23)
CALCIUM SERPL-MCNC: 9.3 MG/DL (ref 8.8–10.2)
CHLORIDE BLD-SCNC: 99 MMOL/L (ref 98–111)
CO2: 19 MMOL/L (ref 22–29)
CREAT SERPL-MCNC: 1.4 MG/DL (ref 0.5–1.2)
EKG P AXIS: NORMAL DEGREES
EKG P-R INTERVAL: NORMAL MS
EKG Q-T INTERVAL: 636 MS
EKG QRS DURATION: 200 MS
EKG QTC CALCULATION (BAZETT): 579 MS
EKG T AXIS: 156 DEGREES
EOSINOPHILS ABSOLUTE: 0.1 K/UL (ref 0–0.6)
EOSINOPHILS RELATIVE PERCENT: 1.6 % (ref 0–5)
GFR NON-AFRICAN AMERICAN: 49
GLUCOSE BLD-MCNC: 256 MG/DL (ref 70–99)
GLUCOSE BLD-MCNC: 278 MG/DL (ref 74–109)
HCT VFR BLD CALC: 49.9 % (ref 42–52)
HEMOGLOBIN: 16.8 G/DL (ref 14–18)
IMMATURE GRANULOCYTES #: 0.1 K/UL
LACTIC ACID: 2 MMOL/L (ref 0.5–1.9)
LYMPHOCYTES ABSOLUTE: 1.1 K/UL (ref 1.1–4.5)
LYMPHOCYTES RELATIVE PERCENT: 13.7 % (ref 20–40)
MCH RBC QN AUTO: 31.1 PG (ref 27–31)
MCHC RBC AUTO-ENTMCNC: 33.7 G/DL (ref 33–37)
MCV RBC AUTO: 92.4 FL (ref 80–94)
MONOCYTES ABSOLUTE: 1.1 K/UL (ref 0–0.9)
MONOCYTES RELATIVE PERCENT: 12.9 % (ref 0–10)
NEUTROPHILS ABSOLUTE: 5.8 K/UL (ref 1.5–7.5)
NEUTROPHILS RELATIVE PERCENT: 70.3 % (ref 50–65)
PDW BLD-RTO: 14.1 % (ref 11.5–14.5)
PERFORMED ON: ABNORMAL
PLATELET # BLD: 145 K/UL (ref 130–400)
PMV BLD AUTO: 10.7 FL (ref 9.4–12.4)
POTASSIUM SERPL-SCNC: 4.3 MMOL/L (ref 3.5–5)
PRO-BNP: 459 PG/ML (ref 0–1800)
RBC # BLD: 5.4 M/UL (ref 4.7–6.1)
SODIUM BLD-SCNC: 135 MMOL/L (ref 136–145)
TOTAL PROTEIN: 6.6 G/DL (ref 6.6–8.7)
TROPONIN: 0.02 NG/ML (ref 0–0.03)
TROPONIN: 0.02 NG/ML (ref 0–0.03)
TROPONIN: <0.01 NG/ML (ref 0–0.03)
WBC # BLD: 8.3 K/UL (ref 4.8–10.8)

## 2020-03-17 PROCEDURE — 93010 ELECTROCARDIOGRAM REPORT: CPT | Performed by: INTERNAL MEDICINE

## 2020-03-17 PROCEDURE — 93005 ELECTROCARDIOGRAM TRACING: CPT | Performed by: EMERGENCY MEDICINE

## 2020-03-17 PROCEDURE — G0378 HOSPITAL OBSERVATION PER HR: HCPCS

## 2020-03-17 PROCEDURE — 82947 ASSAY GLUCOSE BLOOD QUANT: CPT

## 2020-03-17 PROCEDURE — 85025 COMPLETE CBC W/AUTO DIFF WBC: CPT

## 2020-03-17 PROCEDURE — 83880 ASSAY OF NATRIURETIC PEPTIDE: CPT

## 2020-03-17 PROCEDURE — 99285 EMERGENCY DEPT VISIT HI MDM: CPT

## 2020-03-17 PROCEDURE — 84484 ASSAY OF TROPONIN QUANT: CPT

## 2020-03-17 PROCEDURE — 6370000000 HC RX 637 (ALT 250 FOR IP): Performed by: INTERNAL MEDICINE

## 2020-03-17 PROCEDURE — 93005 ELECTROCARDIOGRAM TRACING: CPT | Performed by: INTERNAL MEDICINE

## 2020-03-17 PROCEDURE — 2580000003 HC RX 258: Performed by: INTERNAL MEDICINE

## 2020-03-17 PROCEDURE — 99223 1ST HOSP IP/OBS HIGH 75: CPT | Performed by: INTERNAL MEDICINE

## 2020-03-17 PROCEDURE — 6360000002 HC RX W HCPCS: Performed by: INTERNAL MEDICINE

## 2020-03-17 PROCEDURE — 36415 COLL VENOUS BLD VENIPUNCTURE: CPT

## 2020-03-17 PROCEDURE — 80053 COMPREHEN METABOLIC PANEL: CPT

## 2020-03-17 PROCEDURE — 83605 ASSAY OF LACTIC ACID: CPT

## 2020-03-17 PROCEDURE — 96372 THER/PROPH/DIAG INJ SC/IM: CPT

## 2020-03-17 PROCEDURE — 71045 X-RAY EXAM CHEST 1 VIEW: CPT

## 2020-03-17 RX ORDER — SODIUM CHLORIDE 0.9 % (FLUSH) 0.9 %
10 SYRINGE (ML) INJECTION PRN
Status: DISCONTINUED | OUTPATIENT
Start: 2020-03-17 | End: 2020-03-20 | Stop reason: HOSPADM

## 2020-03-17 RX ORDER — CLOPIDOGREL BISULFATE 75 MG/1
75 TABLET ORAL DAILY
Status: DISCONTINUED | OUTPATIENT
Start: 2020-03-17 | End: 2020-03-20 | Stop reason: HOSPADM

## 2020-03-17 RX ORDER — ASPIRIN 81 MG/1
81 TABLET, CHEWABLE ORAL DAILY
Status: DISCONTINUED | OUTPATIENT
Start: 2020-03-18 | End: 2020-03-17 | Stop reason: SDUPTHER

## 2020-03-17 RX ORDER — POLYETHYLENE GLYCOL 3350 17 G/17G
17 POWDER, FOR SOLUTION ORAL DAILY PRN
Status: DISCONTINUED | OUTPATIENT
Start: 2020-03-17 | End: 2020-03-20 | Stop reason: HOSPADM

## 2020-03-17 RX ORDER — ATORVASTATIN CALCIUM 40 MG/1
40 TABLET, FILM COATED ORAL NIGHTLY
Status: DISCONTINUED | OUTPATIENT
Start: 2020-03-17 | End: 2020-03-20 | Stop reason: HOSPADM

## 2020-03-17 RX ORDER — METFORMIN HYDROCHLORIDE 500 MG/1
1000 TABLET, EXTENDED RELEASE ORAL
Status: DISCONTINUED | OUTPATIENT
Start: 2020-03-18 | End: 2020-03-20 | Stop reason: HOSPADM

## 2020-03-17 RX ORDER — INSULIN GLARGINE 100 [IU]/ML
85 INJECTION, SOLUTION SUBCUTANEOUS NIGHTLY
Status: DISCONTINUED | OUTPATIENT
Start: 2020-03-17 | End: 2020-03-20 | Stop reason: HOSPADM

## 2020-03-17 RX ORDER — HYDROCHLOROTHIAZIDE 12.5 MG/1
12.5 CAPSULE, GELATIN COATED ORAL DAILY
Status: DISCONTINUED | OUTPATIENT
Start: 2020-03-17 | End: 2020-03-20 | Stop reason: HOSPADM

## 2020-03-17 RX ORDER — ACETAMINOPHEN 650 MG/1
650 SUPPOSITORY RECTAL EVERY 6 HOURS PRN
Status: DISCONTINUED | OUTPATIENT
Start: 2020-03-17 | End: 2020-03-20 | Stop reason: HOSPADM

## 2020-03-17 RX ORDER — PANTOPRAZOLE SODIUM 40 MG/1
40 TABLET, DELAYED RELEASE ORAL
Status: DISCONTINUED | OUTPATIENT
Start: 2020-03-18 | End: 2020-03-20 | Stop reason: HOSPADM

## 2020-03-17 RX ORDER — PRAMIPEXOLE DIHYDROCHLORIDE 0.25 MG/1
1 TABLET ORAL DAILY
Status: DISCONTINUED | OUTPATIENT
Start: 2020-03-17 | End: 2020-03-19

## 2020-03-17 RX ORDER — CITALOPRAM 20 MG/1
20 TABLET ORAL DAILY
Status: DISCONTINUED | OUTPATIENT
Start: 2020-03-17 | End: 2020-03-17 | Stop reason: SDUPTHER

## 2020-03-17 RX ORDER — ASPIRIN 81 MG/1
81 TABLET ORAL DAILY
Status: DISCONTINUED | OUTPATIENT
Start: 2020-03-18 | End: 2020-03-20 | Stop reason: HOSPADM

## 2020-03-17 RX ORDER — SODIUM CHLORIDE 0.9 % (FLUSH) 0.9 %
10 SYRINGE (ML) INJECTION EVERY 12 HOURS SCHEDULED
Status: DISCONTINUED | OUTPATIENT
Start: 2020-03-17 | End: 2020-03-20 | Stop reason: HOSPADM

## 2020-03-17 RX ORDER — FUROSEMIDE 20 MG/1
20 TABLET ORAL PRN
Status: DISCONTINUED | OUTPATIENT
Start: 2020-03-17 | End: 2020-03-20 | Stop reason: HOSPADM

## 2020-03-17 RX ORDER — ACETAMINOPHEN 325 MG/1
650 TABLET ORAL EVERY 4 HOURS PRN
Status: DISCONTINUED | OUTPATIENT
Start: 2020-03-17 | End: 2020-03-17 | Stop reason: SDUPTHER

## 2020-03-17 RX ORDER — ATENOLOL 25 MG/1
25 TABLET ORAL DAILY
Status: DISCONTINUED | OUTPATIENT
Start: 2020-03-17 | End: 2020-03-20 | Stop reason: HOSPADM

## 2020-03-17 RX ORDER — ONDANSETRON 2 MG/ML
4 INJECTION INTRAMUSCULAR; INTRAVENOUS EVERY 6 HOURS PRN
Status: DISCONTINUED | OUTPATIENT
Start: 2020-03-17 | End: 2020-03-20 | Stop reason: HOSPADM

## 2020-03-17 RX ORDER — FLUCONAZOLE 100 MG/1
100 TABLET ORAL DAILY
Status: DISCONTINUED | OUTPATIENT
Start: 2020-03-18 | End: 2020-03-20 | Stop reason: HOSPADM

## 2020-03-17 RX ORDER — ACETAMINOPHEN 325 MG/1
650 TABLET ORAL EVERY 6 HOURS PRN
Status: DISCONTINUED | OUTPATIENT
Start: 2020-03-17 | End: 2020-03-20 | Stop reason: HOSPADM

## 2020-03-17 RX ORDER — CITALOPRAM 20 MG/1
20 TABLET ORAL DAILY
Status: DISCONTINUED | OUTPATIENT
Start: 2020-03-18 | End: 2020-03-20 | Stop reason: HOSPADM

## 2020-03-17 RX ORDER — PROMETHAZINE HYDROCHLORIDE 25 MG/1
12.5 TABLET ORAL EVERY 6 HOURS PRN
Status: DISCONTINUED | OUTPATIENT
Start: 2020-03-17 | End: 2020-03-20 | Stop reason: HOSPADM

## 2020-03-17 RX ADMIN — SODIUM CHLORIDE, PRESERVATIVE FREE 10 ML: 5 INJECTION INTRAVENOUS at 20:01

## 2020-03-17 RX ADMIN — SODIUM CHLORIDE, PRESERVATIVE FREE 10 ML: 5 INJECTION INTRAVENOUS at 19:58

## 2020-03-17 RX ADMIN — ENOXAPARIN SODIUM 40 MG: 40 INJECTION SUBCUTANEOUS at 19:58

## 2020-03-17 RX ADMIN — PRAMIPEXOLE DIHYDROCHLORIDE 1 MG: 0.25 TABLET ORAL at 20:14

## 2020-03-17 RX ADMIN — ATORVASTATIN CALCIUM 40 MG: 40 TABLET, FILM COATED ORAL at 19:58

## 2020-03-17 RX ADMIN — ACETAMINOPHEN 650 MG: 325 TABLET ORAL at 19:58

## 2020-03-17 RX ADMIN — INSULIN GLARGINE 85 UNITS: 100 INJECTION, SOLUTION SUBCUTANEOUS at 20:01

## 2020-03-17 ASSESSMENT — PAIN SCALES - GENERAL
PAINLEVEL_OUTOF10: 0
PAINLEVEL_OUTOF10: 2
PAINLEVEL_OUTOF10: 3

## 2020-03-17 ASSESSMENT — ENCOUNTER SYMPTOMS
DIARRHEA: 0
BACK PAIN: 0
VOMITING: 0
ABDOMINAL PAIN: 0
COUGH: 0
ABDOMINAL DISTENTION: 0
WHEEZING: 0
SHORTNESS OF BREATH: 0
BLOOD IN STOOL: 0

## 2020-03-17 ASSESSMENT — PAIN DESCRIPTION - PAIN TYPE: TYPE: CHRONIC PAIN

## 2020-03-17 ASSESSMENT — PAIN DESCRIPTION - LOCATION: LOCATION: LEG

## 2020-03-17 NOTE — PROGRESS NOTES
Lewis Crump arrived to room # [id number]. Presented with: bradycardia  Mental Status: Patient is oriented, alert, thought processes intact and able to concentrate and follow conversation. Vitals:    03/17/20 1801   BP: (!) 140/68   Pulse: 73   Resp: 20   Temp: 97.6 °F (36.4 °C)   SpO2: 94%     Patient safety contract and falls prevention contract reviewed with patient Yes and No.  Oriented Patient to room. Call light within reach. Yes.   Needs, issues or concerns expressed at this time: no.      Electronically signed by Jeffrey Xiao RN on 3/17/2020 at 6:08 PM

## 2020-03-17 NOTE — PROGRESS NOTES
4 Eyes Skin Assessment    Gerard Dumont is being assessed upon: Admission    I agree that I, Marta Hernandez, along with Gideon Wolfe RN (either 2 RN's or 1 LPN and 1 RN) have performed a thorough Head to Toe Skin Assessment on the patient. ALL assessment sites listed below have been assessed. Areas assessed by both nurses:     [x]   Head, Face, and Ears   [x]   Shoulders, Back, and Chest  [x]   Arms, Elbows, and Hands   [x]   Coccyx, Sacrum, and Ischium  [x]   Legs, Feet, and Heels    Does the Patient have Skin Breakdown?  No    Johnny Prevention initiated: NA  Wound Care Orders initiated: NA    WOC nurse consulted for Pressure Injury (Stage 3,4, Unstageable, DTI, NWPT, and Complex wounds) and New or Established Ostomies: NA        Primary Nurse eSignature: Marta Hernandez RN on 3/17/2020 at 6:07 PM      Co-Signer eSignature: Electronically signed by Eusebio Pope RN on 3/17/20 at 7:46 PM CDT

## 2020-03-17 NOTE — H&P
Patient:   Hunter Márquez                  1940  MRN: [id number]    PROBLEM LIST:    Patient Active Problem List    Diagnosis Date Noted    Near syncope 11/14/2019     Priority: Low    History of syncope 11/14/2019     Priority: Low    [facility] node [facility] 11/14/2019     Priority: Low    Mixed hyperlipidemia 11/14/2019     Priority: Low    S/P aortic valve replacement with bioprosthetic valve 08/22/2019     Priority: Low    Grade I diastolic dysfunction 98/46/3304     Priority: Low    Pacemaker 08/22/2019     Priority: Low    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     Priority: Low    Syncope 02/21/2019     Priority: Low    Hyponatremia 06/11/2018     Priority: Low    Hypercholesterolemia with hypertriglyceridemia 06/11/2018     Priority: Low    Rheumatic aortic valve insufficiency      Priority: Low    Moderate mitral stenosis      Priority: Low    Pinched nerve in neck 04/18/2018     Priority: Low    CPAP (continuous positive airway pressure) dependence 03/08/2018     Priority: Low    BiPAP (biphasic positive airway pressure) dependence      Priority: Low     Overview Note:     16cm/12cm      Bilateral carpal tunnel syndrome 08/28/2017     Priority: Low    Diabetic polyneuropathy associated with type 2 diabetes mellitus ([facility] 08/29/2016     Priority: Low    Orthostasis      Priority: Low    Vertigo 07/26/2016     Priority: Low    Syncope and collapse 02/17/2016     Priority: Low    Uncontrolled type 2 diabetes mellitus without complication, with long-term current use of insulin ([facility] 02/17/2016     Priority: Low    Obstructive sleep apnea 02/17/2016     Priority: Low    Obesity 02/17/2016     Priority: Low    Restless legs syndrome 02/17/2016     Priority: Low    Gastroesophageal reflux disease without esophagitis 02/17/2016     Priority: Low    Essential hypertension 02/17/2016     Priority: Low    Chronic bilateral low back pain without sciatica 07/16/2012 A0OCBSIX, CARBOXHGBART, 02THERAPY in the last 72 hours. INR: No results for input(s): INR in the last 72 hours. A1c:Invalid input(s): HEMOGLOBIN A1C  URINALYSIS:   Lab Results   Component Value Date    NITRU Negative 09/15/2018    BLOODU Negative 09/15/2018    SPECGRAV 1.022 09/15/2018    GLUCOSEU >=1000 09/15/2018     -----------------------------------------------------------------  IMAGING:  XR CHEST PORTABLE   Final Result   1. Cardiomegaly without acute cardiopulmonary process. Signed by Dr Deepak Marley on 3/17/2020 2:11 PM      NM MYOCARDIAL SPECT REST EXERCISE OR RX    (Results Pending)         Assessment and Recommendations: This is a [de-identified]y.o. year old male with past medical history of bioprosthetic AVR 6/2018, permanent pacemaker placement 12/2013, diabetes mellitus, CK D stage III, obesity presenting with acute onset of lightheadedness with loss of capture of ventricular lead due to increased thresholds for unclear reasons. 1. Pacemaker is recent interrogation had shown a threshold of 1.375 V which has since increased to 2.75 V. His electrolytes are normal. No significant chest pain or ischemia demonstrated clinically. Lead impedances are normal. Battery life of 9 months. Have asked [employer] representatives to have their  contacted. Will admit overnight. Thresholds were increased to 5 V maximal with good capture. No orthostatic changes noted and currently asymptomatic. Follow serial troponins, echocardiogram as well as a Lexiscan study to rule out ischemia.             Electronically signed by Vargas Hooks MD on 3/17/2020 at 4:04 PM

## 2020-03-18 ENCOUNTER — APPOINTMENT (OUTPATIENT)
Dept: NUCLEAR MEDICINE | Age: 80
DRG: 247 | End: 2020-03-18
Payer: MEDICARE

## 2020-03-18 ENCOUNTER — TELEPHONE (OUTPATIENT)
Dept: UROLOGY | Facility: CLINIC | Age: 80
End: 2020-03-18

## 2020-03-18 LAB
EKG P AXIS: NORMAL DEGREES
EKG P-R INTERVAL: NORMAL MS
EKG Q-T INTERVAL: 442 MS
EKG QRS DURATION: 200 MS
EKG QTC CALCULATION (BAZETT): 470 MS
EKG T AXIS: 114 DEGREES
GLUCOSE BLD-MCNC: 167 MG/DL (ref 70–99)
GLUCOSE BLD-MCNC: 169 MG/DL (ref 70–99)
GLUCOSE BLD-MCNC: 179 MG/DL (ref 70–99)
GLUCOSE BLD-MCNC: 278 MG/DL (ref 70–99)
GLUCOSE BLD-MCNC: 327 MG/DL (ref 70–99)
HCT VFR BLD CALC: 50.4 % (ref 42–52)
HEMOGLOBIN: 17 G/DL (ref 14–18)
LV EF: 50 %
LVEF MODALITY: NORMAL
MCH RBC QN AUTO: 30.9 PG (ref 27–31)
MCHC RBC AUTO-ENTMCNC: 33.7 G/DL (ref 33–37)
MCV RBC AUTO: 91.6 FL (ref 80–94)
PDW BLD-RTO: 13.7 % (ref 11.5–14.5)
PERFORMED ON: ABNORMAL
PLATELET # BLD: 122 K/UL (ref 130–400)
PMV BLD AUTO: 10.8 FL (ref 9.4–12.4)
RBC # BLD: 5.5 M/UL (ref 4.7–6.1)
TROPONIN: 0.02 NG/ML (ref 0–0.03)
TROPONIN: <0.01 NG/ML (ref 0–0.03)
WBC # BLD: 5.6 K/UL (ref 4.8–10.8)

## 2020-03-18 PROCEDURE — 2580000003 HC RX 258: Performed by: INTERNAL MEDICINE

## 2020-03-18 PROCEDURE — 93010 ELECTROCARDIOGRAM REPORT: CPT | Performed by: INTERNAL MEDICINE

## 2020-03-18 PROCEDURE — 82947 ASSAY GLUCOSE BLOOD QUANT: CPT

## 2020-03-18 PROCEDURE — 93005 ELECTROCARDIOGRAM TRACING: CPT | Performed by: INTERNAL MEDICINE

## 2020-03-18 PROCEDURE — C8929 TTE W OR WO FOL WCON,DOPPLER: HCPCS

## 2020-03-18 PROCEDURE — 6360000004 HC RX CONTRAST MEDICATION: Performed by: INTERNAL MEDICINE

## 2020-03-18 PROCEDURE — 99232 SBSQ HOSP IP/OBS MODERATE 35: CPT | Performed by: INTERNAL MEDICINE

## 2020-03-18 PROCEDURE — 84484 ASSAY OF TROPONIN QUANT: CPT

## 2020-03-18 PROCEDURE — 3430000000 HC RX DIAGNOSTIC RADIOPHARMACEUTICAL: Performed by: INTERNAL MEDICINE

## 2020-03-18 PROCEDURE — 6360000002 HC RX W HCPCS: Performed by: INTERNAL MEDICINE

## 2020-03-18 PROCEDURE — G0378 HOSPITAL OBSERVATION PER HR: HCPCS

## 2020-03-18 PROCEDURE — 6370000000 HC RX 637 (ALT 250 FOR IP): Performed by: INTERNAL MEDICINE

## 2020-03-18 PROCEDURE — 96372 THER/PROPH/DIAG INJ SC/IM: CPT

## 2020-03-18 PROCEDURE — 78452 HT MUSCLE IMAGE SPECT MULT: CPT

## 2020-03-18 PROCEDURE — 36415 COLL VENOUS BLD VENIPUNCTURE: CPT

## 2020-03-18 PROCEDURE — A9500 TC99M SESTAMIBI: HCPCS | Performed by: INTERNAL MEDICINE

## 2020-03-18 PROCEDURE — 85027 COMPLETE CBC AUTOMATED: CPT

## 2020-03-18 RX ADMIN — SODIUM CHLORIDE, PRESERVATIVE FREE 10 ML: 5 INJECTION INTRAVENOUS at 21:34

## 2020-03-18 RX ADMIN — ASPIRIN 81 MG: 81 TABLET, COATED ORAL at 13:25

## 2020-03-18 RX ADMIN — INSULIN LISPRO 8 UNITS: 100 INJECTION, SOLUTION INTRAVENOUS; SUBCUTANEOUS at 16:39

## 2020-03-18 RX ADMIN — CLOPIDOGREL BISULFATE 75 MG: 75 TABLET ORAL at 13:25

## 2020-03-18 RX ADMIN — INSULIN GLARGINE 85 UNITS: 100 INJECTION, SOLUTION SUBCUTANEOUS at 21:35

## 2020-03-18 RX ADMIN — TETRAKIS(2-METHOXYISOBUTYLISOCYANIDE)COPPER(I) TETRAFLUOROBORATE 10 MILLICURIE: 1 INJECTION, POWDER, LYOPHILIZED, FOR SOLUTION INTRAVENOUS at 12:29

## 2020-03-18 RX ADMIN — HYDROCHLOROTHIAZIDE 12.5 MG: 12.5 CAPSULE ORAL at 13:25

## 2020-03-18 RX ADMIN — FLUCONAZOLE 100 MG: 100 TABLET ORAL at 13:25

## 2020-03-18 RX ADMIN — ACETAMINOPHEN 650 MG: 325 TABLET ORAL at 21:34

## 2020-03-18 RX ADMIN — SODIUM CHLORIDE, PRESERVATIVE FREE 10 ML: 5 INJECTION INTRAVENOUS at 13:29

## 2020-03-18 RX ADMIN — SODIUM CHLORIDE, PRESERVATIVE FREE 10 ML: 5 INJECTION INTRAVENOUS at 21:41

## 2020-03-18 RX ADMIN — ATENOLOL 25 MG: 25 TABLET ORAL at 13:25

## 2020-03-18 RX ADMIN — PERFLUTREN 1.65 MG: 6.52 INJECTION, SUSPENSION INTRAVENOUS at 09:20

## 2020-03-18 RX ADMIN — TETRAKIS(2-METHOXYISOBUTYLISOCYANIDE)COPPER(I) TETRAFLUOROBORATE 30 MILLICURIE: 1 INJECTION, POWDER, LYOPHILIZED, FOR SOLUTION INTRAVENOUS at 12:29

## 2020-03-18 RX ADMIN — ATORVASTATIN CALCIUM 40 MG: 40 TABLET, FILM COATED ORAL at 21:34

## 2020-03-18 RX ADMIN — PRAMIPEXOLE DIHYDROCHLORIDE 1 MG: 0.25 TABLET ORAL at 21:34

## 2020-03-18 RX ADMIN — INSULIN LISPRO 3 UNITS: 100 INJECTION, SOLUTION INTRAVENOUS; SUBCUTANEOUS at 21:35

## 2020-03-18 RX ADMIN — REGADENOSON 0.4 MG: 0.08 INJECTION, SOLUTION INTRAVENOUS at 10:39

## 2020-03-18 RX ADMIN — CITALOPRAM HYDROBROMIDE 20 MG: 20 TABLET ORAL at 13:25

## 2020-03-18 RX ADMIN — ENOXAPARIN SODIUM 40 MG: 40 INJECTION SUBCUTANEOUS at 18:07

## 2020-03-18 ASSESSMENT — PAIN SCALES - GENERAL
PAINLEVEL_OUTOF10: 0
PAINLEVEL_OUTOF10: 3

## 2020-03-18 ASSESSMENT — PAIN DESCRIPTION - PAIN TYPE: TYPE: CHRONIC PAIN

## 2020-03-18 ASSESSMENT — PAIN DESCRIPTION - LOCATION: LOCATION: LEG

## 2020-03-18 NOTE — PROGRESS NOTES
Pt stated he no longer takes lantus he takes humlin r kwik pen twice a day 60-70 units, but is unable to have any one bring it in for him. Spoke with pharmacist yun, he states that the lantus is longer acting but should be alright for him to take.  Will monitor for symptoms of hypoglycemia

## 2020-03-18 NOTE — TELEPHONE ENCOUNTER
Called wife to see about moving his apt to [facility] scheduled she said he was in the hospital at the moment and she would ask but she thinks he will want to stick with dr judd. I did tell her that dr judd thinks it would be fine for him to see [facility].

## 2020-03-18 NOTE — TELEPHONE ENCOUNTER
Patient's wife stated her  wanted to wait until after he was out of the hospital to reschedule. He will call then.

## 2020-03-18 NOTE — PLAN OF CARE
Problem: Falls - Risk of:  Goal: Will remain free from falls  Description: Will remain free from falls  3/18/2020 0011 by Nito Gentile RN  Outcome: Ongoing     Problem: Falls - Risk of:  Goal: Absence of physical injury  Description: Absence of physical injury  3/18/2020 0011 by Nito Gentile RN  Outcome: Ongoing

## 2020-03-19 LAB
ALBUMIN SERPL-MCNC: 4.6 G/DL (ref 3.5–5.2)
ALP BLD-CCNC: 73 U/L (ref 40–130)
ALT SERPL-CCNC: 59 U/L (ref 5–41)
ANION GAP SERPL CALCULATED.3IONS-SCNC: 15 MMOL/L (ref 7–19)
AST SERPL-CCNC: 40 U/L (ref 5–40)
BILIRUB SERPL-MCNC: 0.8 MG/DL (ref 0.2–1.2)
BUN BLDV-MCNC: 29 MG/DL (ref 8–23)
CALCIUM SERPL-MCNC: 10 MG/DL (ref 8.8–10.2)
CHLORIDE BLD-SCNC: 96 MMOL/L (ref 98–111)
CO2: 25 MMOL/L (ref 22–29)
CREAT SERPL-MCNC: 1.1 MG/DL (ref 0.5–1.2)
EKG P AXIS: NORMAL DEGREES
EKG P-R INTERVAL: NORMAL MS
EKG Q-T INTERVAL: 488 MS
EKG QRS DURATION: 190 MS
EKG QTC CALCULATION (BAZETT): 495 MS
EKG T AXIS: 112 DEGREES
GFR NON-AFRICAN AMERICAN: >60
GLUCOSE BLD-MCNC: 214 MG/DL (ref 70–99)
GLUCOSE BLD-MCNC: 222 MG/DL (ref 70–99)
GLUCOSE BLD-MCNC: 245 MG/DL (ref 74–109)
GLUCOSE BLD-MCNC: 256 MG/DL (ref 70–99)
GLUCOSE BLD-MCNC: 259 MG/DL (ref 70–99)
GLUCOSE BLD-MCNC: 323 MG/DL (ref 70–99)
HCT VFR BLD CALC: 50.3 % (ref 42–52)
HEMOGLOBIN: 17 G/DL (ref 14–18)
MCH RBC QN AUTO: 30.8 PG (ref 27–31)
MCHC RBC AUTO-ENTMCNC: 33.8 G/DL (ref 33–37)
MCV RBC AUTO: 91.1 FL (ref 80–94)
PDW BLD-RTO: 13.6 % (ref 11.5–14.5)
PERFORMED ON: ABNORMAL
PLATELET # BLD: 122 K/UL (ref 130–400)
PMV BLD AUTO: 11.2 FL (ref 9.4–12.4)
POTASSIUM SERPL-SCNC: 4.6 MMOL/L (ref 3.5–5)
RBC # BLD: 5.52 M/UL (ref 4.7–6.1)
SODIUM BLD-SCNC: 136 MMOL/L (ref 136–145)
TOTAL PROTEIN: 6.6 G/DL (ref 6.6–8.7)
TROPONIN: 0.01 NG/ML (ref 0–0.03)
TROPONIN: 0.02 NG/ML (ref 0–0.03)
WBC # BLD: 5.4 K/UL (ref 4.8–10.8)

## 2020-03-19 PROCEDURE — 92929 HC PRQ CARD STENT W/ANGIO ADDL: CPT

## 2020-03-19 PROCEDURE — 92929 PR PRQ TRLUML CORONARY STENT W/ANGIO ADDL ART/BRNCH: CPT | Performed by: INTERNAL MEDICINE

## 2020-03-19 PROCEDURE — 99153 MOD SED SAME PHYS/QHP EA: CPT

## 2020-03-19 PROCEDURE — 93458 L HRT ARTERY/VENTRICLE ANGIO: CPT | Performed by: INTERNAL MEDICINE

## 2020-03-19 PROCEDURE — 6370000000 HC RX 637 (ALT 250 FOR IP): Performed by: INTERNAL MEDICINE

## 2020-03-19 PROCEDURE — 93571 IV DOP VEL&/PRESS C FLO 1ST: CPT | Performed by: INTERNAL MEDICINE

## 2020-03-19 PROCEDURE — 99152 MOD SED SAME PHYS/QHP 5/>YRS: CPT | Performed by: INTERNAL MEDICINE

## 2020-03-19 PROCEDURE — 4A023N7 MEASUREMENT OF CARDIAC SAMPLING AND PRESSURE, LEFT HEART, PERCUTANEOUS APPROACH: ICD-10-PCS | Performed by: INTERNAL MEDICINE

## 2020-03-19 PROCEDURE — 99152 MOD SED SAME PHYS/QHP 5/>YRS: CPT

## 2020-03-19 PROCEDURE — G0378 HOSPITAL OBSERVATION PER HR: HCPCS

## 2020-03-19 PROCEDURE — 2500000003 HC RX 250 WO HCPCS

## 2020-03-19 PROCEDURE — 92928 PRQ TCAT PLMT NTRAC ST 1 LES: CPT

## 2020-03-19 PROCEDURE — 93458 L HRT ARTERY/VENTRICLE ANGIO: CPT

## 2020-03-19 PROCEDURE — 6360000002 HC RX W HCPCS

## 2020-03-19 PROCEDURE — 84484 ASSAY OF TROPONIN QUANT: CPT

## 2020-03-19 PROCEDURE — 92928 PRQ TCAT PLMT NTRAC ST 1 LES: CPT | Performed by: INTERNAL MEDICINE

## 2020-03-19 PROCEDURE — B2111ZZ FLUOROSCOPY OF MULTIPLE CORONARY ARTERIES USING LOW OSMOLAR CONTRAST: ICD-10-PCS | Performed by: INTERNAL MEDICINE

## 2020-03-19 PROCEDURE — 6360000004 HC RX CONTRAST MEDICATION: Performed by: INTERNAL MEDICINE

## 2020-03-19 PROCEDURE — C1894 INTRO/SHEATH, NON-LASER: HCPCS

## 2020-03-19 PROCEDURE — 96372 THER/PROPH/DIAG INJ SC/IM: CPT

## 2020-03-19 PROCEDURE — B2151ZZ FLUOROSCOPY OF LEFT HEART USING LOW OSMOLAR CONTRAST: ICD-10-PCS | Performed by: INTERNAL MEDICINE

## 2020-03-19 PROCEDURE — 6370000000 HC RX 637 (ALT 250 FOR IP)

## 2020-03-19 PROCEDURE — C1887 CATHETER, GUIDING: HCPCS

## 2020-03-19 PROCEDURE — 027136Z DILATION OF CORONARY ARTERY, TWO ARTERIES WITH THREE DRUG-ELUTING INTRALUMINAL DEVICES, PERCUTANEOUS APPROACH: ICD-10-PCS | Performed by: INTERNAL MEDICINE

## 2020-03-19 PROCEDURE — 85027 COMPLETE CBC AUTOMATED: CPT

## 2020-03-19 PROCEDURE — 82947 ASSAY GLUCOSE BLOOD QUANT: CPT

## 2020-03-19 PROCEDURE — 36415 COLL VENOUS BLD VENIPUNCTURE: CPT

## 2020-03-19 PROCEDURE — C1874 STENT, COATED/COV W/DEL SYS: HCPCS

## 2020-03-19 PROCEDURE — 80053 COMPREHEN METABOLIC PANEL: CPT

## 2020-03-19 PROCEDURE — 2709999900 HC NON-CHARGEABLE SUPPLY

## 2020-03-19 PROCEDURE — C1769 GUIDE WIRE: HCPCS

## 2020-03-19 PROCEDURE — C1725 CATH, TRANSLUMIN NON-LASER: HCPCS

## 2020-03-19 PROCEDURE — 2580000003 HC RX 258: Performed by: INTERNAL MEDICINE

## 2020-03-19 PROCEDURE — 93005 ELECTROCARDIOGRAM TRACING: CPT | Performed by: INTERNAL MEDICINE

## 2020-03-19 PROCEDURE — 93571 IV DOP VEL&/PRESS C FLO 1ST: CPT

## 2020-03-19 PROCEDURE — 6360000002 HC RX W HCPCS: Performed by: INTERNAL MEDICINE

## 2020-03-19 RX ORDER — NITROGLYCERIN 0.4 MG/1
0.4 TABLET SUBLINGUAL EVERY 5 MIN PRN
Status: DISCONTINUED | OUTPATIENT
Start: 2020-03-19 | End: 2020-03-20 | Stop reason: HOSPADM

## 2020-03-19 RX ORDER — PRAMIPEXOLE DIHYDROCHLORIDE 0.25 MG/1
1 TABLET ORAL 3 TIMES DAILY
Status: DISCONTINUED | OUTPATIENT
Start: 2020-03-19 | End: 2020-03-20 | Stop reason: HOSPADM

## 2020-03-19 RX ORDER — PRAMIPEXOLE DIHYDROCHLORIDE 0.25 MG/1
1 TABLET ORAL 3 TIMES DAILY
Status: DISCONTINUED | OUTPATIENT
Start: 2020-03-19 | End: 2020-03-19

## 2020-03-19 RX ORDER — SODIUM CHLORIDE 9 MG/ML
INJECTION, SOLUTION INTRAVENOUS CONTINUOUS
Status: DISCONTINUED | OUTPATIENT
Start: 2020-03-19 | End: 2020-03-20 | Stop reason: HOSPADM

## 2020-03-19 RX ORDER — ACETAMINOPHEN 325 MG/1
650 TABLET ORAL EVERY 4 HOURS PRN
Status: DISCONTINUED | OUTPATIENT
Start: 2020-03-19 | End: 2020-03-20 | Stop reason: HOSPADM

## 2020-03-19 RX ORDER — SODIUM CHLORIDE 0.9 % (FLUSH) 0.9 %
10 SYRINGE (ML) INJECTION PRN
Status: DISCONTINUED | OUTPATIENT
Start: 2020-03-19 | End: 2020-03-19 | Stop reason: SDUPTHER

## 2020-03-19 RX ORDER — ALPRAZOLAM 0.5 MG/1
0.5 TABLET ORAL
Status: ACTIVE | OUTPATIENT
Start: 2020-03-19 | End: 2020-03-19

## 2020-03-19 RX ORDER — SODIUM CHLORIDE 0.9 % (FLUSH) 0.9 %
10 SYRINGE (ML) INJECTION EVERY 12 HOURS SCHEDULED
Status: DISCONTINUED | OUTPATIENT
Start: 2020-03-19 | End: 2020-03-19 | Stop reason: SDUPTHER

## 2020-03-19 RX ORDER — ONDANSETRON 2 MG/ML
4 INJECTION INTRAMUSCULAR; INTRAVENOUS EVERY 6 HOURS PRN
Status: DISCONTINUED | OUTPATIENT
Start: 2020-03-19 | End: 2020-03-20 | Stop reason: HOSPADM

## 2020-03-19 RX ADMIN — ASPIRIN 81 MG: 81 TABLET, COATED ORAL at 10:37

## 2020-03-19 RX ADMIN — INSULIN LISPRO 4 UNITS: 100 INJECTION, SOLUTION INTRAVENOUS; SUBCUTANEOUS at 18:00

## 2020-03-19 RX ADMIN — ATORVASTATIN CALCIUM 40 MG: 40 TABLET, FILM COATED ORAL at 21:16

## 2020-03-19 RX ADMIN — SODIUM CHLORIDE: 9 INJECTION, SOLUTION INTRAVENOUS at 15:28

## 2020-03-19 RX ADMIN — FLUCONAZOLE 100 MG: 100 TABLET ORAL at 10:38

## 2020-03-19 RX ADMIN — CLOPIDOGREL BISULFATE 75 MG: 75 TABLET ORAL at 10:37

## 2020-03-19 RX ADMIN — IOPAMIDOL 250 ML: 612 INJECTION, SOLUTION INTRAVENOUS at 09:36

## 2020-03-19 RX ADMIN — INSULIN LISPRO 4 UNITS: 100 INJECTION, SOLUTION INTRAVENOUS; SUBCUTANEOUS at 10:41

## 2020-03-19 RX ADMIN — ATENOLOL 25 MG: 25 TABLET ORAL at 10:38

## 2020-03-19 RX ADMIN — CITALOPRAM HYDROBROMIDE 20 MG: 20 TABLET ORAL at 10:38

## 2020-03-19 RX ADMIN — SODIUM CHLORIDE, PRESERVATIVE FREE 10 ML: 5 INJECTION INTRAVENOUS at 21:16

## 2020-03-19 RX ADMIN — PRAMIPEXOLE DIHYDROCHLORIDE 1 MG: 0.25 TABLET ORAL at 16:52

## 2020-03-19 RX ADMIN — ENOXAPARIN SODIUM 40 MG: 40 INJECTION SUBCUTANEOUS at 18:41

## 2020-03-19 RX ADMIN — INSULIN LISPRO 4 UNITS: 100 INJECTION, SOLUTION INTRAVENOUS; SUBCUTANEOUS at 21:17

## 2020-03-19 RX ADMIN — INSULIN GLARGINE 85 UNITS: 100 INJECTION, SOLUTION SUBCUTANEOUS at 21:17

## 2020-03-19 RX ADMIN — HYDROCHLOROTHIAZIDE 12.5 MG: 12.5 CAPSULE ORAL at 10:38

## 2020-03-19 ASSESSMENT — PAIN SCALES - GENERAL
PAINLEVEL_OUTOF10: 0

## 2020-03-20 VITALS
WEIGHT: 231.2 LBS | SYSTOLIC BLOOD PRESSURE: 112 MMHG | HEIGHT: 70 IN | TEMPERATURE: 97.2 F | RESPIRATION RATE: 16 BRPM | HEART RATE: 63 BPM | DIASTOLIC BLOOD PRESSURE: 71 MMHG | BODY MASS INDEX: 33.1 KG/M2 | OXYGEN SATURATION: 93 %

## 2020-03-20 LAB
EKG P AXIS: NORMAL DEGREES
EKG P-R INTERVAL: NORMAL MS
EKG Q-T INTERVAL: 480 MS
EKG QRS DURATION: 178 MS
EKG QTC CALCULATION (BAZETT): 481 MS
EKG T AXIS: 102 DEGREES
GLUCOSE BLD-MCNC: 270 MG/DL (ref 70–99)
GLUCOSE BLD-MCNC: 313 MG/DL (ref 70–99)
HCT VFR BLD CALC: 47.3 % (ref 42–52)
HEMOGLOBIN: 16.4 G/DL (ref 14–18)
LV EF: 47 %
LVEF MODALITY: NORMAL
MCH RBC QN AUTO: 30.9 PG (ref 27–31)
MCHC RBC AUTO-ENTMCNC: 34.7 G/DL (ref 33–37)
MCV RBC AUTO: 89.2 FL (ref 80–94)
PDW BLD-RTO: 13.8 % (ref 11.5–14.5)
PERFORMED ON: ABNORMAL
PERFORMED ON: ABNORMAL
PLATELET # BLD: 117 K/UL (ref 130–400)
PMV BLD AUTO: 11 FL (ref 9.4–12.4)
RBC # BLD: 5.3 M/UL (ref 4.7–6.1)
TROPONIN: 0.03 NG/ML (ref 0–0.03)
WBC # BLD: 5.5 K/UL (ref 4.8–10.8)

## 2020-03-20 PROCEDURE — 36415 COLL VENOUS BLD VENIPUNCTURE: CPT

## 2020-03-20 PROCEDURE — 82947 ASSAY GLUCOSE BLOOD QUANT: CPT

## 2020-03-20 PROCEDURE — 6370000000 HC RX 637 (ALT 250 FOR IP): Performed by: INTERNAL MEDICINE

## 2020-03-20 PROCEDURE — 99238 HOSP IP/OBS DSCHRG MGMT 30/<: CPT | Performed by: INTERNAL MEDICINE

## 2020-03-20 PROCEDURE — 2580000003 HC RX 258: Performed by: INTERNAL MEDICINE

## 2020-03-20 PROCEDURE — 85027 COMPLETE CBC AUTOMATED: CPT

## 2020-03-20 PROCEDURE — 93010 ELECTROCARDIOGRAM REPORT: CPT | Performed by: INTERNAL MEDICINE

## 2020-03-20 PROCEDURE — 2140000000 HC CCU INTERMEDIATE R&B

## 2020-03-20 PROCEDURE — 84484 ASSAY OF TROPONIN QUANT: CPT

## 2020-03-20 RX ORDER — CLOPIDOGREL BISULFATE 75 MG/1
75 TABLET ORAL DAILY
Qty: 90 TABLET | Refills: 3 | Status: SHIPPED | OUTPATIENT
Start: 2020-03-20 | End: 2020-07-10 | Stop reason: ALTCHOICE

## 2020-03-20 RX ORDER — ATORVASTATIN CALCIUM 40 MG/1
40 TABLET, FILM COATED ORAL NIGHTLY
Qty: 90 TABLET | Refills: 3 | Status: SHIPPED | OUTPATIENT
Start: 2020-03-20 | End: 2021-02-05 | Stop reason: SDUPTHER

## 2020-03-20 RX ADMIN — CITALOPRAM HYDROBROMIDE 20 MG: 20 TABLET ORAL at 08:48

## 2020-03-20 RX ADMIN — PANTOPRAZOLE SODIUM 40 MG: 40 TABLET, DELAYED RELEASE ORAL at 05:18

## 2020-03-20 RX ADMIN — ATENOLOL 25 MG: 25 TABLET ORAL at 08:49

## 2020-03-20 RX ADMIN — INSULIN LISPRO 6 UNITS: 100 INJECTION, SOLUTION INTRAVENOUS; SUBCUTANEOUS at 08:53

## 2020-03-20 RX ADMIN — FLUCONAZOLE 100 MG: 100 TABLET ORAL at 08:48

## 2020-03-20 RX ADMIN — INSULIN LISPRO 8 UNITS: 100 INJECTION, SOLUTION INTRAVENOUS; SUBCUTANEOUS at 12:17

## 2020-03-20 RX ADMIN — PRAMIPEXOLE DIHYDROCHLORIDE 1 MG: 0.25 TABLET ORAL at 12:16

## 2020-03-20 RX ADMIN — SODIUM CHLORIDE, PRESERVATIVE FREE 10 ML: 5 INJECTION INTRAVENOUS at 08:49

## 2020-03-20 RX ADMIN — HYDROCHLOROTHIAZIDE 12.5 MG: 12.5 CAPSULE ORAL at 08:48

## 2020-03-20 RX ADMIN — ASPIRIN 81 MG: 81 TABLET, COATED ORAL at 08:48

## 2020-03-20 RX ADMIN — CLOPIDOGREL BISULFATE 75 MG: 75 TABLET ORAL at 08:48

## 2020-03-20 ASSESSMENT — PAIN SCALES - GENERAL
PAINLEVEL_OUTOF10: 0

## 2020-03-20 NOTE — PROGRESS NOTES
 Essential hypertension 02/17/2016     Priority: Low    Chronic bilateral low back pain without sciatica 07/16/2012     Priority: Low    Other chest pain 04/30/2012     Priority: Low     Overview Note:     4/30/12  cardiolite  Positive for inferior myocardial ischemia, EF 52%  4/30/12  Cath  Mild to moderate CAD, normal LVFX  4/9/2015  Echo  Moderate AI, normal LVFX  2/17/2016  Echo  Moderate AI, DEE 1.3 cm2  4/18/2018  Echo AS / AI  5/16/2018  AS with moderate AI, decreased LVFX  Cath  Mild CAD  06/07/2018, Aortic Valve Replacement, Implanting a 21 mm [place] Magna Ease Pericardial Tissue Valve by Dr. Aicha Ernandez Date:  3/17/2020    Admission Problem List: Present on Admission:   Pre-syncope   Bradycardia   Lightheadedness      Cardiac Specific Data:  Specialty Problems        Cardiology Problems    Pre-syncope        Other chest pain        Essential hypertension        Syncope and collapse        Orthostasis        Moderate mitral stenosis        Rheumatic aortic valve insufficiency        Hypercholesterolemia with hypertriglyceridemia        Syncope        Vertebrobasilar artery insufficiency        Mixed hyperlipidemia        Near syncope        Sinoatrial node dysfunction (HCC)        Bradycardia            1. 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 2. Permanent pacemaker placement 12/2013, 100% atrial and ventricular paced with recent increase in RV lead threshold with capture failure and presyncopal episodes. 3.  Single-vessel disease involving bifurcation LAD/diagonal treated with complex bifurcation stents 3/19/2020.  4. Diabetes mellitus. 5. CK D stage III. 6. Obesity. Subjective:  Mr. Josey Bolanos is doing much better. He has been ambulating in the hallway without difficulty. He feels much more energy in the last 2 days and he has felt in the last year.     Objective:   /71   Pulse 63   Temp 97.2 °F (36.2 °C) (Temporal)   Resp 16   Ht 5' 10\" (1.778 m)   Wt 231 lb 3.2 oz (104.9 kg)   SpO2 93%   BMI 33.17 kg/m²       Intake/Output Summary (Last 24 hours) at 3/20/2020 1212  Last data filed at 3/20/2020 7700  Gross per 24 hour   Intake 480 ml   Output --   Net 480 ml       Prior to Admission medications    Medication Sig Start Date End Date Taking? Authorizing Provider   fluconazole (DIFLUCAN) 100 MG tablet Take 100 mg by mouth daily   Yes Historical Provider, MD   citalopram (CELEXA) 20 MG tablet TAKE ONE TABLET BY MOUTH DAILY 9/27/19  Yes Marya Hitchcock MD   pramipexole (MIRAPEX) 1.5 MG tablet TAKE ONE TABLET BY MOUTH THREE TIMES DAILY ** MAY CAUSE DROWSINESS 1/8/19  Yes Marya Hitchcock MD   oxyCODONE (ROXICODONE) 5 MG immediate release tablet Take 5 mg by mouth every 4 hours as needed for Pain. Julee Hernandez Historical Provider, MD   aspirin 81 MG EC tablet Take 1 tablet by mouth daily 6/11/18  Yes RITESH Jose Cap   B-D UF III MINI PEN NEEDLES 31G X 5 MM MISC  8/20/16  Yes Historical Provider, MD   clopidogrel (PLAVIX) 75 MG tablet TAKE 1 TABLET BY MOUTH DAILY 9/16/19 11/14/20  Marya Hitchcock MD   Cholecalciferol (VITAMIN D3) 64520 units CAPS  4/18/19   Historical Provider, MD   HUMULIN R U-500 KWIKPEN 500 UNIT/ML SOPN concentrated injection pen  4/20/19   Historical Provider, MD   insulin glargine (LANTUS) 100 UNIT/ML injection vial Inject 85 Units into the skin nightly 2/23/19   Young Luna, DO   Empagliflozin-Metformin HCl ER (SYNJARDY XR)  MG TB24 Take 1 tablet by mouth daily (with breakfast)    Historical Provider, MD   atenolol (TENORMIN) 25 MG tablet Take 25 mg by mouth daily    Historical Provider, MD   hydrochlorothiazide (MICROZIDE) 12.5 MG capsule Take 12.5 mg by mouth daily    Historical Provider, MD   furosemide (LASIX) 20 MG tablet Take 1 tablet by mouth as needed (For weight gain greater than 2.5 lbs in 24 hours.) 2/19/16   Silvio Ngo PA-C   esomeprazole (NEXIUM) 40 MG capsule Take 40 mg by mouth every morning (before breakfast). Historical Provider, MD        pramipexole  1 mg Oral TID    insulin lispro  0-12 Units Subcutaneous TID WC    insulin lispro  0-6 Units Subcutaneous Nightly    pantoprazole  40 mg Oral QAM AC    hydrochlorothiazide  12.5 mg Oral Daily    atenolol  25 mg Oral Daily    aspirin  81 mg Oral Daily    insulin glargine  85 Units Subcutaneous Nightly    clopidogrel  75 mg Oral Daily    citalopram  20 mg Oral Daily    fluconazole  100 mg Oral Daily    sodium chloride flush  10 mL Intravenous 2 times per day    atorvastatin  40 mg Oral Nightly    enoxaparin  40 mg Subcutaneous Daily    sodium chloride flush  10 mL Intravenous 2 times per day    [Held by provider] metFORMIN  1,000 mg Oral Daily with breakfast       TELEMETRY: Paced rhythm    Physical Exam:      Physical Exam  Constitutional:       Appearance: He is well-developed. He is obese. HENT:      Mouth/Throat:      Pharynx: No oropharyngeal exudate. Eyes:      General: No scleral icterus. Right eye: No discharge. Left eye: No discharge. Neck:      Thyroid: No thyromegaly. Vascular: No JVD. Cardiovascular:      Rate and Rhythm: Normal rate and regular rhythm. Heart sounds: Murmur present. No friction rub. No gallop. Pulmonary:      Effort: No respiratory distress. Breath sounds: No stridor. No wheezing or rales. Abdominal:      General: Bowel sounds are normal. There is no distension. Palpations: Abdomen is soft. There is no mass. Tenderness: There is no abdominal tenderness. There is no guarding or rebound. Musculoskeletal:         General: No deformity. Skin:     General: Skin is warm. Coloration: Skin is not pale. Findings: No erythema or rash. Neurological:      Mental Status: He is alert and oriented to person, place, and time. Motor: No abnormal muscle tone.       Coordination: Coordination normal.      Deep heart rates were 76 and  76 respectively. Lexiscan/Cardiolyte Nuclear Medicine Report Date of Procedure: 3/18/2020 The patient was injected with 57.78 millicuries (mCi) of Technetium (Tc99m). After an appropriate level of stress the patient was re-injected with 00.74 millicuries (mCi) of Technetium (Tc99m). Repeat gated images were then performed per standard protocol. Findings: 1. Analysis of the the stress and rest images reveals mild to moderate inferior reversible defect. Moderate-sized basal to mid anterolateral reversible defect. 2.  Analysis of the gated images reveals mildly reduced left ventricular function with a calculated ejection fraction of 47 %. Impression: There is mild to moderate inferior and moderate basal to mid anterolateral ischemia, with a calculated ejection fraction of 47 %. Suggest: Consideration for cardiac catheterization. Signed by Dr Ania Colon on 3/18/2020 5:11 PM        Assessment and Plan: This is [de-identified] y.o. year old male with past medical history of bioprosthetic AVR 6/2018, permanent pacemaker placement 12/2013, diabetes mellitus, CK D stage III, obesity presenting with acute onset of lightheadedness/presncope with loss of capture of ventricular lead due to increased thresholds for unclear reasons, finding of LAD bifurcation disease treated with complex bifurcation stents 3/19/2020 with increase in RV lead voltage with good capture. 1.  No clear etiology of higher threshold in RV lead has been determined. [employer] engineers do recommend replacement of pacemaker at some point. He is likely CARLOS in 6 months. We will continue to monitor his lead for any further failure as this would require lead extraction and replacement versus just battery change. He does feel significantly better. Continue Plavix uninterrupted for 1 year. Would likely continue Plavix lifelong as a sole antiplatelet agent going forward.   2.  Can possibly be discharged and follow-up as an outpatient. Close follow-up of RV lead as an outpatient going forward.         Clare Dodd MD 3/20/2020 12:12 PM

## 2020-03-20 NOTE — DISCHARGE SUMMARY
3/17/2020 2:11 PM    Nm Myocardial Spect Rest Exercise Or Rx    Result Date: 3/18/2020  Lexiscan Nuclear Stress Test Report Procedure date: 3/18/20 Indications: Syncope, bradycardia Procedure: Stress was performed with injection of 0.4 mg Lexiscan. Vital signs and EKG were monitored. Technetium-99 sestamibi was injected in divided doses, approximately 10 mCi and 30 mCi respectively for rest and stress imaging. The patient was discharged in stable condition. Results: Patient had symptoms of dyspnea, light headed during infusion that resolved in recovery. Baseline EKG showed paced rhythm. During stress there were no significant EKG changes or rhythm changes. Baseline and peak blood pressures were 153/71, and 153/71 respectively. Baseline and peak heart rates were 76 and  76 respectively. Lexiscan/Cardiolyte Nuclear Medicine Report Date of Procedure: 3/18/2020 The patient was injected with 59.83 millicuries (mCi) of Technetium (Tc99m). After an appropriate level of stress the patient was re-injected with 79.58 millicuries (mCi) of Technetium (Tc99m). Repeat gated images were then performed per standard protocol. Findings: 1. Analysis of the the stress and rest images reveals mild to moderate inferior reversible defect. Moderate-sized basal to mid anterolateral reversible defect. 2.  Analysis of the gated images reveals mildly reduced left ventricular function with a calculated ejection fraction of 47 %. Impression: There is mild to moderate inferior and moderate basal to mid anterolateral ischemia, with a calculated ejection fraction of 47 %. Suggest: Consideration for cardiac catheterization.  Signed by Dr Sueellen Homans on 3/18/2020 5:11 PM      Pertinent Labs:   CBC:   Recent Labs     03/18/20  0623 03/19/20  0151 03/20/20  0108   WBC 5.6 5.4 5.5   HGB 17.0 17.0 16.4   * 122* 117*     BMP:    Recent Labs     03/17/20  1330 03/19/20  0151   * 136   K 4.3 4.6   CL 99 96*   CO2 19* 25   BUN 31* 29* CREATININE 1.4* 1.1   GLUCOSE 278* 245*     INR: No results for input(s): INR in the last 72 hours. Lipids: No results for input(s): CHOL, HDL in the last 72 hours. Invalid input(s): LDLCALCU  ABGs:No results for input(s): PHART, ILU9DEW, PO2ART, HNZ7MGR, BEART, HGBAE, N4MKPVJD, CARBOXHGBART, 02THERAPY in the last 72 hours. HgBA1c:  No results for input(s): LABA1C in the last 72 hours. Procedures: Catheterization/PCI   Procedure     Procedure Type      Diagnostic procedure:DCA, Coronary Angiogram, Left Heart Cath, Left   Ventriculogram, Left Ventricular Pressure Measurement, Other Diagnostic   Procedure, iFR      PCI procedure:LAD, Diag/Septal1, LAURI, LAD, LAURI      Conclusions      Single-vessel LAD proximal to mid bifurcation stenosis. Normal LV ejection fraction. Successful PCI to proximal to mid LAD and 1st diagonal utilizing \"collar   and transferred technique\". Proximal LAD stented with 3.0 by 18 mm resolute integrity (collar stent)   just to bifurcation with the 1st diagonal.   Mid LAD (2.5 x 14 mm resolute integrity) and 1st diagonal (2.5 x 14   millimeters resolute integrity) stented in kissing stent fashion with   proximal edge is just within distal edge of collar stent. Recommendations      Medical management. Monitor pacemaker going forward. Signatures      ----------------------------------------------------------------   Electronically signed by Ernesto Cameron MD(Performing Physician) on   03/19/2020 14:13   ----------------------------------------------------------------      Angiographic Findings      Cardiac Arteries and Lesion Findings     LMCA: Left main patent.     LAD: LAD proximal to mid bifurcation 70% stenosis involving 1st diagonal.  IFR equals 0.85.  1st diagonal ostial proximal 80% stenosis. Lesion on Prox LAD: 70% stenosis reduced to 0%. Pre procedure ERICA III    flow was noted. Post Procedure ERICA III flow was present. The guidewire    cross was successful.  The lesion was diagnosed as Moderate Risk (B). Treatment results:Interventional treatment was successful. Comments:LAD proximal to mid 70% bifurcation stenosis with large 1st    diagonal.    LAD proximal to mid lesion assessed physiologically with IFR. IFR equals    0.85. Proximal LAD stented with 3.0 by 18 mm resolute integrity (collar stent)    just to bifurcation with the 1st diagonal.    Mid LAD (2.5 x 14 mm resolute integrity) and 1st diagonal (2.5 x 14    millimeters resolute integrity) stented in kissing stent fashion with    proximal edge is just within distal edge of collar stent. Devices used       - [employer] Pressure Guide Wire. Number of passes: 4.       - Balanced Middleweight Universal. Number of passes: 1.       - [employer] Euphora 2.0mm x12mm RX Balloon Catheter. 1 inflation(s) to    a max pressure of: 10 enzo. - Resolute Integrity 3.0 x 18 LAURI. Diameter: 3 mm. Length: 18 mm. 1    inflation(s) to a max pressure of: 10 enzo. - Abbott . [id number] Whisper MS Guidewire. Number of passes: 1. Lesion on 1st Dia% stenosis reduced to 0%. Pre procedure ERICA III    flow was noted. Post Procedure ERICA III flow was present. The guidewire    cross was successful. The lesion was diagnosed as Moderate Risk (B). Treatment results:Interventional treatment was successful. Comments:1st diagonal ostial to proximal (2.5 x 14 mm resolute integrity)       Devices used       - Balanced Middleweight Universal. Number of passes: 1.       - [employer]onic Euphora 2.0mm x12mm RX Balloon Catheter. 1 inflation(s) to    a max pressure of: 10 enzo. - Resolute Integrity 2.5 x 14 LAURI. Diameter: 2.5 mm. Length: 14 mm. 1    inflation(s) to a max pressure of: 10 enzo. Lesion on Mid LAD: 70% stenosis reduced to 0%. Pre procedure ERICA III    flow was noted. Post Procedure ERICA III flow was present. The guidewire    cross was successful. The lesion was diagnosed as Moderate Risk (B). Comments:Mid LAD stent (2.5 x 14 mm resolute integrity)       Devices used       - Resolute Integrity 2.5 x 14 LAURI. Diameter: 2.5 mm. Length: 14 mm. 1    inflation(s) to a max pressure of: 14 enzo. - Abbott [id number] NC Trek RX Balloon Catheter. Diameter: 3 mm. Length:    8 mm. 1 inflation(s) to a max pressure of: 14 enzo. LCx: Circumflex is a large vessel with ostial 30% stenosis. Lesion on Prox CX: 30% stenosis 7 mm length. RCA: RCA mid diffuse 20% stenosis. Lesion on Prox RCA: 20% stenosis 22 mm length. VA     LV function assessed as:Normal.  Ejection Fraction    - Method: LV gram. EF%: 55.     Hospital Course:   1. 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 2. Permanent pacemaker placement 12/2013, 100% atrial and ventricular paced with recent increase in RV lead threshold with capture failure and presyncopal episodes. 3.  Single-vessel disease involving bifurcation LAD/diagonal treated with complex bifurcation stents 3/19/2020.  4. Diabetes mellitus. 5. CK D stage III. 6. Obesity.   PRESENTATION: Vinod Gonzalez is a [de-identified]y.o. year old male who presents with severe lightheadedness with presyncopal episode around 10:30 to 11 AM this morning. Evaluation in emergency room had shown loss of capture of his ventricular lead with heart rate at 25 bpm. Patient had pacemaker implantation 12/10/2013. He is 100 percent atrial paced and ventricular paced. Interrogation of his pacemaker shows a sudden increase in threshold to 2.75 V on manual testing. Recent interrogation by automatic device threshold testing had shown 1.375 V. Upper limit was set for 2.5 V. His device upper limit threshold was set to 5 V with capture elicited. No obvious reason for sudden increase in ventricular lead threshold. Impedance is normal in the 400 range. Battery shows 9 months left. Electrolytes were normal.  1.  No loss of capture on higher pacing voltage. [employer] engineers are evaluating for issues in this regard though etiology remains unclear. Device is CARLOS but does have battery life of 9 months. Likely increase usage and shorten battery life with higher voltage. 2.  Lexiscan study with multiple reversible defects noted. Previous catheterization in 2018 without significant disease. Basal to mid anterior wall reduced uptake, inferior reduced uptake as well as mid lateral on stress images consistent with possible ischemia. Echo with mid to apical septal hypokinesis. Cardiac catheterization with LAD proximal to mid bifurcation lesion with diagonal treated with complex bifurcation stents. This is [de-identified] y.o. year old male with past medical history of bioprosthetic AVR 6/2018, permanent pacemaker placement 12/2013, diabetes mellitus, CK D stage III, obesity presenting with acute onset of lightheadedness/presncope with loss of capture of ventricular lead due to increased thresholds for unclear reasons, finding of LAD bifurcation disease treated with complex bifurcation stents 3/19/2020 with increase in RV lead voltage with good capture.     1. No clear etiology of higher threshold in RV lead has been determined. [employer] engineers do recommend replacement of pacemaker at some point. He is likely CARLOS in 6 months. We will continue to monitor his lead for any further failure as this would require lead extraction and replacement versus just battery change. He does feel significantly better. Continue Plavix uninterrupted for 1 year. Would likely continue Plavix lifelong as a sole antiplatelet agent going forward. 2.  Can possibly be discharged and follow-up as an outpatient.   Close follow-up of RV lead as an outpatient going forward.     Physical Exam:    Vital Signs: /71   Pulse 63   Temp 97.2 °F (36.2 °C) (Temporal)   Resp 16   Ht 5' 10\" (1.778 m)   Wt 231 lb 3.2 oz (104.9 kg)   SpO2 93%   BMI 33.17 kg/m²     Physical Exam      Discharge Medications:       [facility]   Home Medication Instructions [id number]    Printed on:03/20/20 1224   Medication Information                      aspirin 81 MG EC tablet  Take 1 tablet by mouth daily             atenolol (TENORMIN) 25 MG tablet  Take 25 mg by mouth daily             atorvastatin (LIPITOR) 40 MG tablet  Take 1 tablet by mouth nightly             B-D UF III MINI PEN NEEDLES 31G X 5 MM MISC               Cholecalciferol (VITAMIN D3) 51101 units CAPS               citalopram (CELEXA) 20 MG tablet  TAKE ONE TABLET BY MOUTH DAILY             clopidogrel (PLAVIX) 75 MG tablet  Take 1 tablet by mouth daily             Empagliflozin-Metformin HCl ER (SYNJARDY XR)  MG TB24  Take 1 tablet by mouth daily (with breakfast)             esomeprazole (NEXIUM) 40 MG capsule  Take 40 mg by mouth every morning (before breakfast). furosemide (LASIX) 20 MG tablet  Take 1 tablet by mouth as needed (For weight gain greater than 2.5 lbs in 24 hours.)             HUMULIN R U-500 KWIKPEN 500 UNIT/ML SOPN concentrated injection pen               hydrochlorothiazide (MICROZIDE) 12.5 MG capsule  Take 12.5 mg by mouth daily             insulin glargine (LANTUS) 100 UNIT/ML injection vial  Inject 85 Units into the skin nightly             oxyCODONE (ROXICODONE) 5 MG immediate release tablet  Take 5 mg by mouth every 4 hours as needed for Pain. .             pramipexole (MIRAPEX) 1.5 MG tablet  TAKE ONE TABLET BY MOUTH THREE TIMES DAILY ** MAY CAUSE DROWSINESS                 Discharge Instructions:   RITESH Davis - CNP  [address] [place] [address]  [phone]    On 4/1/2020  appt 0830am  Follow-up closely with pacemaker clinic for RV lead regular interrogations and possible generator change within 4 to 6 months if no RV lead issues noted. Take medications as directed.   Resume activity as tolerated.     Diet: DIET CARDIAC; No Caffeine     Disposition: Patient is medically stable and will be discharged *    Electronically signed by Elliot Lo MD on 3/20/2020 at 12:24 PM

## 2020-03-20 NOTE — PROGRESS NOTES
Pt received medication and is resting. No complaints at this time. Will continue to monitor.  Electronically signed by Kassandra Chu RN on 3/19/2020 at 9:31 PM

## 2020-03-23 ENCOUNTER — TELEPHONE (OUTPATIENT)
Dept: CARDIOLOGY | Age: 80
End: 2020-03-23

## 2020-03-23 NOTE — TELEPHONE ENCOUNTER
WE are calling today to sign you up for My Chart. My Chart is a secure online health connection. Through micaela of this application, you can see your medication, Review your test results, and schedule electronic appointments/or E-visits with all of your [facility] health Providers or physicians. To ensure safe and efficient care during this time, you can micaela My Chart to connect with your care team online. Through My Chart or My Chart Mobile, you can receive online diagnosis and treatment.   Plus you can talk face-to-face over video with your healthcare clinician or arrange follow-up with a provider or physician in person, dependent upon the level of care you need

## 2020-03-30 ENCOUNTER — TELEPHONE (OUTPATIENT)
Dept: CARDIOLOGY | Age: 80
End: 2020-03-30

## 2020-04-01 ENCOUNTER — OFFICE VISIT (OUTPATIENT)
Dept: CARDIOLOGY | Age: 80
End: 2020-04-01
Payer: MEDICARE

## 2020-04-01 VITALS
BODY MASS INDEX: 32.64 KG/M2 | DIASTOLIC BLOOD PRESSURE: 74 MMHG | SYSTOLIC BLOOD PRESSURE: 110 MMHG | WEIGHT: 228 LBS | HEIGHT: 70 IN | HEART RATE: 72 BPM

## 2020-04-01 PROCEDURE — 99213 OFFICE O/P EST LOW 20 MIN: CPT | Performed by: INTERNAL MEDICINE

## 2020-04-01 PROCEDURE — 1036F TOBACCO NON-USER: CPT | Performed by: INTERNAL MEDICINE

## 2020-04-01 PROCEDURE — 93280 PM DEVICE PROGR EVAL DUAL: CPT | Performed by: INTERNAL MEDICINE

## 2020-04-01 PROCEDURE — 4040F PNEUMOC VAC/ADMIN/RCVD: CPT | Performed by: INTERNAL MEDICINE

## 2020-04-01 PROCEDURE — 1123F ACP DISCUSS/DSCN MKR DOCD: CPT | Performed by: INTERNAL MEDICINE

## 2020-04-01 PROCEDURE — G8427 DOCREV CUR MEDS BY ELIG CLIN: HCPCS | Performed by: INTERNAL MEDICINE

## 2020-04-01 PROCEDURE — 1111F DSCHRG MED/CURRENT MED MERGE: CPT | Performed by: INTERNAL MEDICINE

## 2020-04-01 PROCEDURE — G8417 CALC BMI ABV UP PARAM F/U: HCPCS | Performed by: INTERNAL MEDICINE

## 2020-04-01 ASSESSMENT — ENCOUNTER SYMPTOMS
ABDOMINAL PAIN: 0
ABDOMINAL DISTENTION: 0
SHORTNESS OF BREATH: 0
VOMITING: 0
WHEEZING: 0
DIARRHEA: 0
BLOOD IN STOOL: 0
COUGH: 0
BACK PAIN: 0

## 2020-04-01 NOTE — PROGRESS NOTES
Pacemaker interrogated  Presenting rhythm:  AP , AP 98.3%,  100%  Battey voltage 2.85 V, < 2 months to 9 months  Lead status:  Lead impedance within range and stable  Sensing:  P waves 1.9 mV,  R waves paced  Thresholds:  Atrial 1.25V @ 0.4ms, ventricular 3V@ 0.4ms, 2 V @ 1.0ms  Observations:  Battery nearing RRT, elevated RV thresholds  Reprogramming for sensitivity and threshold testing-increased pulse width to 1.0ms to improve   RV safety margin.   Discussed with [employer] rep  [facility]

## 2020-04-17 ASSESSMENT — ENCOUNTER SYMPTOMS
ABDOMINAL PAIN: 0
VOMITING: 0
SHORTNESS OF BREATH: 1

## 2020-04-17 NOTE — ED PROVIDER NOTES
vial, R-0Normal      Empagliflozin-Metformin HCl ER (SYNJARDY XR)  MG TB24 Take 1 tablet by mouth daily (with breakfast)Historical Med      atenolol (TENORMIN) 25 MG tablet Take 25 mg by mouth dailyHistorical Med      hydrochlorothiazide (MICROZIDE) 12.5 MG capsule Take 12.5 mg by mouth dailyHistorical Med      furosemide (LASIX) 20 MG tablet Take 1 tablet by mouth as needed (For weight gain greater than 2.5 lbs in 24 hours.), Disp-30 tablet, R-0      esomeprazole (NEXIUM) 40 MG capsule Take 40 mg by mouth every morning (before breakfast). ALLERGIES     Patient has no known allergies. FAMILY HISTORY       Family History   Problem Relation Age of Onset    Diabetes Mother     Cancer Father     Cancer Sister     Heart Disease Brother     Cancer Brother           SOCIAL HISTORY       Social History     Socioeconomic History    Marital status:       Spouse name: None    Number of children: None    Years of education: None    Highest education level: None   Occupational History    None   Social Needs    Financial resource strain: None    Food insecurity     Worry: None     Inability: None    Transportation needs     Medical: None     Non-medical: None   Tobacco Use    Smoking status: Former Smoker     Types: Cigarettes     Last attempt to quit: 1977     Years since quittin.7    Smokeless tobacco: Former User   Substance and Sexual Activity    Alcohol use: No    Drug use: No    Sexual activity: None   Lifestyle    Physical activity     Days per week: None     Minutes per session: None    Stress: None   Relationships    Social connections     Talks on phone: None     Gets together: None     Attends Jewish service: None     Active member of club or organization: None     Attends meetings of clubs or organizations: None     Relationship status: None    Intimate partner violence     Fear of current or ex partner: None     Emotionally abused: None     Physically abused: None     Forced sexual activity: None   Other Topics Concern    None   Social History Narrative    None       SCREENINGS    Francisco J Coma Scale  Eye Opening: Spontaneous  Best Verbal Response: Oriented  Best Motor Response: Obeys commands  [place] Coma Scale Score: 15        PHYSICAL EXAM    (up to 7 for level 4, 8 or more for level 5)     ED Triage Vitals   BP Temp Temp Source Pulse Resp SpO2 Height Weight   03/17/20 1315 03/17/20 1315 03/17/20 1801 03/17/20 1315 03/17/20 1315 03/17/20 1315 03/17/20 1315 03/17/20 1315   (!) 90/45 98.2 °F (36.8 °C) Temporal (!) 25 20 94 % 5' 10\" (1.778 m) 240 lb (108.9 kg)       Physical Exam  Vitals signs and nursing note reviewed. HENT:      Head: Atraumatic. Mouth/Throat:      Mouth: Mucous membranes are moist. Mucous membranes are not dry. Pharynx: No posterior oropharyngeal erythema. Eyes:      General: No scleral icterus. Pupils: Pupils are equal, round, and reactive to light. Neck:      Trachea: No tracheal deviation. Cardiovascular:      Rate and Rhythm: Regular rhythm. Bradycardia present. Pulses: Normal pulses. Heart sounds: Normal heart sounds. No murmur. Pulmonary:      Effort: Pulmonary effort is normal. No respiratory distress. Breath sounds: Normal breath sounds. No stridor. Abdominal:      General: There is no distension. Palpations: Abdomen is soft. Tenderness: There is no abdominal tenderness. There is no guarding. Musculoskeletal:      Right lower leg: No edema. Left lower leg: No edema. Skin:     Capillary Refill: Capillary refill takes less than 2 seconds. Coloration: Skin is not pale. Findings: No rash. Neurological:      Mental Status: He is alert and oriented to person, place, and time. Psychiatric:         Mood and Affect: Mood normal.         Behavior: Behavior is cooperative.          DIAGNOSTIC RESULTS     EKG: All EKG's areinterpreted by the Emergency Department Physician who either signs or Co-signs this chart in the absence of a cardiologist.    Wide-complex rhythm with a rate of 27. Patient does not appear to have capture based on his pacemaker spikes. RADIOLOGY:  Non-plain film images such as CT, Ultrasound and MRI are read by the radiologist. Plain radiographic images are visualized and preliminarily interpreted bythe emergency physician with the below findings:    CXR: No infiltrate. No obvious pacemaker lead displacement.         LABS:  Labs Reviewed   COMPREHENSIVE METABOLIC PANEL - Abnormal; Notable for the following components:       Result Value    Sodium 135 (*)     CO2 19 (*)     Glucose 278 (*)     BUN 31 (*)     CREATININE 1.4 (*)     GFR Non- 49 (*)     ALT 63 (*)     AST 54 (*)     All other components within normal limits   CBC WITH AUTO DIFFERENTIAL - Abnormal; Notable for the following components:    MCH 31.1 (*)     Neutrophils % 70.3 (*)     Lymphocytes % 13.7 (*)     Monocytes % 12.9 (*)     Monocytes Absolute 1.10 (*)     All other components within normal limits   LACTIC ACID, PLASMA - Abnormal; Notable for the following components:    Lactic Acid 2.0 (*)     All other components within normal limits    Narrative:     CALL  [facility]  [facility] tel. ,  Chemistry results called to and read back by Servando Olivares RN in ED, 03/17/2020  17:16, by [facility]   CBC - Abnormal; Notable for the following components:    Platelets 089 (*)     All other components within normal limits   CBC - Abnormal; Notable for the following components:    Platelets 742 (*)     All other components within normal limits   COMPREHENSIVE METABOLIC PANEL - Abnormal; Notable for the following components:    Chloride 96 (*)     Glucose 245 (*)     BUN 29 (*)     ALT 59 (*)     All other components within normal limits    Narrative:     Collection has been rescheduled by ANNETTE at 03/19/2020 06:01 Reason: add   on per daphney jiang    CBC - Abnormal; Notable for the following components: Platelets 586 (*)     All other components within normal limits   POCT GLUCOSE - Abnormal; Notable for the following components:    POC Glucose 256 (*)     All other components within normal limits   POCT GLUCOSE - Abnormal; Notable for the following components:    POC Glucose 169 (*)     All other components within normal limits   POCT GLUCOSE - Abnormal; Notable for the following components:    POC Glucose 167 (*)     All other components within normal limits   POCT GLUCOSE - Abnormal; Notable for the following components:    POC Glucose 179 (*)     All other components within normal limits   POCT GLUCOSE - Abnormal; Notable for the following components:    POC Glucose 327 (*)     All other components within normal limits   POCT GLUCOSE - Abnormal; Notable for the following components:    POC Glucose 278 (*)     All other components within normal limits   POCT GLUCOSE - Abnormal; Notable for the following components:    POC Glucose 256 (*)     All other components within normal limits   POCT GLUCOSE - Abnormal; Notable for the following components:    POC Glucose 214 (*)     All other components within normal limits   POCT GLUCOSE - Abnormal; Notable for the following components:    POC Glucose 259 (*)     All other components within normal limits   POCT GLUCOSE - Abnormal; Notable for the following components:    POC Glucose 222 (*)     All other components within normal limits   POCT GLUCOSE - Abnormal; Notable for the following components:    POC Glucose 323 (*)     All other components within normal limits   POCT GLUCOSE - Abnormal; Notable for the following components:    POC Glucose 270 (*)     All other components within normal limits   POCT GLUCOSE - Abnormal; Notable for the following components:    POC Glucose 313 (*)     All other components within normal limits   BRAIN NATRIURETIC PEPTIDE   TROPONIN   TROPONIN   TROPONIN   TROPONIN   TROPONIN   TROPONIN   TROPONIN   TROPONIN       All other labs were within normal range or not returned as of this dictation. EMERGENCY DEPARTMENT COURSE and DIFFERENTIAL DIAGNOSIS/MDM:   Vitals:    Vitals:    03/20/20 0640 03/20/20 0833 03/20/20 0859 03/20/20 1041   BP: 134/68   112/71   Pulse: 62  63 63   Resp: 16   16   Temp: 97 °F (36.1 °C)   97.2 °F (36.2 °C)   TempSrc: Temporal   Temporal   SpO2: 91%   93%   Weight:  231 lb 3.2 oz (104.9 kg)     Height:           MDM      CONSULTS:    Case was discussed with Dr. Yoon Seay regarding cardiology consultation. Patient underwent pacemaker interrogation here in the ED with adjustment of parameters with improved capture. Patient will be admitted for further evaluation. PROCEDURES:  Unless otherwise noted below, none     Procedures    FINAL IMPRESSION      1. Bradycardia    2.  Near syncope          DISPOSITION/PLAN   DISPOSITION Admitted 03/17/2020 04:25:09 PM      (Please note that portions of this note were completed with a voice recognition program.  Efforts were made to edit thedictations but occasionally words are mis-transcribed.)    Sarah Betancourt MD (electronically signed)  Attending Emergency Physician         Sarah Betancourt MD  04/17/20 [id number]

## 2020-04-22 ENCOUNTER — OFFICE VISIT (OUTPATIENT)
Dept: CARDIOLOGY | Age: 80
End: 2020-04-22
Payer: MEDICARE

## 2020-04-22 ENCOUNTER — TELEPHONE (OUTPATIENT)
Dept: CARDIOLOGY | Age: 80
End: 2020-04-22

## 2020-04-22 VITALS
BODY MASS INDEX: 31.21 KG/M2 | HEIGHT: 70 IN | HEART RATE: 68 BPM | DIASTOLIC BLOOD PRESSURE: 60 MMHG | SYSTOLIC BLOOD PRESSURE: 130 MMHG | WEIGHT: 218 LBS

## 2020-04-22 PROCEDURE — 93288 INTERROG EVL PM/LDLS PM IP: CPT | Performed by: CLINICAL NURSE SPECIALIST

## 2020-04-22 NOTE — PROGRESS NOTES
Patient is here for pacemaker check only. Battery has reached recommended replacement time. RV thresholds have been high. Consideration for RV lead replacement. Due to the coronavirus, procedure determination form filled out and submitted to committee for pacemaker generator change and possible RV lead replacement. Pacemaker check showed adequate battery status @ RRT  Mode: DDDR  Lead impedances are stable  Pacing:  AP99.5%,, %  Appropriate diagnostics and safety margins noted.   Sustained arrythmia: none  Please see the scanned interrogation report

## 2020-04-28 ENCOUNTER — APPOINTMENT (OUTPATIENT)
Dept: GENERAL RADIOLOGY | Age: 80
End: 2020-04-28
Attending: INTERNAL MEDICINE
Payer: MEDICARE

## 2020-04-28 ENCOUNTER — HOSPITAL ENCOUNTER (OUTPATIENT)
Dept: CARDIAC CATH/INVASIVE PROCEDURES | Age: 80
Discharge: HOME | End: 2020-04-28
Attending: INTERNAL MEDICINE | Admitting: INTERNAL MEDICINE
Payer: MEDICARE

## 2020-04-28 VITALS
DIASTOLIC BLOOD PRESSURE: 87 MMHG | HEIGHT: 70 IN | RESPIRATION RATE: 17 BRPM | BODY MASS INDEX: 30.78 KG/M2 | HEART RATE: 60 BPM | SYSTOLIC BLOOD PRESSURE: 114 MMHG | WEIGHT: 215 LBS | OXYGEN SATURATION: 98 % | TEMPERATURE: 97.3 F

## 2020-04-28 LAB
ANION GAP SERPL CALCULATED.3IONS-SCNC: 16 MMOL/L (ref 7–19)
BASOPHILS ABSOLUTE: 0 K/UL (ref 0–0.2)
BASOPHILS RELATIVE PERCENT: 0.3 % (ref 0–1)
BUN BLDV-MCNC: 29 MG/DL (ref 8–23)
CALCIUM SERPL-MCNC: 10 MG/DL (ref 8.8–10.2)
CHLORIDE BLD-SCNC: 98 MMOL/L (ref 98–111)
CO2: 26 MMOL/L (ref 22–29)
CREAT SERPL-MCNC: 1.1 MG/DL (ref 0.5–1.2)
EKG P AXIS: NORMAL DEGREES
EKG P-R INTERVAL: NORMAL MS
EKG Q-T INTERVAL: 476 MS
EKG QRS DURATION: 204 MS
EKG QTC CALCULATION (BAZETT): 483 MS
EKG T AXIS: 101 DEGREES
EOSINOPHILS ABSOLUTE: 0.1 K/UL (ref 0–0.6)
EOSINOPHILS RELATIVE PERCENT: 0.9 % (ref 0–5)
GFR NON-AFRICAN AMERICAN: >60
GLUCOSE BLD-MCNC: 239 MG/DL (ref 74–109)
HCT VFR BLD CALC: 46.9 % (ref 42–52)
HEMOGLOBIN: 16.2 G/DL (ref 14–18)
IMMATURE GRANULOCYTES #: 0 K/UL
LYMPHOCYTES ABSOLUTE: 0.6 K/UL (ref 1.1–4.5)
LYMPHOCYTES RELATIVE PERCENT: 9.7 % (ref 20–40)
MCH RBC QN AUTO: 31.8 PG (ref 27–31)
MCHC RBC AUTO-ENTMCNC: 34.5 G/DL (ref 33–37)
MCV RBC AUTO: 92 FL (ref 80–94)
MONOCYTES ABSOLUTE: 0.8 K/UL (ref 0–0.9)
MONOCYTES RELATIVE PERCENT: 11.9 % (ref 0–10)
NEUTROPHILS ABSOLUTE: 4.9 K/UL (ref 1.5–7.5)
NEUTROPHILS RELATIVE PERCENT: 76.6 % (ref 50–65)
PDW BLD-RTO: 14.3 % (ref 11.5–14.5)
PLATELET # BLD: 145 K/UL (ref 130–400)
PMV BLD AUTO: 10 FL (ref 9.4–12.4)
POTASSIUM SERPL-SCNC: 4.6 MMOL/L (ref 3.5–5)
RBC # BLD: 5.1 M/UL (ref 4.7–6.1)
SODIUM BLD-SCNC: 140 MMOL/L (ref 136–145)
WBC # BLD: 6.4 K/UL (ref 4.8–10.8)

## 2020-04-28 PROCEDURE — 33228 REMV&REPLC PM GEN DUAL LEAD: CPT | Performed by: INTERNAL MEDICINE

## 2020-04-28 PROCEDURE — 2720000010 HC SURG SUPPLY STERILE

## 2020-04-28 PROCEDURE — 6360000002 HC RX W HCPCS: Performed by: INTERNAL MEDICINE

## 2020-04-28 PROCEDURE — 99153 MOD SED SAME PHYS/QHP EA: CPT

## 2020-04-28 PROCEDURE — 80048 BASIC METABOLIC PNL TOTAL CA: CPT

## 2020-04-28 PROCEDURE — C1785 PMKR, DUAL, RATE-RESP: HCPCS

## 2020-04-28 PROCEDURE — 99152 MOD SED SAME PHYS/QHP 5/>YRS: CPT | Performed by: INTERNAL MEDICINE

## 2020-04-28 PROCEDURE — 6360000002 HC RX W HCPCS

## 2020-04-28 PROCEDURE — 36415 COLL VENOUS BLD VENIPUNCTURE: CPT

## 2020-04-28 PROCEDURE — 33228 REMV&REPLC PM GEN DUAL LEAD: CPT

## 2020-04-28 PROCEDURE — 71045 X-RAY EXAM CHEST 1 VIEW: CPT

## 2020-04-28 PROCEDURE — 93005 ELECTROCARDIOGRAM TRACING: CPT | Performed by: INTERNAL MEDICINE

## 2020-04-28 PROCEDURE — 2580000003 HC RX 258: Performed by: INTERNAL MEDICINE

## 2020-04-28 PROCEDURE — 71046 X-RAY EXAM CHEST 2 VIEWS: CPT

## 2020-04-28 PROCEDURE — 2500000003 HC RX 250 WO HCPCS

## 2020-04-28 PROCEDURE — 85025 COMPLETE CBC W/AUTO DIFF WBC: CPT

## 2020-04-28 PROCEDURE — 93010 ELECTROCARDIOGRAM REPORT: CPT | Performed by: INTERNAL MEDICINE

## 2020-04-28 PROCEDURE — 99152 MOD SED SAME PHYS/QHP 5/>YRS: CPT

## 2020-04-28 RX ORDER — ACETAMINOPHEN 325 MG/1
650 TABLET ORAL EVERY 4 HOURS PRN
Status: DISCONTINUED | OUTPATIENT
Start: 2020-04-28 | End: 2020-04-28 | Stop reason: HOSPADM

## 2020-04-28 RX ORDER — SODIUM CHLORIDE 0.9 % (FLUSH) 0.9 %
10 SYRINGE (ML) INJECTION EVERY 12 HOURS SCHEDULED
Status: DISCONTINUED | OUTPATIENT
Start: 2020-04-28 | End: 2020-04-28 | Stop reason: HOSPADM

## 2020-04-28 RX ORDER — CHLORHEXIDINE GLUCONATE 4 G/100ML
SOLUTION TOPICAL ONCE
Status: DISCONTINUED | OUTPATIENT
Start: 2020-04-28 | End: 2020-04-28 | Stop reason: HOSPADM

## 2020-04-28 RX ORDER — SODIUM CHLORIDE 9 MG/ML
INJECTION, SOLUTION INTRAVENOUS CONTINUOUS
Status: DISCONTINUED | OUTPATIENT
Start: 2020-04-28 | End: 2020-04-28 | Stop reason: HOSPADM

## 2020-04-28 RX ORDER — SODIUM CHLORIDE 0.9 % (FLUSH) 0.9 %
10 SYRINGE (ML) INJECTION PRN
Status: DISCONTINUED | OUTPATIENT
Start: 2020-04-28 | End: 2020-04-28 | Stop reason: HOSPADM

## 2020-04-28 RX ADMIN — Medication 2 G: at 14:30

## 2020-04-28 RX ADMIN — SODIUM CHLORIDE: 9 INJECTION, SOLUTION INTRAVENOUS at 07:36

## 2020-04-28 NOTE — PROGRESS NOTES
AVS EXPLAINED. NEW PT ID CARD & [employer] BOOKLET EXPLAINED & GIVEN. PT & SON VOICED UNDERSTANDINGS.

## 2020-05-04 RX ORDER — CITALOPRAM 20 MG/1
TABLET ORAL
Qty: 30 TABLET | Refills: 5 | Status: SHIPPED | OUTPATIENT
Start: 2020-05-04 | End: 2020-11-11

## 2020-05-04 NOTE — TELEPHONE ENCOUNTER
Requested Prescriptions     Pending Prescriptions Disp Refills    citalopram (CELEXA) 20 MG tablet [Pharmacy Med Name: CITALOPRAM HBR 20 MG TABLET 20 TAB] 30 tablet 5     Sig: TAKE ONE TABLET BY MOUTH DAILY       Last Office Visit: 11/26/2019  Next Office Visit: 6/2/2020  Last Medication Refill: 9/27/19 with 5 refills

## 2020-05-07 ENCOUNTER — TELEPHONE (OUTPATIENT)
Dept: CARDIOLOGY | Age: 80
End: 2020-05-07

## 2020-06-10 ENCOUNTER — OFFICE VISIT (OUTPATIENT)
Dept: CARDIOLOGY | Age: 80
End: 2020-06-10
Payer: MEDICARE

## 2020-06-10 VITALS
HEIGHT: 70 IN | WEIGHT: 214 LBS | DIASTOLIC BLOOD PRESSURE: 58 MMHG | BODY MASS INDEX: 30.64 KG/M2 | HEART RATE: 72 BPM | SYSTOLIC BLOOD PRESSURE: 126 MMHG

## 2020-06-10 PROCEDURE — 93280 PM DEVICE PROGR EVAL DUAL: CPT | Performed by: CLINICAL NURSE SPECIALIST

## 2020-06-10 PROCEDURE — 4040F PNEUMOC VAC/ADMIN/RCVD: CPT | Performed by: CLINICAL NURSE SPECIALIST

## 2020-06-10 PROCEDURE — 99213 OFFICE O/P EST LOW 20 MIN: CPT | Performed by: CLINICAL NURSE SPECIALIST

## 2020-06-10 PROCEDURE — 1123F ACP DISCUSS/DSCN MKR DOCD: CPT | Performed by: CLINICAL NURSE SPECIALIST

## 2020-06-10 PROCEDURE — G8417 CALC BMI ABV UP PARAM F/U: HCPCS | Performed by: CLINICAL NURSE SPECIALIST

## 2020-06-10 PROCEDURE — 1036F TOBACCO NON-USER: CPT | Performed by: CLINICAL NURSE SPECIALIST

## 2020-06-10 PROCEDURE — G8427 DOCREV CUR MEDS BY ELIG CLIN: HCPCS | Performed by: CLINICAL NURSE SPECIALIST

## 2020-06-10 ASSESSMENT — ENCOUNTER SYMPTOMS
EYE REDNESS: 0
ABDOMINAL PAIN: 0
CHEST TIGHTNESS: 0
NAUSEA: 0
BACK PAIN: 1
VOMITING: 0
WHEEZING: 0
SHORTNESS OF BREATH: 1
COUGH: 0
FACIAL SWELLING: 0

## 2020-06-10 NOTE — PROGRESS NOTES
function   RVSP measures 39 mmHg    Lab Results   Component Value Date    LABA1C 7.7 (H) 05/10/2019     No results found for: EAG  Assessment:     Diagnosis Orders   1. Rheumatic aortic valve insufficiency     2. Moderate mitral stenosis     3. Essential hypertension     4. Vertebrobasilar artery insufficiency     5. Sinoatrial node dysfunction (HCC)     6. Pacemaker     7. Grade I diastolic dysfunction     8. Diabetic polyneuropathy associated with type 2 diabetes mellitus ([address]     9. 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 Most recent echo in February shows adequate function    Rheumatic mitral stenosis-mild to moderate per echo in February 2019. Grade 1 diastolic dysfunction- without signs of fluid overload    Vertebral artery insufficiency-monitored by neurologist associated syncope. No further syncope since starting Plavix in recent months. Obstructive sleep apnea- does not wear his mask on a regular basis. He is working with his DME company to find a better fit    Uncontrolled type 2 diabetes- last A1c has improved to 7.7. Continue to strive for less than 7%    Hypertension- well-controlled on current regimen    Pacemaker check showed adequate battery status @ 3.4  Mode: DDDR  Lead impedances are stable  Pacing:  AP 98.9%,  100%  Appropriate diagnostics and safety margins noted. Sustained arrythmia:  none  Reprogramming done for sensitivity and threshold testing. RV outputs decreased by Lissett Mobley RN pacemaker nurse to preserve battery life  Please see the scanned interrogation report    Patient has some mild erythema on the distal edge of his pacemaker site. He is starting an antibiotic that he will be picking up today for an ear infection. We will continue with this and he will let us know if erythema does not clear up in the next week or 2. Stable cardiovascular status.  No evidence of

## 2020-06-10 NOTE — PATIENT INSTRUCTIONS
Return in about 6 months (around 12/10/2020) for ROSCOE Stokes 9/10/20  Continue antibiotic given for ear infection    Call with any questionsor concerns  Follow up with RITESH Gil CNP for non cardiac problems  Report any new problems  Cardiovascular Fitness-Exercise as tolerated. Strive for 15 minutes of exercise most days of the week. Cardiac / HealthyDiet  Continue current medications as directed  Continue plan of treatment  It is always recommended that you bring your medicationsbottles with you to each visit - this is for your safety!

## 2020-06-29 ENCOUNTER — APPOINTMENT (OUTPATIENT)
Dept: GENERAL RADIOLOGY | Age: 80
DRG: 316 | End: 2020-06-29
Payer: MEDICARE

## 2020-06-29 ENCOUNTER — HOSPITAL ENCOUNTER (INPATIENT)
Age: 80
LOS: 1 days | Discharge: HOME | DRG: 316 | End: 2020-06-29
Attending: EMERGENCY MEDICINE | Admitting: INTERNAL MEDICINE
Payer: MEDICARE

## 2020-06-29 VITALS
HEIGHT: 70 IN | BODY MASS INDEX: 30.41 KG/M2 | DIASTOLIC BLOOD PRESSURE: 56 MMHG | SYSTOLIC BLOOD PRESSURE: 118 MMHG | RESPIRATION RATE: 14 BRPM | TEMPERATURE: 98.3 F | WEIGHT: 212.4 LBS | HEART RATE: 66 BPM | OXYGEN SATURATION: 93 %

## 2020-06-29 PROBLEM — T82.111A PACEMAKER MALFUNCTION, INITIAL ENCOUNTER: Status: ACTIVE | Noted: 2020-06-29

## 2020-06-29 LAB
ALBUMIN SERPL-MCNC: 4 G/DL (ref 3.5–5.2)
ALP BLD-CCNC: 74 U/L (ref 40–130)
ALT SERPL-CCNC: 22 U/L (ref 5–41)
ANION GAP SERPL CALCULATED.3IONS-SCNC: 11 MMOL/L (ref 7–19)
AST SERPL-CCNC: 20 U/L (ref 5–40)
BASOPHILS ABSOLUTE: 0 K/UL (ref 0–0.2)
BASOPHILS RELATIVE PERCENT: 0.1 % (ref 0–1)
BILIRUB SERPL-MCNC: 0.3 MG/DL (ref 0.2–1.2)
BUN BLDV-MCNC: 30 MG/DL (ref 8–23)
CALCIUM SERPL-MCNC: 9.3 MG/DL (ref 8.8–10.2)
CHLORIDE BLD-SCNC: 99 MMOL/L (ref 98–111)
CO2: 27 MMOL/L (ref 22–29)
CREAT SERPL-MCNC: 1 MG/DL (ref 0.5–1.2)
EKG P AXIS: NORMAL DEGREES
EKG P-R INTERVAL: NORMAL MS
EKG Q-T INTERVAL: 504 MS
EKG QRS DURATION: 152 MS
EKG QTC CALCULATION (BAZETT): 468 MS
EKG T AXIS: NORMAL DEGREES
EOSINOPHILS ABSOLUTE: 0.1 K/UL (ref 0–0.6)
EOSINOPHILS RELATIVE PERCENT: 1 % (ref 0–5)
GFR NON-AFRICAN AMERICAN: >60
GLUCOSE BLD-MCNC: 176 MG/DL (ref 74–109)
GLUCOSE BLD-MCNC: 192 MG/DL (ref 70–99)
HCT VFR BLD CALC: 41.9 % (ref 42–52)
HEMOGLOBIN: 14.3 G/DL (ref 14–18)
IMMATURE GRANULOCYTES #: 0.1 K/UL
LACTIC ACID: 2 MMOL/L (ref 0.5–1.9)
LYMPHOCYTES ABSOLUTE: 0.8 K/UL (ref 1.1–4.5)
LYMPHOCYTES RELATIVE PERCENT: 12.1 % (ref 20–40)
MAGNESIUM: 2.2 MG/DL (ref 1.6–2.4)
MCH RBC QN AUTO: 32.4 PG (ref 27–31)
MCHC RBC AUTO-ENTMCNC: 34.1 G/DL (ref 33–37)
MCV RBC AUTO: 94.8 FL (ref 80–94)
MONOCYTES ABSOLUTE: 0.7 K/UL (ref 0–0.9)
MONOCYTES RELATIVE PERCENT: 9.8 % (ref 0–10)
NEUTROPHILS ABSOLUTE: 5.2 K/UL (ref 1.5–7.5)
NEUTROPHILS RELATIVE PERCENT: 76.1 % (ref 50–65)
PDW BLD-RTO: 13.5 % (ref 11.5–14.5)
PERFORMED ON: ABNORMAL
PLATELET # BLD: 128 K/UL (ref 130–400)
PMV BLD AUTO: 10.1 FL (ref 9.4–12.4)
POTASSIUM SERPL-SCNC: 4.2 MMOL/L (ref 3.5–5)
PRO-BNP: 654 PG/ML (ref 0–1800)
RBC # BLD: 4.42 M/UL (ref 4.7–6.1)
SODIUM BLD-SCNC: 137 MMOL/L (ref 136–145)
TOTAL PROTEIN: 6.2 G/DL (ref 6.6–8.7)
TROPONIN: 0.02 NG/ML (ref 0–0.03)
WBC # BLD: 6.8 K/UL (ref 4.8–10.8)

## 2020-06-29 PROCEDURE — 83880 ASSAY OF NATRIURETIC PEPTIDE: CPT

## 2020-06-29 PROCEDURE — 93010 ELECTROCARDIOGRAM REPORT: CPT | Performed by: INTERNAL MEDICINE

## 2020-06-29 PROCEDURE — 71045 X-RAY EXAM CHEST 1 VIEW: CPT

## 2020-06-29 PROCEDURE — 6360000002 HC RX W HCPCS: Performed by: INTERNAL MEDICINE

## 2020-06-29 PROCEDURE — 83735 ASSAY OF MAGNESIUM: CPT

## 2020-06-29 PROCEDURE — 36415 COLL VENOUS BLD VENIPUNCTURE: CPT

## 2020-06-29 PROCEDURE — 2580000003 HC RX 258: Performed by: INTERNAL MEDICINE

## 2020-06-29 PROCEDURE — 80053 COMPREHEN METABOLIC PANEL: CPT

## 2020-06-29 PROCEDURE — 85025 COMPLETE CBC W/AUTO DIFF WBC: CPT

## 2020-06-29 PROCEDURE — 83605 ASSAY OF LACTIC ACID: CPT

## 2020-06-29 PROCEDURE — 93005 ELECTROCARDIOGRAM TRACING: CPT | Performed by: EMERGENCY MEDICINE

## 2020-06-29 PROCEDURE — 2140000000 HC CCU INTERMEDIATE R&B

## 2020-06-29 PROCEDURE — 82947 ASSAY GLUCOSE BLOOD QUANT: CPT

## 2020-06-29 PROCEDURE — 99024 POSTOP FOLLOW-UP VISIT: CPT | Performed by: INTERNAL MEDICINE

## 2020-06-29 PROCEDURE — 99285 EMERGENCY DEPT VISIT HI MDM: CPT

## 2020-06-29 PROCEDURE — 84484 ASSAY OF TROPONIN QUANT: CPT

## 2020-06-29 RX ORDER — SODIUM CHLORIDE 0.9 % (FLUSH) 0.9 %
10 SYRINGE (ML) INJECTION EVERY 12 HOURS SCHEDULED
Status: DISCONTINUED | OUTPATIENT
Start: 2020-06-29 | End: 2020-06-29 | Stop reason: HOSPADM

## 2020-06-29 RX ORDER — SODIUM CHLORIDE 0.9 % (FLUSH) 0.9 %
10 SYRINGE (ML) INJECTION PRN
Status: DISCONTINUED | OUTPATIENT
Start: 2020-06-29 | End: 2020-06-29 | Stop reason: HOSPADM

## 2020-06-29 RX ADMIN — ENOXAPARIN SODIUM 40 MG: 40 INJECTION SUBCUTANEOUS at 10:57

## 2020-06-29 RX ADMIN — SODIUM CHLORIDE, PRESERVATIVE FREE 10 ML: 5 INJECTION INTRAVENOUS at 10:57

## 2020-06-29 ASSESSMENT — ENCOUNTER SYMPTOMS
SHORTNESS OF BREATH: 0
ABDOMINAL PAIN: 0
WHEEZING: 0
BLOOD IN STOOL: 0
ABDOMINAL DISTENTION: 0
BACK PAIN: 0
DIARRHEA: 0
VOMITING: 0
COUGH: 0

## 2020-06-29 NOTE — ED PROVIDER NOTES
[facility] EMERGENCY DEPT  eMERGENCY dEPARTMENT eNCOUnter      Pt Name: Therese Luna  MRN: [id number]  [place] 1940  Date of evaluation: 6/29/2020  Provider: Ayala Hutton MD    [address] [place] [address]       Chief Complaint   Patient presents with    Bradycardia     given 1.5 mg atropine by EMS         HISTORY OF PRESENT ILLNESS   (Location/Symptom, Timing/Onset,Context/Setting, Quality, Duration, Modifying Factors, Severity)  Note limiting factors. Therese Luna is a 79 y.o. male who presents to the emergency department for evaluation regarding slow heart rate. Patient reports that he has a prior history of pacemaker placement. Reports that he got up to go to the bathroom this evening and noted that his heart rate was slow. Reports that when EMS arrived his heart rate was in the 30s. He was not really hypotensive with this event. He states that overall he just felt dizzy. He denies acute syncopal events, chest pain or shortness of breath. Patient's blood pressure remained stable in route to the hospital.  In review of his previous records he recently underwent pacemaker evaluation in April and was felt to be having some right ventricular re-lead issues at that time. He also underwent battery replacement at that time. HPI    NursingNotes were reviewed. REVIEW OF SYSTEMS    (2-9 systems for level 4, 10 or more for level 5)     Review of Systems   Constitutional: Negative for chills and fever. Respiratory: Negative for shortness of breath. Cardiovascular: Negative for chest pain. Gastrointestinal: Negative for abdominal pain, diarrhea and vomiting. Neurological: Positive for dizziness and weakness. All other systems reviewed and are negative.            PAST MEDICALHISTORY     Past Medical History:   Diagnosis Date    Aortic valve stenosis     Arthritis     Chest tightness, discomfort, or pressure 4/30/2012    Chronic back pain     CKD (chronic kidney disease)     CPAP (continuous positive airway pressure) dependence     13cm    Diabetes ([facility]     Diabetic polyneuropathy associated with type 2 diabetes mellitus ([facility] 8/29/2016    GERD (gastroesophageal reflux disease)     HTN (hypertension)     Hyperlipemia     Left ventricular diastolic dysfunction     Mitral stenosis     Mitral valve stenosis     Neuropathy     Obstructive sleep apnea     AHI: 34.5 per PSG, 6/2013; AHI: 36.9 per PSG, 7/2015; AHI: 54.5 per PSG, 3/2017    Pinched nerve in neck     Restless legs syndrome 2/17/2016         SURGICAL HISTORY       Past Surgical History:   Procedure Laterality Date    APPENDECTOMY      BACK SURGERY      4 Back surgeries    BLADDER SURGERY      CARDIAC CATHETERIZATION  4/30/12    EF > 50%    CATARACT REMOVAL      CHOLECYSTECTOMY      COLONOSCOPY      EYE SURGERY      implants placed both eyes    PACEMAKER PLACEMENT      IN RPLCMT PROST AORTIC VALVE OPEN XCP HOMOGRF/STENT N/A 6/7/2018    AORTIC VALVE REPLACEMENT TRANSESOPHAGEAL ECHOCARDIOGRAM performed by Archana Kirk MD at [facility]     Previous Medications    ASPIRIN 81 MG EC TABLET    Take 1 tablet by mouth daily    ATENOLOL (TENORMIN) 25 MG TABLET    Take 25 mg by mouth daily    ATORVASTATIN (LIPITOR) 40 MG TABLET    Take 1 tablet by mouth nightly    B-D UF III MINI PEN NEEDLES 31G X 5 MM MISC        CHOLECALCIFEROL (VITAMIN D3) 27615 UNITS CAPS        CITALOPRAM (CELEXA) 20 MG TABLET    TAKE ONE TABLET BY MOUTH DAILY    CLOPIDOGREL (PLAVIX) 75 MG TABLET    Take 1 tablet by mouth daily    EMPAGLIFLOZIN-METFORMIN HCL ER (SYNJARDY XR)  MG TB24    Take 1 tablet by mouth daily (with breakfast)    ESOMEPRAZOLE (NEXIUM) 40 MG CAPSULE    Take 40 mg by mouth every morning (before breakfast).       FUROSEMIDE (LASIX) 20 MG TABLET    Take 1 tablet by mouth as needed (For weight gain greater than 2.5 lbs in 24 hours.)    HUMULIN R U-500 KWIKPEN 500 UNIT/ML SOPN CONCENTRATED INJECTION PEN Procedures    FINAL IMPRESSION      1. Malfunction of cardiac pacemaker, initial encounter    2. Bradycardia          DISPOSITION/PLAN   DISPOSITION Decision To Admit 06/29/2020 06:54:56 AM      PATIENT REFERRED TO:  No follow-up provider specified.     DISCHARGE MEDICATIONS:  New Prescriptions    No medications on file          (Please note that portions of this note were completed with a voice recognition program.  Efforts were made to edit thedictations but occasionally words are mis-transcribed.)    Angely Garcia MD (electronically signed)  Attending Emergency Physician         Angely Garcia MD  06/29/20 [id number]

## 2020-06-29 NOTE — H&P
02/17/2016     Priority: Low    Gastroesophageal reflux disease without esophagitis 02/17/2016     Priority: Low    Essential hypertension 02/17/2016     Priority: Low    Chronic bilateral low back pain without sciatica 07/16/2012     Priority: Low    Other chest pain 04/30/2012     Priority: Low     Overview Note:     4/30/12  cardiolite  Positive for inferior myocardial ischemia, EF 52%  4/30/12  Cath  Mild to moderate CAD, normal LVFX  4/9/2015  Echo  Moderate AI, normal LVFX  2/17/2016  Echo  Moderate AI, DEE 1.3 cm2  4/18/2018  Echo AS / AI  5/16/2018  AS with moderate AI, decreased LVFX  Cath  Mild CAD  06/07/2018, Aortic Valve Replacement, Implanting a 21 mm [place] Magna Ease Pericardial Tissue Valve by Dr. Edis Corrales           1. Permanent pacemaker placement 12/2013, 100% atrial and ventricular paced with recent increase in RV lead threshold with capture failure and presyncopal episodes. 2.  Single-vessel disease involving bifurcation LAD/diagonal treated with complex bifurcation stents 3/19/2020 (proximal LAD 3.0 x 18 mm resolute, mid LAD 2.5 x 14 mm, D1 2.5 x 14 mm, CAT technique)  3. 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 4. Diabetes mellitus. 5. CK D stage III. 6. Obesity. PRESENTATION: Filippo Hennessy is a 79y.o. year old male who presents with presents with 2 days of lightheadedness found to have a heart rate in the 30s without syncope. Assessment EMS records and a heart rate in the 30s. Recent generator change 4/28/2020 with noted RV lead with elevated thresholds which is readjusted. Device had been at RRT at that time. EKGs reviewed. He is atrial paced with ventricular escape beats with intermittent ventricular capture. Device was interrogated with RV lead threshold just under 2mV. Device lead monitoring and sedated below this number which resulted in failure to capture. Lead monitoring was turned off.  Lead thresholds was set at 4 mV. Currently asymptomatic. Patient does have chronic low back pain with prior surgeries and does desire further evaluation with MRI is going forward. REVIEW OF SYSTEMS:  Review of Systems   Constitutional: Negative for activity change, diaphoresis and fatigue. HENT: Negative for hearing loss, nosebleeds and tinnitus. Eyes: Negative for visual disturbance. Respiratory: Negative for cough, shortness of breath and wheezing. Cardiovascular: Negative for chest pain, palpitations and leg swelling. Gastrointestinal: Negative for abdominal distention, abdominal pain, blood in stool, diarrhea and vomiting. Endocrine: Negative for cold intolerance, heat intolerance, polydipsia, polyphagia and polyuria. Genitourinary: Negative for difficulty urinating, flank pain and hematuria. Musculoskeletal: Negative for arthralgias, back pain, joint swelling and myalgias. Skin: Negative for pallor and rash. Neurological: Positive for light-headedness. Negative for dizziness, seizures, syncope and headaches. Psychiatric/Behavioral: Negative for behavioral problems and dysphoric mood. The patient is not nervous/anxious.         Past Medical History:      Diagnosis Date    Aortic valve stenosis     Arthritis     Chest tightness, discomfort, or pressure 4/30/2012    Chronic back pain     CKD (chronic kidney disease)     CPAP (continuous positive airway pressure) dependence     13cm    [facility] [address]     Diabetic polyneuropathy associated with type 2 diabetes mellitus ([facility] [address] 8/29/2016    GERD (gastroesophageal reflux disease)     HTN (hypertension)     Hyperlipemia     Left ventricular diastolic dysfunction     Mitral stenosis     Mitral valve stenosis     Neuropathy     Obstructive sleep apnea     AHI: 34.5 per PSG, 6/2013; AHI: 36.9 per PSG, 7/2015; AHI: 54.5 per PSG, 3/2017    Pinched nerve in neck     Restless legs syndrome 2/17/2016       Past Surgical History:      Procedure Laterality Date    APPENDECTOMY      BACK SURGERY      4 Back surgeries    BLADDER SURGERY      CARDIAC CATHETERIZATION  4/30/12    EF > 50%    CATARACT REMOVAL      CHOLECYSTECTOMY      COLONOSCOPY      EYE SURGERY      implants placed both eyes    PACEMAKER PLACEMENT      CO RPLCMT PROST AORTIC VALVE OPEN XCP HOMOGRF/STENT N/A 6/7/2018    AORTIC VALVE REPLACEMENT TRANSESOPHAGEAL ECHOCARDIOGRAM performed by Chase Louie MD at [facility]         Medications Prior to Admission:    Prior to Admission medications    Medication Sig Start Date End Date Taking? Authorizing Provider   citalopram (CELEXA) 20 MG tablet TAKE ONE TABLET BY MOUTH DAILY 5/4/20  Yes Hernan Burks MD   atorvastatin (LIPITOR) 40 MG tablet Take 1 tablet by mouth nightly 3/20/20  Yes Rafael Sharma MD   clopidogrel (PLAVIX) 75 MG tablet Take 1 tablet by mouth daily 3/20/20 6/29/20 Yes Rafael Sharma MD   HUMULIN R U-500 KWIKPEN 500 UNIT/ML SOPN concentrated injection pen  4/20/19  Yes Historical Provider, MD   insulin glargine (LANTUS) 100 UNIT/ML injection vial Inject 85 Units into the skin nightly  Patient taking differently: Inject 45 Units into the skin nightly  2/23/19  Yes Raffi Heredia DO   pramipexole (MIRAPEX) 1.5 MG tablet TAKE ONE TABLET BY MOUTH THREE TIMES DAILY ** MAY CAUSE DROWSINESS  Patient taking differently: Take 1.5 mg by mouth daily  1/8/19  Yes Hernan Burks MD   oxyCODONE (ROXICODONE) 5 MG immediate release tablet Take 5 mg by mouth every 4 hours as needed for Pain. Maria De Jesus Nunez Historical Provider, MD   aspirin 81 MG EC tablet Take 1 tablet by mouth daily 6/11/18  Yes Chip Simmonds, APRN   atenolol (TENORMIN) 25 MG tablet Take 25 mg by mouth daily   Yes Historical Provider, MD   hydrochlorothiazide (MICROZIDE) 12.5 MG capsule Take 12.5 mg by mouth daily   Yes Historical Provider, MD CHAVIS UF III MINI PEN NEEDLES 31G X 5 MM MISC  8/20/16  Yes Historical Provider, MD   furosemide (LASIX) 20 MG tablet Take 1 LAB DATA:  CBC:   Recent Labs     06/29/20  0300   WBC 6.8   HGB 14.3   *     BMP:    Recent Labs     06/29/20  0300      K 4.2   CL 99   CO2 27   BUN 30*   CREATININE 1.0   GLUCOSE 176*     Hepatic:   Recent Labs     06/29/20  0300   AST 20   ALT 22   BILITOT 0.3   ALKPHOS 74     CK, CKMB, Troponin: @LABRCNT (CKTOTAL:3, CKMB:3, TROPONINI:3)@  Pro-BNP: No results for input(s): BNP in the last 72 hours. Lipids: No results for input(s): CHOL, HDL in the last 72 hours. Invalid input(s): LDL  ABGs: No results for input(s): PHART, FYN4NFP, PO2ART, CGK6WLY, BEART, HGBAE, G0YLAJXU, CARBOXHGBART, 02THERAPY in the last 72 hours. INR: No results for input(s): INR in the last 72 hours. A1c:Invalid input(s): HEMOGLOBIN A1C  URINALYSIS:   Lab Results   Component Value Date    NITRU Negative 09/15/2018    BLOODU Negative 09/15/2018    SPECGRAV 1.022 09/15/2018    GLUCOSEU >=1000 09/15/2018     -----------------------------------------------------------------  IMAGING:  XR CHEST PORTABLE   Final Result   1. Stable chest exam. Left chest wall dual chamber pacemaker with   leads in stable orientation. Signed by Dr Tasha Galarza on 6/29/2020 7:27 AM            Assessment and Recommendations: This is [de-identified] y.o. year old male with past medical history of bioprosthetic AVR 6/2018, permanent pacemaker placement 12/2013, diabetes mellitus, CK D stage III, obesity presenting with acute onset of lightheadedness with loss of capture of ventricular lead due to increased thresholds for unclear reasons, with increase in ventricular lead output done with capture, status post PCI 3/19/2020 with complex bifurcation stents to proximal to mid LAD/D1 status post generator change 4/28/20 with recurrent failure of RV lead capture with elevated . 1. Patient will require RV lead revision with possible extraction and new lead placement to allow possible MRI usage going forward.  We'll refer to Dr. Ethan Stanley at [place] for

## 2020-06-29 NOTE — ED NOTES
Spoke with Criss Kruger with [employer] who requested that we place a magnet on pt pacemaker. Magnet placed on pacemaker, pt HR went from the low to mid 30's to the low 50's with magnet in place. Criss Kruger with [employer] states to leave the magnet in place and states that she will be here shortly to reprogram pt pacemaker. Physician notified.       Maren Curry RN  06/29/20 1533

## 2020-06-29 NOTE — DISCHARGE SUMMARY
Mild cardiomegaly which is stable. The pulmonary vasculature are nondilated. No consolidation, pleural effusion or pneumothorax. 1. Stable chest exam. Left chest wall dual chamber pacemaker with leads in stable orientation. Signed by Dr Katelyn Nobles on 6/29/2020 7:27 AM      Pertinent Labs:   CBC:   Recent Labs     06/29/20  0300   WBC 6.8   HGB 14.3   *     BMP:    Recent Labs     06/29/20  0300      K 4.2   CL 99   CO2 27   BUN 30*   CREATININE 1.0   GLUCOSE 176*     INR: No results for input(s): INR in the last 72 hours. Lipids: No results for input(s): CHOL, HDL in the last 72 hours. Invalid input(s): LDLCALCU  ABGs:No results for input(s): PHART, LXR1OHL, PO2ART, SDG8GQK, BEART, HGBAE, N6EABJOK, CARBOXHGBART, 02THERAPY in the last 72 hours. HgBA1c:  No results for input(s): LABA1C in the last 72 hours. Procedures: none    1. Permanent pacemaker placement 12/2013, 100% atrial and ventricular paced with recent increase in RV lead threshold with capture failure and presyncopal episodes. 2.  Single-vessel disease involving bifurcation LAD/diagonal treated with complex bifurcation stents 3/19/2020 (proximal LAD 3.0 x 18 mm resolute, mid LAD 2.5 x 14 mm, D1 2.5 x 14 mm, CAT technique)  3. 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 4. Diabetes mellitus. 5. CK D stage III. 6. Obesity. Hospital Course: Pacemaker interrogation with RV lead set to 4 mV due to elevated RV lead thresholds. RV lead monitoring turned off. Transferred to [place] to Dr. Cruz Carrillo as an outpatient for possible RV lead revision and extraction of old lead. Molly Correa is a 79y.o. year old male who presents with presents with 2 days of lightheadedness found to have a heart rate in the 30s without syncope. Assessment EMS records and a heart rate in the 30s. Recent generator change 4/28/2020 with noted RV lead with elevated thresholds which is readjusted.

## 2020-07-09 ENCOUNTER — TELEPHONE (OUTPATIENT)
Dept: CARDIOLOGY | Age: 80
End: 2020-07-09

## 2020-07-09 NOTE — TELEPHONE ENCOUNTER
Pt called stating he hasn't heard anything from [facility] about his apt. Dr. Joselin Vizcarra office   Your last note when pt was in hospital    1. Patient will require RV lead revision with possible extraction and new lead placement to allow possible MRI usage going forward. We'll refer to Dr. Joselin Vizcarra at [facility] for lead extraction. Lead capture set at 4 mV which would allow 5 years on the battery. 2. Spoke with Dr. Joselin Vizcarra and he will arrange for patient to be brought to [facility] as an outpatient.

## 2020-07-10 ENCOUNTER — TELEPHONE (OUTPATIENT)
Dept: CARDIOLOGY | Age: 80
End: 2020-07-10

## 2020-07-10 ENCOUNTER — HOSPITAL ENCOUNTER (EMERGENCY)
Age: 80
Discharge: HOME | End: 2020-07-10
Attending: EMERGENCY MEDICINE
Payer: MEDICARE

## 2020-07-10 VITALS
OXYGEN SATURATION: 92 % | WEIGHT: 212 LBS | HEART RATE: 71 BPM | BODY MASS INDEX: 30.35 KG/M2 | SYSTOLIC BLOOD PRESSURE: 132 MMHG | HEIGHT: 70 IN | TEMPERATURE: 98 F | RESPIRATION RATE: 20 BRPM | DIASTOLIC BLOOD PRESSURE: 87 MMHG

## 2020-07-10 LAB
ALBUMIN SERPL-MCNC: 4.2 G/DL (ref 3.5–5.2)
ALP BLD-CCNC: 79 U/L (ref 40–130)
ALT SERPL-CCNC: 18 U/L (ref 5–41)
ANION GAP SERPL CALCULATED.3IONS-SCNC: 13 MMOL/L (ref 7–19)
AST SERPL-CCNC: 15 U/L (ref 5–40)
BASOPHILS ABSOLUTE: 0 K/UL (ref 0–0.2)
BASOPHILS RELATIVE PERCENT: 0.3 % (ref 0–1)
BILIRUB SERPL-MCNC: 0.4 MG/DL (ref 0.2–1.2)
BUN BLDV-MCNC: 24 MG/DL (ref 8–23)
CALCIUM SERPL-MCNC: 9.4 MG/DL (ref 8.8–10.2)
CHLORIDE BLD-SCNC: 99 MMOL/L (ref 98–111)
CO2: 27 MMOL/L (ref 22–29)
CREAT SERPL-MCNC: 0.9 MG/DL (ref 0.5–1.2)
EOSINOPHILS ABSOLUTE: 0.1 K/UL (ref 0–0.6)
EOSINOPHILS RELATIVE PERCENT: 1.1 % (ref 0–5)
GFR NON-AFRICAN AMERICAN: >60
GLUCOSE BLD-MCNC: 129 MG/DL (ref 74–109)
HCT VFR BLD CALC: 44.2 % (ref 42–52)
HEMOGLOBIN: 15.1 G/DL (ref 14–18)
IMMATURE GRANULOCYTES #: 0 K/UL
LYMPHOCYTES ABSOLUTE: 0.8 K/UL (ref 1.1–4.5)
LYMPHOCYTES RELATIVE PERCENT: 13.3 % (ref 20–40)
MCH RBC QN AUTO: 32.1 PG (ref 27–31)
MCHC RBC AUTO-ENTMCNC: 34.2 G/DL (ref 33–37)
MCV RBC AUTO: 94 FL (ref 80–94)
MONOCYTES ABSOLUTE: 0.7 K/UL (ref 0–0.9)
MONOCYTES RELATIVE PERCENT: 10.8 % (ref 0–10)
NEUTROPHILS ABSOLUTE: 4.6 K/UL (ref 1.5–7.5)
NEUTROPHILS RELATIVE PERCENT: 73.9 % (ref 50–65)
PDW BLD-RTO: 13.5 % (ref 11.5–14.5)
PLATELET # BLD: 136 K/UL (ref 130–400)
PMV BLD AUTO: 10.1 FL (ref 9.4–12.4)
POTASSIUM SERPL-SCNC: 4 MMOL/L (ref 3.5–5)
RBC # BLD: 4.7 M/UL (ref 4.7–6.1)
SODIUM BLD-SCNC: 139 MMOL/L (ref 136–145)
TOTAL PROTEIN: 6.7 G/DL (ref 6.6–8.7)
WBC # BLD: 6.2 K/UL (ref 4.8–10.8)

## 2020-07-10 PROCEDURE — 99283 EMERGENCY DEPT VISIT LOW MDM: CPT

## 2020-07-10 PROCEDURE — 36415 COLL VENOUS BLD VENIPUNCTURE: CPT

## 2020-07-10 PROCEDURE — 80053 COMPREHEN METABOLIC PANEL: CPT

## 2020-07-10 PROCEDURE — 85025 COMPLETE CBC W/AUTO DIFF WBC: CPT

## 2020-07-10 PROCEDURE — 87205 SMEAR GRAM STAIN: CPT

## 2020-07-10 PROCEDURE — 87070 CULTURE OTHR SPECIMN AEROBIC: CPT

## 2020-07-10 RX ORDER — MUPIROCIN CALCIUM 20 MG/G
CREAM TOPICAL
Qty: 1 TUBE | Refills: 0 | Status: SHIPPED | OUTPATIENT
Start: 2020-07-10 | End: 2020-08-05

## 2020-07-10 RX ORDER — SULFAMETHOXAZOLE AND TRIMETHOPRIM 800; 160 MG/1; MG/1
1 TABLET ORAL 2 TIMES DAILY
Qty: 14 TABLET | Refills: 0 | Status: SHIPPED | OUTPATIENT
Start: 2020-07-10 | End: 2020-07-14 | Stop reason: SDUPTHER

## 2020-07-10 RX ORDER — CLOPIDOGREL BISULFATE 75 MG/1
75 TABLET ORAL DAILY
COMMUNITY

## 2020-07-10 ASSESSMENT — ENCOUNTER SYMPTOMS
SHORTNESS OF BREATH: 0
COUGH: 0

## 2020-07-10 NOTE — TELEPHONE ENCOUNTER
Dr. Servando Frias office has not received a referral.  Dr. Servando Frias did not speak with them about your conversation. Pt. Apt. Made for first available July 24th at 930/ arrival at [address] @ [address], [place] . Bring insurance, ID , copay if appropriate. Wear a mask, and can have only one person with him. Message left on pt voice mail.

## 2020-07-10 NOTE — ED PROVIDER NOTES
[facility] EMERGENCY DEPT  eMERGENCY dEPARTMENT eNCOUnter      Pt Name: Bethany Sabillon  MRN: [id number]  [place] 1940  Date of evaluation: 7/10/2020  Provider: Macey Lee MD    [facility]       Chief Complaint   Patient presents with    Wound Infection     pacermaker insertion yellow drainage lateral side left chest; generator replaced 2 months ago         HISTORY OF PRESENT ILLNESS   (Location/Symptom, Timing/Onset,Context/Setting, Quality, Duration, Modifying Factors, Severity)  Note limiting factors. Bethany Sabillon is a 79 y.o. male who presents to the emergency department with I think my pacemaker is infected. Saw some white pimple at edge today when I got up. No fever, no real redness swelling or drainage. I haven't heard from Dr [facility] office but I was supposed to go to [address] [place] [address]. Notes say \"Pacemaker interrogation with RV lead set to 4 mV due to elevated RV lead thresholds. RV lead monitoring turned off. Transferred to [address] [place] [address] to Dr. Young Sanford as an outpatient for possible RV lead revision and extraction of old lead. \" but patient has never been to [address] [place] [address] and unsure of plan. - fever, COVID exposure. Put in 2 months ago. Saw some white so came to ed today. Was passing out prior so go pacer. The history is provided by the patient and medical records. NursingNotes were reviewed. REVIEW OF SYSTEMS    (2-9 systems for level 4, 10 or more for level 5)     Review of Systems   Constitutional: Negative for fever. Respiratory: Negative for cough and shortness of breath. Cardiovascular: Negative for chest pain. Skin: Positive for wound. A complete review of systems was performed and is negative except as noted above in the HPI.        PAST MEDICAL HISTORY     Past Medical History:   Diagnosis Date    Aortic valve stenosis     Arthritis     Chest tightness, discomfort, or pressure 4/30/2012    Chronic back pain     CKD (chronic kidney disease)     CPAP (continuous positive airway pressure) dependence     13cm    [facility] [address]     Diabetic polyneuropathy associated with type 2 diabetes mellitus ([facility] [address] 8/29/2016    GERD (gastroesophageal reflux disease)     HTN (hypertension)     Hyperlipemia     Left ventricular diastolic dysfunction     Mitral stenosis     Mitral valve stenosis     Neuropathy     Obstructive sleep apnea     AHI: 34.5 per PSG, 6/2013; AHI: 36.9 per PSG, 7/2015; AHI: 54.5 per PSG, 3/2017    Pinched nerve in neck     Restless legs syndrome 2/17/2016         SURGICAL HISTORY       Past Surgical History:   Procedure Laterality Date    APPENDECTOMY      BACK SURGERY      4 Back surgeries    BLADDER SURGERY      CARDIAC CATHETERIZATION  4/30/12    EF > 50%    CATARACT REMOVAL      CHOLECYSTECTOMY      COLONOSCOPY      EYE SURGERY      implants placed both eyes    PACEMAKER PLACEMENT      MA RPLCMT PROST AORTIC VALVE OPEN XCP HOMOGRF/STENT N/A 6/7/2018    AORTIC VALVE REPLACEMENT TRANSESOPHAGEAL ECHOCARDIOGRAM performed by Cande Corbin MD at [address] [facility]       Previous Medications    ASPIRIN 81 MG EC TABLET    Take 1 tablet by mouth daily    ATENOLOL (TENORMIN) 25 MG TABLET    Take 25 mg by mouth daily    ATORVASTATIN (LIPITOR) 40 MG TABLET    Take 1 tablet by mouth nightly    B-D UF III MINI PEN NEEDLES 31G X 5 MM MISC        CHOLECALCIFEROL (VITAMIN D3) 69589 UNITS CAPS        CITALOPRAM (CELEXA) 20 MG TABLET    TAKE ONE TABLET BY MOUTH DAILY    CLOPIDOGREL (PLAVIX) 75 MG TABLET    Take 75 mg by mouth daily    EMPAGLIFLOZIN-METFORMIN HCL ER (SYNJARDY XR)  MG TB24    Take 1 tablet by mouth daily (with breakfast)    ESOMEPRAZOLE (NEXIUM) 40 MG CAPSULE    Take 40 mg by mouth every morning (before breakfast).       FUROSEMIDE (LASIX) 20 MG TABLET    Take 1 tablet by mouth as needed (For weight gain greater than 2.5 lbs in 24 hours.)    HUMULIN R U-500 KWIKPEN 500 UNIT/ML SOPN CONCENTRATED INJECTION PEN        HYDROCHLOROTHIAZIDE (MICROZIDE) 12.5 MG CAPSULE    Take 12.5 mg by mouth daily    INSULIN GLARGINE (LANTUS) 100 UNIT/ML INJECTION VIAL    Inject 85 Units into the skin nightly    OXYCODONE (ROXICODONE) 5 MG IMMEDIATE RELEASE TABLET    Take 5 mg by mouth every 4 hours as needed for Pain. Ronold Kanner PRAMIPEXOLE (MIRAPEX) 1.5 MG TABLET    TAKE ONE TABLET BY MOUTH THREE TIMES DAILY ** MAY CAUSE DROWSINESS       ALLERGIES     Patient has no known allergies. FAMILY HISTORY       Family History   Problem Relation Age of Onset    Diabetes Mother     Cancer Father     Cancer Sister     Heart Disease Brother     Cancer Brother     Cancer Sister     Cancer Brother           SOCIAL HISTORY       Social History     Socioeconomic History    Marital status:       Spouse name: None    Number of children: None    Years of education: None    Highest education level: None   Occupational History    None   Social Needs    Financial resource strain: None    Food insecurity     Worry: None     Inability: None    Transportation needs     Medical: None     Non-medical: None   Tobacco Use    Smoking status: Former Smoker     Packs/day: 3.00     Years: 50.00     Pack years: 150.00     Types: Cigarettes     Last attempt to quit: 1977     Years since quittin.0    Smokeless tobacco: Former User   Substance and Sexual Activity    Alcohol use: No    Drug use: No    Sexual activity: Not Currently     Partners: Female   Lifestyle    Physical activity     Days per week: None     Minutes per session: None    Stress: None   Relationships    Social connections     Talks on phone: None     Gets together: None     Attends Tenriism service: None     Active member of club or organization: None     Attends meetings of clubs or organizations: None     Relationship status: None    Intimate partner violence     Fear of current or ex partner: None     Emotionally abused: None     Physically abused: None     Forced sexual activity: None   Other Topics Concern    None   Social History Narrative    None       SCREENINGS             PHYSICAL EXAM    (up to 7 for level 4, 8 or more for level 5)     ED Triage Vitals [07/10/20 0543]   BP Temp Temp src Pulse Resp SpO2 Height Weight   (!) 142/64 98 °F (36.7 °C) -- 62 20 98 % 5' 10\" (1.778 m) 212 lb (96.2 kg)       Physical Exam  Vitals signs and nursing note reviewed. Constitutional:       Appearance: Normal appearance. He is normal weight. HENT:      Head: Normocephalic and atraumatic. Mouth/Throat:      Mouth: Mucous membranes are moist.   Eyes:      Extraocular Movements: Extraocular movements intact. Pupils: Pupils are equal, round, and reactive to light. Cardiovascular:      Rate and Rhythm: Normal rate and regular rhythm. Pulmonary:      Effort: Pulmonary effort is normal. No respiratory distress. Breath sounds: Normal breath sounds. Comments: Pacer TOM chest wall    2 mm pustule with minimal induration at far edge laterally of suture line, very unimpressive, no pocket hematoma or swelling no spreading redness, no draining on its own   Abdominal:      General: Abdomen is flat. Musculoskeletal: Normal range of motion. General: No tenderness. Skin:     General: Skin is warm and dry. Neurological:      General: No focal deficit present. Mental Status: He is alert and oriented to person, place, and time.    Psychiatric:         Mood and Affect: Mood normal.         Behavior: Behavior normal.         DIAGNOSTIC RESULTS     EKG: All EKG's are interpreted by the Emergency Department Physician who either signs or Co-signs this chart in the absence of a cardiologist.        RADIOLOGY:   Non-plain film images such as CT, Ultrasound and MRI are read by the radiologist. Plainradiographic images are visualized and preliminarily interpreted by the emergency physician with the below findings:        Interpretation per the Radiologist below, if available at the time of this note:    No orders to display         ED BEDSIDE ULTRASOUND:   Performed by ED Physician - none    LABS:  Labs Reviewed   COMPREHENSIVE METABOLIC PANEL - Abnormal; Notable for the following components:       Result Value    Glucose 129 (*)     BUN 24 (*)     All other components within normal limits   CBC WITH AUTO DIFFERENTIAL - Abnormal; Notable for the following components:    MCH 32.1 (*)     Neutrophils % 73.9 (*)     Lymphocytes % 13.3 (*)     Monocytes % 10.8 (*)     Lymphocytes Absolute 0.8 (*)     All other components within normal limits   CULTURE, WOUND       All other labs were within normal range or not returned as of this dictation. EMERGENCY DEPARTMENT COURSE and DIFFERENTIALDIAGNOSIS/MDM:   Vitals:    Vitals:    07/10/20 0543 07/10/20 0633 07/10/20 0703   BP: (!) 142/64 112/61 (!) 117/54   Pulse: 62 60 61   Resp: 20 18 19   Temp: 98 °F (36.7 °C)     SpO2: 98% 91% 93%   Weight: 212 lb (96.2 kg)     Height: 5' 10\" (1.778 m)         MDM  Number of Diagnoses or Management Options  Wound infection:   Diagnosis management comments: Dr Tavares Grigsby saw pt in ed    Small pustule expressed and send for culture    Start on bactrim and bactroban    Dr Tavares Grigsby getting pt follow up to be seen in [place] for lead extraction    No fever    Small pustule likely at suture line prior knot    Labs fine. Stable for dc    Pacer interrogated working fine        Amount and/or Complexity of Data Reviewed  Clinical lab tests: reviewed and ordered    Patient Progress  Patient progress: stable        CONSULTS:  None    PROCEDURES:  Unless otherwise notedbelow, none     Procedures    FINAL IMPRESSION     1.  Wound infection          DISPOSITION/PLAN   DISPOSITION Decision To Discharge 07/10/2020 08:01:40 AM      PATIENT REFERRED TO:  RITESH Son - CNP  [address]  [phone]    Schedule an appointment as soon as possible for a visit in 1 week  if not improving, return to ed if spreading redness; follow up in [place] the Cardiology office is going to call you with appt      DISCHARGE MEDICATIONS:  New Prescriptions    MUPIROCIN (BACTROBAN) 2 % CREAM    Apply topically 3 times daily.     SULFAMETHOXAZOLE-TRIMETHOPRIM (BACTRIM DS) 800-160 MG PER TABLET    Take 1 tablet by mouth 2 times daily for 7 days          (Please note that portions of this note were completed with a voice recognition program.  Efforts were made to edit the dictations butoccasionally words are mis-transcribed.)    Andrea Peter MD (electronically signed)  AttendingEmergency Physician         Andrea Peter MD  07/10/20 5662

## 2020-07-10 NOTE — ED NOTES
ASSESSMENT:    PT ALERT/ORIENTED X4. PUPILS EQUAL/REACTIVE    SKIN:  WARM/DRY PINK CAPILLARY REFILL < 2SECS    CARDIAC:  S1 S2 NOTED     LUNGS: CLEAR UPPER AND LOWER LOBES, RESPIRATIONS EVEN/UNLABORED   small amt of yellow exudate lateral side pacer incision  ABDOMEN: BOWEL SOUNDS NOTED UPPER AND LOWER QUADRANTS                     SOFT AND NONTENDER. EXTREMITIES:  BILATERAL DP AND PT AND NO EDEMA NOTED. NO DISTRESS NOTED. SIDE RAILS UP AND CALL LIGHT IN REACH.      Meli Owens RN  07/10/20 3698

## 2020-07-10 NOTE — ED NOTES
Bed: 05  Expected date: 7/10/20  Expected time:   Means of arrival: [facility]  Comments:  Libby Cruz ?  Infected pacer site     Nader Lawsonmelissa De La Garza  07/10/20 5539

## 2020-07-14 ENCOUNTER — OFFICE VISIT (OUTPATIENT)
Dept: CARDIOLOGY | Age: 80
End: 2020-07-14

## 2020-07-14 LAB
GRAM STAIN RESULT: ABNORMAL
ORGANISM: ABNORMAL
WOUND/ABSCESS: ABNORMAL

## 2020-07-14 PROCEDURE — 99024 POSTOP FOLLOW-UP VISIT: CPT | Performed by: CLINICAL NURSE SPECIALIST

## 2020-07-14 RX ORDER — SULFAMETHOXAZOLE AND TRIMETHOPRIM 800; 160 MG/1; MG/1
1 TABLET ORAL 2 TIMES DAILY
Qty: 14 TABLET | Refills: 0 | Status: SHIPPED | OUTPATIENT
Start: 2020-07-14 | End: 2020-07-21

## 2020-07-14 NOTE — PROGRESS NOTES
Patient here for wound check after ER visit. See wound photos. Lateral edge is red, but no drainage. Unable to express any fluid from site. Notified Dr. Flaco Kiser. He reviewed wound photo and ordered patient to be on Bactrim for another 7 days. Patient has appointment with Dr. Jenelle Hammans on 7/24/20 for evaluation.

## 2020-07-15 ENCOUNTER — TELEPHONE (OUTPATIENT)
Dept: CARDIOLOGY | Age: 80
End: 2020-07-15

## 2020-07-15 NOTE — TELEPHONE ENCOUNTER
Patient called with questions about an appt. Saw this info from yesterday and relayed to patient:    Pt. Apt. Made for first available July 24th at 930/ arrival at [address] @ [address], [place] . Dr. Maritza Abdi. [phone]  Bring insurance, ID , copay if appropriate. Wear a mask, and can have only one person` with him.       Patient voiced understanding.

## 2020-08-05 ENCOUNTER — OFFICE VISIT (OUTPATIENT)
Dept: CARDIOLOGY | Age: 80
End: 2020-08-05
Payer: MEDICARE

## 2020-08-05 VITALS
DIASTOLIC BLOOD PRESSURE: 70 MMHG | SYSTOLIC BLOOD PRESSURE: 140 MMHG | BODY MASS INDEX: 30.78 KG/M2 | HEART RATE: 61 BPM | HEIGHT: 70 IN | WEIGHT: 215 LBS

## 2020-08-05 PROBLEM — T82.110A PACEMAKER LEAD MALFUNCTION: Status: ACTIVE | Noted: 2020-08-05

## 2020-08-05 PROCEDURE — 99214 OFFICE O/P EST MOD 30 MIN: CPT | Performed by: CLINICAL NURSE SPECIALIST

## 2020-08-05 PROCEDURE — 4040F PNEUMOC VAC/ADMIN/RCVD: CPT | Performed by: CLINICAL NURSE SPECIALIST

## 2020-08-05 PROCEDURE — 1123F ACP DISCUSS/DSCN MKR DOCD: CPT | Performed by: CLINICAL NURSE SPECIALIST

## 2020-08-05 PROCEDURE — G8427 DOCREV CUR MEDS BY ELIG CLIN: HCPCS | Performed by: CLINICAL NURSE SPECIALIST

## 2020-08-05 PROCEDURE — 1036F TOBACCO NON-USER: CPT | Performed by: CLINICAL NURSE SPECIALIST

## 2020-08-05 PROCEDURE — G8417 CALC BMI ABV UP PARAM F/U: HCPCS | Performed by: CLINICAL NURSE SPECIALIST

## 2020-08-05 PROCEDURE — 93288 INTERROG EVL PM/LDLS PM IP: CPT | Performed by: CLINICAL NURSE SPECIALIST

## 2020-08-05 RX ORDER — EZETIMIBE 10 MG/1
10 TABLET ORAL DAILY
COMMUNITY
Start: 2020-07-07

## 2020-08-05 ASSESSMENT — ENCOUNTER SYMPTOMS
WHEEZING: 0
COUGH: 0
ABDOMINAL PAIN: 0
FACIAL SWELLING: 0
EYE REDNESS: 0
VOMITING: 0
NAUSEA: 0
CHEST TIGHTNESS: 0
SHORTNESS OF BREATH: 0

## 2020-08-05 NOTE — PROGRESS NOTES
hyperlipidemia 11/14/2019    S/P aortic valve replacement with bioprosthetic valve 08/22/2019    Grade I diastolic dysfunction 84/21/8550    Pacemaker 08/22/2019    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    Syncope 02/21/2019    Hyponatremia 06/11/2018    Hypercholesterolemia with hypertriglyceridemia 06/11/2018    Rheumatic aortic valve insufficiency     Moderate mitral stenosis     Pinched nerve in neck 04/18/2018    CPAP (continuous positive airway pressure) dependence 03/08/2018    BiPAP (biphasic positive airway pressure) dependence     Bilateral carpal tunnel syndrome 08/28/2017    Diabetic polyneuropathy associated with type 2 diabetes mellitus ([address] 08/29/2016    Orthostasis     Vertigo 07/26/2016    Syncope and collapse 02/17/2016    Uncontrolled type 2 diabetes mellitus without complication, with long-term current use of insulin ([address] 02/17/2016    Obstructive sleep apnea 02/17/2016    Obesity 02/17/2016    Restless legs syndrome 02/17/2016    Gastroesophageal reflux disease without esophagitis 02/17/2016    Essential hypertension 02/17/2016    Chronic bilateral low back pain without sciatica 07/16/2012    Other chest pain 04/30/2012     Past Medical History:   Diagnosis Date    Aortic valve stenosis     Arthritis     Chest tightness, discomfort, or pressure 4/30/2012    Chronic back pain     CKD (chronic kidney disease)     CPAP (continuous positive airway pressure) dependence     13cm    Diabetes ([address]     Diabetic polyneuropathy associated with type 2 diabetes mellitus ([address] 8/29/2016    GERD (gastroesophageal reflux disease)     HTN (hypertension)     Hyperlipemia     Left ventricular diastolic dysfunction     Mitral stenosis     Mitral valve stenosis     Neuropathy     Obstructive sleep apnea     AHI: 34.5 per PSG, 6/2013; AHI: 36.9 per PSG, 7/2015; AHI: 54.5 per PSG, 3/2017    Pinched nerve in neck     Restless legs syndrome 2/17/2016     Past Surgical History:   Procedure Laterality Date    APPENDECTOMY      BACK SURGERY      4 Back surgeries    BLADDER SURGERY      CARDIAC CATHETERIZATION  12    EF > 50%    CATARACT REMOVAL      CHOLECYSTECTOMY      COLONOSCOPY      EYE SURGERY      implants placed both eyes    OTHER SURGICAL HISTORY  2020    Right Ventricular lead replacement- Dr. Janki Jessica RPLCMT PROST AORTIC VALVE OPEN XCP HOMOGRF/STENT N/A 2018    AORTIC VALVE REPLACEMENT TRANSESOPHAGEAL ECHOCARDIOGRAM performed by Rama French MD at [address] [place] [address]       Family History   Problem Relation Age of Onset    Diabetes Mother     Cancer Father     Cancer Sister     Heart Disease Brother     Cancer Brother     Cancer Sister     Cancer Brother      Social History     Tobacco Use    Smoking status: Former Smoker     Packs/day: 3.00     Years: 50.00     Pack years: 150.00     Types: Cigarettes     Last attempt to quit: 1977     Years since quittin.0    Smokeless tobacco: Former User   Substance Use Topics    Alcohol use: No      Current Outpatient Medications   Medication Sig Dispense Refill    ezetimibe (ZETIA) 10 MG tablet Take 10 mg by mouth daily      clopidogrel (PLAVIX) 75 MG tablet Take 75 mg by mouth daily      citalopram (CELEXA) 20 MG tablet TAKE ONE TABLET BY MOUTH DAILY 30 tablet 5    atorvastatin (LIPITOR) 40 MG tablet Take 1 tablet by mouth nightly 90 tablet 3    Cholecalciferol (VITAMIN D3) 43989 units CAPS   11    HUMULIN R U-500 KWIKPEN 500 UNIT/ML SOPN concentrated injection pen   11    insulin glargine (LANTUS) 100 UNIT/ML injection vial Inject 85 Units into the skin nightly (Patient taking differently: Inject 45 Units into the skin nightly ) 3 vial 0    Empagliflozin-Metformin HCl ER (SYNJARDY XR)  MG TB24 Take 1 tablet by mouth daily (with breakfast)      oxyCODONE (ROXICODONE) 5 MG immediate release tablet Take 5 mg by mouth every 4 hours as needed for Pain. Genetta Dieter aspirin 81 MG EC tablet Take 1 tablet by mouth daily 30 tablet 3    atenolol (TENORMIN) 25 MG tablet Take 25 mg by mouth daily      hydrochlorothiazide (MICROZIDE) 12.5 MG capsule Take 12.5 mg by mouth daily      B-D UF III MINI PEN NEEDLES 31G X 5 MM MISC       furosemide (LASIX) 20 MG tablet Take 1 tablet by mouth as needed (For weight gain greater than 2.5 lbs in 24 hours.) (Patient taking differently: Take 20 mg by mouth daily ) 30 tablet 0    esomeprazole (NEXIUM) 40 MG capsule Take 40 mg by mouth every morning (before breakfast). No current facility-administered medications for this visit. Allergies: Azithromycin; Metoprolol; Cyclobenzaprine; and Metoclopramide    Review of Systems  Review of Systems   Constitutional: Positive for fatigue. Negative for activity change, diaphoresis, fever and unexpected weight change. HENT: Negative for facial swelling and nosebleeds. Eyes: Negative for redness and visual disturbance. Respiratory: Negative for cough, chest tightness, shortness of breath and wheezing. Cardiovascular: Negative for chest pain, palpitations and leg swelling. Gastrointestinal: Negative for abdominal pain, nausea and vomiting. Endocrine: Negative for cold intolerance and heat intolerance. Genitourinary: Negative for dysuria and hematuria. Musculoskeletal: Negative for arthralgias and myalgias. Skin: Negative for pallor and rash. Complains of mild soreness around pacemaker incision site   Neurological: Negative for dizziness, seizures, syncope, weakness and light-headedness. Hematological: Does not bruise/bleed easily. Psychiatric/Behavioral: Negative for agitation. The patient is not nervous/anxious. Objective  Vital Signs - BP (!) 140/70   Pulse 61   Ht 5' 10\" (1.778 m)   Wt 215 lb (97.5 kg)   BMI 30.85 kg/m²   Physical Exam  Vitals signs and nursing note reviewed. Constitutional:       General: He is not in acute distress. Appearance: He is well-developed. He is obese. He is not diaphoretic. HENT:      Head: Normocephalic and atraumatic. Right Ear: Hearing and external ear normal.      Left Ear: Hearing and external ear normal.      Nose: Nose normal.   Eyes:      General:         Right eye: No discharge. Left eye: No discharge. Pupils: Pupils are equal, round, and reactive to light. Neck:      Musculoskeletal: Neck supple. No muscular tenderness. Thyroid: No thyromegaly. Vascular: No carotid bruit or JVD. Trachea: No tracheal deviation. Cardiovascular:      Rate and Rhythm: Normal rate and regular rhythm. Heart sounds: Murmur (2/6 systolic) present. No friction rub. No gallop. Pulmonary:      Effort: Pulmonary effort is normal. No respiratory distress. Breath sounds: Normal breath sounds. No wheezing or rales. Abdominal:      Palpations: Abdomen is soft. Tenderness: There is no abdominal tenderness. Musculoskeletal:         General: No swelling or deformity. Skin:     General: Skin is warm and dry. Findings: No rash. Comments: Left chest pacemaker site progressing well. Mild edema around the site, no erythema or drainage. Small amount of bruising at distal end of incision. Incision is covered with Dermabond   Neurological:      General: No focal deficit present. Mental Status: He is alert and oriented to person, place, and time. Cranial Nerves: No cranial nerve deficit. Psychiatric:         Mood and Affect: Mood normal.         Behavior: Behavior normal.         Judgment: Judgment normal.         Data:  Heart Cath 3/17/20  Conclusions      Single-vessel LAD proximal to mid bifurcation stenosis. Normal LV ejection fraction. Successful PCI to proximal to mid LAD and 1st diagonal utilizing \"collar   and transferred technique\".    Proximal LAD stented with 3.0 by 18 mm resolute integrity (collar stent)   just to bifurcation with the 1st diagonal.   Mid LAD (2.5 x 14 mm resolute integrity) and 1st diagonal (2.5 x 14   millimeters resolute integrity) stented in kissing stent fashion with   proximal edge is just within distal edge of collar stent. Recommendations    Medical management. Monitor pacemaker going forward. Echo 3/17/20  Summary   Calcification and thickening of the mitral valve leaflets with reduced   mobility. With a PHT of 147 msec and a MVA calculated at 1.5 cm^2, suggesting mild   mitral stenosis. Mild mitral regurgitation is present. Bioprosthetic valve in aortic position with increased velocities across   valve suggesting aortic stenosis (may be normal for bioprosthesis. No evidence of aortic valve regurgitation . Left ventricular size is normal .   Moderate concentric left ventricular hypertrophy. Left ventricular ejection fraction is visually estimated at 54%   Diastolic Dysfunction is present. Lab Results   Component Value Date    CHOL 159 (L) 05/10/2019    TRIG 129 05/10/2019    HDL 54 (L) 05/10/2019    LDLCALC 79 05/10/2019    LDLDIRECT 51 04/30/2012     Lab Results   Component Value Date    ALT 18 07/10/2020    AST 15 07/10/2020     Assessment:     Diagnosis Orders   1. Pacemaker lead malfunction, subsequent encounter     2. Sinoatrial node dysfunction (HCC)     3. Pacemaker malfunction, initial encounter     4. Valvular heart disease     5. S/P aortic valve replacement with bioprosthetic valve     6. Coronary artery disease involving native coronary artery of native heart without angina pectoris  [facility]   7. Essential hypertension     8. Mixed hyperlipidemia       Pacemaker lead malfunction-with recent right ventricular lead replacement by Dr. Sanjana Floyd in [place] on 8/3/2020. Patient is recovering nicely. Wound shows no sign of infection and is covered with Dermabond.   Discussed signs and symptoms of infection to observe for. Device check shows normal function. Return again in 10 days for wound check    Pacemaker check showed adequate battery status @8.4 years estimated  Mode: DDDR  Lead impedances are stable  Pacing: AP 98.8%,  100%  Appropriate diagnostics and safety margins noted. Sustained arrythmia:  none  Reprogramming done for sensitivity and threshold testing. Please see the scanned interrogation report    Valvular heart disease-history of aortic valve replacement. Reviewed most recent echo that shows normal functioning valve. Patient also has mild mitral stenosis    CAD-stable that symptoms of angina. Drug-eluting stents in March. Continue aspirin Plavix without interruption for 1 year. Refer back to cardiac rehab in a few weeks after pacemaker has healed    Hypertension-well-controlled on current regimen    Hyperlipidemia-on statin therapy. Last lipid check in May 2019, lipids were at goal.    Stable cardiovascular status. No evidence of overt heart failure,angina or dysrhythmia. Plan    Orders Placed This Encounter   Procedures   [facility]     Referral Priority:   Routine     Referral Type:   Eval and Treat     Referral Reason:   Specialty Services Required     Requested Specialty:   Cardiac Rehabilitation     Number of Visits Requested:   1     Return in about 10 days (around 8/15/2020). Do not lift left arm above the head, no driving or mowing  Refer back to Cardiac Rehab in a few weeks  Call for any signs or symptoms of infection including drainage, redness, pain at incision site    Call with any questionsor concerns  Follow up with RITESH Washington CNP for non cardiac problems  Report any new problems  Cardiovascular Fitness-Exercise as tolerated. Strive for 15 minutes of exercise most days of the week.     Cardiac / HealthyDiet  Continue current medications as directed  Continue plan of treatment  It is always recommended that you bring your medicationsbottles with you to each visit - this is for your safety!        RITESH Moss

## 2020-08-14 ENCOUNTER — NURSE ONLY (OUTPATIENT)
Dept: CARDIOLOGY | Age: 80
End: 2020-08-14

## 2020-08-14 PROCEDURE — 99024 POSTOP FOLLOW-UP VISIT: CPT | Performed by: CLINICAL NURSE SPECIALIST

## 2020-08-19 ENCOUNTER — TELEPHONE (OUTPATIENT)
Dept: CARDIOLOGY | Age: 80
End: 2020-08-19

## 2020-08-28 RX ORDER — INSULIN HUMAN 500 [IU]/ML
INJECTION, SOLUTION SUBCUTANEOUS
Qty: 12 ML | Refills: 5 | Status: SHIPPED | OUTPATIENT
Start: 2020-08-28 | End: 2021-04-08

## 2020-09-01 ENCOUNTER — OFFICE VISIT (OUTPATIENT)
Dept: NEUROLOGY | Age: 80
End: 2020-09-01
Payer: MEDICARE

## 2020-09-01 VITALS
HEIGHT: 70 IN | RESPIRATION RATE: 18 BRPM | BODY MASS INDEX: 30.78 KG/M2 | WEIGHT: 215 LBS | SYSTOLIC BLOOD PRESSURE: 100 MMHG | DIASTOLIC BLOOD PRESSURE: 63 MMHG | HEART RATE: 60 BPM

## 2020-09-01 PROCEDURE — 1123F ACP DISCUSS/DSCN MKR DOCD: CPT | Performed by: PSYCHIATRY & NEUROLOGY

## 2020-09-01 PROCEDURE — G8417 CALC BMI ABV UP PARAM F/U: HCPCS | Performed by: PSYCHIATRY & NEUROLOGY

## 2020-09-01 PROCEDURE — G8427 DOCREV CUR MEDS BY ELIG CLIN: HCPCS | Performed by: PSYCHIATRY & NEUROLOGY

## 2020-09-01 PROCEDURE — 99214 OFFICE O/P EST MOD 30 MIN: CPT | Performed by: PSYCHIATRY & NEUROLOGY

## 2020-09-01 PROCEDURE — 1036F TOBACCO NON-USER: CPT | Performed by: PSYCHIATRY & NEUROLOGY

## 2020-09-01 PROCEDURE — 4040F PNEUMOC VAC/ADMIN/RCVD: CPT | Performed by: PSYCHIATRY & NEUROLOGY

## 2020-09-01 RX ORDER — PRAMIPEXOLE DIHYDROCHLORIDE 1.5 MG/1
TABLET ORAL 3 TIMES DAILY
COMMUNITY
Start: 2020-08-05

## 2020-09-01 NOTE — PROGRESS NOTES
both eyes    OTHER SURGICAL HISTORY  08/03/2020    Right Ventricular lead replacement- Dr. Anastasiia Yee RPLCMT PROST AORTIC VALVE OPEN XCP HOMOGRF/STENT N/A 6/7/2018    AORTIC VALVE REPLACEMENT TRANSESOPHAGEAL ECHOCARDIOGRAM performed by Romina Shepherd MD at [address] [place] [address]  · None    Significant Injuries  · None    Habits  Alistair Grijalva reports that he quit smoking about 43 years ago. His smoking use included cigarettes. He has a 150.00 pack-year smoking history. He has quit using smokeless tobacco. He reports that he does not drink alcohol or use drugs. Family History   Problem Relation Age of Onset    Diabetes Mother     Cancer Father     Cancer Sister     Heart Disease Brother     Cancer Brother     Cancer Sister     Cancer Brother        Social History  Alistari Grijalva is single, lives in [place], [place], and is retired. Medications:  Current Outpatient Medications   Medication Sig Dispense Refill    pramipexole (MIRAPEX) 1.5 MG tablet 3 times daily       ezetimibe (ZETIA) 10 MG tablet Take 10 mg by mouth daily      clopidogrel (PLAVIX) 75 MG tablet Take 75 mg by mouth daily      citalopram (CELEXA) 20 MG tablet TAKE ONE TABLET BY MOUTH DAILY 30 tablet 5    atorvastatin (LIPITOR) 40 MG tablet Take 1 tablet by mouth nightly 90 tablet 3    Cholecalciferol (VITAMIN D3) 56143 units CAPS   11    HUMULIN R U-500 KWIKPEN 500 UNIT/ML SOPN concentrated injection pen   11    insulin glargine (LANTUS) 100 UNIT/ML injection vial Inject 85 Units into the skin nightly (Patient taking differently: Inject 45 Units into the skin nightly ) 3 vial 0    Empagliflozin-Metformin HCl ER (SYNJARDY XR)  MG TB24 Take 1 tablet by mouth daily (with breakfast)      oxyCODONE (ROXICODONE) 5 MG immediate release tablet Take 5 mg by mouth every 4 hours as needed for Pain. Chelseya Rear aspirin 81 MG EC tablet Take 1 tablet by mouth daily 30 tablet 3    atenolol (TENORMIN) 25 MG tablet Take 25 mg by mouth daily      hydrochlorothiazide (MICROZIDE) 12.5 MG capsule Take 12.5 mg by mouth daily      B-D UF III MINI PEN NEEDLES 31G X 5 MM MISC       furosemide (LASIX) 20 MG tablet Take 1 tablet by mouth as needed (For weight gain greater than 2.5 lbs in 24 hours.) (Patient taking differently: Take 20 mg by mouth daily ) 30 tablet 0    esomeprazole (NEXIUM) 40 MG capsule Take 40 mg by mouth every morning (before breakfast). No current facility-administered medications for this visit. Allergies:   Allergies as of 09/01/2020 - Review Complete 09/01/2020   Allergen Reaction Noted    Azithromycin  07/24/2020    Metoprolol  07/24/2020    Cyclobenzaprine Rash 07/24/2020    Metoclopramide Rash 07/24/2020       Review of Systems:  Constitutional: negative for - chills and fever  Eyes:  negative for - visual disturbance and photophobia  HENMT: negative for - headaches and sinus pain  Respiratory: negative for - cough, hemoptysis, and shortness of breath  Cardiovascular: negative for - chest pain and palpitations  Gastrointestinal: negative for - blood in stools, constipation, diarrhea, nausea, and vomiting  Genito-Urinary: negative for - hematuria  Positive for - urinary frequency, urinary urgency  Musculoskeletal: positive for - joint pain, joint stiffness, and joint swelling  Hematological and Lymphatic: negative for - bleeding problems, abnormal bruising, and swollen lymph nodes  Endocrine:  negative for - polydipsia and polyphagia  Allergy/Immunology:  negative for - rhinorrhea, sinus congestion, hives  Integument:  negative for - negative for - rash, change in moles, new or changing lesions  Psychological: negative for - anxiety and depression  Neurological: negative for - memory loss, weakness  Positive for - numbness/tingling    PHYSICAL EXAMINATION:  Vitals:  /63   Pulse 60   Resp 18   Ht 5' 10\" (1.778 m)   Wt 215 lb (97.5 kg)   BMI 30.85 kg/m²   General appearance:  Alert, well developed, well nourished, in no distress  HEENT:  normocephalic, atraumatic, sclera appear normal, no nasal abnormalities, no rhinorrhea, Ears appear normal, oral mucous membranes are moist without erythema, trachea midline, thyroid is normal, no lymphadenopathy or neck mass. Cardiovascular:  Regular rate and rhythm without murmer. No peripheral edema, No cyanosis or clubbing. No carotid bruits. Pulmonary:  Lungs are clear to auscultation. Breathing appears normal, good expansion, normal effort without use of accessory muscles  Musculoskeletal:  Joints are arthritic. Integument:  No rash, erythema, or pallor  Psychiatric:  Mood, affect, and behavior appear normal      NEUROLOGIC EXAMINATION:  Mental Status:  alert, oriented to person, place, and time. Speech:  Clear without dysarthria or dysphonia  Language:  Fluent without aphasia  Cranial Nerves:   II Visual fields are full to confrontation   III,IV, VI Extraocular movements are full   VII Facial movements are symmetrical without weakness   VIII Hearing is intact   IX,X Shoulder shrug and head rotation strength are intact   XII No tongue atrophy or fasciculations. Normal tongue protrusion. No tongue weakness  Motor:  Normal strength in both upper and lower extremities. Normal muscle tone and bulk. Deep tendon reflexes are trace present in both upper extremities and absent in both lower extremities. Johnson's signs are absent bilaterally. There is no ankle clonus on either side. Coordination:  Rapid alternating movements are normal in both upper extremities. Finger to nose testing is unimpaired bilaterally. Gait:  Mildly unsteady. Assessment:       ICD-10-CM    1. Vertebrobasilar artery insufficiency  G45.0    2. Obstructive sleep apnea  G47.33    3. Diabetic polyneuropathy associated with type 2 diabetes mellitus (HCC)  E11.42    4.  Memory loss  R41.3    5. Syncope and collapse  R55    He has had enigmatic syncope for years. Investigations have been negative. His pacemaker was recently found to have the leads misplaced and had to be completely replaced. Hopefully that explains and will correct his recurrent syncope. Plan:   1. Trial of Myrbetriq 25 mg q HS for the nocturia. If that will work, he should be more likely to use his CPAP during sleep.   2. Continue the same medications  3. FU in 6 months    Electronically signed by Viridiana Mena MD on 9/1/20

## 2020-09-14 ENCOUNTER — ANESTHESIA (OUTPATIENT)
Dept: OPERATING ROOM | Age: 80
DRG: 603 | End: 2020-09-14
Payer: MEDICARE

## 2020-09-14 ENCOUNTER — ANESTHESIA EVENT (OUTPATIENT)
Dept: OPERATING ROOM | Age: 80
DRG: 603 | End: 2020-09-14
Payer: MEDICARE

## 2020-09-14 ENCOUNTER — HOSPITAL ENCOUNTER (INPATIENT)
Age: 80
LOS: 7 days | Discharge: HOME HEALTH | DRG: 603 | End: 2020-09-21
Attending: INTERNAL MEDICINE | Admitting: HOSPITALIST
Payer: MEDICARE

## 2020-09-14 VITALS — OXYGEN SATURATION: 97 % | TEMPERATURE: 97.6 F | SYSTOLIC BLOOD PRESSURE: 127 MMHG | DIASTOLIC BLOOD PRESSURE: 69 MMHG

## 2020-09-14 PROBLEM — L03.114 CELLULITIS OF LEFT UPPER ARM: Status: ACTIVE | Noted: 2020-09-14

## 2020-09-14 PROBLEM — L03.114 CELLULITIS OF LEFT HAND: Status: ACTIVE | Noted: 2020-09-14

## 2020-09-14 PROBLEM — L02.512 ABSCESS OF LEFT HAND: Status: ACTIVE | Noted: 2020-09-14

## 2020-09-14 LAB
ALBUMIN SERPL-MCNC: 3.2 G/DL (ref 3.5–5.2)
ALP BLD-CCNC: 84 U/L (ref 40–130)
ALT SERPL-CCNC: 31 U/L (ref 5–41)
ANION GAP SERPL CALCULATED.3IONS-SCNC: 13 MMOL/L (ref 7–19)
AST SERPL-CCNC: 27 U/L (ref 5–40)
BASOPHILS ABSOLUTE: 0 K/UL (ref 0–0.2)
BASOPHILS RELATIVE PERCENT: 0.2 % (ref 0–1)
BILIRUB SERPL-MCNC: 0.9 MG/DL (ref 0.2–1.2)
BUN BLDV-MCNC: 22 MG/DL (ref 8–23)
CALCIUM SERPL-MCNC: 8.3 MG/DL (ref 8.8–10.2)
CHLORIDE BLD-SCNC: 96 MMOL/L (ref 98–111)
CO2: 21 MMOL/L (ref 22–29)
CREAT SERPL-MCNC: 0.8 MG/DL (ref 0.5–1.2)
EOSINOPHILS ABSOLUTE: 0 K/UL (ref 0–0.6)
EOSINOPHILS RELATIVE PERCENT: 0.1 % (ref 0–5)
GFR AFRICAN AMERICAN: >59
GFR NON-AFRICAN AMERICAN: >60
GLUCOSE BLD-MCNC: 108 MG/DL (ref 70–99)
GLUCOSE BLD-MCNC: 109 MG/DL (ref 70–99)
GLUCOSE BLD-MCNC: 123 MG/DL (ref 74–109)
HBA1C MFR BLD: 8.5 % (ref 4–6)
HCT VFR BLD CALC: 40.5 % (ref 42–52)
HEMOGLOBIN: 13.9 G/DL (ref 14–18)
IMMATURE GRANULOCYTES #: 0.2 K/UL
LYMPHOCYTES ABSOLUTE: 0.3 K/UL (ref 1.1–4.5)
LYMPHOCYTES RELATIVE PERCENT: 1.9 % (ref 20–40)
MAGNESIUM: 2.4 MG/DL (ref 1.6–2.4)
MCH RBC QN AUTO: 30.5 PG (ref 27–31)
MCHC RBC AUTO-ENTMCNC: 34.3 G/DL (ref 33–37)
MCV RBC AUTO: 89 FL (ref 80–94)
MONOCYTES ABSOLUTE: 1.9 K/UL (ref 0–0.9)
MONOCYTES RELATIVE PERCENT: 11.4 % (ref 0–10)
NEUTROPHILS ABSOLUTE: 14.2 K/UL (ref 1.5–7.5)
NEUTROPHILS RELATIVE PERCENT: 85.2 % (ref 50–65)
PDW BLD-RTO: 14 % (ref 11.5–14.5)
PERFORMED ON: ABNORMAL
PERFORMED ON: ABNORMAL
PHOSPHORUS: 1.9 MG/DL (ref 2.5–4.5)
PLATELET # BLD: 131 K/UL (ref 130–400)
PMV BLD AUTO: 10.2 FL (ref 9.4–12.4)
POTASSIUM SERPL-SCNC: 3.4 MMOL/L (ref 3.5–5)
RBC # BLD: 4.55 M/UL (ref 4.7–6.1)
SARS-COV-2, NAAT: NOT DETECTED
SODIUM BLD-SCNC: 130 MMOL/L (ref 136–145)
TOTAL PROTEIN: 5.9 G/DL (ref 6.6–8.7)
WBC # BLD: 16.6 K/UL (ref 4.8–10.8)

## 2020-09-14 PROCEDURE — 82947 ASSAY GLUCOSE BLOOD QUANT: CPT

## 2020-09-14 PROCEDURE — 80053 COMPREHEN METABOLIC PANEL: CPT

## 2020-09-14 PROCEDURE — 2580000003 HC RX 258: Performed by: ANESTHESIOLOGY

## 2020-09-14 PROCEDURE — 6360000002 HC RX W HCPCS: Performed by: INTERNAL MEDICINE

## 2020-09-14 PROCEDURE — 2500000003 HC RX 250 WO HCPCS

## 2020-09-14 PROCEDURE — 2500000003 HC RX 250 WO HCPCS: Performed by: ORTHOPAEDIC SURGERY

## 2020-09-14 PROCEDURE — 2709999900 HC NON-CHARGEABLE SUPPLY: Performed by: ORTHOPAEDIC SURGERY

## 2020-09-14 PROCEDURE — 2580000003 HC RX 258: Performed by: ORTHOPAEDIC SURGERY

## 2020-09-14 PROCEDURE — 0X9K0ZZ DRAINAGE OF LEFT HAND, OPEN APPROACH: ICD-10-PCS | Performed by: ORTHOPAEDIC SURGERY

## 2020-09-14 PROCEDURE — 87205 SMEAR GRAM STAIN: CPT

## 2020-09-14 PROCEDURE — 83036 HEMOGLOBIN GLYCOSYLATED A1C: CPT

## 2020-09-14 PROCEDURE — 87077 CULTURE AEROBIC IDENTIFY: CPT

## 2020-09-14 PROCEDURE — 6360000002 HC RX W HCPCS: Performed by: HOSPITALIST

## 2020-09-14 PROCEDURE — 3700000001 HC ADD 15 MINUTES (ANESTHESIA): Performed by: ORTHOPAEDIC SURGERY

## 2020-09-14 PROCEDURE — 1210000000 HC MED SURG R&B

## 2020-09-14 PROCEDURE — 2580000003 HC RX 258: Performed by: INTERNAL MEDICINE

## 2020-09-14 PROCEDURE — 7100000001 HC PACU RECOVERY - ADDTL 15 MIN: Performed by: ORTHOPAEDIC SURGERY

## 2020-09-14 PROCEDURE — 3700000000 HC ANESTHESIA ATTENDED CARE: Performed by: ORTHOPAEDIC SURGERY

## 2020-09-14 PROCEDURE — 7100000000 HC PACU RECOVERY - FIRST 15 MIN: Performed by: ORTHOPAEDIC SURGERY

## 2020-09-14 PROCEDURE — 6360000002 HC RX W HCPCS: Performed by: ANESTHESIOLOGY

## 2020-09-14 PROCEDURE — 84100 ASSAY OF PHOSPHORUS: CPT

## 2020-09-14 PROCEDURE — 83735 ASSAY OF MAGNESIUM: CPT

## 2020-09-14 PROCEDURE — U0002 COVID-19 LAB TEST NON-CDC: HCPCS

## 2020-09-14 PROCEDURE — 87186 SC STD MICRODIL/AGAR DIL: CPT

## 2020-09-14 PROCEDURE — 85025 COMPLETE CBC W/AUTO DIFF WBC: CPT

## 2020-09-14 PROCEDURE — 2500000003 HC RX 250 WO HCPCS: Performed by: HOSPITALIST

## 2020-09-14 PROCEDURE — 6360000002 HC RX W HCPCS

## 2020-09-14 PROCEDURE — 87075 CULTR BACTERIA EXCEPT BLOOD: CPT

## 2020-09-14 PROCEDURE — 3600000003 HC SURGERY LEVEL 3 BASE: Performed by: ORTHOPAEDIC SURGERY

## 2020-09-14 PROCEDURE — 2580000003 HC RX 258: Performed by: HOSPITALIST

## 2020-09-14 PROCEDURE — 64415 NJX AA&/STRD BRCH PLXS IMG: CPT

## 2020-09-14 PROCEDURE — 87070 CULTURE OTHR SPECIMN AEROBIC: CPT

## 2020-09-14 PROCEDURE — 36415 COLL VENOUS BLD VENIPUNCTURE: CPT

## 2020-09-14 PROCEDURE — 6360000002 HC RX W HCPCS: Performed by: ORTHOPAEDIC SURGERY

## 2020-09-14 PROCEDURE — 6370000000 HC RX 637 (ALT 250 FOR IP): Performed by: ORTHOPAEDIC SURGERY

## 2020-09-14 PROCEDURE — 3600000013 HC SURGERY LEVEL 3 ADDTL 15MIN: Performed by: ORTHOPAEDIC SURGERY

## 2020-09-14 PROCEDURE — 2500000003 HC RX 250 WO HCPCS: Performed by: ANESTHESIOLOGY

## 2020-09-14 RX ORDER — FAMOTIDINE 20 MG/1
20 TABLET, FILM COATED ORAL 2 TIMES DAILY
Status: DISCONTINUED | OUTPATIENT
Start: 2020-09-14 | End: 2020-09-21 | Stop reason: HOSPADM

## 2020-09-14 RX ORDER — MIDAZOLAM HYDROCHLORIDE 1 MG/ML
2 INJECTION INTRAMUSCULAR; INTRAVENOUS
Status: DISCONTINUED | OUTPATIENT
Start: 2020-09-14 | End: 2020-09-14 | Stop reason: ALTCHOICE

## 2020-09-14 RX ORDER — DIPHENHYDRAMINE HYDROCHLORIDE 50 MG/ML
12.5 INJECTION INTRAMUSCULAR; INTRAVENOUS
Status: DISCONTINUED | OUTPATIENT
Start: 2020-09-14 | End: 2020-09-14 | Stop reason: HOSPADM

## 2020-09-14 RX ORDER — POLYETHYLENE GLYCOL 3350 17 G/17G
17 POWDER, FOR SOLUTION ORAL DAILY PRN
Status: DISCONTINUED | OUTPATIENT
Start: 2020-09-14 | End: 2020-09-21 | Stop reason: HOSPADM

## 2020-09-14 RX ORDER — SODIUM CHLORIDE 0.9 % (FLUSH) 0.9 %
10 SYRINGE (ML) INJECTION EVERY 12 HOURS SCHEDULED
Status: DISCONTINUED | OUTPATIENT
Start: 2020-09-14 | End: 2020-09-14 | Stop reason: SDUPTHER

## 2020-09-14 RX ORDER — MORPHINE SULFATE 4 MG/ML
4 INJECTION, SOLUTION INTRAMUSCULAR; INTRAVENOUS
Status: DISCONTINUED | OUTPATIENT
Start: 2020-09-14 | End: 2020-09-20

## 2020-09-14 RX ORDER — OXYCODONE HYDROCHLORIDE 5 MG/1
10 TABLET ORAL EVERY 4 HOURS PRN
Status: DISCONTINUED | OUTPATIENT
Start: 2020-09-14 | End: 2020-09-20

## 2020-09-14 RX ORDER — PROMETHAZINE HYDROCHLORIDE 25 MG/ML
6.25 INJECTION, SOLUTION INTRAMUSCULAR; INTRAVENOUS
Status: DISCONTINUED | OUTPATIENT
Start: 2020-09-14 | End: 2020-09-14 | Stop reason: HOSPADM

## 2020-09-14 RX ORDER — SODIUM CHLORIDE 0.9 % (FLUSH) 0.9 %
10 SYRINGE (ML) INJECTION PRN
Status: DISCONTINUED | OUTPATIENT
Start: 2020-09-14 | End: 2020-09-14 | Stop reason: ALTCHOICE

## 2020-09-14 RX ORDER — SODIUM CHLORIDE 0.9 % (FLUSH) 0.9 %
10 SYRINGE (ML) INJECTION EVERY 12 HOURS SCHEDULED
Status: DISCONTINUED | OUTPATIENT
Start: 2020-09-14 | End: 2020-09-14 | Stop reason: ALTCHOICE

## 2020-09-14 RX ORDER — SODIUM CHLORIDE 0.9 % (FLUSH) 0.9 %
10 SYRINGE (ML) INJECTION EVERY 12 HOURS SCHEDULED
Status: DISCONTINUED | OUTPATIENT
Start: 2020-09-14 | End: 2020-09-21 | Stop reason: HOSPADM

## 2020-09-14 RX ORDER — FENTANYL CITRATE 50 UG/ML
INJECTION, SOLUTION INTRAMUSCULAR; INTRAVENOUS PRN
Status: DISCONTINUED | OUTPATIENT
Start: 2020-09-14 | End: 2020-09-14 | Stop reason: SDUPTHER

## 2020-09-14 RX ORDER — POTASSIUM CHLORIDE 7.45 MG/ML
10 INJECTION INTRAVENOUS PRN
Status: DISCONTINUED | OUTPATIENT
Start: 2020-09-14 | End: 2020-09-21 | Stop reason: HOSPADM

## 2020-09-14 RX ORDER — SODIUM CHLORIDE 0.9 % (FLUSH) 0.9 %
10 SYRINGE (ML) INJECTION PRN
Status: DISCONTINUED | OUTPATIENT
Start: 2020-09-14 | End: 2020-09-21 | Stop reason: HOSPADM

## 2020-09-14 RX ORDER — ATENOLOL 25 MG/1
25 TABLET ORAL DAILY
Status: DISCONTINUED | OUTPATIENT
Start: 2020-09-14 | End: 2020-09-21 | Stop reason: HOSPADM

## 2020-09-14 RX ORDER — ONDANSETRON 2 MG/ML
4 INJECTION INTRAMUSCULAR; INTRAVENOUS EVERY 6 HOURS PRN
Status: DISCONTINUED | OUTPATIENT
Start: 2020-09-14 | End: 2020-09-15 | Stop reason: SDUPTHER

## 2020-09-14 RX ORDER — ERGOCALCIFEROL 1.25 MG/1
50000 CAPSULE ORAL WEEKLY
Status: DISCONTINUED | OUTPATIENT
Start: 2020-09-14 | End: 2020-09-21 | Stop reason: HOSPADM

## 2020-09-14 RX ORDER — ONDANSETRON 2 MG/ML
INJECTION INTRAMUSCULAR; INTRAVENOUS PRN
Status: DISCONTINUED | OUTPATIENT
Start: 2020-09-14 | End: 2020-09-14 | Stop reason: SDUPTHER

## 2020-09-14 RX ORDER — MORPHINE SULFATE 4 MG/ML
2 INJECTION, SOLUTION INTRAMUSCULAR; INTRAVENOUS EVERY 5 MIN PRN
Status: DISCONTINUED | OUTPATIENT
Start: 2020-09-14 | End: 2020-09-14 | Stop reason: HOSPADM

## 2020-09-14 RX ORDER — MORPHINE SULFATE 4 MG/ML
4 INJECTION, SOLUTION INTRAMUSCULAR; INTRAVENOUS
Status: DISCONTINUED | OUTPATIENT
Start: 2020-09-14 | End: 2020-09-14 | Stop reason: ALTCHOICE

## 2020-09-14 RX ORDER — OXYCODONE HYDROCHLORIDE 5 MG/1
5 TABLET ORAL EVERY 4 HOURS PRN
Status: DISCONTINUED | OUTPATIENT
Start: 2020-09-14 | End: 2020-09-21 | Stop reason: HOSPADM

## 2020-09-14 RX ORDER — ASPIRIN 81 MG/1
81 TABLET ORAL DAILY
Qty: 30 TABLET | Refills: 3 | Status: SHIPPED | OUTPATIENT
Start: 2020-09-14 | End: 2020-09-21 | Stop reason: HOSPADM

## 2020-09-14 RX ORDER — PANTOPRAZOLE SODIUM 40 MG/1
40 TABLET, DELAYED RELEASE ORAL
Status: DISCONTINUED | OUTPATIENT
Start: 2020-09-15 | End: 2020-09-21 | Stop reason: HOSPADM

## 2020-09-14 RX ORDER — OXYCODONE HYDROCHLORIDE AND ACETAMINOPHEN 5; 325 MG/1; MG/1
1 TABLET ORAL EVERY 4 HOURS PRN
Qty: 50 TABLET | Refills: 0 | Status: SHIPPED | OUTPATIENT
Start: 2020-09-14 | End: 2020-09-21

## 2020-09-14 RX ORDER — MAGNESIUM SULFATE 1 G/100ML
1 INJECTION INTRAVENOUS PRN
Status: DISCONTINUED | OUTPATIENT
Start: 2020-09-14 | End: 2020-09-21 | Stop reason: HOSPADM

## 2020-09-14 RX ORDER — PROMETHAZINE HYDROCHLORIDE 12.5 MG/1
12.5 TABLET ORAL EVERY 6 HOURS PRN
Status: DISCONTINUED | OUTPATIENT
Start: 2020-09-14 | End: 2020-09-15 | Stop reason: SDUPTHER

## 2020-09-14 RX ORDER — LIDOCAINE HYDROCHLORIDE 10 MG/ML
INJECTION, SOLUTION INFILTRATION; PERINEURAL
Status: COMPLETED | OUTPATIENT
Start: 2020-09-14 | End: 2020-09-14

## 2020-09-14 RX ORDER — SODIUM CHLORIDE 0.9 % (FLUSH) 0.9 %
10 SYRINGE (ML) INJECTION PRN
Status: DISCONTINUED | OUTPATIENT
Start: 2020-09-14 | End: 2020-09-14 | Stop reason: SDUPTHER

## 2020-09-14 RX ORDER — EZETIMIBE 10 MG/1
10 TABLET ORAL DAILY
Status: DISCONTINUED | OUTPATIENT
Start: 2020-09-14 | End: 2020-09-21 | Stop reason: HOSPADM

## 2020-09-14 RX ORDER — INSULIN GLARGINE 100 [IU]/ML
85 INJECTION, SOLUTION SUBCUTANEOUS NIGHTLY
Status: DISCONTINUED | OUTPATIENT
Start: 2020-09-14 | End: 2020-09-20

## 2020-09-14 RX ORDER — MEPERIDINE HYDROCHLORIDE 50 MG/ML
12.5 INJECTION INTRAMUSCULAR; INTRAVENOUS; SUBCUTANEOUS EVERY 5 MIN PRN
Status: DISCONTINUED | OUTPATIENT
Start: 2020-09-14 | End: 2020-09-14 | Stop reason: HOSPADM

## 2020-09-14 RX ORDER — LIDOCAINE HYDROCHLORIDE 10 MG/ML
1 INJECTION, SOLUTION EPIDURAL; INFILTRATION; INTRACAUDAL; PERINEURAL
Status: DISCONTINUED | OUTPATIENT
Start: 2020-09-14 | End: 2020-09-14 | Stop reason: ALTCHOICE

## 2020-09-14 RX ORDER — SCOLOPAMINE TRANSDERMAL SYSTEM 1 MG/1
1 PATCH, EXTENDED RELEASE TRANSDERMAL ONCE
Status: DISCONTINUED | OUTPATIENT
Start: 2020-09-14 | End: 2020-09-14 | Stop reason: ALTCHOICE

## 2020-09-14 RX ORDER — HYDROMORPHONE HYDROCHLORIDE 1 MG/ML
0.25 INJECTION, SOLUTION INTRAMUSCULAR; INTRAVENOUS; SUBCUTANEOUS EVERY 5 MIN PRN
Status: DISCONTINUED | OUTPATIENT
Start: 2020-09-14 | End: 2020-09-14 | Stop reason: HOSPADM

## 2020-09-14 RX ORDER — ATORVASTATIN CALCIUM 40 MG/1
40 TABLET, FILM COATED ORAL NIGHTLY
Status: DISCONTINUED | OUTPATIENT
Start: 2020-09-14 | End: 2020-09-21 | Stop reason: HOSPADM

## 2020-09-14 RX ORDER — MIDAZOLAM HYDROCHLORIDE 1 MG/ML
2 INJECTION INTRAMUSCULAR; INTRAVENOUS ONCE
Status: COMPLETED | OUTPATIENT
Start: 2020-09-14 | End: 2020-09-14

## 2020-09-14 RX ORDER — CITALOPRAM 20 MG/1
20 TABLET ORAL DAILY
Status: DISCONTINUED | OUTPATIENT
Start: 2020-09-14 | End: 2020-09-21 | Stop reason: HOSPADM

## 2020-09-14 RX ORDER — ENALAPRILAT 2.5 MG/2ML
1.25 INJECTION INTRAVENOUS
Status: DISCONTINUED | OUTPATIENT
Start: 2020-09-14 | End: 2020-09-14 | Stop reason: HOSPADM

## 2020-09-14 RX ORDER — SODIUM CHLORIDE, SODIUM LACTATE, POTASSIUM CHLORIDE, CALCIUM CHLORIDE 600; 310; 30; 20 MG/100ML; MG/100ML; MG/100ML; MG/100ML
INJECTION, SOLUTION INTRAVENOUS CONTINUOUS
Status: DISCONTINUED | OUTPATIENT
Start: 2020-09-14 | End: 2020-09-21 | Stop reason: HOSPADM

## 2020-09-14 RX ORDER — LIDOCAINE HYDROCHLORIDE 20 MG/ML
INJECTION, SOLUTION INFILTRATION; PERINEURAL
Status: COMPLETED | OUTPATIENT
Start: 2020-09-14 | End: 2020-09-14

## 2020-09-14 RX ORDER — PROMETHAZINE HYDROCHLORIDE 12.5 MG/1
12.5 TABLET ORAL EVERY 6 HOURS PRN
Status: DISCONTINUED | OUTPATIENT
Start: 2020-09-14 | End: 2020-09-21 | Stop reason: HOSPADM

## 2020-09-14 RX ORDER — HYDRALAZINE HYDROCHLORIDE 20 MG/ML
5 INJECTION INTRAMUSCULAR; INTRAVENOUS EVERY 10 MIN PRN
Status: DISCONTINUED | OUTPATIENT
Start: 2020-09-14 | End: 2020-09-14 | Stop reason: HOSPADM

## 2020-09-14 RX ORDER — ACETAMINOPHEN 650 MG/1
650 SUPPOSITORY RECTAL EVERY 6 HOURS PRN
Status: DISCONTINUED | OUTPATIENT
Start: 2020-09-14 | End: 2020-09-21 | Stop reason: HOSPADM

## 2020-09-14 RX ORDER — FENTANYL CITRATE 50 UG/ML
50 INJECTION, SOLUTION INTRAMUSCULAR; INTRAVENOUS
Status: DISCONTINUED | OUTPATIENT
Start: 2020-09-14 | End: 2020-09-14 | Stop reason: ALTCHOICE

## 2020-09-14 RX ORDER — ACETAMINOPHEN 325 MG/1
650 TABLET ORAL EVERY 6 HOURS PRN
Status: DISCONTINUED | OUTPATIENT
Start: 2020-09-14 | End: 2020-09-21 | Stop reason: HOSPADM

## 2020-09-14 RX ORDER — SODIUM CHLORIDE, SODIUM LACTATE, POTASSIUM CHLORIDE, CALCIUM CHLORIDE 600; 310; 30; 20 MG/100ML; MG/100ML; MG/100ML; MG/100ML
INJECTION, SOLUTION INTRAVENOUS CONTINUOUS
Status: DISCONTINUED | OUTPATIENT
Start: 2020-09-14 | End: 2020-09-14

## 2020-09-14 RX ORDER — ASPIRIN 81 MG/1
81 TABLET ORAL 2 TIMES DAILY
Qty: 42 TABLET | Refills: 0 | Status: SHIPPED | OUTPATIENT
Start: 2020-09-14 | End: 2022-09-15

## 2020-09-14 RX ORDER — ONDANSETRON 2 MG/ML
4 INJECTION INTRAMUSCULAR; INTRAVENOUS EVERY 6 HOURS PRN
Status: DISCONTINUED | OUTPATIENT
Start: 2020-09-14 | End: 2020-09-21 | Stop reason: HOSPADM

## 2020-09-14 RX ORDER — ROPIVACAINE HYDROCHLORIDE 5 MG/ML
INJECTION, SOLUTION EPIDURAL; INFILTRATION; PERINEURAL
Status: COMPLETED | OUTPATIENT
Start: 2020-09-14 | End: 2020-09-14

## 2020-09-14 RX ORDER — SODIUM CHLORIDE 9 MG/ML
INJECTION, SOLUTION INTRAVENOUS CONTINUOUS
Status: DISCONTINUED | OUTPATIENT
Start: 2020-09-14 | End: 2020-09-21 | Stop reason: HOSPADM

## 2020-09-14 RX ORDER — LIDOCAINE HYDROCHLORIDE 10 MG/ML
INJECTION, SOLUTION EPIDURAL; INFILTRATION; INTRACAUDAL; PERINEURAL PRN
Status: DISCONTINUED | OUTPATIENT
Start: 2020-09-14 | End: 2020-09-14 | Stop reason: SDUPTHER

## 2020-09-14 RX ORDER — MORPHINE SULFATE 4 MG/ML
2 INJECTION, SOLUTION INTRAMUSCULAR; INTRAVENOUS
Status: DISCONTINUED | OUTPATIENT
Start: 2020-09-14 | End: 2020-09-20

## 2020-09-14 RX ORDER — MORPHINE SULFATE 4 MG/ML
4 INJECTION, SOLUTION INTRAMUSCULAR; INTRAVENOUS EVERY 5 MIN PRN
Status: DISCONTINUED | OUTPATIENT
Start: 2020-09-14 | End: 2020-09-14 | Stop reason: HOSPADM

## 2020-09-14 RX ORDER — HYDROMORPHONE HYDROCHLORIDE 1 MG/ML
0.5 INJECTION, SOLUTION INTRAMUSCULAR; INTRAVENOUS; SUBCUTANEOUS EVERY 5 MIN PRN
Status: DISCONTINUED | OUTPATIENT
Start: 2020-09-14 | End: 2020-09-14 | Stop reason: HOSPADM

## 2020-09-14 RX ORDER — CLOPIDOGREL BISULFATE 75 MG/1
75 TABLET ORAL DAILY
Status: CANCELLED | OUTPATIENT
Start: 2020-09-14

## 2020-09-14 RX ORDER — POTASSIUM CHLORIDE 20 MEQ/1
40 TABLET, EXTENDED RELEASE ORAL PRN
Status: DISCONTINUED | OUTPATIENT
Start: 2020-09-14 | End: 2020-09-21 | Stop reason: HOSPADM

## 2020-09-14 RX ADMIN — CEFEPIME HYDROCHLORIDE 1 G: 1 INJECTION, POWDER, FOR SOLUTION INTRAMUSCULAR; INTRAVENOUS at 22:07

## 2020-09-14 RX ADMIN — VANCOMYCIN HYDROCHLORIDE 1500 MG: 10 INJECTION, POWDER, LYOPHILIZED, FOR SOLUTION INTRAVENOUS at 23:42

## 2020-09-14 RX ADMIN — LIDOCAINE HYDROCHLORIDE 50 MG: 10 INJECTION, SOLUTION EPIDURAL; INFILTRATION; INTRACAUDAL; PERINEURAL at 17:35

## 2020-09-14 RX ADMIN — PRAMIPEXOLE DIHYDROCHLORIDE 1.5 MG: 1 TABLET ORAL at 22:08

## 2020-09-14 RX ADMIN — SODIUM CHLORIDE: 9 INJECTION, SOLUTION INTRAVENOUS at 15:24

## 2020-09-14 RX ADMIN — LIDOCAINE HYDROCHLORIDE 10 ML: 20 INJECTION, SOLUTION INFILTRATION; PERINEURAL at 17:12

## 2020-09-14 RX ADMIN — LIDOCAINE HYDROCHLORIDE 1 ML: 10 INJECTION, SOLUTION INFILTRATION; PERINEURAL at 17:12

## 2020-09-14 RX ADMIN — SODIUM CHLORIDE, PRESERVATIVE FREE 10 ML: 5 INJECTION INTRAVENOUS at 22:07

## 2020-09-14 RX ADMIN — ROPIVACAINE HYDROCHLORIDE 10 ML: 5 INJECTION, SOLUTION EPIDURAL; INFILTRATION; PERINEURAL at 17:12

## 2020-09-14 RX ADMIN — FAMOTIDINE 20 MG: 20 TABLET ORAL at 22:08

## 2020-09-14 RX ADMIN — Medication 85 UNITS: at 22:09

## 2020-09-14 RX ADMIN — SODIUM CHLORIDE, POTASSIUM CHLORIDE, SODIUM LACTATE AND CALCIUM CHLORIDE: 600; 310; 30; 20 INJECTION, SOLUTION INTRAVENOUS at 22:09

## 2020-09-14 RX ADMIN — ONDANSETRON HYDROCHLORIDE 4 MG: 2 INJECTION, SOLUTION INTRAMUSCULAR; INTRAVENOUS at 17:36

## 2020-09-14 RX ADMIN — FENTANYL CITRATE 25 MCG: 50 INJECTION INTRAMUSCULAR; INTRAVENOUS at 17:48

## 2020-09-14 RX ADMIN — METRONIDAZOLE 500 MG: 500 INJECTION, SOLUTION INTRAVENOUS at 22:07

## 2020-09-14 RX ADMIN — MIDAZOLAM 2 MG: 1 INJECTION INTRAMUSCULAR; INTRAVENOUS at 17:05

## 2020-09-14 RX ADMIN — CEFAZOLIN SODIUM 2 G: 1 INJECTION, POWDER, FOR SOLUTION INTRAMUSCULAR; INTRAVENOUS at 15:24

## 2020-09-14 RX ADMIN — SODIUM CHLORIDE, SODIUM LACTATE, POTASSIUM CHLORIDE, AND CALCIUM CHLORIDE: 600; 310; 30; 20 INJECTION, SOLUTION INTRAVENOUS at 16:59

## 2020-09-14 RX ADMIN — ATORVASTATIN CALCIUM 40 MG: 40 TABLET, FILM COATED ORAL at 22:08

## 2020-09-14 RX ADMIN — METRONIDAZOLE 500 MG: 500 INJECTION, SOLUTION INTRAVENOUS at 15:24

## 2020-09-14 ASSESSMENT — PAIN SCALES - GENERAL: PAINLEVEL_OUTOF10: 0

## 2020-09-14 ASSESSMENT — ENCOUNTER SYMPTOMS: SHORTNESS OF BREATH: 1

## 2020-09-14 NOTE — PROGRESS NOTES
4 Eyes Skin Assessment    Sagar Gillis is being assessed upon: Admission    I agree that Petey Elias, along with Corey Mcconnell either 2 RN's or 1 LPN and 1 RN) have performed a thorough Head to Toe Skin Assessment on the patient. ALL assessment sites listed below have been assessed. Areas assessed by both nurses:     [x]   Head, Face, and Ears   [x]   Shoulders, Back, and Chest  [x]   Arms, Elbows, and Hands   [x]   Coccyx, Sacrum, and Ischium  [x]   Legs, Feet, and Heels    Does the Patient have Skin Breakdown? Yes, wound(s) noted upon assessment. It is the responsibility of the Primary Nurse to assure that the following documentation, preventions, orders, and consults are complete on the above noted wound(s): Wound LDA initiated. LDA Flowsheet Documentation includes the Mariana-wound, Wound Assessment, Measurements, Dressing Treatment, Drainage, and Color.    (cellulitus of the R hand and FA)    Johnny Prevention initiated: Yes  Wound Care Orders initiated: NA    [facility] nurse consulted for Pressure Injury (Stage 3,4, Unstageable, DTI, NWPT, and Complex wounds) and New or Established Ostomies: NA        Primary Nurse eSignature: Clyde Dumont RN on 9/14/2020 at 2:38 PM      Co-Signer eSignature: Electronically signed by Blanca Hughes RN on 9/14/20 at 6:48 PM CDT

## 2020-09-14 NOTE — PROGRESS NOTES
Pharmacy Note  Vancomycin Consult    Alistair Grijalva is a 79 y.o. male started on Vancomycin for Abscess of left hand; consult received from Dr. Neli Gallardo to manage therapy. Also receiving the following antibiotics: Cefazolin / Metronidazole. Principal Problem:    Cellulitis of left hand and arm  Active Problems:    Chronic bilateral low back pain without sciatica    Type 2 diabetes mellitus with hyperglycemia, with long-term current use of insulin ([facility])    Obstructive sleep apnea    Class 1 obesity due to excess calories in adult    Gastroesophageal reflux disease without esophagitis    Essential hypertension    Diabetic polyneuropathy associated with type 2 diabetes mellitus ([facility])    Bilateral carpal tunnel syndrome    CPAP (continuous positive airway pressure) dependence    S/P aortic valve replacement with bioprosthetic valve    Pacemaker    Abscess of left hand  Resolved Problems:    * No resolved hospital problems. *      Allergies:  Azithromycin; Metoprolol; Cyclobenzaprine; and Metoclopramide     Temp max: 97.8    No results for input(s): BUN in the last 72 hours. No results for input(s): CREATININE in the last 72 hours. No results for input(s): WBC in the last 72 hours. No intake or output data in the 24 hours ending 09/14/20 1409    Culture Date Source Results   None ordered at this time. Ht Readings from Last 1 Encounters:   09/14/20 5' 10\" (1.778 m)        Wt Readings from Last 1 Encounters:   09/14/20 210 lb (95.3 kg)         Body mass index is 30.13 kg/m². CrCl cannot be calculated (Patient's most recent lab result is older than the maximum 10 days allowed. ). Assessment/Plan:  Will initiate vancomycin 1500  mg IV every 12 hours. Timing of trough level will be determined based on culture results, renal function, and clinical response. Thank you for the consult. Will continue to follow.     Electronically signed by Cristian Cam, [address] [place] [address] on 9/14/2020 at 2:09 PM

## 2020-09-14 NOTE — ANESTHESIA PRE PROCEDURE
Department of Anesthesiology  Preprocedure Note       Name:  Laine Mcdaniel   Age:  79 y.o.  :  1940                                          MRN:  [id number]         Date:  2020      Surgeon: Bridgette Lawler):  Hawa Jerome MD    Procedure: Procedure(s):  AORTIC VALVE REPLACEMENT TRANSESOPHAGEAL ECHOCARDIOGRAM    Medications prior to admission:   Prior to Admission medications    Medication Sig Start Date End Date Taking? Authorizing Provider   pramipexole (MIRAPEX) 1.5 MG tablet 3 times daily  20   Historical Provider, MD   mirabegron (MYRBETRIQ) 25 MG TB24 Take 1 tablet by mouth daily 20   Deejay Wild MD   ezetimibe (ZETIA) 10 MG tablet Take 10 mg by mouth daily 20   Historical Provider, MD   clopidogrel (PLAVIX) 75 MG tablet Take 75 mg by mouth daily    Historical Provider, MD   citalopram (CELEXA) 20 MG tablet TAKE ONE TABLET BY MOUTH DAILY 20   Deejay Wild MD   atorvastatin (LIPITOR) 40 MG tablet Take 1 tablet by mouth nightly 3/20/20   Chet Hewitt MD   Cholecalciferol (VITAMIN D3) 92990 units CAPS  19   Historical Provider, MD   HUMULIN R U-500 KWIKPEN 500 UNIT/ML SOPN concentrated injection pen  19   Historical Provider, MD   insulin glargine (LANTUS) 100 UNIT/ML injection vial Inject 85 Units into the skin nightly  Patient taking differently: Inject 45 Units into the skin nightly  19   [facility], DO   Empagliflozin-Metformin HCl ER (SYNJARDY XR)  MG TB24 Take 1 tablet by mouth daily (with breakfast)    Historical Provider, MD   oxyCODONE (ROXICODONE) 5 MG immediate release tablet Take 5 mg by mouth every 4 hours as needed for Pain. Sofia Leone     Historical Provider, MD   aspirin 81 MG EC tablet Take 1 tablet by mouth daily 18   RITESH Armenta   atenolol (TENORMIN) 25 MG tablet Take 25 mg by mouth daily    Historical Provider, MD   hydrochlorothiazide (MICROZIDE) 12.5 MG capsule Take 12.5 mg by mouth daily    Historical Provider, MD CHAVIS UF III MINI PEN NEEDLES 31G X 5 MM MISC  8/20/16   Historical Provider, MD   furosemide (LASIX) 20 MG tablet Take 1 tablet by mouth as needed (For weight gain greater than 2.5 lbs in 24 hours.)  Patient taking differently: Take 20 mg by mouth daily  2/19/16   Nupur Guillen PA-C   esomeprazole (NEXIUM) 40 MG capsule Take 40 mg by mouth every morning (before breakfast). Historical Provider, MD       Current medications:    No current facility-administered medications for this visit. No current outpatient medications on file.      Facility-Administered Medications Ordered in Other Visits   Medication Dose Route Frequency Provider Last Rate Last Dose    [START ON 9/15/2020] pantoprazole (PROTONIX) tablet 40 mg  40 mg Oral QAM AC Hipolito Liang MD        atenolol (TENORMIN) tablet 25 mg  25 mg Oral Daily Hipolito Liang MD        [START ON 9/15/2020] Empagliflozin-metFORMIN HCl ER  MG TB24 1 tablet  1 tablet Oral Daily with breakfast Hipolito Liang MD        insulin glargine (LANTUS) injection vial 85 Units  85 Units Subcutaneous Nightly Hipolito Liang MD        vitamin D (ERGOCALCIFEROL) capsule 50,000 Units  50,000 Units Oral Weekly Hipolito Liang MD        atorvastatin (LIPITOR) tablet 40 mg  40 mg Oral Nightly Hipolito Liang MD        citalopram (CELEXA) tablet 20 mg  20 mg Oral Daily Hipolito Liang MD        ezetimibe (ZETIA) tablet 10 mg  10 mg Oral Daily Hipolito Liang MD        pramipexole (MIRAPEX) tablet 1.5 mg  1.5 mg Oral TID Hipolito Liang MD        mirabegron (MYRBETRIQ) extended release tablet 25 mg  25 mg Oral Daily Hipolito Liang MD        sodium chloride flush 0.9 % injection 10 mL  10 mL Intravenous 2 times per day Hipolito Liang MD        sodium chloride flush 0.9 % injection 10 mL  10 mL Intravenous PRN Hipolito Liang MD        potassium chloride (KLOR-CON M) extended release tablet 40 mEq  40 mEq Oral PRN Hipolito Liang MD        Or    potassium bicarb-citric acid (EFFER-K) effervescent tablet 40 mEq 40 mEq Oral PRN Hipolito Liang MD        Or    potassium chloride 10 mEq/100 mL IVPB (Peripheral Line)  10 mEq Intravenous PRN Hipolito Liang MD        magnesium sulfate 1 g in dextrose 5% 100 mL IVPB  1 g Intravenous PRN Hipolito Liang MD        acetaminophen (TYLENOL) tablet 650 mg  650 mg Oral Q6H PRN Hipolito Liang MD        Or    acetaminophen (TYLENOL) suppository 650 mg  650 mg Rectal Q6H PRN Hipolito Liang MD        polyethylene glycol (GLYCOLAX) packet 17 g  17 g Oral Daily PRN Hipolito Liang MD        promethazine (PHENERGAN) tablet 12.5 mg  12.5 mg Oral Q6H PRN Hipolito Liang MD        Or    ondansetron (ZOFRAN) injection 4 mg  4 mg Intravenous Q6H PRN Hipolito Liang MD        famotidine (PEPCID) tablet 20 mg  20 mg Oral BID Hipolito Liang MD        ceFAZolin (ANCEF) 2 g in sterile water 20 mL IV syringe  2 g Intravenous Q8H Hipolito Liang MD   2 g at 09/14/20 1524    metronidazole (FLAGYL) 500 mg in NaCl 100 mL IVPB premix  500 mg Intravenous Q8H Hipolito Liang  mL/hr at 09/14/20 1524 500 mg at 09/14/20 1524    vancomycin (VANCOCIN) 1,500 mg in dextrose 5 % 500 mL IVPB  1,500 mg Intravenous Q12H Hipolito Liang MD        vancomycin (VANCOCIN) intermittent dosing (placeholder)   Other RX Placeholder Hipolito Liang MD        insulin lispro (HUMALOG) injection vial 0-12 Units  0-12 Units Subcutaneous TID WC Hipolito Liang MD        insulin lispro (HUMALOG) injection vial 0-6 Units  0-6 Units Subcutaneous Nightly Hipolito Liang MD        0.9 % sodium chloride infusion   Intravenous Continuous Hipolito Liang MD 75 mL/hr at 09/14/20 1524      midazolam (VERSED) injection 2 mg  2 mg Intravenous Once Chelsea Hoskins MD        lactated ringers infusion   Intravenous Continuous Chelsea Hoskins MD           Allergies:     Allergies   Allergen Reactions    Azithromycin     Metoprolol     Cyclobenzaprine Rash    Metoclopramide Rash       Problem List:    Patient Active Problem List   Diagnosis Code    Other chest pain R07.89    Chronic bilateral low back pain without sciatica M54.5, G89.29    Syncope and collapse R55    Type 2 diabetes mellitus with hyperglycemia, with long-term current use of insulin ([facility]) E11.65, Z79.4    Obstructive sleep apnea G47.33    Class 1 obesity due to excess calories in adult E66.09    Restless legs syndrome G25.81    Gastroesophageal reflux disease without esophagitis K21.9    Essential hypertension I10    Vertigo R42    Orthostasis I95.1    Diabetic polyneuropathy associated with type 2 diabetes mellitus ([facility]) E11.42    Bilateral carpal tunnel syndrome G56.03    CPAP (continuous positive airway pressure) dependence Z99.89    BiPAP (biphasic positive airway pressure) dependence Z99.89    Pinched nerve in neck S14. 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    S/P aortic valve replacement with bioprosthetic valve Z95.3    Grade I diastolic dysfunction Z56.0    Pacemaker Z95.0    Near syncope R55    History of syncope Z87.898    Sinoatrial node dysfunction (HCC) I49.5    Mixed hyperlipidemia E78.2    Pre-syncope R55    Bradycardia R00.1    Lightheadedness R42    Heart block I45.9    Pacemaker malfunction, initial encounter T82.111A    Pacemaker lead malfunction T82.110A    Cellulitis of left hand and arm L03.114    Abscess of left hand L02.512       Past Medical History:        Diagnosis Date    Aortic valve stenosis     Arthritis     Chest tightness, discomfort, or pressure 4/30/2012    Chronic back pain     CKD (chronic kidney disease)     CPAP (continuous positive airway pressure) dependence     13cm    Diabetes [facility] [address]     Diabetic polyneuropathy associated with type 2 diabetes mellitus ([facility] [address] 8/29/2016    GERD (gastroesophageal reflux disease)     HTN (hypertension)     Hyperlipemia     Left ventricular diastolic dysfunction     Mitral stenosis     Mitral valve stenosis     Neuropathy     Obstructive sleep apnea     AHI: 34.5 per PSG, 2013; AHI: 36.9 per PSG, 2015; AHI: 54.5 per PSG, 3/2017    Pinched nerve in neck     Restless legs syndrome 2016       Past Surgical History:        Procedure Laterality Date    APPENDECTOMY      BACK SURGERY      4 Back surgeries    BLADDER SURGERY      CARDIAC CATHETERIZATION  12    EF > 50%    CATARACT REMOVAL      CHOLECYSTECTOMY      COLONOSCOPY      EYE SURGERY      implants placed both eyes    OTHER SURGICAL HISTORY  2020    Right Ventricular lead replacement- Dr. Del Blue RPLCMT PROST AORTIC VALVE OPEN XCP HOMOGRF/STENT N/A 2018    AORTIC VALVE REPLACEMENT TRANSESOPHAGEAL ECHOCARDIOGRAM performed by Lucretia Nageotte, MD at [address] [facility]         Social History:    Social History     Tobacco Use    Smoking status: Former Smoker     Packs/day: 3.00     Years: 50.00     Pack years: 150.00     Types: Cigarettes     Last attempt to quit: 1977     Years since quittin.1    Smokeless tobacco: Former User   Substance Use Topics    Alcohol use: No                                Counseling given: Not Answered      Vital Signs (Current): There were no vitals filed for this visit.                                            BP Readings from Last 3 Encounters:   20 135/74   20 100/63   20 (!) 140/70       NPO Status:                                                                                 BMI:   Wt Readings from Last 3 Encounters:   20 210 lb (95.3 kg)   20 215 lb (97.5 kg)   20 215 lb (97.5 kg)     There is no height or weight on file to calculate BMI.    CBC:   Lab Results   Component Value Date    WBC 16.6 2020    RBC 4.55 2020    HGB 13.9 2020    HCT 40.5 2020    HCT 36.1 06/03/2012    MCV 89.0 09/14/2020    RDW 14.0 09/14/2020     09/14/2020     06/03/2012       CMP:   Lab Results   Component Value Date     09/14/2020     06/02/2012    K 3.4 09/14/2020    K 4.4 01/16/2020    K 5.0 06/02/2012    CL 96 09/14/2020     06/02/2012    CO2 21 09/14/2020    BUN 22 09/14/2020    CREATININE 0.8 09/14/2020    CREATININE 1.0 06/02/2012    GFRAA >59 09/14/2020    LABGLOM >60 09/14/2020    GLUCOSE 123 09/14/2020    PROT 5.9 09/14/2020    PROT 6.7 09/19/2012    CALCIUM 8.3 09/14/2020    BILITOT 0.9 09/14/2020    ALKPHOS 84 09/14/2020    ALKPHOS 48 05/31/2012    AST 27 09/14/2020    ALT 31 09/14/2020       POC Tests:   No results for input(s): POCGLU, POCNA, POCK, POCCL, POCBUN, POCHEMO, POCHCT in the last 72 hours.     Coags:   Lab Results   Component Value Date    PROTIME 13.3 01/16/2020    PROTIME 10.80 04/28/2012    INR 1.07 01/16/2020    APTT 26.4 01/16/2020       HCG (If Applicable): No results found for: PREGTESTUR, PREGSERUM, HCG, HCGQUANT     ABGs:   Lab Results   Component Value Date    PHART 7.400 06/08/2018    PO2ART 75.0 06/08/2018    QCH2RHW 39.0 06/08/2018    OJW9UTA 24.2 06/08/2018    BEART -0.5 06/08/2018    D6UZRRHZ 94.0 06/08/2018        Type & Screen (If Applicable):  No results found for: LABABO, [address]    Anesthesia Evaluation  Patient summary reviewed and Nursing notes reviewed no history of anesthetic complications:   Airway: Mallampati: II  TM distance: >3 FB   Neck ROM: full  Mouth opening: > = 3 FB Dental: normal exam         Pulmonary:normal exam  breath sounds clear to auscultation  (+) shortness of breath:  sleep apnea: on CPAP,                             Cardiovascular:    (+) hypertension:, valvular problems/murmurs: AS and AI, pacemaker:, CAD:, CABG/stent:, hyperlipidemia      ECG reviewed  Rhythm: regular  Rate: normal  Echocardiogram reviewed  Stress test reviewed       Beta Blocker:  Dose within 24 Hrs      ROS comment: Conclusions      Summary   1. Mild mitral stenosis and regurgitation   2. Aortic stenosis with moderate regurgitation   3. Grossly increase LV size and reduced LV function      Signature        Neuro/Psych:   (+) neuromuscular disease:,    (-) seizures and CVA            ROS comment: RLS GI/Hepatic/Renal:   (+) GERD:,           Endo/Other:    (+) DiabetesType II DM, , .                 Abdominal:       Abdomen: soft. Vascular: negative vascular ROS. Anesthesia Plan      general and regional     ASA 4     (Brachial plexus block)  Induction: intravenous. MIPS: Postoperative opioids intended and Prophylactic antiemetics administered. Anesthetic plan and risks discussed with patient. Use of blood products discussed with patient whom. Plan discussed with CRNA.     Attending anesthesiologist reviewed and agrees with Romero Fuentes MD   9/14/2020

## 2020-09-14 NOTE — CONSULTS
Orthopaedic Inpatient Consultation    NAME:  Austin Chand   : 1940  MRN: [id number]    2020 12:25 PM    Requesting Physician: Vicenta Hendrix MD    CHIEF COMPLAINT:  left hand/wrist cellulitis    HISTORY OF PRESENT ILLNESS:   The patient is a 79 y.o. male who was fishing on Thursday morning and got a fishing hook stuck into the dorsal aspect of his left fifth digit. He was evaluated in a [facility] hospital where he was given empiric antibiotics over the weekend and developed worsening cellulitis of the left upper extremity. Finally yesterday, a culture of reported purulent discharge from the left fifth digit hook puncture site was obtained and IV antibiotics were adjusted. As a result, his redness, swelling and cellulitis has improved per the patient's report as well as based on visual examination of his erythema resolution. His pain is located in the left hand and wrist and rated a 2/5, constant, dull, worse with movement, better with rest and medication. There are no associated symptoms.      Past Medical History:        Diagnosis Date    Aortic valve stenosis     Arthritis     Chest tightness, discomfort, or pressure 2012    Chronic back pain     CKD (chronic kidney disease)     CPAP (continuous positive airway pressure) dependence     13cm    Diabetes ([facility] [address]     Diabetic polyneuropathy associated with type 2 diabetes mellitus ([facility] [address] 2016    GERD (gastroesophageal reflux disease)     HTN (hypertension)     Hyperlipemia     Left ventricular diastolic dysfunction     Mitral stenosis     Mitral valve stenosis     Neuropathy     Obstructive sleep apnea     AHI: 34.5 per PSG, 2013; AHI: 36.9 per PSG, 2015; AHI: 54.5 per PSG, 3/2017    Pinched nerve in neck     Restless legs syndrome 2016       Past Surgical History:        Procedure Laterality Date    APPENDECTOMY      BACK SURGERY      4 Back surgeries    BLADDER SURGERY      CARDIAC CATHETERIZATION  12    EF > 50%  CATARACT REMOVAL      CHOLECYSTECTOMY      COLONOSCOPY      EYE SURGERY      implants placed both eyes    OTHER SURGICAL HISTORY  08/03/2020    Right Ventricular lead replacement- Dr. Anjana Iglesias RPLCMT PROST AORTIC VALVE OPEN XCP HOMOGRF/STENT N/A 6/7/2018    AORTIC VALVE REPLACEMENT TRANSESOPHAGEAL ECHOCARDIOGRAM performed by Joanne Simpson MD at [address] [facility]         Current Medications:   Prior to Admission medications    Medication Sig Start Date End Date Taking? Authorizing Provider   pramipexole (MIRAPEX) 1.5 MG tablet 3 times daily  8/5/20   Historical Provider, MD   mirabegron (MYRBETRIQ) 25 MG TB24 Take 1 tablet by mouth daily 9/1/20   Addie Chappell MD   ezetimibe (ZETIA) 10 MG tablet Take 10 mg by mouth daily 7/7/20   Historical Provider, MD   clopidogrel (PLAVIX) 75 MG tablet Take 75 mg by mouth daily    Historical Provider, MD   citalopram (CELEXA) 20 MG tablet TAKE ONE TABLET BY MOUTH DAILY 5/4/20   Addie Chappell MD   atorvastatin (LIPITOR) 40 MG tablet Take 1 tablet by mouth nightly 3/20/20   Aarti Howard MD   Cholecalciferol (VITAMIN D3) 46373 units CAPS  4/18/19   Historical Provider, MD   HUMULIN R U-500 KWIKPEN 500 UNIT/ML SOPN concentrated injection pen  4/20/19   Historical Provider, MD   insulin glargine (LANTUS) 100 UNIT/ML injection vial Inject 85 Units into the skin nightly  Patient taking differently: Inject 45 Units into the skin nightly  2/23/19   [facility], DO   Empagliflozin-Metformin HCl ER (SYNJARDY XR)  MG TB24 Take 1 tablet by mouth daily (with breakfast)    Historical Provider, MD   oxyCODONE (ROXICODONE) 5 MG immediate release tablet Take 5 mg by mouth every 4 hours as needed for Pain. Clarence Dockery     Historical Provider, MD   aspirin 81 MG EC tablet Take 1 tablet by mouth daily 6/11/18   RITESH Harris   atenolol (TENORMIN) 25 MG tablet Take 25 mg by mouth daily    Historical Provider, MD hydrochlorothiazide (MICROZIDE) 12.5 MG capsule Take 12.5 mg by mouth daily    Historical Provider, MD CHAVIS UF III MINI PEN NEEDLES 31G X 5 MM MISC  16   Historical Provider, MD   furosemide (LASIX) 20 MG tablet Take 1 tablet by mouth as needed (For weight gain greater than 2.5 lbs in 24 hours.)  Patient taking differently: Take 20 mg by mouth daily  16   Debra Randhawa PA-C   esomeprazole (NEXIUM) 40 MG capsule Take 40 mg by mouth every morning (before breakfast). Historical Provider, MD       Allergies:  Azithromycin; Metoprolol; Cyclobenzaprine; and Metoclopramide    Social History:   Social History     Socioeconomic History    Marital status:       Spouse name: Not on file    Number of children: Not on file    Years of education: Not on file    Highest education level: Not on file   Occupational History    Not on file   Social Needs    Financial resource strain: Not on file    Food insecurity     Worry: Not on file     Inability: Not on file    Transportation needs     Medical: Not on file     Non-medical: Not on file   Tobacco Use    Smoking status: Former Smoker     Packs/day: 3.00     Years: 50.00     Pack years: 150.00     Types: Cigarettes     Last attempt to quit: 1977     Years since quittin.1    Smokeless tobacco: Former User   Substance and Sexual Activity    Alcohol use: No    Drug use: No    Sexual activity: Not Currently     Partners: Female   Lifestyle    Physical activity     Days per week: Not on file     Minutes per session: Not on file    Stress: Not on file   Relationships    Social connections     Talks on phone: Not on file     Gets together: Not on file     Attends Latter-day service: Not on file     Active member of club or organization: Not on file     Attends meetings of clubs or organizations: Not on file     Relationship status: Not on file    Intimate partner violence     Fear of current or ex partner: Not on file     Emotionally abused: Not on file     Physically abused: Not on file     Forced sexual activity: Not on file   Other Topics Concern    Not on file   Social History Narrative    Not on file       Family History:   Family History   Problem Relation Age of Onset    Diabetes Mother     Cancer Father     Cancer Sister     Heart Disease Brother     Cancer Brother     Cancer Sister     Cancer Brother        REVIEW OF SYSTEMS:  14 point review of systems has been reviewed from the patient's emergency room visit, reviewed with the patient on today's date with no new changes. PHYSICAL EXAM:      Physical Examination:  Vitals:   Vitals:    09/14/20 1225   BP: 135/74   Pulse: 60   Resp: 20   Temp: 97.8 °F (36.6 °C)   TempSrc: Temporal   Weight: 210 lb (95.3 kg)   Height: 5' 10\" (1.778 m)     General:  Appears stated age, no distress. Orientation:  Alert and oriented to time, place, and person. Mood and Affect:  Cooperative and pleasant. Gait:  Resting comfortably in bed. Cardiovascular:  Symmetric 1-2 plus pulses in upper and lower extremities. Lymph:  No cervical or inguinal lymphadenopathy noted. Sensation:  Grossly intact to light touch. DTR:  Normal, no pathologic reflexes. Coordination/balance:  Normal    Musculoskeletal:  Right upper extremity exam:  There is no tenderness to palpation about the shoulder, elbow, wrist or hand. Unrestricted full function motion is present. Stability is normal with provocative tests, 5/5 strength, and skin is normal.      Left upper extremity exam:  There is no tenderness to palpation about the shoulder or elbow, but there is obvious discomfort about the left wrist and hand with associated erythema predominantly over the dorsum of the hand. He demonstrates limited intact active range of motion of all 5 digits. There is a superficial bulla over the dorsum of the hand.   There is no obvious expressible discharge from the reported hook puncture site over the dorsum of the left fifth digit at the proximal interphalangeal joint. He does have an erythema margin marked out on his skin to the mid humeral diaphysis, but his margin of erythema is now to the dorsum of the mid forearm suggesting improved erythema. Right lower extremity exam:  There is no tenderness to palpation about the hip, knee, ankle or foot. Unrestricted full function motion is present. Stability is normal with provocative tests, 5/5 strength, and skin is normal.     Left lower extremity exam:  There is no tenderness to palpation about the hip, knee, ankle or foot. Unrestricted full function motion is present.   Stability is normal with provocative tests, 5/5 strength, and skin is normal.      DATA:    CBC with Differential:    Lab Results   Component Value Date    WBC 16.6 09/14/2020    RBC 4.55 09/14/2020    HGB 13.9 09/14/2020    HCT 40.5 09/14/2020    HCT 36.1 06/03/2012     09/14/2020     06/03/2012    MCV 89.0 09/14/2020    MCH 30.5 09/14/2020    MCHC 34.3 09/14/2020    RDW 14.0 09/14/2020    LYMPHOPCT 1.9 09/14/2020    MONOPCT 11.4 09/14/2020    EOSPCT 1.1 04/30/2012    BASOPCT 0.2 09/14/2020    MONOSABS 1.90 09/14/2020    LYMPHSABS 0.3 09/14/2020    EOSABS 0.00 09/14/2020    BASOSABS 0.00 09/14/2020     CMP:    Lab Results   Component Value Date     09/14/2020     06/02/2012    K 3.4 09/14/2020    K 4.4 01/16/2020    K 5.0 06/02/2012    CL 96 09/14/2020     06/02/2012    CO2 21 09/14/2020    BUN 22 09/14/2020    CREATININE 0.8 09/14/2020    CREATININE 1.0 06/02/2012    GFRAA >59 09/14/2020    LABGLOM >60 09/14/2020    GLUCOSE 123 09/14/2020    PROT 5.9 09/14/2020    PROT 6.7 09/19/2012    CALCIUM 8.3 09/14/2020    BILITOT 0.9 09/14/2020    ALKPHOS 84 09/14/2020    ALKPHOS 48 05/31/2012    AST 27 09/14/2020    ALT 31 09/14/2020     BMP:    Lab Results   Component Value Date     09/14/2020     06/02/2012    K 3.4 09/14/2020    K 4.4 01/16/2020    K 5.0 06/02/2012    CL 96 09/14/2020     06/02/2012    CO2 21 09/14/2020    BUN 22 09/14/2020    CREATININE 0.8 09/14/2020    CREATININE 1.0 06/02/2012    CALCIUM 8.3 09/14/2020    GFRAA >59 09/14/2020    LABGLOM >60 09/14/2020    GLUCOSE 123 09/14/2020       --------------------------------------------------------------------------------------------------------------------    Assessment: Left hand/wrist cellulitis with possible abscess formation due to dorsal left fifth digit puncture site with fishing hook    Plan:  1) to OR for left hand I&D and intraoperative cultures - we discussed risks, benefits, and alternatives and patient wishes to proceed  2) Admit postop for pain control, PT/OT and infectious disease recommendations  3) n.p.o. presently     Electronically signed by Alberto Iyer MD on 9/14/2020 at 3:30 PM

## 2020-09-14 NOTE — CONSULTS
CONSULTATION NOTE :ID       Patient - Marlena George,  Age - 79 y.o.    - 1940      Room Number - [facility] OR Pool/NONE   MRN -  [id number]   [facility]  Date of Admission -  2020 12:25 PM  Patient's PCP: RITESH Washington - CNP     Requesting Physician: Nicolas Posada MD    REASON FOR CONSULTATION   Left hand and upper arm cellulitis/abscess    HISTORY OF PRESENT ILLNESS       This is a very pleasant 79 y.o. male who was admitted to the hospital with a chief complaints of LUE cellulitis worsening after hospitalization with iv abx at [facility]. Consult was called around 3:40 pm. I came to see patient and he was in surgery. I came back to see patient in the recovery room and d/w Dr Leanna Dakins. The patient had fish hook injury to hand, was admitted to [facility] and received vancomycin. Ertapenem was added. There is reportedly a culture from puncture site fro [facility] recently obtained that is pending.     No tasha purulence noted at time of surgery - multiple cultures taken    PAST MEDICAL AND SURGICAL HISTORY       Past Medical History:   Diagnosis Date    Aortic valve stenosis     Arthritis     Chest tightness, discomfort, or pressure 2012    Chronic back pain     CKD (chronic kidney disease)     CPAP (continuous positive airway pressure) dependence     13cm    Diabetes ([address]     Diabetic polyneuropathy associated with type 2 diabetes mellitus ([address] 2016    GERD (gastroesophageal reflux disease)     HTN (hypertension)     Hyperlipemia     Left ventricular diastolic dysfunction     Mitral stenosis     Mitral valve stenosis     Neuropathy     Obstructive sleep apnea     AHI: 34.5 per PSG, 2013; AHI: 36.9 per PSG, 2015; AHI: 54.5 per PSG, 3/2017    Pinched nerve in neck     Restless legs syndrome 2016       Past Surgical History:   Procedure Laterality Date    APPENDECTOMY      BACK SURGERY      4 Back surgeries  BLADDER SURGERY      CARDIAC CATHETERIZATION  4/30/12    EF > 50%    CATARACT REMOVAL      CHOLECYSTECTOMY      COLONOSCOPY      EYE SURGERY      implants placed both eyes    OTHER SURGICAL HISTORY  08/03/2020    Right Ventricular lead replacement- Dr. Linnea Acosta RPLCMT PROST AORTIC VALVE OPEN XCP HOMOGRF/STENT N/A 6/7/2018    AORTIC VALVE REPLACEMENT TRANSESOPHAGEAL ECHOCARDIOGRAM performed by Jennifer Mendoza MD at [place] :       Scheduled Meds:   [MAR Hold] pantoprazole  40 mg Oral QAM AC    [MAR Hold] atenolol  25 mg Oral Daily    [MAR Hold] Empagliflozin-metFORMIN HCl ER  1 tablet Oral Daily with breakfast    [MAR Hold] insulin glargine  85 Units Subcutaneous Nightly    [MAR Hold] vitamin D  50,000 Units Oral Weekly    [MAR Hold] atorvastatin  40 mg Oral Nightly    [MAR Hold] citalopram  20 mg Oral Daily    [MAR Hold] ezetimibe  10 mg Oral Daily    [MAR Hold] pramipexole  1.5 mg Oral TID    [MAR Hold] mirabegron  25 mg Oral Daily    [MAR Hold] sodium chloride flush  10 mL Intravenous 2 times per day    [MAR Hold] famotidine  20 mg Oral BID    [MAR Hold] ceFAZolin  2 g Intravenous Q8H    [MAR Hold] metroNIDAZOLE  500 mg Intravenous Q8H    [MAR Hold] vancomycin  1,500 mg Intravenous Q12H    [MAR Hold] vancomycin (VANCOCIN) intermittent dosing (placeholder)   Other RX Placeholder    [MAR Hold] insulin lispro  0-12 Units Subcutaneous TID WC    [MAR Hold] insulin lispro  0-6 Units Subcutaneous Nightly     Continuous Infusions:   [MAR Hold] sodium chloride 75 mL/hr at 09/14/20 1524    [MAR Hold] lactated ringers 100 mL/hr at 09/14/20 1659     PRN Meds:[MAR Hold] sodium chloride flush, [MAR Hold] potassium chloride **OR** [MAR Hold] potassium alternative oral replacement **OR** [MAR Hold] potassium chloride, [MAR Hold] magnesium sulfate, [MAR Hold] acetaminophen **OR** [MAR Hold] acetaminophen, [MAR Hold] polyethylene glycol, [MAR Hold] promethazine **OR** [MAR Hold] ondansetron, [MAR Hold] HYDROmorphone, [MAR Hold] HYDROmorphone, [MAR Hold] morphine, [MAR Hold] morphine, [MAR Hold] diphenhydrAMINE, [MAR Hold] promethazine, [MAR Hold] hydrALAZINE, [MAR Hold] enalaprilat, [MAR Hold] meperidine    ALLERGIES:      Azithromycin; Metoprolol; Cyclobenzaprine; and Metoclopramide      SOCIAL HISTORY:     TOBACCO:   reports that he quit smoking about 43 years ago. His smoking use included cigarettes. He has a 150.00 pack-year smoking history. He has quit using smokeless tobacco.     ETOH:   reports no history of alcohol use. FAMILY HISTORY:         Problem Relation Age of Onset    Diabetes Mother     Cancer Father     Cancer Sister     Heart Disease Brother     Cancer Brother     Cancer Sister     Cancer Brother        REVIEW OF SYSTEMS:     Unobtainable as patient pst op in Recovery room    PHYSICAL EXAM:     BP (!) 154/63   Pulse 60   Temp 97.6 °F (36.4 °C)   Resp 17   Ht 5' 10\" (1.778 m)   Wt 210 lb (95.3 kg)   SpO2 98%   BMI 30.13 kg/m²  Temp (24hrs), Av.7 °F (36.5 °C), Min:97.6 °F (36.4 °C), Max:97.8 °F (36.6 °C)    General: sleeping soundly I recovery room, not in distress. HEENT: O2 per NC in place and face mask in place  Chest:  Lungs without crackles, ronchi or wheezing  Cardiovascular:  RRR ,S1S2, no murmur or gallop. distant heart tones  Abdomen:  Soft, non tender to palpation, BS positive  Extremities: some eschars noted - left knee and right pretibial - healing no superinfection. Left UE with post op dressing in place. Erythema evident proximal to dressing  Skin:  Warm and dry.   CNS: sleeping post op  PSYCH: not agitated        LABS:     CBC with DIFF:   Recent Labs     20  1430   WBC 16.6*   RBC 4.55*   HGB 13.9*   HCT 40.5*   MCV 89.0   MCH 30.5   MCHC 34.3   RDW 14.0      MPV 10.2   NEUTOPHILPCT 85.2*   LYMPHOPCT 1.9*   MONOPCT 11.4*   EOSRELPCT 0. 1   BASOPCT 0.2   NEUTROABS 14.2*   LYMPHSABS 0.3*   MONOSABS 1.90*   EOSABS 0.00   BASOSABS 0.00       CMP/BMP:  Recent Labs     09/14/20  1430   *   K 3.4*   CL 96*   CO2 21*   ANIONGAP 13   GLUCOSE 123*   BUN 22   CREATININE 0.8   LABGLOM >60   CALCIUM 8.3*   PROT 5.9*   LABALBU 3.2*   BILITOT 0.9   ALKPHOS 84   ALT 31   AST 27          Culture: No results for input(s): BC, BLOODCULT2, ORG in the last 72 hours. IMAGING:   No results found.         ASSESSMENT AND PLAN     Patient Active Problem List   Diagnosis    Other chest pain    Chronic bilateral low back pain without sciatica    Syncope and collapse    Type 2 diabetes mellitus with hyperglycemia, with long-term current use of insulin ([facility])    Obstructive sleep apnea    Class 1 obesity due to excess calories in adult    Restless legs syndrome    Gastroesophageal reflux disease without esophagitis    Essential hypertension    Vertigo    Orthostasis    Diabetic polyneuropathy associated with type 2 diabetes mellitus ([facility])    Bilateral carpal tunnel syndrome    CPAP (continuous positive airway pressure) dependence    BiPAP (biphasic positive airway pressure) dependence    Pinched nerve in neck    Rheumatic aortic valve insufficiency    Moderate mitral stenosis    Hyponatremia    Hypercholesterolemia with hypertriglyceridemia    Syncope    Vertebrobasilar artery insufficiency    S/P aortic valve replacement with bioprosthetic valve    Grade I diastolic dysfunction    Pacemaker    Near syncope    History of syncope    Sinoatrial node dysfunction (HCC)    Mixed hyperlipidemia    Pre-syncope    Bradycardia    Lightheadedness    Heart block    Pacemaker malfunction, initial encounter    Pacemaker lead malfunction    Cellulitis of left hand and arm    Abscess of left hand     Continue broad empiric abx  D/w tomorrow re: any additional info regarding nature of injury  Follow surgical cultures from this evening  Follow up on cultures obtained from [employer] wbc for improvement     Thank you Donald Dominguez MD for allowing me to participate in this patient's care  Electronically signed by Paulino Palma MD on 9/14/2020 at 6:40 PM

## 2020-09-14 NOTE — OP NOTE
Patient Name: Kiki Orourke  MRN: [id number]  : 1940    DATE of SURGERY: 2020    SURGEON: Con Moeller MD    ASSISTANT: NONE    PREOPERATIVE DIAGNOSIS: Left hand/wrist redness and swelling with cellulitis and possible abscess following fishing hook puncture, left fifth digit     POSTOPERATIVE DIAGNOSIS: Left hand/wrist redness and swelling with cellulitis and possible abscess following fishing hook puncture, left fifth digit     PROCEDURE PERFORMED: I&D of left hand with intraoperative cultures     IMPLANTS  Nne     ANESTHESIA USED  General endotracheal anesthesia. OPERATIVE INDICATIONS  Patient is a 79 y.o. male who sustained a fishing hook puncture to the dorsum of the fifth digit of the left hand with the above admitting diagnosis. Indications for surgery were to minimize infection burden as well as to obtain accurate cultures. Risks included that of anesthesia, bleeding, continued infection, pain, damage to local instructions, need for further surgery, loss of function, loss of movement and loss of digit or extremity. ESTIMATED BLOOD LOSS  Less than 20 mL. SPECIMENS  None. DRAINS  None. COMPLICATIONS  None. PROCEDURE IN DETAIL  The patient was seen in the preoperative holding room. Once again, informed consent form was reviewed with patient and signed. The site of surgery was marked with the patient's agreement. The patient was transported to the operating room where a timeout was performed identifying the correct patient as well as the operative site. Kefzol 2 g was given as perioperative antibiotics. The right upper extremity was prepped and draped in the usual sterile fashion. Tourniquet was placed about his right brachium, but not inflated. A small longitudinal incision was made over the dorsal ulnar aspect of the left fifth digit at the level of the proximal interphalangeal joint corresponding with the fishing hook puncture site.   No obvious purulence was encountered,

## 2020-09-14 NOTE — H&P
[place], [place], [address]        DEPARTMENT OF HOSPITALIST MEDICINE        HISTORY & PHYSICAL:          REASON FOR ADMISSION:  Patient transferred from [facility] with left hand/arm cellulitis and abscess. HISTORY OF PRESENT ILLNESS:  Suellyn Closs is an 79 y.o. male. He is a very pleasant gentleman who has diabetes mellitus and chronic medical issues. He is also a professional fisherman. He was fishing 4 days ago when he caught a fish and when he tried to reel her in, the hook jerked out and got lodged into his left hand. The hook was dirty looking and in water for a long time. His friend took the hook out and patient was brought in to the ER for evaluation from where he was admitted in [facility].  He was started on IV Invanz and vancomycin and is transferred to our facility for an ID evaluation. I saw the patient when he came to our hospital.  His hand and left arm is red and swollen. I ordered hand surgery and ID consults as well as baseline labs and will restart him on IV Invanz and vancomycin. He is kept n.p.o. for possible I&D of his left hand later on today.     PAST MEDICAL HISTORY:  Past Medical History:   Diagnosis Date    Aortic valve stenosis     Arthritis     Chest tightness, discomfort, or pressure 4/30/2012    Chronic back pain     CKD (chronic kidney disease)     CPAP (continuous positive airway pressure) dependence     13cm    Diabetes ([address]     Diabetic polyneuropathy associated with type 2 diabetes mellitus [facility] 8/29/2016    GERD (gastroesophageal reflux disease)     HTN (hypertension)     Hyperlipemia     Left ventricular diastolic dysfunction     Mitral stenosis     Mitral valve stenosis     Neuropathy     Obstructive sleep apnea     AHI: 34.5 per PSG, 6/2013; AHI: 36.9 per PSG, 7/2015; AHI: 54.5 per PSG, 3/2017    Pinched nerve in neck     Restless legs syndrome 2/17/2016         PAST SURGICAL HISTORY:  Past Surgical Emotionally abused: Not on file     Physically abused: Not on file     Forced sexual activity: Not on file   Other Topics Concern    Not on file   Social History Narrative    Not on file        FAMILY HISTORY:  Family History   Problem Relation Age of Onset    Diabetes Mother     Cancer Father     Cancer Sister     Heart Disease Brother     Cancer Brother     Cancer Sister     Cancer Brother          ALLERGIES:  Allergies   Allergen Reactions    Azithromycin     Metoprolol     Cyclobenzaprine Rash    Metoclopramide Rash        PRIOR TO ADMISSION MEDS:  Medications Prior to Admission: pramipexole (MIRAPEX) 1.5 MG tablet, 3 times daily   mirabegron (MYRBETRIQ) 25 MG TB24, Take 1 tablet by mouth daily  ezetimibe (ZETIA) 10 MG tablet, Take 10 mg by mouth daily  clopidogrel (PLAVIX) 75 MG tablet, Take 75 mg by mouth daily  citalopram (CELEXA) 20 MG tablet, TAKE ONE TABLET BY MOUTH DAILY  atorvastatin (LIPITOR) 40 MG tablet, Take 1 tablet by mouth nightly  Cholecalciferol (VITAMIN D3) 47054 units CAPS,   HUMULIN R U-500 KWIKPEN 500 UNIT/ML SOPN concentrated injection pen,   insulin glargine (LANTUS) 100 UNIT/ML injection vial, Inject 85 Units into the skin nightly (Patient taking differently: Inject 45 Units into the skin nightly )  Empagliflozin-Metformin HCl ER (SYNJARDY XR)  MG TB24, Take 1 tablet by mouth daily (with breakfast)  oxyCODONE (ROXICODONE) 5 MG immediate release tablet, Take 5 mg by mouth every 4 hours as needed for Pain. Alphonso Mccurdy   aspirin 81 MG EC tablet, Take 1 tablet by mouth daily  atenolol (TENORMIN) 25 MG tablet, Take 25 mg by mouth daily  hydrochlorothiazide (MICROZIDE) 12.5 MG capsule, Take 12.5 mg by mouth daily  B-D UF III MINI PEN NEEDLES 31G X 5 MM MISC,   furosemide (LASIX) 20 MG tablet, Take 1 tablet by mouth as needed (For weight gain greater than 2.5 lbs in 24 hours.) (Patient taking differently: Take 20 mg by mouth daily )  esomeprazole (NEXIUM) 40 MG capsule, Take 40 mg by mouth every morning (before breakfast). REVIEW OF SYSTEMS:  Constitutional:  No fevers, chills, nausea, vomiting, + tiredness and fatigue   Head:  No head injury, facial trauma   Eyes:  No acute visual changes, exudate, trauma   Ears:  No acute hearing loss, earaches   Nose: No nasal discharge, epistaxis   Neck: No new hoarseness, voice change, or new masses   Lungs:   No hemoptysis, pleurisy   Heart:  No chest pressure with exertion, palpitations,    Abdomen:   No new masses, no bright red blood per rectum   Extremities:  Full range of motion, no swelling of bilateral legs   Skin: ++ swollen hand and left arm with redness, no jaundice   Neurologic: No new motor or sensory changes       PHYSICAL EXAM:  /74   Pulse 60   Temp 97.8 °F (36.6 °C) (Temporal)   Resp 20   Ht 5' 10\" (1.778 m)   Wt 210 lb (95.3 kg)   BMI 30.13 kg/m²   No intake/output data recorded.       PHYSICAL EXAMINATION:    Vital Signs: Please see the chart   PETR:  Awake, alert, oriented x 3, patient appears tired and fatigued   Head/Eyes:  Normocephalic, atraumatic, EOMI and PERRLA bilaterally   ENT: Moist mucous membranes, nasal passages clear   Neck: Supple, full range of motion, no carotid bruit, trachea midline   Respiratory:   Bilateral decreased air entry in both lung fields, mild B/L crackles, symmetric expansion of chest   Cardiovascular:  Regular rate and rhythm, S1+S2+0, no murmurs/rubs   Urology: No bilateral CVA tenderness, no suprapubic tenderness   Abdomen:   Soft, non-tender, bowel sounds +ve, no organomegaly   Muscle/Joints: Moves all, full range of motion, no muscle spasms   Extremities: No clubbing, no cyanosis, no calf tenderness, no edema   Pulses: 2+ bilaterally, symmetrical   Skin: Warm, dry, ++ left hand cellulitis/swelling/abscess with left arm erythema, no jaundice   Neurologic: Awake, alert, oriented x 3, cranial nerves II-XII intact, no focal neurological deficits, sensory system intact   Psychiatric: Normal also started patient on IV Invanz which is being substituted by pharmacy  Hand surgery consult given for evaluation and further treatment recommendations  Keep patient n.p.o. in case patient undergoes I&D of the left hand today after evaluation by hand surgery  Infectious disease consult given for evaluation and further treatment recommendations  Continue diabetic meds  Accu-Cheks qAC and qHS ordered with low-dose insulin coverage as per protocol  Hold off on aspirin and Plavix  Further management as per recommendations by ID and hand surgery  Wound care consult given for evaluation and further treatment recommendations    Chronic medical issues . .. Continue with home meds. Monitor patient closely while admitted. Advised very close f/u with patient's PCP as an outpatient to address chronic medical issues. Repeat labs in a.m. Electrolyte replacement as per protocol. Patient will be monitored very closely on the floor. Further recommendations as per the hospital course. Patient  is on DVT prophylaxis  Current medications reviewed  Lab work reviewed  Radiology/Chest x-ray films reviewed  Discussed with the nurse and addressed all questions/concerns  Discussed with Patient and/or Family at the bedside in detail . .. they verbalize understanding and agree with the management plan. Attestation:  A minimum of two midnights of inpatient hospital care is anticipated to be required based on the patient's clinical condition which requires intensive medical therapy. At this time the patient is not clinically optimized for safe discharge. Donna Cooley MD  2:08 PM 9/14/2020      DISCLAIMER: This note was created with electronic voice recognition which does have occasional errors. If you have any questions regarding the content within the note please do not hesitate to contact me. .. Thanks.

## 2020-09-14 NOTE — ANESTHESIA POSTPROCEDURE EVALUATION
Department of Anesthesiology  Postprocedure Note    Patient: Abigail Lamar  MRN: [id number]  YOB: 1940  Date of evaluation: 9/14/2020  Time:  6:31 PM     Procedure Summary     Date:  09/14/20 Room / Location:  [address]    Anesthesia Start:  1726 Anesthesia Stop:  0841    Procedure:  INCISION AND DRAINAGE LEFT HAND ABSCESS (Left ) Diagnosis:  (HAND ABSCESS)    Surgeon:  Xi Pop MD Responsible Provider:  RITESH Red CRNA    Anesthesia Type:  general, regional ASA Status:  4          Anesthesia Type: general, regional    Anderson Phase I: Anderson Score: 10    Anderson Phase II:      Last vitals: Reviewed and per EMR flowsheets.        Anesthesia Post Evaluation    Patient location during evaluation: PACU  Patient participation: complete - patient participated  Level of consciousness: awake and alert  Pain score: 0  Nausea & Vomiting: no nausea and no vomiting  Complications: no  Cardiovascular status: hemodynamically stable  Respiratory status: nonlabored ventilation, spontaneous ventilation and nasal cannula  Hydration status: euvolemic

## 2020-09-14 NOTE — ANESTHESIA PROCEDURE NOTES
Peripheral Block    Patient location during procedure: holding area  Staffing  Anesthesiologist: Chelsea Hoskins MD  Performed: anesthesiologist   Preanesthetic Checklist  Completed: patient identified, site marked, surgical consent, pre-op evaluation, timeout performed, IV checked, risks and benefits discussed, monitors and equipment checked, anesthesia consent given, oxygen available and patient being monitored  Peripheral Block  Patient position: sitting  Prep: ChloraPrep  Patient monitoring: cardiac monitor, continuous pulse ox, frequent blood pressure checks and IV access  Block type: Brachial plexus  Laterality: left  Injection technique: single-shot  Procedures: ultrasound guided  Local infiltration: lidocaine  Infiltration strength: 1 %  Dose: 1 mL  Supraclavicular  Provider prep: mask and sterile gloves  Local infiltration: lidocaine  Needle  Needle type: combined needle/nerve stimulator   Needle gauge: 20 G  Needle length: 5 cm  Needle localization: ultrasound guidance  Assessment  Injection assessment: negative aspiration for heme, no paresthesia on injection and local visualized surrounding nerve on ultrasound  Paresthesia pain: none  Slow fractionated injection: yes  Hemodynamics: stable  Additional Notes  20 cc 0.5% Ropivacaine injected    Under ultrasound (\"US\") guidance, a 22 gauge needle was inserted and placed in close proximity to the brachial plexus nerves. Ultrasound was also used to visualize the spread of the anesthetic in close proximity to the nerve being blocked. The nerve appeared anatomically normal, and there were no abnormal pathological findings. A permanent image was recorded.    Medications Administered  Lidocaine injection 1%, 1 mL  lidocaine injection 2%, 10 mL  ropivacaine (NAROPIN) injection 0.5%, 10 mL  Reason for block: post-op pain management

## 2020-09-15 PROBLEM — E87.6 HYPOKALEMIA: Status: ACTIVE | Noted: 2020-09-15

## 2020-09-15 PROBLEM — E83.39 HYPOPHOSPHATEMIA: Status: ACTIVE | Noted: 2020-09-15

## 2020-09-15 LAB
ANION GAP SERPL CALCULATED.3IONS-SCNC: 11 MMOL/L (ref 7–19)
BASOPHILS ABSOLUTE: 0 K/UL (ref 0–0.2)
BASOPHILS RELATIVE PERCENT: 0.3 % (ref 0–1)
BUN BLDV-MCNC: 22 MG/DL (ref 8–23)
CALCIUM SERPL-MCNC: 8.4 MG/DL (ref 8.8–10.2)
CHLORIDE BLD-SCNC: 99 MMOL/L (ref 98–111)
CO2: 21 MMOL/L (ref 22–29)
CREAT SERPL-MCNC: 0.7 MG/DL (ref 0.5–1.2)
EOSINOPHILS ABSOLUTE: 0 K/UL (ref 0–0.6)
EOSINOPHILS RELATIVE PERCENT: 0.1 % (ref 0–5)
GFR AFRICAN AMERICAN: >59
GFR NON-AFRICAN AMERICAN: >60
GLUCOSE BLD-MCNC: 166 MG/DL (ref 70–99)
GLUCOSE BLD-MCNC: 185 MG/DL (ref 74–109)
GLUCOSE BLD-MCNC: 239 MG/DL (ref 70–99)
GLUCOSE BLD-MCNC: 250 MG/DL (ref 70–99)
GLUCOSE BLD-MCNC: 284 MG/DL (ref 70–99)
HCT VFR BLD CALC: 42.1 % (ref 42–52)
HEMOGLOBIN: 14.6 G/DL (ref 14–18)
IMMATURE GRANULOCYTES #: 0.3 K/UL
LYMPHOCYTES ABSOLUTE: 0.2 K/UL (ref 1.1–4.5)
LYMPHOCYTES RELATIVE PERCENT: 1.4 % (ref 20–40)
MCH RBC QN AUTO: 31.7 PG (ref 27–31)
MCHC RBC AUTO-ENTMCNC: 34.7 G/DL (ref 33–37)
MCV RBC AUTO: 91.3 FL (ref 80–94)
MONOCYTES ABSOLUTE: 1.3 K/UL (ref 0–0.9)
MONOCYTES RELATIVE PERCENT: 8.5 % (ref 0–10)
NEUTROPHILS ABSOLUTE: 12.9 K/UL (ref 1.5–7.5)
NEUTROPHILS RELATIVE PERCENT: 87.5 % (ref 50–65)
PDW BLD-RTO: 14.1 % (ref 11.5–14.5)
PERFORMED ON: ABNORMAL
PLATELET # BLD: 136 K/UL (ref 130–400)
PMV BLD AUTO: 10.5 FL (ref 9.4–12.4)
POTASSIUM SERPL-SCNC: 3.6 MMOL/L (ref 3.5–5)
RBC # BLD: 4.61 M/UL (ref 4.7–6.1)
SODIUM BLD-SCNC: 131 MMOL/L (ref 136–145)
WBC # BLD: 14.7 K/UL (ref 4.8–10.8)

## 2020-09-15 PROCEDURE — 85025 COMPLETE CBC W/AUTO DIFF WBC: CPT

## 2020-09-15 PROCEDURE — 1210000000 HC MED SURG R&B

## 2020-09-15 PROCEDURE — 2580000003 HC RX 258: Performed by: INTERNAL MEDICINE

## 2020-09-15 PROCEDURE — 82947 ASSAY GLUCOSE BLOOD QUANT: CPT

## 2020-09-15 PROCEDURE — 2580000003 HC RX 258: Performed by: HOSPITALIST

## 2020-09-15 PROCEDURE — 80048 BASIC METABOLIC PNL TOTAL CA: CPT

## 2020-09-15 PROCEDURE — 2500000003 HC RX 250 WO HCPCS: Performed by: ORTHOPAEDIC SURGERY

## 2020-09-15 PROCEDURE — 36415 COLL VENOUS BLD VENIPUNCTURE: CPT

## 2020-09-15 PROCEDURE — 2580000003 HC RX 258: Performed by: ORTHOPAEDIC SURGERY

## 2020-09-15 PROCEDURE — 6370000000 HC RX 637 (ALT 250 FOR IP): Performed by: ORTHOPAEDIC SURGERY

## 2020-09-15 PROCEDURE — 2500000003 HC RX 250 WO HCPCS: Performed by: HOSPITALIST

## 2020-09-15 PROCEDURE — 6360000002 HC RX W HCPCS: Performed by: INTERNAL MEDICINE

## 2020-09-15 RX ADMIN — CEFEPIME HYDROCHLORIDE 1 G: 1 INJECTION, POWDER, FOR SOLUTION INTRAMUSCULAR; INTRAVENOUS at 09:39

## 2020-09-15 RX ADMIN — FAMOTIDINE 20 MG: 20 TABLET ORAL at 09:39

## 2020-09-15 RX ADMIN — MIRABEGRON 25 MG: 25 TABLET, FILM COATED, EXTENDED RELEASE ORAL at 09:39

## 2020-09-15 RX ADMIN — ATENOLOL 25 MG: 25 TABLET ORAL at 09:38

## 2020-09-15 RX ADMIN — METRONIDAZOLE 500 MG: 500 INJECTION, SOLUTION INTRAVENOUS at 06:34

## 2020-09-15 RX ADMIN — INSULIN LISPRO 6 UNITS: 100 INJECTION, SOLUTION INTRAVENOUS; SUBCUTANEOUS at 09:40

## 2020-09-15 RX ADMIN — INSULIN LISPRO 1 UNITS: 100 INJECTION, SOLUTION INTRAVENOUS; SUBCUTANEOUS at 21:14

## 2020-09-15 RX ADMIN — CEFEPIME HYDROCHLORIDE 1 G: 1 INJECTION, POWDER, FOR SOLUTION INTRAMUSCULAR; INTRAVENOUS at 21:13

## 2020-09-15 RX ADMIN — SODIUM CHLORIDE, PRESERVATIVE FREE 10 ML: 5 INJECTION INTRAVENOUS at 10:10

## 2020-09-15 RX ADMIN — PRAMIPEXOLE DIHYDROCHLORIDE 1.5 MG: 1 TABLET ORAL at 09:38

## 2020-09-15 RX ADMIN — ATORVASTATIN CALCIUM 40 MG: 40 TABLET, FILM COATED ORAL at 21:13

## 2020-09-15 RX ADMIN — PRAMIPEXOLE DIHYDROCHLORIDE 1.5 MG: 1 TABLET ORAL at 21:13

## 2020-09-15 RX ADMIN — POTASSIUM PHOSPHATE, MONOBASIC AND POTASSIUM PHOSPHATE, DIBASIC 30 MMOL: 224; 236 INJECTION, SOLUTION, CONCENTRATE INTRAVENOUS at 11:05

## 2020-09-15 RX ADMIN — FAMOTIDINE 20 MG: 20 TABLET ORAL at 21:13

## 2020-09-15 RX ADMIN — CITALOPRAM HYDROBROMIDE 20 MG: 20 TABLET ORAL at 09:38

## 2020-09-15 RX ADMIN — SODIUM CHLORIDE, PRESERVATIVE FREE 10 ML: 5 INJECTION INTRAVENOUS at 21:13

## 2020-09-15 RX ADMIN — PANTOPRAZOLE SODIUM 40 MG: 40 TABLET, DELAYED RELEASE ORAL at 06:34

## 2020-09-15 RX ADMIN — SODIUM CHLORIDE: 9 INJECTION, SOLUTION INTRAVENOUS at 15:26

## 2020-09-15 RX ADMIN — EZETIMIBE 10 MG: 10 TABLET ORAL at 09:38

## 2020-09-15 RX ADMIN — INSULIN LISPRO 6 UNITS: 100 INJECTION, SOLUTION INTRAVENOUS; SUBCUTANEOUS at 12:49

## 2020-09-15 RX ADMIN — INSULIN LISPRO 4 UNITS: 100 INJECTION, SOLUTION INTRAVENOUS; SUBCUTANEOUS at 17:37

## 2020-09-15 ASSESSMENT — ENCOUNTER SYMPTOMS: COLOR CHANGE: 1

## 2020-09-15 NOTE — CARE COORDINATION
Met with patient to discuss discharge needs. Current Readmission Risk score is 23%. Patient's son, Rachel Shah, present. Patient lives at home with his girlfriend of 15 years. He has a cane, walker, and CPAP. He previously received Home Health services from [facility] ([facility] He is interested in receiving Home Health services at discharge. Message send to MD requesting order. Patient reports that he is able to afford his medications at this time. At discharge, one of his three sons will provide transportation.   Electronically signed by Sabrina Lee RN on 9/15/2020 at 12:18 PM

## 2020-09-15 NOTE — PROGRESS NOTES
INFECTIOUS DISEASES PROGRESS NOTE    Patient: Maxim Nash  YOB: 1940  MRN: [id number]   Admit date: 9/14/2020   Admitting Physician: Reilly Mora MD  Primary Care Physician: RITESH Vieyra - CNP    CHIEF COMPLAINT: Left upper extremity swelling    Interval History: Patient was initially seen last night in the recovery room. His son is at bedside. He explained that injury to his hand while catfishing. He did have a deeply embedded hook. I contacted [facility].  Patient has gram-negative rods growing from cultures from hand. Blood cultures are negative to date from [facility]:    Allergies   Allergen Reactions    Azithromycin     Metoprolol     Cyclobenzaprine Rash    Metoclopramide Rash       Current Meds: pantoprazole (PROTONIX) tablet 40 mg, QAM AC  atenolol (TENORMIN) tablet 25 mg, Daily  Empagliflozin-metFORMIN HCl ER  MG TB24 1 tablet, Daily with breakfast  insulin glargine (LANTUS) injection vial 85 Units, Nightly  vitamin D (ERGOCALCIFEROL) capsule 50,000 Units, Weekly  atorvastatin (LIPITOR) tablet 40 mg, Nightly  citalopram (CELEXA) tablet 20 mg, Daily  ezetimibe (ZETIA) tablet 10 mg, Daily  pramipexole (MIRAPEX) tablet 1.5 mg, TID  mirabegron (MYRBETRIQ) extended release tablet 25 mg, Daily  potassium chloride (KLOR-CON M) extended release tablet 40 mEq, PRN    Or  potassium bicarb-citric acid (EFFER-K) effervescent tablet 40 mEq, PRN    Or  potassium chloride 10 mEq/100 mL IVPB (Peripheral Line), PRN  magnesium sulfate 1 g in dextrose 5% 100 mL IVPB, PRN  acetaminophen (TYLENOL) tablet 650 mg, Q6H PRN    Or  acetaminophen (TYLENOL) suppository 650 mg, Q6H PRN  polyethylene glycol (GLYCOLAX) packet 17 g, Daily PRN  promethazine (PHENERGAN) tablet 12.5 mg, Q6H PRN    Or  ondansetron (ZOFRAN) injection 4 mg, Q6H PRN  famotidine (PEPCID) tablet 20 mg, BID  metronidazole (FLAGYL) 500 mg in NaCl 100 mL IVPB premix, Q8H  vancomycin (VANCOCIN) 1,500 mg in dextrose 5 % 500 mL IVPB, Q12H  vancomycin (VANCOCIN) intermittent dosing (placeholder), RX Placeholder  insulin lispro (HUMALOG) injection vial 0-12 Units, TID WC  insulin lispro (HUMALOG) injection vial 0-6 Units, Nightly  0.9 % sodium chloride infusion, Continuous  lactated ringers infusion, Continuous  sodium chloride flush 0.9 % injection 10 mL, 2 times per day  sodium chloride flush 0.9 % injection 10 mL, PRN  promethazine (PHENERGAN) tablet 12.5 mg, Q6H PRN    Or  ondansetron (ZOFRAN) injection 4 mg, Q6H PRN  oxyCODONE (ROXICODONE) immediate release tablet 5 mg, Q4H PRN    Or  oxyCODONE (ROXICODONE) immediate release tablet 10 mg, Q4H PRN  morphine injection 2 mg, Q2H PRN    Or  morphine injection 4 mg, Q2H PRN  cefepime (MAXIPIME) 1 g in sterile water 10 mL IV syringe, Q12H        Review of Systems   Constitutional: Positive for diaphoresis. Skin: Positive for color change and wound. Vital Signs:  BP (!) 144/72   Pulse 77   Temp 97.3 °F (36.3 °C) (Temporal)   Resp 20   Ht 5' 10\" (1.778 m)   Wt 210 lb (95.3 kg)   SpO2 100%   BMI 30.13 kg/m²   Temp (24hrs), Av.3 °F (36.3 °C), Min:95.8 °F (35.4 °C), Max:99 °F (37.2 °C)      Physical Exam  General: Pleasant 79year-old gentleman sitting up in bed in no acute distress  HEENT: Sclera anicteric. He place facemask on a my request  Respiratory: Effort even and unlabored  Left upper extremity elevated on multiple pillows. Already see improvement in the deep erythema proximal to the dressing. There is more of a tan appearance. Still with some erythema, edema distally. Capillary refill is brisk. Sensation intact to light touch of fingers. Neuro: Hard of hearing  Psych: Pleasant and cooperative    Line/IV site: No erythema,warmth, induration, or tenderness.     LAB RESULTS:    CBC with DIFF:  Recent Labs     20  1430   WBC 16.6*   RBC 4.55*   HGB 13.9*   HCT 40.5*   MCV 89.0   MCH 30.5   MCHC 34.3   RDW 14.0      MPV 10.2 NEUTOPHILPCT 85.2*   LYMPHOPCT 1.9*   MONOPCT 11.4*   EOSRELPCT 0.1   BASOPCT 0.2   NEUTROABS 14.2*   LYMPHSABS 0.3*   MONOSABS 1.90*   EOSABS 0.00   BASOSABS 0.00       CMP/BMP:  Recent Labs     09/14/20  1430 09/15/20  0255   * 131*   K 3.4* 3.6   CL 96* 99   CO2 21* 21*   ANIONGAP 13 11   GLUCOSE 123* 185*   BUN 22 22   CREATININE 0.8 0.7   LABGLOM >60 >60   CALCIUM 8.3* 8.4*   PROT 5.9*  --    LABALBU 3.2*  --    BILITOT 0.9  --    ALKPHOS 84  --    ALT 31  --    AST 27  --          Culture Results:  Culture, Surgical [[id number]]   Collected: 09/14/20 1647    Order Status: Completed  Specimen: Swab from Hand  Updated: 09/14/20 2045     Gram Stain Result  Few WBC's (Polymorphonuclear)   No organisms seen    Narrative:      ORDER#: [id number]                          ORDERED BY: Leandra Cid   SOURCE: Hand L hand abscess                COLLECTED:  09/14/20 16:47   ANTIBIOTICS AT [facility]. :                      RECEIVED :  09/14/20 18:08    Culture, Surgical [[id number]]   Collected: 09/14/20 1646    Order Status: Completed  Specimen: Swab from Finger  Updated: 09/14/20 2044     Gram Stain Result  Moderate WBC's (Polymorphonuclear)   No organisms seen    Narrative:      ORDER#: [id number]                          ORDERED BY: Leandra Cid   SOURCE: Finger Left                        COLLECTED:  09/14/20 16:46   ANTIBIOTICS AT [facility]. :                      RECEIVED :  09/14/20 18:06          RADIOLOGY      No results found.                 Patient Active Problem List   Diagnosis    Other chest pain    Chronic bilateral low back pain without sciatica    Syncope and collapse    Type 2 diabetes mellitus with hyperglycemia, with long-term current use of insulin ([facility])    Obstructive sleep apnea    Class 1 obesity due to excess calories in adult    Restless legs syndrome    Gastroesophageal reflux disease without esophagitis    Essential hypertension    Vertigo    Orthostasis    Diabetic polyneuropathy associated with type 2 diabetes mellitus (HCC)    Bilateral carpal tunnel syndrome    CPAP (continuous positive airway pressure) dependence    BiPAP (biphasic positive airway pressure) dependence    Pinched nerve in neck    Rheumatic aortic valve insufficiency    Moderate mitral stenosis    Hyponatremia    Hypercholesterolemia with hypertriglyceridemia    Syncope    Vertebrobasilar artery insufficiency    S/P aortic valve replacement with bioprosthetic valve    Grade I diastolic dysfunction    Pacemaker    Near syncope    History of syncope    Sinoatrial node dysfunction (HCC)    Mixed hyperlipidemia    Pre-syncope    Bradycardia    Lightheadedness    Heart block    Pacemaker malfunction, initial encounter    Pacemaker lead malfunction    Cellulitis of left hand and arm    Abscess of left hand       IMPRESSION:    Severe cellulitis of left hand after fish hook injury-patient hospitalized at [facility] prior to transfer. Culture of wound at puncture site with gram-negative rods. They will fax results when available regarding identification. Concerned about Pseudomonas, Aeromonas, etc. i.e. waterborne gram-negative rods.         RECOMMENDATIONS :  · Check with [facility] re cultures when final  · Follow our cultures from surgery  · Continue aggressive elevation left upper extremity  · Dc vanco  · Continue cefepime - may need quinolone pending cultures           Keagan Peña MD

## 2020-09-15 NOTE — PROGRESS NOTES
Pt in room 409 after surgery. Family in room. Vitals are stable. Pt is alert and oriented. Left hand warm and able to move fingers. Unable to feel radial pulse due to ace in place. Bed alarm remains on for safety.

## 2020-09-15 NOTE — FLOWSHEET NOTE
09/15/20 1448   Encounter Summary   Services provided to: Patient and family together  (Son)   Referral/Consult From: Rounding   Support System Children   Complexity of Encounter Moderate   Length of Encounter 15 minutes   Advance Care Planning Yes   Spiritual/Christianity   Type Spiritual support   Assessment Calm; Approachable   Intervention Explored feelings, thoughts, concerns; Discussed belief system/Mosque practices/alphonso; Active listening   Outcome Coping   Advance Directives (For Healthcare)   Healthcare Directive No, patient does not have an advance directive for healthcare treatment     Plan: To follow if and when appropriate.       Electronically signed by Doc Elliott MA [facility] on 9/15/2020 at 2:51 PM

## 2020-09-15 NOTE — ACP (ADVANCE CARE PLANNING)
Advance Care Planning     Advance Care Planning Activator (Inpatient)  Conversation Note      Date of ACP Conversation: 9/15/2020    [employer] with: Patient and a son    Patient requested prayer first.  Then he reported having a Will. Regarding health care matters, he said \"They know what to do. \"  Referring to the children. Moreover, he was not able to name the order by which his health Care Decision Makers be known. Note:  There is no completed Living Will or Advanced Directives. ACP Activator: Manuel Garg Decision Maker:     Current Designated Health Care Decision Maker:       Care Preferences    Note:  Not discussed. Length of ACP Conversation in minutes:  20 minutes      Follow-up plan: To follow up tomorrow if appropriate.         Electronically signed by Karen Galdamez MA [facility] on 9/15/2020 at 2:48 PM

## 2020-09-15 NOTE — PROGRESS NOTES
[facility]  Nurse  Consult Note       NAME:  Lexi Adkins  MEDICAL RECORD NUMBER:  [id number]  AGE: 79 y.o. GENDER: male  : 1940  TODAY'S DATE:  9/15/2020    Subjective   Reason for Wound Nurse Evaluation and Assessment: Left Hand surgical wound      Lexi Adkins is a 79 y.o. male referred by:   [x] Physician  [] Nursing  [] Other:     Wound Identification:  Wound Type: non-healing surgical  Contributing Factors: diabetes and poor glucose control    Wound History: Patient has had surgery due to cellulitis and possible abscess after a fishing hook puncture of left fifth finger  Current Wound Care Treatment:  Patient currently has dressing to Left hand; Packing strips in both incision sites (left fifth digit and left lateral hand) Patient's hand is very swollen with redness marked to assess for increased/decreased redness. Patient is controlling swelling with elevation and compression.     Patient Goal of Care:  [x] Wound Healing  [] Odor Control  [] Palliative Care  [] Pain Control   [] Other:         PAST MEDICAL HISTORY        Diagnosis Date    Aortic valve stenosis     Arthritis     Chest tightness, discomfort, or pressure 2012    Chronic back pain     CKD (chronic kidney disease)     CPAP (continuous positive airway pressure) dependence     13cm    Diabetes ([facility]     Diabetic polyneuropathy associated with type 2 diabetes mellitus ([facility] 2016    GERD (gastroesophageal reflux disease)     HTN (hypertension)     Hyperlipemia     Left ventricular diastolic dysfunction     Mitral stenosis     Mitral valve stenosis     Neuropathy     Obstructive sleep apnea     AHI: 34.5 per PSG, 2013; AHI: 36.9 per PSG, 2015; AHI: 54.5 per PSG, 3/2017    Pinched nerve in neck     Restless legs syndrome 2016       PAST SURGICAL HISTORY    Past Surgical History:   Procedure Laterality Date    APPENDECTOMY      BACK SURGERY      4 Back surgeries    BLADDER SURGERY      CARDIAC CATHETERIZATION  12    EF > 50%    CATARACT REMOVAL      CHOLECYSTECTOMY      COLONOSCOPY      EYE SURGERY      implants placed both eyes    HAND SURGERY Left 2020    INCISION AND DRAINAGE LEFT HAND ABSCESS performed by Fela Wesley MD at [address] [place] [address] OTHER SURGICAL HISTORY  2020    Right Ventricular lead replacement- Dr. Dian Hugo RPLCMT PROST AORTIC VALVE OPEN XCP HOMOGRF/STENT N/A 2018    AORTIC VALVE REPLACEMENT TRANSESOPHAGEAL ECHOCARDIOGRAM performed by Blade Castro MD at [facility] HISTORY    Family History   Problem Relation Age of Onset    Diabetes Mother     Cancer Father     Cancer Sister     Heart Disease Brother     Cancer Brother     Cancer Sister     Cancer Brother        SOCIAL HISTORY    Social History     Tobacco Use    Smoking status: Former Smoker     Packs/day: 3.00     Years: 50.00     Pack years: 150.00     Types: Cigarettes     Last attempt to quit: 1977     Years since quittin.1    Smokeless tobacco: Former User   Substance Use Topics    Alcohol use: No    Drug use: No       ALLERGIES    Allergies   Allergen Reactions    Azithromycin     Metoprolol     Cyclobenzaprine Rash    Metoclopramide Rash       MEDICATIONS    No current facility-administered medications on file prior to encounter.       Current Outpatient Medications on File Prior to Encounter   Medication Sig Dispense Refill    pramipexole (MIRAPEX) 1.5 MG tablet 3 times daily       mirabegron (MYRBETRIQ) 25 MG TB24 Take 1 tablet by mouth daily 30 tablet 5    ezetimibe (ZETIA) 10 MG tablet Take 10 mg by mouth daily      clopidogrel (PLAVIX) 75 MG tablet Take 75 mg by mouth daily      citalopram (CELEXA) 20 MG tablet TAKE ONE TABLET BY MOUTH DAILY 30 tablet 5    atorvastatin (LIPITOR) 40 MG tablet Take 1 tablet by mouth nightly 90 tablet 3    Cholecalciferol (VITAMIN D3) 10552 units CAPS 11    HUMULIN R U-500 KWIKPEN 500 UNIT/ML SOPN concentrated injection pen   11    insulin glargine (LANTUS) 100 UNIT/ML injection vial Inject 85 Units into the skin nightly (Patient taking differently: Inject 45 Units into the skin nightly ) 3 vial 0    Empagliflozin-Metformin HCl ER (SYNJARDY XR)  MG TB24 Take 1 tablet by mouth daily (with breakfast)      oxyCODONE (ROXICODONE) 5 MG immediate release tablet Take 5 mg by mouth every 4 hours as needed for Pain. Marie Velazquez atenolol (TENORMIN) 25 MG tablet Take 25 mg by mouth daily      hydrochlorothiazide (MICROZIDE) 12.5 MG capsule Take 12.5 mg by mouth daily      B-D UF III MINI PEN NEEDLES 31G X 5 MM MISC       furosemide (LASIX) 20 MG tablet Take 1 tablet by mouth as needed (For weight gain greater than 2.5 lbs in 24 hours.) (Patient taking differently: Take 20 mg by mouth daily ) 30 tablet 0    esomeprazole (NEXIUM) 40 MG capsule Take 40 mg by mouth every morning (before breakfast).            Objective    BP (!) 145/76   Pulse 63   Temp 97.3 °F (36.3 °C) (Temporal)   Resp 20   Ht 5' 10\" (1.778 m)   Wt 210 lb (95.3 kg)   SpO2 95%   BMI 30.13 kg/m²     LABS:  WBC:    Lab Results   Component Value Date    WBC 14.7 09/15/2020     H/H:    Lab Results   Component Value Date    HGB 14.6 09/15/2020    HCT 42.1 09/15/2020    HCT 36.1 06/03/2012     PTT:    Lab Results   Component Value Date    APTT 26.4 01/16/2020    PTT 31.5 04/09/2015   [APTT}  PT/INR:    Lab Results   Component Value Date    PROTIME 13.3 01/16/2020    PROTIME 10.80 04/28/2012    INR 1.07 01/16/2020     HgBA1c:    Lab Results   Component Value Date    LABA1C 8.5 09/14/2020    LABA1C 8.9 04/10/2015       Assessment   Johnny Risk Score: Johnny Scale Score: 20    Patient Active Problem List   Diagnosis Code    Other chest pain R07.89    Chronic bilateral low back pain without sciatica M54.5, G89.29    Syncope and collapse R55    Type 2 diabetes mellitus with hyperglycemia, with tolerated by patient. Pain Assessment:  Severity:  0 / 10  Quality of pain: N/A  Wound Pain Timing/Severity: none  Premedicated: N/A    Plan   Plan of Care: Incision 09/14/20 Finger (Comment which one) Left-Dressing/Treatment: Ace wrap  Incision 09/14/20 Hand Left;Posterior-Dressing/Treatment: Ace wrap    Specialty Bed Required : N/A   [] Low Air Loss   [] Pressure Redistribution  [] Fluid Immersion  [] Bariatric  [] Total Pressure Relief  [] Other:     Current Diet: DIET CARDIAC; Dietician consult:  N/A    Discharge Plan:  Placement for patient upon discharge: home with support    Patient appropriate for Outpatient [address]: Yes    Referrals:  []   [] [address]  [] Supplies  [x] Other    Patient/Caregiver Teaching:  Level of patient/caregiver understanding able to:   [] Indicates understanding       [x] Needs reinforcement  [] Unsuccessful      [] Verbal Understanding  [] Demonstrated understanding       [] No evidence of learning  [] Refused teaching         [] N/A      According to Dr Edson Gotti:   POSTOPERATIVE PLAN: Begin twice daily half-strength peroxide soaks starting on postoperative day 2. Advance gauze at each site approximately 1 inch until complete. Follow-up in 1 week for wound check    I have removed outer dressing to picture hand and re-applied outer dressing. I have instructed patient to apply dressing according to Doctor's instructions. Patient may need re-inforcement. Patient is very hard of hearing.      Electronically signed by   Ansley Lorenzana RN [facility], [facility]. 9/15/2020

## 2020-09-16 PROBLEM — A49.9: Status: ACTIVE | Noted: 2020-09-16

## 2020-09-16 LAB
ANION GAP SERPL CALCULATED.3IONS-SCNC: 11 MMOL/L (ref 7–19)
BASOPHILS ABSOLUTE: 0 K/UL (ref 0–0.2)
BASOPHILS RELATIVE PERCENT: 0.2 % (ref 0–1)
BUN BLDV-MCNC: 25 MG/DL (ref 8–23)
CALCIUM SERPL-MCNC: 8.4 MG/DL (ref 8.8–10.2)
CHLORIDE BLD-SCNC: 102 MMOL/L (ref 98–111)
CO2: 23 MMOL/L (ref 22–29)
CREAT SERPL-MCNC: 0.7 MG/DL (ref 0.5–1.2)
EOSINOPHILS ABSOLUTE: 0 K/UL (ref 0–0.6)
EOSINOPHILS RELATIVE PERCENT: 0.1 % (ref 0–5)
GFR AFRICAN AMERICAN: >59
GFR NON-AFRICAN AMERICAN: >60
GLUCOSE BLD-MCNC: 104 MG/DL (ref 70–99)
GLUCOSE BLD-MCNC: 133 MG/DL (ref 70–99)
GLUCOSE BLD-MCNC: 133 MG/DL (ref 74–109)
GLUCOSE BLD-MCNC: 138 MG/DL (ref 70–99)
GLUCOSE BLD-MCNC: 147 MG/DL (ref 70–99)
GLUCOSE BLD-MCNC: 163 MG/DL (ref 70–99)
HCT VFR BLD CALC: 42.2 % (ref 42–52)
HEMOGLOBIN: 14.3 G/DL (ref 14–18)
IMMATURE GRANULOCYTES #: 0.5 K/UL
LYMPHOCYTES ABSOLUTE: 0.5 K/UL (ref 1.1–4.5)
LYMPHOCYTES RELATIVE PERCENT: 3.2 % (ref 20–40)
MCH RBC QN AUTO: 31 PG (ref 27–31)
MCHC RBC AUTO-ENTMCNC: 33.9 G/DL (ref 33–37)
MCV RBC AUTO: 91.3 FL (ref 80–94)
MONOCYTES ABSOLUTE: 1.9 K/UL (ref 0–0.9)
MONOCYTES RELATIVE PERCENT: 13.4 % (ref 0–10)
NEUTROPHILS ABSOLUTE: 11.5 K/UL (ref 1.5–7.5)
NEUTROPHILS RELATIVE PERCENT: 80 % (ref 50–65)
PDW BLD-RTO: 14.5 % (ref 11.5–14.5)
PERFORMED ON: ABNORMAL
PHOSPHORUS: 2.5 MG/DL (ref 2.5–4.5)
PLATELET # BLD: 139 K/UL (ref 130–400)
PMV BLD AUTO: 10.2 FL (ref 9.4–12.4)
POTASSIUM SERPL-SCNC: 4.2 MMOL/L (ref 3.5–5)
RBC # BLD: 4.62 M/UL (ref 4.7–6.1)
SODIUM BLD-SCNC: 136 MMOL/L (ref 136–145)
WBC # BLD: 14.4 K/UL (ref 4.8–10.8)

## 2020-09-16 PROCEDURE — 84100 ASSAY OF PHOSPHORUS: CPT

## 2020-09-16 PROCEDURE — 6370000000 HC RX 637 (ALT 250 FOR IP): Performed by: ORTHOPAEDIC SURGERY

## 2020-09-16 PROCEDURE — 2580000003 HC RX 258: Performed by: ORTHOPAEDIC SURGERY

## 2020-09-16 PROCEDURE — 1210000000 HC MED SURG R&B

## 2020-09-16 PROCEDURE — 6360000002 HC RX W HCPCS: Performed by: ORTHOPAEDIC SURGERY

## 2020-09-16 PROCEDURE — 2580000003 HC RX 258: Performed by: INTERNAL MEDICINE

## 2020-09-16 PROCEDURE — 36415 COLL VENOUS BLD VENIPUNCTURE: CPT

## 2020-09-16 PROCEDURE — 80048 BASIC METABOLIC PNL TOTAL CA: CPT

## 2020-09-16 PROCEDURE — 82947 ASSAY GLUCOSE BLOOD QUANT: CPT

## 2020-09-16 PROCEDURE — 85025 COMPLETE CBC W/AUTO DIFF WBC: CPT

## 2020-09-16 PROCEDURE — 6360000002 HC RX W HCPCS: Performed by: INTERNAL MEDICINE

## 2020-09-16 RX ADMIN — PRAMIPEXOLE DIHYDROCHLORIDE 1.5 MG: 1 TABLET ORAL at 09:07

## 2020-09-16 RX ADMIN — PANTOPRAZOLE SODIUM 40 MG: 40 TABLET, DELAYED RELEASE ORAL at 06:28

## 2020-09-16 RX ADMIN — ATORVASTATIN CALCIUM 40 MG: 40 TABLET, FILM COATED ORAL at 20:55

## 2020-09-16 RX ADMIN — PRAMIPEXOLE DIHYDROCHLORIDE 1.5 MG: 1 TABLET ORAL at 15:15

## 2020-09-16 RX ADMIN — INSULIN LISPRO 1 UNITS: 100 INJECTION, SOLUTION INTRAVENOUS; SUBCUTANEOUS at 20:55

## 2020-09-16 RX ADMIN — PRAMIPEXOLE DIHYDROCHLORIDE 1.5 MG: 1 TABLET ORAL at 20:55

## 2020-09-16 RX ADMIN — EZETIMIBE 10 MG: 10 TABLET ORAL at 09:08

## 2020-09-16 RX ADMIN — FAMOTIDINE 20 MG: 20 TABLET ORAL at 09:08

## 2020-09-16 RX ADMIN — ATENOLOL 25 MG: 25 TABLET ORAL at 09:08

## 2020-09-16 RX ADMIN — CEFEPIME HYDROCHLORIDE 1 G: 1 INJECTION, POWDER, FOR SOLUTION INTRAMUSCULAR; INTRAVENOUS at 21:15

## 2020-09-16 RX ADMIN — SODIUM CHLORIDE, PRESERVATIVE FREE 10 ML: 5 INJECTION INTRAVENOUS at 20:55

## 2020-09-16 RX ADMIN — CITALOPRAM HYDROBROMIDE 20 MG: 20 TABLET ORAL at 09:09

## 2020-09-16 RX ADMIN — FAMOTIDINE 20 MG: 20 TABLET ORAL at 20:55

## 2020-09-16 RX ADMIN — MIRABEGRON 25 MG: 25 TABLET, FILM COATED, EXTENDED RELEASE ORAL at 09:08

## 2020-09-16 RX ADMIN — CEFEPIME HYDROCHLORIDE 1 G: 1 INJECTION, POWDER, FOR SOLUTION INTRAMUSCULAR; INTRAVENOUS at 11:00

## 2020-09-16 RX ADMIN — MORPHINE SULFATE 2 MG: 4 INJECTION, SOLUTION INTRAMUSCULAR; INTRAVENOUS at 11:56

## 2020-09-16 RX ADMIN — INSULIN LISPRO 2 UNITS: 100 INJECTION, SOLUTION INTRAVENOUS; SUBCUTANEOUS at 18:17

## 2020-09-16 ASSESSMENT — PAIN SCALES - GENERAL
PAINLEVEL_OUTOF10: 7
PAINLEVEL_OUTOF10: 3

## 2020-09-16 ASSESSMENT — PAIN DESCRIPTION - ORIENTATION: ORIENTATION: LEFT

## 2020-09-16 ASSESSMENT — PAIN DESCRIPTION - LOCATION: LOCATION: ARM

## 2020-09-16 ASSESSMENT — PAIN DESCRIPTION - PAIN TYPE: TYPE: SURGICAL PAIN

## 2020-09-16 ASSESSMENT — ENCOUNTER SYMPTOMS: COLOR CHANGE: 1

## 2020-09-16 NOTE — PLAN OF CARE
Problem: Falls - Risk of:  Goal: Will remain free from falls  Description: Will remain free from falls  9/16/2020 0338 by Ricky Grant RN  Outcome: Ongoing  9/15/2020 1345 by Augie Gann RN  Outcome: Ongoing  Goal: Absence of physical injury  Description: Absence of physical injury  9/16/2020 0338 by Ricky Grant RN  Outcome: Ongoing  9/15/2020 1345 by Augie Gann RN  Outcome: Ongoing

## 2020-09-16 NOTE — PROGRESS NOTES
Orthopedic Surgery Progress Note    Berna Leyva  9/16/2020      Subjective:     Systemic or Specific Complaints:  Patient resting comfortably in bed the AM.  Tolerates wound inspection without issue.       Objective:     Patient Vitals for the past 24 hrs:   BP Temp Temp src Pulse Resp SpO2   09/16/20 0632 (!) 141/72 96.5 °F (35.8 °C) Temporal 63 14 98 %   09/16/20 0200 (!) 152/90 96.5 °F (35.8 °C) Temporal 60 20 100 %   09/15/20 2213 (!) 159/78 96.7 °F (35.9 °C) Temporal 62 20 97 %   09/15/20 1920 (!) 140/70 97.1 °F (36.2 °C) Temporal 64 20 97 %   09/15/20 1153 (!) 145/76 97.3 °F (36.3 °C) Temporal 63 20 95 %   09/15/20 1100 (!) 153/82 97.2 °F (36.2 °C) Tympanic 68 20 98 %   09/15/20 0800 134/77 97.5 °F (36.4 °C) Tympanic 65 18 98 %       left upper  General: alert, appears stated age and cooperative   Wound: draining             Dressing: bloody drainage   Extremity: Distal NVI           DVT Exam: No evidence of DVT seen on physical exam.                   Data Review:  Recent Labs     09/15/20  0929 09/16/20  0319   HGB 14.6 14.3     Recent Labs     09/16/20  0319      K 4.2   CREATININE 0.7       Assessment:     POD# 2 s/p left hand I&D    Plan:      1:  DVT prophylaxis, ICE, elevate  2:  Pain control  3:  Physical therapy/Occupational therapy  4:  Anticipate discharge once ID recs in place and medically stable and if pain well controlled  5: Weight bearing as tolerated       Electronically signed by Mary Kay Villa MD on 9/16/2020 at 7:37 AM

## 2020-09-16 NOTE — PROGRESS NOTES
[place], [place], [address]        DEPARTMENT OF HOSPITALIST MEDICINE        PROGRESS  NOTE:    NOTE: Portions of this note have been copied forward, however, changed to reflect the most current clinical status of this patient. Patient: Baldev Bergman  YOB: 1940  Date of Service: 9/16/2020  MRN: [id number]   Acct: [de-identified]   Primary Care Physician: RITESH Kang CNP  Advance Directive: Full Code  Admit Date: 9/14/2020       Hospital Day: 2        SUBJECTIVE:    Patient continues to feel better. He is asking when he is able to go home. His left hand pain is under control. CUMULATIVE  HOSPITAL  COURSE:    9/16/2020  Patient's surgical wound cultures from transferring facility were positive for Aeromonas gram-negative waterborne organism and he is growing Edwardsiella tarda. (Gram-negative waterborne organism) from the cultures sent from our facility. ID is continuing with cefepime for now and awaiting debilities from surgical cultures. 9/15/2020  Patient underwent I&D of left hand yesterday which he tolerated well. Wound cultures were obtained and the affected sites were copiously irrigated. Patient is feeling better. I reviewed the ID and sensitivity of the wound cultures from patient's transferring facility which were sensitive to IV cefepime. 9/14/2020  HISTORY OF PRESENT ILLNESS:  Baldev Bergman is an 79 y.o. male. He is a very pleasant gentleman who has diabetes mellitus and chronic medical issues. He is also a professional fisherman. He was fishing 4 days ago when he caught a fish and when he tried to reel her in, the hook jerked out and got lodged into his left hand. The hook was dirty looking and in water for a long time.   His friend took the hook out and patient was brought in to the ER for evaluation from where he was admitted in [facility].  He was started on IV Invanz and vancomycin and is transferred to our facility for an ID evaluation. I saw the patient when he came to our hospital.  His hand and left arm is red and swollen. I ordered hand surgery and ID consults as well as baseline labs and will restart him on IV Invanz and vancomycin. He is kept n.p.o. for possible I&D of his left hand later on today.       REVIEW  OF  SYSTEMS:    Constitutional:  No fevers, chills, nausea, vomiting,    Lungs:   No hemoptysis, pleurisy   Heart:  No chest pressure with exertion, palpitations,    Abdomen:   No new masses, no bright red blood per rectum   Extremities: + mild Left hand pain after surgery   Neurologic: No new motor or sensory changes       PAST MEDICAL HISTORY:  Past Medical History:   Diagnosis Date    Aortic valve stenosis     Arthritis     Chest tightness, discomfort, or pressure 4/30/2012    Chronic back pain     CKD (chronic kidney disease)     CPAP (continuous positive airway pressure) dependence     13cm    Diabetes [facility]     Diabetic polyneuropathy associated with type 2 diabetes mellitus ([facility] [address] 8/29/2016    GERD (gastroesophageal reflux disease)     HTN (hypertension)     Hyperlipemia     Left ventricular diastolic dysfunction     Mitral stenosis     Mitral valve stenosis     Neuropathy     Obstructive sleep apnea     AHI: 34.5 per PSG, 6/2013; AHI: 36.9 per PSG, 7/2015; AHI: 54.5 per PSG, 3/2017    Pinched nerve in neck     Restless legs syndrome 2/17/2016         PAST SURGICAL HISTORY:  Past Surgical History:   Procedure Laterality Date    APPENDECTOMY      BACK SURGERY      4 Back surgeries    BLADDER SURGERY      CARDIAC CATHETERIZATION  4/30/12    EF > 50%    CATARACT REMOVAL      CHOLECYSTECTOMY      COLONOSCOPY      EYE SURGERY      implants placed both eyes    HAND SURGERY Left 9/14/2020    INCISION AND DRAINAGE LEFT HAND ABSCESS performed by Anna Bailey MD at [address] HISTORY  08/03/2020    Right Ventricular lead replacement- Dr. Daphney Hansen PACEMAKER PLACEMENT      [place] RPLCMT PROST AORTIC VALVE OPEN XCP HOMOGRF/STENT N/A 6/7/2018    AORTIC VALVE REPLACEMENT TRANSESOPHAGEAL ECHOCARDIOGRAM performed by Jhoan Lara MD at [facility] HISTORY:  Family History   Problem Relation Age of Onset    Diabetes Mother     Cancer Father     Cancer Sister     Heart Disease Brother     Cancer Brother     Cancer Sister     Cancer Brother            OBJECTIVE:  BP (!) 154/79   Pulse 60   Temp 96.6 °F (35.9 °C) (Temporal)   Resp 16   Ht 5' 10\" (1.778 m)   Wt 210 lb (95.3 kg)   SpO2 98%   BMI 30.13 kg/m²   No intake/output data recorded.     PHYSICAL  EXAMINATION:    PETR:  Awake, alert, oriented x 3, patient appears tired and fatigued   Head/Eyes:  Normocephalic, atraumatic, EOMI and PERRLA bilaterally   Respiratory:   Bilateral decreased air entry in both lung fields, mild B/L crackles, symmetric expansion of chest   Cardiovascular:  Regular rate and rhythm, S1+S2+0, no murmurs/rubs   Abdomen:   Soft, non-tender, bowel sounds +ve, no organomegaly   Extremities: Moves all, decreased range of motion of left hand fingers, ++ left hand edema, left hand under bandage   Neurologic: Awake, alert, oriented x 3, cranial nerves II-XII intact, no focal neurological deficits, sensory system intact   Psychiatric: Normal mood, non-suicidal       CURRENT MEDICATIONS:  Scheduled:   pantoprazole  40 mg Oral QAM AC    atenolol  25 mg Oral Daily    Empagliflozin-metFORMIN HCl ER  1 tablet Oral Daily with breakfast    insulin glargine  85 Units Subcutaneous Nightly    vitamin D  50,000 Units Oral Weekly    atorvastatin  40 mg Oral Nightly    citalopram  20 mg Oral Daily    ezetimibe  10 mg Oral Daily    pramipexole  1.5 mg Oral TID    mirabegron  25 mg Oral Daily    famotidine  20 mg Oral BID    insulin lispro  0-12 Units Subcutaneous TID WC    insulin lispro  0-6 Units Subcutaneous Nightly    sodium chloride flush  10 mL Intravenous 2 times per day    cefepime  1 g Intravenous Q12H        PRN:  potassium chloride, 40 mEq, PRN    Or  potassium alternative oral replacement, 40 mEq, PRN    Or  potassium chloride, 10 mEq, PRN  magnesium sulfate, 1 g, PRN  acetaminophen, 650 mg, Q6H PRN    Or  acetaminophen, 650 mg, Q6H PRN  polyethylene glycol, 17 g, Daily PRN  sodium chloride flush, 10 mL, PRN  promethazine, 12.5 mg, Q6H PRN    Or  ondansetron, 4 mg, Q6H PRN  oxyCODONE, 5 mg, Q4H PRN    Or  oxyCODONE, 10 mg, Q4H PRN  morphine, 2 mg, Q2H PRN    Or  morphine, 4 mg, Q2H PRN        Infusions:   sodium chloride 75 mL/hr at 09/15/20 1526    lactated ringers 125 mL/hr at 09/14/20 2209       Laboratory Data:        Recent Labs     09/14/20  1430 09/15/20  0929 09/16/20  0319   WBC 16.6* 14.7* 14.4*   HGB 13.9* 14.6 14.3    136 139     Recent Labs     09/14/20  1430 09/15/20  0255 09/16/20  0319   * 131* 136   K 3.4* 3.6 4.2   CL 96* 99 102   CO2 21* 21* 23   BUN 22 22 25*   CREATININE 0.8 0.7 0.7   GLUCOSE 123* 185* 133*     Recent Labs     09/14/20  1430   AST 27   ALT 31   BILITOT 0.9   ALKPHOS 84     Troponin T: No results for input(s): TROPONINI in the last 72 hours. Pro-BNP: No results for input(s): BNP in the last 72 hours. INR: No results for input(s): INR in the last 72 hours. UA:No results for input(s): NITRITE, COLORU, PHUR, LABCAST, WBCUA, RBCUA, MUCUS, TRICHOMONAS, YEAST, BACTERIA, CLARITYU, SPECGRAV, LEUKOCYTESUR, UROBILINOGEN, BILIRUBINUR, BLOODU, GLUCOSEU, AMORPHOUS in the last 72 hours. Invalid input(s): Mitch Johnston  A1C:   Recent Labs     09/14/20  1430   LABA1C 8.5*     ABG:No results for input(s): PHART, IGO8PBC, PO2ART, DQI4NNR, BEART, HGBAE, S2AWHUQA, CARBOXHGBART in the last 72 hours. Imaging:    No results found.      ASSESSMENT & PLAN:    Principal Problem:    Cellulitis of left hand and arm  Active Problems:    Chronic bilateral low back pain without sciatica    Type 2 diabetes mellitus with hyperglycemia, with long-term current use of insulin ([facility])    Obstructive sleep apnea    Class 1 obesity due to excess calories in adult    Gastroesophageal reflux disease without esophagitis    Essential hypertension    Diabetic polyneuropathy associated with type 2 diabetes mellitus (HCC)    Bilateral carpal tunnel syndrome    CPAP (continuous positive airway pressure) dependence    S/P aortic valve replacement with bioprosthetic valve    Pacemaker    Abscess of left hand    Hypokalemia    Hypophosphatemia    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 *          Continue with current medications  Continue with IV cefepime which shows good sensitivity to the Aeromonas cultures drawn from the transferring facility  Follow-up on ID and sensitivity of the wound cultures positive for Edwardsiella tarda obtained during I&D of left hand  Continue with pain management PRN as needed  Continue diabetic meds  Accu-Cheks qAC and qHS ordered with insulin coverage as per protocol  Patient will require home health care upon discharge for dressing changes and possible IV antibiotics, if needed  Leukocytosis is slowly downtrending from 16.6-14.4  Potassium levels normalized after replacement  Phosphate levels normalized after replacement  Follow-up closely with ID and hand surgery  Continue with dressing changes as per hand surgery recommendations      Repeat labs in a.m. Electrolyte replacement as per protocol. Patient will be monitored very closely on the floor. Further recommendations as per the hospital course. Patient  is on DVT prophylaxis  Current medications reviewed  Lab work reviewed  Radiology/Chest x-ray films reviewed  Treatment recommendations from suspecialities reviewed, appreciated and agreed with  Discussed with the nurse and addressed all questions/concerns  Discussed with Patient and/or Family at the bedside in detail . .. they understand and agree with the management plan. Antonia Morales MD  9/16/2020 5:50 PM      DISCLAIMER: This note was created with electronic voice recognition which does have occasional errors. If you have any questions regarding the content within the note please do not hesitate to contact me. .. Thanks.

## 2020-09-16 NOTE — PROGRESS NOTES
INFECTIOUS DISEASES PROGRESS NOTE    Patient: Austin Chand  YOB: 1940  MRN: [id number]   Admit date: 9/14/2020   Admitting Physician: Vicenta Hendrix MD  Primary Care Physician: RITESH Mayers - CNP    CHIEF COMPLAINT: Left upper extremity infection      Interval History: Patient sitting up eating some lunch. Family member at bedside. Cultures from [facility] reported as Aeromonas. Cultures identified here for Edwardsiella tarda      Allergies:    Allergies   Allergen Reactions    Azithromycin     Metoprolol     Cyclobenzaprine Rash    Metoclopramide Rash       Current Meds: pantoprazole (PROTONIX) tablet 40 mg, QAM AC  atenolol (TENORMIN) tablet 25 mg, Daily  Empagliflozin-metFORMIN HCl ER  MG TB24 1 tablet, Daily with breakfast  insulin glargine (LANTUS) injection vial 85 Units, Nightly  vitamin D (ERGOCALCIFEROL) capsule 50,000 Units, Weekly  atorvastatin (LIPITOR) tablet 40 mg, Nightly  citalopram (CELEXA) tablet 20 mg, Daily  ezetimibe (ZETIA) tablet 10 mg, Daily  pramipexole (MIRAPEX) tablet 1.5 mg, TID  mirabegron (MYRBETRIQ) extended release tablet 25 mg, Daily  potassium chloride (KLOR-CON M) extended release tablet 40 mEq, PRN    Or  potassium bicarb-citric acid (EFFER-K) effervescent tablet 40 mEq, PRN    Or  potassium chloride 10 mEq/100 mL IVPB (Peripheral Line), PRN  magnesium sulfate 1 g in dextrose 5% 100 mL IVPB, PRN  acetaminophen (TYLENOL) tablet 650 mg, Q6H PRN    Or  acetaminophen (TYLENOL) suppository 650 mg, Q6H PRN  polyethylene glycol (GLYCOLAX) packet 17 g, Daily PRN  famotidine (PEPCID) tablet 20 mg, BID  insulin lispro (HUMALOG) injection vial 0-12 Units, TID WC  insulin lispro (HUMALOG) injection vial 0-6 Units, Nightly  0.9 % sodium chloride infusion, Continuous  lactated ringers infusion, Continuous  sodium chloride flush 0.9 % injection 10 mL, 2 times per day  sodium chloride flush 0.9 % injection 10 mL, PRN  promethazine (PHENERGAN) tablet 12.5 mg, Q6H PRN    Or  ondansetron (ZOFRAN) injection 4 mg, Q6H PRN  oxyCODONE (ROXICODONE) immediate release tablet 5 mg, Q4H PRN    Or  oxyCODONE (ROXICODONE) immediate release tablet 10 mg, Q4H PRN  morphine injection 2 mg, Q2H PRN    Or  morphine injection 4 mg, Q2H PRN  cefepime (MAXIPIME) 1 g in sterile water 10 mL IV syringe, Q12H        Review of Systems   Constitutional: Negative for fever. Skin: Positive for color change and wound. Vital Signs:  BP (!) 154/79   Pulse 60   Temp 96.6 °F (35.9 °C) (Temporal)   Resp 16   Ht 5' 10\" (1.778 m)   Wt 210 lb (95.3 kg)   SpO2 98%   BMI 30.13 kg/m²   Temp (24hrs), Av.7 °F (35.9 °C), Min:96.5 °F (35.8 °C), Max:97.1 °F (36.2 °C)      Physical Exam   General: Patient's nontoxic-appearing sitting up on the side of bed having just finished lunch. He is in no acute distress  HEENT: Sclera anicteric. I placed mask over patient's face. Respiratory: Effort even and unlabored  Neuro: Patient very hard of hearing  Left upper extremity. Slight increase in the erythema proximal to dressing when compared to yesterday, although yesterday the patient's arm had been elevated and currently it is dependent while he is eating. Visible finger tips with sensation intact and adequate capillary refill.   Psych: Pleasant and cooperative      September 15, 2020                  LAB RESULTS:    CBC with DIFF:  Recent Labs     20  1430 09/15/20  0929 20  0319   WBC 16.6* 14.7* 14.4*   RBC 4.55* 4.61* 4.62*   HGB 13.9* 14.6 14.3   HCT 40.5* 42.1 42.2   MCV 89.0 91.3 91.3   MCH 30.5 31.7* 31.0   MCHC 34.3 34.7 33.9   RDW 14.0 14.1 14.5    136 139   MPV 10.2 10.5 10.2   NEUTOPHILPCT 85.2* 87.5* 80.0*   LYMPHOPCT 1.9* 1.4* 3.2*   MONOPCT 11.4* 8.5 13.4*   EOSRELPCT 0.1 0.1 0.1   BASOPCT 0.2 0.3 0.2   NEUTROABS 14.2* 12.9* 11.5*   LYMPHSABS 0.3* 0.2* 0.5*   MONOSABS 1.90* 1.30* 1.90*   EOSABS 0.00 0.00 0.00   BASOSABS 0.00 0.00 0.00       CMP/BMP:  Recent Labs 09/14/20  1430 09/15/20  0255 09/16/20  0319   * 131* 136   K 3.4* 3.6 4.2   CL 96* 99 102   CO2 21* 21* 23   ANIONGAP 13 11 11   GLUCOSE 123* 185* 133*   BUN 22 22 25*   CREATININE 0.8 0.7 0.7   LABGLOM >60 >60 >60   CALCIUM 8.3* 8.4* 8.4*   PROT 5.9*  --   --    LABALBU 3.2*  --   --    BILITOT 0.9  --   --    ALKPHOS 84  --   --    ALT 31  --   --    AST 27  --   --          Culture Results:  Culture, Surgical [[id number]]  (Abnormal)  Collected: 09/14/20 1647    Order Status: Completed  Specimen: Swab from Hand  Updated: 09/16/20 1038     Gram Stain Result  Few WBC's (Polymorphonuclear)   No organisms seen     Anaerobic Culture  No anaerobes to date,will hold 4 days     Organism  Edwardsiella tardaAbnormal       Culture Surgical  --     Moderate growth   Sensitivity to follow    Narrative:      ORDER#: [id number]                          ORDERED BY: Kamran Villa   SOURCE: Hand L hand abscess                COLLECTED:  09/14/20 16:47   ANTIBIOTICS AT [facility]. :                      RECEIVED :  09/14/20 18:08    Culture, Surgical [[id number]]  (Abnormal)  Collected: 09/14/20 1646    Order Status: Completed  Specimen: Swab from Finger  Updated: 09/16/20 1038     Gram Stain Result  Moderate WBC's (Polymorphonuclear)   No organisms seen     Anaerobic Culture  No anaerobes to date,will hold 4 days     Organism  Edwardsiella tardaAbnormal       Culture Surgical  --     Rare growth   Sensitivity to follow    Narrative:      ORDER#: [id number]                          ORDERED BY: Kamran Villa   SOURCE: Finger Left                        COLLECTED:  09/14/20 16:46   ANTIBIOTICS AT [facility] :                      RECEIVED :  09/14/20 18:06            RADIOLOGY      No results found.       Patient Active Problem List   Diagnosis    Other chest pain    Chronic bilateral low back pain without sciatica    Syncope and collapse    Type 2 diabetes mellitus with hyperglycemia, with long-term current use of insulin ([address]   

## 2020-09-16 NOTE — PROGRESS NOTES
on today.       REVIEW  OF  SYSTEMS:    Constitutional:  No fevers, chills, nausea, vomiting,    Lungs:   No hemoptysis, pleurisy   Heart:  No chest pressure with exertion, palpitations,    Abdomen:   No new masses, no bright red blood per rectum   Extremities: + mild Left hand pain after surgery   Neurologic: No new motor or sensory changes       PAST MEDICAL HISTORY:  Past Medical History:   Diagnosis Date    Aortic valve stenosis     Arthritis     Chest tightness, discomfort, or pressure 4/30/2012    Chronic back pain     CKD (chronic kidney disease)     CPAP (continuous positive airway pressure) dependence     13cm    Diabetes ([facility] [address]     Diabetic polyneuropathy associated with type 2 diabetes mellitus ([facility] [address] 8/29/2016    GERD (gastroesophageal reflux disease)     HTN (hypertension)     Hyperlipemia     Left ventricular diastolic dysfunction     Mitral stenosis     Mitral valve stenosis     Neuropathy     Obstructive sleep apnea     AHI: 34.5 per PSG, 6/2013; AHI: 36.9 per PSG, 7/2015; AHI: 54.5 per PSG, 3/2017    Pinched nerve in neck     Restless legs syndrome 2/17/2016         PAST SURGICAL HISTORY:  Past Surgical History:   Procedure Laterality Date    APPENDECTOMY      BACK SURGERY      4 Back surgeries    BLADDER SURGERY      CARDIAC CATHETERIZATION  4/30/12    EF > 50%    CATARACT REMOVAL      CHOLECYSTECTOMY      COLONOSCOPY      EYE SURGERY      implants placed both eyes    HAND SURGERY Left 9/14/2020    INCISION AND DRAINAGE LEFT HAND ABSCESS performed by Sharla Thakur MD at [address] [facility] OTHER SURGICAL HISTORY  08/03/2020    Right Ventricular lead replacement- Dr. Claudette Gear HOMOGRF/STENT N/A 6/7/2018    AORTIC VALVE REPLACEMENT TRANSESOPHAGEAL ECHOCARDIOGRAM performed by Cande Corbin MD at [address]:  Family History   Problem Relation Age of Onset  Diabetes Mother     Cancer Father     Cancer Sister     Heart Disease Brother     Cancer Brother     Cancer Sister     Cancer Brother            OBJECTIVE:  BP (!) 140/70   Pulse 64   Temp 97.1 °F (36.2 °C) (Temporal)   Resp 20   Ht 5' 10\" (1.778 m)   Wt 210 lb (95.3 kg)   SpO2 97%   BMI 30.13 kg/m²   No intake/output data recorded.     PHYSICAL  EXAMINATION:    PETR:  Awake, alert, oriented x 3, patient appears tired and fatigued   Head/Eyes:  Normocephalic, atraumatic, EOMI and PERRLA bilaterally   Respiratory:   Bilateral decreased air entry in both lung fields, mild B/L crackles, symmetric expansion of chest   Cardiovascular:  Regular rate and rhythm, S1+S2+0, no murmurs/rubs   Abdomen:   Soft, non-tender, bowel sounds +ve, no organomegaly   Extremities: Moves all, decreased range of motion of left hand fingers, ++ left hand edema, left hand under bandage   Neurologic: Awake, alert, oriented x 3, cranial nerves II-XII intact, no focal neurological deficits, sensory system intact   Psychiatric: Normal mood, non-suicidal       CURRENT MEDICATIONS:  Scheduled:   pantoprazole  40 mg Oral QAM AC    atenolol  25 mg Oral Daily    Empagliflozin-metFORMIN HCl ER  1 tablet Oral Daily with breakfast    insulin glargine  85 Units Subcutaneous Nightly    vitamin D  50,000 Units Oral Weekly    atorvastatin  40 mg Oral Nightly    citalopram  20 mg Oral Daily    ezetimibe  10 mg Oral Daily    pramipexole  1.5 mg Oral TID    mirabegron  25 mg Oral Daily    famotidine  20 mg Oral BID    insulin lispro  0-12 Units Subcutaneous TID WC    insulin lispro  0-6 Units Subcutaneous Nightly    sodium chloride flush  10 mL Intravenous 2 times per day    cefepime  1 g Intravenous Q12H        PRN:  potassium chloride, 40 mEq, PRN    Or  potassium alternative oral replacement, 40 mEq, PRN    Or  potassium chloride, 10 mEq, PRN  magnesium sulfate, 1 g, PRN  acetaminophen, 650 mg, Q6H PRN    Or  acetaminophen, 650 mg, Q6H PRN  polyethylene glycol, 17 g, Daily PRN  sodium chloride flush, 10 mL, PRN  promethazine, 12.5 mg, Q6H PRN    Or  ondansetron, 4 mg, Q6H PRN  oxyCODONE, 5 mg, Q4H PRN    Or  oxyCODONE, 10 mg, Q4H PRN  morphine, 2 mg, Q2H PRN    Or  morphine, 4 mg, Q2H PRN        Infusions:   sodium chloride 75 mL/hr at 09/15/20 1526    lactated ringers 125 mL/hr at 09/14/20 2209       Laboratory Data:        Recent Labs     09/14/20  1430 09/15/20  0929   WBC 16.6* 14.7*   HGB 13.9* 14.6    136     Recent Labs     09/14/20  1430 09/15/20  0255   * 131*   K 3.4* 3.6   CL 96* 99   CO2 21* 21*   BUN 22 22   CREATININE 0.8 0.7   GLUCOSE 123* 185*     Recent Labs     09/14/20  1430   AST 27   ALT 31   BILITOT 0.9   ALKPHOS 84     Troponin T: No results for input(s): TROPONINI in the last 72 hours. Pro-BNP: No results for input(s): BNP in the last 72 hours. INR: No results for input(s): INR in the last 72 hours. UA:No results for input(s): NITRITE, COLORU, PHUR, LABCAST, WBCUA, RBCUA, MUCUS, TRICHOMONAS, YEAST, BACTERIA, CLARITYU, SPECGRAV, LEUKOCYTESUR, UROBILINOGEN, BILIRUBINUR, BLOODU, GLUCOSEU, AMORPHOUS in the last 72 hours. Invalid input(s): Eula Hamper  A1C:   Recent Labs     09/14/20  1430   LABA1C 8.5*     ABG:No results for input(s): PHART, JNW8KKB, PO2ART, BSI5HNC, BEART, HGBAE, C6ONSSIO, CARBOXHGBART in the last 72 hours. Imaging:    No results found.      ASSESSMENT & PLAN:    Principal Problem:    Cellulitis of left hand and arm  Active Problems:    Chronic bilateral low back pain without sciatica    Type 2 diabetes mellitus with hyperglycemia, with long-term current use of insulin (HCC)    Obstructive sleep apnea    Class 1 obesity due to excess calories in adult    Gastroesophageal reflux disease without esophagitis    Essential hypertension    Diabetic polyneuropathy associated with type 2 diabetes mellitus (HCC)    Bilateral carpal tunnel syndrome    CPAP (continuous positive airway pressure) dependence    S/P aortic valve replacement with bioprosthetic valve    Pacemaker    Abscess of left hand    Hypokalemia    Hypophosphatemia  Resolved Problems:    * No resolved hospital problems. *          Continue with current medications  Continue with IV cefepime which shows good sensitivity to the cultures drawn from the transferring facility  DC IV Flagyl  Follow-up on the wound cultures with ID and sensitivity obtained during I&D of left hand  Continue with pain management PRN as needed  Continue diabetic meds  Accu-Cheks qAC and qHS ordered with insulin coverage as per protocol  Patient will require home health care upon discharge for dressing changes and possible IV antibiotics, if needed  Leukocytosis is slowly downtrending from 16.6-14.7  Potassium levels normalized after replacement  IV potassium phosphate ordered for phosphate replacement  Repeat phosphate level in a.m. Follow-up closely with ID and hand surgery      Repeat labs in a.m. Electrolyte replacement as per protocol. Patient will be monitored very closely on the floor. Further recommendations as per the hospital course. Patient  is on DVT prophylaxis  Current medications reviewed  Lab work reviewed  Radiology/Chest x-ray films reviewed  Treatment recommendations from suspecialities reviewed, appreciated and agreed with  Discussed with the nurse and addressed all questions/concerns  Discussed with Patient and/or Family at the bedside in detail . .. they understand and agree with the management plan. Laureen Raphael MD  9/15/2020 8:39 PM      DISCLAIMER: This note was created with electronic voice recognition which does have occasional errors. If you have any questions regarding the content within the note please do not hesitate to contact me. .. Thanks.

## 2020-09-17 LAB
ANION GAP SERPL CALCULATED.3IONS-SCNC: 11 MMOL/L (ref 7–19)
BASOPHILS ABSOLUTE: 0 K/UL (ref 0–0.2)
BASOPHILS RELATIVE PERCENT: 0.3 % (ref 0–1)
BUN BLDV-MCNC: 18 MG/DL (ref 8–23)
CALCIUM SERPL-MCNC: 8.5 MG/DL (ref 8.8–10.2)
CHLORIDE BLD-SCNC: 99 MMOL/L (ref 98–111)
CO2: 23 MMOL/L (ref 22–29)
CREAT SERPL-MCNC: 0.6 MG/DL (ref 0.5–1.2)
EOSINOPHILS ABSOLUTE: 0.1 K/UL (ref 0–0.6)
EOSINOPHILS RELATIVE PERCENT: 0.6 % (ref 0–5)
GFR AFRICAN AMERICAN: >59
GFR NON-AFRICAN AMERICAN: >60
GLUCOSE BLD-MCNC: 112 MG/DL (ref 74–109)
GLUCOSE BLD-MCNC: 139 MG/DL (ref 70–99)
GLUCOSE BLD-MCNC: 167 MG/DL (ref 70–99)
GLUCOSE BLD-MCNC: 173 MG/DL (ref 70–99)
GLUCOSE BLD-MCNC: 271 MG/DL (ref 70–99)
HCT VFR BLD CALC: 40.9 % (ref 42–52)
HEMOGLOBIN: 13.9 G/DL (ref 14–18)
IMMATURE GRANULOCYTES #: 0.4 K/UL
LYMPHOCYTES ABSOLUTE: 0.6 K/UL (ref 1.1–4.5)
LYMPHOCYTES RELATIVE PERCENT: 4.4 % (ref 20–40)
MCH RBC QN AUTO: 31 PG (ref 27–31)
MCHC RBC AUTO-ENTMCNC: 34 G/DL (ref 33–37)
MCV RBC AUTO: 91.1 FL (ref 80–94)
MONOCYTES ABSOLUTE: 1.8 K/UL (ref 0–0.9)
MONOCYTES RELATIVE PERCENT: 13.7 % (ref 0–10)
NEUTROPHILS ABSOLUTE: 10.3 K/UL (ref 1.5–7.5)
NEUTROPHILS RELATIVE PERCENT: 78.3 % (ref 50–65)
PDW BLD-RTO: 14.6 % (ref 11.5–14.5)
PERFORMED ON: ABNORMAL
PLATELET # BLD: 144 K/UL (ref 130–400)
PMV BLD AUTO: 10.1 FL (ref 9.4–12.4)
POTASSIUM SERPL-SCNC: 4.4 MMOL/L (ref 3.5–5)
RBC # BLD: 4.49 M/UL (ref 4.7–6.1)
SODIUM BLD-SCNC: 133 MMOL/L (ref 136–145)
WBC # BLD: 13.2 K/UL (ref 4.8–10.8)

## 2020-09-17 PROCEDURE — 80048 BASIC METABOLIC PNL TOTAL CA: CPT

## 2020-09-17 PROCEDURE — 2580000003 HC RX 258: Performed by: ORTHOPAEDIC SURGERY

## 2020-09-17 PROCEDURE — 82947 ASSAY GLUCOSE BLOOD QUANT: CPT

## 2020-09-17 PROCEDURE — 36415 COLL VENOUS BLD VENIPUNCTURE: CPT

## 2020-09-17 PROCEDURE — 6370000000 HC RX 637 (ALT 250 FOR IP): Performed by: HOSPITALIST

## 2020-09-17 PROCEDURE — 6360000002 HC RX W HCPCS: Performed by: INTERNAL MEDICINE

## 2020-09-17 PROCEDURE — 6360000002 HC RX W HCPCS: Performed by: ORTHOPAEDIC SURGERY

## 2020-09-17 PROCEDURE — 6370000000 HC RX 637 (ALT 250 FOR IP): Performed by: ORTHOPAEDIC SURGERY

## 2020-09-17 PROCEDURE — 1210000000 HC MED SURG R&B

## 2020-09-17 PROCEDURE — 85025 COMPLETE CBC W/AUTO DIFF WBC: CPT

## 2020-09-17 PROCEDURE — 2580000003 HC RX 258: Performed by: INTERNAL MEDICINE

## 2020-09-17 RX ADMIN — SODIUM CHLORIDE, PRESERVATIVE FREE 1 G: 5 INJECTION INTRAVENOUS at 19:55

## 2020-09-17 RX ADMIN — MIRABEGRON 25 MG: 25 TABLET, FILM COATED, EXTENDED RELEASE ORAL at 08:35

## 2020-09-17 RX ADMIN — FAMOTIDINE 20 MG: 20 TABLET ORAL at 20:00

## 2020-09-17 RX ADMIN — INSULIN LISPRO 2 UNITS: 100 INJECTION, SOLUTION INTRAVENOUS; SUBCUTANEOUS at 12:20

## 2020-09-17 RX ADMIN — CEFEPIME HYDROCHLORIDE 1 G: 1 INJECTION, POWDER, FOR SOLUTION INTRAMUSCULAR; INTRAVENOUS at 10:41

## 2020-09-17 RX ADMIN — MORPHINE SULFATE 2 MG: 4 INJECTION, SOLUTION INTRAMUSCULAR; INTRAVENOUS at 21:19

## 2020-09-17 RX ADMIN — SODIUM CHLORIDE: 9 INJECTION, SOLUTION INTRAVENOUS at 10:41

## 2020-09-17 RX ADMIN — PANTOPRAZOLE SODIUM 40 MG: 40 TABLET, DELAYED RELEASE ORAL at 05:48

## 2020-09-17 RX ADMIN — EZETIMIBE 10 MG: 10 TABLET ORAL at 08:35

## 2020-09-17 RX ADMIN — Medication 5 ML: at 14:19

## 2020-09-17 RX ADMIN — INSULIN LISPRO 3 UNITS: 100 INJECTION, SOLUTION INTRAVENOUS; SUBCUTANEOUS at 20:46

## 2020-09-17 RX ADMIN — SODIUM CHLORIDE, PRESERVATIVE FREE 10 ML: 5 INJECTION INTRAVENOUS at 08:35

## 2020-09-17 RX ADMIN — ATORVASTATIN CALCIUM 40 MG: 40 TABLET, FILM COATED ORAL at 20:00

## 2020-09-17 RX ADMIN — MORPHINE SULFATE 4 MG: 4 INJECTION, SOLUTION INTRAMUSCULAR; INTRAVENOUS at 10:41

## 2020-09-17 RX ADMIN — CITALOPRAM HYDROBROMIDE 20 MG: 20 TABLET ORAL at 08:35

## 2020-09-17 RX ADMIN — PRAMIPEXOLE DIHYDROCHLORIDE 1.5 MG: 1 TABLET ORAL at 08:33

## 2020-09-17 RX ADMIN — PRAMIPEXOLE DIHYDROCHLORIDE 1.5 MG: 1 TABLET ORAL at 20:00

## 2020-09-17 RX ADMIN — PRAMIPEXOLE DIHYDROCHLORIDE 1.5 MG: 1 TABLET ORAL at 14:19

## 2020-09-17 RX ADMIN — SODIUM CHLORIDE: 9 INJECTION, SOLUTION INTRAVENOUS at 23:18

## 2020-09-17 RX ADMIN — ATENOLOL 25 MG: 25 TABLET ORAL at 08:35

## 2020-09-17 RX ADMIN — Medication 85 UNITS: at 20:46

## 2020-09-17 RX ADMIN — FAMOTIDINE 20 MG: 20 TABLET ORAL at 08:35

## 2020-09-17 ASSESSMENT — PAIN SCALES - GENERAL
PAINLEVEL_OUTOF10: 4
PAINLEVEL_OUTOF10: 0
PAINLEVEL_OUTOF10: 3
PAINLEVEL_OUTOF10: 8

## 2020-09-17 ASSESSMENT — ENCOUNTER SYMPTOMS: COLOR CHANGE: 1

## 2020-09-17 ASSESSMENT — PAIN DESCRIPTION - ORIENTATION: ORIENTATION: LEFT

## 2020-09-17 ASSESSMENT — PAIN DESCRIPTION - PAIN TYPE: TYPE: SURGICAL PAIN

## 2020-09-17 ASSESSMENT — PAIN DESCRIPTION - LOCATION: LOCATION: ARM

## 2020-09-17 NOTE — PROGRESS NOTES
[place] [id number]        DEPARTMENT OF HOSPITALIST MEDICINE        PROGRESS  NOTE:    NOTE: Portions of this note have been copied forward, however, changed to reflect the most current clinical status of this patient. Patient: Ameena Rubio  YOB: 1940  Date of Service: 9/17/2020  MRN: [id number]   Acct: [de-identified]   Primary Care Physician: RITESH Calvin CNP  Advance Directive: Full Code  Admit Date: 9/14/2020       Hospital Day: 3        SUBJECTIVE:    Patient is doing well. Hand pain is slowly improving and the hand swelling is decreasing as well. [address]  COURSE:    9/17/2020  Patient is feeling better. Pain is under control. Case management is working on setting up home health care for the patient. Patient had great antibiotic sensitivity to major antibiotics on follow-up and review of the surgical cultures. Still awaiting clearance from ID and orthopedics for discharge. 9/16/2020  Patient's surgical wound cultures from transferring facility were positive for Aeromonas gram-negative waterborne organism and he is growing Edwardsiella tarda. (Gram-negative waterborne organism) from the cultures sent from our facility. ID is continuing with cefepime for now and awaiting debilities from surgical cultures. 9/15/2020  Patient underwent I&D of left hand yesterday which he tolerated well. Wound cultures were obtained and the affected sites were copiously irrigated. Patient is feeling better. I reviewed the ID and sensitivity of the wound cultures from patient's transferring facility which were sensitive to IV cefepime. 9/14/2020  HISTORY OF PRESENT ILLNESS:  Ameena Rubio is an 79 y.o. male. He is a very pleasant gentleman who has diabetes mellitus and chronic medical issues. He is also a professional fisherman.   He was fishing 4 days ago when he caught a fish and when he tried to reel her in, the hook jerked out and got lodged into his left hand. The hook was dirty looking and in water for a long time. His friend took the hook out and patient was brought in to the ER for evaluation from where he was admitted in [facility].  He was started on IV Invanz and vancomycin and is transferred to our facility for an ID evaluation. I saw the patient when he came to our hospital.  His hand and left arm is red and swollen. I ordered hand surgery and ID consults as well as baseline labs and will restart him on IV Invanz and vancomycin. He is kept n.p.o. for possible I&D of his left hand later on today.       REVIEW  OF  SYSTEMS:    Constitutional:  No fevers, chills, nausea, vomiting,    Lungs:   No hemoptysis, pleurisy   Heart:  No chest pressure with exertion, palpitations,    Abdomen:   No new masses, no bright red blood per rectum   Extremities: + mild Left hand pain after surgery   Neurologic: No new motor or sensory changes       PAST MEDICAL HISTORY:  Past Medical History:   Diagnosis Date    Aortic valve stenosis     Arthritis     Chest tightness, discomfort, or pressure 4/30/2012    Chronic back pain     CKD (chronic kidney disease)     CPAP (continuous positive airway pressure) dependence     13cm    Diabetes [facility]     Diabetic polyneuropathy associated with type 2 diabetes mellitus ([facility] 8/29/2016    GERD (gastroesophageal reflux disease)     HTN (hypertension)     Hyperlipemia     Left ventricular diastolic dysfunction     Mitral stenosis     Mitral valve stenosis     Neuropathy     Obstructive sleep apnea     AHI: 34.5 per PSG, 6/2013; AHI: 36.9 per PSG, 7/2015; AHI: 54.5 per PSG, 3/2017    Pinched nerve in neck     Restless legs syndrome 2/17/2016         PAST SURGICAL HISTORY:  Past Surgical History:   Procedure Laterality Date    APPENDECTOMY      BACK SURGERY      4 Back surgeries    BLADDER SURGERY      CARDIAC CATHETERIZATION  4/30/12    EF > 50%    CATARACT REMOVAL      CHOLECYSTECTOMY  COLONOSCOPY      EYE SURGERY      implants placed both eyes    HAND SURGERY Left 9/14/2020    INCISION AND DRAINAGE LEFT HAND ABSCESS performed by Dasia Quinones MD at [facility] OTHER SURGICAL HISTORY  08/03/2020    Right Ventricular lead replacement- Dr. Onelia Talavera RPLCMT PROST AORTIC VALVE OPEN XCP HOMOGRF/STENT N/A 6/7/2018    AORTIC VALVE REPLACEMENT TRANSESOPHAGEAL ECHOCARDIOGRAM performed by Shady Miranda MD at [facility] HISTORY:  Family History   Problem Relation Age of Onset    Diabetes Mother     Cancer Father     Cancer Sister     Heart Disease Brother     Cancer Brother     Cancer Sister     Cancer Brother            OBJECTIVE:  /71   Pulse 63   Temp 97.2 °F (36.2 °C) (Temporal)   Resp 16   Ht 5' 10\" (1.778 m)   Wt 228 lb 6.4 oz (103.6 kg)   SpO2 96%   BMI 32.77 kg/m²   No intake/output data recorded.     PHYSICAL  EXAMINATION:    PETR:  Awake, alert, oriented x 3, patient appears tired and fatigued   Head/Eyes:  Normocephalic, atraumatic, EOMI and PERRLA bilaterally   Respiratory:   Bilateral decreased air entry in both lung fields, mild B/L crackles, symmetric expansion of chest   Cardiovascular:  Regular rate and rhythm, S1+S2+0, no murmurs/rubs   Abdomen:   Soft, non-tender, bowel sounds +ve, no organomegaly   Extremities: Moves all, decreased range of motion of left hand fingers, ++ left hand edema, left hand under bandage   Neurologic: Awake, alert, oriented x 3, cranial nerves II-XII intact, no focal neurological deficits, sensory system intact   Psychiatric: Normal mood, non-suicidal       CURRENT MEDICATIONS:  Scheduled:   pantoprazole  40 mg Oral QAM AC    atenolol  25 mg Oral Daily    Empagliflozin-metFORMIN HCl ER  1 tablet Oral Daily with breakfast    insulin glargine  85 Units Subcutaneous Nightly    vitamin D  50,000 Units Oral Weekly    atorvastatin  40 mg Oral Nightly hours.  ABG:No results for input(s): PHART, PEK1DUC, PO2ART, ILI0ADB, BEART, HGBAE, A6TYCGRO, CARBOXHGBART in the last 72 hours. Imaging:    No results found. ASSESSMENT & PLAN:    Principal Problem:    Cellulitis of left hand and arm  Active Problems:    Chronic bilateral low back pain without sciatica    Type 2 diabetes mellitus with hyperglycemia, with long-term current use of insulin ([facility])    Obstructive sleep apnea    Class 1 obesity due to excess calories in adult    Gastroesophageal reflux disease without esophagitis    Essential hypertension    Diabetic polyneuropathy associated with type 2 diabetes mellitus (HCC)    Bilateral carpal tunnel syndrome    CPAP (continuous positive airway pressure) dependence    S/P aortic valve replacement with bioprosthetic valve    Pacemaker    Abscess of left hand    Hypokalemia    Hypophosphatemia    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 *          Continue with current medications  Continue with IV cefepime which shows good sensitivity to the Aeromonas cultures drawn from the transferring facility  Wound cultures from the surgical cultures are positive for Edwardsiella tarda obtained during I&D of left hand, which showed great sensitivity to measure antibiotics  De-escalation of IV antibiotics from IV cefepime as per ID  Continue with pain management PRN as needed  Continue diabetic meds  Accu-Cheks qAC and qHS ordered with insulin coverage as per protocol  Case management working on establishing home health care for the patient for dressing changes and possible IV antibiotics  Leukocytosis is slowly downtrending from 16.6-13.2  Potassium levels normalized after replacement  Phosphate levels normalized after replacement  Follow-up closely with ID and hand surgery  Continue with dressing changes as per hand surgery recommendations      Repeat labs in a.m. Electrolyte replacement as per protocol.  Patient will be monitored very closely on the floor. Further recommendations as per the hospital course. DC planning: Likely in the next few days once patient is cleared for discharge by ID and hand surgery    Patient  is on DVT prophylaxis  Current medications reviewed  Lab work reviewed  Radiology/Chest x-ray films reviewed  Treatment recommendations from suspecialities reviewed, appreciated and agreed with  Discussed with the nurse and addressed all questions/concerns  Discussed with Patient and/or Family at the bedside in detail . .. they understand and agree with the management plan. Erick Torres MD  9/17/2020 6:04 PM      DISCLAIMER: This note was created with electronic voice recognition which does have occasional errors. If you have any questions regarding the content within the note please do not hesitate to contact me. .. Thanks.

## 2020-09-17 NOTE — PROGRESS NOTES
I spoke with Dr. Elisa Phelps. Dressing changes BID - one half strength hydrogen peroxide with one half other sterile fluid (ie: sterile water, normal saline). Advance the moistened gauze out approximately 1 inch at each time. Cover with gauze and ace wrap. Home Care is only necessary to assess his erythema, not needed for dressing changes: he can do that himself. Follow up with ortho surgery 1 week from surgery date.    Electronically signed by Paresh Vickers RN on 9/17/2020 at 1:55 PM

## 2020-09-17 NOTE — PLAN OF CARE
Problem: Falls - Risk of:  Goal: Will remain free from falls  Description: Will remain free from falls  9/17/2020 0307 by Rina Valdez RN  Outcome: Ongoing  9/16/2020 1452 by Choco Hubbard RN  Outcome: Ongoing  Goal: Absence of physical injury  Description: Absence of physical injury  Outcome: Ongoing

## 2020-09-17 NOTE — CARE COORDINATION
Spoke with patient and son regarding MD orders for [facility] services. They were agreeable and chose [facility]. Spoke with Corie Tamayo. Referral accepted and faxed. Please notify [facility] when patient discharges and Fax DC Summary,  DC med list and any new [facility] orders. [address] [place] [address] 190  [phone]   Fax  [phone]. The Patient  was provided with a choice of provider and agrees with the discharge plan. [x] Yes [] No    Freedom of choice list was provided with basic dialogue that supports the patient's individualized plan of care/goals, treatment preferences and shares the quality data associated with the providers.  [x] Yes [] No  Electronically signed by Imani Cote RN on 9/17/20 at 3:37 PM CDT

## 2020-09-17 NOTE — PROGRESS NOTES
INFECTIOUS DISEASES PROGRESS NOTE    Patient: Austin Chand  YOB: 1940  MRN: [id number]   Admit date: 9/14/2020   Admitting Physician: Vicenta Hendrix MD  Primary Care Physician: RITESH Mayers - CNP    CHIEF COMPLAINT: Left upper extremity infection      Interval History: Patient sitting up eating some dinner. Family at bedside. He has a lady friend that lives with him, but doesn't think she will be able to help him with dressing changes. Allergies:    Allergies   Allergen Reactions    Azithromycin     Metoprolol     Cyclobenzaprine Rash    Metoclopramide Rash       Current Meds: magic (miracle) mouthwash, Q8H PRN  pantoprazole (PROTONIX) tablet 40 mg, QAM AC  atenolol (TENORMIN) tablet 25 mg, Daily  Patient supplied - Empagliflozin-metFORMIN HCl ER  MG TB24 1 tablet, Daily with breakfast  insulin glargine (LANTUS) injection vial 85 Units, Nightly  vitamin D (ERGOCALCIFEROL) capsule 50,000 Units, Weekly  atorvastatin (LIPITOR) tablet 40 mg, Nightly  citalopram (CELEXA) tablet 20 mg, Daily  ezetimibe (ZETIA) tablet 10 mg, Daily  pramipexole (MIRAPEX) tablet 1.5 mg, TID  mirabegron (MYRBETRIQ) extended release tablet 25 mg, Daily  potassium chloride (KLOR-CON M) extended release tablet 40 mEq, PRN    Or  potassium bicarb-citric acid (EFFER-K) effervescent tablet 40 mEq, PRN    Or  potassium chloride 10 mEq/100 mL IVPB (Peripheral Line), PRN  magnesium sulfate 1 g in dextrose 5% 100 mL IVPB, PRN  acetaminophen (TYLENOL) tablet 650 mg, Q6H PRN    Or  acetaminophen (TYLENOL) suppository 650 mg, Q6H PRN  polyethylene glycol (GLYCOLAX) packet 17 g, Daily PRN  famotidine (PEPCID) tablet 20 mg, BID  insulin lispro (HUMALOG) injection vial 0-12 Units, TID WC  insulin lispro (HUMALOG) injection vial 0-6 Units, Nightly  0.9 % sodium chloride infusion, Continuous  lactated ringers infusion, Continuous  sodium chloride flush 0.9 % injection 10 mL, 2 times per day  sodium chloride flush 0.9 % injection 10 mL, PRN  promethazine (PHENERGAN) tablet 12.5 mg, Q6H PRN    Or  ondansetron (ZOFRAN) injection 4 mg, Q6H PRN  oxyCODONE (ROXICODONE) immediate release tablet 5 mg, Q4H PRN    Or  oxyCODONE (ROXICODONE) immediate release tablet 10 mg, Q4H PRN  morphine injection 2 mg, Q2H PRN    Or  morphine injection 4 mg, Q2H PRN  cefepime (MAXIPIME) 1 g in sterile water 10 mL IV syringe, Q12H        Review of Systems   Constitutional: Negative for fever. Skin: Positive for color change and wound. Vital Signs:  /71   Pulse 63   Temp 97.2 °F (36.2 °C) (Temporal)   Resp 16   Ht 5' 10\" (1.778 m)   Wt 228 lb 6.4 oz (103.6 kg)   SpO2 96%   BMI 32.77 kg/m²   Temp (24hrs), Av.4 °F (36.3 °C), Min:96.9 °F (36.1 °C), Max:98.1 °F (36.7 °C)      Physical Exam   General: Patient's nontoxic-appearing sitting up on the side of bed eating. He is in no acute distress. Family at bedside - placed masks when Ailene Shone, BARBARA walked in  HEENT: Sclera anicteric. Respiratory: Effort even and unlabored  Neuro: Patient very hard of hearing  Left upper extremity.   Edema and erythema significantly improved- dorsal hand with bullae formation  Psych: Pleasant and cooperative      September 15, 2020                  LAB RESULTS:    CBC with DIFF:  Recent Labs     09/15/20  0929 20  0319 20  0344   WBC 14.7* 14.4* 13.2*   RBC 4.61* 4.62* 4.49*   HGB 14.6 14.3 13.9*   HCT 42.1 42.2 40.9*   MCV 91.3 91.3 91.1   MCH 31.7* 31.0 31.0   MCHC 34.7 33.9 34.0   RDW 14.1 14.5 14.6*    139 144   MPV 10.5 10.2 10.1   NEUTOPHILPCT 87.5* 80.0* 78.3*   LYMPHOPCT 1.4* 3.2* 4.4*   MONOPCT 8.5 13.4* 13.7*   EOSRELPCT 0.1 0.1 0.6   BASOPCT 0.3 0.2 0.3   NEUTROABS 12.9* 11.5* 10.3*   LYMPHSABS 0.2* 0.5* 0.6*   MONOSABS 1.30* 1.90* 1.80*   EOSABS 0.00 0.00 0.10   BASOSABS 0.00 0.00 0.00       CMP/BMP:  Recent Labs     09/15/20  0255 20  0319 20  0344   * 136 133*   K 3.6 4.2 4.4   CL 99 102 99   CO2 21* 23 23 ANIONGAP 11 11 11   GLUCOSE 185* 133* 112*   BUN 22 25* 18   CREATININE 0.7 0.7 0.6   LABGLOM >60 >60 >60   CALCIUM 8.4* 8.4* 8.5*         Culture Results:  Culture, Surgical [[id number]]  (Abnormal)  Collected: 09/14/20 1647    Order Status: Completed  Specimen: Swab from Hand  Updated: 09/16/20 1038     Gram Stain Result  Few WBC's (Polymorphonuclear)   No organisms seen     Anaerobic Culture  No anaerobes to date,will hold 4 days     Organism  Edwardsiella tardaAbnormal       Culture Surgical  --     Moderate growth   Sensitivity to follow    Narrative:      ORDER#: [id number]                          ORDERED BY: [facility]   SOURCE: Hand L hand abscess                COLLECTED:  09/14/20 16:47   ANTIBIOTICS AT [facility]. :                      RECEIVED :  09/14/20 18:08    Culture, Surgical [[id number]]  (Abnormal)  Collected: 09/14/20 1646    Order Status: Completed  Specimen: Swab from Finger  Updated: 09/16/20 1038     Gram Stain Result  Moderate WBC's (Polymorphonuclear)   No organisms seen     Anaerobic Culture  No anaerobes to date,will hold 4 days     Organism  Edwardsiella tardaAbnormal       Culture Surgical  --     Rare growth   Sensitivity to follow    Narrative:      ORDER#: [id number]                          ORDERED BY: Almeta Peoples   SOURCE: Finger Left                        COLLECTED:  09/14/20 16:46   ANTIBIOTICS AT [facility]. :                      RECEIVED :  09/14/20 18:06        ORDER#: [id number]                          ORDERED BY: [facility]   SOURCE: Hand L hand abscess                COLLECTED:  09/14/20 16:47   ANTIBIOTICS AT [facility]. :                      RECEIVED :  09/14/20 18:08    Susceptibility     Edwardsiella tarda (1)     Antibiotic  Interpretation  BARBARA  Status     ampicillin  Sensitive  <=2  mcg/mL      aztreonam  Sensitive  <=1  mcg/mL      ceFAZolin  Sensitive  <=4  mcg/mL      cefepime  Sensitive  <=1  mcg/mL      cefTRIAXone  Sensitive  <=1  mcg/mL      gentamicin  Sensitive  <=1  mcg/mL organism)  4. Leukocytosis-improved      RECOMMENDATIONS :  · Narrow to ceftriaxone  · Continue aggressive elevation - patient has done very well with this. Reminded patient and family re: above heart level.   · If continues improvement, should be able to be discharged on oral levofloxacin soon as it has excellent oral bioavailably as long as no contraindication ( check EKG for QT interval)or drug interaction        Giulia Fraser MD

## 2020-09-18 LAB
ANAEROBIC CULTURE: ABNORMAL
ANAEROBIC CULTURE: ABNORMAL
ANION GAP SERPL CALCULATED.3IONS-SCNC: 10 MMOL/L (ref 7–19)
BASOPHILS ABSOLUTE: 0.1 K/UL (ref 0–0.2)
BASOPHILS RELATIVE PERCENT: 0.5 % (ref 0–1)
BUN BLDV-MCNC: 14 MG/DL (ref 8–23)
CALCIUM SERPL-MCNC: 8.6 MG/DL (ref 8.8–10.2)
CHLORIDE BLD-SCNC: 99 MMOL/L (ref 98–111)
CO2: 23 MMOL/L (ref 22–29)
CREAT SERPL-MCNC: 0.6 MG/DL (ref 0.5–1.2)
CULTURE SURGICAL: ABNORMAL
CULTURE SURGICAL: ABNORMAL
EKG P AXIS: NORMAL DEGREES
EKG P-R INTERVAL: NORMAL MS
EKG Q-T INTERVAL: 498 MS
EKG QRS DURATION: 122 MS
EKG QTC CALCULATION (BAZETT): 499 MS
EKG T AXIS: NORMAL DEGREES
EOSINOPHILS ABSOLUTE: 0.1 K/UL (ref 0–0.6)
EOSINOPHILS RELATIVE PERCENT: 0.6 % (ref 0–5)
GFR AFRICAN AMERICAN: >59
GFR NON-AFRICAN AMERICAN: >60
GLUCOSE BLD-MCNC: 106 MG/DL (ref 70–99)
GLUCOSE BLD-MCNC: 145 MG/DL (ref 74–109)
GLUCOSE BLD-MCNC: 172 MG/DL (ref 70–99)
GLUCOSE BLD-MCNC: 180 MG/DL (ref 70–99)
GLUCOSE BLD-MCNC: 265 MG/DL (ref 70–99)
GLUCOSE BLD-MCNC: 266 MG/DL (ref 70–99)
GRAM STAIN RESULT: ABNORMAL
GRAM STAIN RESULT: ABNORMAL
HCT VFR BLD CALC: 41.3 % (ref 42–52)
HEMOGLOBIN: 13.8 G/DL (ref 14–18)
IMMATURE GRANULOCYTES #: 0.6 K/UL
LYMPHOCYTES ABSOLUTE: 0.7 K/UL (ref 1.1–4.5)
LYMPHOCYTES RELATIVE PERCENT: 5.1 % (ref 20–40)
MCH RBC QN AUTO: 30.7 PG (ref 27–31)
MCHC RBC AUTO-ENTMCNC: 33.4 G/DL (ref 33–37)
MCV RBC AUTO: 92 FL (ref 80–94)
MONOCYTES ABSOLUTE: 1.6 K/UL (ref 0–0.9)
MONOCYTES RELATIVE PERCENT: 11 % (ref 0–10)
NEUTROPHILS ABSOLUTE: 11.6 K/UL (ref 1.5–7.5)
NEUTROPHILS RELATIVE PERCENT: 78.9 % (ref 50–65)
ORGANISM: ABNORMAL
ORGANISM: ABNORMAL
PDW BLD-RTO: 14.6 % (ref 11.5–14.5)
PERFORMED ON: ABNORMAL
PLATELET # BLD: 156 K/UL (ref 130–400)
PMV BLD AUTO: 9.8 FL (ref 9.4–12.4)
POTASSIUM SERPL-SCNC: 4.4 MMOL/L (ref 3.5–5)
RBC # BLD: 4.49 M/UL (ref 4.7–6.1)
SODIUM BLD-SCNC: 132 MMOL/L (ref 136–145)
WBC # BLD: 14.6 K/UL (ref 4.8–10.8)

## 2020-09-18 PROCEDURE — 80048 BASIC METABOLIC PNL TOTAL CA: CPT

## 2020-09-18 PROCEDURE — 2580000003 HC RX 258: Performed by: ORTHOPAEDIC SURGERY

## 2020-09-18 PROCEDURE — 6360000002 HC RX W HCPCS: Performed by: ORTHOPAEDIC SURGERY

## 2020-09-18 PROCEDURE — 93005 ELECTROCARDIOGRAM TRACING: CPT | Performed by: INTERNAL MEDICINE

## 2020-09-18 PROCEDURE — 97530 THERAPEUTIC ACTIVITIES: CPT

## 2020-09-18 PROCEDURE — 93010 ELECTROCARDIOGRAM REPORT: CPT | Performed by: INTERNAL MEDICINE

## 2020-09-18 PROCEDURE — 97162 PT EVAL MOD COMPLEX 30 MIN: CPT

## 2020-09-18 PROCEDURE — 82947 ASSAY GLUCOSE BLOOD QUANT: CPT

## 2020-09-18 PROCEDURE — 6360000002 HC RX W HCPCS: Performed by: INTERNAL MEDICINE

## 2020-09-18 PROCEDURE — 97110 THERAPEUTIC EXERCISES: CPT

## 2020-09-18 PROCEDURE — 99221 1ST HOSP IP/OBS SF/LOW 40: CPT | Performed by: INTERNAL MEDICINE

## 2020-09-18 PROCEDURE — 97165 OT EVAL LOW COMPLEX 30 MIN: CPT

## 2020-09-18 PROCEDURE — 1210000000 HC MED SURG R&B

## 2020-09-18 PROCEDURE — 36415 COLL VENOUS BLD VENIPUNCTURE: CPT

## 2020-09-18 PROCEDURE — 2580000003 HC RX 258: Performed by: INTERNAL MEDICINE

## 2020-09-18 PROCEDURE — 6370000000 HC RX 637 (ALT 250 FOR IP): Performed by: ORTHOPAEDIC SURGERY

## 2020-09-18 PROCEDURE — 85025 COMPLETE CBC W/AUTO DIFF WBC: CPT

## 2020-09-18 RX ADMIN — PRAMIPEXOLE DIHYDROCHLORIDE 1.5 MG: 1 TABLET ORAL at 07:56

## 2020-09-18 RX ADMIN — PRAMIPEXOLE DIHYDROCHLORIDE 1.5 MG: 1 TABLET ORAL at 21:23

## 2020-09-18 RX ADMIN — MORPHINE SULFATE 2 MG: 4 INJECTION, SOLUTION INTRAMUSCULAR; INTRAVENOUS at 14:07

## 2020-09-18 RX ADMIN — PANTOPRAZOLE SODIUM 40 MG: 40 TABLET, DELAYED RELEASE ORAL at 05:20

## 2020-09-18 RX ADMIN — MORPHINE SULFATE 4 MG: 4 INJECTION, SOLUTION INTRAMUSCULAR; INTRAVENOUS at 21:40

## 2020-09-18 RX ADMIN — INSULIN LISPRO 6 UNITS: 100 INJECTION, SOLUTION INTRAVENOUS; SUBCUTANEOUS at 17:48

## 2020-09-18 RX ADMIN — PRAMIPEXOLE DIHYDROCHLORIDE 1.5 MG: 1 TABLET ORAL at 13:46

## 2020-09-18 RX ADMIN — FAMOTIDINE 20 MG: 20 TABLET ORAL at 07:57

## 2020-09-18 RX ADMIN — OXYCODONE 5 MG: 5 TABLET ORAL at 17:52

## 2020-09-18 RX ADMIN — Medication 85 UNITS: at 21:25

## 2020-09-18 RX ADMIN — SODIUM CHLORIDE, PRESERVATIVE FREE 10 ML: 5 INJECTION INTRAVENOUS at 21:23

## 2020-09-18 RX ADMIN — ATENOLOL 25 MG: 25 TABLET ORAL at 07:56

## 2020-09-18 RX ADMIN — ATORVASTATIN CALCIUM 40 MG: 40 TABLET, FILM COATED ORAL at 21:23

## 2020-09-18 RX ADMIN — MIRABEGRON 25 MG: 25 TABLET, FILM COATED, EXTENDED RELEASE ORAL at 07:56

## 2020-09-18 RX ADMIN — FAMOTIDINE 20 MG: 20 TABLET ORAL at 21:23

## 2020-09-18 RX ADMIN — SODIUM CHLORIDE, PRESERVATIVE FREE 1 G: 5 INJECTION INTRAVENOUS at 21:41

## 2020-09-18 RX ADMIN — INSULIN LISPRO 2 UNITS: 100 INJECTION, SOLUTION INTRAVENOUS; SUBCUTANEOUS at 13:46

## 2020-09-18 RX ADMIN — Medication 5 ML: at 06:50

## 2020-09-18 RX ADMIN — OXYCODONE 5 MG: 5 TABLET ORAL at 11:02

## 2020-09-18 RX ADMIN — CITALOPRAM HYDROBROMIDE 20 MG: 20 TABLET ORAL at 07:57

## 2020-09-18 RX ADMIN — EZETIMIBE 10 MG: 10 TABLET ORAL at 07:56

## 2020-09-18 ASSESSMENT — PAIN SCALES - GENERAL
PAINLEVEL_OUTOF10: 3
PAINLEVEL_OUTOF10: 7
PAINLEVEL_OUTOF10: 7
PAINLEVEL_OUTOF10: 0
PAINLEVEL_OUTOF10: 2
PAINLEVEL_OUTOF10: 8
PAINLEVEL_OUTOF10: 8
PAINLEVEL_OUTOF10: 6
PAINLEVEL_OUTOF10: 6

## 2020-09-18 ASSESSMENT — ENCOUNTER SYMPTOMS
WHEEZING: 0
BLOOD IN STOOL: 0
ABDOMINAL DISTENTION: 0
COLOR CHANGE: 1
DIARRHEA: 0
SHORTNESS OF BREATH: 0
VOMITING: 0
COUGH: 0
ABDOMINAL PAIN: 0
BACK PAIN: 0

## 2020-09-18 ASSESSMENT — PAIN DESCRIPTION - ORIENTATION
ORIENTATION: LEFT

## 2020-09-18 ASSESSMENT — PAIN DESCRIPTION - LOCATION
LOCATION: WRIST
LOCATION: WRIST
LOCATION: ARM
LOCATION: WRIST

## 2020-09-18 ASSESSMENT — PAIN - FUNCTIONAL ASSESSMENT
PAIN_FUNCTIONAL_ASSESSMENT: PREVENTS OR INTERFERES SOME ACTIVE ACTIVITIES AND ADLS
PAIN_FUNCTIONAL_ASSESSMENT: PREVENTS OR INTERFERES SOME ACTIVE ACTIVITIES AND ADLS
PAIN_FUNCTIONAL_ASSESSMENT: ACTIVITIES ARE NOT PREVENTED

## 2020-09-18 ASSESSMENT — PAIN DESCRIPTION - DESCRIPTORS
DESCRIPTORS: ACHING
DESCRIPTORS: ACHING
DESCRIPTORS: ACHING;CRAMPING

## 2020-09-18 ASSESSMENT — PAIN DESCRIPTION - FREQUENCY
FREQUENCY: INTERMITTENT
FREQUENCY: INTERMITTENT

## 2020-09-18 ASSESSMENT — PAIN DESCRIPTION - PAIN TYPE
TYPE: ACUTE PAIN;SURGICAL PAIN

## 2020-09-18 ASSESSMENT — PAIN DESCRIPTION - DIRECTION: RADIATING_TOWARDS: NO

## 2020-09-18 ASSESSMENT — PAIN SCALES - WONG BAKER: WONGBAKER_NUMERICALRESPONSE: 2

## 2020-09-18 ASSESSMENT — PAIN DESCRIPTION - ONSET
ONSET: ON-GOING
ONSET: ON-GOING

## 2020-09-18 ASSESSMENT — PAIN DESCRIPTION - PROGRESSION
CLINICAL_PROGRESSION: GRADUALLY IMPROVING
CLINICAL_PROGRESSION: GRADUALLY IMPROVING

## 2020-09-18 NOTE — PROGRESS NOTES
Occupational Therapy   Occupational Therapy Initial Assessment  Date: 2020   Patient Name: Teresa Smith  MRN: [id number]     : 1940    Date of Service: 2020    Discharge Recommendations:          Assessment   Performance deficits / Impairments: Decreased ADL status; Decreased functional mobility ; Decreased endurance  Assessment: Will progress as tolerated. Treatment Diagnosis: LUE cellulitis, L wrist I and D  Prognosis: Good  Decision Making: Low Complexity  REQUIRES OT FOLLOW UP: Yes  Activity Tolerance  Activity Tolerance: Patient Tolerated treatment well           Patient Diagnosis(es): The primary encounter diagnosis was Abscess of left hand. A diagnosis of Cellulitis of left hand and arm was also pertinent to this visit. has a past medical history of Aortic valve stenosis, Arthritis, Chest tightness, discomfort, or pressure, Chronic back pain, CKD (chronic kidney disease), CPAP (continuous positive airway pressure) dependence, Diabetes ([address] Diabetic polyneuropathy associated with type 2 diabetes mellitus ([address] GERD (gastroesophageal reflux disease), HTN (hypertension), Hyperlipemia, Left ventricular diastolic dysfunction, Mitral stenosis, Mitral valve stenosis, Neuropathy, Obstructive sleep apnea, Pinched nerve in neck, and Restless legs syndrome. has a past surgical history that includes Cardiac catheterization (12); Spine surgery; Cholecystectomy; Cataract removal; Appendectomy; pacemaker placement; Bladder surgery; back surgery; pr rplcmt prost aortic valve open xcp homogrf/stent (N/A, 2018); Colonoscopy; eye surgery; Pacemaker insertion; other surgical history (2020); and Hand surgery (Left, 2020).     Treatment Diagnosis: LUE cellulitis, L wrist I and D      Restrictions       Subjective   General  Chart Reviewed: Yes  Patient assessed for rehabilitation services?: Yes  Family / Caregiver Present: Yes  Diagnosis: LUE cellulitis L wrist I and D  Patient Currently in Pain: Yes  Pain Assessment  Pain Assessment: Faces  [place]-Baker Pain Rating: Hurts a little bit  Pain Location: Wrist  Pain Orientation: Left  Pain Descriptors: Aching  Vital Signs  Pulse: 61  BP: 134/66  Patient Currently in Pain: Yes  Social/Functional History  Social/Functional History  Lives With: Significant other  Type of Home: House  Home Equipment: Cane  ADL Assistance: Independent  Ambulation Assistance: Independent  Transfer Assistance: Independent       Objective   Vision: Within Functional Limits  Hearing: Within functional limits    Orientation  Overall Orientation Status: Within Normal Limits        ADL  Feeding: Setup  Grooming: Supervision  UE Bathing: Minimal assistance  LE Bathing: Supervision  UE Dressing: Minimal assistance  LE Dressing: Supervision  Toileting: Minimal assistance  Additional Comments: Son states pt's significant other can provide some light assistance to patient at home such as meal prep or item retrieval        Bed mobility  Supine to Sit: Supervision  Transfers  Stand Step Transfers: Independent  Sit to stand: Independent  Transfer Comments: Using a cane     Cognition  Overall Cognitive Status: WFL                 LUE AROM (degrees)  LUE AROM : WFL  LUE General AROM: Full shld and elbow AROM, 3/4 supination.   Wrist immobilized in splint with ace wrap to PIP joints  RUE AROM (degrees)  RUE AROM : WNL  LUE Strength  Gross LUE Strength: Exceptions to [facility]  L Shoulder Flex: 4-/5  L Shoulder ABduction: 4-/5  L Elbow Flex: 4-/5  L Elbow Ext: 4-/5  RUE Strength  Gross RUE Strength: WFL                   Plan   Plan  Times per week: 3-5x/week  Times per day: Daily    G-Code     OutComes Score                                                  AM-PAC Score             Goals  Short term goals  Time Frame for Short term goals: 1 week  Short term goal 1: Supervision donning a pullover shirt  Short term goal 2: Supervision with toilet tfers from cane level  Short term goal 3: Supervision

## 2020-09-18 NOTE — PROGRESS NOTES
INFECTIOUS DISEASES PROGRESS NOTE    Patient: Leatha Billings  YOB: 1940  MRN: [id number]   Admit date: 9/14/2020   Admitting Physician: Arian Stroud MD  Primary Care Physician: RITESH Diaz - CNP    CHIEF COMPLAINT: Left upper extremity infection      Interval History: Patient complained of left upper extremity pain. He was 8 out of 10. He just took a pain shot. Patient had EKG with QT corrected of 499 ms    Pharmacy reviewed his medications and no drug interaction with levofloxacin and citalopram that can prolong the QTc    Reviewed with patient and son the black box warnings of fluoroquinolones including QT prolongation, tendinitis/tendon rupture (further increased in patients greater than 60 or taking steroids, peripheral neuropathy. Also reviewed that fluoroquinolones are very bioavailable in the oral form and would be the oral drug of choice for this type of infection as well. Patient son mentioned that the patient does see Dr. Tanya Torres and patient would prefer cardiology input before taking that oral medication. I had spoken with staff earlier today that if oral quinolone could not be orchestrated, we would need to know early so that a PICC line could be placed on IV antibiotics submitted to insurance company for approval.    Allergies:    Allergies   Allergen Reactions    Azithromycin     Metoprolol     Cyclobenzaprine Rash    Metoclopramide Rash       Current Meds: magic (miracle) mouthwash, Q8H PRN  cefTRIAXone (ROCEPHIN) 1 g in sodium chloride (PF) 10 mL IV syringe, Q24H  pantoprazole (PROTONIX) tablet 40 mg, QAM AC  atenolol (TENORMIN) tablet 25 mg, Daily  Patient supplied - Empagliflozin-metFORMIN HCl ER  MG TB24 1 tablet, Daily with breakfast  insulin glargine (LANTUS) injection vial 85 Units, Nightly  vitamin D (ERGOCALCIFEROL) capsule 50,000 Units, Weekly  atorvastatin (LIPITOR) tablet 40 mg, Nightly  citalopram (CELEXA) tablet 20 mg, Daily  ezetimibe ([employer]) tablet 10 mg, Daily  pramipexole (MIRAPEX) tablet 1.5 mg, TID  mirabegron (MYRBETRIQ) extended release tablet 25 mg, Daily  potassium chloride (KLOR-CON M) extended release tablet 40 mEq, PRN    Or  potassium bicarb-citric acid (EFFER-K) effervescent tablet 40 mEq, PRN    Or  potassium chloride 10 mEq/100 mL IVPB (Peripheral Line), PRN  magnesium sulfate 1 g in dextrose 5% 100 mL IVPB, PRN  acetaminophen (TYLENOL) tablet 650 mg, Q6H PRN    Or  acetaminophen (TYLENOL) suppository 650 mg, Q6H PRN  polyethylene glycol (GLYCOLAX) packet 17 g, Daily PRN  famotidine (PEPCID) tablet 20 mg, BID  insulin lispro (HUMALOG) injection vial 0-12 Units, TID WC  insulin lispro (HUMALOG) injection vial 0-6 Units, Nightly  0.9 % sodium chloride infusion, Continuous  lactated ringers infusion, Continuous  sodium chloride flush 0.9 % injection 10 mL, 2 times per day  sodium chloride flush 0.9 % injection 10 mL, PRN  promethazine (PHENERGAN) tablet 12.5 mg, Q6H PRN    Or  ondansetron (ZOFRAN) injection 4 mg, Q6H PRN  oxyCODONE (ROXICODONE) immediate release tablet 5 mg, Q4H PRN    Or  oxyCODONE (ROXICODONE) immediate release tablet 10 mg, Q4H PRN  morphine injection 2 mg, Q2H PRN    Or  morphine injection 4 mg, Q2H PRN        Review of Systems   Constitutional: Negative for fever. Skin: Positive for color change and wound. Vital Signs:  /66   Pulse 61   Temp 97.3 °F (36.3 °C) (Temporal)   Resp 16   Ht 5' 10\" (1.778 m)   Wt 225 lb 2 oz (102.1 kg)   SpO2 95%   BMI 32.30 kg/m²   Temp (24hrs), Av.2 °F (36.2 °C), Min:97.1 °F (36.2 °C), Max:97.3 °F (36.3 °C)      Physical Exam   General: Patient's nontoxic-appearing sitting up in bed with arm elevated on 4 pillows. He was in no respiratory distress  HEENT: Sclera anicteric. I placed mask on patient's face to examine him. Respiratory: Effort even and unlabored  Neuro: Patient very hard of hearing.   He seemed to drift off to sleep but wake back up easily (recently had iv pain medication)  Left upper extremity. Edema and erythema appear stable when compared to visible portions today compared to yesterday. He may have a little more edema of his fingers. Psych: Pleasant and cooperative      September 15, 2020                  LAB RESULTS:    CBC with DIFF:  Recent Labs     09/16/20 0319 09/17/20 0344 09/18/20  0436   WBC 14.4* 13.2* 14.6*   RBC 4.62* 4.49* 4.49*   HGB 14.3 13.9* 13.8*   HCT 42.2 40.9* 41.3*   MCV 91.3 91.1 92.0   MCH 31.0 31.0 30.7   MCHC 33.9 34.0 33.4   RDW 14.5 14.6* 14.6*    144 156   MPV 10.2 10.1 9.8   NEUTOPHILPCT 80.0* 78.3* 78.9*   LYMPHOPCT 3.2* 4.4* 5.1*   MONOPCT 13.4* 13.7* 11.0*   EOSRELPCT 0.1 0.6 0.6   BASOPCT 0.2 0.3 0.5   NEUTROABS 11.5* 10.3* 11.6*   LYMPHSABS 0.5* 0.6* 0.7*   MONOSABS 1.90* 1.80* 1.60*   EOSABS 0.00 0.10 0.10   BASOSABS 0.00 0.00 0.10       CMP/BMP:  Recent Labs     09/16/20 0319 09/17/20 0344 09/18/20  0436    133* 132*   K 4.2 4.4 4.4    99 99   CO2 23 23 23   ANIONGAP 11 11 10   GLUCOSE 133* 112* 145*   BUN 25* 18 14   CREATININE 0.7 0.6 0.6   LABGLOM >60 >60 >60   CALCIUM 8.4* 8.5* 8.6*         Culture Results:  Culture, Surgical [[id number]]  (Abnormal)  Collected: 09/14/20 2362    Order Status: Completed  Specimen: Swab from Hand  Updated: 09/16/20 1038     Gram Stain Result  Few WBC's (Polymorphonuclear)   No organisms seen     Anaerobic Culture  No anaerobes to date,will hold 4 days     Organism  Edwardsiella tardaAbnormal       Culture Surgical  --     Moderate growth   Sensitivity to follow    Narrative:      ORDER#: [id number]                          ORDERED BY: Wilber Jarrell   SOURCE: Hand L hand abscess                COLLECTED:  09/14/20 16:47   ANTIBIOTICS AT [facility]. :                      RECEIVED :  09/14/20 18:08    Culture, Surgical [[id number]]  (Abnormal)  Collected: 09/14/20 1646    Order Status: Completed  Specimen: Swab from Finger  Updated: 09/16/20 1038     Gram Stain Result Moderate WBC's (Polymorphonuclear)   No organisms seen     Anaerobic Culture  No anaerobes to date,will hold 4 days     Organism  Edwardsiella tardaAbnormal       Culture Surgical  --     Rare growth   Sensitivity to follow    Narrative:      ORDER#: [id number]                          ORDERED BY: Ree Ferrer   SOURCE: Finger Left                        COLLECTED:  09/14/20 16:46   ANTIBIOTICS AT [facility]. :                      RECEIVED :  09/14/20 18:06        ORDER#: [id number]                          ORDERED BY: Ree Ferrer   SOURCE: Hand L hand abscess                COLLECTED:  09/14/20 16:47   ANTIBIOTICS AT [facility]. :                      RECEIVED :  09/14/20 18:08    Susceptibility     Edwardsiella tarda (1)     Antibiotic  Interpretation  BARBARA  Status     ampicillin  Sensitive  <=2  mcg/mL      aztreonam  Sensitive  <=1  mcg/mL      ceFAZolin  Sensitive  <=4  mcg/mL      cefepime  Sensitive  <=1  mcg/mL      cefTRIAXone  Sensitive  <=1  mcg/mL      gentamicin  Sensitive  <=1  mcg/mL      levofloxacin  Sensitive  <=0.12  mcg/mL      meropenem  Sensitive  <=0.25  mcg/mL      piperacillin-tazobactam  Sensitive  <=4  mcg/mL      trimethoprim-sulfamethoxazole  Sensitive  <=20  mcg/mL              RADIOLOGY      No results found.       Patient Active Problem List   Diagnosis    Other chest pain    Chronic bilateral low back pain without sciatica    Syncope and collapse    Type 2 diabetes mellitus with hyperglycemia, with long-term current use of insulin ([facility])    Obstructive sleep apnea    Class 1 obesity due to excess calories in adult    Restless legs syndrome    Gastroesophageal reflux disease without esophagitis    Essential hypertension    Vertigo    Orthostasis    Diabetic polyneuropathy associated with type 2 diabetes mellitus ([facility])    Bilateral carpal tunnel syndrome    CPAP (continuous positive airway pressure) dependence    BiPAP (biphasic positive airway pressure) dependence    Pinched nerve in neck  Rheumatic aortic valve insufficiency    Moderate mitral stenosis    Hyponatremia    Hypercholesterolemia with hypertriglyceridemia    Syncope    Vertebrobasilar artery insufficiency    S/P aortic valve replacement with bioprosthetic valve    Grade I diastolic dysfunction    Pacemaker    Near syncope    History of syncope    Sinoatrial node dysfunction (HCC)    Mixed hyperlipidemia    Pre-syncope    Bradycardia    Lightheadedness    Heart block    Pacemaker malfunction, initial encounter    Pacemaker lead malfunction    Cellulitis of left hand and arm    Abscess of left hand    Hypokalemia    Hypophosphatemia    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 Severe infection left hand status post incision and drainage per Dr. Stevan Hamilton  2. Wound cultures from [facility] identified as Aeromonas (gram-negative waterborne organism)  3. Surgical cultures identified as Edwardsiella tarda. (Gram-negative waterborne organism)  4. Leukocytosis- essentially stable      RECOMMENDATIONS :  · Continue ceftriaxone  · Continue aggressive elevation - patient has done very well with this. Reminded patient and family re: above heart level. · If continues improvement, should be able to be discharged on oral levofloxacin soon as it has excellent oral bioavailably as long as no contraindication -reviewed black box warning with patient and son. Patient was somewhat drowsy as he just had IV pain medicine. · We will consult cardiology after discussion with patient and son that she normally sees Dr. Ayaka Lincoln.   QT corrected is 499 ms    Discussed with BARBARA Ram MD

## 2020-09-18 NOTE — DISCHARGE SUMMARY
[place], [place], [address]        DEPARTMENT OF HOSPITALIST MEDICINE        DISCHARGE SUMMARY:      PATIENT NAME:  Arden Serra  :  1940  MRN:  [id number]    Admission Date:   2020 12:25 PM Attending: Vneecia Durán MD   Discharge Date:   2020              PCP: RITESH Naqvi CNP  Length of Stay: 7 days     Chief Complaint on Admission: No chief complaint on file. Consultants:     IP CONSULT TO ORTHOPEDIC SURGERY  IP CONSULT TO PHARMACY  IP CONSULT TO INFECTIOUS DISEASES  IP CONSULT TO CASE MANAGEMENT  IP CONSULT TO HOME CARE NEEDS  IP CONSULT TO CARDIOLOGY       Discharge Problem List:   Principal Problem:    Cellulitis of left hand and arm  Active Problems:    Chronic bilateral low back pain without sciatica    Type 2 diabetes mellitus with hyperglycemia, with long-term current use of insulin (HCC)    Obstructive sleep apnea    Class 1 obesity due to excess calories in adult    Gastroesophageal reflux disease without esophagitis    Essential hypertension    Diabetic polyneuropathy associated with type 2 diabetes mellitus (HCC)    Bilateral carpal tunnel syndrome    CPAP (continuous positive airway pressure) dependence    S/P aortic valve replacement with bioprosthetic valve    Pacemaker    Abscess of left hand    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 His hand continues to improve with IV antibiotics and dressing changes. He has been cleared from all the specialist on the case to be discharged. He is being discharged home on p.o. Levaquin 500 mg daily for 10 days. Patient's family and home health care to follow the exact instructions given by hand surgery for dressing change recommendations at home. His blood sugar levels are under better control on Lantus 50 units subcu twice daily.   Strictly advised patient to cut down on high carb diet and follow diabetic diet recommendations. He is advised to follow-up closely with hand surgery, cardiology and ID as per the recommendations. 9/20/2020  Patient is having dressing changes 2-3 times a day. His left hand and lower arm swelling is slowly improving.     9/19/2020  I spoke with the cardiologist in detail. Patient has wide QRS complex and QTC of 499 likely secondary to the permanent pacemaker placement as he has paced rhythm. Cardiology has cleared the patient to use Levaquin as an outpatient if needed. Patient had multiple cardiac issues requiring insertion of hardware. Any infection that might spread systematically may be disastrous for the patient involving the heart issues. Patient needs aggressive treatment and close monitoring in the hospital until wound reinfection possibility comes drastically down. Please see the cardiology note as below:          9/18/2020  Patient is feeling better. ID wants to discharge patient on p.o. Levaquin because of increased bioavailability. She spoke with patient and son regarding black box warning. They got concerned and wanted Dr. Genna Goode input who is a his cardiologist.     9/17/2020  Patient is feeling better. Pain is under control. Case management is working on setting up home health care for the patient. Patient had great antibiotic sensitivity to major antibiotics on follow-up and review of the surgical cultures. Still awaiting clearance from ID and orthopedics for discharge.     9/16/2020  Patient's surgical wound cultures from transferring facility were positive for Aeromonas gram-negative waterborne organism and he is growing [address]  (Gram-negative waterborne organism) from the cultures sent from our facility. ID is continuing with cefepime for now and awaiting debilities from surgical cultures.     9/15/2020  Patient underwent I&D of left hand yesterday which he tolerated well.   Wound cultures were obtained and the affected sites were copiously irrigated. Patient is feeling better. I reviewed the ID and sensitivity of the wound cultures from patient's transferring facility which were sensitive to IV cefepime.     9/14/2020  HISTORY OF PRESENT ILLNESS:  Nicholas Szymanski is an 79 y. o. male.  He is a very pleasant gentleman who has diabetes mellitus and chronic medical issues. 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  The hook was dirty looking and in water for a long time.  His friend took the hook out and patient was brought in to the ER for evaluation [facility] on IV Invanz and vancomycin and is transferred to our facility for an ID evaluation.  I saw the patient when he came to our hospital.  His hand and left arm is red and swollen.  I ordered hand surgery and ID consults as well as baseline labs and will restart him on IV Invanz and vancomycin.  He is kept n.p.o. for possible I&D of his left hand later on today. OBJECTIVE:  /81   Pulse 58   Temp 97.4 °F (36.3 °C) (Temporal)   Resp 16   Ht 5' 10\" (1.778 m)   Wt 225 lb 2 oz (102.1 kg)   SpO2 97%   BMI 32.30 kg/m²       Heart: RRR   Lungs:  Fair bilateral air entry   Abdomen: Soft, non-tender   Extremities:  Left hand covered in a bandage   Neurologic: Alert and oriented   Skin: Warm and dry          Laboratory Data:            Recent Labs     09/19/20  0234 09/20/20  0234 09/21/20  0143   WBC 15.6* 14.8* 13.4*   HGB 15.4 14.8 16.0    172 181     Recent Labs     09/19/20  0234 09/20/20  0234 09/21/20  0143   * 131* 135*   K 4.4 4.6 4.9   CL 94* 95* 96*   CO2 25 24 25   BUN 13 13 13   CREATININE 0.7 0.7 0.7   GLUCOSE 234* 297* 155*     No results for input(s): AST, ALT, ALB, BILITOT, ALKPHOS in the last 72 hours.   Troponin T: No results for input(s): TROPONINI in the last 72 hours. Pro-BNP: No results for input(s): BNP in the last 72 hours. INR: No results for input(s): INR in the last 72 hours. UA:No results for input(s): NITRITE, COLORU, PHUR, LABCAST, WBCUA, RBCUA, MUCUS, TRICHOMONAS, YEAST, BACTERIA, CLARITYU, SPECGRAV, LEUKOCYTESUR, UROBILINOGEN, BILIRUBINUR, BLOODU, GLUCOSEU, AMORPHOUS in the last 72 hours. Invalid input(s): Delgado Roldan  A1C:   Recent Labs     09/20/20  0234   LABA1C 8.6*     ABG:No results for input(s): PHART, XLS8JWV, PO2ART, ZAU5NJT, BEART, HGBAE, Q1SCHQQQ, CARBOXHGBART in the last 72 hours. Impressions of imaging performed in 48 hours before discharge:    No results found. Julian ELIAS   Home Medication Instructions IGS:[id number]    Printed on:09/21/20 1643   Medication Information                      aspirin EC 81 MG EC tablet  Take 1 tablet by mouth 2 times daily for 21 days             atenolol (TENORMIN) 25 MG tablet  Take 25 mg by mouth daily             atorvastatin (LIPITOR) 40 MG tablet  Take 1 tablet by mouth nightly             B-D UF III MINI PEN NEEDLES 31G X 5 MM MISC               Cholecalciferol (VITAMIN D3) 55569 units CAPS               citalopram (CELEXA) 20 MG tablet  TAKE ONE TABLET BY MOUTH DAILY             clopidogrel (PLAVIX) 75 MG tablet  Take 75 mg by mouth daily             Empagliflozin-Metformin HCl ER (SYNJARDY XR)  MG TB24  Take 1 tablet by mouth daily (with breakfast)             esomeprazole (NEXIUM) 40 MG capsule  Take 40 mg by mouth every morning (before breakfast).                ezetimibe (ZETIA) 10 MG tablet  Take 10 mg by mouth daily             furosemide (LASIX) 20 MG tablet  Take 1 tablet by mouth as needed (For weight gain greater than 2.5 lbs in 24 hours.)             HUMULIN R U-500 KWIKPEN 500 UNIT/ML SOPN concentrated injection pen               insulin glargine (LANTUS) 100 UNIT/ML injection vial  Inject 50 Units into the skin 2 times daily levoFLOXacin (LEVAQUIN) 500 MG tablet  Take 1 tablet by mouth daily for 10 days             mirabegron (MYRBETRIQ) 25 MG TB24  Take 1 tablet by mouth daily             oxyCODONE-acetaminophen (PERCOCET) 5-325 MG per tablet  Take 1 tablet by mouth every 4 hours as needed for Pain for up to 7 days. Intended supply: 7 days. Take lowest dose possible to manage pain             pramipexole (MIRAPEX) 1.5 MG tablet  3 times daily                    ISSUES TO BE ADDRESSED AT HOSPITAL FOLLOW-UP VISIT:  Advised patient to follow-up closely with PCP, cardiology, hand surgery and ID as per the recommendations. Patient's family and home health care to follow the exact instructions given by hand surgery for dressing changes at home! Strictly advised patient to cut down on high carb diet and follow diabetic diet recommendations. Condition on Discharge: gradually improving  Discharge Disposition: Home with [address] [place] [address]    Recommended Follow Up:  RITESH Barajas - [facility]  [address] [facility]  [phone]    On 9/25/2020  AT   2:20    Dasia Quinones MD  [address] [place] [address]  [phone]    On 9/24/2020  AT  10:30   For wound re-check    [address] [address]        [address]  [phone]    On 10/21/2020  1:30 PM    Selvin Feldman MD  [facility]  [address] [place], [place] [phone]      As needed. ..ajf    Followup Appointments Scheduled at Time of Discharge:  Future Appointments   Date Time Provider Naz Barrera   10/21/2020  1:30 PM RTIESH Burton Cardio [facility]   12/10/2020  8:45 AM RITESH Burton Cardio [facility]   3/8/2021 10:45 AM MD MAGDALENA Glover NEURO [facility]        Discharge Instructions:   Please see the discharge paperwork.     Patient was seen at bedside today, and the examination shows improvement since yesterday. Detailed discharge directions delivered to the patient by myself and our nursing staff, who verbalizes understanding and is very happy and satisfied with the plan. Patient has been advised to continue all medications as prescribed and advised, and f/u with PCP within 1 week. Patient is stable from medical standpoint to be discharged. Total time spent during patient evaluation and assessment, discussion with the nurse/family, addressing discharge medications/scripts and coordination of care for safe discharge was in excess of 40 minutes.       Signed Electronically:    Shirley Marquez MD  4:43 PM 9/21/2020

## 2020-09-18 NOTE — PLAN OF CARE
Problem: Falls - Risk of:  Goal: Will remain free from falls  Description: Will remain free from falls  9/18/2020 0304 by Owen Dunn RN  Outcome: Ongoing  9/17/2020 1648 by Myla Melgoza RN  Outcome: Ongoing  Goal: Absence of physical injury  Description: Absence of physical injury  Outcome: Ongoing

## 2020-09-18 NOTE — PLAN OF CARE
Problem: Falls - Risk of:  Goal: Will remain free from falls  Description: Will remain free from falls  9/18/2020 1518 by Jacob Gonzales RN  Outcome: Ongoing  9/18/2020 0304 by Owen Dunn RN  Outcome: Ongoing  Goal: Absence of physical injury  Description: Absence of physical injury  9/18/2020 1518 by Jacob Gonzales RN  Outcome: Ongoing  9/18/2020 0304 by Owen Dunn RN  Outcome: Ongoing     Problem: Pain:  Goal: Pain level will decrease  Description: Pain level will decrease  Outcome: Ongoing  Goal: Control of acute pain  Description: Control of acute pain  Outcome: Ongoing  Goal: Control of chronic pain  Description: Control of chronic pain  Outcome: Ongoing

## 2020-09-18 NOTE — PROGRESS NOTES
Physical Therapy    Facility/Department: [facility] ONCOLOGY UNIT  Initial Assessment    NAME: Dom Puckett  : 1940  MRN: [id number]    Date of Service: 2020    Discharge Recommendations:  Continue to assess pending progress, Patient would benefit from continued therapy after discharge, 24 hour supervision or assist        Assessment   Body structures, Functions, Activity limitations: Decreased functional mobility ; Decreased ROM; Decreased strength;Decreased safe awareness; Increased pain;Decreased balance;Decreased endurance  Assessment: Pt. will benefit from PT to decrease impairments. Pt. a fall risk and should not attempt mobility on his own at this time. Pt. a little unsteady with amb. with cane due to not picking cane up every time. Pt. most likely will improve with gait each time. Pt. just needs a little A with balance. Encouraged pt. to sit up for good portion of day with LUE elevated. Treatment Diagnosis: impaired gait and mobility  Prognosis: Good  Decision Making: Medium Complexity  PT Education: Goals;PT Role;Plan of Care;General Safety;Gait Training;Weight-bearing Education; Functional Mobility Training;Transfer Training  Patient Education: use of call light  Barriers to Learning: none noted  REQUIRES PT FOLLOW UP: Yes  Activity Tolerance  Activity Tolerance: Patient Tolerated treatment well       Patient Diagnosis(es): The primary encounter diagnosis was Abscess of left hand. A diagnosis of Cellulitis of left hand and arm was also pertinent to this visit.      has a past medical history of Aortic valve stenosis, Arthritis, Chest tightness, discomfort, or pressure, Chronic back pain, CKD (chronic kidney disease), CPAP (continuous positive airway pressure) dependence, Diabetes ([address] Diabetic polyneuropathy associated with type 2 diabetes mellitus ([address] GERD (gastroesophageal reflux disease), HTN (hypertension), Hyperlipemia, Left ventricular diastolic dysfunction, Mitral stenosis, Mitral valve stenosis, Neuropathy, Obstructive sleep apnea, Pinched nerve in neck, and Restless legs syndrome. has a past surgical history that includes Cardiac catheterization (4/30/12); Spine surgery; Cholecystectomy; Cataract removal; Appendectomy; pacemaker placement; Bladder surgery; back surgery; pr rplcmt prost aortic valve open xcp homogrf/stent (N/A, 6/7/2018); Colonoscopy; eye surgery; Pacemaker insertion; other surgical history (08/03/2020); and Hand surgery (Left, 9/14/2020). Restrictions  Restrictions/Precautions  Restrictions/Precautions: Weight Bearing  Implants present? : Pacemaker  Upper Extremity Weight Bearing Restrictions  Left Upper Extremity Weight Bearing: Weight Bearing As Tolerated  Position Activity Restriction  Other position/activity restrictions: pt's L hand wrapped  Vision/Hearing  Vision: Within Functional Limits  Hearing: Within functional limits     Subjective  General  Chart Reviewed: Yes  Patient assessed for rehabilitation services?: Yes  Response To Previous Treatment: Not applicable  Family / Caregiver Present: Yes  Referring Practitioner: Denise Walters MD  Referral Date : 09/16/20  Diagnosis: Left hand/wrist redness and swelling with cellulitis and possible abscess following fishing hook puncture, left fifth digit  Follows Commands: Within Functional Limits  General Comment  Comments: I&D of left hand with intraoperative cultures 9/14/20  Subjective  Subjective: Pt. willing to work with therapy. States he could not have walked like this yesterday.   Pain Screening  Patient Currently in Pain: Yes  Pain Assessment  Pain Assessment: 0-10  Pain Level: 8  Pain Type: Acute pain;Surgical pain  Pain Location: Wrist  Pain Orientation: Left  Functional Pain Assessment: Prevents or interferes some active activities and ADLs  Response to Pain Intervention: Patient Satisfied  Vital Signs  Patient Currently in Pain: Yes  Pre Treatment Pain Screening  Intervention List: Patient able to continue with treatment    Orientation  Orientation  Overall Orientation Status: Within Functional Limits  Social/Functional History  Social/Functional History  Lives With: Significant other  Type of Home: House  Home Equipment: Cane  ADL Assistance: Independent  Ambulation Assistance: Independent  Transfer Assistance: Independent  Occupation: Retired  Leisure & Hobbies: Runic Games  Cognition   Cognition  Overall Cognitive Status: WFL    Objective     Observation/Palpation  Posture: Good  Observation: L hand edematous, reddened, wrapped    AROM RLE (degrees)  RLE AROM: WFL  AROM LLE (degrees)  LLE AROM : WFL  Strength RLE  Strength RLE: WFL  Comment: grossly 4/5  Strength LLE  Strength LLE: WFL  Comment: grossly 4/5     Sensation  Overall Sensation Status: Impaired(neuropathy B feet)  Bed mobility  Comment: pt. already seated on EOB  Transfers  Sit to Stand: Stand by assistance  Stand to sit: Stand by assistance  Ambulation  Ambulation?: Yes  WB Status: no restrictions, pt. using cane in R hand  Ambulation 1  Surface: level tile  Device: Single point cane  Assistance: Minimal assistance;Contact guard assistance  Quality of Gait: a little unsteady, does not fully  cane at times  Gait Deviations: Slow Judith;Decreased step length;Decreased step height  Distance: 180'     Balance  Posture: Good  Sitting - Static: Good;+  Sitting - Dynamic: Good;-  Standing - Static: Fair;-  Standing - Dynamic: Poor;+        Plan   Plan  Times per week: 3-7  Plan weeks: 2  Current Treatment Recommendations: Strengthening, ROM, Balance Training, Functional Mobility Training, Transfer Training, Endurance Training, Gait Training, Safety Education & Training, Positioning, Equipment Evaluation, Education, & procurement, Patient/Caregiver Education & Training  Plan Comment: cont. PT per POC.   Safety Devices  Type of devices: Gait belt, Patient at risk for falls, Nurse notified, Left in bed, Call light within reach(RN present to give meds and pt. left seated on EOB with family member present and LUE elevated on 2 pillows)    G-Code       OutComes Score                                                  AM-PAC Score             Goals  Short term goals  Time Frame for Short term goals: 2 wks  Short term goal 1: supine to sit indep  Short term goal 2: sit to stand indep  Short term goal 3: amb. 200' with straight cane indep  Patient Goals   Patient goals : go home       Therapy Time   Individual Concurrent Group Co-treatment   Time In           Time Out           Minutes                   Benito Montesinos PT    Electronically signed by Benito Montesinos PT on 9/18/2020 at 2:26 PM

## 2020-09-18 NOTE — CONSULTS
[facility]  Cardiology Consult      Requesting MD:  Rishabh Ibarra MD   Admit Status:  Inpatient [101]       History obtained from:   ? Patient  ?  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 09/16/2020     Priority: Low    Hypokalemia 09/15/2020     Priority: Low    Hypophosphatemia 09/15/2020     Priority: Low    Cellulitis of left hand and arm 09/14/2020     Priority: Low    Abscess of left hand 09/14/2020     Priority: Low    Pacemaker lead malfunction 08/05/2020     Priority: Low    Pacemaker malfunction, initial encounter 06/29/2020     Priority: Low    Bradycardia 03/17/2020     Priority: Low    Near syncope 11/14/2019     Priority: Low    History of syncope 11/14/2019     Priority: Low    Sinoatrial node dysfunction ([address] 11/14/2019     Priority: Low    Mixed hyperlipidemia 11/14/2019     Priority: Low    S/P aortic valve replacement with bioprosthetic valve 08/22/2019     Priority: Low    Grade I diastolic dysfunction 52/08/6935     Priority: Low    Pacemaker 08/22/2019     Priority: Low    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     Priority: Low    Syncope 02/21/2019     Priority: Low    Hyponatremia 06/11/2018     Priority: Low    Hypercholesterolemia with hypertriglyceridemia 06/11/2018     Priority: Low    Rheumatic aortic valve insufficiency      Priority: Low    Moderate mitral stenosis      Priority: Low    Pinched nerve in neck 04/18/2018     Priority: Low    CPAP (continuous positive airway pressure) dependence 03/08/2018     Priority: Low    BiPAP (biphasic positive airway pressure) dependence      Priority: Low     Overview Note:     16cm/12cm      Bilateral carpal tunnel syndrome 08/28/2017     Priority: Low    Diabetic polyneuropathy associated with type 2 diabetes mellitus ([facility] 08/29/2016     Priority: Low    Orthostasis      Priority: Low    Vertigo 07/26/2016     Priority: Low    Syncope and collapse 02/17/2016     Priority: Low    Type 2 diabetes mellitus with hyperglycemia, with long-term current use of insulin ([facility] 02/17/2016     Priority: Low    Obstructive sleep apnea 02/17/2016     Priority: Low    Class 1 obesity due to excess calories in adult 02/17/2016     Priority: Low    Restless legs syndrome 02/17/2016     Priority: Low    Gastroesophageal reflux disease without esophagitis 02/17/2016     Priority: Low    Essential hypertension 02/17/2016     Priority: Low    Chronic bilateral low back pain without sciatica 07/16/2012     Priority: Low    Other chest pain 04/30/2012     Priority: Low     Overview Note:     4/30/12  cardiolite  Positive for inferior myocardial ischemia, EF 52%  4/30/12  Cath  Mild to moderate CAD, normal LVFX  4/9/2015  Echo  Moderate AI, normal LVFX  2/17/2016  Echo  Moderate AI, DEE 1.3 cm2  4/18/2018  Echo AS / AI  5/16/2018  AS with moderate AI, decreased LVFX  Cath  Mild CAD  06/07/2018, Aortic Valve Replacement, Implanting a 21 mm [place] Magna Ease Pericardial Tissue Valve by Dr. Ambrosio Wu                 1. Permanent pacemaker placement 12/2013, 100% ventricular paced with recent increase in RV lead threshold with capture failure and presyncopal episodes, status post RV lead revision 8/3/2020 (Melissa). 2.  Single-vessel disease involving bifurcation LAD/diagonal treated with complex bifurcation stents 3/19/2020 (proximal LAD 3.0 x 18 mm resolute, mid LAD 2.5 x 14 mm, D1 2.5 x 14 mm, CAT technique)  3. Bioprosthetic AVR (21 mm pericardial tissue valve) 6/2018, moderate MS, normal LV ejection fraction. 4. Diabetes mellitus. 5. CK D stage III. 6. Obesity.   7.  New extensive left hand abscess/cellulitis post fishhook injury with incision and drainage 9/14/2020, Edwardsiella tarda growth.       PRESENTATION: Justin Haskins is a 79y.o. year old male who presents with fishhook injury that he sustained Thursday last week with rapid development of left arm swelling, redness and development of abscess/cellulitis status post incision and drainage 9/14/2020, on IV antibiotics which have been narrowed with growth of edwardsiella tarda. Asked to evaluate patient for possible fluoroquinolone administration. REVIEW OF SYSTEMS:  Review of Systems   Constitutional: Negative for activity change, diaphoresis and fatigue. HENT: Negative for hearing loss, nosebleeds and tinnitus. Eyes: Negative for visual disturbance. Respiratory: Negative for cough, shortness of breath and wheezing. Cardiovascular: Negative for chest pain, palpitations and leg swelling. Gastrointestinal: Negative for abdominal distention, abdominal pain, blood in stool, diarrhea and vomiting. Endocrine: Negative for cold intolerance, heat intolerance, polydipsia, polyphagia and polyuria. Genitourinary: Negative for difficulty urinating, flank pain and hematuria. Musculoskeletal: Negative for arthralgias, back pain, joint swelling and myalgias. Left arm swelling and bandage with pain   Skin: Negative for pallor and rash. Neurological: Negative for dizziness, seizures, syncope and headaches. Psychiatric/Behavioral: Negative for behavioral problems and dysphoric mood. The patient is not nervous/anxious.         Past Medical History:      Diagnosis Date    Aortic valve stenosis     Arthritis     Chest tightness, discomfort, or pressure 4/30/2012    Chronic back pain     CKD (chronic kidney disease)     CPAP (continuous positive airway pressure) dependence     13cm    Diabetes ([address]     Diabetic polyneuropathy associated with type 2 diabetes mellitus ([facility] 8/29/2016    GERD (gastroesophageal reflux disease)     HTN (hypertension)     Hyperlipemia     Left ventricular diastolic dysfunction     Mitral stenosis     Mitral valve stenosis     Neuropathy     Obstructive sleep apnea     AHI: 34.5 per PSG, 2013; AHI: 36.9 per PSG, 2015; AHI: 54.5 per PSG, 3/2017    Pinched nerve in neck     Restless legs syndrome 2016       Past Surgical History:      Procedure Laterality Date    APPENDECTOMY      BACK SURGERY      4 Back surgeries    BLADDER SURGERY      CARDIAC CATHETERIZATION  12    EF > 50%    CATARACT REMOVAL      CHOLECYSTECTOMY      COLONOSCOPY      EYE SURGERY      implants placed both eyes    HAND SURGERY Left 2020    INCISION AND DRAINAGE LEFT HAND ABSCESS performed by Hawa Jerome MD at [address] [facility] OTHER SURGICAL HISTORY  2020    Right Ventricular lead replacement- Dr. Dexter Fernandez HOMOGRF/STENT N/A 2018    AORTIC VALVE REPLACEMENT TRANSESOPHAGEAL ECHOCARDIOGRAM performed by Amina Chapa MD at [address] [place] [address]         Allergies:  Azithromycin; Metoprolol; Cyclobenzaprine; and Metoclopramide    Past Social History:  Social History     Socioeconomic History    Marital status:       Spouse name: Not on file    Number of children: Not on file    Years of education: Not on file    Highest education level: Not on file   Occupational History    Not on file   Social Needs    Financial resource strain: Not on file    Food insecurity     Worry: Not on file     Inability: Not on file    Transportation needs     Medical: Not on file     Non-medical: Not on file   Tobacco Use    Smoking status: Former Smoker     Packs/day: 3.00     Years: 50.00     Pack years: 150.00     Types: Cigarettes     Last attempt to quit: 1977     Years since quittin.2    Smokeless tobacco: Former User   Substance and Sexual Activity    Alcohol use: No    Drug use: No    Sexual activity: Not Currently     Partners: Female   Lifestyle    Physical activity     Days per week: Not on file     Minutes per session: Not on file    Stress: Not on file   Relationships    Social connections     Talks on phone: Not on file     Gets together: Not on file     Attends Hoahaoism service: Not on file     Active member of club or organization: Not on file     Attends meetings of clubs or organizations: Not on file     Relationship status: Not on file    Intimate partner violence     Fear of current or ex partner: Not on file     Emotionally abused: Not on file     Physically abused: Not on file     Forced sexual activity: Not on file   Other Topics Concern    Not on file   Social History Narrative    Not on file       Family History:       Problem Relation Age of Onset    Diabetes Mother     Cancer Father    Lamar Almeida Sister     Heart Disease Brother     Cancer Brother     Cancer Sister    [facility]:  Prior to Admission medications    Medication Sig Start Date End Date Taking? Authorizing Provider   oxyCODONE-acetaminophen (PERCOCET) 5-325 MG per tablet Take 1 tablet by mouth every 4 hours as needed for Pain for up to 7 days. Intended supply: 7 days.  Take lowest dose possible to manage pain 9/14/20 9/21/20 Yes Leonel Humphrey MD   aspirin EC 81 MG EC tablet Take 1 tablet by mouth 2 times daily for 21 days 9/14/20 10/5/20 Yes Leonel Humphrey MD   aspirin 81 MG EC tablet Take 1 tablet by mouth daily 9/14/20  Yes Leonel Humphrey MD   pramipexole (MIRAPEX) 1.5 MG tablet 3 times daily  8/5/20   Historical Provider, MD   mirabegron (MYRBETRIQ) 25 MG TB24 Take 1 tablet by mouth daily 9/1/20   Addie Chappell MD   ezetimibe (ZETIA) 10 MG tablet Take 10 mg by mouth daily 7/7/20   Historical Provider, MD   clopidogrel (PLAVIX) 75 MG tablet Take 75 mg by mouth daily    Historical Provider, MD   citalopram (CELEXA) 20 MG tablet TAKE ONE TABLET BY MOUTH DAILY 5/4/20   Addie Chappell MD   atorvastatin (LIPITOR) 40 MG tablet Take 1 tablet by mouth nightly 3/20/20   Aarti Howard MD   Cholecalciferol (VITAMIN D3) 90552 units CAPS  4/18/19   Historical Provider, MD   HUMULIN R U-500 KWIKPEN 500 UNIT/ML SOPN concentrated injection pen  4/20/19   Historical Provider, MD   insulin glargine (LANTUS) 100 UNIT/ML injection vial Inject 85 Units into the skin nightly  Patient taking differently: Inject 45 Units into the skin nightly  2/23/19   [facility] Luna, DO   Empagliflozin-Metformin HCl ER (SYNJARDY XR)  MG TB24 Take 1 tablet by mouth daily (with breakfast)    Historical Provider, MD   oxyCODONE (ROXICODONE) 5 MG immediate release tablet Take 5 mg by mouth every 4 hours as needed for Pain. Clarence Ra Historical Provider, MD   atenolol (TENORMIN) 25 MG tablet Take 25 mg by mouth daily    Historical Provider, MD   hydrochlorothiazide (MICROZIDE) 12.5 MG capsule Take 12.5 mg by mouth daily    Historical Provider, MD CHAVIS UF III MINI PEN NEEDLES 31G X 5 MM MISC  8/20/16   Historical Provider, MD   furosemide (LASIX) 20 MG tablet Take 1 tablet by mouth as needed (For weight gain greater than 2.5 lbs in 24 hours.)  Patient taking differently: Take 20 mg by mouth daily  2/19/16   Peter Murillo PA-C   esomeprazole (NEXIUM) 40 MG capsule Take 40 mg by mouth every morning (before breakfast).       Historical Provider, MD       Current Meds:   cefTRIAXone (ROCEPHIN) IV  1 g Intravenous Q24H    pantoprazole  40 mg Oral QAM AC    atenolol  25 mg Oral Daily    Empagliflozin-metFORMIN HCl ER  1 tablet Oral Daily with breakfast    insulin glargine  85 Units Subcutaneous Nightly    vitamin D  50,000 Units Oral Weekly    atorvastatin  40 mg Oral Nightly    citalopram  20 mg Oral Daily    ezetimibe  10 mg Oral Daily    pramipexole  1.5 mg Oral TID    mirabegron  25 mg Oral Daily    famotidine  20 mg Oral BID    insulin lispro  0-12 Units Subcutaneous TID WC    insulin lispro  0-6 Units Subcutaneous Nightly    sodium chloride flush  10 mL Intravenous 2 times per day       Current Infused Meds:   Labs:  Recent Labs     09/16/20 0319 09/17/20  0344 09/18/20  0436   WBC 14.4* 13.2* 14.6*   HGB 14.3 13.9* 13.8*    144 156       Recent Labs     09/16/20 0319 09/17/20  0344 09/18/20  0436    133* 132*   K 4.2 4.4 4.4    99 99   CO2 23 23 23   BUN 25* 18 14   CREATININE 0.7 0.6 0.6   LABGLOM >60 >60 >60   CALCIUM 8.4* 8.5* 8.6*   PHOS 2.5  --   --        CK, CKMB, Troponin: @LABRCNT (CKTOTAL:3, CKMB:3, TROPONINI:3)@    Last 3 BNP:          IMAGING:  No results found. Assessment and Plan: This is a 79y.o. year old male with past medical history of bioprosthetic aortic valve replacement 6/2018, permanent pacemaker placement 12/2013, diabetes mellitus, CKD stage III, obesity, coronary artery disease with PCI 3/19/2020 utilizing complex bifurcation stents to LAD/first diagonal, normal LV ejection fraction, presyncopal episodes with high pacemaker RV lead thresholds status post RV lead revision 8/3/2020 now presenting with extensive cellulitis/abscess requiring drainage of left arm following fishhook injury. 1.  His EKG is ventricular paced with resultant widened QRS. Repolarization as expected is also abnormal and wider than normal.  No significant change from prior. Can proceed with fluoroquinolone administration if this is deemed appropriate as antibiotic choice. 2.  Patient with extensive cardiac hardware including recent pacemaker generator and RV lead revision, extensive bifurcation stents in LAD/diagonal as well as bioprosthetic aortic valve. High risk of endocarditis and requires aggressive antibiotic therapy as appropriate for infection. Have spoken with hospitalist in this regard.       Electronically signed by Moni Barnett MD on 9/18/2020 at 5:13 PM

## 2020-09-18 NOTE — PROGRESS NOTES
Pharmacy Consult      Sagar Gillis is a 79 y.o. male for whom pharmacy has been consulted to review home medications for interactions with Levofloxacin. Patient Active Problem List   Diagnosis    Other chest pain    Chronic bilateral low back pain without sciatica    Syncope and collapse    Type 2 diabetes mellitus with hyperglycemia, with long-term current use of insulin (HCC)    Obstructive sleep apnea    Class 1 obesity due to excess calories in adult    Restless legs syndrome    Gastroesophageal reflux disease without esophagitis    Essential hypertension    Vertigo    Orthostasis    Diabetic polyneuropathy associated with type 2 diabetes mellitus (HCC)    Bilateral carpal tunnel syndrome    CPAP (continuous positive airway pressure) dependence    BiPAP (biphasic positive airway pressure) dependence    Pinched nerve in neck    Rheumatic aortic valve insufficiency    Moderate mitral stenosis    Hyponatremia    Hypercholesterolemia with hypertriglyceridemia    Syncope    Vertebrobasilar artery insufficiency    S/P aortic valve replacement with bioprosthetic valve    Grade I diastolic dysfunction    Pacemaker    Near syncope    History of syncope    Sinoatrial node dysfunction (HCC)    Mixed hyperlipidemia    Pre-syncope    Bradycardia    Lightheadedness    Heart block    Pacemaker malfunction, initial encounter    Pacemaker lead malfunction    Cellulitis of left hand and arm    Abscess of left hand    Hypokalemia    Hypophosphatemia    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     Assessment/Plan:    Citalopram: QT-prolonging Quinolone Antibiotics (Moderate Risk) may enhance the QTc-prolonging effect of QT-prolonging Antidepressants (Moderate Risk). - Severity: Moderate - Reliability Rating: Fair     Humalog, Lantus, and Synjardy: Quinolones may enhance the hypoglycemic effect of Agents with Blood Glucose Lowering Effects. Quinolones may diminish the therapeutic effect of Agents with Blood Glucose Lowering Effects. Specifically, if an agent is being used to treat diabetes, loss of blood sugar control may occur with quinolone use. - Severity: Moderate - Reliability Rating: Fair     Thank you for the consult.       Electronically signed by Jack Campuzano, [address] [place] [address] on 9/18/2020 at 12:19 PM

## 2020-09-19 LAB
ANION GAP SERPL CALCULATED.3IONS-SCNC: 13 MMOL/L (ref 7–19)
BASOPHILS ABSOLUTE: 0.1 K/UL (ref 0–0.2)
BASOPHILS RELATIVE PERCENT: 0.6 % (ref 0–1)
BUN BLDV-MCNC: 13 MG/DL (ref 8–23)
CALCIUM SERPL-MCNC: 9.4 MG/DL (ref 8.8–10.2)
CHLORIDE BLD-SCNC: 94 MMOL/L (ref 98–111)
CO2: 25 MMOL/L (ref 22–29)
CREAT SERPL-MCNC: 0.7 MG/DL (ref 0.5–1.2)
EOSINOPHILS ABSOLUTE: 0.2 K/UL (ref 0–0.6)
EOSINOPHILS RELATIVE PERCENT: 1 % (ref 0–5)
GFR AFRICAN AMERICAN: >59
GFR NON-AFRICAN AMERICAN: >60
GLUCOSE BLD-MCNC: 164 MG/DL (ref 70–99)
GLUCOSE BLD-MCNC: 218 MG/DL (ref 70–99)
GLUCOSE BLD-MCNC: 234 MG/DL (ref 74–109)
GLUCOSE BLD-MCNC: 245 MG/DL (ref 70–99)
GLUCOSE BLD-MCNC: 267 MG/DL (ref 70–99)
HCT VFR BLD CALC: 46 % (ref 42–52)
HEMOGLOBIN: 15.4 G/DL (ref 14–18)
IMMATURE GRANULOCYTES #: 0.8 K/UL
LYMPHOCYTES ABSOLUTE: 0.9 K/UL (ref 1.1–4.5)
LYMPHOCYTES RELATIVE PERCENT: 5.5 % (ref 20–40)
MCH RBC QN AUTO: 30.3 PG (ref 27–31)
MCHC RBC AUTO-ENTMCNC: 33.5 G/DL (ref 33–37)
MCV RBC AUTO: 90.4 FL (ref 80–94)
MONOCYTES ABSOLUTE: 1.7 K/UL (ref 0–0.9)
MONOCYTES RELATIVE PERCENT: 10.7 % (ref 0–10)
NEUTROPHILS ABSOLUTE: 11.9 K/UL (ref 1.5–7.5)
NEUTROPHILS RELATIVE PERCENT: 76.8 % (ref 50–65)
PDW BLD-RTO: 14.2 % (ref 11.5–14.5)
PERFORMED ON: ABNORMAL
PLATELET # BLD: 170 K/UL (ref 130–400)
PMV BLD AUTO: 9.5 FL (ref 9.4–12.4)
POTASSIUM SERPL-SCNC: 4.4 MMOL/L (ref 3.5–5)
RBC # BLD: 5.09 M/UL (ref 4.7–6.1)
SODIUM BLD-SCNC: 132 MMOL/L (ref 136–145)
WBC # BLD: 15.6 K/UL (ref 4.8–10.8)

## 2020-09-19 PROCEDURE — 85025 COMPLETE CBC W/AUTO DIFF WBC: CPT

## 2020-09-19 PROCEDURE — 1210000000 HC MED SURG R&B

## 2020-09-19 PROCEDURE — 2580000003 HC RX 258: Performed by: INTERNAL MEDICINE

## 2020-09-19 PROCEDURE — 82947 ASSAY GLUCOSE BLOOD QUANT: CPT

## 2020-09-19 PROCEDURE — 2580000003 HC RX 258: Performed by: ORTHOPAEDIC SURGERY

## 2020-09-19 PROCEDURE — 6370000000 HC RX 637 (ALT 250 FOR IP): Performed by: HOSPITALIST

## 2020-09-19 PROCEDURE — 36415 COLL VENOUS BLD VENIPUNCTURE: CPT

## 2020-09-19 PROCEDURE — 6360000002 HC RX W HCPCS: Performed by: INTERNAL MEDICINE

## 2020-09-19 PROCEDURE — 6370000000 HC RX 637 (ALT 250 FOR IP): Performed by: ORTHOPAEDIC SURGERY

## 2020-09-19 PROCEDURE — 80048 BASIC METABOLIC PNL TOTAL CA: CPT

## 2020-09-19 RX ORDER — NICOTINE POLACRILEX 4 MG
15 LOZENGE BUCCAL PRN
Status: DISCONTINUED | OUTPATIENT
Start: 2020-09-19 | End: 2020-09-21 | Stop reason: HOSPADM

## 2020-09-19 RX ORDER — DEXTROSE MONOHYDRATE 25 G/50ML
12.5 INJECTION, SOLUTION INTRAVENOUS PRN
Status: DISCONTINUED | OUTPATIENT
Start: 2020-09-19 | End: 2020-09-21 | Stop reason: HOSPADM

## 2020-09-19 RX ORDER — DEXTROSE MONOHYDRATE 50 MG/ML
100 INJECTION, SOLUTION INTRAVENOUS PRN
Status: DISCONTINUED | OUTPATIENT
Start: 2020-09-19 | End: 2020-09-21 | Stop reason: HOSPADM

## 2020-09-19 RX ADMIN — ATENOLOL 25 MG: 25 TABLET ORAL at 10:14

## 2020-09-19 RX ADMIN — Medication 85 UNITS: at 23:17

## 2020-09-19 RX ADMIN — INSULIN LISPRO 9 UNITS: 100 INJECTION, SOLUTION INTRAVENOUS; SUBCUTANEOUS at 17:37

## 2020-09-19 RX ADMIN — INSULIN LISPRO 4 UNITS: 100 INJECTION, SOLUTION INTRAVENOUS; SUBCUTANEOUS at 13:30

## 2020-09-19 RX ADMIN — SODIUM CHLORIDE, PRESERVATIVE FREE 10 ML: 5 INJECTION INTRAVENOUS at 23:17

## 2020-09-19 RX ADMIN — PRAMIPEXOLE DIHYDROCHLORIDE 1.5 MG: 1 TABLET ORAL at 10:13

## 2020-09-19 RX ADMIN — PANTOPRAZOLE SODIUM 40 MG: 40 TABLET, DELAYED RELEASE ORAL at 05:29

## 2020-09-19 RX ADMIN — PRAMIPEXOLE DIHYDROCHLORIDE 1.5 MG: 1 TABLET ORAL at 14:14

## 2020-09-19 RX ADMIN — FAMOTIDINE 20 MG: 20 TABLET ORAL at 23:16

## 2020-09-19 RX ADMIN — EZETIMIBE 10 MG: 10 TABLET ORAL at 10:14

## 2020-09-19 RX ADMIN — ATORVASTATIN CALCIUM 40 MG: 40 TABLET, FILM COATED ORAL at 23:16

## 2020-09-19 RX ADMIN — CITALOPRAM HYDROBROMIDE 20 MG: 20 TABLET ORAL at 10:14

## 2020-09-19 RX ADMIN — OXYCODONE 5 MG: 5 TABLET ORAL at 14:14

## 2020-09-19 RX ADMIN — SODIUM CHLORIDE, PRESERVATIVE FREE 1 G: 5 INJECTION INTRAVENOUS at 20:50

## 2020-09-19 RX ADMIN — MIRABEGRON 25 MG: 25 TABLET, FILM COATED, EXTENDED RELEASE ORAL at 10:14

## 2020-09-19 RX ADMIN — POLYETHYLENE GLYCOL 3350 17 G: 17 POWDER, FOR SOLUTION ORAL at 00:56

## 2020-09-19 RX ADMIN — FAMOTIDINE 20 MG: 20 TABLET ORAL at 10:13

## 2020-09-19 RX ADMIN — PRAMIPEXOLE DIHYDROCHLORIDE 1.5 MG: 1 TABLET ORAL at 23:16

## 2020-09-19 ASSESSMENT — PAIN SCALES - GENERAL
PAINLEVEL_OUTOF10: 7
PAINLEVEL_OUTOF10: 6
PAINLEVEL_OUTOF10: 0

## 2020-09-19 NOTE — PLAN OF CARE
Problem: Falls - Risk of:  Goal: Will remain free from falls  Description: Will remain free from falls  9/19/2020 1500 by Juan Roth RN  Outcome: Ongoing  9/19/2020 0331 by Holley Judd LPN  Outcome: Ongoing  Goal: Absence of physical injury  Description: Absence of physical injury  9/19/2020 1500 by Juan Roth RN  Outcome: Ongoing  9/19/2020 0331 by Holley Judd LPN  Outcome: Ongoing

## 2020-09-19 NOTE — PROGRESS NOTES
[place], [place], [address]        DEPARTMENT OF HOSPITALIST MEDICINE        PROGRESS  NOTE:    NOTE: Portions of this note have been copied forward, however, changed to reflect the most current clinical status of this patient. Patient: Fany Jesus  YOB: 1940  Date of Service: 9/19/2020  MRN: [id number]   Acct: [de-identified]   Primary Care Physician: RITESH Cole CNP  Advance Directive: Full Code  Admit Date: 9/14/2020       Hospital Day: 5        SUBJECTIVE:    I spoke with patient and family in detail about the risk of involvement of his heart if the infection spreads. He did not express the desire to go home today as he normally does! Rosanne Oscar  COURSE:    9/19/2020  I spoke with the cardiologist in detail. Patient has wide QRS complex and QTC of 499 likely secondary to the permanent pacemaker placement as he has paced rhythm. Cardiology has cleared the patient to use Levaquin as an outpatient if needed. Patient had multiple cardiac issues requiring insertion of hardware. Any infection that might spread systematically may be disastrous for the patient involving the heart issues. Patient needs aggressive treatment and close monitoring in the hospital until wound reinfection possibility comes drastically down. Please see the cardiology note as below:        9/18/2020  Patient is feeling better. ID wants to discharge patient on p.o. Levaquin because of increased bioavailability. She spoke with patient and son regarding black box warning. They got concerned and wanted Dr. Claudette Isaacs input who is a his cardiologist.    9/17/2020  Patient is feeling better. Pain is under control. Case management is working on setting up home health care for the patient. Patient had great antibiotic sensitivity to major antibiotics on follow-up and review of the surgical cultures. Still awaiting clearance from ID and orthopedics for discharge.     9/16/2020  Patient's surgical wound cultures from transferring facility were positive for Aeromonas gram-negative waterborne organism and he is growing Edwardsiella tarda. (Gram-negative waterborne organism) from the cultures sent from our facility. ID is continuing with cefepime for now and awaiting debilities from surgical cultures. 9/15/2020  Patient underwent I&D of left hand yesterday which he tolerated well. Wound cultures were obtained and the affected sites were copiously irrigated. Patient is feeling better. I reviewed the ID and sensitivity of the wound cultures from patient's transferring facility which were sensitive to IV cefepime. 9/14/2020  HISTORY OF PRESENT ILLNESS:  Sagar Gillis is an 79 y.o. male. He is a very pleasant gentleman who has diabetes mellitus and chronic medical issues. He is also a professional fisherman. He was fishing 4 days ago when he caught a fish and when he tried to reel her in, the hook jerked out and got lodged into his left hand. The hook was dirty looking and in water for a long time. His friend took the hook out and patient was brought in to the ER for evaluation from where he was admitted in [facility].  He was started on IV Invanz and vancomycin and is transferred to our facility for an ID evaluation. I saw the patient when he came to our hospital.  His hand and left arm is red and swollen. I ordered hand surgery and ID consults as well as baseline labs and will restart him on IV Invanz and vancomycin. He is kept n.p.o. for possible I&D of his left hand later on today.       REVIEW  OF  SYSTEMS:    Constitutional:  No fevers, chills, nausea, vomiting,    Lungs:   No hemoptysis, pleurisy   Heart:  No chest pressure with exertion, palpitations,    Abdomen:   No new masses, no bright red blood per rectum   Extremities: + mild Left hand pain after surgery   Neurologic: No new motor or sensory changes       PAST MEDICAL HISTORY:  Past Medical History: Diagnosis Date    Aortic valve stenosis     Arthritis     Chest tightness, discomfort, or pressure 4/30/2012    Chronic back pain     CKD (chronic kidney disease)     CPAP (continuous positive airway pressure) dependence     13cm    Diabetes [facility] [address]     Diabetic polyneuropathy associated with type 2 diabetes mellitus ([facility] [address] 8/29/2016    GERD (gastroesophageal reflux disease)     HTN (hypertension)     Hyperlipemia     Left ventricular diastolic dysfunction     Mitral stenosis     Mitral valve stenosis     Neuropathy     Obstructive sleep apnea     AHI: 34.5 per PSG, 6/2013; AHI: 36.9 per PSG, 7/2015; AHI: 54.5 per PSG, 3/2017    Pinched nerve in neck     Restless legs syndrome 2/17/2016         PAST SURGICAL HISTORY:  Past Surgical History:   Procedure Laterality Date    APPENDECTOMY      BACK SURGERY      4 Back surgeries    BLADDER SURGERY      CARDIAC CATHETERIZATION  4/30/12    EF > 50%    CATARACT REMOVAL      CHOLECYSTECTOMY      COLONOSCOPY      EYE SURGERY      implants placed both eyes    HAND SURGERY Left 9/14/2020    INCISION AND DRAINAGE LEFT HAND ABSCESS performed by Gerard Grimaldo MD at [address] [place] [address] OTHER SURGICAL HISTORY  08/03/2020    Right Ventricular lead replacement- Dr. Jesenia Fontanez HOMOGRF/STENT N/A 6/7/2018    AORTIC VALVE REPLACEMENT TRANSESOPHAGEAL ECHOCARDIOGRAM performed by Meghann Medina MD at [address] [place] [address] Road:  Family History   Problem Relation Age of Onset    Diabetes Mother     Cancer Father     Cancer Sister     Heart Disease Brother     Cancer Brother     Cancer Sister     Cancer Brother            OBJECTIVE:  /65   Pulse 60   Temp 97 °F (36.1 °C) (Temporal)   Resp 18   Ht 5' 10\" (1.778 m)   Wt 225 lb 2 oz (102.1 kg)   SpO2 95%   BMI 32.30 kg/m²   No intake/output data recorded.     PHYSICAL  EXAMINATION:    PETR:  Awake, alert, oriented x 3, patient appears tired and fatigued   Head/Eyes:  Normocephalic, atraumatic, EOMI and PERRLA bilaterally   Respiratory:   Bilateral decreased air entry in both lung fields, mild B/L crackles, symmetric expansion of chest   Cardiovascular:  Regular rate and rhythm, S1+S2+0, no murmurs/rubs   Abdomen:   Soft, non-tender, bowel sounds +ve, no organomegaly   Extremities: Moves all, decreased range of motion of left hand fingers, ++ left hand edema, left hand under bandage   Neurologic: Awake, alert, oriented x 3, cranial nerves II-XII intact, no focal neurological deficits, sensory system intact   Psychiatric: Normal mood, non-suicidal       CURRENT MEDICATIONS:  Scheduled:   cefTRIAXone (ROCEPHIN) IV  1 g Intravenous Q24H    pantoprazole  40 mg Oral QAM AC    atenolol  25 mg Oral Daily    Empagliflozin-metFORMIN HCl ER  1 tablet Oral Daily with breakfast    insulin glargine  85 Units Subcutaneous Nightly    vitamin D  50,000 Units Oral Weekly    atorvastatin  40 mg Oral Nightly    citalopram  20 mg Oral Daily    ezetimibe  10 mg Oral Daily    pramipexole  1.5 mg Oral TID    mirabegron  25 mg Oral Daily    famotidine  20 mg Oral BID    insulin lispro  0-12 Units Subcutaneous TID WC    insulin lispro  0-6 Units Subcutaneous Nightly    sodium chloride flush  10 mL Intravenous 2 times per day        PRN:  magic (miracle) mouthwash, 5 mL, Q8H PRN  potassium chloride, 40 mEq, PRN    Or  potassium alternative oral replacement, 40 mEq, PRN    Or  potassium chloride, 10 mEq, PRN  magnesium sulfate, 1 g, PRN  acetaminophen, 650 mg, Q6H PRN    Or  acetaminophen, 650 mg, Q6H PRN  polyethylene glycol, 17 g, Daily PRN  sodium chloride flush, 10 mL, PRN  promethazine, 12.5 mg, Q6H PRN    Or  ondansetron, 4 mg, Q6H PRN  oxyCODONE, 5 mg, Q4H PRN    Or  oxyCODONE, 10 mg, Q4H PRN  morphine, 2 mg, Q2H PRN    Or  morphine, 4 mg, Q2H PRN        Infusions:   sodium chloride 75 mL/hr at 09/17/20 4407    lactated ringers 125 mL/hr at 09/14/20 2209       Laboratory Data:        Recent Labs     09/17/20  0344 09/18/20  0436 09/19/20  0234   WBC 13.2* 14.6* 15.6*   HGB 13.9* 13.8* 15.4    156 170     Recent Labs     09/17/20  0344 09/18/20  0436 09/19/20  0234   * 132* 132*   K 4.4 4.4 4.4   CL 99 99 94*   CO2 23 23 25   BUN 18 14 13   CREATININE 0.6 0.6 0.7   GLUCOSE 112* 145* 234*     No results for input(s): AST, ALT, ALB, BILITOT, ALKPHOS in the last 72 hours. Troponin T: No results for input(s): TROPONINI in the last 72 hours. Pro-BNP: No results for input(s): BNP in the last 72 hours. INR: No results for input(s): INR in the last 72 hours. UA:No results for input(s): NITRITE, COLORU, PHUR, LABCAST, WBCUA, RBCUA, MUCUS, TRICHOMONAS, YEAST, BACTERIA, CLARITYU, SPECGRAV, LEUKOCYTESUR, UROBILINOGEN, BILIRUBINUR, BLOODU, GLUCOSEU, AMORPHOUS in the last 72 hours. Invalid input(s): Era Feeling  A1C:   No results for input(s): LABA1C in the last 72 hours. ABG:No results for input(s): PHART, PXW8LMQ, PO2ART, YAD7LJY, BEART, HGBAE, T2NXWSQA, CARBOXHGBART in the last 72 hours. Imaging:    No results found. ASSESSMENT & PLAN:    Principal Problem:    Cellulitis of left hand and arm  Active Problems:    Chronic bilateral low back pain without sciatica    Type 2 diabetes mellitus with hyperglycemia, with long-term current use of insulin ([facility])    Obstructive sleep apnea    Class 1 obesity due to excess calories in adult    Gastroesophageal reflux disease without esophagitis    Essential hypertension    Diabetic polyneuropathy associated with type 2 diabetes mellitus (HCC)    Bilateral carpal tunnel syndrome    CPAP (continuous positive airway pressure) dependence    S/P aortic valve replacement with bioprosthetic valve    Pacemaker    Abscess of left hand    Hypokalemia    Hypophosphatemia    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

## 2020-09-19 NOTE — PLAN OF CARE
Problem: Falls - Risk of:  Goal: Will remain free from falls  Description: Will remain free from falls  9/19/2020 0331 by Adan Prader, LPN  Outcome: Ongoing  9/18/2020 1518 by Erick White RN  Outcome: Ongoing  Goal: Absence of physical injury  Description: Absence of physical injury  9/19/2020 0331 by Adan Prader, LPN  Outcome: Ongoing  9/18/2020 1518 by Erick White RN  Outcome: Ongoing     Problem: Pain:  Goal: Pain level will decrease  Description: Pain level will decrease  9/19/2020 0331 by Adan Prader, LPN  Outcome: Ongoing  9/18/2020 1518 by Erick White RN  Outcome: Ongoing  Goal: Control of acute pain  Description: Control of acute pain  9/19/2020 0331 by Adan Prader, LPN  Outcome: Ongoing  9/18/2020 1518 by Erick White RN  Outcome: Ongoing  Goal: Control of chronic pain  Description: Control of chronic pain  9/19/2020 0331 by Adan Prader, LPN  Outcome: Ongoing  9/18/2020 1518 by Erick White RN  Outcome: Ongoing     Problem: Discharge Planning:  Goal: Discharged to appropriate level of care  Description: Discharged to appropriate level of care  Outcome: Ongoing     Problem:  Body Temperature - Imbalanced:  Goal: Ability to maintain a body temperature in the normal range will improve  Description: Ability to maintain a body temperature in the normal range will improve  Outcome: Ongoing     Problem: Mobility - Impaired:  Goal: Mobility will improve to maximum level  Description: Mobility will improve to maximum level  Outcome: Ongoing     Problem: Pain:  Goal: Pain level will decrease  Description: Pain level will decrease  9/19/2020 0331 by Adan Prader, LPN  Outcome: Ongoing  9/18/2020 1518 by Erick White RN  Outcome: Ongoing  Goal: Control of acute pain  Description: Control of acute pain  Outcome: Ongoing  Goal: Control of chronic pain  Description: Control of chronic pain  Outcome: Ongoing     Problem: Skin Integrity - Impaired:  Goal: Will show no infection signs and symptoms  Description: Will show no infection signs and symptoms  Outcome: Ongoing  Goal: Absence of new skin breakdown  Description: Absence of new skin breakdown  Outcome: Ongoing     Problem: Fluid Volume:  Goal: Ability to achieve a balanced intake and output will improve  Description: Ability to achieve a balanced intake and output will improve  Outcome: Ongoing     Problem: Physical Regulation:  Goal: Ability to maintain clinical measurements within normal limits will improve  Description: Ability to maintain clinical measurements within normal limits will improve  Outcome: Ongoing  Goal: Will show no signs and symptoms of electrolyte imbalance  Description: Will show no signs and symptoms of electrolyte imbalance  Outcome: Ongoing

## 2020-09-19 NOTE — PROGRESS NOTES
Infectious Diseases Progress Note    Patient: Lexi Adkins  YOB: 1940  MRN: [id number]   Admit date: 9/14/2020   Admitting Physician: Milli Schneider MD  Primary Care Physician: RITESH Dc - CNP    Chief Complaint/Interval History: He is without fever. He is hemodynamically stable. He is elevating his left arm. He feels it is starting to look better. In/Out    Intake/Output Summary (Last 24 hours) at 9/19/2020 1836  Last data filed at 9/19/2020 0046  Gross per 24 hour   Intake 450 ml   Output --   Net 450 ml     Allergies:    Allergies   Allergen Reactions    Azithromycin     Metoprolol     Cyclobenzaprine Rash    Metoclopramide Rash     Current Meds: insulin lispro (HUMALOG) injection vial 0-18 Units, TID WC  insulin lispro (HUMALOG) injection vial 0-9 Units, Nightly  glucose (GLUTOSE) 40 % oral gel 15 g, PRN  dextrose 50 % IV solution, PRN  glucagon (rDNA) injection 1 mg, PRN  dextrose 5 % solution, PRN  magic (miracle) mouthwash, Q8H PRN  cefTRIAXone (ROCEPHIN) 1 g in sodium chloride (PF) 10 mL IV syringe, Q24H  pantoprazole (PROTONIX) tablet 40 mg, QAM AC  atenolol (TENORMIN) tablet 25 mg, Daily  Patient supplied - Empagliflozin-metFORMIN HCl ER  MG TB24 1 tablet, Daily with breakfast  insulin glargine (LANTUS) injection vial 85 Units, Nightly  vitamin D (ERGOCALCIFEROL) capsule 50,000 Units, Weekly  atorvastatin (LIPITOR) tablet 40 mg, Nightly  citalopram (CELEXA) tablet 20 mg, Daily  ezetimibe (ZETIA) tablet 10 mg, Daily  pramipexole (MIRAPEX) tablet 1.5 mg, TID  mirabegron (MYRBETRIQ) extended release tablet 25 mg, Daily  potassium chloride (KLOR-CON M) extended release tablet 40 mEq, PRN    Or  potassium bicarb-citric acid (EFFER-K) effervescent tablet 40 mEq, PRN    Or  potassium chloride 10 mEq/100 mL IVPB (Peripheral Line), PRN  magnesium sulfate 1 g in dextrose 5% 100 mL IVPB, PRN  acetaminophen (TYLENOL) tablet 650 mg, Q6H PRN    Or  acetaminophen (TYLENOL) suppository 650 mg, Q6H PRN  polyethylene glycol (GLYCOLAX) packet 17 g, Daily PRN  famotidine (PEPCID) tablet 20 mg, BID  0.9 % sodium chloride infusion, Continuous  lactated ringers infusion, Continuous  sodium chloride flush 0.9 % injection 10 mL, 2 times per day  sodium chloride flush 0.9 % injection 10 mL, PRN  promethazine (PHENERGAN) tablet 12.5 mg, Q6H PRN    Or  ondansetron (ZOFRAN) injection 4 mg, Q6H PRN  oxyCODONE (ROXICODONE) immediate release tablet 5 mg, Q4H PRN    Or  oxyCODONE (ROXICODONE) immediate release tablet 10 mg, Q4H PRN  morphine injection 2 mg, Q2H PRN    Or  morphine injection 4 mg, Q2H PRN      Review of Systems no diarrhea or rash    VitalSigns:  /60   Pulse 59   Temp 97.1 °F (36.2 °C)   Resp 18   Ht 5' 10\" (1.778 m)   Wt 225 lb 2 oz (102.1 kg)   SpO2 96%   BMI 32.30 kg/m²      Physical Exam  Line/IV site: No erythema, warmth, induration, or tenderness. Left arm with intact sensation and capillary refill all fingers. Currently wrapped.     Lab Results:  CBC:   Recent Labs     09/17/20  0344 09/18/20  0436 09/19/20  0234   WBC 13.2* 14.6* 15.6*   HGB 13.9* 13.8* 15.4    156 170     BMP:  Recent Labs     09/17/20  0344 09/18/20  0436 09/19/20  0234   * 132* 132*   K 4.4 4.4 4.4   CL 99 99 94*   CO2 23 23 25   BUN 18 14 13   CREATININE 0.6 0.6 0.7   GLUCOSE 112* 145* 234*     CultureResults:    Surgical cultures:  Edwardsiella tarda (1)   Antibiotic  Interpretation  BARBARA  Status    ampicillin  Sensitive  <=2  mcg/mL     aztreonam  Sensitive  <=1  mcg/mL     ceFAZolin  Sensitive  <=4  mcg/mL     cefepime  Sensitive  <=1  mcg/mL     cefTRIAXone  Sensitive  <=1  mcg/mL     gentamicin  Sensitive  <=1  mcg/mL     levofloxacin  Sensitive  <=0.12  mcg/mL     meropenem  Sensitive  <=0.25  mcg/mL     piperacillin-tazobactam  Sensitive  <=4  mcg/mL     trimethoprim-sulfamethoxazole  Sensitive  <=20  mcg/mL       Radiology: None    Additional Studies Reviewed: None    Impression:  Severe infection left hand-had undergone incision and drainage  Wound cultures from [facility] Aeromonas.   Surgical cultures here edwardsiella  Leukocytosis-stable    Recommendations:  Continue elevation  Continue ceftriaxone  If ongoing improvement possible transition to oral antibiotic treatment    Ravinder Mobley MD

## 2020-09-19 NOTE — PROGRESS NOTES
Chan[place], [facility], [place] [address]        DEPARTMENT OF HOSPITALIST MEDICINE        PROGRESS  NOTE:    NOTE: Portions of this note have been copied forward, however, changed to reflect the most current clinical status of this patient. Patient: Fany Jesus  YOB: 1940  Date of Service: 9/18/2020  MRN: [id number]   Acct: [de-identified]   Primary Care Physician: RITESH Cole CNP  Advance Directive: Full Code  Admit Date: 9/14/2020       Hospital Day: 4        SUBJECTIVE:    I saw the patient in the room earlier today. Remains symptomatic to go home, but once they had about the oral Levaquin antibiotic with black box warnings, they wanted to hold discharge and speak with Dr. Lisha Britton. [address] [facility]  COURSE:    9/18/2020  Patient is feeling better. ID wants to discharge patient on p.o. Levaquin because of increased bioavailability. She spoke with patient and son regarding black box warning. They got concerned and wanted Dr. Claudette Isaacs input who is a his cardiologist.    9/17/2020  Patient is feeling better. Pain is under control. Case management is working on setting up home health care for the patient. Patient had great antibiotic sensitivity to major antibiotics on follow-up and review of the surgical cultures. Still awaiting clearance from ID and orthopedics for discharge. 9/16/2020  Patient's surgical wound cultures from transferring facility were positive for Aeromonas gram-negative waterborne organism and he is growing Edwardsiella tarda. (Gram-negative waterborne organism) from the cultures sent from our facility. ID is continuing with cefepime for now and awaiting debilities from surgical cultures. 9/15/2020  Patient underwent I&D of left hand yesterday which he tolerated well. Wound cultures were obtained and the affected sites were copiously irrigated. Patient is feeling better.   I reviewed the ID and sensitivity of the wound cultures from patient's transferring facility which were sensitive to IV cefepime. 9/14/2020  HISTORY OF PRESENT ILLNESS:  Laine Mcdaniel is an 79 y.o. male. He is a very pleasant gentleman who has diabetes mellitus and chronic medical issues. He is also a professional fisherman. He was fishing 4 days ago when he caught a fish and when he tried to reel her in, the hook jerked out and got lodged into his left hand. The hook was dirty looking and in water for a long time. His friend took the hook out and patient was brought in to the ER for evaluation from where he was admitted in [facility].  He was started on IV Invanz and vancomycin and is transferred to our facility for an ID evaluation. I saw the patient when he came to our hospital.  His hand and left arm is red and swollen. I ordered hand surgery and ID consults as well as baseline labs and will restart him on IV Invanz and vancomycin. He is kept n.p.o. for possible I&D of his left hand later on today.       REVIEW  OF  SYSTEMS:    Constitutional:  No fevers, chills, nausea, vomiting,    Lungs:   No hemoptysis, pleurisy   Heart:  No chest pressure with exertion, palpitations,    Abdomen:   No new masses, no bright red blood per rectum   Extremities: + mild Left hand pain after surgery   Neurologic: No new motor or sensory changes       PAST MEDICAL HISTORY:  Past Medical History:   Diagnosis Date    Aortic valve stenosis     Arthritis     Chest tightness, discomfort, or pressure 4/30/2012    Chronic back pain     CKD (chronic kidney disease)     CPAP (continuous positive airway pressure) dependence     13cm    Diabetes ([address]     Diabetic polyneuropathy associated with type 2 diabetes mellitus ([facility] [address] 8/29/2016    GERD (gastroesophageal reflux disease)     HTN (hypertension)     Hyperlipemia     Left ventricular diastolic dysfunction     Mitral stenosis     Mitral valve stenosis     Neuropathy     Obstructive sleep apnea     AHI: 34.5 per PSG, deficits, sensory system intact   Psychiatric: Normal mood, non-suicidal       CURRENT MEDICATIONS:  Scheduled:   cefTRIAXone (ROCEPHIN) IV  1 g Intravenous Q24H    pantoprazole  40 mg Oral QAM AC    atenolol  25 mg Oral Daily    Empagliflozin-metFORMIN HCl ER  1 tablet Oral Daily with breakfast    insulin glargine  85 Units Subcutaneous Nightly    vitamin D  50,000 Units Oral Weekly    atorvastatin  40 mg Oral Nightly    citalopram  20 mg Oral Daily    ezetimibe  10 mg Oral Daily    pramipexole  1.5 mg Oral TID    mirabegron  25 mg Oral Daily    famotidine  20 mg Oral BID    insulin lispro  0-12 Units Subcutaneous TID WC    insulin lispro  0-6 Units Subcutaneous Nightly    sodium chloride flush  10 mL Intravenous 2 times per day        PRN:  magic (miracle) mouthwash, 5 mL, Q8H PRN  potassium chloride, 40 mEq, PRN    Or  potassium alternative oral replacement, 40 mEq, PRN    Or  potassium chloride, 10 mEq, PRN  magnesium sulfate, 1 g, PRN  acetaminophen, 650 mg, Q6H PRN    Or  acetaminophen, 650 mg, Q6H PRN  polyethylene glycol, 17 g, Daily PRN  sodium chloride flush, 10 mL, PRN  promethazine, 12.5 mg, Q6H PRN    Or  ondansetron, 4 mg, Q6H PRN  oxyCODONE, 5 mg, Q4H PRN    Or  oxyCODONE, 10 mg, Q4H PRN  morphine, 2 mg, Q2H PRN    Or  morphine, 4 mg, Q2H PRN        Infusions:   sodium chloride 75 mL/hr at 09/17/20 2318    lactated ringers 125 mL/hr at 09/14/20 2209       Laboratory Data:        Recent Labs     09/16/20 0319 09/17/20  0344 09/18/20  0436   WBC 14.4* 13.2* 14.6*   HGB 14.3 13.9* 13.8*    144 156     Recent Labs     09/16/20 0319 09/17/20  0344 09/18/20  0436    133* 132*   K 4.2 4.4 4.4    99 99   CO2 23 23 23   BUN 25* 18 14   CREATININE 0.7 0.6 0.6   GLUCOSE 133* 112* 145*     No results for input(s): AST, ALT, ALB, BILITOT, ALKPHOS in the last 72 hours. Troponin T: No results for input(s): TROPONINI in the last 72 hours.   Pro-BNP: No results for input(s): BNP in the last 72 hours. INR: No results for input(s): INR in the last 72 hours. UA:No results for input(s): NITRITE, COLORU, PHUR, LABCAST, WBCUA, RBCUA, MUCUS, TRICHOMONAS, YEAST, BACTERIA, CLARITYU, SPECGRAV, LEUKOCYTESUR, UROBILINOGEN, BILIRUBINUR, BLOODU, GLUCOSEU, AMORPHOUS in the last 72 hours. Invalid input(s): Ally Fair  A1C:   No results for input(s): LABA1C in the last 72 hours. ABG:No results for input(s): PHART, [id number], PO2ART, VQY7IIW, BEART, HGBAE, I2VDUAJR, CARBOXHGBART in the last 72 hours. Imaging:    No results found. ASSESSMENT & PLAN:    Principal Problem:    Cellulitis of left hand and arm  Active Problems:    Chronic bilateral low back pain without sciatica    Type 2 diabetes mellitus with hyperglycemia, with long-term current use of insulin ([facility])    Obstructive sleep apnea    Class 1 obesity due to excess calories in adult    Gastroesophageal reflux disease without esophagitis    Essential hypertension    Diabetic polyneuropathy associated with type 2 diabetes mellitus ([facility])    Bilateral carpal tunnel syndrome    CPAP (continuous positive airway pressure) dependence    S/P aortic valve replacement with bioprosthetic valve    Pacemaker    Abscess of left hand    Hypokalemia    Hypophosphatemia    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 *          Continue with current medications  Wound cultures from transferring facilities showed Aeromonas cultures   Wound cultures from the surgical cultures are positive for Edwardsiella tarda obtained during I&D of left hand, which showed great sensitivity to measure antibiotics  De-escalation of IV antibiotics from IV cefepime to IV ceftriaxone as per ID  ID spoke with the patient and son regarding p.o.  Levaquin at the antibiotic a chance with good bioavailability and discussed black box warnings with them  Cardiology consult given to evaluate appropriateness of Levaquin as outpatient antibiotic for this patient  Patient had a QTC of 499 on EKG  Continue with pain management PRN as needed  Continue diabetic meds  Accu-Cheks qAC and qHS ordered with insulin coverage as per protocol  Leukocytosis is slowly downtrending   Potassium levels normalized after replacement  Phosphate levels normalized after replacement  Follow-up closely with ID and hand surgery  Continue with dressing changes as per hand surgery recommendations      Repeat labs in a.m. Electrolyte replacement as per protocol. Patient will be monitored very closely on the floor. Further recommendations as per the hospital course. DC planning: Likely in the next few days once patient is cleared for discharge by ID and hand surgery, and p.o. antibiotics approved by cardiology    Patient  is on DVT prophylaxis  Current medications reviewed  Lab work reviewed  Radiology/Chest x-ray films reviewed  Treatment recommendations from suspecialities reviewed, appreciated and agreed with  Discussed with the nurse and addressed all questions/concerns  Discussed with Patient and/or Family at the bedside in detail . .. they understand and agree with the management plan. Anna Gonzalez MD  9/18/2020 9:07 PM      DISCLAIMER: This note was created with electronic voice recognition which does have occasional errors. If you have any questions regarding the content within the note please do not hesitate to contact me. .. Thanks.

## 2020-09-20 LAB
ANION GAP SERPL CALCULATED.3IONS-SCNC: 12 MMOL/L (ref 7–19)
BASOPHILS ABSOLUTE: 0.1 K/UL (ref 0–0.2)
BASOPHILS RELATIVE PERCENT: 0.5 % (ref 0–1)
BUN BLDV-MCNC: 13 MG/DL (ref 8–23)
CALCIUM SERPL-MCNC: 9 MG/DL (ref 8.8–10.2)
CHLORIDE BLD-SCNC: 95 MMOL/L (ref 98–111)
CO2: 24 MMOL/L (ref 22–29)
CREAT SERPL-MCNC: 0.7 MG/DL (ref 0.5–1.2)
EOSINOPHILS ABSOLUTE: 0.2 K/UL (ref 0–0.6)
EOSINOPHILS RELATIVE PERCENT: 1.2 % (ref 0–5)
GFR AFRICAN AMERICAN: >59
GFR NON-AFRICAN AMERICAN: >60
GLUCOSE BLD-MCNC: 155 MG/DL (ref 70–99)
GLUCOSE BLD-MCNC: 247 MG/DL (ref 70–99)
GLUCOSE BLD-MCNC: 297 MG/DL (ref 74–109)
GLUCOSE BLD-MCNC: 308 MG/DL (ref 70–99)
GLUCOSE BLD-MCNC: 327 MG/DL (ref 70–99)
HBA1C MFR BLD: 8.6 % (ref 4–6)
HCT VFR BLD CALC: 43.8 % (ref 42–52)
HEMOGLOBIN: 14.8 G/DL (ref 14–18)
IMMATURE GRANULOCYTES #: 0.7 K/UL
LYMPHOCYTES ABSOLUTE: 0.8 K/UL (ref 1.1–4.5)
LYMPHOCYTES RELATIVE PERCENT: 5.6 % (ref 20–40)
MCH RBC QN AUTO: 31 PG (ref 27–31)
MCHC RBC AUTO-ENTMCNC: 33.8 G/DL (ref 33–37)
MCV RBC AUTO: 91.6 FL (ref 80–94)
MONOCYTES ABSOLUTE: 1.4 K/UL (ref 0–0.9)
MONOCYTES RELATIVE PERCENT: 9.5 % (ref 0–10)
NEUTROPHILS ABSOLUTE: 11.6 K/UL (ref 1.5–7.5)
NEUTROPHILS RELATIVE PERCENT: 78.6 % (ref 50–65)
PDW BLD-RTO: 14.1 % (ref 11.5–14.5)
PERFORMED ON: ABNORMAL
PLATELET # BLD: 172 K/UL (ref 130–400)
PMV BLD AUTO: 9.6 FL (ref 9.4–12.4)
POTASSIUM SERPL-SCNC: 4.6 MMOL/L (ref 3.5–5)
RBC # BLD: 4.78 M/UL (ref 4.7–6.1)
SODIUM BLD-SCNC: 131 MMOL/L (ref 136–145)
WBC # BLD: 14.8 K/UL (ref 4.8–10.8)

## 2020-09-20 PROCEDURE — 83036 HEMOGLOBIN GLYCOSYLATED A1C: CPT

## 2020-09-20 PROCEDURE — 6370000000 HC RX 637 (ALT 250 FOR IP): Performed by: ORTHOPAEDIC SURGERY

## 2020-09-20 PROCEDURE — 80048 BASIC METABOLIC PNL TOTAL CA: CPT

## 2020-09-20 PROCEDURE — 2580000003 HC RX 258: Performed by: ORTHOPAEDIC SURGERY

## 2020-09-20 PROCEDURE — 6370000000 HC RX 637 (ALT 250 FOR IP): Performed by: HOSPITALIST

## 2020-09-20 PROCEDURE — 2580000003 HC RX 258: Performed by: INTERNAL MEDICINE

## 2020-09-20 PROCEDURE — 6360000002 HC RX W HCPCS: Performed by: INTERNAL MEDICINE

## 2020-09-20 PROCEDURE — 82947 ASSAY GLUCOSE BLOOD QUANT: CPT

## 2020-09-20 PROCEDURE — 1210000000 HC MED SURG R&B

## 2020-09-20 PROCEDURE — 85025 COMPLETE CBC W/AUTO DIFF WBC: CPT

## 2020-09-20 PROCEDURE — 36415 COLL VENOUS BLD VENIPUNCTURE: CPT

## 2020-09-20 RX ORDER — INSULIN GLARGINE 100 [IU]/ML
50 INJECTION, SOLUTION SUBCUTANEOUS 2 TIMES DAILY
Status: DISCONTINUED | OUTPATIENT
Start: 2020-09-20 | End: 2020-09-21 | Stop reason: HOSPADM

## 2020-09-20 RX ADMIN — OXYCODONE 5 MG: 5 TABLET ORAL at 00:23

## 2020-09-20 RX ADMIN — EZETIMIBE 10 MG: 10 TABLET ORAL at 08:43

## 2020-09-20 RX ADMIN — INSULIN LISPRO 6 UNITS: 100 INJECTION, SOLUTION INTRAVENOUS; SUBCUTANEOUS at 16:42

## 2020-09-20 RX ADMIN — INSULIN LISPRO 3 UNITS: 100 INJECTION, SOLUTION INTRAVENOUS; SUBCUTANEOUS at 11:48

## 2020-09-20 RX ADMIN — ATORVASTATIN CALCIUM 40 MG: 40 TABLET, FILM COATED ORAL at 21:07

## 2020-09-20 RX ADMIN — INSULIN LISPRO 12 UNITS: 100 INJECTION, SOLUTION INTRAVENOUS; SUBCUTANEOUS at 08:43

## 2020-09-20 RX ADMIN — FAMOTIDINE 20 MG: 20 TABLET ORAL at 21:07

## 2020-09-20 RX ADMIN — POLYETHYLENE GLYCOL 3350 17 G: 17 POWDER, FOR SOLUTION ORAL at 08:50

## 2020-09-20 RX ADMIN — ATENOLOL 25 MG: 25 TABLET ORAL at 08:44

## 2020-09-20 RX ADMIN — FAMOTIDINE 20 MG: 20 TABLET ORAL at 08:43

## 2020-09-20 RX ADMIN — PRAMIPEXOLE DIHYDROCHLORIDE 1.5 MG: 1 TABLET ORAL at 14:44

## 2020-09-20 RX ADMIN — CITALOPRAM HYDROBROMIDE 20 MG: 20 TABLET ORAL at 08:44

## 2020-09-20 RX ADMIN — SODIUM CHLORIDE, PRESERVATIVE FREE 10 ML: 5 INJECTION INTRAVENOUS at 08:44

## 2020-09-20 RX ADMIN — INSULIN GLARGINE 50 UNITS: 100 INJECTION, SOLUTION SUBCUTANEOUS at 16:42

## 2020-09-20 RX ADMIN — MIRABEGRON 25 MG: 25 TABLET, FILM COATED, EXTENDED RELEASE ORAL at 08:43

## 2020-09-20 RX ADMIN — SODIUM CHLORIDE, PRESERVATIVE FREE 1 G: 5 INJECTION INTRAVENOUS at 21:42

## 2020-09-20 RX ADMIN — PANTOPRAZOLE SODIUM 40 MG: 40 TABLET, DELAYED RELEASE ORAL at 06:09

## 2020-09-20 RX ADMIN — PRAMIPEXOLE DIHYDROCHLORIDE 1.5 MG: 1 TABLET ORAL at 08:43

## 2020-09-20 RX ADMIN — OXYCODONE 5 MG: 5 TABLET ORAL at 21:07

## 2020-09-20 RX ADMIN — SODIUM CHLORIDE, PRESERVATIVE FREE 10 ML: 5 INJECTION INTRAVENOUS at 21:07

## 2020-09-20 RX ADMIN — INSULIN LISPRO 6 UNITS: 100 INJECTION, SOLUTION INTRAVENOUS; SUBCUTANEOUS at 21:09

## 2020-09-20 RX ADMIN — PRAMIPEXOLE DIHYDROCHLORIDE 1.5 MG: 1 TABLET ORAL at 21:07

## 2020-09-20 ASSESSMENT — PAIN DESCRIPTION - PROGRESSION
CLINICAL_PROGRESSION: GRADUALLY IMPROVING
CLINICAL_PROGRESSION: GRADUALLY IMPROVING

## 2020-09-20 ASSESSMENT — PAIN DESCRIPTION - FREQUENCY
FREQUENCY: CONTINUOUS
FREQUENCY: INTERMITTENT

## 2020-09-20 ASSESSMENT — PAIN DESCRIPTION - ORIENTATION
ORIENTATION: LEFT
ORIENTATION: LEFT

## 2020-09-20 ASSESSMENT — PAIN DESCRIPTION - ONSET
ONSET: ON-GOING
ONSET: ON-GOING

## 2020-09-20 ASSESSMENT — PAIN SCALES - GENERAL
PAINLEVEL_OUTOF10: 9
PAINLEVEL_OUTOF10: 4
PAINLEVEL_OUTOF10: 8
PAINLEVEL_OUTOF10: 4

## 2020-09-20 ASSESSMENT — PAIN DESCRIPTION - LOCATION
LOCATION: ARM
LOCATION: HAND;ARM

## 2020-09-20 ASSESSMENT — PAIN - FUNCTIONAL ASSESSMENT
PAIN_FUNCTIONAL_ASSESSMENT: ACTIVITIES ARE NOT PREVENTED
PAIN_FUNCTIONAL_ASSESSMENT: ACTIVITIES ARE NOT PREVENTED

## 2020-09-20 ASSESSMENT — PAIN DESCRIPTION - PAIN TYPE
TYPE: ACUTE PAIN
TYPE: ACUTE PAIN

## 2020-09-20 ASSESSMENT — PAIN DESCRIPTION - DESCRIPTORS
DESCRIPTORS: ACHING
DESCRIPTORS: ACHING

## 2020-09-20 NOTE — PROGRESS NOTES
[place], [place], [address]        DEPARTMENT OF HOSPITALIST MEDICINE        PROGRESS  NOTE:    NOTE: Portions of this note have been copied forward, however, changed to reflect the most current clinical status of this patient. Patient: Radha Webber  YOB: 1940  Date of Service: 9/20/2020  MRN: [id number]   Acct: [de-identified]   Primary Care Physician: RITESH Goldman CNP  Advance Directive: Full Code  Admit Date: 9/14/2020       Hospital Day: 6        SUBJECTIVE:    Patient is feeling better. 10 pain is under control. Swelling of the left hand and lower arm is also decreasing. [address]  COURSE:    9/20/2020  Patient is having dressing changes 2-3 times a day. His left hand and lower arm swelling is slowly improving. 9/19/2020  I spoke with the cardiologist in detail. Patient has wide QRS complex and QTC of 499 likely secondary to the permanent pacemaker placement as he has paced rhythm. Cardiology has cleared the patient to use Levaquin as an outpatient if needed. Patient had multiple cardiac issues requiring insertion of hardware. Any infection that might spread systematically may be disastrous for the patient involving the heart issues. Patient needs aggressive treatment and close monitoring in the hospital until wound reinfection possibility comes drastically down. Please see the cardiology note as below:        9/18/2020  Patient is feeling better. ID wants to discharge patient on p.o. Levaquin because of increased bioavailability. She spoke with patient and son regarding black box warning. They got concerned and wanted Dr. Marlene Roberson input who is a his cardiologist.    9/17/2020  Patient is feeling better. Pain is under control. Case management is working on setting up home health care for the patient. Patient had great antibiotic sensitivity to major antibiotics on follow-up and review of the surgical cultures.  Still awaiting clearance from ID and orthopedics for discharge. 9/16/2020  Patient's surgical wound cultures from transferring facility were positive for Aeromonas gram-negative waterborne organism and he is growing Edwardsiella tarda. (Gram-negative waterborne organism) from the cultures sent from our facility. ID is continuing with cefepime for now and awaiting debilities from surgical cultures. 9/15/2020  Patient underwent I&D of left hand yesterday which he tolerated well. Wound cultures were obtained and the affected sites were copiously irrigated. Patient is feeling better. I reviewed the ID and sensitivity of the wound cultures from patient's transferring facility which were sensitive to IV cefepime. 9/14/2020  HISTORY OF PRESENT ILLNESS:  Damon Chávez is an 79 y.o. male. He is a very pleasant gentleman who has diabetes mellitus and chronic medical issues. He is also a professional fisherman. He was fishing 4 days ago when he caught a fish and when he tried to reel her in, the hook jerked out and got lodged into his left hand. The hook was dirty looking and in water for a long time. His friend took the hook out and patient was brought in to the ER for evaluation from where he was admitted in [facility].  He was started on IV Invanz and vancomycin and is transferred to our facility for an ID evaluation. I saw the patient when he came to our hospital.  His hand and left arm is red and swollen. I ordered hand surgery and ID consults as well as baseline labs and will restart him on IV Invanz and vancomycin. He is kept n.p.o. for possible I&D of his left hand later on today.       REVIEW  OF  SYSTEMS:    Constitutional:  No fevers, chills, nausea, vomiting,    Lungs:   No hemoptysis, pleurisy   Heart:  No chest pressure with exertion, palpitations,    Abdomen:   No new masses, no bright red blood per rectum   Extremities: + mild Left hand pain after surgery   Neurologic: No new motor or sensory changes PAST MEDICAL HISTORY:  Past Medical History:   Diagnosis Date    Aortic valve stenosis     Arthritis     Chest tightness, discomfort, or pressure 4/30/2012    Chronic back pain     CKD (chronic kidney disease)     CPAP (continuous positive airway pressure) dependence     13cm    Diabetes ([facility] [address]     Diabetic polyneuropathy associated with type 2 diabetes mellitus ([facility] [address] 8/29/2016    GERD (gastroesophageal reflux disease)     HTN (hypertension)     Hyperlipemia     Left ventricular diastolic dysfunction     Mitral stenosis     Mitral valve stenosis     Neuropathy     Obstructive sleep apnea     AHI: 34.5 per PSG, 6/2013; AHI: 36.9 per PSG, 7/2015; AHI: 54.5 per PSG, 3/2017    Pinched nerve in neck     Restless legs syndrome 2/17/2016         PAST SURGICAL HISTORY:  Past Surgical History:   Procedure Laterality Date    APPENDECTOMY      BACK SURGERY      4 Back surgeries    BLADDER SURGERY      CARDIAC CATHETERIZATION  4/30/12    EF > 50%    CATARACT REMOVAL      CHOLECYSTECTOMY      COLONOSCOPY      EYE SURGERY      implants placed both eyes    HAND SURGERY Left 9/14/2020    INCISION AND DRAINAGE LEFT HAND ABSCESS performed by Ana Blanchard MD at [address] [place] [address] OTHER SURGICAL HISTORY  08/03/2020    Right Ventricular lead replacement- Dr. Spike Mcdonnell HOMOGRF/STENT N/A 6/7/2018    AORTIC VALVE REPLACEMENT TRANSESOPHAGEAL ECHOCARDIOGRAM performed by Anjum Gao MD at [address] [place] [address] Road:  Family History   Problem Relation Age of Onset    Diabetes Mother     Cancer Father     Cancer Sister     Heart Disease Brother     Cancer Brother     Cancer Sister     Cancer Brother            OBJECTIVE:  /68   Pulse 66   Temp 98.1 °F (36.7 °C) (Temporal)   Resp 18   Ht 5' 10\" (1.778 m)   Wt 225 lb 2 oz (102.1 kg)   SpO2 96%   BMI 32.30 kg/m²   No intake/output data recorded.     PHYSICAL  EXAMINATION:    PETR:  Awake, alert, oriented x 3, patient appears tired and fatigued   Head/Eyes:  Normocephalic, atraumatic, EOMI and PERRLA bilaterally   Respiratory:   Bilateral decreased air entry in both lung fields, mild B/L crackles, symmetric expansion of chest   Cardiovascular:  Regular rate and rhythm, S1+S2+0, no murmurs/rubs   Abdomen:   Soft, non-tender, bowel sounds +ve, no organomegaly   Extremities: Moves all, decreased range of motion of left hand fingers, ++ left hand edema, left hand under bandage   Neurologic: Awake, alert, oriented x 3, cranial nerves II-XII intact, no focal neurological deficits, sensory system intact   Psychiatric: Normal mood, non-suicidal       CURRENT MEDICATIONS:  Scheduled:   insulin glargine  50 Units Subcutaneous BID    insulin lispro  0-18 Units Subcutaneous TID WC    insulin lispro  0-9 Units Subcutaneous Nightly    cefTRIAXone (ROCEPHIN) IV  1 g Intravenous Q24H    pantoprazole  40 mg Oral QAM AC    atenolol  25 mg Oral Daily    Empagliflozin-metFORMIN HCl ER  1 tablet Oral Daily with breakfast    vitamin D  50,000 Units Oral Weekly    atorvastatin  40 mg Oral Nightly    citalopram  20 mg Oral Daily    ezetimibe  10 mg Oral Daily    pramipexole  1.5 mg Oral TID    mirabegron  25 mg Oral Daily    famotidine  20 mg Oral BID    sodium chloride flush  10 mL Intravenous 2 times per day        PRN:  glucose, 15 g, PRN  dextrose, 12.5 g, PRN  glucagon (rDNA), 1 mg, PRN  dextrose, 100 mL/hr, PRN  magic (miracle) mouthwash, 5 mL, Q8H PRN  potassium chloride, 40 mEq, PRN    Or  potassium alternative oral replacement, 40 mEq, PRN    Or  potassium chloride, 10 mEq, PRN  magnesium sulfate, 1 g, PRN  acetaminophen, 650 mg, Q6H PRN    Or  acetaminophen, 650 mg, Q6H PRN  polyethylene glycol, 17 g, Daily PRN  sodium chloride flush, 10 mL, PRN  promethazine, 12.5 mg, Q6H PRN    Or  ondansetron, 4 mg, Q6H PRN  oxyCODONE, 5 mg, Q4H PRN        Infusions:   dextrose      sodium chloride 75 mL/hr at 09/17/20 2318    lactated ringers 125 mL/hr at 09/14/20 2209       Laboratory Data:        Recent Labs     09/18/20  0436 09/19/20  0234 09/20/20  0234   WBC 14.6* 15.6* 14.8*   HGB 13.8* 15.4 14.8    170 172     Recent Labs     09/18/20  0436 09/19/20  0234 09/20/20  0234   * 132* 131*   K 4.4 4.4 4.6   CL 99 94* 95*   CO2 23 25 24   BUN 14 13 13   CREATININE 0.6 0.7 0.7   GLUCOSE 145* 234* 297*     No results for input(s): AST, ALT, ALB, BILITOT, ALKPHOS in the last 72 hours. Troponin T: No results for input(s): TROPONINI in the last 72 hours. Pro-BNP: No results for input(s): BNP in the last 72 hours. INR: No results for input(s): INR in the last 72 hours. UA:No results for input(s): NITRITE, COLORU, PHUR, LABCAST, WBCUA, RBCUA, MUCUS, TRICHOMONAS, YEAST, BACTERIA, CLARITYU, SPECGRAV, LEUKOCYTESUR, UROBILINOGEN, BILIRUBINUR, BLOODU, GLUCOSEU, AMORPHOUS in the last 72 hours. Invalid input(s): Agustin Schneider  A1C:   Recent Labs     09/20/20  0234   LABA1C 8.6*     ABG:No results for input(s): PHART, QYR9VGR, PO2ART, [id number], BEART, HGBAE, [id number], CARBOXHGBART in the last 72 hours. Imaging:    No results found.      ASSESSMENT & PLAN:    Principal Problem:    Cellulitis of left hand and arm  Active Problems:    Chronic bilateral low back pain without sciatica    Type 2 diabetes mellitus with hyperglycemia, with long-term current use of insulin ([facility])    Obstructive sleep apnea    Class 1 obesity due to excess calories in adult    Gastroesophageal reflux disease without esophagitis    Essential hypertension    Diabetic polyneuropathy associated with type 2 diabetes mellitus (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 *          Continue with current medications  Wound cultures from transferring facilities showed Aeromonas cultures   Wound cultures from the surgical cultures are positive for Edwardsiella tarda obtained during I&D of left hand, which showed great sensitivity to measure antibiotics  Continue with IV ceftriaxone as per ID  ID spoke with the patient and son regarding p.o. Levaquin at the antibiotic a chance with good bioavailability and discussed black box warnings with them  Cardiology consult appreciated. Patient has been cleared by cardiology to use p.o. Levaquin upon discharge   Patient had a QTC of 499 on EKG which is secondary to permanent pacemaker placement  Continue with pain management PRN as needed  Continue diabetic meds  Accu-Cheks qAC and qHS ordered with insulin coverage as per protocol  Leukocytosis is still between 13 and 16  Follow-up closely with ID and hand surgery  Patient blood sugar levels are still elevated  Hemoglobin A1c level is 8.8 which showed poorly controlled diabetes mellitus  Increased insulin sliding slight coverage to high-dose  Switch Lantus to 50 units subcu twice daily for better control of diabetes mellitus  Continue with dressing changes as per hand surgery recommendations  DC IV pain medications  Continue with pain meds p.o. PRN as needed    Repeat labs in a.m. Electrolyte replacement as per protocol. Patient will be monitored very closely on the floor. Further recommendations as per the hospital course.     DC planning: Likely in next couple of days once patient is cleared for discharge by ID and hand surgery, and p.o. antibiotics and home health care for dressing changes    Patient  is on DVT prophylaxis  Current medications reviewed  Lab work reviewed  Radiology/Chest x-ray films reviewed  Treatment recommendations from suspecialities reviewed, appreciated and agreed with  Discussed with the nurse and addressed all questions/concerns  Discussed with Patient and/or Family at the bedside in detail . .. they understand and agree with the management plan. Hipolito Liang MD  9/20/2020 2:23 PM      DISCLAIMER: This note was created with electronic voice recognition which does have occasional errors. If you have any questions regarding the content within the note please do not hesitate to contact me. .. Thanks.

## 2020-09-20 NOTE — PROGRESS NOTES
Infectious Diseases Progress Note    Patient: Song Tucker  YOB: 1940  MRN: [id number]   Admit date: 9/14/2020   Admitting Physician: Carloz Coronado MD  Primary Care Physician: RITESH Fowler - CNP    Chief Complaint/Interval History: He has been doing a good job of elevating his left arm above the level of his heart. His son is at bedside. He showed me pictures dating back to where the fishhook entered the left fifth finger. He showed me a video of the hook removal.  He was able to show me some of the progressive pictures has evidence of infection developed. There has been improvement with his exam today in comparison to how it had looked prior to admission and his incision and drainage. He is without fever. He is without chills or sweats. He has no rash or skin itching. In/Out    Intake/Output Summary (Last 24 hours) at 9/20/2020 1412  Last data filed at 9/19/2020 2316  Gross per 24 hour   Intake 260 ml   Output --   Net 260 ml     Allergies:    Allergies   Allergen Reactions    Azithromycin     Metoprolol     Cyclobenzaprine Rash    Metoclopramide Rash     Current Meds: insulin lispro (HUMALOG) injection vial 0-18 Units, TID WC  insulin lispro (HUMALOG) injection vial 0-9 Units, Nightly  glucose (GLUTOSE) 40 % oral gel 15 g, PRN  dextrose 50 % IV solution, PRN  glucagon (rDNA) injection 1 mg, PRN  dextrose 5 % solution, PRN  magic (miracle) mouthwash, Q8H PRN  cefTRIAXone (ROCEPHIN) 1 g in sodium chloride (PF) 10 mL IV syringe, Q24H  pantoprazole (PROTONIX) tablet 40 mg, QAM AC  atenolol (TENORMIN) tablet 25 mg, Daily  Patient supplied - Empagliflozin-metFORMIN HCl ER  MG TB24 1 tablet, Daily with breakfast  insulin glargine (LANTUS) injection vial 85 Units, Nightly  vitamin D (ERGOCALCIFEROL) capsule 50,000 Units, Weekly  atorvastatin (LIPITOR) tablet 40 mg, Nightly  citalopram (CELEXA) tablet 20 mg, Daily  ezetimibe (ZETIA) tablet 10 mg, Daily  pramipexole (MIRAPEX) tablet 1.5 mg, TID  mirabegron (MYRBETRIQ) extended release tablet 25 mg, Daily  potassium chloride (KLOR-CON M) extended release tablet 40 mEq, PRN    Or  potassium bicarb-citric acid (EFFER-K) effervescent tablet 40 mEq, PRN    Or  potassium chloride 10 mEq/100 mL IVPB (Peripheral Line), PRN  magnesium sulfate 1 g in dextrose 5% 100 mL IVPB, PRN  acetaminophen (TYLENOL) tablet 650 mg, Q6H PRN    Or  acetaminophen (TYLENOL) suppository 650 mg, Q6H PRN  polyethylene glycol (GLYCOLAX) packet 17 g, Daily PRN  famotidine (PEPCID) tablet 20 mg, BID  0.9 % sodium chloride infusion, Continuous  lactated ringers infusion, Continuous  sodium chloride flush 0.9 % injection 10 mL, 2 times per day  sodium chloride flush 0.9 % injection 10 mL, PRN  promethazine (PHENERGAN) tablet 12.5 mg, Q6H PRN    Or  ondansetron (ZOFRAN) injection 4 mg, Q6H PRN  oxyCODONE (ROXICODONE) immediate release tablet 5 mg, Q4H PRN    Or  oxyCODONE (ROXICODONE) immediate release tablet 10 mg, Q4H PRN  morphine injection 2 mg, Q2H PRN    Or  morphine injection 4 mg, Q2H PRN      Review of Systems see HPI    VitalSigns:  /68   Pulse 66   Temp 98.1 °F (36.7 °C) (Temporal)   Resp 18   Ht 5' 10\" (1.778 m)   Wt 225 lb 2 oz (102.1 kg)   SpO2 96%   BMI 32.30 kg/m²      Physical Exam  Line/IV (peripheral) site: No erythema, warmth, induration, or tenderness. He has an extensive blistered area over the dorsal aspect of the left hand. He seems to have intact sensation over this area. I suspect the skin and some of the subcu tissue will slough. There did not appear to be any nonviable areas based on sensory exam.  He has movement in all fingers. He had intact sensation in all fingers. Son assisted in trying to email a few representative photos to our -Demetria toscano at her office email address so they could be downloaded into his chart. It was not clear from his cell phone whether the photos actually sent.     Lab Results:  CBC: Recent Labs     09/18/20  0436 09/19/20  0234 09/20/20  0234   WBC 14.6* 15.6* 14.8*   HGB 13.8* 15.4 14.8    170 172     BMP:  Recent Labs     09/18/20  0436 09/19/20  0234 09/20/20  0234   * 132* 131*   K 4.4 4.4 4.6   CL 99 94* 95*   CO2 23 25 24   BUN 14 13 13   CREATININE 0.6 0.7 0.7   GLUCOSE 145* 234* 297*     CultureResults:  Culture from the left fifth finger September 14, 2020:  Edwardsiella tarda (1)     Antibiotic  Interpretation  BARBARA  Status    ampicillin  Sensitive  <=2  mcg/mL     aztreonam  Sensitive  <=1  mcg/mL     ceFAZolin  Sensitive  <=4  mcg/mL     cefepime  Sensitive  <=1  mcg/mL     cefTRIAXone  Sensitive  <=1  mcg/mL     gentamicin  Sensitive  <=1  mcg/mL     levofloxacin  Sensitive  <=0.12  mcg/mL     meropenem  Sensitive  <=0.25  mcg/mL     piperacillin-tazobactam  Sensitive  <=4  mcg/mL     trimethoprim-sulfamethoxazole  Sensitive  <=20  mcg/mL       Radiology: None    Additional Studies Reviewed:  None    Impression:  1. Severe infection left hand-Aeromonas and Edwardsiella. He is improving with elevation, antibiotic treatment, and recent debridement.   2.  Leukocytosis-stable    Recommendations:  Continue local care  Continue elevation  Continue intravenous antibiotic treatment  Anticipate probable transition to oral antibiotic treatment tomorrow  Continue to follow    Hira Ahuja MD

## 2020-09-20 NOTE — PLAN OF CARE
will improve  Outcome: Ongoing  Goal: Will show no signs and symptoms of electrolyte imbalance  Description: Will show no signs and symptoms of electrolyte imbalance  Outcome: Ongoing

## 2020-09-21 VITALS
OXYGEN SATURATION: 97 % | DIASTOLIC BLOOD PRESSURE: 81 MMHG | BODY MASS INDEX: 32.23 KG/M2 | HEART RATE: 58 BPM | SYSTOLIC BLOOD PRESSURE: 133 MMHG | RESPIRATION RATE: 16 BRPM | TEMPERATURE: 97.4 F | WEIGHT: 225.13 LBS | HEIGHT: 70 IN

## 2020-09-21 PROBLEM — E87.6 HYPOKALEMIA: Status: RESOLVED | Noted: 2020-09-15 | Resolved: 2020-09-21

## 2020-09-21 PROBLEM — A49.9: Status: RESOLVED | Noted: 2020-09-16 | Resolved: 2020-09-21

## 2020-09-21 PROBLEM — E83.39 HYPOPHOSPHATEMIA: Status: RESOLVED | Noted: 2020-09-15 | Resolved: 2020-09-21

## 2020-09-21 LAB
ANION GAP SERPL CALCULATED.3IONS-SCNC: 14 MMOL/L (ref 7–19)
BASOPHILS ABSOLUTE: 0.1 K/UL (ref 0–0.2)
BASOPHILS RELATIVE PERCENT: 0.6 % (ref 0–1)
BUN BLDV-MCNC: 13 MG/DL (ref 8–23)
CALCIUM SERPL-MCNC: 9.2 MG/DL (ref 8.8–10.2)
CHLORIDE BLD-SCNC: 96 MMOL/L (ref 98–111)
CO2: 25 MMOL/L (ref 22–29)
CREAT SERPL-MCNC: 0.7 MG/DL (ref 0.5–1.2)
EOSINOPHILS ABSOLUTE: 0.2 K/UL (ref 0–0.6)
EOSINOPHILS RELATIVE PERCENT: 1.2 % (ref 0–5)
GFR AFRICAN AMERICAN: >59
GFR NON-AFRICAN AMERICAN: >60
GLUCOSE BLD-MCNC: 155 MG/DL (ref 74–109)
GLUCOSE BLD-MCNC: 174 MG/DL (ref 70–99)
GLUCOSE BLD-MCNC: 196 MG/DL (ref 70–99)
HCT VFR BLD CALC: 47.5 % (ref 42–52)
HEMOGLOBIN: 16 G/DL (ref 14–18)
IMMATURE GRANULOCYTES #: 0.9 K/UL
LYMPHOCYTES ABSOLUTE: 1 K/UL (ref 1.1–4.5)
LYMPHOCYTES RELATIVE PERCENT: 7.6 % (ref 20–40)
MCH RBC QN AUTO: 30.7 PG (ref 27–31)
MCHC RBC AUTO-ENTMCNC: 33.7 G/DL (ref 33–37)
MCV RBC AUTO: 91 FL (ref 80–94)
MONOCYTES ABSOLUTE: 1.6 K/UL (ref 0–0.9)
MONOCYTES RELATIVE PERCENT: 11.6 % (ref 0–10)
NEUTROPHILS ABSOLUTE: 9.7 K/UL (ref 1.5–7.5)
NEUTROPHILS RELATIVE PERCENT: 72.5 % (ref 50–65)
PDW BLD-RTO: 14.2 % (ref 11.5–14.5)
PERFORMED ON: ABNORMAL
PERFORMED ON: ABNORMAL
PLATELET # BLD: 181 K/UL (ref 130–400)
PMV BLD AUTO: 9.3 FL (ref 9.4–12.4)
POTASSIUM SERPL-SCNC: 4.9 MMOL/L (ref 3.5–5)
RBC # BLD: 5.22 M/UL (ref 4.7–6.1)
SODIUM BLD-SCNC: 135 MMOL/L (ref 136–145)
WBC # BLD: 13.4 K/UL (ref 4.8–10.8)

## 2020-09-21 PROCEDURE — 6370000000 HC RX 637 (ALT 250 FOR IP): Performed by: HOSPITALIST

## 2020-09-21 PROCEDURE — 6370000000 HC RX 637 (ALT 250 FOR IP): Performed by: INTERNAL MEDICINE

## 2020-09-21 PROCEDURE — 85025 COMPLETE CBC W/AUTO DIFF WBC: CPT

## 2020-09-21 PROCEDURE — 97116 GAIT TRAINING THERAPY: CPT

## 2020-09-21 PROCEDURE — 36415 COLL VENOUS BLD VENIPUNCTURE: CPT

## 2020-09-21 PROCEDURE — 99232 SBSQ HOSP IP/OBS MODERATE 35: CPT | Performed by: INTERNAL MEDICINE

## 2020-09-21 PROCEDURE — 80048 BASIC METABOLIC PNL TOTAL CA: CPT

## 2020-09-21 PROCEDURE — 82947 ASSAY GLUCOSE BLOOD QUANT: CPT

## 2020-09-21 PROCEDURE — 97530 THERAPEUTIC ACTIVITIES: CPT

## 2020-09-21 PROCEDURE — 6370000000 HC RX 637 (ALT 250 FOR IP): Performed by: ORTHOPAEDIC SURGERY

## 2020-09-21 RX ORDER — LEVOFLOXACIN 500 MG/1
500 TABLET, FILM COATED ORAL DAILY
Qty: 10 TABLET | Refills: 0 | Status: SHIPPED | OUTPATIENT
Start: 2020-09-22 | End: 2020-10-02

## 2020-09-21 RX ORDER — INSULIN GLARGINE 100 [IU]/ML
50 INJECTION, SOLUTION SUBCUTANEOUS 2 TIMES DAILY
Qty: 1 VIAL | Refills: 1 | Status: SHIPPED | OUTPATIENT
Start: 2020-09-21

## 2020-09-21 RX ORDER — LEVOFLOXACIN 500 MG/1
500 TABLET, FILM COATED ORAL DAILY
Status: DISCONTINUED | OUTPATIENT
Start: 2020-09-21 | End: 2020-09-21 | Stop reason: HOSPADM

## 2020-09-21 RX ADMIN — LEVOFLOXACIN 500 MG: 500 TABLET, FILM COATED ORAL at 12:51

## 2020-09-21 RX ADMIN — ATENOLOL 25 MG: 25 TABLET ORAL at 10:20

## 2020-09-21 RX ADMIN — INSULIN LISPRO 3 UNITS: 100 INJECTION, SOLUTION INTRAVENOUS; SUBCUTANEOUS at 12:52

## 2020-09-21 RX ADMIN — EZETIMIBE 10 MG: 10 TABLET ORAL at 10:20

## 2020-09-21 RX ADMIN — MIRABEGRON 25 MG: 25 TABLET, FILM COATED, EXTENDED RELEASE ORAL at 10:19

## 2020-09-21 RX ADMIN — FAMOTIDINE 20 MG: 20 TABLET ORAL at 10:19

## 2020-09-21 RX ADMIN — ERGOCALCIFEROL 50000 UNITS: 1.25 CAPSULE ORAL at 10:20

## 2020-09-21 RX ADMIN — INSULIN GLARGINE 50 UNITS: 100 INJECTION, SOLUTION SUBCUTANEOUS at 10:21

## 2020-09-21 RX ADMIN — PRAMIPEXOLE DIHYDROCHLORIDE 1.5 MG: 1 TABLET ORAL at 10:21

## 2020-09-21 RX ADMIN — CITALOPRAM HYDROBROMIDE 20 MG: 20 TABLET ORAL at 10:20

## 2020-09-21 ASSESSMENT — ENCOUNTER SYMPTOMS: COLOR CHANGE: 1

## 2020-09-21 NOTE — PROGRESS NOTES
INFECTIOUS DISEASES PROGRESS NOTE    Patient: Zee Fraser  YOB: 1940  MRN: [id number]   Admit date: 9/14/2020   Admitting Physician: Denise Walters MD  Primary Care Physician: RITESH Hall - RICHARD    CHIEF COMPLAINT: Left upper extremity infection      Interval History: Patient reports dressing change done earlier today . Allergies:    Allergies   Allergen Reactions    Azithromycin     Metoprolol     Cyclobenzaprine Rash    Metoclopramide Rash       Current Meds: insulin glargine (LANTUS) injection vial 50 Units, BID  insulin lispro (HUMALOG) injection vial 0-18 Units, TID WC  insulin lispro (HUMALOG) injection vial 0-9 Units, Nightly  glucose (GLUTOSE) 40 % oral gel 15 g, PRN  dextrose 50 % IV solution, PRN  glucagon (rDNA) injection 1 mg, PRN  dextrose 5 % solution, PRN  magic (miracle) mouthwash, Q8H PRN  cefTRIAXone (ROCEPHIN) 1 g in sodium chloride (PF) 10 mL IV syringe, Q24H  pantoprazole (PROTONIX) tablet 40 mg, QAM AC  atenolol (TENORMIN) tablet 25 mg, Daily  Patient supplied - Empagliflozin-metFORMIN HCl ER  MG TB24 1 tablet, Daily with breakfast  vitamin D (ERGOCALCIFEROL) capsule 50,000 Units, Weekly  atorvastatin (LIPITOR) tablet 40 mg, Nightly  citalopram (CELEXA) tablet 20 mg, Daily  ezetimibe (ZETIA) tablet 10 mg, Daily  pramipexole (MIRAPEX) tablet 1.5 mg, TID  mirabegron (MYRBETRIQ) extended release tablet 25 mg, Daily  potassium chloride (KLOR-CON M) extended release tablet 40 mEq, PRN    Or  potassium bicarb-citric acid (EFFER-K) effervescent tablet 40 mEq, PRN    Or  potassium chloride 10 mEq/100 mL IVPB (Peripheral Line), PRN  magnesium sulfate 1 g in dextrose 5% 100 mL IVPB, PRN  acetaminophen (TYLENOL) tablet 650 mg, Q6H PRN    Or  acetaminophen (TYLENOL) suppository 650 mg, Q6H PRN  polyethylene glycol (GLYCOLAX) packet 17 g, Daily PRN  famotidine (PEPCID) tablet 20 mg, BID  0.9 % sodium chloride infusion, Continuous  lactated ringers infusion, Continuous  sodium chloride flush 0.9 % injection 10 mL, 2 times per day  sodium chloride flush 0.9 % injection 10 mL, PRN  promethazine (PHENERGAN) tablet 12.5 mg, Q6H PRN    Or  ondansetron (ZOFRAN) injection 4 mg, Q6H PRN  oxyCODONE (ROXICODONE) immediate release tablet 5 mg, Q4H PRN        Review of Systems   Constitutional: Negative for fever. Skin: Positive for color change and wound. Vital Signs:  /81   Pulse 58   Temp 97.4 °F (36.3 °C) (Temporal)   Resp 16   Ht 5' 10\" (1.778 m)   Wt 225 lb 2 oz (102.1 kg)   SpO2 97%   BMI 32.30 kg/m²   Temp (24hrs), Av.3 °F (36.3 °C), Min:96.6 °F (35.9 °C), Max:97.9 °F (36.6 °C)      Physical Exam   General: Patient's nontoxic-appearing walking  Back from bathroom   HEENT: Sclera anicteric. I asked patient to put on a mask. Respiratory: Effort even and unlabored  Neuro: Patient very hard of hearing. Left upper extremity.   Edema and erythema appear improved  Psych: Pleasant and cooperative      September 15, 2020                  LAB RESULTS:    CBC with DIFF:  Recent Labs     20  0234 20  0234 20  0143   WBC 15.6* 14.8* 13.4*   RBC 5.09 4.78 5.22   HGB 15.4 14.8 16.0   HCT 46.0 43.8 47.5   MCV 90.4 91.6 91.0   MCH 30.3 31.0 30.7   MCHC 33.5 33.8 33.7   RDW 14.2 14.1 14.2    172 181   MPV 9.5 9.6 9.3*   NEUTOPHILPCT 76.8* 78.6* 72.5*   LYMPHOPCT 5.5* 5.6* 7.6*   MONOPCT 10.7* 9.5 11.6*   EOSRELPCT 1.0 1.2 1.2   BASOPCT 0.6 0.5 0.6   NEUTROABS 11.9* 11.6* 9.7*   LYMPHSABS 0.9* 0.8* 1.0*   MONOSABS 1.70* 1.40* 1.60*   EOSABS 0.20 0.20 0.20   BASOSABS 0.10 0.10 0.10       CMP/BMP:  Recent Labs     20  0234 20  0234 20  0143   * 131* 135*   K 4.4 4.6 4.9   CL 94* 95* 96*   CO2 25 24 25   ANIONGAP 13 12 14   GLUCOSE 234* 297* 155*   BUN 13 13 13   CREATININE 0.7 0.7 0.7   LABGLOM >60 >60 >60   CALCIUM 9.4 9.0 9.2         Culture Results:  Culture, Surgical [[id number]]  (Abnormal)  Collected: 20 1647    Order Status: Completed  Specimen: Swab from Hand  Updated: 09/16/20 1038     Gram Stain Result  Few WBC's (Polymorphonuclear)   No organisms seen     Anaerobic Culture  No anaerobes to date,will hold 4 days     Organism  Edwardsiella tardaAbnormal       Culture Surgical  --     Moderate growth   Sensitivity to follow    Narrative:      ORDER#: [id number]                          ORDERED BY: Red Larsen   SOURCE: Hand L hand abscess                COLLECTED:  09/14/20 16:47   ANTIBIOTICS AT [facility]. :                      RECEIVED :  09/14/20 18:08    Culture, Surgical [[id number]]  (Abnormal)  Collected: 09/14/20 1646    Order Status: Completed  Specimen: Swab from Finger  Updated: 09/16/20 1038     Gram Stain Result  Moderate WBC's (Polymorphonuclear)   No organisms seen     Anaerobic Culture  No anaerobes to date,will hold 4 days     Organism  Edwardsiella tardaAbnormal       Culture Surgical  --     Rare growth   Sensitivity to follow    Narrative:      ORDER#: [id number]                          ORDERED BY: Red Larsen   SOURCE: Finger Left                        COLLECTED:  09/14/20 16:46   ANTIBIOTICS AT [facility]. :                      RECEIVED :  09/14/20 18:06        ORDER#: [id number]                          ORDERED BY: Red Larsen   SOURCE: Hand L hand abscess                COLLECTED:  09/14/20 16:47   ANTIBIOTICS AT [facility]. :                      RECEIVED :  09/14/20 18:08    Susceptibility     Edwardsiella tarda (1)     Antibiotic  Interpretation  BARBARA  Status     ampicillin  Sensitive  <=2  mcg/mL      aztreonam  Sensitive  <=1  mcg/mL      ceFAZolin  Sensitive  <=4  mcg/mL      cefepime  Sensitive  <=1  mcg/mL      cefTRIAXone  Sensitive  <=1  mcg/mL      gentamicin  Sensitive  <=1  mcg/mL      levofloxacin  Sensitive  <=0.12  mcg/mL      meropenem  Sensitive  <=0.25  mcg/mL      piperacillin-tazobactam  Sensitive  <=4  mcg/mL      trimethoprim-sulfamethoxazole  Sensitive  <=20  mcg/mL              RADIOLOGY No results found. Patient Active Problem List   Diagnosis    Other chest pain    Chronic bilateral low back pain without sciatica    Syncope and collapse    Type 2 diabetes mellitus with hyperglycemia, with long-term current use of insulin ([facility])    Obstructive sleep apnea    Class 1 obesity due to excess calories in adult    Restless legs syndrome    Gastroesophageal reflux disease without esophagitis    Essential hypertension    Vertigo    Orthostasis    Diabetic polyneuropathy associated with type 2 diabetes mellitus (HCC)    Bilateral carpal tunnel syndrome    CPAP (continuous positive airway pressure) dependence    BiPAP (biphasic positive airway pressure) dependence    Pinched nerve in neck    Rheumatic aortic valve insufficiency    Moderate mitral stenosis    Hyponatremia    Hypercholesterolemia with hypertriglyceridemia    Syncope    Vertebrobasilar artery insufficiency    S/P aortic valve replacement with bioprosthetic valve    Grade I diastolic dysfunction    Pacemaker    Near syncope    History of syncope    Sinoatrial node dysfunction (HCC)    Mixed hyperlipidemia    Pre-syncope    Bradycardia    Lightheadedness    Heart block    Pacemaker malfunction, initial encounter    Pacemaker lead malfunction    Cellulitis of left hand and arm    Abscess of left hand    Hypokalemia    Hypophosphatemia    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 Severe infection left hand status post incision and drainage per Dr. Anna Sultana 9/14/20  2. Wound cultures from [facility] [place] [place] identified as Aeromonas (gram-negative waterborne organism)  3. Surgical cultures identified as Edwardsiella tarda. (Gram-negative waterborne organism)  4.  Leukocytosis- continued improvement      RECOMMENDATIONS :  · Change to levofloxacin 500 mg po q day   · Reviewed with patient and son that patient needs to avoid calcium , iron, vitamins etc with dose and take with water to maximize absorption.       Patient has follow up with Dr Nidia Belcher and PCP - will put follow up for prn but to include office phone number for any questions    Discussed with BARBARA Sorenson MD

## 2020-09-21 NOTE — PROGRESS NOTES
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 09/16/2020     Priority: Low    Hypokalemia 09/15/2020     Priority: Low    Hypophosphatemia 09/15/2020     Priority: Low    Cellulitis of left hand and arm 09/14/2020     Priority: Low    Abscess of left hand 09/14/2020     Priority: Low    Pacemaker lead malfunction 08/05/2020     Priority: Low    Pacemaker malfunction, initial encounter 06/29/2020     Priority: Low    Bradycardia 03/17/2020     Priority: Low    Near syncope 11/14/2019     Priority: Low    History of syncope 11/14/2019     Priority: Low    [facility] 11/14/2019     Priority: Low    Mixed hyperlipidemia 11/14/2019     Priority: Low    S/P aortic valve replacement with bioprosthetic valve 08/22/2019     Priority: Low    Grade I diastolic dysfunction 81/06/8649     Priority: Low    Pacemaker 08/22/2019     Priority: Low    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     Priority: Low    Syncope 02/21/2019     Priority: Low    Hyponatremia 06/11/2018     Priority: Low    Hypercholesterolemia with hypertriglyceridemia 06/11/2018     Priority: Low    Rheumatic aortic valve insufficiency      Priority: Low    Moderate mitral stenosis      Priority: Low    Pinched nerve in neck 04/18/2018     Priority: Low    CPAP (continuous positive airway pressure) dependence 03/08/2018     Priority: Low    BiPAP (biphasic positive airway pressure) dependence      Priority: Low     Overview Note:     16cm/12cm      Bilateral carpal tunnel syndrome 08/28/2017     Priority: Low    Diabetic polyneuropathy associated with type 2 diabetes mellitus ([facility] 08/29/2016     Priority: Low    Orthostasis      Priority: Low    Vertigo 07/26/2016     Priority: Low    Syncope and collapse 02/17/2016     Priority: Low    Type 2 diabetes mellitus with hyperglycemia, with long-term current use of insulin ([facility] 02/17/2016     Priority: Low    Obstructive sleep apnea 02/17/2016     Priority: Low    Class 1 obesity due to excess calories in adult 02/17/2016     Priority: Low    Restless legs syndrome 02/17/2016     Priority: Low    Gastroesophageal reflux disease without esophagitis 02/17/2016     Priority: Low    Essential hypertension 02/17/2016     Priority: Low    Chronic bilateral low back pain without sciatica 07/16/2012     Priority: Low    Other chest pain 04/30/2012     Priority: Low     Overview Note:     4/30/12  cardiolite  Positive for inferior myocardial ischemia, EF 52%  4/30/12  Cath  Mild to moderate CAD, normal LVFX  4/9/2015  Echo  Moderate AI, normal LVFX  2/17/2016  Echo  Moderate AI, DEE 1.3 cm2  4/18/2018  Echo AS / AI  5/16/2018  AS with moderate AI, decreased LVFX  Cath  Mild CAD  06/07/2018, Aortic Valve Replacement, Implanting a 21 mm [place] Magna Ease Pericardial Tissue Valve by Dr. Sarah Ramesh Date:  9/14/2020    Admission Problem List: Present on Admission:   Cellulitis of left hand and arm   Abscess of left hand   Class 1 obesity due to excess calories in adult   Type 2 diabetes mellitus with hyperglycemia, with long-term current use of insulin (HCC)   Chronic bilateral low back pain without sciatica   Obstructive sleep apnea   Gastroesophageal reflux disease without esophagitis   Essential hypertension   Diabetic polyneuropathy associated with type 2 diabetes mellitus (HCC)   Bilateral carpal tunnel syndrome   CPAP (continuous positive airway pressure) dependence   S/P aortic valve replacement with bioprosthetic valve   Pacemaker   Hypokalemia   Hypophosphatemia   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      Cardiac Specific Data:  Specialty Problems        Cardiology Problems 81 MG EC tablet Take 1 tablet by mouth 2 times daily for 21 days 9/14/20 10/5/20 Yes Akash Chu MD   aspirin 81 MG EC tablet Take 1 tablet by mouth daily 9/14/20  Yes Akash Chu MD   pramipexole (MIRAPEX) 1.5 MG tablet 3 times daily  8/5/20   Historical Provider, MD   mirabegron (MYRBETRIQ) 25 MG TB24 Take 1 tablet by mouth daily 9/1/20   Edward Davis MD   ezetimibe (ZETIA) 10 MG tablet Take 10 mg by mouth daily 7/7/20   Historical Provider, MD   clopidogrel (PLAVIX) 75 MG tablet Take 75 mg by mouth daily    Historical Provider, MD   citalopram (CELEXA) 20 MG tablet TAKE ONE TABLET BY MOUTH DAILY 5/4/20   Edward Davis MD   atorvastatin (LIPITOR) 40 MG tablet Take 1 tablet by mouth nightly 3/20/20   Carlos Guzman MD   Cholecalciferol (VITAMIN D3) 82329 units CAPS  4/18/19   Historical Provider, MD   HUMULIN R U-500 KWIKPEN 500 UNIT/ML SOPN concentrated injection pen  4/20/19   Historical Provider, MD   insulin glargine (LANTUS) 100 UNIT/ML injection vial Inject 85 Units into the skin nightly  Patient taking differently: Inject 45 Units into the skin nightly  2/23/19   [facility], DO   Empagliflozin-Metformin HCl ER (SYNJARDY XR)  MG TB24 Take 1 tablet by mouth daily (with breakfast)    Historical Provider, MD   oxyCODONE (ROXICODONE) 5 MG immediate release tablet Take 5 mg by mouth every 4 hours as needed for Pain. Melida Michelle     Historical Provider, MD   atenolol (TENORMIN) 25 MG tablet Take 25 mg by mouth daily    Historical Provider, MD   hydrochlorothiazide (MICROZIDE) 12.5 MG capsule Take 12.5 mg by mouth daily    Historical Provider, MD CHAVIS UF III MINI PEN NEEDLES 31G X 5 MM MISC  8/20/16   Historical Provider, MD   furosemide (LASIX) 20 MG tablet Take 1 tablet by mouth as needed (For weight gain greater than 2.5 lbs in 24 hours.)  Patient taking differently: Take 20 mg by mouth daily  2/19/16   Bobbi Has, PA-C   esomeprazole (NEXIUM) 40 MG capsule Take 40 mg by mouth every morning (before AM

## 2020-09-21 NOTE — PROGRESS NOTES
Physical Therapy  Name: Dom Puckett  MRN:  [id number]  Date of service:  9/21/2020 09/21/20 1032   Subjective   Subjective Pt states he has been up walking with family but willing to walk again. states he feels he needs a sling for his L arm to assist with holding it up while walking. General Comment   Comments called central supply and ordered sling for patient   Transfers   Sit to Stand Modified independent   Stand to sit Independent   Ambulation   Ambulation? Yes   WB Status no restrictions, pt. using cane in R hand   Ambulation 1   Surface level tile   Device Single point cane   Assistance Stand by assistance;Contact guard assistance   Quality of Gait slightly unsteady   Gait Deviations Slow Judith   Distance 275'   Comments pt states he feels weak from being in hospital and in bed most of the week   Short term goals   Time Frame for Short term goals 2 wks   Short term goal 1 supine to sit indep   Short term goal 2 sit to stand indep   Short term goal 3 amb. 200' with straight cane indep   Conditions Requiring Skilled Therapeutic Intervention   Body structures, Functions, Activity limitations Decreased functional mobility ; Decreased ADL status; Decreased endurance   Assessment Pt is up walking with family in orr, increasing endurance   Activity Tolerance   Activity Tolerance Patient Tolerated treatment well   Safety Devices   Type of devices Call light within reach  (family present)       Electronically signed by [place] PTA on 9/21/2020 at 10:38 AM

## 2020-09-21 NOTE — PROGRESS NOTES
I    Comprehensive Nutrition Assessment    Type and Reason for Visit:  Initial    Nutrition Recommendations/Plan: Modify diet to CHO 4    Nutrition Assessment:  Pt is nutritionally compromised AEB endocrine dysfunction and abnormal lab values. Will modify diet to include CHO restrictions. Estimated Daily Nutrient Needs:  Energy (kcal):  5724-0606 kcals/day; Weight Used for Energy Requirements:  Current(15-18)     Protein (g):   g/PRO/day; Weight Used for Protein Requirements:  Ideal(1.2-2.0)        Fluid (ml/day):  6580-1751 mL/day; Weight Used for Fluid Requirements:  Current(15-18)       Wounds:  Surgical Wound       Current Nutrition Therapies:    DIET CARDIAC; Anthropometric Measures:  · Height: 5' 10\" (177.8 cm)  · Current Body Weight: 225 lb (102.1 kg)     · Ideal Body Weight: 166 lbs  · BMI: 32.3   · BMI Categories: Obese Class 1 (BMI 30.0-34. 9)       Nutrition Diagnosis:   · Altered nutrition-related lab values related to endocrine dysfuntion as evidenced by lab values    Nutrition Interventions:   Food and/or Nutrient Delivery:  Modify Current Diet     Goals:  Blood Glucose levels 150 or below       Nutrition Monitoring and Evaluation:   Food/Nutrient Intake Outcomes:  Food and Nutrient Intake  Physical Signs/Symptoms Outcomes:  Biochemical Data, Nutrition Focused Physical Findings, Skin, Weight     Electronically signed by Nivia Sutbbs MS, RD, LD on 9/21/20 at 3:15 PM CDT    Contact: [phone]

## 2020-09-22 NOTE — CARE COORDINATION
Received email from [facility] stating they received a phone call last night stating patient was discharging. He was set up with [address] [place] [address].     I have notified [facility] this morning of discharge and faxed DC Summary and med list.  Electronically signed by Monster Bui RN on 9/22/20 at 8:19 AM CDT

## 2020-09-24 ENCOUNTER — ANESTHESIA EVENT (OUTPATIENT)
Dept: PERIOP | Facility: HOSPITAL | Age: 80
End: 2020-09-24

## 2020-09-24 ENCOUNTER — ANESTHESIA (OUTPATIENT)
Dept: PERIOP | Facility: HOSPITAL | Age: 80
End: 2020-09-24

## 2020-09-24 ENCOUNTER — HOSPITAL ENCOUNTER (OUTPATIENT)
Facility: HOSPITAL | Age: 80
Setting detail: HOSPITAL OUTPATIENT SURGERY
Discharge: HOME | End: 2020-09-24
Attending: ORTHOPAEDIC SURGERY | Admitting: ORTHOPAEDIC SURGERY

## 2020-09-24 VITALS
WEIGHT: 211.64 LBS | RESPIRATION RATE: 18 BRPM | SYSTOLIC BLOOD PRESSURE: 147 MMHG | HEIGHT: 68 IN | OXYGEN SATURATION: 95 % | TEMPERATURE: 98.1 F | HEART RATE: 66 BPM | DIASTOLIC BLOOD PRESSURE: 57 MMHG | BODY MASS INDEX: 32.08 KG/M2

## 2020-09-24 DIAGNOSIS — L08.9 FOREIGN BODY OF FINGER OF LEFT HAND WITH INFECTION: Primary | ICD-10-CM

## 2020-09-24 DIAGNOSIS — S60.459A FOREIGN BODY OF FINGER OF LEFT HAND WITH INFECTION: Primary | ICD-10-CM

## 2020-09-24 DIAGNOSIS — L08.9 INFECTED HAND: ICD-10-CM

## 2020-09-24 LAB
ANION GAP SERPL CALCULATED.3IONS-SCNC: 10 MMOL/L (ref 5–15)
BUN SERPL-MCNC: 19 MG/DL (ref 8–23)
BUN/CREAT SERPL: 25.7 (ref 7–25)
CALCIUM SPEC-SCNC: 9.3 MG/DL (ref 8.6–10.5)
CHLORIDE SERPL-SCNC: 101 MMOL/L (ref 98–107)
CO2 SERPL-SCNC: 26 MMOL/L (ref 22–29)
CREAT SERPL-MCNC: 0.74 MG/DL (ref 0.76–1.27)
DEPRECATED RDW RBC AUTO: 46.3 FL (ref 37–54)
ERYTHROCYTE [DISTWIDTH] IN BLOOD BY AUTOMATED COUNT: 14.2 % (ref 12.3–15.4)
GFR SERPL CREATININE-BSD FRML MDRD: 102 ML/MIN/1.73
GLUCOSE BLDC GLUCOMTR-MCNC: 128 MG/DL (ref 70–130)
GLUCOSE BLDC GLUCOMTR-MCNC: 173 MG/DL (ref 70–130)
GLUCOSE SERPL-MCNC: 132 MG/DL (ref 65–99)
HCT VFR BLD AUTO: 43.3 % (ref 37.5–51)
HGB BLD-MCNC: 14.6 G/DL (ref 13–17.7)
MCH RBC QN AUTO: 30.2 PG (ref 26.6–33)
MCHC RBC AUTO-ENTMCNC: 33.7 G/DL (ref 31.5–35.7)
MCV RBC AUTO: 89.5 FL (ref 79–97)
PLATELET # BLD AUTO: 176 10*3/MM3 (ref 140–450)
PMV BLD AUTO: 9.3 FL (ref 6–12)
POTASSIUM SERPL-SCNC: 4.8 MMOL/L (ref 3.5–5.2)
RBC # BLD AUTO: 4.84 10*6/MM3 (ref 4.14–5.8)
SARS-COV-2 RDRP RESP QL NAA+PROBE: NOT DETECTED
SODIUM SERPL-SCNC: 137 MMOL/L (ref 136–145)
WBC # BLD AUTO: 12.82 10*3/MM3 (ref 3.4–10.8)

## 2020-09-24 PROCEDURE — 87147 CULTURE TYPE IMMUNOLOGIC: CPT | Performed by: ORTHOPAEDIC SURGERY

## 2020-09-24 PROCEDURE — 93005 ELECTROCARDIOGRAM TRACING: CPT | Performed by: ORTHOPAEDIC SURGERY

## 2020-09-24 PROCEDURE — 25010000002 VANCOMYCIN 1 G RECONSTITUTED SOLUTION: Performed by: NURSE ANESTHETIST, CERTIFIED REGISTERED

## 2020-09-24 PROCEDURE — 87205 SMEAR GRAM STAIN: CPT | Performed by: ORTHOPAEDIC SURGERY

## 2020-09-24 PROCEDURE — C9803 HOPD COVID-19 SPEC COLLECT: HCPCS

## 2020-09-24 PROCEDURE — 85027 COMPLETE CBC AUTOMATED: CPT | Performed by: ORTHOPAEDIC SURGERY

## 2020-09-24 PROCEDURE — 87070 CULTURE OTHR SPECIMN AEROBIC: CPT | Performed by: ORTHOPAEDIC SURGERY

## 2020-09-24 PROCEDURE — 25010000003 CEFAZOLIN PER 500 MG: Performed by: NURSE ANESTHETIST, CERTIFIED REGISTERED

## 2020-09-24 PROCEDURE — 25010000002 PROPOFOL 10 MG/ML EMULSION: Performed by: NURSE ANESTHETIST, CERTIFIED REGISTERED

## 2020-09-24 PROCEDURE — 87186 SC STD MICRODIL/AGAR DIL: CPT | Performed by: ORTHOPAEDIC SURGERY

## 2020-09-24 PROCEDURE — 87635 SARS-COV-2 COVID-19 AMP PRB: CPT | Performed by: ORTHOPAEDIC SURGERY

## 2020-09-24 PROCEDURE — 82962 GLUCOSE BLOOD TEST: CPT

## 2020-09-24 PROCEDURE — 25010000002 SUCCINYLCHOLINE PER 20 MG: Performed by: NURSE ANESTHETIST, CERTIFIED REGISTERED

## 2020-09-24 PROCEDURE — 80048 BASIC METABOLIC PNL TOTAL CA: CPT | Performed by: ORTHOPAEDIC SURGERY

## 2020-09-24 PROCEDURE — 93010 ELECTROCARDIOGRAM REPORT: CPT | Performed by: INTERNAL MEDICINE

## 2020-09-24 RX ORDER — MAGNESIUM HYDROXIDE 1200 MG/15ML
LIQUID ORAL AS NEEDED
Status: DISCONTINUED | OUTPATIENT
Start: 2020-09-24 | End: 2020-09-24 | Stop reason: HOSPADM

## 2020-09-24 RX ORDER — VANCOMYCIN HYDROCHLORIDE 1 G/20ML
INJECTION, POWDER, LYOPHILIZED, FOR SOLUTION INTRAVENOUS AS NEEDED
Status: DISCONTINUED | OUTPATIENT
Start: 2020-09-24 | End: 2020-09-24 | Stop reason: SURG

## 2020-09-24 RX ORDER — INSULIN GLARGINE 100 [IU]/ML
50 INJECTION, SOLUTION SUBCUTANEOUS 2 TIMES DAILY
COMMUNITY
End: 2021-06-08 | Stop reason: CLARIF

## 2020-09-24 RX ORDER — SUCCINYLCHOLINE CHLORIDE 20 MG/ML
INJECTION INTRAMUSCULAR; INTRAVENOUS AS NEEDED
Status: DISCONTINUED | OUTPATIENT
Start: 2020-09-24 | End: 2020-09-24 | Stop reason: SURG

## 2020-09-24 RX ORDER — ATORVASTATIN CALCIUM 40 MG/1
40 TABLET, FILM COATED ORAL EVERY MORNING
COMMUNITY

## 2020-09-24 RX ORDER — SODIUM CHLORIDE 0.9 % (FLUSH) 0.9 %
3-10 SYRINGE (ML) INJECTION AS NEEDED
Status: DISCONTINUED | OUTPATIENT
Start: 2020-09-24 | End: 2020-09-24 | Stop reason: HOSPADM

## 2020-09-24 RX ORDER — LEVOFLOXACIN 500 MG/1
500 TABLET, FILM COATED ORAL DAILY
COMMUNITY
End: 2021-06-08

## 2020-09-24 RX ORDER — LIDOCAINE HYDROCHLORIDE 40 MG/ML
SOLUTION TOPICAL AS NEEDED
Status: DISCONTINUED | OUTPATIENT
Start: 2020-09-24 | End: 2020-09-24 | Stop reason: SURG

## 2020-09-24 RX ORDER — OXYCODONE HYDROCHLORIDE AND ACETAMINOPHEN 5; 325 MG/1; MG/1
1-2 TABLET ORAL EVERY 4 HOURS PRN
Qty: 60 TABLET | Refills: 0 | Status: SHIPPED | OUTPATIENT
Start: 2020-09-24 | End: 2021-06-08

## 2020-09-24 RX ORDER — ONDANSETRON 2 MG/ML
4 INJECTION INTRAMUSCULAR; INTRAVENOUS AS NEEDED
Status: DISCONTINUED | OUTPATIENT
Start: 2020-09-24 | End: 2020-09-24 | Stop reason: HOSPADM

## 2020-09-24 RX ORDER — LIDOCAINE HYDROCHLORIDE 10 MG/ML
0.5 INJECTION, SOLUTION EPIDURAL; INFILTRATION; INTRACAUDAL; PERINEURAL ONCE AS NEEDED
Status: DISCONTINUED | OUTPATIENT
Start: 2020-09-24 | End: 2020-09-24 | Stop reason: HOSPADM

## 2020-09-24 RX ORDER — ONDANSETRON 4 MG/1
4 TABLET, FILM COATED ORAL EVERY 8 HOURS PRN
Qty: 20 TABLET | Refills: 1 | Status: SHIPPED | OUTPATIENT
Start: 2020-09-24 | End: 2021-06-08

## 2020-09-24 RX ORDER — MORPHINE SULFATE 2 MG/ML
2 INJECTION, SOLUTION INTRAMUSCULAR; INTRAVENOUS
Status: DISCONTINUED | OUTPATIENT
Start: 2020-09-24 | End: 2020-09-24 | Stop reason: HOSPADM

## 2020-09-24 RX ORDER — FLUMAZENIL 0.1 MG/ML
0.2 INJECTION INTRAVENOUS AS NEEDED
Status: DISCONTINUED | OUTPATIENT
Start: 2020-09-24 | End: 2020-09-24 | Stop reason: HOSPADM

## 2020-09-24 RX ORDER — LABETALOL HYDROCHLORIDE 5 MG/ML
5 INJECTION, SOLUTION INTRAVENOUS
Status: DISCONTINUED | OUTPATIENT
Start: 2020-09-24 | End: 2020-09-24 | Stop reason: HOSPADM

## 2020-09-24 RX ORDER — ACETAMINOPHEN 500 MG
1000 TABLET ORAL ONCE
Status: COMPLETED | OUTPATIENT
Start: 2020-09-24 | End: 2020-09-24

## 2020-09-24 RX ORDER — SODIUM CHLORIDE 0.9 % (FLUSH) 0.9 %
3 SYRINGE (ML) INJECTION AS NEEDED
Status: DISCONTINUED | OUTPATIENT
Start: 2020-09-24 | End: 2020-09-24 | Stop reason: HOSPADM

## 2020-09-24 RX ORDER — NALOXONE HCL 0.4 MG/ML
0.04 VIAL (ML) INJECTION AS NEEDED
Status: DISCONTINUED | OUTPATIENT
Start: 2020-09-24 | End: 2020-09-24 | Stop reason: HOSPADM

## 2020-09-24 RX ORDER — SODIUM CHLORIDE 0.9 % (FLUSH) 0.9 %
3 SYRINGE (ML) INJECTION EVERY 12 HOURS SCHEDULED
Status: DISCONTINUED | OUTPATIENT
Start: 2020-09-24 | End: 2020-09-24 | Stop reason: HOSPADM

## 2020-09-24 RX ORDER — CLOPIDOGREL BISULFATE 75 MG/1
75 TABLET ORAL DAILY
COMMUNITY

## 2020-09-24 RX ORDER — OXYCODONE AND ACETAMINOPHEN 10; 325 MG/1; MG/1
1 TABLET ORAL ONCE AS NEEDED
Status: DISCONTINUED | OUTPATIENT
Start: 2020-09-24 | End: 2020-09-24 | Stop reason: HOSPADM

## 2020-09-24 RX ORDER — SODIUM CHLORIDE, SODIUM LACTATE, POTASSIUM CHLORIDE, CALCIUM CHLORIDE 600; 310; 30; 20 MG/100ML; MG/100ML; MG/100ML; MG/100ML
1000 INJECTION, SOLUTION INTRAVENOUS CONTINUOUS
Status: DISCONTINUED | OUTPATIENT
Start: 2020-09-24 | End: 2020-09-24 | Stop reason: HOSPADM

## 2020-09-24 RX ORDER — OXYCODONE HYDROCHLORIDE AND ACETAMINOPHEN 5; 325 MG/1; MG/1
1 TABLET ORAL EVERY 4 HOURS PRN
COMMUNITY
End: 2021-06-08

## 2020-09-24 RX ORDER — PROPOFOL 10 MG/ML
VIAL (ML) INTRAVENOUS AS NEEDED
Status: DISCONTINUED | OUTPATIENT
Start: 2020-09-24 | End: 2020-09-24 | Stop reason: SURG

## 2020-09-24 RX ORDER — CEFAZOLIN SODIUM 1 G/3ML
INJECTION, POWDER, FOR SOLUTION INTRAMUSCULAR; INTRAVENOUS AS NEEDED
Status: DISCONTINUED | OUTPATIENT
Start: 2020-09-24 | End: 2020-09-24 | Stop reason: SURG

## 2020-09-24 RX ORDER — SODIUM CHLORIDE, SODIUM LACTATE, POTASSIUM CHLORIDE, CALCIUM CHLORIDE 600; 310; 30; 20 MG/100ML; MG/100ML; MG/100ML; MG/100ML
100 INJECTION, SOLUTION INTRAVENOUS CONTINUOUS
Status: DISCONTINUED | OUTPATIENT
Start: 2020-09-24 | End: 2020-09-24 | Stop reason: HOSPADM

## 2020-09-24 RX ORDER — ATENOLOL 25 MG/1
25 TABLET ORAL DAILY
COMMUNITY

## 2020-09-24 RX ORDER — FENTANYL CITRATE 50 UG/ML
25 INJECTION, SOLUTION INTRAMUSCULAR; INTRAVENOUS
Status: DISCONTINUED | OUTPATIENT
Start: 2020-09-24 | End: 2020-09-24 | Stop reason: HOSPADM

## 2020-09-24 RX ORDER — SUFENTANIL CITRATE 50 UG/ML
INJECTION EPIDURAL; INTRAVENOUS AS NEEDED
Status: DISCONTINUED | OUTPATIENT
Start: 2020-09-24 | End: 2020-09-24 | Stop reason: SURG

## 2020-09-24 RX ORDER — ROCURONIUM BROMIDE 10 MG/ML
INJECTION, SOLUTION INTRAVENOUS AS NEEDED
Status: DISCONTINUED | OUTPATIENT
Start: 2020-09-24 | End: 2020-09-24 | Stop reason: SURG

## 2020-09-24 RX ADMIN — ROCURONIUM BROMIDE 10 MG: 10 INJECTION INTRAVENOUS at 18:01

## 2020-09-24 RX ADMIN — SODIUM CHLORIDE, POTASSIUM CHLORIDE, SODIUM LACTATE AND CALCIUM CHLORIDE 1000 ML: 600; 310; 30; 20 INJECTION, SOLUTION INTRAVENOUS at 14:12

## 2020-09-24 RX ADMIN — PROPOFOL 150 MG: 10 INJECTION, EMULSION INTRAVENOUS at 18:01

## 2020-09-24 RX ADMIN — SUFENTANIL CITRATE 5 MCG: 50 INJECTION EPIDURAL; INTRAVENOUS at 18:54

## 2020-09-24 RX ADMIN — LIDOCAINE HYDROCHLORIDE 1 EACH: 40 SOLUTION TOPICAL at 18:01

## 2020-09-24 RX ADMIN — ACETAMINOPHEN 1000 MG: 500 TABLET, FILM COATED ORAL at 17:16

## 2020-09-24 RX ADMIN — SODIUM CHLORIDE, POTASSIUM CHLORIDE, SODIUM LACTATE AND CALCIUM CHLORIDE: 600; 310; 30; 20 INJECTION, SOLUTION INTRAVENOUS at 19:01

## 2020-09-24 RX ADMIN — VANCOMYCIN HYDROCHLORIDE 1 G: 1 INJECTION, POWDER, LYOPHILIZED, FOR SOLUTION INTRAVENOUS at 18:17

## 2020-09-24 RX ADMIN — SUFENTANIL CITRATE 30 MCG: 50 INJECTION EPIDURAL; INTRAVENOUS at 18:01

## 2020-09-24 RX ADMIN — CEFAZOLIN 1 G: 330 INJECTION, POWDER, FOR SOLUTION INTRAMUSCULAR; INTRAVENOUS at 18:15

## 2020-09-24 RX ADMIN — SUCCINYLCHOLINE CHLORIDE 180 MG: 20 INJECTION, SOLUTION INTRAMUSCULAR; INTRAVENOUS at 18:01

## 2020-09-24 NOTE — ANESTHESIA PREPROCEDURE EVALUATION
Anesthesia Evaluation     Patient summary reviewed   no history of anesthetic complications:  NPO Solid Status: > 8 hours  NPO Liquid Status: > 8 hours           Airway   Mallampati: I  TM distance: >3 FB  Neck ROM: full  Dental    (+) edentulous    Pulmonary    (+) a smoker Former,   (-) asthma, sleep apnea  Cardiovascular   Exercise tolerance: poor (<4 METS)    ECG reviewed  PT is on anticoagulation therapy  Patient on routine beta blocker and Beta blocker given within 24 hours of surgery    (+) pacemaker pacemaker, hypertension, valvular problems/murmurs (s/p AVR ), CAD, CABG, cardiac stents (unable to get accurate timeline. Patient states he had heart stents placed at [facility] 3 months ago, but no records confirm this. I spoke with his son and he states his father did not have stents 3 months ago) hyperlipidemia,   (-) past MI      Neuro/Psych  (+) poor historian.,     (-) seizures, TIA, CVA  GI/Hepatic/Renal/Endo    (+)   diabetes mellitus using insulin,   (-) liver disease, no renal disease    Musculoskeletal     Abdominal    Substance History      OB/GYN          Other                      Anesthesia Plan    ASA 3 - emergent     general   (Patient extremely poor historian on timeline of cardiac interventions. Discussed with son as well who could also not provide an accurate timeline. Patient reports he had cardiac stents placed at [facility] 3 months ago but there is no record of this at this time. He also explains he had pacemaker lead replacement at [place] ([facility] his son did confirm). I have discussed at length with the patient that I can not find a record of most recent cardiac stents. Patient also unable to provide details on if CABG at time of AVR or simply AVR with bioprosthesis. Patient has remained on DAPT and I have discussed risks of proceeding. Have reviewed [employer] pacemaker interrogation and will interrogate post operatively as well. Plan for asa plavix post op)  intravenous induction      Anesthetic plan, all risks, benefits, and alternatives have been provided, discussed and informed consent has been obtained with: patient.

## 2020-09-24 NOTE — ANESTHESIA PROCEDURE NOTES
Airway  Urgency: elective    Date/Time: 9/24/2020 6:03 PM  Airway not difficult    General Information and Staff    Patient location during procedure: OR  CRNA: Jean Pierre Hernandez CRNA    Indications and Patient Condition  Indications for airway management: airway protection    Preoxygenated: yes  MILS maintained throughout  Mask difficulty assessment: 1 - vent by mask    Final Airway Details  Final airway type: endotracheal airway      Successful airway: ETT  Cuffed: yes   Successful intubation technique: direct laryngoscopy  Endotracheal tube insertion site: oral  Blade: Jeffrey  Blade size: 2  ETT size (mm): 8.0  Cormack-Lehane Classification: grade IIa - partial view of glottis  Placement verified by: chest auscultation   Cuff volume (mL): 10  Measured from: lips  ETT/EBT  to lips (cm): 23  Number of attempts at approach: 1  Assessment: lips, teeth, and gum same as pre-op and atraumatic intubation

## 2020-09-25 NOTE — ANESTHESIA POSTPROCEDURE EVALUATION
Patient: Atul Jeong    Procedure Summary     Date: 09/24/20 Room / Location: [facility] OR  /  [facility] OR    Anesthesia Start: 1758 Anesthesia Stop: 1911    Procedure: INCISION AND DRAINAGE LEFT HAND (Left Arm Upper) Diagnosis:     Surgeon: Ming Ramos MD Provider: Jean Pierre Hernandez CRNA    Anesthesia Type: general ASA Status: 3 - Emergent          Anesthesia Type: general    Vitals  Vitals Value Taken Time   /61 09/24/20 1955   Temp 98.1 °F (36.7 °C) 09/24/20 1950   Pulse 69 09/24/20 1955   Resp 16 09/24/20 1950   SpO2 95 % 09/24/20 1955   Vitals shown include unvalidated device data.        Post Anesthesia Care and Evaluation    Patient location during evaluation: PACU  Patient participation: complete - patient participated  Level of consciousness: awake and alert  Pain management: adequate  Airway patency: patent  Anesthetic complications: No anesthetic complications    Cardiovascular status: acceptable  Respiratory status: acceptable  Hydration status: acceptable

## 2020-09-27 LAB
BACTERIA SPEC AEROBE CULT: NORMAL
GRAM STN SPEC: NORMAL
GRAM STN SPEC: NORMAL

## 2020-09-28 LAB
BACTERIA FLD CULT: ABNORMAL
GRAM STN SPEC: ABNORMAL
GRAM STN SPEC: ABNORMAL

## 2020-10-21 ENCOUNTER — OFFICE VISIT (OUTPATIENT)
Dept: CARDIOLOGY | Age: 80
End: 2020-10-21
Payer: MEDICARE

## 2020-10-21 VITALS
OXYGEN SATURATION: 97 % | HEART RATE: 63 BPM | DIASTOLIC BLOOD PRESSURE: 64 MMHG | HEIGHT: 70 IN | SYSTOLIC BLOOD PRESSURE: 126 MMHG | WEIGHT: 217 LBS | BODY MASS INDEX: 31.07 KG/M2

## 2020-10-21 PROCEDURE — G8484 FLU IMMUNIZE NO ADMIN: HCPCS | Performed by: CLINICAL NURSE SPECIALIST

## 2020-10-21 PROCEDURE — 99213 OFFICE O/P EST LOW 20 MIN: CPT | Performed by: CLINICAL NURSE SPECIALIST

## 2020-10-21 PROCEDURE — 1036F TOBACCO NON-USER: CPT | Performed by: CLINICAL NURSE SPECIALIST

## 2020-10-21 PROCEDURE — G8417 CALC BMI ABV UP PARAM F/U: HCPCS | Performed by: CLINICAL NURSE SPECIALIST

## 2020-10-21 PROCEDURE — 4040F PNEUMOC VAC/ADMIN/RCVD: CPT | Performed by: CLINICAL NURSE SPECIALIST

## 2020-10-21 PROCEDURE — 93288 INTERROG EVL PM/LDLS PM IP: CPT | Performed by: CLINICAL NURSE SPECIALIST

## 2020-10-21 PROCEDURE — 1111F DSCHRG MED/CURRENT MED MERGE: CPT | Performed by: CLINICAL NURSE SPECIALIST

## 2020-10-21 PROCEDURE — 1123F ACP DISCUSS/DSCN MKR DOCD: CPT | Performed by: CLINICAL NURSE SPECIALIST

## 2020-10-21 PROCEDURE — G8427 DOCREV CUR MEDS BY ELIG CLIN: HCPCS | Performed by: CLINICAL NURSE SPECIALIST

## 2020-10-21 RX ORDER — OXYCODONE HYDROCHLORIDE 5 MG/1
5 CAPSULE ORAL EVERY 4 HOURS PRN
COMMUNITY
End: 2020-12-10 | Stop reason: ALTCHOICE

## 2020-10-21 ASSESSMENT — ENCOUNTER SYMPTOMS
CHEST TIGHTNESS: 0
VOMITING: 0
SHORTNESS OF BREATH: 0
COUGH: 0
WHEEZING: 0
ABDOMINAL PAIN: 0
FACIAL SWELLING: 0
NAUSEA: 0
EYE REDNESS: 0

## 2020-10-21 NOTE — PATIENT INSTRUCTIONS
Return in about 6 months (around 4/21/2021) for APRN.    [facility] 1/21/21  Observe pacemaker site for any sign of infection- redness, drainage, swelling, etc

## 2020-10-21 NOTE — PROGRESS NOTES
[facility], [address] [facility] [address] [place] [address], [id number]  Phone: [phone]  Fax: [phone]    OFFICE VISIT:  10/21/2020    Audelia Bedolla - : 1940    Reason For Visit:  Fatou Del Rosario is a 79 y.o. male who is here for 1 Month Follow-Up (Pacer check.)       Diagnosis Orders   1. Pacemaker lead malfunction, subsequent encounter     2. Sinoatrial node dysfunction (HCC)     3. Valvular heart disease     4. S/P aortic valve replacement with bioprosthetic valve     5. Coronary artery disease involving native coronary artery of native heart without angina pectoris     6. Essential hypertension     7. Mixed hyperlipidemia     8. 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 Patient recently had a pacemaker generator change in 2020. Issues were found with elevated RV threshold. Patient was sent to Dr. Randell Sinclair in [place] for  RV lead replacement 8/3/20. Since last patient's last year to his left hand with a fishhook. This has led to systemic infection, several surgeries, and now he is receiving IV antibiotics 4 times a day at home via home health. From a cardiac standpoint he is feeling well. There is no complaints of chest pain, unusual dyspnea, orthopnea, PND, edema, palpitations. He was told by his infectious disease doctor to continue to closely observe his pacemaker site for signs of infection. Currently it is doing well    RITESH Cristina CNP is PCP.   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 09/16/2020    Cellulitis of left hand and arm 09/14/2020    Abscess of left hand 09/14/2020    Pacemaker lead malfunction 08/05/2020    Pacemaker malfunction, initial encounter 06/29/2020    Bradycardia 03/17/2020    Near syncope 11/14/2019    History of syncope 11/14/2019    Sinoatrial node dysfunction ([address] 11/14/2019    Mixed hyperlipidemia 11/14/2019    S/P aortic valve replacement with bioprosthetic valve 08/22/2019    Grade I diastolic dysfunction 05/42/9066    Pacemaker 08/22/2019    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    Syncope 02/21/2019    Hyponatremia 06/11/2018    Hypercholesterolemia with hypertriglyceridemia 06/11/2018    Rheumatic aortic valve insufficiency     Moderate mitral stenosis     Pinched nerve in neck 04/18/2018    CPAP (continuous positive airway pressure) dependence 03/08/2018    BiPAP (biphasic positive airway pressure) dependence     Bilateral carpal tunnel syndrome 08/28/2017    Diabetic polyneuropathy associated with type 2 diabetes mellitus ([address] 08/29/2016    Orthostasis     Vertigo 07/26/2016    Syncope and collapse 02/17/2016    Type 2 diabetes mellitus with hyperglycemia, with long-term current use of insulin ([address] 02/17/2016    Obstructive sleep apnea 02/17/2016    Class 1 obesity due to excess calories in adult 02/17/2016    Restless legs syndrome 02/17/2016    Gastroesophageal reflux disease without esophagitis 02/17/2016    Essential hypertension 02/17/2016    Chronic bilateral low back pain without sciatica 07/16/2012    Other chest pain 04/30/2012     Past Medical History:   Diagnosis Date    Aortic valve stenosis     Arthritis     Chest tightness, discomfort, or pressure 4/30/2012    Chronic back pain     CKD (chronic kidney disease)     CPAP (continuous positive airway pressure) dependence     13cm    Diabetes ([address]     Diabetic polyneuropathy associated with type 2 diabetes mellitus ([facility] 2016    GERD (gastroesophageal reflux disease)     HTN (hypertension)     Hyperlipemia     Left ventricular diastolic dysfunction     Mitral stenosis     Mitral valve stenosis     Neuropathy     Obstructive sleep apnea     AHI: 34.5 per PSG, 2013; AHI: 36.9 per PSG, 2015; AHI: 54.5 per PSG, 3/2017    Pinched nerve in neck     Restless legs syndrome 2016     Past Surgical History:   Procedure Laterality Date    APPENDECTOMY      BACK SURGERY      4 Back surgeries    BLADDER SURGERY      CARDIAC CATHETERIZATION  12    EF > 50%    CATARACT REMOVAL      CHOLECYSTECTOMY      COLONOSCOPY      EYE SURGERY      implants placed both eyes    HAND SURGERY Left 2020    INCISION AND DRAINAGE LEFT HAND ABSCESS performed by Clara Arguelles MD at [address] [facility] OTHER SURGICAL HISTORY  2020    Right Ventricular lead replacement- Dr. Sergio Bragg RPLCMT PROST AORTIC VALVE OPEN XCP HOMOGRF/STENT N/A 2018    AORTIC VALVE REPLACEMENT TRANSESOPHAGEAL ECHOCARDIOGRAM performed by Ariela Clay MD at [address] [facility]       Family History   Problem Relation Age of Onset    Diabetes Mother     Cancer Father     Cancer Sister     Heart Disease Brother     Cancer Brother     Cancer Sister     Cancer Brother      Social History     Tobacco Use    Smoking status: Former Smoker     Packs/day: 3.00     Years: 50.00     Pack years: 150.00     Types: Cigarettes     Last attempt to quit: 1977     Years since quittin.2    Smokeless tobacco: Former User   Substance Use Topics    Alcohol use: No      Current Outpatient Medications   Medication Sig Dispense Refill    oxyCODONE 5 MG capsule Take 5 mg by mouth every 4 hours as needed for Pain.       insulin glargine (LANTUS) 100 UNIT/ML injection vial Inject 50 Units into the skin 2 times daily 1 vial 1    pramipexole (MIRAPEX) 1.5 MG tablet 3 times daily       mirabegron (MYRBETRIQ) 25 MG TB24 Take 1 tablet by mouth daily 30 tablet 5    ezetimibe (ZETIA) 10 MG tablet Take 10 mg by mouth daily      clopidogrel (PLAVIX) 75 MG tablet Take 75 mg by mouth daily      citalopram (CELEXA) 20 MG tablet TAKE ONE TABLET BY MOUTH DAILY 30 tablet 5    atorvastatin (LIPITOR) 40 MG tablet Take 1 tablet by mouth nightly 90 tablet 3    Cholecalciferol (VITAMIN D3) 63563 units CAPS   11    HUMULIN R U-500 KWIKPEN 500 UNIT/ML SOPN concentrated injection pen   11    Empagliflozin-Metformin HCl ER (SYNJARDY XR)  MG TB24 Take 1 tablet by mouth daily (with breakfast)      atenolol (TENORMIN) 25 MG tablet Take 25 mg by mouth daily      B-D UF III MINI PEN NEEDLES 31G X 5 MM MISC       furosemide (LASIX) 20 MG tablet Take 1 tablet by mouth as needed (For weight gain greater than 2.5 lbs in 24 hours.) (Patient taking differently: Take 20 mg by mouth daily ) 30 tablet 0    esomeprazole (NEXIUM) 40 MG capsule Take 40 mg by mouth every morning (before breakfast).  aspirin EC 81 MG EC tablet Take 1 tablet by mouth 2 times daily for 21 days 42 tablet 0     No current facility-administered medications for this visit. Allergies: Azithromycin; Metoprolol; Cyclobenzaprine; and Metoclopramide    Review of Systems  Review of Systems   Constitutional: Positive for fatigue. Negative for activity change, diaphoresis, fever and unexpected weight change. HENT: Negative for facial swelling and nosebleeds. Eyes: Negative for redness and visual disturbance. Respiratory: Negative for cough, chest tightness, shortness of breath and wheezing. Cardiovascular: Negative for chest pain, palpitations and leg swelling. Gastrointestinal: Negative for abdominal pain, nausea and vomiting. Endocrine: Negative for cold intolerance and heat intolerance. Genitourinary: Negative for dysuria and hematuria. Musculoskeletal: Negative for arthralgias and myalgias. Complains of left hand injury with fishhook   Skin: Negative for pallor and rash. Neurological: Negative for dizziness, seizures, syncope, weakness and light-headedness. Hematological: Does not bruise/bleed easily. Psychiatric/Behavioral: Negative for agitation. The patient is not nervous/anxious. Objective  Vital Signs - /64   Pulse 63   Ht 5' 10\" (1.778 m)   Wt 217 lb (98.4 kg)   SpO2 97%   BMI 31.14 kg/m²   Physical Exam  Vitals signs and nursing note reviewed. Constitutional:       General: He is not in acute distress. Appearance: He is well-developed. He is obese. He is not diaphoretic. HENT:      Head: Normocephalic and atraumatic. Right Ear: Hearing and external ear normal.      Left Ear: Hearing and external ear normal.      Nose: Nose normal.   Eyes:      General:         Right eye: No discharge. Left eye: No discharge. Pupils: Pupils are equal, round, and reactive to light. Neck:      Musculoskeletal: Neck supple. No muscular tenderness. Thyroid: No thyromegaly. Vascular: No carotid bruit or JVD. Trachea: No tracheal deviation. Cardiovascular:      Rate and Rhythm: Normal rate and regular rhythm. Heart sounds: Murmur (2/6 systolic) present. No friction rub. No gallop. Pulmonary:      Effort: Pulmonary effort is normal. No respiratory distress. Breath sounds: Normal breath sounds. No wheezing or rales. Abdominal:      Palpations: Abdomen is soft. Tenderness: There is no abdominal tenderness. Musculoskeletal:         General: No swelling or deformity. Skin:     General: Skin is warm and dry. Findings: No rash. Comments: Left chest pacemaker site is well-healed without sign of infection  Left hand is wrapped with dressing and Ace wrap bandage dry and intact   Neurological:      General: No focal deficit present. Mental Status: He is alert and oriented to person, place, and time. Cranial Nerves:  No of exercise most days of the week. Cardiac / HealthyDiet  Continue current medications as directed  Continue plan of treatment  It is always recommended that you bring your medicationsbottles with you to each visit - this is for your safety!        RITESH Harmon

## 2020-11-03 PROBLEM — R55 SYNCOPE: Status: RESOLVED | Noted: 2019-02-21 | Resolved: 2020-11-03

## 2020-11-11 RX ORDER — CITALOPRAM 20 MG/1
TABLET ORAL
Qty: 30 TABLET | Refills: 5 | Status: SHIPPED | OUTPATIENT
Start: 2020-11-11 | End: 2021-05-18

## 2020-11-11 NOTE — TELEPHONE ENCOUNTER
Requested Prescriptions     Pending Prescriptions Disp Refills    citalopram (CELEXA) 20 MG tablet [Pharmacy Med Name: CITALOPRAM HBR 20 MG TABLET 20 TAB] 30 tablet 5     Sig: TAKE ONE TABLET BY MOUTH DAILY

## 2020-12-10 ENCOUNTER — TELEPHONE (OUTPATIENT)
Dept: CARDIOLOGY | Age: 80
End: 2020-12-10

## 2020-12-10 ENCOUNTER — OFFICE VISIT (OUTPATIENT)
Dept: CARDIOLOGY | Age: 80
End: 2020-12-10
Payer: MEDICARE

## 2020-12-10 ENCOUNTER — NURSE TRIAGE (OUTPATIENT)
Dept: OTHER | Facility: CLINIC | Age: 80
End: 2020-12-10

## 2020-12-10 VITALS
SYSTOLIC BLOOD PRESSURE: 156 MMHG | BODY MASS INDEX: 31.35 KG/M2 | OXYGEN SATURATION: 98 % | HEIGHT: 70 IN | HEART RATE: 66 BPM | WEIGHT: 219 LBS | DIASTOLIC BLOOD PRESSURE: 72 MMHG

## 2020-12-10 PROCEDURE — 4040F PNEUMOC VAC/ADMIN/RCVD: CPT | Performed by: CLINICAL NURSE SPECIALIST

## 2020-12-10 PROCEDURE — 1123F ACP DISCUSS/DSCN MKR DOCD: CPT | Performed by: CLINICAL NURSE SPECIALIST

## 2020-12-10 PROCEDURE — G8484 FLU IMMUNIZE NO ADMIN: HCPCS | Performed by: CLINICAL NURSE SPECIALIST

## 2020-12-10 PROCEDURE — G8427 DOCREV CUR MEDS BY ELIG CLIN: HCPCS | Performed by: CLINICAL NURSE SPECIALIST

## 2020-12-10 PROCEDURE — 93280 PM DEVICE PROGR EVAL DUAL: CPT | Performed by: CLINICAL NURSE SPECIALIST

## 2020-12-10 PROCEDURE — 99213 OFFICE O/P EST LOW 20 MIN: CPT | Performed by: CLINICAL NURSE SPECIALIST

## 2020-12-10 PROCEDURE — 1036F TOBACCO NON-USER: CPT | Performed by: CLINICAL NURSE SPECIALIST

## 2020-12-10 PROCEDURE — G8417 CALC BMI ABV UP PARAM F/U: HCPCS | Performed by: CLINICAL NURSE SPECIALIST

## 2020-12-10 RX ORDER — HYDROCODONE BITARTRATE AND ACETAMINOPHEN 7.5; 325 MG/1; MG/1
TABLET ORAL DAILY
COMMUNITY
Start: 2020-11-03 | End: 2022-03-10 | Stop reason: SDUPTHER

## 2020-12-10 ASSESSMENT — ENCOUNTER SYMPTOMS
EYE REDNESS: 0
COUGH: 0
ABDOMINAL PAIN: 0
WHEEZING: 0
VOMITING: 0
FACIAL SWELLING: 0
CHEST TIGHTNESS: 0
SHORTNESS OF BREATH: 0
NAUSEA: 0

## 2020-12-10 NOTE — TELEPHONE ENCOUNTER
Contacted Cardiologist due to significant cardiac history and symptoms. Spoke with Rony Grider RN in office and she advised that MD will look at patient information and they will call him back directly. Reason for Disposition   Patient wants to be seen    Answer Assessment - Initial Assessment Questions  1. LOCATION: \"Where does it hurt? \"        In chest right below pacemaker    2. RADIATION: \"Does the pain go anywhere else? \" (e.g., into neck, jaw, arms, back)      No    3. ONSET: \"When did the chest pain begin? \" (Minutes, hours or days)       Around 6am this morning    4. PATTERN \"Does the pain come and go, or has it been constant since it started? \"  \"Does it get worse with exertion? \"       Constant    5. DURATION: \"How long does it last\" (e.g., seconds, minutes, hours)      Constant    6. SEVERITY: \"How bad is the pain? \"  (e.g., Scale 1-10; mild, moderate, or severe)     - MILD (1-3): doesn't interfere with normal activities      - MODERATE (4-7): interferes with normal activities or awakens from sleep     - SEVERE (8-10): excruciating pain, unable to do any normal activities        4/10    7. CARDIAC RISK FACTORS: \"Do you have any history of heart problems or risk factors for heart disease? \" (e.g., angina, prior heart attack; diabetes, high blood pressure, high cholesterol, smoker, or strong family history of heart disease)      Pacemaker, cardiac valve replacement, pacemaker failure in the past  3 stents placed    8. PULMONARY RISK FACTORS: \"Do you have any history of lung disease? \"  (e.g., blood clots in lung, asthma, emphysema, birth control pills)      no  9. CAUSE: \"What do you think is causing the chest pain? \"      Heart history    10. OTHER SYMPTOMS: \"Do you have any other symptoms? \" (e.g., dizziness, nausea, vomiting, sweating, fever, difficulty breathing, cough)        Shortness of breath    11. PREGNANCY: \"Is there any chance you are pregnant? \" \"When was your last menstrual period? \" N/A    Protocols used: CHEST PAIN-ADULT-[place]

## 2020-12-10 NOTE — TELEPHONE ENCOUNTER
Rahul Mauricio, patient called and spoke with nurse triage. They called to see if he needed to be seen. She states that he felt a pop in his chest where his pacemaker is at 6AM and he has been having pain there since. It does hurt when he takes a deep breath. She said he does not sound like he is immediate distress. Vitals after incident were 158/81 HR 63 and vitals now 143/83 HR 76. Does he need to go to ED or ok to bring in today?

## 2020-12-10 NOTE — TELEPHONE ENCOUNTER
Spoke with patient and advised he could be seen at 2 or 2:45. He chose 2:45. Added to schedule and skyped Kirke [place], [place] for Nirav Brunner to let her know.

## 2020-12-10 NOTE — PROGRESS NOTES
[facility], [address] [place] [address] U[facility]MA Samaniego [facility], [employer] [id number]  Phone: [phone]  Fax: [phone]    OFFICE VISIT:  12/10/2020    Shahrzad Ferris - : 1940    Reason For Visit:  Sam Joseph is a 79 y.o. male who is here for Follow-up (Patient states he had chest pain on left side that almost \"knocked him down\". States it hurts all the time but worse with deep breath. )       Diagnosis Orders   1. Chest wall pain     2. Coronary artery disease involving native coronary artery of native heart without angina pectoris     3. Sinoatrial node dysfunction (HCC)     4. 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   Patient recently had a pacemaker generator change in 2020. Issues were found with elevated RV threshold. Patient was sent to Dr. Lg Cruz in [facility] 68 lead replacement 8/3/20. Patient had an injury with a fishhook shortly thereafter and developed a systemic infection involving several surgeries and IV antibiotics. Patient is here today because he was standing at his bar when he turned and heard a pop in his chest.  He states his chest has been sore ever since this occurred earlier today. No fall. He states the soreness is better now. Was concerned it might be his heart or pacemaker and wanted to come in for an evaluation.        RITESH Gtz CNP is PCP.   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    Cellulitis of left hand and arm 2020    Abscess of left hand 2020    Pacemaker lead malfunction 08/05/2020    Pacemaker malfunction, initial encounter 06/29/2020    Bradycardia 03/17/2020    Near syncope 11/14/2019    History of syncope 11/14/2019    Sinoatrial node dysfunction ([address] 11/14/2019    Mixed hyperlipidemia 11/14/2019    S/P aortic valve replacement with bioprosthetic valve 08/22/2019    Grade I diastolic dysfunction 21/38/1870    Pacemaker 08/22/2019    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    Hyponatremia 06/11/2018    Hypercholesterolemia with hypertriglyceridemia 06/11/2018    Rheumatic aortic valve insufficiency     Moderate mitral stenosis     Pinched nerve in neck 04/18/2018    CPAP (continuous positive airway pressure) dependence 03/08/2018    BiPAP (biphasic positive airway pressure) dependence     Bilateral carpal tunnel syndrome 08/28/2017    Diabetic polyneuropathy associated with type 2 diabetes mellitus ([address] 08/29/2016    Orthostasis     Vertigo 07/26/2016    Type 2 diabetes mellitus with hyperglycemia, with long-term current use of insulin ([address] 02/17/2016    Obstructive sleep apnea 02/17/2016    Class 1 obesity due to excess calories in adult 02/17/2016    Restless legs syndrome 02/17/2016    Gastroesophageal reflux disease without esophagitis 02/17/2016    Essential hypertension 02/17/2016    Chronic bilateral low back pain without sciatica 07/16/2012    Other chest pain 04/30/2012     Past Medical History:   Diagnosis Date    Aortic valve stenosis     Arthritis     Chest tightness, discomfort, or pressure 4/30/2012    Chronic back pain     CKD (chronic kidney disease)     CPAP (continuous positive airway pressure) dependence     13cm    Diabetes ([address]     Diabetic polyneuropathy associated with type 2 diabetes mellitus ([address] 8/29/2016    GERD (gastroesophageal reflux disease)     HTN (hypertension)     Hyperlipemia     Left ventricular diastolic dysfunction     Mitral stenosis     Mitral valve stenosis     Neuropathy     Obstructive sleep apnea     AHI: 34.5 per PSG, 2013; AHI: 36.9 per PSG, 2015; AHI: 54.5 per PSG, 3/2017    Pinched nerve in neck     Restless legs syndrome 2016     Past Surgical History:   Procedure Laterality Date    APPENDECTOMY      BACK SURGERY      4 Back surgeries    BLADDER SURGERY      CARDIAC CATHETERIZATION  12    EF > 50%    CATARACT REMOVAL      CHOLECYSTECTOMY      COLONOSCOPY      EYE SURGERY      implants placed both eyes    HAND SURGERY Left 2020    INCISION AND DRAINAGE LEFT HAND ABSCESS performed by Elliott Benitez MD at [address] OTHER SURGICAL HISTORY  2020    Right Ventricular lead replacement- Dr. Clementina Julio RPLCMT PROST AORTIC VALVE OPEN XCP HOMOGRF/STENT N/A 2018    AORTIC VALVE REPLACEMENT TRANSESOPHAGEAL ECHOCARDIOGRAM performed by Diana Castañeda MD at [facility]       Family History   Problem Relation Age of Onset    Diabetes Mother     Cancer Father     Cancer Sister     Heart Disease Brother     Cancer Brother     Cancer Sister     Cancer Brother      Social History     Tobacco Use    Smoking status: Former Smoker     Packs/day: 3.00     Years: 50.00     Pack years: 150.00     Types: Cigarettes     Last attempt to quit: 1977     Years since quittin.4    Smokeless tobacco: Former User   Substance Use Topics    Alcohol use: No      Current Outpatient Medications   Medication Sig Dispense Refill    HYDROcodone-acetaminophen (NORCO) 7.5-325 MG per tablet Take by mouth daily.       citalopram (CELEXA) 20 MG tablet TAKE ONE TABLET BY MOUTH DAILY 30 tablet 5    insulin glargine (LANTUS) 100 UNIT/ML injection vial Inject 50 Units into the skin 2 times daily 1 vial 1    aspirin EC 81 MG EC tablet Take 1 tablet by mouth 2 times daily for 21 days 42 tablet 0    pramipexole (MIRAPEX) 1.5 MG tablet 3 times daily       mirabegron (MYRBETRIQ) 25 MG TB24 Take 1 tablet by mouth daily 30 tablet 5    ezetimibe (ZETIA) 10 MG tablet Take 10 mg by mouth daily      clopidogrel (PLAVIX) 75 MG tablet Take 75 mg by mouth daily      atorvastatin (LIPITOR) 40 MG tablet Take 1 tablet by mouth nightly 90 tablet 3    Cholecalciferol (VITAMIN D3) 77726 units CAPS   11    HUMULIN R U-500 KWIKPEN 500 UNIT/ML SOPN concentrated injection pen   11    Empagliflozin-Metformin HCl ER (SYNJARDY XR)  MG TB24 Take 1 tablet by mouth daily (with breakfast)      atenolol (TENORMIN) 25 MG tablet Take 25 mg by mouth daily      B-D UF III MINI PEN NEEDLES 31G X 5 MM MISC       furosemide (LASIX) 20 MG tablet Take 1 tablet by mouth as needed (For weight gain greater than 2.5 lbs in 24 hours.) (Patient taking differently: Take 20 mg by mouth daily ) 30 tablet 0    esomeprazole (NEXIUM) 40 MG capsule Take 40 mg by mouth every morning (before breakfast). No current facility-administered medications for this visit. Allergies: Azithromycin; Metoprolol; Cyclobenzaprine; and Metoclopramide    Review of Systems  Review of Systems   Constitutional: Positive for fatigue. Negative for activity change, diaphoresis, fever and unexpected weight change. HENT: Negative for facial swelling and nosebleeds. Eyes: Negative for redness and visual disturbance. Respiratory: Negative for cough, chest tightness, shortness of breath and wheezing. Cardiovascular: Positive for chest pain (described as a \"pop\" in chest). Negative for palpitations and leg swelling. Gastrointestinal: Negative for abdominal pain, nausea and vomiting. Endocrine: Negative for cold intolerance and heat intolerance. Genitourinary: Negative for dysuria and hematuria. Musculoskeletal: Negative for arthralgias and myalgias. C/o chronic left hand injury   Skin: Negative for pallor and rash. Neurological: Negative for dizziness, seizures, syncope, weakness and light-headedness.    Hematological: Does not bruise/bleed easily. Psychiatric/Behavioral: Negative for agitation. The patient is not nervous/anxious. Objective  Vital Signs - BP (!) 156/72   Pulse 66   Ht 5' 10\" (1.778 m)   Wt 219 lb (99.3 kg)   SpO2 98%   BMI 31.42 kg/m²   Physical Exam  Vitals signs and nursing note reviewed. Constitutional:       General: He is not in acute distress. Appearance: He is well-developed. He is obese. He is not diaphoretic. Comments: Deconditioned   HENT:      Head: Normocephalic and atraumatic. Right Ear: Hearing and external ear normal.      Left Ear: Hearing and external ear normal.      Nose: Nose normal.   Eyes:      General:         Right eye: No discharge. Left eye: No discharge. Pupils: Pupils are equal, round, and reactive to light. Neck:      Musculoskeletal: Neck supple. No muscular tenderness. Thyroid: No thyromegaly. Vascular: No carotid bruit or JVD. Trachea: No tracheal deviation. Cardiovascular:      Rate and Rhythm: Normal rate and regular rhythm. Heart sounds: Normal heart sounds. No murmur. No friction rub. No gallop. Pulmonary:      Effort: Pulmonary effort is normal. No respiratory distress. Breath sounds: Normal breath sounds. No wheezing or rales. Abdominal:      Palpations: Abdomen is soft. Tenderness: There is no abdominal tenderness. Musculoskeletal:         General: Swelling (left hand with dressing) present. No deformity. Right lower leg: No edema. Left lower leg: No edema. Skin:     General: Skin is warm and dry. Findings: No rash. Neurological:      General: No focal deficit present. Mental Status: He is alert and oriented to person, place, and time. Cranial Nerves: No cranial nerve deficit.    Psychiatric:         Mood and Affect: Mood normal.         Behavior: Behavior normal.         Judgment: Judgment normal.         Data:  Heart Cath 3/17/20  PCI procedure:LAD, Diag/Septal1, LAURI, LAD, LAURI    Conclusions    Single-vessel LAD proximal to mid bifurcation stenosis. Normal LV ejection fraction. Successful PCI to proximal to mid LAD and 1st diagonal utilizing \"collar   and transferred technique\". Proximal LAD stented with 3.0 by 18 mm resolute integrity (collar stent)   just to bifurcation with the 1st diagonal.   Mid LAD (2.5 x 14 mm resolute integrity) and 1st diagonal (2.5 x 14   millimeters resolute integrity) stented in kissing stent fashion with   proximal edge is just within distal edge of collar stent. Assessment:     Diagnosis Orders   1. Chest wall pain     2. Coronary artery disease involving native coronary artery of native heart without angina pectoris     3. Sinoatrial node dysfunction (HCC)     4. Pacemaker         Chest wall pain/CAD-pain sounds musculoskeletal in nature and not angina. Offered chest x-ray, but patient denies any fall and he does not think he cracked a rib. He has pain medication at home. Suggested he use that and try a heating pad to the chest.  He can go to the ER for worsening symptoms. Reassured him that his pacemaker is adequately functioning as well    Sinoatrial node dysfunction/pacemaker-  Pacemaker check showed adequate battery status @9 years estimated  Mode: DDDR. Lead impedances are stable  Pacing: AP 98.7%,  100%. Appropriate diagnostics and safety margins noted. Sustained arrythmia: None. Reprogramming done for sensitivity and threshold testing. Please see the scanned interrogation report      Stable cardiovascular status. No evidence of overt heart failure,angina or dysrhythmia. Plan    Return for as scheduled. Use Lortab as needed now for musculoskeletal pain  Use heating pad to chest- patricio musculoskeletal pain    Call with any questionsor concerns  Follow up with RITESH Chambers CNP for non cardiac problems  Report any new problems  Cardiovascular Fitness-Exercise as tolerated.   Strive for 15 minutes of

## 2020-12-10 NOTE — PATIENT INSTRUCTIONS
Return for as scheduled.    Use Lortab as needed now for musculoskeletal pain  Use heating pad to chest- likley musculoskeletal pain

## 2021-01-12 ENCOUNTER — HOSPITAL ENCOUNTER (EMERGENCY)
Age: 81
Discharge: HOME | End: 2021-01-12
Attending: EMERGENCY MEDICINE
Payer: MEDICARE

## 2021-01-12 ENCOUNTER — APPOINTMENT (OUTPATIENT)
Dept: GENERAL RADIOLOGY | Age: 81
End: 2021-01-12
Payer: MEDICARE

## 2021-01-12 VITALS
RESPIRATION RATE: 24 BRPM | HEART RATE: 79 BPM | TEMPERATURE: 96.6 F | SYSTOLIC BLOOD PRESSURE: 138 MMHG | OXYGEN SATURATION: 95 % | DIASTOLIC BLOOD PRESSURE: 57 MMHG

## 2021-01-12 DIAGNOSIS — E11.65 TYPE 2 DIABETES MELLITUS WITH HYPERGLYCEMIA, WITH LONG-TERM CURRENT USE OF INSULIN (HCC): ICD-10-CM

## 2021-01-12 DIAGNOSIS — R07.89 ATYPICAL CHEST PAIN: Primary | ICD-10-CM

## 2021-01-12 DIAGNOSIS — I25.10 CORONARY ARTERY DISEASE INVOLVING NATIVE CORONARY ARTERY OF NATIVE HEART WITHOUT ANGINA PECTORIS: ICD-10-CM

## 2021-01-12 DIAGNOSIS — Z95.0 CARDIAC PACEMAKER: ICD-10-CM

## 2021-01-12 DIAGNOSIS — Z79.4 TYPE 2 DIABETES MELLITUS WITH HYPERGLYCEMIA, WITH LONG-TERM CURRENT USE OF INSULIN (HCC): ICD-10-CM

## 2021-01-12 LAB
ALBUMIN SERPL-MCNC: 4.3 G/DL (ref 3.5–5.2)
ALP BLD-CCNC: 89 U/L (ref 40–130)
ALT SERPL-CCNC: 23 U/L (ref 5–41)
ANION GAP SERPL CALCULATED.3IONS-SCNC: 10 MMOL/L (ref 7–19)
APTT: 28.4 SEC (ref 26–36.2)
AST SERPL-CCNC: 19 U/L (ref 5–40)
BASOPHILS ABSOLUTE: 0 K/UL (ref 0–0.2)
BASOPHILS RELATIVE PERCENT: 0.3 % (ref 0–1)
BILIRUB SERPL-MCNC: 0.5 MG/DL (ref 0.2–1.2)
BUN BLDV-MCNC: 24 MG/DL (ref 8–23)
CALCIUM SERPL-MCNC: 9.6 MG/DL (ref 8.8–10.2)
CHLORIDE BLD-SCNC: 101 MMOL/L (ref 98–111)
CO2: 23 MMOL/L (ref 22–29)
CREAT SERPL-MCNC: 0.9 MG/DL (ref 0.5–1.2)
EOSINOPHILS ABSOLUTE: 0.1 K/UL (ref 0–0.6)
EOSINOPHILS RELATIVE PERCENT: 0.8 % (ref 0–5)
GFR AFRICAN AMERICAN: >59
GFR NON-AFRICAN AMERICAN: >60
GLUCOSE BLD-MCNC: 275 MG/DL (ref 74–109)
HCT VFR BLD CALC: 48.2 % (ref 42–52)
HEMOGLOBIN: 16.1 G/DL (ref 14–18)
IMMATURE GRANULOCYTES #: 0.1 K/UL
INR BLD: 1.04 (ref 0.88–1.18)
LYMPHOCYTES ABSOLUTE: 1.1 K/UL (ref 1.1–4.5)
LYMPHOCYTES RELATIVE PERCENT: 14.5 % (ref 20–40)
MCH RBC QN AUTO: 30 PG (ref 27–31)
MCHC RBC AUTO-ENTMCNC: 33.4 G/DL (ref 33–37)
MCV RBC AUTO: 89.8 FL (ref 80–94)
MONOCYTES ABSOLUTE: 0.8 K/UL (ref 0–0.9)
MONOCYTES RELATIVE PERCENT: 11 % (ref 0–10)
NEUTROPHILS ABSOLUTE: 5.4 K/UL (ref 1.5–7.5)
NEUTROPHILS RELATIVE PERCENT: 72.6 % (ref 50–65)
PDW BLD-RTO: 14.1 % (ref 11.5–14.5)
PLATELET # BLD: 152 K/UL (ref 130–400)
PMV BLD AUTO: 10.1 FL (ref 9.4–12.4)
POTASSIUM REFLEX MAGNESIUM: 4.4 MMOL/L (ref 3.5–5)
PROTHROMBIN TIME: 13.5 SEC (ref 12–14.6)
RBC # BLD: 5.37 M/UL (ref 4.7–6.1)
SODIUM BLD-SCNC: 134 MMOL/L (ref 136–145)
TOTAL PROTEIN: 6.9 G/DL (ref 6.6–8.7)
TROPONIN: <0.01 NG/ML (ref 0–0.03)
WBC # BLD: 7.4 K/UL (ref 4.8–10.8)

## 2021-01-12 PROCEDURE — 85025 COMPLETE CBC W/AUTO DIFF WBC: CPT

## 2021-01-12 PROCEDURE — 36415 COLL VENOUS BLD VENIPUNCTURE: CPT

## 2021-01-12 PROCEDURE — 85610 PROTHROMBIN TIME: CPT

## 2021-01-12 PROCEDURE — 99282 EMERGENCY DEPT VISIT SF MDM: CPT

## 2021-01-12 PROCEDURE — 84484 ASSAY OF TROPONIN QUANT: CPT

## 2021-01-12 PROCEDURE — 80053 COMPREHEN METABOLIC PANEL: CPT

## 2021-01-12 PROCEDURE — 93005 ELECTROCARDIOGRAM TRACING: CPT | Performed by: EMERGENCY MEDICINE

## 2021-01-12 PROCEDURE — 85730 THROMBOPLASTIN TIME PARTIAL: CPT

## 2021-01-12 PROCEDURE — 71045 X-RAY EXAM CHEST 1 VIEW: CPT

## 2021-01-12 ASSESSMENT — ENCOUNTER SYMPTOMS
COUGH: 0
VOICE CHANGE: 0
SHORTNESS OF BREATH: 0
EYE REDNESS: 0
DIARRHEA: 0
ABDOMINAL PAIN: 0
RHINORRHEA: 0
VOMITING: 0
EYE PAIN: 0

## 2021-01-12 NOTE — ED PROVIDER NOTES
Cardiovascular: Positive for chest pain. Gastrointestinal: Negative for abdominal pain, diarrhea and vomiting. Endocrine: Negative. Genitourinary: Negative. Musculoskeletal: Positive for arthralgias. Negative for gait problem. Skin: Negative for rash and wound. Neurological: Negative for weakness and headaches. Hematological: Negative. Psychiatric/Behavioral: Negative. All other systems reviewed and are negative. A complete review of systems was performed and is negative except as noted above in the HPI.        PAST MEDICAL HISTORY     Past Medical History:   Diagnosis Date    Aortic valve stenosis     Arthritis     Chest tightness, discomfort, or pressure 4/30/2012    Chronic back pain     CKD (chronic kidney disease)     CPAP (continuous positive airway pressure) dependence     13cm    Diabetes [facility]     Diabetic polyneuropathy associated with type 2 diabetes mellitus ([facility] 8/29/2016    GERD (gastroesophageal reflux disease)     HTN (hypertension)     Hyperlipemia     Left ventricular diastolic dysfunction     Mitral stenosis     Mitral valve stenosis     Neuropathy     Obstructive sleep apnea     AHI: 34.5 per PSG, 6/2013; AHI: 36.9 per PSG, 7/2015; AHI: 54.5 per PSG, 3/2017    Pinched nerve in neck     Restless legs syndrome 2/17/2016         SURGICAL HISTORY       Past Surgical History:   Procedure Laterality Date    APPENDECTOMY      BACK SURGERY      4 Back surgeries    BLADDER SURGERY      CARDIAC CATHETERIZATION  4/30/12    EF > 50%    CATARACT REMOVAL      CHOLECYSTECTOMY      COLONOSCOPY      EYE SURGERY      implants placed both eyes    HAND SURGERY Left 9/14/2020    INCISION AND DRAINAGE LEFT HAND ABSCESS performed by Saul Mitchell MD at [address] [place] [address] Road HISTORY  08/03/2020    Right Ventricular lead replacement- Dr. Suarez  HOMOGRF/STENT N/A 6/7/2018    AORTIC VALVE REPLACEMENT TRANSESOPHAGEAL ECHOCARDIOGRAM performed by Kalpana Vitale MD at [facility]       Previous Medications    ASPIRIN EC 81 MG EC TABLET    Take 1 tablet by mouth 2 times daily for 21 days    ATENOLOL (TENORMIN) 25 MG TABLET    Take 25 mg by mouth daily    ATORVASTATIN (LIPITOR) 40 MG TABLET    Take 1 tablet by mouth nightly    B-D UF III MINI PEN NEEDLES 31G X 5 MM MISC        CHOLECALCIFEROL (VITAMIN D3) 89215 UNITS CAPS        CITALOPRAM (CELEXA) 20 MG TABLET    TAKE ONE TABLET BY MOUTH DAILY    CLOPIDOGREL (PLAVIX) 75 MG TABLET    Take 75 mg by mouth daily    EMPAGLIFLOZIN-METFORMIN HCL ER (SYNJARDY XR)  MG TB24    Take 1 tablet by mouth daily (with breakfast)    ESOMEPRAZOLE (NEXIUM) 40 MG CAPSULE    Take 40 mg by mouth every morning (before breakfast). EZETIMIBE (ZETIA) 10 MG TABLET    Take 10 mg by mouth daily    FUROSEMIDE (LASIX) 20 MG TABLET    Take 1 tablet by mouth as needed (For weight gain greater than 2.5 lbs in 24 hours.)    HUMULIN R U-500 KWIKPEN 500 UNIT/ML SOPN CONCENTRATED INJECTION PEN        HYDROCODONE-ACETAMINOPHEN (NORCO) 7.5-325 MG PER TABLET    Take by mouth daily. INSULIN GLARGINE (LANTUS) 100 UNIT/ML INJECTION VIAL    Inject 50 Units into the skin 2 times daily    MIRABEGRON (MYRBETRIQ) 25 MG TB24    Take 1 tablet by mouth daily    PRAMIPEXOLE (MIRAPEX) 1.5 MG TABLET    3 times daily        ALLERGIES     Azithromycin, Metoprolol, Cyclobenzaprine, and Metoclopramide    FAMILY HISTORY       Family History   Problem Relation Age of Onset    Diabetes Mother     Cancer Father     Cancer Sister     Heart Disease Brother     Cancer Brother     Cancer Sister     Cancer Brother           SOCIAL HISTORY       Social History     Socioeconomic History    Marital status:       Spouse name: Not on file    Number of children: Not on file    Years of education: Not on file    Highest education level: Not on file   Occupational History    Not on file   Social Needs    Financial resource strain: Not on file    Food insecurity     Worry: Not on file     Inability: Not on file    Transportation needs     Medical: Not on file     Non-medical: Not on file   Tobacco Use    Smoking status: Former Smoker     Packs/day: 3.00     Years: 50.00     Pack years: 150.00     Types: Cigarettes     Quit date: 1977     Years since quittin.5    Smokeless tobacco: Former User   Substance and Sexual Activity    Alcohol use: No    Drug use: No    Sexual activity: Not Currently     Partners: Female   Lifestyle    Physical activity     Days per week: Not on file     Minutes per session: Not on file    Stress: Not on file   Relationships    Social connections     Talks on phone: Not on file     Gets together: Not on file     Attends Hinduism service: Not on file     Active member of club or organization: Not on file     Attends meetings of clubs or organizations: Not on file     Relationship status: Not on file    Intimate partner violence     Fear of current or ex partner: Not on file     Emotionally abused: Not on file     Physically abused: Not on file     Forced sexual activity: Not on file   Other Topics Concern    Not on file   Social History Narrative    Not on file       SCREENINGS             PHYSICAL EXAM    (up to 7 for level 4, 8 or more for level 5)     ED Triage Vitals [21 1458]   BP Temp Temp src Pulse Resp SpO2 Height Weight   137/61 96.6 °F (35.9 °C) -- 71 18 94 % -- --       Physical Exam  Vitals signs and nursing note reviewed. Constitutional:       General: He is not in acute distress. Appearance: He is well-developed. He is not diaphoretic. HENT:      Head: Normocephalic and atraumatic. Eyes:      General: No scleral icterus. Neck:      Vascular: No JVD. Cardiovascular:      Rate and Rhythm: Normal rate and regular rhythm.       Pulses:           Radial pulses are 2+ on the right side and 2+ on the left side. Dorsalis pedis pulses are 2+ on the right side and 2+ on the left side. Heart sounds: Normal heart sounds. No murmur. No friction rub. No gallop. Pulmonary:      Effort: Pulmonary effort is normal. No accessory muscle usage or respiratory distress. Breath sounds: Normal breath sounds. No stridor. No decreased breath sounds, wheezing, rhonchi or rales. Chest:      Chest wall: No tenderness. Abdominal:      General: There is no distension. Palpations: Abdomen is soft. Tenderness: There is no abdominal tenderness. There is no guarding or rebound. Musculoskeletal: Normal range of motion. General: No deformity. Right lower leg: No edema. Left lower leg: No edema. Comments: Swelling and redness of the left hand and fingers   Skin:     General: Skin is warm and dry. Coloration: Skin is not pale. Findings: No erythema. Neurological:      Mental Status: He is alert and oriented to person, place, and time. GCS: GCS eye subscore is 4. GCS verbal subscore is 5. GCS motor subscore is 6. Cranial Nerves: No cranial nerve deficit. Motor: No abnormal muscle tone. Coordination: Coordination normal.   Psychiatric:         Behavior: Behavior normal.         Judgment: Judgment normal.         DIAGNOSTIC RESULTS     EKG: All EKG's are interpreted by the Emergency Department Physician who either signs or Co-signs this chart in the absence of a cardiologist.        RADIOLOGY:   Non-plain film images such as CT, Ultrasound and MRI are read by the radiologist. Plainradiographic images are visualized and preliminarily interpreted by the emergency physician with the below findings:      Interpretation per the Radiologist below, if available at the time of this note:    XR CHEST PORTABLE   Final Result   1. Stable chronic change. 2. No acute lung disease.    Signed by Dr Wendy Hanks on 1/12/2021 4:06 PM            ED BEDSIDE ULTRASOUND:   Performed by ED Physician - none    LABS:  Labs Reviewed   CBC WITH AUTO DIFFERENTIAL - Abnormal; Notable for the following components:       Result Value    Neutrophils % 72.6 (*)     Lymphocytes % 14.5 (*)     Monocytes % 11.0 (*)     All other components within normal limits   COMPREHENSIVE METABOLIC PANEL W/ REFLEX TO MG FOR LOW K - Abnormal; Notable for the following components:    Sodium 134 (*)     Glucose 275 (*)     BUN 24 (*)     All other components within normal limits   TROPONIN   PROTIME-INR   APTT       All other labs were within normal range or not returned as of this dictation. Medications - No data to display    [address] [place] [address] and DIFFERENTIALDIAGNOSIS/MDM:   Vitals:    Vitals:    01/12/21 1458 01/12/21 1526   BP: 137/61 (!) 138/57   Pulse: 71 79   Resp: 18 24   Temp: 96.6 °F (35.9 °C)    SpO2: 94% 95%       [facility]    ED Course as of Jan 12 1706   Tue Jan 12, 2021   1559 EKG shows paced rhythm. Unable to further evaluate adequately signs of ischemia. Similar to previous EKG from April 2020    [BR[place]]      ED Course User Index  [BR[place]] Manjula Chawla MD     Pain does not sound concerning for cardiac etiology, though patient does have extensive history. EKG shows no significant acute changes compared to previous. Laboratory evaluation is unremarkable. Patient without any continued pain here. Patient has no continued pain and no other associated symptoms such as shortness of breath that would concern me for alternate diagnosis such as pulmonary embolus. Evaluation and work-up here revealed no signs of emergent or life-threatening pathology that would necessitate admission for further work-up or management at this time. Patient is felt to be stable for discharge home with return precautions for worsening of the condition or development of new concerning symptoms.   Patient was encouraged to follow-up with their primary care

## 2021-01-13 LAB
EKG P AXIS: NORMAL DEGREES
EKG P-R INTERVAL: NORMAL MS
EKG Q-T INTERVAL: 482 MS
EKG QRS DURATION: 176 MS
EKG QTC CALCULATION (BAZETT): 487 MS
EKG T AXIS: 144 DEGREES

## 2021-01-13 PROCEDURE — 93010 ELECTROCARDIOGRAM REPORT: CPT | Performed by: INTERNAL MEDICINE

## 2021-02-05 RX ORDER — ATORVASTATIN CALCIUM 40 MG/1
40 TABLET, FILM COATED ORAL NIGHTLY
Qty: 90 TABLET | Refills: 3 | OUTPATIENT
Start: 2021-02-05

## 2021-02-05 RX ORDER — ATORVASTATIN CALCIUM 40 MG/1
40 TABLET, FILM COATED ORAL NIGHTLY
Qty: 90 TABLET | Refills: 3 | Status: SHIPPED | OUTPATIENT
Start: 2021-02-05 | End: 2022-03-04

## 2021-02-10 RX ORDER — MIRABEGRON 25 MG/1
TABLET, FILM COATED, EXTENDED RELEASE ORAL
Qty: 30 TABLET | Refills: 5 | Status: SHIPPED | OUTPATIENT
Start: 2021-02-10 | End: 2021-07-06

## 2021-03-01 DIAGNOSIS — R21 RASH OF GENITAL AREA: ICD-10-CM

## 2021-03-01 RX ORDER — CLOTRIMAZOLE AND BETAMETHASONE DIPROPIONATE 10; .64 MG/G; MG/G
CREAM TOPICAL
Qty: 45 G | Refills: 2 | Status: SHIPPED | OUTPATIENT
Start: 2021-03-01 | End: 2021-06-08

## 2021-03-01 NOTE — TELEPHONE ENCOUNTER
Requested Prescriptions     Pending Prescriptions Disp Refills   • clotrimazole-betamethasone (LOTRISONE) 1-0.05 % cream [Pharmacy Med Name: CLOTRIMAZOLE-BETAMETHASONE 1-0.05 Cream] 45 g 2     Sig: APPLY TO AFFECTED AREA TWICE A DAY AS DIRECTED TO RASH

## 2021-03-08 ENCOUNTER — OFFICE VISIT (OUTPATIENT)
Dept: NEUROLOGY | Age: 81
End: 2021-03-08
Payer: MEDICARE

## 2021-03-08 VITALS
HEART RATE: 81 BPM | HEIGHT: 70 IN | DIASTOLIC BLOOD PRESSURE: 71 MMHG | SYSTOLIC BLOOD PRESSURE: 136 MMHG | RESPIRATION RATE: 18 BRPM | WEIGHT: 220 LBS | BODY MASS INDEX: 31.5 KG/M2

## 2021-03-08 DIAGNOSIS — R41.3 MEMORY LOSS: ICD-10-CM

## 2021-03-08 DIAGNOSIS — G45.0 VERTEBROBASILAR ARTERY INSUFFICIENCY: Primary | ICD-10-CM

## 2021-03-08 DIAGNOSIS — G47.33 OBSTRUCTIVE SLEEP APNEA: ICD-10-CM

## 2021-03-08 DIAGNOSIS — E11.42 DIABETIC POLYNEUROPATHY ASSOCIATED WITH TYPE 2 DIABETES MELLITUS (HCC): ICD-10-CM

## 2021-03-08 PROCEDURE — G8417 CALC BMI ABV UP PARAM F/U: HCPCS | Performed by: PSYCHIATRY & NEUROLOGY

## 2021-03-08 PROCEDURE — 1036F TOBACCO NON-USER: CPT | Performed by: PSYCHIATRY & NEUROLOGY

## 2021-03-08 PROCEDURE — G8427 DOCREV CUR MEDS BY ELIG CLIN: HCPCS | Performed by: PSYCHIATRY & NEUROLOGY

## 2021-03-08 PROCEDURE — G8484 FLU IMMUNIZE NO ADMIN: HCPCS | Performed by: PSYCHIATRY & NEUROLOGY

## 2021-03-08 PROCEDURE — 99214 OFFICE O/P EST MOD 30 MIN: CPT | Performed by: PSYCHIATRY & NEUROLOGY

## 2021-03-08 PROCEDURE — 1123F ACP DISCUSS/DSCN MKR DOCD: CPT | Performed by: PSYCHIATRY & NEUROLOGY

## 2021-03-08 PROCEDURE — 4040F PNEUMOC VAC/ADMIN/RCVD: CPT | Performed by: PSYCHIATRY & NEUROLOGY

## 2021-03-08 NOTE — PROGRESS NOTES
[facility] Neu[facility]  [address] [place] [address] Drive, [address]  [place], [place]  Phone [phone]  Fax [phone]     [facility] Neurology Follow Up Encounter  3/8/21 11:10 AM CST    Information:   Patient Name: Neil Mclaughlin  :   1940  Age:   79 y.o. MRN:   [id number]  Account #:  [de-identified]  Today:  3/8/21    Provider: Celestino Chew M.D. Chief Complaint:   Chief Complaint   Patient presents with    6 Month Follow-Up       Subjective: Neil Mclaughlin is a 79 y.o. man with a history of diabetic polyneuropathy, RLS, vertebrobasilar insufficiency, recurrent syncope, pacemaker, SEBASTIEN, and memory loss who is following up. He has not had any syncope in over a year. He has had some lightheadedness. The episodes may last an hour or two. He has balance impairment but no falls. He did have a severe left hand cellulitis after getting stuck with a fish hook. He had to have debridement. He has an appt in [facility] Friday. He has chronic back pain and gets sudden pain with movement sometimes. He has had 4 back surgeries.  His RLS is doing well on the Mirapex. He no longer takes Sinemet. His memory is the same. He remains independent.       Objective:     Past Medical History:  Past Medical History:   Diagnosis Date    Aortic valve stenosis     Arthritis     Chest tightness, discomfort, or pressure 2012    Chronic back pain     CKD (chronic kidney disease)     CPAP (continuous positive airway pressure) dependence     13cm    Diabetes ([address]     Diabetic polyneuropathy associated with type 2 diabetes mellitus ([facility] 2016    GERD (gastroesophageal reflux disease)     HTN (hypertension)     Hyperlipemia     Left ventricular diastolic dysfunction     Mitral stenosis     Mitral valve stenosis     Neuropathy     Obstructive sleep apnea     AHI: 34.5 per PSG, 2013; AHI: 36.9 per PSG, 2015; AHI: 54.5 per PSG, 3/2017    Pinched nerve in neck     Restless legs syndrome 2016       Past Surgical History:   Procedure Laterality Date    APPENDECTOMY      BACK SURGERY      4 Back surgeries    BLADDER SURGERY      CARDIAC CATHETERIZATION  4/30/12    EF > 50%    CATARACT REMOVAL      CHOLECYSTECTOMY      COLONOSCOPY      EYE SURGERY      implants placed both eyes    HAND SURGERY Left 9/14/2020    INCISION AND DRAINAGE LEFT HAND ABSCESS performed by Leah Menon MD at [address] [place] [address] OTHER SURGICAL HISTORY  08/03/2020    Right Ventricular lead replacement- Dr. Neisha Cervantes RPLCMT PROST AORTIC VALVE OPEN XCP HOMOGRF/STENT N/A 6/7/2018    AORTIC VALVE REPLACEMENT TRANSESOPHAGEAL ECHOCARDIOGRAM performed by Denis Mcgregor MD at [address] [place] [address]  · None    Significant Injuries  · None    Habits  Duane Arch reports that he quit smoking about 43 years ago. His smoking use included cigarettes. He has a 150.00 pack-year smoking history. He has quit using smokeless tobacco. He reports that he does not drink alcohol or use drugs. Family History   Problem Relation Age of Onset    Diabetes Mother     Cancer Father     Cancer Sister     Heart Disease Brother     Cancer Brother     Cancer Sister     Cancer Brother        Social History  Duane Arch is single, lives in [place], [place], and is retired. Medications:  Current Outpatient Medications   Medication Sig Dispense Refill    MYRBETRIQ 25 MG TB24 TAKE 1 TABLET BY MOUTH DAILY 30 tablet 5    atorvastatin (LIPITOR) 40 MG tablet Take 1 tablet by mouth nightly 90 tablet 3    HYDROcodone-acetaminophen (NORCO) 7.5-325 MG per tablet Take by mouth daily.       citalopram (CELEXA) 20 MG tablet TAKE ONE TABLET BY MOUTH DAILY 30 tablet 5    insulin glargine (LANTUS) 100 UNIT/ML injection vial Inject 50 Units into the skin 2 times daily 1 vial 1    aspirin EC 81 MG EC tablet Take 1 tablet by mouth 2 times daily for 21 days 42 tablet 0    pramipexole (MIRAPEX) 1.5 MG tablet 3 times daily       ezetimibe (ZETIA) 10 MG tablet Take 10 mg by mouth daily      clopidogrel (PLAVIX) 75 MG tablet Take 75 mg by mouth daily      Cholecalciferol (VITAMIN D3) 31017 units CAPS   11    HUMULIN R U-500 KWIKPEN 500 UNIT/ML SOPN concentrated injection pen   11    Empagliflozin-Metformin HCl ER (SYNJARDY XR)  MG TB24 Take 1 tablet by mouth daily (with breakfast)      atenolol (TENORMIN) 25 MG tablet Take 25 mg by mouth daily      B-D UF III MINI PEN NEEDLES 31G X 5 MM MISC       furosemide (LASIX) 20 MG tablet Take 1 tablet by mouth as needed (For weight gain greater than 2.5 lbs in 24 hours.) (Patient taking differently: Take 20 mg by mouth daily ) 30 tablet 0    esomeprazole (NEXIUM) 40 MG capsule Take 40 mg by mouth every morning (before breakfast). No current facility-administered medications for this visit. Allergies:   Allergies as of 03/08/2021 - Review Complete 03/08/2021   Allergen Reaction Noted    Azithromycin  07/24/2020    Metoprolol  07/24/2020    Cyclobenzaprine Rash 07/24/2020    Metoclopramide Rash 07/24/2020       Review of Systems:  Constitutional: negative for - chills and fever  Eyes:  negative for - visual disturbance and photophobia  HENMT: negative for - headaches and sinus pain  Respiratory: negative for - cough, hemoptysis, and shortness of breath  Cardiovascular: negative for - chest pain and palpitations  Gastrointestinal: negative for - blood in stools, constipation, diarrhea, nausea, and vomiting  Genito-Urinary: negative for - hematuria  Positive for - urinary frequency, urinary urgency  Musculoskeletal: positive for - joint pain, joint stiffness, and joint swelling  Hematological and Lymphatic: negative for - bleeding problems, abnormal bruising, and swollen lymph nodes  Endocrine:  negative for - polydipsia and polyphagia  Allergy/Immunology:  negative for - rhinorrhea, sinus congestion, hives Integument:  negative for - negative for - rash, change in moles, new or changing lesions  Psychological: negative for - anxiety and depression  Neurological: negative for - memory loss, weakness  Positive for - numbness/tingling    PHYSICAL EXAMINATION:  Vitals:  /71   Pulse 81   Resp 18   Ht 5' 10\" (1.778 m)   Wt 220 lb (99.8 kg)   BMI 31.57 kg/m²   General appearance:  Alert, well developed, well nourished, in no distress  HEENT:  normocephalic, atraumatic, sclera appear normal, no nasal abnormalities, no rhinorrhea, Ears appear normal, oral mucous membranes are moist without erythema, trachea midline, thyroid is normal, no lymphadenopathy or neck mass. Cardiovascular:  Regular rate and rhythm without murmer. No peripheral edema, No cyanosis or clubbing. No carotid bruits. Pulmonary:  Lungs are clear to auscultation. Breathing appears normal, good expansion, normal effort without use of accessory muscles  Musculoskeletal:  Joints are arthritic. Integument:  No rash, erythema, or pallor  Psychiatric:  Mood, affect, and behavior appear normal      NEUROLOGIC EXAMINATION:  Mental Status:  alert, oriented to person, place, and time. Speech:  Clear without dysarthria or dysphonia  Language:  Fluent without aphasia  Cranial Nerves:              II          Visual fields are full to confrontation              III,IV, VI           Extraocular movements are full              VII        Facial movements are symmetrical without weakness              VIII       Hearing is intact              IX,X     Shoulder shrug and head rotation strength are intact              XII        No tongue atrophy or fasciculations. Normal tongue protrusion. No tongue weakness  Motor:  Normal strength in both upper and lower extremities. Normal muscle tone and bulk. Deep tendon reflexes are trace present in both upper extremities and absent in both lower extremities. Johnson's signs are absent bilaterally.   There is no ankle clonus on either side. Coordination:  Rapid alternating movements are normal in both upper extremities. Finger to nose testing is unimpaired bilaterally. Gait:  Mildly unsteady. Pertinent Diagnostic Studies:  Hospital notes    Assessment:       ICD-10-CM    1. Vertebrobasilar artery insufficiency  G45.0    2. Diabetic polyneuropathy associated with type 2 diabetes mellitus (HCC)  E11.42    3. Obstructive sleep apnea  G47.33    4. Memory loss  R41.3    Clinically doing well with chronic complaints. Plan:   1. Continue present medications  2. I advised CPAP use during sleep  3.  FU in 6 months    Electronically signed by Nigel Feldman MD on 3/8/21

## 2021-03-11 DIAGNOSIS — I49.5 SINOATRIAL NODE DYSFUNCTION (HCC): ICD-10-CM

## 2021-03-11 DIAGNOSIS — Z95.0 PACEMAKER: Primary | ICD-10-CM

## 2021-03-11 PROCEDURE — 93296 REM INTERROG EVL PM/IDS: CPT | Performed by: CLINICAL NURSE SPECIALIST

## 2021-03-11 PROCEDURE — 93294 REM INTERROG EVL PM/LDLS PM: CPT | Performed by: CLINICAL NURSE SPECIALIST

## 2021-04-08 RX ORDER — INSULIN HUMAN 500 [IU]/ML
INJECTION, SOLUTION SUBCUTANEOUS
Qty: 12 ML | Refills: 5 | Status: SHIPPED | OUTPATIENT
Start: 2021-04-08

## 2021-04-19 ENCOUNTER — OFFICE VISIT (OUTPATIENT)
Dept: CARDIOLOGY CLINIC | Age: 81
End: 2021-04-19
Payer: MEDICARE

## 2021-04-19 VITALS
SYSTOLIC BLOOD PRESSURE: 128 MMHG | DIASTOLIC BLOOD PRESSURE: 70 MMHG | BODY MASS INDEX: 32.93 KG/M2 | HEART RATE: 73 BPM | HEIGHT: 70 IN | WEIGHT: 230 LBS

## 2021-04-19 DIAGNOSIS — I10 ESSENTIAL HYPERTENSION: ICD-10-CM

## 2021-04-19 DIAGNOSIS — E78.2 MIXED HYPERLIPIDEMIA: ICD-10-CM

## 2021-04-19 DIAGNOSIS — Z95.0 PACEMAKER: ICD-10-CM

## 2021-04-19 DIAGNOSIS — I49.5 SINOATRIAL NODE DYSFUNCTION (HCC): ICD-10-CM

## 2021-04-19 DIAGNOSIS — I25.10 CORONARY ARTERY DISEASE INVOLVING NATIVE CORONARY ARTERY OF NATIVE HEART WITHOUT ANGINA PECTORIS: Primary | ICD-10-CM

## 2021-04-19 PROCEDURE — 4040F PNEUMOC VAC/ADMIN/RCVD: CPT | Performed by: CLINICAL NURSE SPECIALIST

## 2021-04-19 PROCEDURE — G8427 DOCREV CUR MEDS BY ELIG CLIN: HCPCS | Performed by: CLINICAL NURSE SPECIALIST

## 2021-04-19 PROCEDURE — 1036F TOBACCO NON-USER: CPT | Performed by: CLINICAL NURSE SPECIALIST

## 2021-04-19 PROCEDURE — 1123F ACP DISCUSS/DSCN MKR DOCD: CPT | Performed by: CLINICAL NURSE SPECIALIST

## 2021-04-19 PROCEDURE — 99214 OFFICE O/P EST MOD 30 MIN: CPT | Performed by: CLINICAL NURSE SPECIALIST

## 2021-04-19 PROCEDURE — 93280 PM DEVICE PROGR EVAL DUAL: CPT | Performed by: CLINICAL NURSE SPECIALIST

## 2021-04-19 PROCEDURE — G8417 CALC BMI ABV UP PARAM F/U: HCPCS | Performed by: CLINICAL NURSE SPECIALIST

## 2021-04-19 ASSESSMENT — ENCOUNTER SYMPTOMS
FACIAL SWELLING: 0
ABDOMINAL PAIN: 0
SHORTNESS OF BREATH: 0
CHEST TIGHTNESS: 0
COUGH: 0
NAUSEA: 0
EYE REDNESS: 0
VOMITING: 0
WHEEZING: 0

## 2021-04-19 NOTE — PROGRESS NOTES
[facility] [place], [address], [address]  [address]  Phone: [phone]  Fax: [phone]    OFFICE VISIT:  2021    Joss Benito - : 1940    Reason For Visit:  Bart Cobb is a 79 y.o. male who is here for 6 Month Follow-Up (No cardiac sx today to discuss), Chest Pain, and Hypertension       Diagnosis Orders   1. Coronary artery disease involving native coronary artery of native heart without angina pectoris     2. Essential hypertension     3. Mixed hyperlipidemia     4. Sinoatrial node dysfunction (HCC)     5. 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   Patient recently had a pacemaker generator change in 2020. Issues were found with elevated RV threshold. Patient was sent to Dr. Dony Duncan in [facility] 68 lead replacement 8/3/20. Patient had an injury with a fishhook shortly thereafter and developed a systemic infection involving several surgeries and IV antibiotics. The left hand remains swollen and he has had ongoing issues. Patient denies any cardiac symptoms such as chest pain, unusual dyspnea, orthopnea, PND, edema, palpitations. His main complaints have to do with balance and left shoulder pain. He gets his left shoulder injected periodically at orthopedics     [facility], APRN - CNP is PCP.   Joss eBnito has the following history as recorded in 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    Cellulitis of left hand and arm 2020    Abscess of left hand 2020    Pacemaker lead malfunction 2020    Pacemaker malfunction, initial encounter 06/29/2020    Bradycardia 03/17/2020    Near syncope 11/14/2019    History of syncope 11/14/2019    Sinoatrial node dysfunction ([address] 11/14/2019    Mixed hyperlipidemia 11/14/2019    S/P aortic valve replacement with bioprosthetic valve 08/22/2019    Grade I diastolic dysfunction 08/29/5510    Pacemaker 08/22/2019    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    Hyponatremia 06/11/2018    Hypercholesterolemia with hypertriglyceridemia 06/11/2018    Rheumatic aortic valve insufficiency     Moderate mitral stenosis     Pinched nerve in neck 04/18/2018    CPAP (continuous positive airway pressure) dependence 03/08/2018    BiPAP (biphasic positive airway pressure) dependence     Bilateral carpal tunnel syndrome 08/28/2017    Diabetic polyneuropathy associated with type 2 diabetes mellitus ([address] 08/29/2016    Orthostasis     Vertigo 07/26/2016    Type 2 diabetes mellitus with hyperglycemia, with long-term current use of insulin ([address] 02/17/2016    Obstructive sleep apnea 02/17/2016    Class 1 obesity due to excess calories in adult 02/17/2016    Restless legs syndrome 02/17/2016    Gastroesophageal reflux disease without esophagitis 02/17/2016    Essential hypertension 02/17/2016    Chronic bilateral low back pain without sciatica 07/16/2012    Other chest pain 04/30/2012     Past Medical History:   Diagnosis Date    Aortic valve stenosis     Arthritis     Chest tightness, discomfort, or pressure 4/30/2012    Chronic back pain     CKD (chronic kidney disease)     CPAP (continuous positive airway pressure) dependence     13cm    Diabetes ([address]     Diabetic polyneuropathy associated with type 2 diabetes mellitus ([address] 8/29/2016    GERD (gastroesophageal reflux disease)     HTN (hypertension)     Hyperlipemia     Left ventricular diastolic dysfunction     Mitral stenosis     Mitral valve stenosis     Neuropathy     Obstructive sleep apnea     AHI: 34.5 per PSG, 2013; AHI: 36.9 per PSG, 2015; AHI: 54.5 per PSG, 3/2017    Pinched nerve in neck     Restless legs syndrome 2016     Past Surgical History:   Procedure Laterality Date    APPENDECTOMY      BACK SURGERY      4 Back surgeries    BLADDER SURGERY      CARDIAC CATHETERIZATION  12    EF > 50%    CATARACT REMOVAL      CHOLECYSTECTOMY      COLONOSCOPY      EYE SURGERY      implants placed both eyes    HAND SURGERY Left 2020    INCISION AND DRAINAGE LEFT HAND ABSCESS performed by Kiersten Mejia MD at [facility] OTHER SURGICAL HISTORY  2020    Right Ventricular lead replacement- Dr. Trip Dueñas RPLCMT PROST AORTIC VALVE OPEN XCP HOMOGRF/STENT N/A 2018    AORTIC VALVE REPLACEMENT TRANSESOPHAGEAL ECHOCARDIOGRAM performed by Renetta Andino MD at [facility]       Family History   Problem Relation Age of Onset    Diabetes Mother     Cancer Father     Cancer Sister     Heart Disease Brother     Cancer Brother     Cancer Sister     Cancer Brother      Social History     Tobacco Use    Smoking status: Former Smoker     Packs/day: 3.00     Years: 50.00     Pack years: 150.00     Types: Cigarettes     Quit date: 1977     Years since quittin.7    Smokeless tobacco: Former User   Substance Use Topics    Alcohol use: No      Current Outpatient Medications   Medication Sig Dispense Refill    MYRBETRIQ 25 MG TB24 TAKE 1 TABLET BY MOUTH DAILY 30 tablet 5    atorvastatin (LIPITOR) 40 MG tablet Take 1 tablet by mouth nightly 90 tablet 3    HYDROcodone-acetaminophen (NORCO) 7.5-325 MG per tablet Take by mouth daily.       citalopram (CELEXA) 20 MG tablet TAKE ONE TABLET BY MOUTH DAILY 30 tablet 5    insulin glargine (LANTUS) 100 UNIT/ML injection vial Inject 50 Units into the skin 2 times daily 1 vial 1    aspirin EC 81 MG EC tablet Take 1 tablet by mouth 2 times daily for 21 days 42 tablet 0    pramipexole (MIRAPEX) 1.5 MG tablet 3 times daily       ezetimibe (ZETIA) 10 MG tablet Take 10 mg by mouth daily      clopidogrel (PLAVIX) 75 MG tablet Take 75 mg by mouth daily      Cholecalciferol (VITAMIN D3) 66932 units CAPS   11    HUMULIN R U-500 KWIKPEN 500 UNIT/ML SOPN concentrated injection pen   11    Empagliflozin-Metformin HCl ER (SYNJARDY XR)  MG TB24 Take 1 tablet by mouth daily (with breakfast)      atenolol (TENORMIN) 25 MG tablet Take 25 mg by mouth daily      B-D UF III MINI PEN NEEDLES 31G X 5 MM MISC       furosemide (LASIX) 20 MG tablet Take 1 tablet by mouth as needed (For weight gain greater than 2.5 lbs in 24 hours.) (Patient taking differently: Take 20 mg by mouth daily ) 30 tablet 0    esomeprazole (NEXIUM) 40 MG capsule Take 40 mg by mouth every morning (before breakfast). No current facility-administered medications for this visit. Allergies: Azithromycin, Metoprolol, Cyclobenzaprine, and Metoclopramide    Review of Systems  Review of Systems   Constitutional: Positive for fatigue. Negative for activity change, diaphoresis, fever and unexpected weight change. HENT: Negative for facial swelling and nosebleeds. Eyes: Negative for redness and visual disturbance. Respiratory: Negative for cough, chest tightness, shortness of breath and wheezing. Cardiovascular: Negative for chest pain, palpitations and leg swelling. Gastrointestinal: Negative for abdominal pain, nausea and vomiting. Endocrine: Negative for cold intolerance and heat intolerance. Genitourinary: Negative for dysuria and hematuria. Musculoskeletal: Negative for arthralgias and myalgias. C/o chronic left hand injury, balance issues   Skin: Negative for pallor and rash. Neurological: Negative for dizziness, seizures, syncope, weakness and light-headedness. Hematological: Does not bruise/bleed easily. Psychiatric/Behavioral: Negative for agitation. The patient is not nervous/anxious. Objective  Vital Signs - /70   Pulse 73   Ht 5' 10\" (1.778 m)   Wt 230 lb (104.3 kg)   BMI 33.00 kg/m²   Physical Exam  Vitals signs and nursing note reviewed. Constitutional:       General: He is not in acute distress. Appearance: He is well-developed. He is obese. He is not diaphoretic. Comments: Deconditioned   HENT:      Head: Normocephalic and atraumatic. Right Ear: Hearing and external ear normal.      Left Ear: Hearing and external ear normal.      Nose: Nose normal.   Eyes:      General:         Right eye: No discharge. Left eye: No discharge. Pupils: Pupils are equal, round, and reactive to light. Neck:      Musculoskeletal: Neck supple. No muscular tenderness. Thyroid: No thyromegaly. Vascular: No carotid bruit or JVD. Trachea: No tracheal deviation. Cardiovascular:      Rate and Rhythm: Normal rate and regular rhythm. Heart sounds: Normal heart sounds. No murmur. No friction rub. No gallop. Pulmonary:      Effort: Pulmonary effort is normal. No respiratory distress. Breath sounds: Normal breath sounds. No wheezing or rales. Abdominal:      Palpations: Abdomen is soft. Tenderness: There is no abdominal tenderness. Musculoskeletal:         General: Swelling (left hand 4+) present. No deformity. Right lower leg: No edema. Left lower leg: No edema. Comments: Abnormal gait   Skin:     General: Skin is warm and dry. Findings: No rash. Neurological:      General: No focal deficit present. Mental Status: He is alert and oriented to person, place, and time. Cranial Nerves: No cranial nerve deficit. Psychiatric:         Mood and Affect: Mood normal.         Behavior: Behavior normal.         Judgment: Judgment normal.         Data:  Heart Cath 3/17/20  PCI procedure:LAD, Diag/Septal1, LAURI, LAD, LAURI    Conclusions    Single-vessel LAD proximal to mid bifurcation stenosis.    Normal LV ejection

## 2021-05-18 RX ORDER — CITALOPRAM 20 MG/1
TABLET ORAL
Qty: 30 TABLET | Refills: 5 | Status: SHIPPED | OUTPATIENT
Start: 2021-05-18 | End: 2021-10-25

## 2021-05-18 NOTE — TELEPHONE ENCOUNTER
Requested Prescriptions     Pending Prescriptions Disp Refills    citalopram (CELEXA) 20 MG tablet [Pharmacy Med Name: CITALOPRAM HBR 20 MG TABLET 20 Tablet] 30 tablet 5     Sig: TAKE ONE TABLET BY MOUTH DAILY       Last Office Visit: 3/8/2021  Next Office Visit: 3/10/22  Last Medication Refill: 11/11/20 with 5 refills

## 2021-05-20 ENCOUNTER — TELEPHONE (OUTPATIENT)
Dept: CARDIOLOGY CLINIC | Age: 81
End: 2021-05-20

## 2021-05-20 NOTE — TELEPHONE ENCOUNTER
Date: TBD    Cardiologist: Dr. Joseline Medina    Procedure: left shoulder manipulation under anesthesia    Surgeon: Dr. Kaity Lamb    Last Office Visit: 4-19-21    Reason for office visit and medical concerns addressed at this office visit: CAD, HTN, hypelipidemia, pacemaker, DM CKD    Testing Performed and Date of Service:  Pacer 3-11-21    RCRI = 2 pts, elevated, 6.6%   METs 3    Current Medications: ASA, celexa, myrbetriq, lipitor, norco, insulin, mirapex, zetia, plavix, tenormin, lasix, nexium    Is the patient currently taking an anticoagulant? If so, what is the diagnosis the patient has been given to warrant the need for the anticoagulant? plavix and ASA    Additional Notes: Cardiac Risk request    Med hold on Plavix.

## 2021-06-01 ENCOUNTER — TELEPHONE (OUTPATIENT)
Dept: CARDIOLOGY CLINIC | Age: 81
End: 2021-06-01

## 2021-06-01 NOTE — TELEPHONE ENCOUNTER
Date: TBD    Cardiologist: Cathy Pressley    Procedure: left shoulder manipulation under anesthesia    Surgeon: Roberto    Last Office Visit: 4/19/21  Reason for office visit and medical concerns addressed at this office visit: cad, vhd, ckd, dm, htn, hyperlipidemia, sa node dysfunction    Testing Performed and Date of Service:  3/17/20 Cath  Single-vessel LAD proximal to mid bifurcation stenosis. Normal LV ejection fraction. Successful PCI to proximal to mid LAD and 1st diagonal utilizing \"collar   and transferred technique\". Proximal LAD stented with 3.0 by 18 mm resolute integrity (collar stent)   just to bifurcation with the 1st diagonal.   Mid LAD (2.5 x 14 mm resolute integrity) and 1st diagonal (2.5 x 14   millimeters resolute integrity) stented in kissing stent fashion with   proximal edge is just within distal edge of collar stent. Recommendations      Medical management. Monitor pacemaker going forward. RCRI = 6.6   METs 4    Current Medications: celexa, myrbetriq, atorvastatin, norco, insulin, aspirin, mirapex, zetia, plavix, atenolol, lasix, nexium    Is the patient currently taking an anticoagulant?  If so, what is the diagnosis the patient has been given to warrant the need for the anticoagulant? plavix for LAURI 3/17/20    Additional Notes: requesting cardiac clearance and to hold plavix prior to procedure

## 2021-06-01 NOTE — TELEPHONE ENCOUNTER
Okay to send letter for cardiac or stratification.   Okay to hold Plavix 5 days prior to procedure and resume thereafter

## 2021-06-08 RX ORDER — HYDROCODONE BITARTRATE AND ACETAMINOPHEN 10; 325 MG/1; MG/1
1 TABLET ORAL EVERY 8 HOURS PRN
COMMUNITY

## 2021-06-09 ENCOUNTER — TRANSCRIBE ORDERS (OUTPATIENT)
Dept: ADMINISTRATIVE | Facility: HOSPITAL | Age: 81
End: 2021-06-09

## 2021-06-09 DIAGNOSIS — Z11.59 SCREENING FOR VIRAL DISEASE: Primary | ICD-10-CM

## 2021-06-12 ENCOUNTER — LAB (OUTPATIENT)
Dept: LAB | Facility: HOSPITAL | Age: 81
End: 2021-06-12

## 2021-06-12 LAB — SARS-COV-2 ORF1AB RESP QL NAA+PROBE: NOT DETECTED

## 2021-06-12 PROCEDURE — U0005 INFEC AGEN DETEC AMPLI PROBE: HCPCS | Performed by: ORTHOPAEDIC SURGERY

## 2021-06-12 PROCEDURE — C9803 HOPD COVID-19 SPEC COLLECT: HCPCS | Performed by: ORTHOPAEDIC SURGERY

## 2021-06-12 PROCEDURE — U0004 COV-19 TEST NON-CDC HGH THRU: HCPCS | Performed by: ORTHOPAEDIC SURGERY

## 2021-06-14 PROBLEM — M75.02 ADHESIVE CAPSULITIS OF LEFT SHOULDER: Status: ACTIVE | Noted: 2021-06-14

## 2021-06-14 NOTE — DISCHARGE INSTRUCTIONS
UPPER EXTREMITY POST-OP INSTRUCTIONS - DR. SHEA    IMPORTANT PHONE NUMBERS:  • For emergencies, please call [phone]  • You may reach Dr. Shea and clinical staff at [phone]- M-F 8:00 am-5:00 pm  • After 5pm or on the weekends, please call [phone]  • Call immediately if you have any of the following symptoms:     Elevated temperature above 101.5 degrees for more than 48 hours after surgery     Persistent drainage from wound     Severe pain around surgical site    Sling use: The sling is provided for your comfort and to ensure proper healing of your repair following surgery. Please place the abduction pillow with the curved side against your side and the sling on the side of the pillow. Your surgery requires that you wear the sling if noted below.  __x__ For comfort. Remove sling 24 hours and begin range of motion exercises  ____ At all times except bathing, dressing, and therapy. Also wear the sling during sleep.  ____ No sling required    Bathing:  _x__No bandages, no restrictions!!  ___You may remove you dressing and shower on the 3rd day after surgery (Ex. Tues surgery, shower on Friday)  ** if you are told to it is ok to remove your dressing and shower, DO NOT SOAK your incisions in a tub.  ___Keep splint clean, dry, and intact. DO NOT place foreign objects into your splint.      Dressings: Keep dressing/splint intact unless instructed otherwise below. SOME DRAINAGE IS NORMAL!    • DO NOT touch or apply ointment to the incision.    • DO NOT remove the steri-strips over the incisions (if you have steri-strips). They will         generally fall off on their own or can be removed 1 weeksafter surgery.    • If you have yellow gauze and it comes off, do not worry about it. Leave them off.   • Signs of infection that warrant a phone call to our clinical line:     o Excessive drainage or redness     o Red streaking coming away from the incision  o Increased pain  o Increased temperature  above 101 degrees      Physical Therapy:        *  Your physical therapy status will be discussed with you postoperatively and at your first post-op appointment. Some injuries will not require physical therapy.      *  If you have a shoulder manipulation, please schedule therapy for the next day      Medications: You will be discharged with the appropriate medications following your surgery. Fill these at the pharmacy and take them as directed on the label. Not all of the medications below may be prescribed. Occasionally, other medications may be prescribed with specific instructions.    Percocet/Lortab (oxycodone/hydrocodone with tylenol) - Pain Medication, will cause drowsiness, possibly itchiness (this is NOT an allergy - use benadryl or an over the counter allergy medication such as Claritin or Zyrtec)     o Take 1-2 tablets every 4-6 hours. DO NOT EXCEED 4,000mg of Tylenol in 24 hours.  **DO NOT MIX WITH ALCOHOL, DRIVE WHILE TAKING, OR TAKE with extra TYLENOL**    Colace (Docusate) - stool softener, used for constipation. Take this only if you feel constipated.      Zofran (Ondansetron) or Phenergan - Anti-nausea medication, will cause drowsiness      *Starting January 2021, all narcotic medication must be prescribed electronically to your pharmacy.  Be sure to notify nursing of your preferred pharmacy.  If you are running low on pain medications, please notify us if you need a refill 24-48 hours prior to when you run out, so we can make arrangements to refill the prescription for you if we determine is necessary      YOUR NEXT PAIN MEDICATION IS DUE AT______________        Moderate Conscious Sedation, Adult, Care After  Refer to this sheet in the next few weeks. These instructions provide you with information on caring for yourself after your procedure. Your health care provider may also give you more specific instructions. Your treatment has been planned according to current medical practices, but problems  sometimes occur. Call your health care provider if you have any problems or questions after your procedure.  WHAT TO EXPECT AFTER THE PROCEDURE    After your procedure:  · You may feel sleepy, clumsy, and have poor balance for several hours.  · Vomiting may occur if you eat too soon after the procedure.  HOME CARE INSTRUCTIONS  · Do not participate in any activities where you could become injured for at least 24 hours. Do not:  ¨ Drive.  ¨ Swim.  ¨ Ride a bicycle.  ¨ Operate heavy machinery.  ¨ Cook.  ¨ Use power tools.  ¨ Climb ladders.  ¨ Work from a high place.  · Do not make important decisions or sign legal documents until you are improved.  · If you vomit, drink water, juice, or soup when you can drink without vomiting. Make sure you have little or no nausea before eating solid foods.  · Only take over-the-counter or prescription medicines for pain, discomfort, or fever as directed by your health care provider.  · Make sure you and your family fully understand everything about the medicines given to you, including what side effects may occur.  · You should not drink alcohol, take sleeping pills, or take medicines that cause drowsiness for at least 24 hours.  · If you smoke, do not smoke without supervision.  · If you are feeling better, you may resume normal activities 24 hours after you were sedated.  · Keep all appointments with your health care provider.  SEEK MEDICAL CARE IF:  · Your skin is pale or bluish in color.  · You continue to feel nauseous or vomit.  · Your pain is getting worse and is not helped by medicine.  · You have bleeding or swelling.  · You are still sleepy or feeling clumsy after 24 hours.  SEEK IMMEDIATE MEDICAL CARE IF:  · You develop a rash.  · You have difficulty breathing.  · You develop any type of allergic problem.  · You have a fever.  MAKE SURE YOU:  · Understand these instructions.  · Will watch your condition.  · Will get help right away if you are not doing well or get  worse.     This information is not intended to replace advice given to you by your health care provider. Make sure you discuss any questions you have with your health care provider.     Document Released: 10/08/2014 Document Revised: 01/08/2016 Document Reviewed: 10/08/2014  [employer] Interactive Patient Education ©2016 [employer]         CALL YOUR PHYSICIAN IF YOU EXPERIENCE  INCREASED PAIN NOT HELPED BY YOUR PAIN MEDICATION.        Fall Prevention in the Home      Falls can cause injuries. They can happen to people of all ages. There are many things you can do to make your home safe and to help prevent falls.    WHAT CAN I DO ON THE OUTSIDE OF MY HOME?  · Regularly fix the edges of walkways and driveways and fix any cracks.  · Remove anything that might make you trip as you walk through a door, such as a raised step or threshold.  · Trim any bushes or trees on the path to your home.  · Use bright outdoor lighting.  · Clear any walking paths of anything that might make someone trip, such as rocks or tools.  · Regularly check to see if handrails are loose or broken. Make sure that both sides of any steps have handrails.  · Any raised decks and porches should have guardrails on the edges.  · Have any leaves, snow, or ice cleared regularly.  · Use sand or salt on walking paths during winter.  · Clean up any spills in your garage right away. This includes oil or grease spills.  WHAT CAN I DO IN THE BATHROOM?    · Use night lights.  · Install grab bars by the toilet and in the tub and shower. Do not use towel bars as grab bars.  · Use non-skid mats or decals in the tub or shower.  · If you need to sit down in the shower, use a plastic, non-slip stool.  · Keep the floor dry. Clean up any water that spills on the floor as soon as it happens.  · Remove soap buildup in the tub or shower regularly.  · Attach bath mats securely with double-sided non-slip rug tape.  · Do not have throw rugs and other things on the floor that  can make you trip.  WHAT CAN I DO IN THE BEDROOM?  · Use night lights.  · Make sure that you have a light by your bed that is easy to reach.  · Do not use any sheets or blankets that are too big for your bed. They should not hang down onto the floor.  · Have a firm chair that has side arms. You can use this for support while you get dressed.  · Do not have throw rugs and other things on the floor that can make you trip.  WHAT CAN I DO IN THE KITCHEN?  · Clean up any spills right away.  · Avoid walking on wet floors.  · Keep items that you use a lot in easy-to-reach places.  · If you need to reach something above you, use a strong step stool that has a grab bar.  · Keep electrical cords out of the way.  · Do not use floor polish or wax that makes floors slippery. If you must use wax, use non-skid floor wax.  · Do not have throw rugs and other things on the floor that can make you trip.  WHAT CAN I DO WITH MY STAIRS?  · Do not leave any items on the stairs.  · Make sure that there are handrails on both sides of the stairs and use them. Fix handrails that are broken or loose. Make sure that handrails are as long as the stairways.  · Check any carpeting to make sure that it is firmly attached to the stairs. Fix any carpet that is loose or worn.  · Avoid having throw rugs at the top or bottom of the stairs. If you do have throw rugs, attach them to the floor with carpet tape.  · Make sure that you have a light switch at the top of the stairs and the bottom of the stairs. If you do not have them, ask someone to add them for you.  WHAT ELSE CAN I DO TO HELP PREVENT FALLS?  · Wear shoes that:  ¨ Do not have high heels.  ¨ Have rubber bottoms.  ¨ Are comfortable and fit you well.  ¨ Are closed at the toe. Do not wear sandals.  · If you use a stepladder:  ¨ Make sure that it is fully opened. Do not climb a closed stepladder.  ¨ Make sure that both sides of the stepladder are locked into place.  ¨ Ask someone to hold it for  you, if possible.  · Clearly kip and make sure that you can see:  ¨ Any grab bars or handrails.  ¨ First and last steps.  ¨ Where the edge of each step is.  · Use tools that help you move around (mobility aids) if they are needed. These include:  ¨ Canes.  ¨ Walkers.  ¨ Scooters.  ¨ Crutches.  · Turn on the lights when you go into a dark area. Replace any light bulbs as soon as they burn out.  · Set up your furniture so you have a clear path. Avoid moving your furniture around.  · If any of your floors are uneven, fix them.  · If there are any pets around you, be aware of where they are.  · Review your medicines with your doctor. Some medicines can make you feel dizzy. This can increase your chance of falling.  Ask your doctor what other things that you can do to help prevent falls.     This information is not intended to replace advice given to you by your health care provider. Make sure you discuss any questions you have with your health care provider.     Document Released: 10/14/2010 Document Revised: 05/03/2016 Document Reviewed: 01/22/2016  [employer] Interactive Patient Education ©2016 [employer]     PATIENT/FAMILY/RESPONSIBLE PARTY VERBALIZES UNDERSTANDING OF ABOVE EDUCATION.  COPY OF PAIN SCALE GIVEN AND REVIEWED WITH VERBALIZED UNDERSTANDING.

## 2021-06-14 NOTE — OP NOTE
Patient Name: Shahbaz  MRN: [id number]  : 1940    DATE of SURGERY: 6/15/2021    SURGEON: Andreas Price MD    ASSISTANT: NONE    PREOP DIAGNOSIS  Left shoulder adhesive capsulitis.       POSTOP DIAGNOSIS  Left shoulder adhesive capsulitis.       PROCEDURE  1.  Left shoulder manipulation under anesthesia.    2.  Left shoulder glenohumeral joint corticosteroid injection.       IMPLANTS  None.       ANESTHESIA  MAC, interscalene block.       OPERATIVE INDICATIONS  The patient is a 81 y.o. male who presented to my clinic with complaints of progressively losing function and motion of the shoulder. He had a severe infection after having a fish hook get trapped in his left hand several months ago.  Clinical exam was consistent with adhesive capsulitis with active and passive motion decreased and symmetrical.  Having failed conservative treatment, I recommended the above-named surgery.  The risks included that of anesthesia, pain, intraoperative fracture, loss of function, continued loss of motion.     ESTIMATED BLOOD LOSS  None.       PROCEDURE IN DETAIL    The patient was seen in the preop holding room; once again, the informed consent form was reviewed with the patient and signed.  The site of surgery was marked with the patients agreement.   The anesthesia team then performed a successful interscalene blockade and the patient was transported to the post-anesthesia care unit.     After induction of anesthesia, pre-manipulation range of motion was recorded  as follows:  1) Forward flexion: 140  2) External rotation in adduction: 0  3) External rotation in abduction: 0  4) Internal rotation in abduction:  20     The shoulder was then manipulated systematically, first by forward flexing the shoulder while externally rotating the extremity.  This was followed by extension, abduction, rotation, and finally cross arm adduction.  The capsule was noted to rupture both audibly as well as with palpation.      Post-manipulation range of motion was as follows:  1) Forward flexion: 180  2) External rotation in adduction: 40  3) External rotation in abduction:  60  4) Internal rotation in abduction:  80     After successfully manipulating the shoulder, the coracoid process was palpated and a sterile prep was performed just lateral to it.  Then, 4 mL 1% lidocaine, 4 mL 0.25% Marcaine, and 80 mg of Depo-Medrol were injected successfully in to the glenohumeral joint with a 22 gauge needle.  A band-aid was applied. The patient was awakened by anesthesia, placed in a sling, and transported to the preop area in stable condition.     POSTOP PLAN  Begin range of motion immediately.  A physical therapy prescription has been given for passive and active-assist range of motion to begin on postop day No. 1.  The patient will follow up in clinic in 1 week for a clinical check.      Electronically signed by Andreas Price MD on 6/15/2021 at 08:45 CDT

## 2021-06-15 ENCOUNTER — ANESTHESIA (OUTPATIENT)
Dept: PREOP | Facility: HOSPITAL | Age: 81
End: 2021-06-15

## 2021-06-15 ENCOUNTER — ANESTHESIA EVENT (OUTPATIENT)
Dept: PREOP | Facility: HOSPITAL | Age: 81
End: 2021-06-15

## 2021-06-15 ENCOUNTER — HOSPITAL ENCOUNTER (OUTPATIENT)
Dept: PREOP | Facility: HOSPITAL | Age: 81
Setting detail: HOSPITAL OUTPATIENT SURGERY
Discharge: HOME | End: 2021-06-15

## 2021-06-15 VITALS
RESPIRATION RATE: 20 BRPM | HEIGHT: 69 IN | HEART RATE: 60 BPM | OXYGEN SATURATION: 94 % | DIASTOLIC BLOOD PRESSURE: 65 MMHG | BODY MASS INDEX: 33.89 KG/M2 | SYSTOLIC BLOOD PRESSURE: 99 MMHG | WEIGHT: 228.84 LBS | TEMPERATURE: 97.4 F

## 2021-06-15 LAB
GLUCOSE BLDC GLUCOMTR-MCNC: 212 MG/DL (ref 70–130)
GLUCOSE BLDC GLUCOMTR-MCNC: 231 MG/DL (ref 70–130)

## 2021-06-15 PROCEDURE — 25010000002 KETOROLAC TROMETHAMINE PER 15 MG: Performed by: ORTHOPAEDIC SURGERY

## 2021-06-15 PROCEDURE — 25010000002 PROPOFOL 10 MG/ML EMULSION: Performed by: NURSE ANESTHETIST, CERTIFIED REGISTERED

## 2021-06-15 PROCEDURE — 25010000002 ROPIVACAINE PER 1 MG: Performed by: ANESTHESIOLOGY

## 2021-06-15 PROCEDURE — 76942 ECHO GUIDE FOR BIOPSY: CPT

## 2021-06-15 PROCEDURE — 25010000002 FENTANYL CITRATE (PF) 50 MCG/ML SOLUTION: Performed by: ANESTHESIOLOGY

## 2021-06-15 PROCEDURE — 82962 GLUCOSE BLOOD TEST: CPT

## 2021-06-15 RX ORDER — SODIUM CHLORIDE, SODIUM LACTATE, POTASSIUM CHLORIDE, CALCIUM CHLORIDE 600; 310; 30; 20 MG/100ML; MG/100ML; MG/100ML; MG/100ML
9 INJECTION, SOLUTION INTRAVENOUS CONTINUOUS
Status: DISCONTINUED | OUTPATIENT
Start: 2021-06-15 | End: 2021-06-16 | Stop reason: HOSPADM

## 2021-06-15 RX ORDER — ONDANSETRON 2 MG/ML
4 INJECTION INTRAMUSCULAR; INTRAVENOUS ONCE AS NEEDED
Status: DISCONTINUED | OUTPATIENT
Start: 2021-06-15 | End: 2021-06-16 | Stop reason: HOSPADM

## 2021-06-15 RX ORDER — OXYCODONE AND ACETAMINOPHEN 10; 325 MG/1; MG/1
1 TABLET ORAL ONCE AS NEEDED
Status: DISCONTINUED | OUTPATIENT
Start: 2021-06-15 | End: 2021-06-16 | Stop reason: HOSPADM

## 2021-06-15 RX ORDER — DEXTROSE MONOHYDRATE 25 G/50ML
12.5 INJECTION, SOLUTION INTRAVENOUS AS NEEDED
Status: DISCONTINUED | OUTPATIENT
Start: 2021-06-15 | End: 2021-06-16 | Stop reason: HOSPADM

## 2021-06-15 RX ORDER — PROPOFOL 10 MG/ML
VIAL (ML) INTRAVENOUS AS NEEDED
Status: DISCONTINUED | OUTPATIENT
Start: 2021-06-15 | End: 2021-06-15 | Stop reason: SURG

## 2021-06-15 RX ORDER — FENTANYL CITRATE 50 UG/ML
25 INJECTION, SOLUTION INTRAMUSCULAR; INTRAVENOUS
Status: DISCONTINUED | OUTPATIENT
Start: 2021-06-15 | End: 2021-06-16 | Stop reason: HOSPADM

## 2021-06-15 RX ORDER — SODIUM CHLORIDE, SODIUM LACTATE, POTASSIUM CHLORIDE, CALCIUM CHLORIDE 600; 310; 30; 20 MG/100ML; MG/100ML; MG/100ML; MG/100ML
1000 INJECTION, SOLUTION INTRAVENOUS CONTINUOUS
Status: DISCONTINUED | OUTPATIENT
Start: 2021-06-15 | End: 2021-06-16 | Stop reason: HOSPADM

## 2021-06-15 RX ORDER — LABETALOL HYDROCHLORIDE 5 MG/ML
5 INJECTION, SOLUTION INTRAVENOUS
Status: DISCONTINUED | OUTPATIENT
Start: 2021-06-15 | End: 2021-06-16 | Stop reason: HOSPADM

## 2021-06-15 RX ORDER — NALOXONE HCL 0.4 MG/ML
0.4 VIAL (ML) INJECTION AS NEEDED
Status: DISCONTINUED | OUTPATIENT
Start: 2021-06-15 | End: 2021-06-16 | Stop reason: HOSPADM

## 2021-06-15 RX ORDER — SODIUM CHLORIDE 0.9 % (FLUSH) 0.9 %
3 SYRINGE (ML) INJECTION AS NEEDED
Status: DISCONTINUED | OUTPATIENT
Start: 2021-06-15 | End: 2021-06-16 | Stop reason: HOSPADM

## 2021-06-15 RX ORDER — FLUMAZENIL 0.1 MG/ML
0.2 INJECTION INTRAVENOUS AS NEEDED
Status: DISCONTINUED | OUTPATIENT
Start: 2021-06-15 | End: 2021-06-16 | Stop reason: HOSPADM

## 2021-06-15 RX ORDER — SODIUM CHLORIDE 0.9 % (FLUSH) 0.9 %
10 SYRINGE (ML) INJECTION AS NEEDED
Status: DISCONTINUED | OUTPATIENT
Start: 2021-06-15 | End: 2021-06-16 | Stop reason: HOSPADM

## 2021-06-15 RX ORDER — IBUPROFEN 600 MG/1
600 TABLET ORAL ONCE AS NEEDED
Status: DISCONTINUED | OUTPATIENT
Start: 2021-06-15 | End: 2021-06-16 | Stop reason: HOSPADM

## 2021-06-15 RX ORDER — SODIUM CHLORIDE 0.9 % (FLUSH) 0.9 %
10 SYRINGE (ML) INJECTION EVERY 12 HOURS SCHEDULED
Status: DISCONTINUED | OUTPATIENT
Start: 2021-06-15 | End: 2021-06-16 | Stop reason: HOSPADM

## 2021-06-15 RX ORDER — ROPIVACAINE HYDROCHLORIDE 5 MG/ML
INJECTION, SOLUTION EPIDURAL; INFILTRATION; PERINEURAL
Status: COMPLETED | OUTPATIENT
Start: 2021-06-15 | End: 2021-06-15

## 2021-06-15 RX ORDER — DICYCLOMINE HYDROCHLORIDE 10 MG/1
10 CAPSULE ORAL 2 TIMES DAILY
COMMUNITY

## 2021-06-15 RX ORDER — OXYCODONE AND ACETAMINOPHEN 7.5; 325 MG/1; MG/1
2 TABLET ORAL EVERY 4 HOURS PRN
Status: DISCONTINUED | OUTPATIENT
Start: 2021-06-15 | End: 2021-06-16 | Stop reason: HOSPADM

## 2021-06-15 RX ORDER — LIDOCAINE HYDROCHLORIDE 10 MG/ML
0.5 INJECTION, SOLUTION EPIDURAL; INFILTRATION; INTRACAUDAL; PERINEURAL ONCE AS NEEDED
Status: DISCONTINUED | OUTPATIENT
Start: 2021-06-15 | End: 2021-06-16 | Stop reason: HOSPADM

## 2021-06-15 RX ORDER — KETOROLAC TROMETHAMINE 30 MG/ML
30 INJECTION, SOLUTION INTRAMUSCULAR; INTRAVENOUS ONCE
Status: COMPLETED | OUTPATIENT
Start: 2021-06-15 | End: 2021-06-15

## 2021-06-15 RX ORDER — LIDOCAINE HYDROCHLORIDE 20 MG/ML
INJECTION, SOLUTION EPIDURAL; INFILTRATION; INTRACAUDAL; PERINEURAL AS NEEDED
Status: DISCONTINUED | OUTPATIENT
Start: 2021-06-15 | End: 2021-06-15 | Stop reason: SURG

## 2021-06-15 RX ADMIN — PROPOFOL 50 MG: 10 INJECTION, EMULSION INTRAVENOUS at 08:39

## 2021-06-15 RX ADMIN — KETOROLAC TROMETHAMINE 30 MG: 30 INJECTION, SOLUTION INTRAMUSCULAR; INTRAVENOUS at 08:44

## 2021-06-15 RX ADMIN — LIDOCAINE HYDROCHLORIDE 40 MG: 20 INJECTION, SOLUTION EPIDURAL; INFILTRATION; INTRACAUDAL; PERINEURAL at 08:39

## 2021-06-15 RX ADMIN — PROPOFOL 50 MG: 10 INJECTION, EMULSION INTRAVENOUS at 08:42

## 2021-06-15 RX ADMIN — SODIUM CHLORIDE, POTASSIUM CHLORIDE, SODIUM LACTATE AND CALCIUM CHLORIDE 1000 ML: 600; 310; 30; 20 INJECTION, SOLUTION INTRAVENOUS at 08:01

## 2021-06-15 RX ADMIN — ROPIVACAINE HYDROCHLORIDE 20 ML: 5 INJECTION, SOLUTION EPIDURAL; INFILTRATION; PERINEURAL at 08:28

## 2021-06-15 RX ADMIN — FENTANYL CITRATE 25 MCG: 50 INJECTION, SOLUTION INTRAMUSCULAR; INTRAVENOUS at 08:24

## 2021-06-15 NOTE — ANESTHESIA PROCEDURE NOTES
Peripheral Block      Patient reassessed immediately prior to procedure    Patient location during procedure: holding area  Start time: 6/15/2021 8:25 AM  Stop time: 6/15/2021 8:27 AM  Reason for block: procedure for pain, at surgeon's request, post-op pain management and Request by Dr. kinney  Performed by  Anesthesiologist: Chaparro Weber MD  Preanesthetic Checklist  Completed: patient identified, IV checked, site marked, risks and benefits discussed, surgical consent, monitors and equipment checked, pre-op evaluation and timeout performed  Prep:  Pt Position: supine  Sterile barriers:gloves  Prep: ChloraPrep  Patient monitoring: blood pressure monitoring, continuous pulse oximetry and EKG  Procedure  Sedation:yes    Guidance:ultrasound guided, nerve stimulator and Brachial plexus identified and local anesthetic seen surrounding nerves  Images:still images obtained, printed/placed on chart (picture printed and placed in patients chart)  Loss of twitch: 0.5 mA  Laterality:left  Block Type:interscalene  Injection Technique:single-shot  Needle Type:echogenic  Needle Gauge:22 G      Medications Used: ropivacaine (NAROPIN) injection 0.5 %, 20 mL  Med admintered at 6/15/2021 8:28 AM      Post Assessment  Injection Assessment: negative aspiration for heme, no paresthesia on injection and incremental injection  Patient Tolerance:comfortable throughout block  Complications:no

## 2021-06-15 NOTE — ANESTHESIA POSTPROCEDURE EVALUATION
Patient: Atul Jeong    Procedure Summary     Date: 06/15/21 Room / Location: [facility] PREOP PHASE II    Anesthesia Start: 0838 Anesthesia Stop: 0850    Procedure: MANIPULATION OF JOINT Diagnosis:     Scheduled Providers:  Provider: Luiz Cole CRNA    Anesthesia Type: general with block ASA Status: 3          Anesthesia Type: general with block    Vitals  Vitals Value Taken Time   /65 06/15/21 0900   Temp     Pulse 64 06/15/21 0905   Resp     SpO2 93 % 06/15/21 0905   Vitals shown include unvalidated device data.        Post Anesthesia Care and Evaluation    Patient location during evaluation: PHASE II  Patient participation: complete - patient participated  Level of consciousness: awake  Pain management: adequate  Airway patency: patent  Anesthetic complications: No anesthetic complications  PONV Status: none  Cardiovascular status: acceptable  Respiratory status: acceptable  Hydration status: acceptable  No anesthesia care post op

## 2021-06-15 NOTE — H&P
Pt Name: Atul Jeong  MRN: [id number]  YOB: 1940  Date of evaluation: 6/15/2021    H&P including current review of systems was updated in the paper chart and/or the document previously scanned into the record.  There have been no significant changes or new problems since the original evaluation.  The patient's problems continue and indications for contemplated procedure have not changed.    Electronically signed by Andreas Price MD on 6/15/2021 at 08:22 CDT

## 2021-06-15 NOTE — ANESTHESIA PREPROCEDURE EVALUATION
Anesthesia Evaluation     Patient summary reviewed   no history of anesthetic complications:  NPO Solid Status: > 8 hours             Airway   Mallampati: II  Dental    (+) upper dentures and lower dentures    Pulmonary    (+) sleep apnea,   (-) COPD, asthma, not a smoker  Cardiovascular   Exercise tolerance: good (4-7 METS)    (+) pacemaker (medtronic iraj) interrogated 6-12 months ago, hypertension, CAD, CABG, cardiac stents (2018) hyperlipidemia,   (-) past MI, angina      Neuro/Psych  (-) seizures, TIA, CVA  GI/Hepatic/Renal/Endo    (+) obesity,  GERD,  diabetes mellitus,   (-) liver disease, no renal disease    Musculoskeletal     Abdominal    Substance History      OB/GYN          Other                        Anesthesia Plan    ASA 3     general with block     intravenous induction     Anesthetic plan, all risks, benefits, and alternatives have been provided, discussed and informed consent has been obtained with: patient.

## 2021-06-23 ENCOUNTER — TELEPHONE (OUTPATIENT)
Dept: CARDIOLOGY CLINIC | Age: 81
End: 2021-06-23

## 2021-07-06 RX ORDER — MIRABEGRON 25 MG/1
TABLET, FILM COATED, EXTENDED RELEASE ORAL
Qty: 90 TABLET | Refills: 5 | Status: SHIPPED | OUTPATIENT
Start: 2021-07-06 | End: 2022-08-30

## 2021-07-06 NOTE — TELEPHONE ENCOUNTER
Requested Prescriptions     Pending Prescriptions Disp Refills    MYRBETRIQ 25 MG TB24 [Pharmacy Med Name: Bisi Higgins 25 MG TB24 25 Tablet] 90 tablet 5     Sig: TAKE ONE TABLET BY MOUTH DAILY       Last Office Visit: 3/8/2021  Next Office Visit: 3/10/2022  Last Medication Refill: 2/10/2021 5 refills

## 2021-07-19 DIAGNOSIS — I49.5 SINOATRIAL NODE DYSFUNCTION (HCC): ICD-10-CM

## 2021-07-19 DIAGNOSIS — Z95.0 PACEMAKER: Primary | ICD-10-CM

## 2021-07-19 PROCEDURE — 93296 REM INTERROG EVL PM/IDS: CPT | Performed by: CLINICAL NURSE SPECIALIST

## 2021-07-19 PROCEDURE — 93294 REM INTERROG EVL PM/LDLS PM: CPT | Performed by: CLINICAL NURSE SPECIALIST

## 2021-10-20 DIAGNOSIS — Z95.0 PACEMAKER: Primary | ICD-10-CM

## 2021-10-20 DIAGNOSIS — I49.5 SINOATRIAL NODE DYSFUNCTION (HCC): ICD-10-CM

## 2021-10-25 ENCOUNTER — OFFICE VISIT (OUTPATIENT)
Dept: CARDIOLOGY CLINIC | Age: 81
End: 2021-10-25
Payer: MEDICARE

## 2021-10-25 ENCOUNTER — TELEPHONE (OUTPATIENT)
Dept: CARDIOLOGY CLINIC | Age: 81
End: 2021-10-25

## 2021-10-25 VITALS
BODY MASS INDEX: 32.64 KG/M2 | OXYGEN SATURATION: 96 % | HEART RATE: 78 BPM | HEIGHT: 70 IN | WEIGHT: 228 LBS | SYSTOLIC BLOOD PRESSURE: 136 MMHG | DIASTOLIC BLOOD PRESSURE: 84 MMHG

## 2021-10-25 DIAGNOSIS — Z95.3 S/P AORTIC VALVE REPLACEMENT WITH BIOPROSTHETIC VALVE: ICD-10-CM

## 2021-10-25 DIAGNOSIS — E78.2 MIXED HYPERLIPIDEMIA: ICD-10-CM

## 2021-10-25 DIAGNOSIS — I49.5 SINOATRIAL NODE DYSFUNCTION (HCC): ICD-10-CM

## 2021-10-25 DIAGNOSIS — Z95.0 PACEMAKER: ICD-10-CM

## 2021-10-25 DIAGNOSIS — I38 VALVULAR HEART DISEASE: ICD-10-CM

## 2021-10-25 DIAGNOSIS — R10.31 RIGHT LOWER QUADRANT ABDOMINAL PAIN: ICD-10-CM

## 2021-10-25 DIAGNOSIS — I10 ESSENTIAL HYPERTENSION: ICD-10-CM

## 2021-10-25 DIAGNOSIS — I25.10 CORONARY ARTERY DISEASE INVOLVING NATIVE CORONARY ARTERY OF NATIVE HEART WITHOUT ANGINA PECTORIS: Primary | ICD-10-CM

## 2021-10-25 PROCEDURE — G8427 DOCREV CUR MEDS BY ELIG CLIN: HCPCS | Performed by: CLINICAL NURSE SPECIALIST

## 2021-10-25 PROCEDURE — 1036F TOBACCO NON-USER: CPT | Performed by: CLINICAL NURSE SPECIALIST

## 2021-10-25 PROCEDURE — 1123F ACP DISCUSS/DSCN MKR DOCD: CPT | Performed by: CLINICAL NURSE SPECIALIST

## 2021-10-25 PROCEDURE — 99214 OFFICE O/P EST MOD 30 MIN: CPT | Performed by: CLINICAL NURSE SPECIALIST

## 2021-10-25 PROCEDURE — 4040F PNEUMOC VAC/ADMIN/RCVD: CPT | Performed by: CLINICAL NURSE SPECIALIST

## 2021-10-25 PROCEDURE — G8417 CALC BMI ABV UP PARAM F/U: HCPCS | Performed by: CLINICAL NURSE SPECIALIST

## 2021-10-25 PROCEDURE — G8484 FLU IMMUNIZE NO ADMIN: HCPCS | Performed by: CLINICAL NURSE SPECIALIST

## 2021-10-25 RX ORDER — CITALOPRAM 20 MG/1
TABLET ORAL
Qty: 60 TABLET | Refills: 5 | Status: SHIPPED | OUTPATIENT
Start: 2021-10-25

## 2021-10-25 RX ORDER — DICYCLOMINE HYDROCHLORIDE 10 MG/1
10 CAPSULE ORAL 2 TIMES DAILY
COMMUNITY

## 2021-10-25 ASSESSMENT — ENCOUNTER SYMPTOMS
COUGH: 0
VOMITING: 0
NAUSEA: 0
SHORTNESS OF BREATH: 0
WHEEZING: 0
FACIAL SWELLING: 0
EYE REDNESS: 0
CHEST TIGHTNESS: 0

## 2021-10-25 NOTE — TELEPHONE ENCOUNTER
Requested Prescriptions     Pending Prescriptions Disp Refills    citalopram (CELEXA) 20 MG tablet [Pharmacy Med Name: CITALOPRAM HBR 20 MG TABLET 20 Tablet] 60 tablet 5     Sig: TAKE ONE TABLET BY MOUTH DAILY       Last Office Visit: 3/8/2021  Next Office Visit: 3/10/2022  Last Medication Refill:05/18/2021

## 2021-10-25 NOTE — TELEPHONE ENCOUNTER
Patients son called  to let the provider know that Dr. Sohan Florian MD. At [facility] was the last person to recommend a cardiologist for patients possible infection.

## 2021-10-25 NOTE — PROGRESS NOTES
[facility], [address] [place] [address], [address]  [id number]  Phone: [phone]  Fax: [phone]    OFFICE VISIT:  10/25/2021    Dwain Hanna - : 1940    Reason For Visit:  Marisa Peterson is a 79 y.o. male who is here for 6 Month Follow-Up and Coronary Artery Disease       Diagnosis Orders   1. Coronary artery disease involving native coronary artery of native heart without angina pectoris     2. Essential hypertension     3. Mixed hyperlipidemia     4. Sinoatrial node dysfunction (HCC)     5. Pacemaker     6. Right lower quadrant abdominal pain     7. Valvular heart disease     8. 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   Patient recently had a pacemaker generator change in 2020. Issues were found with elevated RV threshold. Patient was sent to Dr. Eleuterio Mckeon in [facility] 68 lead replacement 8/3/20. Patient had an injury with a fishhook shortly thereafter and developed a systemic infection involving several surgeries and IV antibiotics. The left hand remains swollen and he has had ongoing issues. Patient denies any cardiac symptoms such as chest pain, unusual dyspnea, orthopnea, PND, edema, palpitations. His main complaint today is continued right lower quadrant abdominal pain. He reports he has had an endoscopy and colonoscopy with no significant findings. He has also had a CT of the abdomen ordered by his PCP with no significant findings. He was recently at Dr. Edge Serum office who suggested that he may have some infection of his pacemaker causing his continued stomach complaints. He states he was told he should have an MRI.   Patient also states that Dr. Mery Mccartney suggested he could have scar tissue/adhesions from frequent abdominal surgeries causing his pain    RITESH Pereira CNP is PCP.   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 09/16/2020    Cellulitis of left hand and arm 09/14/2020    Abscess of left hand 09/14/2020    Pacemaker lead malfunction 08/05/2020    Pacemaker malfunction, initial encounter 06/29/2020    Bradycardia 03/17/2020    Near syncope 11/14/2019    History of syncope 11/14/2019    Sinoatrial node dysfunction ([facility] 11/14/2019    Mixed hyperlipidemia 11/14/2019    S/P aortic valve replacement with bioprosthetic valve 08/22/2019    Grade I diastolic dysfunction 68/83/9810    Pacemaker 08/22/2019    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    Hyponatremia 06/11/2018    Hypercholesterolemia with hypertriglyceridemia 06/11/2018    Rheumatic aortic valve insufficiency     Moderate mitral stenosis     Pinched nerve in neck 04/18/2018    CPAP (continuous positive airway pressure) dependence 03/08/2018    BiPAP (biphasic positive airway pressure) dependence     Bilateral carpal tunnel syndrome 08/28/2017    Diabetic polyneuropathy associated with type 2 diabetes mellitus ([facility] 08/29/2016    Orthostasis     Vertigo 07/26/2016    Type 2 diabetes mellitus with hyperglycemia, with long-term current use of insulin ([facility] 02/17/2016    Obstructive sleep apnea 02/17/2016    Class 1 obesity due to excess calories in adult 02/17/2016    Restless legs syndrome 02/17/2016    Gastroesophageal reflux disease without esophagitis 02/17/2016    Essential hypertension 02/17/2016    Chronic bilateral low back pain without sciatica 07/16/2012    Other chest pain 04/30/2012     Past Medical History:   Diagnosis Date    Aortic valve stenosis     Arthritis     Chest tightness, discomfort, or pressure 4/30/2012  Chronic back pain     CKD (chronic kidney disease)     CPAP (continuous positive airway pressure) dependence     13cm    Diabetes ([facility]     Diabetic polyneuropathy associated with type 2 diabetes mellitus ([facility] 2016    GERD (gastroesophageal reflux disease)     HTN (hypertension)     Hyperlipemia     Left ventricular diastolic dysfunction     Mitral stenosis     Mitral valve stenosis     Neuropathy     Obstructive sleep apnea     AHI: 34.5 per PSG, 2013; AHI: 36.9 per PSG, 2015; AHI: 54.5 per PSG, 3/2017    Pinched nerve in neck     Restless legs syndrome 2016     Past Surgical History:   Procedure Laterality Date    APPENDECTOMY      BACK SURGERY      4 Back surgeries    BLADDER SURGERY      CARDIAC CATHETERIZATION  12    EF > 50%    CATARACT REMOVAL      CHOLECYSTECTOMY      COLONOSCOPY      EYE SURGERY      implants placed both eyes    HAND SURGERY Left 2020    INCISION AND DRAINAGE LEFT HAND ABSCESS performed by Laurice Aase, MD at [address] [place] [address] OTHER SURGICAL HISTORY  2020    Right Ventricular lead replacement- Dr. [facility] R[facility] PROST AORTIC VALVE OPEN XCP HOMOGRF/STENT N/A 2018    AORTIC VALVE REPLACEMENT TRANSESOPHAGEAL ECHOCARDIOGRAM performed by Todd Sims MD at [address] [place] [address]       Family History   Problem Relation Age of Onset    Diabetes Mother     Cancer Father     Cancer Sister     Heart Disease Brother     Cancer Brother     Cancer Sister     Cancer Brother      Social History     Tobacco Use    Smoking status: Former Smoker     Packs/day: 3.00     Years: 50.00     Pack years: 150.00     Types: Cigarettes     Quit date: 1977     Years since quittin.3    Smokeless tobacco: Former User   Substance Use Topics    Alcohol use: No      Current Outpatient Medications   Medication Sig Dispense Refill    dicyclomine (BENTYL) 10 MG capsule Take 10 mg by mouth 2 times daily      MYRBETRIQ 25 MG TB24 TAKE ONE TABLET BY MOUTH DAILY 90 tablet 5    atorvastatin (LIPITOR) 40 MG tablet Take 1 tablet by mouth nightly 90 tablet 3    HYDROcodone-acetaminophen (NORCO) 7.5-325 MG per tablet Take by mouth daily.  insulin glargine (LANTUS) 100 UNIT/ML injection vial Inject 50 Units into the skin 2 times daily 1 vial 1    aspirin EC 81 MG EC tablet Take 1 tablet by mouth 2 times daily for 21 days 42 tablet 0    pramipexole (MIRAPEX) 1.5 MG tablet 3 times daily       ezetimibe (ZETIA) 10 MG tablet Take 10 mg by mouth daily      clopidogrel (PLAVIX) 75 MG tablet Take 75 mg by mouth daily      Cholecalciferol (VITAMIN D3) 06588 units CAPS   11    HUMULIN R U-500 KWIKPEN 500 UNIT/ML SOPN concentrated injection pen   11    Empagliflozin-Metformin HCl ER (SYNJARDY XR)  MG TB24 Take 1 tablet by mouth daily (with breakfast)      atenolol (TENORMIN) 25 MG tablet Take 25 mg by mouth daily      B-D UF III MINI PEN NEEDLES 31G X 5 MM MISC       furosemide (LASIX) 20 MG tablet Take 1 tablet by mouth as needed (For weight gain greater than 2.5 lbs in 24 hours.) (Patient taking differently: Take 20 mg by mouth daily ) 30 tablet 0    esomeprazole (NEXIUM) 40 MG capsule Take 40 mg by mouth every morning (before breakfast).  citalopram (CELEXA) 20 MG tablet TAKE ONE TABLET BY MOUTH DAILY 60 tablet 5     No current facility-administered medications for this visit. Allergies: Azithromycin, Metoprolol, Cyclobenzaprine, and Metoclopramide    Review of Systems  Review of Systems   Constitutional: Positive for fatigue. Negative for activity change, diaphoresis, fever and unexpected weight change. HENT: Negative for facial swelling and nosebleeds. Eyes: Negative for redness and visual disturbance. Respiratory: Negative for cough, chest tightness, shortness of breath and wheezing. Cardiovascular: Negative for chest pain, palpitations and leg swelling. Gastrointestinal: Positive for abdominal pain (RLQ). Negative for nausea and vomiting. Complains of stomach pain, right lower quadrant   Endocrine: Negative for cold intolerance and heat intolerance. Genitourinary: Negative for dysuria and hematuria. Musculoskeletal: Negative for arthralgias and myalgias. C/o chronic left hand injury, balance issues   Skin: Negative for pallor and rash. Neurological: Negative for dizziness, seizures, syncope, weakness and light-headedness. Hematological: Does not bruise/bleed easily. Psychiatric/Behavioral: Negative for agitation. The patient is not nervous/anxious. Objective  Vital Signs - /84   Pulse 78   Ht 5' 10\" (1.778 m)   Wt 228 lb (103.4 kg)   SpO2 96%   BMI 32.71 kg/m²   Physical Exam  Vitals and nursing note reviewed. Constitutional:       General: He is not in acute distress. Appearance: He is well-developed. He is obese. He is not diaphoretic. Comments: Deconditioned   HENT:      Head: Normocephalic and atraumatic. Right Ear: Hearing and external ear normal.      Left Ear: Hearing and external ear normal.      Nose: Nose normal.   Eyes:      General:         Right eye: No discharge. Left eye: No discharge. Pupils: Pupils are equal, round, and reactive to light. Neck:      Thyroid: No thyromegaly. Vascular: No carotid bruit or JVD. Trachea: No tracheal deviation. Cardiovascular:      Rate and Rhythm: Normal rate and regular rhythm. Heart sounds: Normal heart sounds. No murmur heard. No friction rub. No gallop. Pulmonary:      Effort: Pulmonary effort is normal. No respiratory distress. Breath sounds: Normal breath sounds. No wheezing or rales. Abdominal:      Palpations: Abdomen is soft. Tenderness: There is no abdominal tenderness. Musculoskeletal:         General: Swelling (left hand 1+) and deformity (left hand) present. Cervical back: Neck supple.  No muscular tenderness. Right lower leg: No edema. Left lower leg: No edema. Comments: Abnormal gait, unsteady   Skin:     General: Skin is warm and dry. Findings: No rash. Neurological:      General: No focal deficit present. Mental Status: He is alert and oriented to person, place, and time. Cranial Nerves: No cranial nerve deficit. Psychiatric:         Mood and Affect: Mood normal.         Behavior: Behavior normal.         Judgment: Judgment normal.         Data:  Heart Cath 3/17/20  PCI procedure:LAD, Diag/Septal1, LAURI, LAD, LAURI    Conclusions    Single-vessel LAD proximal to mid bifurcation stenosis. Normal LV ejection fraction. Successful PCI to proximal to mid LAD and 1st diagonal utilizing \"collar   and transferred technique\". Proximal LAD stented with 3.0 by 18 mm resolute integrity (collar stent)   just to bifurcation with the 1st diagonal.   Mid LAD (2.5 x 14 mm resolute integrity) and 1st diagonal (2.5 x 14   millimeters resolute integrity) stented in kissing stent fashion with   proximal edge is just within distal edge of collar stent. Echo 3/18/20  Conclusions      Summary   Calcification and thickening of the mitral valve leaflets with reduced   mobility. With a PHT of 147 msec and a MVA calculated at 1.5 cm^2, suggesting mild   mitral stenosis. Mild mitral regurgitation is present. Bioprosthetic valve in aortic position with increased velocities across   valve suggesting aortic stenosis (may be normal for bioprosthesis. No evidence of aortic valve regurgitation . Left ventricular size is normal .   Moderate concentric left ventricular hypertrophy. Left ventricular ejection fraction is visually estimated at 58%   Diastolic Dysfunction is present.        Lab Results   Component Value Date    CHOL 159 (L) 05/10/2019    TRIG 129 05/10/2019    HDL 54 (L) 05/10/2019    LDLCALC 79 05/10/2019    LDLDIRECT 51 04/30/2012     Lab Results   Component Value Date ALT 23 01/12/2021    AST 19 01/12/2021     Assessment:     Diagnosis Orders   1. Coronary artery disease involving native coronary artery of native heart without angina pectoris     2. Essential hypertension     3. Mixed hyperlipidemia     4. Sinoatrial node dysfunction (HCC)     5. Pacemaker     6. Right lower quadrant abdominal pain     7. Valvular heart disease     8. S/P aortic valve replacement with bioprosthetic valve         CADstable without symptoms of angina. Continue aspirin, atenolol, atorvastatin    Hypertensionstable on atenolol    Hyperlipidemiawell-controlled on atorvastatin    Sinoatrial node dysfunction/pacemaker  Pacemaker check showed adequate battery status @ 7.6 years estimated  Mode: DDDR. Lead impedances are stable  Pacing: AP 98.7%,  99.9%. Appropriate diagnostics and safety margins noted. Sustained arrythmia: None. Reprogramming done for sensitivity and threshold testing. Please see the scanned interrogation report    Valvular heart diseasehistory of aortic valve replacement with bioprosthetic valve in 2018. We will check an echo for monitoring    Abdominal painchronic and ongoing. Patient has had an EGD and colonoscopy, CT of the abdomen with no significant findings. Dr. Federica Briseno suggested investigating the possibility of a pacemaker lead infection. This seems doubtful. It has been 15 months since RV lead replacement. PCP is checking labs today. Will request CBC to see if there is any elevation in his WBC count. We can also check a 2D echocardiogram    Stable cardiovascular status. No evidence of overt heart failure,angina or dysrhythmia. Plan    Return in about 6 months (around 4/25/2022) for APRN. Will obtain labs from PCP office  Echocardiogram    Call with any questionsor concerns  Follow up with RITESH Case CNP for non cardiac problems  Report any new problems  Cardiovascular Fitness-Exercise as tolerated.   Strive for 15 minutes of exercise most days of the week. Cardiac / HealthyDiet  Continue current medications as directed  Continue plan of treatment  It is always recommended that you bring your medicationsbottles with you to each visit - this is for your safety!        RITESH Pastor

## 2021-10-26 ASSESSMENT — ENCOUNTER SYMPTOMS: ABDOMINAL PAIN: 1

## 2021-10-27 DIAGNOSIS — R06.02 SHORTNESS OF BREATH: Primary | ICD-10-CM

## 2021-10-27 DIAGNOSIS — I38 VALVULAR HEART DISEASE: ICD-10-CM

## 2021-10-27 NOTE — PROGRESS NOTES
Please get CBC done at [facility] office. Please let pt know we will do an Echo to check his pacemaker.   Order is dropped

## 2021-11-01 ENCOUNTER — HOSPITAL ENCOUNTER (OUTPATIENT)
Dept: NON INVASIVE DIAGNOSTICS | Age: 81
Discharge: HOME | End: 2021-11-01
Payer: MEDICARE

## 2021-11-01 DIAGNOSIS — R06.02 SHORTNESS OF BREATH: ICD-10-CM

## 2021-11-01 DIAGNOSIS — I38 VALVULAR HEART DISEASE: ICD-10-CM

## 2021-11-01 LAB
LV EF: 60 %
LVEF MODALITY: NORMAL

## 2021-11-01 PROCEDURE — C8929 TTE W OR WO FOL WCON,DOPPLER: HCPCS

## 2021-11-01 PROCEDURE — 6360000004 HC RX CONTRAST MEDICATION: Performed by: INTERNAL MEDICINE

## 2021-11-01 RX ADMIN — PERFLUTREN 1.65 MG: 6.52 INJECTION, SUSPENSION INTRAVENOUS at 09:00

## 2021-11-08 ENCOUNTER — TELEPHONE (OUTPATIENT)
Dept: CARDIOLOGY CLINIC | Age: 81
End: 2021-11-08

## 2021-11-08 NOTE — TELEPHONE ENCOUNTER
Please let patient know recent CBC shows normal blood counts. White blood cell count which indicates infection was in the normal range.   No concerns about underlying infection at this time

## 2021-12-17 ENCOUNTER — APPOINTMENT (OUTPATIENT)
Dept: CT IMAGING | Age: 81
End: 2021-12-17
Payer: MEDICARE

## 2021-12-17 ENCOUNTER — HOSPITAL ENCOUNTER (EMERGENCY)
Age: 81
Discharge: HOME | End: 2021-12-17
Payer: MEDICARE

## 2021-12-17 ENCOUNTER — APPOINTMENT (OUTPATIENT)
Dept: GENERAL RADIOLOGY | Age: 81
End: 2021-12-17
Payer: MEDICARE

## 2021-12-17 VITALS
SYSTOLIC BLOOD PRESSURE: 108 MMHG | TEMPERATURE: 98.4 F | RESPIRATION RATE: 16 BRPM | HEART RATE: 60 BPM | OXYGEN SATURATION: 93 % | DIASTOLIC BLOOD PRESSURE: 68 MMHG

## 2021-12-17 DIAGNOSIS — R06.02 SHORTNESS OF BREATH: ICD-10-CM

## 2021-12-17 DIAGNOSIS — R10.84 GENERALIZED ABDOMINAL PAIN: ICD-10-CM

## 2021-12-17 DIAGNOSIS — J12.82 PNEUMONIA DUE TO COVID-19 VIRUS: Primary | ICD-10-CM

## 2021-12-17 DIAGNOSIS — U07.1 PNEUMONIA DUE TO COVID-19 VIRUS: Primary | ICD-10-CM

## 2021-12-17 DIAGNOSIS — E87.1 HYPONATREMIA: ICD-10-CM

## 2021-12-17 LAB
ALBUMIN SERPL-MCNC: 4.3 G/DL (ref 3.5–5.2)
ALP BLD-CCNC: 80 U/L (ref 40–130)
ALT SERPL-CCNC: 27 U/L (ref 5–41)
ANION GAP SERPL CALCULATED.3IONS-SCNC: 15 MMOL/L (ref 7–19)
AST SERPL-CCNC: 26 U/L (ref 5–40)
BASE EXCESS ARTERIAL: -0.5 MMOL/L (ref -2–2)
BASOPHILS ABSOLUTE: 0 K/UL (ref 0–0.2)
BASOPHILS RELATIVE PERCENT: 0.2 % (ref 0–1)
BILIRUB SERPL-MCNC: 0.6 MG/DL (ref 0.2–1.2)
BILIRUBIN URINE: NEGATIVE
BLOOD, URINE: NEGATIVE
BUN BLDV-MCNC: 19 MG/DL (ref 8–23)
CALCIUM SERPL-MCNC: 8.8 MG/DL (ref 8.8–10.2)
CARBOXYHEMOGLOBIN ARTERIAL: 0.3 % (ref 0–5)
CHLORIDE BLD-SCNC: 97 MMOL/L (ref 98–111)
CLARITY: CLEAR
CO2: 18 MMOL/L (ref 22–29)
COLOR: YELLOW
CREAT SERPL-MCNC: 0.9 MG/DL (ref 0.5–1.2)
EOSINOPHILS ABSOLUTE: 0 K/UL (ref 0–0.6)
EOSINOPHILS RELATIVE PERCENT: 0 % (ref 0–5)
GFR AFRICAN AMERICAN: >59
GFR NON-AFRICAN AMERICAN: >60
GLUCOSE BLD-MCNC: 196 MG/DL (ref 74–109)
GLUCOSE URINE: =>1000 MG/DL
HCO3 ARTERIAL: 21.6 MMOL/L (ref 22–26)
HCT VFR BLD CALC: 47.8 % (ref 42–52)
HEMOGLOBIN, ART, EXTENDED: 16.6 G/DL (ref 14–18)
HEMOGLOBIN: 16 G/DL (ref 14–18)
IMMATURE GRANULOCYTES #: 0.1 K/UL
KETONES, URINE: NEGATIVE MG/DL
LEUKOCYTE ESTERASE, URINE: NEGATIVE
LIPASE: 21 U/L (ref 13–60)
LYMPHOCYTES ABSOLUTE: 0.6 K/UL (ref 1.1–4.5)
LYMPHOCYTES RELATIVE PERCENT: 9.6 % (ref 20–40)
MCH RBC QN AUTO: 31.4 PG (ref 27–31)
MCHC RBC AUTO-ENTMCNC: 33.5 G/DL (ref 33–37)
MCV RBC AUTO: 93.7 FL (ref 80–94)
METHEMOGLOBIN ARTERIAL: 0.6 %
MONOCYTES ABSOLUTE: 0.9 K/UL (ref 0–0.9)
MONOCYTES RELATIVE PERCENT: 16.2 % (ref 0–10)
NEUTROPHILS ABSOLUTE: 4.2 K/UL (ref 1.5–7.5)
NEUTROPHILS RELATIVE PERCENT: 73.1 % (ref 50–65)
NITRITE, URINE: NEGATIVE
O2 CONTENT ARTERIAL: 21.9 ML/DL
O2 SAT, ARTERIAL: 94.1 %
O2 THERAPY: ABNORMAL
PCO2 ARTERIAL: 29 MMHG (ref 35–45)
PDW BLD-RTO: 14.5 % (ref 11.5–14.5)
PH ARTERIAL: 7.48 (ref 7.35–7.45)
PH UA: 6 (ref 5–8)
PLATELET # BLD: 132 K/UL (ref 130–400)
PMV BLD AUTO: 10.3 FL (ref 9.4–12.4)
PO2 ARTERIAL: 75 MMHG (ref 80–100)
POTASSIUM REFLEX MAGNESIUM: 4.8 MMOL/L (ref 3.5–5)
POTASSIUM, WHOLE BLOOD: 4.5
PRO-BNP: 275 PG/ML (ref 0–1800)
PROTEIN UA: NEGATIVE MG/DL
RBC # BLD: 5.1 M/UL (ref 4.7–6.1)
SARS-COV-2, NAAT: DETECTED
SODIUM BLD-SCNC: 130 MMOL/L (ref 136–145)
SPECIFIC GRAVITY UA: 1.02 (ref 1–1.03)
TOTAL PROTEIN: 6.5 G/DL (ref 6.6–8.7)
TROPONIN: <0.01 NG/ML (ref 0–0.03)
UROBILINOGEN, URINE: 2 E.U./DL
WBC # BLD: 5.7 K/UL (ref 4.8–10.8)

## 2021-12-17 PROCEDURE — 84484 ASSAY OF TROPONIN QUANT: CPT

## 2021-12-17 PROCEDURE — 6360000004 HC RX CONTRAST MEDICATION: Performed by: NURSE PRACTITIONER

## 2021-12-17 PROCEDURE — 36600 WITHDRAWAL OF ARTERIAL BLOOD: CPT

## 2021-12-17 PROCEDURE — 84132 ASSAY OF SERUM POTASSIUM: CPT

## 2021-12-17 PROCEDURE — 96375 TX/PRO/DX INJ NEW DRUG ADDON: CPT

## 2021-12-17 PROCEDURE — 83690 ASSAY OF LIPASE: CPT

## 2021-12-17 PROCEDURE — 85025 COMPLETE CBC W/AUTO DIFF WBC: CPT

## 2021-12-17 PROCEDURE — 2580000003 HC RX 258: Performed by: NURSE PRACTITIONER

## 2021-12-17 PROCEDURE — 83880 ASSAY OF NATRIURETIC PEPTIDE: CPT

## 2021-12-17 PROCEDURE — 99284 EMERGENCY DEPT VISIT MOD MDM: CPT

## 2021-12-17 PROCEDURE — 80053 COMPREHEN METABOLIC PANEL: CPT

## 2021-12-17 PROCEDURE — M0245 HC IV INFUSION BAMLANIVIMAB & ETESEVIMAB W/MONITORING: HCPCS

## 2021-12-17 PROCEDURE — 93005 ELECTROCARDIOGRAM TRACING: CPT | Performed by: NURSE PRACTITIONER

## 2021-12-17 PROCEDURE — 82803 BLOOD GASES ANY COMBINATION: CPT

## 2021-12-17 PROCEDURE — 36415 COLL VENOUS BLD VENIPUNCTURE: CPT

## 2021-12-17 PROCEDURE — M0243 CASIRIVI AND IMDEVI INFUSION: HCPCS

## 2021-12-17 PROCEDURE — 2500000003 HC RX 250 WO HCPCS: Performed by: NURSE PRACTITIONER

## 2021-12-17 PROCEDURE — 6360000002 HC RX W HCPCS: Performed by: NURSE PRACTITIONER

## 2021-12-17 PROCEDURE — 71045 X-RAY EXAM CHEST 1 VIEW: CPT

## 2021-12-17 PROCEDURE — 81003 URINALYSIS AUTO W/O SCOPE: CPT

## 2021-12-17 PROCEDURE — 87635 SARS-COV-2 COVID-19 AMP PRB: CPT

## 2021-12-17 PROCEDURE — 96374 THER/PROPH/DIAG INJ IV PUSH: CPT

## 2021-12-17 PROCEDURE — 96365 THER/PROPH/DIAG IV INF INIT: CPT

## 2021-12-17 PROCEDURE — 74177 CT ABD & PELVIS W/CONTRAST: CPT

## 2021-12-17 RX ORDER — ALBUTEROL SULFATE 90 UG/1
2 AEROSOL, METERED RESPIRATORY (INHALATION) 4 TIMES DAILY PRN
Qty: 18 G | Refills: 0 | Status: SHIPPED | OUTPATIENT
Start: 2021-12-17 | End: 2022-03-10

## 2021-12-17 RX ORDER — GUAIFENESIN 600 MG/1
600 TABLET, EXTENDED RELEASE ORAL 2 TIMES DAILY
Qty: 20 TABLET | Refills: 0 | Status: SHIPPED | OUTPATIENT
Start: 2021-12-17 | End: 2021-12-27

## 2021-12-17 RX ORDER — PREDNISONE 20 MG/1
20 TABLET ORAL DAILY
Qty: 10 TABLET | Refills: 0 | Status: SHIPPED | OUTPATIENT
Start: 2021-12-17 | End: 2021-12-22

## 2021-12-17 RX ORDER — MORPHINE SULFATE 2 MG/ML
2 INJECTION, SOLUTION INTRAMUSCULAR; INTRAVENOUS ONCE
Status: COMPLETED | OUTPATIENT
Start: 2021-12-17 | End: 2021-12-17

## 2021-12-17 RX ORDER — 0.9 % SODIUM CHLORIDE 0.9 %
250 INTRAVENOUS SOLUTION INTRAVENOUS ONCE
Status: COMPLETED | OUTPATIENT
Start: 2021-12-17 | End: 2021-12-17

## 2021-12-17 RX ORDER — ONDANSETRON 4 MG/1
4 TABLET, ORALLY DISINTEGRATING ORAL EVERY 8 HOURS PRN
Qty: 10 TABLET | Refills: 0 | Status: SHIPPED | OUTPATIENT
Start: 2021-12-17 | End: 2022-09-15

## 2021-12-17 RX ADMIN — MORPHINE SULFATE 2 MG: 2 INJECTION, SOLUTION INTRAMUSCULAR; INTRAVENOUS at 19:58

## 2021-12-17 RX ADMIN — IOPAMIDOL 90 ML: 755 INJECTION, SOLUTION INTRAVENOUS at 19:09

## 2021-12-17 RX ADMIN — SODIUM CHLORIDE 250 ML: 9 INJECTION, SOLUTION INTRAVENOUS at 21:37

## 2021-12-17 RX ADMIN — SODIUM CHLORIDE: 9 INJECTION, SOLUTION INTRAVENOUS at 21:34

## 2021-12-17 ASSESSMENT — ENCOUNTER SYMPTOMS
ABDOMINAL PAIN: 1
SHORTNESS OF BREATH: 1
COUGH: 1

## 2021-12-17 ASSESSMENT — PAIN SCALES - GENERAL: PAINLEVEL_OUTOF10: 7

## 2021-12-18 NOTE — PROGRESS NOTES
Ref Range & Units 12/17/21 2021   pH, Arterial 7.350 - 7.450 7.480 High     pCO2, Arterial 35.0 - 45.0 mmHg 29.0 Low     pO2, Arterial 80.0 - 100.0 mmHg 75.0 Low     HCO3, Arterial 22.0 - 26.0 mmol/L 21.6 Low     Base Excess, Arterial -2.0 - 2.0 mmol/L -0.5    Hemoglobin, Art, Extended 14.0 - 18.0 g/dL 16.6    O2 Sat, Arterial >92 % 94.1    Carboxyhgb, Arterial 0.0 - 5.0 % 0.3    Comment:      0.0-1.5   (Smokers 1.5-5.0)    Methemoglobin, Arterial <1.5 % 0.6    O2 Content, Arterial Not Established mL/dL 21.9    O2 Therapy  Unknown      ra @ rest  r-24bpm  Left radial  At+

## 2021-12-18 NOTE — ED PROVIDER NOTES
[facility]  eMERGENCY dEPARTMENT eNCOUnter      Pt Name: Jason Ferris  MRN: [id number]  [place] 1940  Date of evaluation: 12/17/2021  Provider: [address]       Chief Complaint   Patient presents with    Abdominal Pain     all over abd pain, difficulty walking due to pain    Shortness of Breath         HISTORY OF PRESENT ILLNESS   (Location/Symptom, Timing/Onset,Context/Setting, Quality, Duration, Modifying Factors, Severity)  Note limiting factors. Ely Britton a 79 y.o. male who presents to the emergency department for evaluation of abdominal pain and shortness of breath. Pt tells me that he has had problems with bilateral lower abdominal pain over past 2 weeks reporting similar problems evaluated at [facility]. Pt's son tells me that he had EGD and colonoscopy there with polyps from colon removed. Pt tells me that he has had worsening shortness of breath over past week. He generally is able to ambulate with son using a cane however recently has been having difficulty walking due to abdominal pain and shortness of breath associated with cough. Pt tells me that he has not received covid vaccination. Pt denies fevers, vomiting as well as diarrhea. He has had no fevers at home. He gives history of appendectomy and cholecystectomy. He has history of CKD, CHF, sick sinus syndrome, diabetes and valvular heart disease. HPI    Nursing Notes were reviewed. REVIEW OF SYSTEMS    (2-9 systems for level 4, 10 or more for level 5)     Review of Systems   Constitutional: Negative for fever. Respiratory: Positive for cough and shortness of breath. Cardiovascular: Negative for chest pain. Gastrointestinal: Positive for abdominal pain. All other systems reviewed and are negative. A complete review of systems was performed and is negative except as noted above in the HPI.        PAST MEDICAL HISTORY     Past Medical History:   Diagnosis Date    Aortic valve stenosis     Arthritis     Chest tightness, discomfort, or pressure 4/30/2012    Chronic back pain     CKD (chronic kidney disease)     CPAP (continuous positive airway pressure) dependence     13cm    Diabetes ([facility]     Diabetic polyneuropathy associated with type 2 diabetes mellitus ([facility] 8/29/2016    GERD (gastroesophageal reflux disease)     HTN (hypertension)     Hyperlipemia     Left ventricular diastolic dysfunction     Mitral stenosis     Mitral valve stenosis     Neuropathy     Obstructive sleep apnea     AHI: 34.5 per PSG, 6/2013; AHI: 36.9 per PSG, 7/2015; AHI: 54.5 per PSG, 3/2017    Pinched nerve in neck     Restless legs syndrome 2/17/2016         SURGICAL HISTORY       Past Surgical History:   Procedure Laterality Date    APPENDECTOMY      BACK SURGERY      4 Back surgeries    BLADDER SURGERY      CARDIAC CATHETERIZATION  4/30/12    EF > 50%    CATARACT REMOVAL      CHOLECYSTECTOMY      COLONOSCOPY      EYE SURGERY      implants placed both eyes    HAND SURGERY Left 9/14/2020    INCISION AND DRAINAGE LEFT HAND ABSCESS performed by Sushil Gonzalez MD at [facility] HISTORY  08/03/2020    Right Ventricular lead replacement- Dr. Deena Hawthorne RPLCMT PROST AORTIC VALVE OPEN XCP HOMOGRF/STENT N/A 6/7/2018    AORTIC VALVE REPLACEMENT TRANSESOPHAGEAL ECHOCARDIOGRAM performed by Clovis Sheets MD at [facility]       Previous Medications    ASPIRIN EC 81 MG EC TABLET    Take 1 tablet by mouth 2 times daily for 21 days    ATENOLOL (TENORMIN) 25 MG TABLET    Take 25 mg by mouth daily    ATORVASTATIN (LIPITOR) 40 MG TABLET    Take 1 tablet by mouth nightly    B-D UF III MINI PEN NEEDLES 31G X 5 MM MISC        CHOLECALCIFEROL (VITAMIN D3) 51170 UNITS CAPS        CITALOPRAM (CELEXA) 20 MG TABLET    TAKE ONE TABLET BY MOUTH DAILY    CLOPIDOGREL (PLAVIX) 75 MG TABLET    Take 75 mg by mouth daily    DICYCLOMINE (BENTYL) 10 MG CAPSULE    Take 10 mg by mouth 2 times daily    EMPAGLIFLOZIN-METFORMIN HCL ER (SYNJARDY XR)  MG TB24    Take 1 tablet by mouth daily (with breakfast)    ESOMEPRAZOLE (NEXIUM) 40 MG CAPSULE    Take 40 mg by mouth every morning (before breakfast). EZETIMIBE (ZETIA) 10 MG TABLET    Take 10 mg by mouth daily    FUROSEMIDE (LASIX) 20 MG TABLET    Take 1 tablet by mouth as needed (For weight gain greater than 2.5 lbs in 24 hours.)    HUMULIN R U-500 KWIKPEN 500 UNIT/ML SOPN CONCENTRATED INJECTION PEN        HYDROCODONE-ACETAMINOPHEN (NORCO) 7.5-325 MG PER TABLET    Take by mouth daily. INSULIN GLARGINE (LANTUS) 100 UNIT/ML INJECTION VIAL    Inject 50 Units into the skin 2 times daily    MYRBETRIQ 25 MG TB24    TAKE ONE TABLET BY MOUTH DAILY    PRAMIPEXOLE (MIRAPEX) 1.5 MG TABLET    3 times daily        ALLERGIES     Azithromycin, Metoprolol, Cyclobenzaprine, and Metoclopramide    FAMILY HISTORY       Family History   Problem Relation Age of Onset    Diabetes Mother     Cancer Father     Cancer Sister     Heart Disease Brother     Cancer Brother     Cancer Sister     Cancer Brother           SOCIAL HISTORY       Social History     Socioeconomic History    Marital status:       Spouse name: Not on file    Number of children: Not on file    Years of education: Not on file    Highest education level: Not on file   Occupational History    Not on file   Tobacco Use    Smoking status: Former Smoker     Packs/day: 3.00     Years: 50.00     Pack years: 150.00     Types: Cigarettes     Quit date: 1977     Years since quittin.4    Smokeless tobacco: Former User   Vaping Use    Vaping Use: Never used   Substance and Sexual Activity    Alcohol use: No    Drug use: No    Sexual activity: Not Currently     Partners: Female   Other Topics Concern    Not on file   Social History Narrative    Not on file     Social Determinants of Health     Financial Resource Strain:     Difficulty of Paying Living Expenses: Not on file   Food Insecurity:     Worried About Running Out of Food in the Last Year: Not on file    Brigida of Food in the Last Year: Not on file   Transportation Needs:     Lack of Transportation (Medical): Not on file    Lack of Transportation (Non-Medical): Not on file   Physical Activity:     Days of Exercise per Week: Not on file    Minutes of Exercise per Session: Not on file   Stress:     Feeling of Stress : Not on file   Social Connections:     Frequency of Communication with Friends and Family: Not on file    Frequency of Social Gatherings with Friends and Family: Not on file    Attends Congregational Services: Not on file    Active Member of [address] [place] [address] [employer] or Organizations: Not on file    Attends Club or Organization Meetings: Not on file    Marital Status: Not on file   Intimate Partner Violence:     Fear of Current or Ex-Partner: Not on file    Emotionally Abused: Not on file    Physically Abused: Not on file    Sexually Abused: Not on file   Housing Stability:     Unable to Pay for Housing in the Last Year: Not on file    Number of [place] in the Last Year: Not on file    Unstable Housing in the Last Year: Not on file       SCREENINGS    Francisco J Coma Scale  Eye Opening: Spontaneous  Best Verbal Response: Oriented  Best Motor Response: Obeys commands  Francisco J Coma Scale Score: 15        PHYSICAL EXAM    (up to 7 for level 4, 8 or more for level 5)     ED Triage Vitals [12/17/21 1456]   BP Temp Temp Source Pulse Resp SpO2 Height Weight   131/76 98.4 °F (36.9 °C) Temporal 64 24 97 % -- --       Physical Exam  Vitals reviewed. HENT:      Head: Normocephalic. Right Ear: External ear normal.      Left Ear: External ear normal.   Eyes:      Conjunctiva/sclera: Conjunctivae normal.      Pupils: Pupils are equal, round, and reactive to light.    Cardiovascular:      Rate and Rhythm: Normal rate and regular rhythm. Heart sounds: Normal heart sounds. Pulmonary:      Effort: Pulmonary effort is normal.      Comments: Decreased breath sounds throughout   Abdominal:      General: Bowel sounds are normal.      Palpations: Abdomen is soft. Tenderness: There is abdominal tenderness in the right lower quadrant, suprapubic area and left lower quadrant. Comments: Soft umbilical hernia   Musculoskeletal:         General: Normal range of motion. Cervical back: Normal range of motion. Skin:     General: Skin is warm and dry. Neurological:      Mental Status: He is alert and oriented to person, place, and time. DIAGNOSTIC RESULTS     EKG: All EKG's are interpreted by the Emergency Department Physician who either signs or Co-signs this chart in the absence of acardiologist.    AV paced rhythm hr 64b/min    RADIOLOGY:   Non-plain film images such as CT, Ultrasound andMRI are read by the radiologist. Plain radiographic images are visualized and preliminarily interpreted by the emergency physician with the below findings:        Interpretation per the Radiologist below, if available at the time of this note:    CT ABDOMEN PELVIS W IV CONTRAST Additional Contrast? None   Final Result   1. Scattered ground glass opacities within the visible lung bases. Findings concerning for pneumonia. Correlation for covid Pneumonia   recommended. 2. Moderate vascular calcification with cardiomegaly. 3. Hepatic steatosis. 4. Stable right adrenal nodules. 5. No evidence for bowel obstruction. Fatty-containing umbilical   hernia. No free air or abscess. Signed by Dr Elif Mcgregor   Final Result   1. Findings suggestive for mild to moderate CHF with interstitial   edema but no appreciable pleural effusions. Prior mediastinal surgery   with left subclavian cardiac pacer device.    Signed by Dr Adelina Magaña            ED BEDSIDE ULTRASOUND:   Performed by ED Physician - none    LABS:  Labs Reviewed   COVID-19, RAPID - Abnormal; Notable for the following components:       Result Value    SARS-CoV-2, NAAT DETECTED (*)     All other components within normal limits    Narrative:     Medina Marv tel. ,  Microbiology results called to and read back by Reshma Ortega in ER,  12/17/2021 18:09, by BRENNAN   CBC WITH AUTO DIFFERENTIAL - Abnormal; Notable for the following components:    MCH 31.4 (*)     Neutrophils % 73.1 (*)     Lymphocytes % 9.6 (*)     Monocytes % 16.2 (*)     Lymphocytes Absolute 0.6 (*)     All other components within normal limits   COMPREHENSIVE METABOLIC PANEL W/ REFLEX TO MG FOR LOW K - Abnormal; Notable for the following components:    Sodium 130 (*)     Chloride 97 (*)     CO2 18 (*)     Glucose 196 (*)     Total Protein 6.5 (*)     All other components within normal limits   URINALYSIS - Abnormal; Notable for the following components:    Urobilinogen, Urine 2.0 (*)     All other components within normal limits   BLOOD GAS, ARTERIAL - Abnormal; Notable for the following components:    pH, Arterial 7.480 (*)     pCO2, Arterial 29.0 (*)     pO2, Arterial 75.0 (*)     HCO3, Arterial 21.6 (*)     All other components within normal limits   TROPONIN   BRAIN NATRIURETIC PEPTIDE   LIPASE   POTASSIUM, WHOLE BLOOD       All other labs were within normal range or not returned as of this dictation. RE-ASSESSMENT     Pt with Spo2 reading of 92% on room air while up in room per Nayana Espinoza RN  Risks/benefits of monoclonal antibody infusion discussed with patient and son who want to proceed with this. Pt is tolerating po fluids and remains in no respiratory distress. Reviewed labs/imaging with patient and family with return precaution and follow up discussed.        EMERGENCY DEPARTMENT COURSE and DIFFERENTIALDIAGNOSIS/MDM:   Vitals:    Vitals:    12/17/21 1456 12/17/21 1638 12/17/21 2000 12/17/21 2135   BP: 131/76   119/61   Pulse: 64 67  65   Resp: 24 21 20 18   Temp: 98.4 °F (36.9 °C)      TempSrc: Temporal      SpO2: 97% 92% 93% 91%       MDM      CONSULTS:  PHARMACY TO DOSE MEDICATION    PROCEDURES:  Unless otherwise notedbelow, none     Procedures    FINAL IMPRESSION     1. Pneumonia due to COVID-19 virus    2. Shortness of breath    3. Generalized abdominal pain    4.  Hyponatremia          DISPOSITION/PLAN   DISPOSITION Discharge - Pending Orders Complete 12/17/2021 09:50:00 PM      PATIENT REFERRED TO:  follow up your pcp    Schedule an appointment as soon as possible for a visit in 3 days  recheck your serum sodium level      DISCHARGE MEDICATIONS:       Current Discharge Medication List           Medication List      START taking these medications    albuterol sulfate  (90 Base) MCG/ACT inhaler  Commonly known as: Ventolin HFA  Inhale 2 puffs into the lungs 4 times daily as needed for Wheezing     guaiFENesin 600 MG extended release tablet  Commonly known as: MUCINEX  Take 1 tablet by mouth 2 times daily for 10 days     ondansetron 4 MG disintegrating tablet  Commonly known as: Zofran ODT  Take 1 tablet by mouth every 8 hours as needed for Nausea or Vomiting     predniSONE 20 MG tablet  Commonly known as: DELTASONE  Take 1 tablet by mouth daily for 5 days        ASK your doctor about these medications    aspirin EC 81 MG EC tablet  Take 1 tablet by mouth 2 times daily for 21 days     atenolol 25 MG tablet  Commonly known as: TENORMIN     atorvastatin 40 MG tablet  Commonly known as: LIPITOR  Take 1 tablet by mouth nightly     B-D UF III MINI PEN NEEDLES 31G X 5 MM Misc  Generic drug: Insulin Pen Needle     citalopram 20 MG tablet  Commonly known as: CELEXA  TAKE ONE TABLET BY MOUTH DAILY     clopidogrel 75 MG tablet  Commonly known as: PLAVIX     dicyclomine 10 MG capsule  Commonly known as: BENTYL     esomeprazole 40 MG delayed release capsule  Commonly known as: NEXIUM     ezetimibe 10 MG tablet  Commonly known as: ZETIA     furosemide 20 MG tablet  Commonly known as: Lasix  Take 1 tablet by mouth as needed (For weight gain greater than 2.5 lbs in 24 hours.)     HumuLIN R U-500 KwikPen 500 UNIT/ML Sopn concentrated injection pen  Generic drug: insulin regular human     HYDROcodone-acetaminophen 7.5-325 MG per tablet  Commonly known as: NORCO     insulin glargine 100 UNIT/ML injection vial  Commonly known as: LANTUS  Inject 50 Units into the skin 2 times daily     Myrbetriq 25 MG Tb24  Generic drug: mirabegron  TAKE ONE TABLET BY MOUTH DAILY     pramipexole 1.5 MG tablet  Commonly known as: MIRAPEX     Synjardy XR  MG Tb24  Generic drug: Empagliflozin-metFORMIN HCl ER     Vitamin D3 1.25 MG (29458 UT) Caps           Where to Get Your Medications      You can get these medications from any pharmacy    Bring a paper prescription for each of these medications  · albuterol sulfate  (90 Base) MCG/ACT inhaler  · guaiFENesin 600 MG extended release tablet  · ondansetron 4 MG disintegrating tablet  · predniSONE 20 MG tablet         (Pleasenote that portions of this note were completed with a voice recognition program.  Efforts were made to edit the dictations but occasionally words are mis-transcribed.)              Tanya Sanchez, RITESH - CNP  12/18/21 2207

## 2021-12-20 ENCOUNTER — TELEPHONE (OUTPATIENT)
Dept: NEUROLOGY | Age: 81
End: 2021-12-20

## 2021-12-20 ENCOUNTER — CARE COORDINATION (OUTPATIENT)
Dept: CARE COORDINATION | Age: 81
End: 2021-12-20

## 2021-12-20 LAB
EKG P AXIS: NORMAL DEGREES
EKG P-R INTERVAL: NORMAL MS
EKG Q-T INTERVAL: 480 MS
EKG QRS DURATION: 170 MS
EKG QTC CALCULATION (BAZETT): 485 MS
EKG T AXIS: 130 DEGREES

## 2021-12-20 NOTE — CARE COORDINATION
Patient contacted regarding COVID-19 diagnosis and monoclonal antibody infusion follow up. Discussed COVID-19 related testing which was available at this time. Test results were positive. Patient informed of results, if available? Yes. Ambulatory Care Manager contacted the family by telephone to perform post discharge assessment. Call within 2 business days of discharge: Yes. Verified name and  with family as identifiers. Provided introduction to self, and explanation of the CTN/ACM role, and reason for call due to risk factors for infection and/or exposure to COVID-19. Symptoms reviewed with family who verbalized the following symptoms: no new symptoms and no worsening symptoms. Pt told ACM to talk with son, Chavo [place]. Son was present w pt at his ED visit. Son said pt is better. His breathing is better. Pt is able to walk around with his cane on his own. SpO2 chks daily currently 94-95%. No temp checks. Pt has been eating very well since yesterday. Son has not checked patient's temperature. Reviewed the self-care and home management guidelines on patient's AVS instructions. Explained parameters for interpreting results of oximeter - pt to return to the ER if SpO2 drops below 90%. Continue to eat regularly and drink plenty of fluids, avoiding high sugar choices. Son has contacted PCP for f/u and has an appt as below. Pt is taking his prescription meds, holding Zofran as he is not nauseated. Advised on quarantine protocol and severe s/s that warrant return to the ED. Son thanked ACM for the call and voiced understanding. Due to no new or worsening symptoms encounter was not routed to provider for escalation. Discussed follow-up appointments. If no appointment was previously scheduled, appointment scheduling offered: Yes and son has already scheduled a tele-health appt.   [facility] follow up appointment(s):   Future Appointments   Date Time Provider Naz Barrera   3/10/2022  9:00 AM Rich Garnica MD [facility]   4/25/2022  8:15 AM RITESH Ochoa RMA [facility]     [facility] follow up appointment(s): [facility] primary care: 12/22 by phone    Non-face-to-face services provided:  Obtained and reviewed discharge summary and/or continuity of care documents  Education of patient/family/caregiver/guardian to support self-management-Reviewed self-care and home management with patient's son. He expressed understanding. Assessment and support for treatment adherence and medication management-Pt has all of his routine medications. He is taking Mucinex, Prednisone and using his inhaler. Advance Care Planning:   Does patient have an Advance Directive:  on file. Educated patient about risk for severe COVID-19 due to risk factors according to CDC guidelines. ACM reviewed discharge instructions, medical action plan and red flag symptoms with the family who verbalized understanding. Discussed COVID vaccination status: Yes. Education provided on COVID-19 vaccination as appropriate. Discussed exposure protocols and quarantine with CDC Guidelines. Family was given an opportunity to verbalize any questions and concerns and agrees to contact ACM or health care provider for questions related to their healthcare. Reviewed and educated family on any new and changed medications related to discharge diagnosis     Was patient discharged with a pulse oximeter? Yes Discussed and confirmed pulse oximeter discharge instructions and when to notify provider or seek emergency care. ACM provided contact information. No further follow-up call identified based on severity of symptoms and risk factors.

## 2021-12-20 NOTE — TELEPHONE ENCOUNTER
Patient called stating Home medical has sent over several fax regarding patient C-pap supplies with no response. Patient has COVID and request new supplies. Please send order to Home [facility]. Please call patient when fax has been sent.

## 2022-01-25 DIAGNOSIS — I49.5 SINOATRIAL NODE DYSFUNCTION (HCC): ICD-10-CM

## 2022-01-25 DIAGNOSIS — Z95.0 PACEMAKER: Primary | ICD-10-CM

## 2022-01-25 PROCEDURE — 93294 REM INTERROG EVL PM/LDLS PM: CPT | Performed by: CLINICAL NURSE SPECIALIST

## 2022-01-25 PROCEDURE — 93296 REM INTERROG EVL PM/IDS: CPT | Performed by: CLINICAL NURSE SPECIALIST

## 2022-02-08 ENCOUNTER — HOSPITAL ENCOUNTER (EMERGENCY)
Age: 82
Discharge: HOME | End: 2022-02-08
Attending: EMERGENCY MEDICINE
Payer: MEDICARE

## 2022-02-08 ENCOUNTER — APPOINTMENT (OUTPATIENT)
Dept: ULTRASOUND IMAGING | Age: 82
End: 2022-02-08
Payer: MEDICARE

## 2022-02-08 VITALS
DIASTOLIC BLOOD PRESSURE: 61 MMHG | HEART RATE: 74 BPM | TEMPERATURE: 97.8 F | OXYGEN SATURATION: 97 % | RESPIRATION RATE: 19 BRPM | SYSTOLIC BLOOD PRESSURE: 142 MMHG

## 2022-02-08 DIAGNOSIS — N49.2 CELLULITIS OF SCROTUM: Primary | ICD-10-CM

## 2022-02-08 DIAGNOSIS — R82.81 PYURIA: ICD-10-CM

## 2022-02-08 DIAGNOSIS — E11.65 TYPE 2 DIABETES MELLITUS WITH HYPERGLYCEMIA, UNSPECIFIED WHETHER LONG TERM INSULIN USE (HCC): ICD-10-CM

## 2022-02-08 LAB
ALBUMIN SERPL-MCNC: 4.2 G/DL (ref 3.5–5.2)
ALP BLD-CCNC: 82 U/L (ref 40–130)
ALT SERPL-CCNC: 13 U/L (ref 5–41)
ANION GAP SERPL CALCULATED.3IONS-SCNC: 15 MMOL/L (ref 7–19)
AST SERPL-CCNC: 14 U/L (ref 5–40)
BACTERIA: NEGATIVE /HPF
BASOPHILS ABSOLUTE: 0 K/UL (ref 0–0.2)
BASOPHILS RELATIVE PERCENT: 0.2 % (ref 0–1)
BILIRUB SERPL-MCNC: 0.8 MG/DL (ref 0.2–1.2)
BILIRUBIN URINE: NEGATIVE
BLOOD, URINE: NEGATIVE
BUN BLDV-MCNC: 20 MG/DL (ref 8–23)
CALCIUM SERPL-MCNC: 9.5 MG/DL (ref 8.8–10.2)
CHLORIDE BLD-SCNC: 96 MMOL/L (ref 98–111)
CLARITY: CLEAR
CO2: 20 MMOL/L (ref 22–29)
COLOR: YELLOW
CREAT SERPL-MCNC: 0.8 MG/DL (ref 0.5–1.2)
CRYSTALS, UA: ABNORMAL /HPF
EOSINOPHILS ABSOLUTE: 0 K/UL (ref 0–0.6)
EOSINOPHILS RELATIVE PERCENT: 0.2 % (ref 0–5)
EPITHELIAL CELLS, UA: 1 /HPF (ref 0–5)
GFR AFRICAN AMERICAN: >59
GFR NON-AFRICAN AMERICAN: >60
GLUCOSE BLD-MCNC: 249 MG/DL (ref 74–109)
GLUCOSE URINE: =>1000 MG/DL
HCT VFR BLD CALC: 45.9 % (ref 42–52)
HEMOGLOBIN: 15 G/DL (ref 14–18)
HYALINE CASTS: 0 /HPF (ref 0–8)
IMMATURE GRANULOCYTES #: 0.1 K/UL
KETONES, URINE: NEGATIVE MG/DL
LEUKOCYTE ESTERASE, URINE: ABNORMAL
LYMPHOCYTES ABSOLUTE: 0.9 K/UL (ref 1.1–4.5)
LYMPHOCYTES RELATIVE PERCENT: 8 % (ref 20–40)
MCH RBC QN AUTO: 30.5 PG (ref 27–31)
MCHC RBC AUTO-ENTMCNC: 32.7 G/DL (ref 33–37)
MCV RBC AUTO: 93.5 FL (ref 80–94)
MONOCYTES ABSOLUTE: 1.2 K/UL (ref 0–0.9)
MONOCYTES RELATIVE PERCENT: 10.3 % (ref 0–10)
NEUTROPHILS ABSOLUTE: 9.1 K/UL (ref 1.5–7.5)
NEUTROPHILS RELATIVE PERCENT: 80.8 % (ref 50–65)
NITRITE, URINE: NEGATIVE
PDW BLD-RTO: 14.9 % (ref 11.5–14.5)
PH UA: 6 (ref 5–8)
PLATELET # BLD: 152 K/UL (ref 130–400)
PMV BLD AUTO: 9.5 FL (ref 9.4–12.4)
POTASSIUM REFLEX MAGNESIUM: 4.9 MMOL/L (ref 3.5–5)
PROTEIN UA: NEGATIVE MG/DL
RBC # BLD: 4.91 M/UL (ref 4.7–6.1)
RBC UA: 1 /HPF (ref 0–4)
REASON FOR REJECTION: NORMAL
REJECTED TEST: NORMAL
SODIUM BLD-SCNC: 131 MMOL/L (ref 136–145)
SPECIFIC GRAVITY UA: 1.02 (ref 1–1.03)
TOTAL PROTEIN: 7.4 G/DL (ref 6.6–8.7)
UROBILINOGEN, URINE: 1 E.U./DL
WBC # BLD: 11.2 K/UL (ref 4.8–10.8)
WBC UA: 20 /HPF (ref 0–5)

## 2022-02-08 PROCEDURE — 2580000003 HC RX 258: Performed by: EMERGENCY MEDICINE

## 2022-02-08 PROCEDURE — 6360000002 HC RX W HCPCS: Performed by: EMERGENCY MEDICINE

## 2022-02-08 PROCEDURE — 76870 US EXAM SCROTUM: CPT

## 2022-02-08 PROCEDURE — 99284 EMERGENCY DEPT VISIT MOD MDM: CPT

## 2022-02-08 PROCEDURE — 80053 COMPREHEN METABOLIC PANEL: CPT

## 2022-02-08 PROCEDURE — 36415 COLL VENOUS BLD VENIPUNCTURE: CPT

## 2022-02-08 PROCEDURE — 96374 THER/PROPH/DIAG INJ IV PUSH: CPT

## 2022-02-08 PROCEDURE — 81001 URINALYSIS AUTO W/SCOPE: CPT

## 2022-02-08 PROCEDURE — 87086 URINE CULTURE/COLONY COUNT: CPT

## 2022-02-08 PROCEDURE — 85025 COMPLETE CBC W/AUTO DIFF WBC: CPT

## 2022-02-08 RX ORDER — CEPHALEXIN 500 MG/1
500 CAPSULE ORAL 4 TIMES DAILY
Qty: 40 CAPSULE | Refills: 0 | Status: SHIPPED | OUTPATIENT
Start: 2022-02-08 | End: 2022-02-18

## 2022-02-08 RX ADMIN — CEFAZOLIN SODIUM 1000 MG: 1 INJECTION, POWDER, FOR SOLUTION INTRAMUSCULAR; INTRAVENOUS at 16:48

## 2022-02-08 ASSESSMENT — PAIN SCALES - GENERAL: PAINLEVEL_OUTOF10: 8

## 2022-02-08 NOTE — ED PROVIDER NOTES
[facility]  eMERGENCY dEPARTMENT eNCOUnter      Pt Name: Delfina Torres  MRN: [id number]  [place] 1940  Date of evaluation: 2/8/2022  Provider: Noreen Chavira MD    CHIEF COMPLAINT       Chief Complaint   Patient presents with    Male  Problem     testicle pain that started yesterday morning, worse today         HISTORY OF PRESENT ILLNESS   (Location/Symptom, Timing/Onset,Context/Setting, Quality, Duration, Modifying Factors, Severity)  Note limiting factors. Delfina Torres is a 79 y.o. male who presents to the emergency department testicular pain    79-year-old male presents with scrotal pain. He tells me that he can't see his private/genitalia. But he doesn't think his testicles have enlarged or changed in size. He is not having difficulty urinating. He reports no recall of any injury. No prior history. No fever or chills documented. No nausea vomiting or diarrhea. Discomfort started about 36 hours ago. Seems worse. The history is provided by the patient. NursingNotes were reviewed. REVIEW OF SYSTEMS    (2-9 systems for level 4, 10 or more for level 5)     Review of Systems   Constitutional: Negative for chills and fever. HENT: Negative for drooling, facial swelling, nosebleeds, sinus pressure, sore throat and voice change. Eyes: Negative for discharge. Respiratory: Negative for apnea and choking. Cardiovascular: Negative for chest pain and leg swelling. Gastrointestinal: Negative for abdominal pain, blood in stool, constipation, diarrhea, nausea and vomiting. Genitourinary: Positive for scrotal swelling and testicular pain. Negative for dysuria, enuresis and penile discharge. Musculoskeletal: Negative for joint swelling. Skin: Negative for rash and wound. Neurological: Negative for seizures and syncope. Psychiatric/Behavioral: Negative for behavioral problems, hallucinations and suicidal ideas. All other systems reviewed and are negative.       A complete review of systems was performed and is negative except as noted above in the HPI.        PAST MEDICAL HISTORY     Past Medical History:   Diagnosis Date    Aortic valve stenosis     Arthritis     Chest tightness, discomfort, or pressure 4/30/2012    Chronic back pain     CKD (chronic kidney disease)     CPAP (continuous positive airway pressure) dependence     13cm    Diabetes [facility] [facility]     Diabetic polyneuropathy associated with type 2 diabetes mellitus ([facility] [address] 8/29/2016    GERD (gastroesophageal reflux disease)     HTN (hypertension)     Hyperlipemia     Left ventricular diastolic dysfunction     Mitral stenosis     Mitral valve stenosis     Neuropathy     Obstructive sleep apnea     AHI: 34.5 per PSG, 6/2013; AHI: 36.9 per PSG, 7/2015; AHI: 54.5 per PSG, 3/2017    Pinched nerve in neck     Restless legs syndrome 2/17/2016         SURGICAL HISTORY       Past Surgical History:   Procedure Laterality Date    APPENDECTOMY      BACK SURGERY      4 Back surgeries    BLADDER SURGERY      CARDIAC CATHETERIZATION  4/30/12    EF > 50%    CATARACT REMOVAL      CHOLECYSTECTOMY      COLONOSCOPY      EYE SURGERY      implants placed both eyes    HAND SURGERY Left 9/14/2020    INCISION AND DRAINAGE LEFT HAND ABSCESS performed by Brent Bland MD at [address] [place] [address] OTHER SURGICAL HISTORY  08/03/2020    Right Ventricular lead replacement- Dr. Albert Valencia HOMOGRF/STENT N/A 6/7/2018    AORTIC VALVE REPLACEMENT TRANSESOPHAGEAL ECHOCARDIOGRAM performed by Jayne Lane MD at [facility]       Discharge Medication List as of 2/8/2022  6:48 PM      CONTINUE these medications which have NOT CHANGED    Details   albuterol sulfate HFA (VENTOLIN HFA) 108 (90 Base) MCG/ACT inhaler Inhale 2 puffs into the lungs 4 times daily as needed for Wheezing, Disp-18 g, R-0Print ondansetron (ZOFRAN ODT) 4 MG disintegrating tablet Take 1 tablet by mouth every 8 hours as needed for Nausea or Vomiting, Disp-10 tablet, R-0Print      dicyclomine (BENTYL) 10 MG capsule Take 10 mg by mouth 2 times dailyHistorical Med      citalopram (CELEXA) 20 MG tablet TAKE ONE TABLET BY MOUTH DAILY, Disp-60 tablet, R-5Normal      MYRBETRIQ 25 MG TB24 TAKE ONE TABLET BY MOUTH DAILY, Disp-90 tablet, R-5Normal      atorvastatin (LIPITOR) 40 MG tablet Take 1 tablet by mouth nightly, Disp-90 tablet, R-3Normal      HYDROcodone-acetaminophen (NORCO) 7.5-325 MG per tablet Take by mouth daily. Historical Med      insulin glargine (LANTUS) 100 UNIT/ML injection vial Inject 50 Units into the skin 2 times daily, Disp-1 vial,R-1Normal      aspirin EC 81 MG EC tablet Take 1 tablet by mouth 2 times daily for 21 days, Disp-42 tablet, R-0Print      pramipexole (MIRAPEX) 1.5 MG tablet 3 times daily Historical Med      ezetimibe (ZETIA) 10 MG tablet Take 10 mg by mouth dailyHistorical Med      clopidogrel (PLAVIX) 75 MG tablet Take 75 mg by mouth dailyHistorical Med      Cholecalciferol (VITAMIN D3) 98376 units CAPS R-11Historical Med      HUMULIN R U-500 KWIKPEN 500 UNIT/ML SOPN concentrated injection pen R-11, DAWHistorical Med      Empagliflozin-Metformin HCl ER (SYNJARDY XR)  MG TB24 Take 1 tablet by mouth daily (with breakfast)Historical Med      atenolol (TENORMIN) 25 MG tablet Take 25 mg by mouth dailyHistorical Med      B-D UF III MINI PEN NEEDLES 31G X 5 MM MISC Starting Sat 8/20/2016, SHARRI, Historical Med      furosemide (LASIX) 20 MG tablet Take 1 tablet by mouth as needed (For weight gain greater than 2.5 lbs in 24 hours.), Disp-30 tablet, R-0Normal      esomeprazole (NEXIUM) 40 MG capsule Take 40 mg by mouth every morning (before breakfast).   Historical Med             ALLERGIES     Azithromycin, Metoprolol, Cyclobenzaprine, and Metoclopramide    FAMILY HISTORY       Family History   Problem Relation Age of Onset    Diabetes Mother     Cancer Father     Cancer Sister     Heart Disease Brother     Cancer Brother     Cancer Sister     Cancer Brother           SOCIAL HISTORY       Social History     Socioeconomic History    Marital status:      Spouse name: None    Number of children: None    Years of education: None    Highest education level: None   Occupational History    None   Tobacco Use    Smoking status: Former Smoker     Packs/day: 3.00     Years: 50.00     Pack years: 150.00     Types: Cigarettes     Quit date: 1977     Years since quittin.6    Smokeless tobacco: Former User   Vaping Use    Vaping Use: Never used   Substance and Sexual Activity    Alcohol use: No    Drug use: No    Sexual activity: Not Currently     Partners: Female   Other Topics Concern    None   Social History Narrative    None     Social Determinants of Health     Financial Resource Strain:     Difficulty of Paying Living Expenses: Not on file   Food Insecurity:     Worried About Running Out of Food in the Last Year: Not on file    Brigida of Food in the Last Year: Not on file   Transportation Needs:     Lack of Transportation (Medical): Not on file    Lack of Transportation (Non-Medical):  Not on file   Physical Activity:     Days of Exercise per Week: Not on file    Minutes of Exercise per Session: Not on file   Stress:     Feeling of Stress : Not on file   Social Connections:     Frequency of Communication with Friends and Family: Not on file    Frequency of Social Gatherings with Friends and Family: Not on file    Attends Judaism Services: Not on file    Active Member of Clubs or Organizations: Not on file    Attends Club or Organization Meetings: Not on file    Marital Status: Not on file   Intimate Partner Violence:     Fear of Current or Ex-Partner: Not on file    Emotionally Abused: Not on file    Physically Abused: Not on file    Sexually Abused: Not on file   Housing Stability:     Unable to Pay for Housing in the Last Year: Not on file    Number of Places Lived in the Last Year: Not on file    Unstable Housing in the Last Year: Not on file       SCREENINGS    [place] Coma Scale  Eye Opening: Spontaneous  Best Verbal Response: Oriented  Best Motor Response: Obeys commands  Francisco J Coma Scale Score: 15        PHYSICAL EXAM    (up to 7 for level 4, 8 or more for level 5)     ED Triage Vitals [02/08/22 1352]   BP Temp Temp Source Pulse Resp SpO2 Height Weight   96/65 97.8 °F (36.6 °C) Oral 66 17 95 % -- --       Physical Exam  Vitals and nursing note reviewed. Constitutional:       General: He is not in acute distress. Appearance: He is well-developed. HENT:      Head: Normocephalic and atraumatic. Right Ear: External ear normal.      Left Ear: External ear normal.   Eyes:      General: No scleral icterus. Conjunctiva/sclera: Conjunctivae normal.      Pupils: Pupils are equal, round, and reactive to light. Cardiovascular:      Rate and Rhythm: Normal rate and regular rhythm. Pulses: Normal pulses. Heart sounds: Normal heart sounds. Pulmonary:      Effort: Pulmonary effort is normal.      Breath sounds: Normal breath sounds. Abdominal:      General: Bowel sounds are normal.      Palpations: Abdomen is soft. Tenderness: There is no abdominal tenderness. Genitourinary:     Penis: Normal.       Testes: Normal.       Musculoskeletal:         General: Normal range of motion. Cervical back: Normal range of motion and neck supple. Skin:     General: Skin is warm and dry. Coloration: Skin is not jaundiced or pale. Neurological:      General: No focal deficit present. Mental Status: He is alert and oriented to person, place, and time.    Psychiatric:         Mood and Affect: Mood normal.         Behavior: Behavior normal.         DIAGNOSTIC RESULTS     EKG: All EKG's are interpreted by the Emergency Department Physician who either signs or Co-signs this chart in the absence of a cardiologist.        RADIOLOGY:   Non-plain film images such as CT, Ultrasound and MRI are read by the radiologist. Plainradiographic images are visualized and preliminarily interpreted by the emergency physician with the below findings:    I have reviewed the results. Interpretation per the Radiologist below, if available at the time of this note:    [address]   Final Result   1. Negative for testicular torsion or epididymoorchitis. 2.  Large bilateral hydroceles. 3.  Scrotal skin thickening and edema. Signed by Dr Geovanna Valdez            ED BEDSIDE ULTRASOUND:   Performed by ED Physician - none    LABS:  Labs Reviewed   COMPREHENSIVE METABOLIC PANEL W/ REFLEX TO MG FOR LOW K - Abnormal; Notable for the following components:       Result Value    Sodium 131 (*)     Chloride 96 (*)     CO2 20 (*)     Glucose 249 (*)     All other components within normal limits   URINE RT REFLEX TO CULTURE - Abnormal; Notable for the following components:    Leukocyte Esterase, Urine TRACE (*)     All other components within normal limits   MICROSCOPIC URINALYSIS - Abnormal; Notable for the following components:    Bacteria, UA NEGATIVE (*)     Crystals, UA NEG (*)     WBC, UA 20 (*)     All other components within normal limits   CBC WITH AUTO DIFFERENTIAL - Abnormal; Notable for the following components:    WBC 11.2 (*)     MCHC 32.7 (*)     RDW 14.9 (*)     Neutrophils % 80.8 (*)     Lymphocytes % 8.0 (*)     Monocytes % 10.3 (*)     Neutrophils Absolute 9.1 (*)     Lymphocytes Absolute 0.9 (*)     Monocytes Absolute 1.20 (*)     All other components within normal limits   CULTURE, URINE   SPECIMEN REJECTION       All other labs were within normal range or not returned as of this dictation.     EMERGENCY DEPARTMENT COURSE and DIFFERENTIALDIAGNOSIS/MDM:   Vitals:    Vitals:    02/08/22 1352 02/08/22 1806 02/08/22 1810 02/08/22 1919   BP: 96/65 (!) 149/82 (!) 149/82 (!) 142/61   Pulse: 66 86  74   Resp: 17 18  19   Temp: 97.8 °F (36.6 °C)      TempSrc: Oral      SpO2: 95% 94% 93% 97%       MDM  Number of Diagnoses or Management Options  Cellulitis of scrotum  Pyuria  Type 2 diabetes mellitus with hyperglycemia, unspecified whether long term insulin use (HCC)  Diagnosis management comments: Clinical evidence suggests a cellulitis of the scrotum. I have initiated antibiotics in the ED and have prescribed outpatient antibiotics. Patient is stable for initial outpatient treatment. Advised return if symptoms worsen or if after appropriate antibiotic therapy he is worsening or not getting better. He can follow-up in the ED if needed suggest he contact his clinician for follow-up. No evidence of torsion. The patient had a longer stay in the ED because we had trouble getting his CBC completed. His urine did show 20 WBCs. No bacteria. I have ordered a culture. Antibiotics given for the scrotal cellulitis should cover any low-grade UTI. CONSULTS:  None    PROCEDURES:  Unless otherwise notedbelow, none     Procedures    FINAL IMPRESSION     1. Cellulitis of scrotum    2.  Type 2 diabetes mellitus with hyperglycemia, unspecified whether long term insulin use (HCC)    3. Pyuria          DISPOSITION/PLAN   DISPOSITION Decision To Discharge 02/08/2022 06:44:29 PM      PATIENT REFERRED TO:  @FUP@    DISCHARGE MEDICATIONS:  Discharge Medication List as of 2/8/2022  6:48 PM      START taking these medications    Details   cephALEXin (KEFLEX) 500 MG capsule Take 1 capsule by mouth 4 times daily for 10 days, Disp-40 capsule, R-0Normal                (Please note that portions of this note were completed with a voice recognition program.  Efforts were made to edit the dictations butoccasionally words are mis-transcribed.)    Criselda Baldwin MD (electronically signed)  AttendingEmergency Physician          Yuliet Ramos MD  02/09/22 1563

## 2022-02-09 ASSESSMENT — ENCOUNTER SYMPTOMS
VOICE CHANGE: 0
ABDOMINAL PAIN: 0
SORE THROAT: 0
FACIAL SWELLING: 0
EYE DISCHARGE: 0
DIARRHEA: 0
APNEA: 0
VOMITING: 0
CHOKING: 0
BLOOD IN STOOL: 0
CONSTIPATION: 0
SINUS PRESSURE: 0
NAUSEA: 0

## 2022-02-09 NOTE — ED NOTES
Called lab about redraw/results. Waiting for lab to call back about status.       Braxton Goodell, RN  02/08/22 5223

## 2022-02-10 LAB — URINE CULTURE, ROUTINE: NORMAL

## 2022-02-28 RX ORDER — ATORVASTATIN CALCIUM 40 MG/1
TABLET, FILM COATED ORAL
Qty: 90 TABLET | Refills: 3 | OUTPATIENT
Start: 2022-02-28

## 2022-03-04 RX ORDER — ATORVASTATIN CALCIUM 40 MG/1
TABLET, FILM COATED ORAL
Qty: 90 TABLET | Refills: 0 | Status: SHIPPED | OUTPATIENT
Start: 2022-03-04 | End: 2022-06-09 | Stop reason: SDUPTHER

## 2022-03-07 RX ORDER — ATORVASTATIN CALCIUM 40 MG/1
TABLET, FILM COATED ORAL
Qty: 90 TABLET | Refills: 3 | OUTPATIENT
Start: 2022-03-07

## 2022-03-10 ENCOUNTER — OFFICE VISIT (OUTPATIENT)
Dept: NEUROLOGY | Age: 82
End: 2022-03-10
Payer: MEDICARE

## 2022-03-10 VITALS
WEIGHT: 225 LBS | BODY MASS INDEX: 33.33 KG/M2 | RESPIRATION RATE: 18 BRPM | HEIGHT: 69 IN | SYSTOLIC BLOOD PRESSURE: 138 MMHG | HEART RATE: 62 BPM | DIASTOLIC BLOOD PRESSURE: 66 MMHG

## 2022-03-10 DIAGNOSIS — R41.3 MEMORY LOSS: ICD-10-CM

## 2022-03-10 DIAGNOSIS — G47.33 OBSTRUCTIVE SLEEP APNEA: ICD-10-CM

## 2022-03-10 DIAGNOSIS — E11.42 DIABETIC POLYNEUROPATHY ASSOCIATED WITH TYPE 2 DIABETES MELLITUS (HCC): ICD-10-CM

## 2022-03-10 DIAGNOSIS — G45.0 VERTEBROBASILAR ARTERY INSUFFICIENCY: ICD-10-CM

## 2022-03-10 DIAGNOSIS — Z79.899 ENCOUNTER FOR LONG-TERM (CURRENT) USE OF MEDICATIONS: Primary | ICD-10-CM

## 2022-03-10 PROCEDURE — 99214 OFFICE O/P EST MOD 30 MIN: CPT | Performed by: PSYCHIATRY & NEUROLOGY

## 2022-03-10 PROCEDURE — 80305 DRUG TEST PRSMV DIR OPT OBS: CPT | Performed by: PSYCHIATRY & NEUROLOGY

## 2022-03-10 PROCEDURE — G8484 FLU IMMUNIZE NO ADMIN: HCPCS | Performed by: PSYCHIATRY & NEUROLOGY

## 2022-03-10 PROCEDURE — 1123F ACP DISCUSS/DSCN MKR DOCD: CPT | Performed by: PSYCHIATRY & NEUROLOGY

## 2022-03-10 PROCEDURE — G8427 DOCREV CUR MEDS BY ELIG CLIN: HCPCS | Performed by: PSYCHIATRY & NEUROLOGY

## 2022-03-10 PROCEDURE — 4040F PNEUMOC VAC/ADMIN/RCVD: CPT | Performed by: PSYCHIATRY & NEUROLOGY

## 2022-03-10 PROCEDURE — G8417 CALC BMI ABV UP PARAM F/U: HCPCS | Performed by: PSYCHIATRY & NEUROLOGY

## 2022-03-10 PROCEDURE — 1036F TOBACCO NON-USER: CPT | Performed by: PSYCHIATRY & NEUROLOGY

## 2022-03-10 RX ORDER — HYDROCODONE BITARTRATE AND ACETAMINOPHEN 7.5; 325 MG/1; MG/1
1 TABLET ORAL EVERY 8 HOURS PRN
Qty: 90 TABLET | Refills: 0 | Status: SHIPPED | OUTPATIENT
Start: 2022-03-10 | End: 2022-04-09

## 2022-03-10 NOTE — PROGRESS NOTES
[facility]  [address] [place] [address] [address], [address]  [place], [place]  Phone [phone]  Fax [phone]     [facility] Follow Up Encounter  3/10/22 9:20 AM CST    Information:   Patient Name: Ac Gillespie  :   1940  Age:   79 y.o. MRN:   [id number]  Account #:  [de-identified]  Today:  3/10/22    Provider: Frederick Bailey M.D. Chief Complaint:   Chief Complaint   Patient presents with    Follow-up       Subjective: Ac Gillespie is a 79 y.o. man with a history of diabetic polyneuropathy, RLS, vertebrobasilar insufficiency, SEBASTIEN, and memory loss who is following up. He complains of pain and involuntary jerking in his legs. He complains of poor balance. He has had no falls or syncope. He complains of poor memory. He resides alone but has two sons that check in on him daily. He is independent with personal care but one of his sons does his finances and banking. He does take Mirapex 1.5 mg TID. He was previously on Sinemet but taken off when he was passing out thinking it could be causing orthostatic hypotension. He rarely drives.         Objective:     Past Medical History:  Past Medical History:   Diagnosis Date    Aortic valve stenosis     Arthritis     Chest tightness, discomfort, or pressure 2012    Chronic back pain     CKD (chronic kidney disease)     CPAP (continuous positive airway pressure) dependence     13cm    Diabetes [facility] [address]     Diabetic polyneuropathy associated with type 2 diabetes mellitus ([facility] [address] 2016    GERD (gastroesophageal reflux disease)     HTN (hypertension)     Hyperlipemia     Left ventricular diastolic dysfunction     Mitral stenosis     Mitral valve stenosis     Neuropathy     Obstructive sleep apnea     AHI: 34.5 per PSG, 2013; AHI: 36.9 per PSG, 2015; AHI: 54.5 per PSG, 3/2017    Pinched nerve in neck     Restless legs syndrome 2016       Past Surgical History:   Procedure Laterality Date    APPENDECTOMY      BACK Inject 50 Units into the skin 2 times daily 1 vial 1    aspirin EC 81 MG EC tablet Take 1 tablet by mouth 2 times daily for 21 days 42 tablet 0    pramipexole (MIRAPEX) 1.5 MG tablet 3 times daily       ezetimibe (ZETIA) 10 MG tablet Take 10 mg by mouth daily      clopidogrel (PLAVIX) 75 MG tablet Take 75 mg by mouth daily      Cholecalciferol (VITAMIN D3) 31462 units CAPS   11    HUMULIN R U-500 KWIKPEN 500 UNIT/ML SOPN concentrated injection pen   11    Empagliflozin-Metformin HCl ER (SYNJARDY XR)  MG TB24 Take 1 tablet by mouth daily (with breakfast)      atenolol (TENORMIN) 25 MG tablet Take 25 mg by mouth daily      B-D UF III MINI PEN NEEDLES 31G X 5 MM MISC       furosemide (LASIX) 20 MG tablet Take 1 tablet by mouth as needed (For weight gain greater than 2.5 lbs in 24 hours.) (Patient taking differently: Take 20 mg by mouth daily ) 30 tablet 0    esomeprazole (NEXIUM) 40 MG capsule Take 40 mg by mouth every morning (before breakfast). No current facility-administered medications for this visit. Allergies:   Allergies as of 03/10/2022 - Fully Reviewed 03/10/2022   Allergen Reaction Noted    Azithromycin  07/24/2020    Metoprolol  07/24/2020    Cyclobenzaprine Rash 07/24/2020    Metoclopramide Rash 07/24/2020       Review of Systems:  Constitutional: negative for - chills and fever  Eyes:  negative for - visual disturbance and photophobia  HENMT: negative for - headaches and sinus pain  Respiratory: negative for - cough, hemoptysis, and shortness of breath  Cardiovascular: negative for - chest pain and palpitations  Gastrointestinal: negative for - blood in stools, constipation, diarrhea, nausea, and vomiting  Genito-Urinary: negative for - hematuria  Positive for - urinary frequency, urinary urgency, and urinary retention  Musculoskeletal: positive for - joint pain, joint stiffness, and joint swelling  Hematological and Lymphatic: negative for - bleeding problems, abnormal bruising, and swollen lymph nodes  Endocrine:  negative for - polydipsia and polyphagia  Allergy/Immunology:  negative for - rhinorrhea, sinus congestion, hives  Integument:  negative for - negative for - rash, change in moles, new or changing lesions  Psychological: negative for - anxiety and depression  Neurological: positive for - memory loss, numbness/tingling, and weakness     PHYSICAL EXAMINATION:  Vitals:  /66   Pulse 62   Resp 18   Ht 5' 9\" (1.753 m)   Wt 225 lb (102.1 kg)   BMI 33.23 kg/m²   General appearance:  Alert, well developed, well nourished, in no distress  HEENT:  normocephalic, atraumatic, sclera appear normal, no nasal abnormalities, no rhinorrhea, Ears appear normal, oral mucous membranes are moist without erythema, trachea midline, thyroid is normal, no lymphadenopathy or neck mass. Cardiovascular:  Regular rate and rhythm without murmer. No peripheral edema, No cyanosis or clubbing. No carotid bruits. Pulmonary:  Lungs are clear to auscultation. Breathing appears normal, good expansion, normal effort without use of accessory muscles  Musculoskeletal:  Joints are osteoarthritic  Integument:  No rash, erythema, or pallor  Psychiatric:  Mood, affect, and behavior appear normal      NEUROLOGIC EXAMINATION:  Mental Status:  alert, oriented to person, place, and time. Speech:  Clear without dysarthria or dysphonia  Language:  Fluent without aphasia  Cranial Nerves:   II Visual fields are full to confrontation   III,IV, VI Extraocular movements are full   VII Facial movements are symmetrical without weakness   VIII Hearing is intact   IX,X Shoulder shrug and head rotation strength are intact   XII No tongue atrophy or fasciculations. Normal tongue protrusion. No tongue weakness  Motor:  Normal strength in both upper and lower extremities. Normal muscle tone and bulk. Deep tendon reflexes are intact and symmetrical in both upper and lower extremities.   Johnson's signs are absent bilaterally. There is no ankle clonus on either side. Sensation:  Sensation is diminished to light touch, temperature, and vibration in distal extremities. Coordination:  Rapid alternating movements are normal in both upper and lower extremities. Finger to nose testing is unimpaired bilaterally. Gait:  Unsteady      Assessment:       ICD-10-CM    1. Diabetic polyneuropathy associated with type 2 diabetes mellitus (HCC)  E11.42    2. Vertebrobasilar artery insufficiency  G45.0    3. Obstructive sleep apnea  G47.33    4. Memory loss  R41.3    His RLS is aggravating him. His balance impairment is related to his age and neuropathy. He has had some memory decline and may be developing dementia. At this point, I did not recommend Aricept or Namenda as he is on many medications already. Plan:   1. Continue present medications including Cymbalta and Mirapex  2. Raven Clarke, PPA, UDS  3. PRN hydrocodone. I discussed this with him. He has been on this in the past and it helped.     4. FU in 6 months    Electronically signed by Francyne Leventhal, MD on 3/10/22

## 2022-03-11 ENCOUNTER — TELEPHONE (OUTPATIENT)
Dept: NEUROLOGY | Age: 82
End: 2022-03-11

## 2022-03-11 NOTE — TELEPHONE ENCOUNTER
The patient currently has access to the requested medication and a Prior Authorization is not needed for the patient/medication.

## 2022-04-27 DIAGNOSIS — Z95.0 PACEMAKER: Primary | ICD-10-CM

## 2022-04-27 DIAGNOSIS — I49.5 SINOATRIAL NODE DYSFUNCTION (HCC): ICD-10-CM

## 2022-04-27 PROCEDURE — 93296 REM INTERROG EVL PM/IDS: CPT | Performed by: CLINICAL NURSE SPECIALIST

## 2022-04-27 PROCEDURE — 93294 REM INTERROG EVL PM/LDLS PM: CPT | Performed by: CLINICAL NURSE SPECIALIST

## 2022-05-06 ENCOUNTER — TELEPHONE (OUTPATIENT)
Dept: CARDIOLOGY CLINIC | Age: 82
End: 2022-05-06

## 2022-05-09 ENCOUNTER — OFFICE VISIT (OUTPATIENT)
Dept: CARDIOLOGY CLINIC | Age: 82
End: 2022-05-09
Payer: MEDICARE

## 2022-05-09 VITALS
WEIGHT: 224 LBS | SYSTOLIC BLOOD PRESSURE: 112 MMHG | BODY MASS INDEX: 33.18 KG/M2 | DIASTOLIC BLOOD PRESSURE: 72 MMHG | HEART RATE: 73 BPM | HEIGHT: 69 IN

## 2022-05-09 DIAGNOSIS — Z95.0 PACEMAKER: ICD-10-CM

## 2022-05-09 DIAGNOSIS — I49.5 SINOATRIAL NODE DYSFUNCTION (HCC): ICD-10-CM

## 2022-05-09 DIAGNOSIS — I10 ESSENTIAL HYPERTENSION: ICD-10-CM

## 2022-05-09 DIAGNOSIS — I38 VALVULAR HEART DISEASE: ICD-10-CM

## 2022-05-09 DIAGNOSIS — I25.10 CORONARY ARTERY DISEASE INVOLVING NATIVE CORONARY ARTERY OF NATIVE HEART WITHOUT ANGINA PECTORIS: Primary | ICD-10-CM

## 2022-05-09 DIAGNOSIS — E78.2 MIXED HYPERLIPIDEMIA: ICD-10-CM

## 2022-05-09 PROCEDURE — G8427 DOCREV CUR MEDS BY ELIG CLIN: HCPCS | Performed by: CLINICAL NURSE SPECIALIST

## 2022-05-09 PROCEDURE — G8417 CALC BMI ABV UP PARAM F/U: HCPCS | Performed by: CLINICAL NURSE SPECIALIST

## 2022-05-09 PROCEDURE — 99214 OFFICE O/P EST MOD 30 MIN: CPT | Performed by: CLINICAL NURSE SPECIALIST

## 2022-05-09 PROCEDURE — 93280 PM DEVICE PROGR EVAL DUAL: CPT | Performed by: CLINICAL NURSE SPECIALIST

## 2022-05-09 PROCEDURE — 1123F ACP DISCUSS/DSCN MKR DOCD: CPT | Performed by: CLINICAL NURSE SPECIALIST

## 2022-05-09 PROCEDURE — 1036F TOBACCO NON-USER: CPT | Performed by: CLINICAL NURSE SPECIALIST

## 2022-05-09 PROCEDURE — 4040F PNEUMOC VAC/ADMIN/RCVD: CPT | Performed by: CLINICAL NURSE SPECIALIST

## 2022-05-09 ASSESSMENT — ENCOUNTER SYMPTOMS
CHEST TIGHTNESS: 0
COUGH: 0
EYE REDNESS: 0
WHEEZING: 0
FACIAL SWELLING: 0
VOMITING: 0
SHORTNESS OF BREATH: 0
NAUSEA: 0

## 2022-05-09 NOTE — PROGRESS NOTES
[facility] [address] Alex Rhoda La[place], [address] [address]  Phone: [phone]  Fax: [phone]    OFFICE VISIT:  2022    Christina Faulkner - : 1940    Reason For Visit:  Carmen Johnson is a 79 y.o. male who is here for 6 Month Follow-Up and Coronary Artery Disease       Diagnosis Orders   1. Coronary artery disease involving native coronary artery of native heart without angina pectoris     2. Valvular heart disease     3. Essential hypertension     4. Mixed hyperlipidemia     5. Sinoatrial node dysfunction (HCC)     6. 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 Patient had a pacemaker generator change in 2020. Issues were found with elevated RV threshold. Patient was sent to Dr. Shama Rollins in [place] [address] lead replacement 8/3/20. Patient had an injury to hand with a fishhook shortly thereafter and developed a systemic infection involving several surgeries and IV antibiotics. He has functional impairment of the left hand as a result of this injury. Patient was hospitalized 2021 with pneumonia related to Neriport. Patient denies any cardiac symptoms such as chest pain, unusual dyspnea, orthopnea, PND, edema, palpitations. He has chronic abdominal pain and follows with surgeon Dr. Maida Cummings in [facility], [facility] [address] - CNP is PCP.   Christina Faulkner has the following history as recorded in [facility]:    Patient Active Problem List    Diagnosis Date Noted    Heart block     Lightheadedness     Pre-syncope 2020    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 2020    Cellulitis of left hand and arm 2020    Abscess of left hand 2020    Pacemaker lead malfunction 2020    Pacemaker malfunction, initial encounter 2020    Bradycardia 03/17/2020    Near syncope 11/14/2019    History of syncope 11/14/2019    Sinoatrial node dysfunction ([address] 11/14/2019    Mixed hyperlipidemia 11/14/2019    S/P aortic valve replacement with bioprosthetic valve 08/22/2019    Grade I diastolic dysfunction 46/70/8774    Pacemaker 08/22/2019    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    Hyponatremia 06/11/2018    Hypercholesterolemia with hypertriglyceridemia 06/11/2018    Rheumatic aortic valve insufficiency     Moderate mitral stenosis     Pinched nerve in neck 04/18/2018    CPAP (continuous positive airway pressure) dependence 03/08/2018    BiPAP (biphasic positive airway pressure) dependence     Bilateral carpal tunnel syndrome 08/28/2017    Diabetic polyneuropathy associated with type 2 diabetes mellitus ([address] 08/29/2016    Orthostasis     Vertigo 07/26/2016    Type 2 diabetes mellitus with hyperglycemia, with long-term current use of insulin ([address] 02/17/2016    Obstructive sleep apnea 02/17/2016    Class 1 obesity due to excess calories in adult 02/17/2016    Restless legs syndrome 02/17/2016    Gastroesophageal reflux disease without esophagitis 02/17/2016    Essential hypertension 02/17/2016    Chronic bilateral low back pain without sciatica 07/16/2012    Other chest pain 04/30/2012     Past Medical History:   Diagnosis Date    Aortic valve stenosis     Arthritis     Chest tightness, discomfort, or pressure 4/30/2012    Chronic back pain     CKD (chronic kidney disease)     CPAP (continuous positive airway pressure) dependence     13cm    Diabetes ([address]     Diabetic polyneuropathy associated with type 2 diabetes mellitus ([address] 8/29/2016    GERD (gastroesophageal reflux disease)     HTN (hypertension)     Hyperlipemia     Left ventricular diastolic dysfunction     Mitral stenosis     Mitral valve stenosis     Neuropathy     Obstructive sleep apnea     AHI: 34.5 per PSG, 6/2013; AHI: 36.9 per PSG, 7/2015; AHI: 54.5 per PSG, 3/2017    Pinched nerve in neck     Restless legs syndrome 2016     Past Surgical History:   Procedure Laterality Date    APPENDECTOMY      BACK SURGERY      4 Back surgeries    BLADDER SURGERY      CARDIAC CATHETERIZATION  12    EF > 50%    CATARACT REMOVAL      CHOLECYSTECTOMY      COLONOSCOPY      EYE SURGERY      implants placed both eyes    HAND SURGERY Left 2020    INCISION AND DRAINAGE LEFT HAND ABSCESS performed by Sim Maddox MD at [address] OTHER SURGICAL HISTORY  2020    Right Ventricular lead replacement- Dr. Stanford Ring RPLCMT PROST AORTIC VALVE OPEN XCP HOMOGRF/STENT N/A 2018    AORTIC VALVE REPLACEMENT TRANSESOPHAGEAL ECHOCARDIOGRAM performed by Josephine Johnson MD at [address] [facility]       Family History   Problem Relation Age of Onset    Diabetes Mother     Cancer Father     Cancer Sister     Heart Disease Brother     Cancer Brother     Cancer Sister     Cancer Brother      Social History     Tobacco Use    Smoking status: Former Smoker     Packs/day: 3.00     Years: 50.00     Pack years: 150.00     Types: Cigarettes     Quit date: 1977     Years since quittin.8    Smokeless tobacco: Former User   Substance Use Topics    Alcohol use: No      Current Outpatient Medications   Medication Sig Dispense Refill    atorvastatin (LIPITOR) 40 MG tablet TAKE ONE TABLET BY MOUTH NIGHTLY 90 tablet 0    ondansetron (ZOFRAN ODT) 4 MG disintegrating tablet Take 1 tablet by mouth every 8 hours as needed for Nausea or Vomiting 10 tablet 0    dicyclomine (BENTYL) 10 MG capsule Take 10 mg by mouth 2 times daily      citalopram (CELEXA) 20 MG tablet TAKE ONE TABLET BY MOUTH DAILY 60 tablet 5    MYRBETRIQ 25 MG TB24 TAKE ONE TABLET BY MOUTH DAILY 90 tablet 5    insulin glargine (LANTUS) 100 UNIT/ML injection vial Inject 50 Units into the skin 2 times daily 1 vial 1    aspirin EC 81 MG EC tablet Take 1 tablet by mouth 2 times daily for 21 days 42 tablet 0    pramipexole (MIRAPEX) 1.5 MG tablet 3 times daily       ezetimibe (ZETIA) 10 MG tablet Take 10 mg by mouth daily      clopidogrel (PLAVIX) 75 MG tablet Take 75 mg by mouth daily      Cholecalciferol (VITAMIN D3) 65974 units CAPS   11    HUMULIN R U-500 KWIKPEN 500 UNIT/ML SOPN concentrated injection pen   11    Empagliflozin-Metformin HCl ER (SYNJARDY XR)  MG TB24 Take 1 tablet by mouth daily (with breakfast)      atenolol (TENORMIN) 25 MG tablet Take 25 mg by mouth daily      B-D UF III MINI PEN NEEDLES 31G X 5 MM MISC       furosemide (LASIX) 20 MG tablet Take 1 tablet by mouth as needed (For weight gain greater than 2.5 lbs in 24 hours.) (Patient taking differently: Take 20 mg by mouth daily ) 30 tablet 0    esomeprazole (NEXIUM) 40 MG capsule Take 40 mg by mouth every morning (before breakfast). No current facility-administered medications for this visit. Allergies: Azithromycin, Metoprolol, Cyclobenzaprine, and Metoclopramide    Review of Systems  Review of Systems   Constitutional: Positive for fatigue. Negative for activity change, diaphoresis, fever and unexpected weight change. HENT: Negative for facial swelling and nosebleeds. Eyes: Negative for redness and visual disturbance. Respiratory: Negative for cough, chest tightness, shortness of breath and wheezing. Cardiovascular: Negative for chest pain, palpitations and leg swelling. Gastrointestinal: Positive for abdominal pain (RLQ, chronic). Negative for nausea and vomiting. Endocrine: Negative for cold intolerance and heat intolerance. Genitourinary: Negative for dysuria and hematuria. Musculoskeletal: Negative for arthralgias and myalgias. C/o chronic left hand injury, balance issues   Skin: Negative for pallor and rash. Neurological: Negative for dizziness, seizures, syncope, weakness and light-headedness. Hematological: Does not bruise/bleed easily. Psychiatric/Behavioral: Negative for agitation. The patient is not nervous/anxious. Objective  Vital Signs - /72   Pulse 73   Ht 5' 9\" (1.753 m)   Wt 224 lb (101.6 kg)   BMI 33.08 kg/m²   Physical Exam  Vitals and nursing note reviewed. Constitutional:       General: He is not in acute distress. Appearance: He is well-developed. He is obese. He is not diaphoretic. Comments: Deconditioned   HENT:      Head: Normocephalic and atraumatic. Right Ear: Hearing and external ear normal.      Left Ear: Hearing and external ear normal.      Nose: Nose normal.   Eyes:      General:         Right eye: No discharge. Left eye: No discharge. Pupils: Pupils are equal, round, and reactive to light. Neck:      Thyroid: No thyromegaly. Vascular: No carotid bruit or JVD. Trachea: No tracheal deviation. Cardiovascular:      Rate and Rhythm: Normal rate and regular rhythm. Heart sounds: Normal heart sounds. No murmur heard. No friction rub. No gallop. Pulmonary:      Effort: Pulmonary effort is normal. No respiratory distress. Breath sounds: Normal breath sounds. No wheezing or rales. Abdominal:      Palpations: Abdomen is soft. Tenderness: There is no abdominal tenderness. Musculoskeletal:         General: Swelling (left hand 1+) and deformity (left hand) present. Cervical back: Neck supple. No muscular tenderness. Right lower leg: No edema. Left lower leg: No edema. Comments: Abnormal gait, unsteady   Skin:     General: Skin is warm and dry. Findings: No rash. Neurological:      General: No focal deficit present. Mental Status: He is alert and oriented to person, place, and time. Cranial Nerves: No cranial nerve deficit.    Psychiatric:         Mood and Affect: Mood normal.         Behavior: Behavior normal.         Judgment: Judgment normal.         Data:  Echo 11/1/21  Conclusions      Summary   Suboptimal study, fair quality   Normal left ventricular chamber size and systolic function. Moderate concentric left ventricular hypertrophy. Left ventricular ejection fraction is visually estimated at 60%. The mitral valve leaflets are moderately to severely thickened and   calcified and appear restricted in motion. There is mild to moderate   mitral stenosis, mean gradient 4, mitral valve area by pressure half-time   1.8. Trace mitral regurgitation. A bioprosthetic aortic valve is reported as present but poorly visualized. No aortic regurgitation. Doppler interrogation demonstrates a mean   gradient of 15, probably normal      Signature      ----------------------------------------------------------------   Electronically signed by Lois Medina DO(Interpreting   physician) on 11/01/2021 09:30 AM   ----------------------------------------------------------------    Heart Cath 3/17/20  PCI procedure:LAD, Diag/Septal1, LAURI, LAD, LAURI    Conclusions    Single-vessel LAD proximal to mid bifurcation stenosis. Normal LV ejection fraction. Successful PCI to proximal to mid LAD and 1st diagonal utilizing \"collar   and transferred technique\". Proximal LAD stented with 3.0 by 18 mm resolute integrity (collar stent)   just to bifurcation with the 1st diagonal.   Mid LAD (2.5 x 14 mm resolute integrity) and 1st diagonal (2.5 x 14   millimeters resolute integrity) stented in kissing stent fashion with   proximal edge is just within distal edge of collar stent. Echo 3/18/20  Conclusions      Summary   Calcification and thickening of the mitral valve leaflets with reduced   mobility. With a PHT of 147 msec and a MVA calculated at 1.5 cm^2, suggesting mild   mitral stenosis. Mild mitral regurgitation is present. Bioprosthetic valve in aortic position with increased velocities across   valve suggesting aortic stenosis (may be normal for bioprosthesis.    No evidence of aortic valve regurgitation . Left ventricular size is normal .   Moderate concentric left ventricular hypertrophy. Left ventricular ejection fraction is visually estimated at 24%   Diastolic Dysfunction is present. Lab Results   Component Value Date    CHOL 159 (L) 05/10/2019    TRIG 129 05/10/2019    HDL 54 (L) 05/10/2019    LDLCALC 79 05/10/2019    LDLDIRECT 51 04/30/2012     Lab Results   Component Value Date    ALT 13 02/08/2022    AST 14 02/08/2022     Assessment:     Diagnosis Orders   1. Coronary artery disease involving native coronary artery of native heart without angina pectoris     2. Valvular heart disease     3. Essential hypertension     4. Mixed hyperlipidemia     5. Sinoatrial node dysfunction (HCC)     6. Pacemaker         CADstable without symptoms of angina. Continue aspirin, atenolol, atorvastatin    Hypertensionstable on atenolol    Hyperlipidemia continues on atorvastatin which is managed by his PCP. We will request most recent labs for review. Sinoatrial node dysfunction/pacemaker  Pacemaker check showed adequate battery status @ 7.2 years estimated  Mode: DDDR. Lead impedances are stable  Pacing: AP 95.6%,  99.2%. Appropriate diagnostics and safety margins noted. Sustained arrythmia: None. Reprogramming done for sensitivity and threshold testing. Please see the scanned interrogation report    Valvular heart diseasehistory of aortic valve replacement with bioprosthetic valve in 2018. Last echo done in November 2021 showed normal functioning valve    Stable cardiovascular status. No evidence of overt heart failure,angina or dysrhythmia. Plan    Return in about 6 months (around 11/9/2022) for APRN. Remote [facility] 3 months    Call with any questionsor concerns  Follow up with RITESH Alejandre CNP for non cardiac problems  Report any new problems  Cardiovascular Fitness-Exercise as tolerated. Strive for 15 minutes of exercise most days of the week. Cardiac / HealthyDiet  Continue current medications as directed  Continue plan of treatment  It is always recommended that you bring your medicationsbottles with you to each visit - this is for your safety!        RITESH Wilhelm

## 2022-05-12 ASSESSMENT — ENCOUNTER SYMPTOMS: ABDOMINAL PAIN: 1

## 2022-06-07 RX ORDER — ATORVASTATIN CALCIUM 40 MG/1
TABLET, FILM COATED ORAL
Qty: 90 TABLET | Refills: 0 | OUTPATIENT
Start: 2022-06-07

## 2022-06-09 RX ORDER — ATORVASTATIN CALCIUM 40 MG/1
TABLET, FILM COATED ORAL
Qty: 90 TABLET | Refills: 1 | Status: SHIPPED | OUTPATIENT
Start: 2022-06-09

## 2022-08-10 DIAGNOSIS — I49.5 SINOATRIAL NODE DYSFUNCTION (HCC): ICD-10-CM

## 2022-08-10 DIAGNOSIS — Z95.0 PACEMAKER: Primary | ICD-10-CM

## 2022-08-10 PROCEDURE — 93294 REM INTERROG EVL PM/LDLS PM: CPT | Performed by: NURSE PRACTITIONER

## 2022-08-10 PROCEDURE — 93296 REM INTERROG EVL PM/IDS: CPT | Performed by: NURSE PRACTITIONER

## 2022-08-30 RX ORDER — MIRABEGRON 25 MG/1
TABLET, FILM COATED, EXTENDED RELEASE ORAL
Qty: 90 TABLET | Refills: 5 | Status: SHIPPED | OUTPATIENT
Start: 2022-08-30

## 2022-08-30 RX ORDER — ATORVASTATIN CALCIUM 40 MG/1
TABLET, FILM COATED ORAL
Qty: 90 TABLET | Refills: 1 | OUTPATIENT
Start: 2022-08-30

## 2022-09-15 ENCOUNTER — OFFICE VISIT (OUTPATIENT)
Dept: NEUROLOGY | Age: 82
End: 2022-09-15
Payer: MEDICARE

## 2022-09-15 VITALS
BODY MASS INDEX: 30.1 KG/M2 | HEIGHT: 71 IN | HEART RATE: 61 BPM | WEIGHT: 215 LBS | DIASTOLIC BLOOD PRESSURE: 62 MMHG | RESPIRATION RATE: 20 BRPM | SYSTOLIC BLOOD PRESSURE: 115 MMHG

## 2022-09-15 DIAGNOSIS — E11.42 DIABETIC POLYNEUROPATHY ASSOCIATED WITH TYPE 2 DIABETES MELLITUS (HCC): Primary | ICD-10-CM

## 2022-09-15 DIAGNOSIS — G47.33 OBSTRUCTIVE SLEEP APNEA: ICD-10-CM

## 2022-09-15 DIAGNOSIS — G45.0 VERTEBROBASILAR ARTERY INSUFFICIENCY: ICD-10-CM

## 2022-09-15 DIAGNOSIS — G25.81 RESTLESS LEGS SYNDROME: Chronic | ICD-10-CM

## 2022-09-15 PROCEDURE — 1123F ACP DISCUSS/DSCN MKR DOCD: CPT | Performed by: PSYCHIATRY & NEUROLOGY

## 2022-09-15 PROCEDURE — 99213 OFFICE O/P EST LOW 20 MIN: CPT | Performed by: PSYCHIATRY & NEUROLOGY

## 2022-09-15 PROCEDURE — G8427 DOCREV CUR MEDS BY ELIG CLIN: HCPCS | Performed by: PSYCHIATRY & NEUROLOGY

## 2022-09-15 PROCEDURE — G8417 CALC BMI ABV UP PARAM F/U: HCPCS | Performed by: PSYCHIATRY & NEUROLOGY

## 2022-09-15 PROCEDURE — 1036F TOBACCO NON-USER: CPT | Performed by: PSYCHIATRY & NEUROLOGY

## 2022-09-15 NOTE — PROGRESS NOTES
[facility]  [address] [place] [address] Drive, [address] [place] [address]  [facility]  Phone [phone]  Fax [phone]     [facility]rology Follow Up Encounter  9/15/22 9:39 AM CDT    Information:   Patient Name: Razia Shrestha  :   1940  Age:   79 y.o. MRN:   [id number]  Account #:  [de-identified]  Today:  9/15/22    Provider: Sebas Cox M.D. Chief Complaint:   Chief Complaint   Patient presents with    Follow-up       Subjective: Razia Shrestha is a 79 y.o. man with a history of diabetic polyneuropathy, vertebrobasilar insufficiency, obstructive sleep apnea, and restless legs syndrome who is following up. He has occasional lightheadedness, often when he first stands up. He has had no syncope. The RLS are doing fairly well with the Mirapex. His peripheral neuropathy is about the same but he has had more numbness and tingling in the right hand. He has limited left hand use since the fish hook injury. The hydrocodone has helped his back pain, neuropathy pain, and when needed, his RLS. He has taken about 90 in the last 6 months. He uses his CPAP nightly. He does waken frequently and sometimes will take the CPAP off.         Objective:     Past Medical History:  Past Medical History:   Diagnosis Date    Aortic valve stenosis     Arthritis     Chest tightness, discomfort, or pressure 2012    Chronic back pain     CKD (chronic kidney disease)     CPAP (continuous positive airway pressure) dependence     13cm    Diabetes ([address]     Diabetic polyneuropathy associated with type 2 diabetes mellitus ([address] 2016    GERD (gastroesophageal reflux disease)     HTN (hypertension)     Hyperlipemia     Left ventricular diastolic dysfunction     Mitral stenosis     Mitral valve stenosis     Neuropathy     Obstructive sleep apnea     AHI: 34.5 per PSG, 2013; AHI: 36.9 per PSG, 2015; AHI: 54.5 per PSG, 3/2017    Pinched nerve in neck     Restless legs syndrome 2016       Past Surgical History: Procedure Laterality Date    APPENDECTOMY      BACK SURGERY      4 Back surgeries    BLADDER SURGERY      CARDIAC CATHETERIZATION  4/30/12    EF > 50%    CATARACT REMOVAL      CHOLECYSTECTOMY      COLONOSCOPY      EYE SURGERY      implants placed both eyes    HAND SURGERY Left 9/14/2020    INCISION AND DRAINAGE LEFT HAND ABSCESS performed by Roge Warren MD at [address]  [facility]  08/03/2020    Right Ventricular lead replacement- Dr. Trinidad Cornell RPLCMT PROST AORTIC VALVE OPEN XCP HOMOGRF/STENT N/A 6/7/2018    AORTIC VALVE REPLACEMENT TRANSESOPHAGEAL ECHOCARDIOGRAM performed by Charline Montana MD at [facility]  None    Significant Injuries  None    Habits  Trinity De Los Santos reports that he quit smoking about 45 years ago. His smoking use included cigarettes. He has a 150.00 pack-year smoking history. He has quit using smokeless tobacco. He reports that he does not drink alcohol and does not use drugs. Family History   Problem Relation Age of Onset    Diabetes Mother     Cancer Father     Cancer Sister     Heart Disease Brother     Cancer Brother     Cancer Sister     Cancer Brother        Social History  Trinity De Los Santos is single, lives in [place], [place], and is retired.     Medications:  Current Outpatient Medications   Medication Sig Dispense Refill    MYRBETRIQ 25 MG TB24 TAKE ONE TABLET BY MOUTH DAILY 90 tablet 5    atorvastatin (LIPITOR) 40 MG tablet TAKE ONE TABLET BY MOUTH NIGHTLY 90 tablet 1    dicyclomine (BENTYL) 10 MG capsule Take 10 mg by mouth 2 times daily      citalopram (CELEXA) 20 MG tablet TAKE ONE TABLET BY MOUTH DAILY 60 tablet 5    insulin glargine (LANTUS) 100 UNIT/ML injection vial Inject 50 Units into the skin 2 times daily 1 vial 1    aspirin EC 81 MG EC tablet Take 1 tablet by mouth 2 times daily for 21 days 42 tablet 0    pramipexole (MIRAPEX) 1.5 MG tablet 3 times daily ezetimibe (ZETIA) 10 MG tablet Take 10 mg by mouth daily      clopidogrel (PLAVIX) 75 MG tablet Take 75 mg by mouth daily      Cholecalciferol (VITAMIN D3) 13347 units CAPS   11    HUMULIN R U-500 KWIKPEN 500 UNIT/ML SOPN concentrated injection pen   11    Empagliflozin-metFORMIN HCl ER  MG TB24 Take 1 tablet by mouth daily (with breakfast)      atenolol (TENORMIN) 25 MG tablet Take 25 mg by mouth daily      B-D UF III MINI PEN NEEDLES 31G X 5 MM MISC       furosemide (LASIX) 20 MG tablet Take 1 tablet by mouth as needed (For weight gain greater than 2.5 lbs in 24 hours.) (Patient taking differently: Take 20 mg by mouth daily) 30 tablet 0    esomeprazole (NEXIUM) 40 MG capsule Take 40 mg by mouth every morning (before breakfast). ondansetron (ZOFRAN ODT) 4 MG disintegrating tablet Take 1 tablet by mouth every 8 hours as needed for Nausea or Vomiting (Patient not taking: Reported on 9/15/2022) 10 tablet 0     No current facility-administered medications for this visit. Allergies:   Allergies as of 09/15/2022 - Fully Reviewed 09/15/2022   Allergen Reaction Noted    Azithromycin  07/24/2020    Metoprolol  07/24/2020    Cyclobenzaprine Rash 07/24/2020    Metoclopramide Rash 07/24/2020       Review of Systems:  Constitutional: negative for - chills and fever  Eyes:  negative for - visual disturbance and photophobia  HENMT: negative for - headaches and sinus pain  Respiratory: negative for - cough, hemoptysis, and shortness of breath  Cardiovascular: negative for - chest pain and palpitations  Gastrointestinal: negative for - blood in stools, constipation, diarrhea, nausea, and vomiting  Genito-Urinary: negative for - hematuria, urinary frequency, urinary urgency, and urinary retention  Musculoskeletal: positive for - joint pain, joint stiffness, and joint swelling  Hematological and Lymphatic: negative for - bleeding problems, abnormal bruising, and swollen lymph nodes  Endocrine:  negative for - polydipsia and polyphagia  Allergy/Immunology:  negative for - rhinorrhea, sinus congestion, hives  Integument:  negative for - negative for - rash, change in moles, new or changing lesions  Psychological: negative for - anxiety and depression  Neurological: positive for - memory loss, numbness/tingling, and weakness     PHYSICAL EXAMINATION:  Vitals:  /62   Pulse 61   Resp 20   Ht 5' 11\" (1.803 m)   Wt 215 lb (97.5 kg)   BMI 29.99 kg/m²   General appearance:  Alert, well developed, well nourished, in no distress  HEENT:  normocephalic, atraumatic, sclera appear normal, no nasal abnormalities, no rhinorrhea, Ears appear normal, oral mucous membranes are moist without erythema, trachea midline, thyroid is normal, no lymphadenopathy or neck mass. Cardiovascular:  Regular rate and rhythm without murmer. No peripheral edema, No cyanosis or clubbing. No carotid bruits. Pulmonary:  Lungs are clear to auscultation. Breathing appears normal, good expansion, normal effort without use of accessory muscles  Musculoskeletal:  Joints are osteoarthritic  Integument:  No rash, erythema, or pallor  Psychiatric:  Mood, affect, and behavior appear normal      NEUROLOGIC EXAMINATION:  Mental Status:  alert, oriented to person, place, and time. Speech:  Clear without dysarthria or dysphonia  Language:  Fluent without aphasia  Cranial Nerves:              II          Visual fields are full to confrontation              III,IV, VI           Extraocular movements are full              VII        Facial movements are symmetrical without weakness              VIII       Hearing is intact              IX,X     Shoulder shrug and head rotation strength are intact              XII        No tongue atrophy or fasciculations. Normal tongue protrusion. No tongue weakness  Motor:  Normal strength in both upper and lower extremities. Normal muscle tone and bulk.   Deep tendon reflexes are intact and symmetrical in both upper and lower extremities. Johnson's signs are absent bilaterally. There is no ankle clonus on either side. Sensation:  Sensation is diminished to light touch, temperature, and vibration in distal extremities. Coordination:  Rapid alternating movements are normal in both upper and lower extremities. Finger to nose testing is unimpaired bilaterally. Gait:  Unsteady    Pertinent Diagnostic Studies:  CPAP download shows that he has used the device every night but only 57% > 4 hours. The APAP is set at 10cm to 20cm. His leak is OK and the residual AHI is 5.0. He has a ResMed machine that is almost 1years old. Assessment:       ICD-10-CM    1. Diabetic polyneuropathy associated with type 2 diabetes mellitus (HCC)  E11.42       2. Vertebrobasilar artery insufficiency  G45.0       3. Obstructive sleep apnea  G47.33       4. Restless legs syndrome  G25.81       He has been clinically stable. He remains fairly active. His son looks in on him often and they catfish together. Plan:   1. Continue present medications including Cymbalta, Mirapex, and PRN hydrocodone  2. Continue CPAP use during sleep  3.  FU in 6 months    Electronically signed by Alexi Rodriguez MD on 9/15/22

## 2022-09-16 RX ORDER — HYDROCODONE BITARTRATE AND ACETAMINOPHEN 7.5; 325 MG/1; MG/1
1 TABLET ORAL EVERY 8 HOURS PRN
Qty: 90 TABLET | Refills: 0 | Status: SHIPPED | OUTPATIENT
Start: 2022-09-16 | End: 2022-10-16

## 2022-09-26 DIAGNOSIS — E11.42 DIABETIC POLYNEUROPATHY ASSOCIATED WITH TYPE 2 DIABETES MELLITUS (HCC): ICD-10-CM

## 2022-09-26 RX ORDER — HYDROCODONE BITARTRATE AND ACETAMINOPHEN 7.5; 325 MG/1; MG/1
1 TABLET ORAL EVERY 8 HOURS PRN
Qty: 90 TABLET | Refills: 0 | OUTPATIENT
Start: 2022-09-26 | End: 2022-10-26

## 2022-09-26 NOTE — TELEPHONE ENCOUNTER
Per pt pharmacy advised to fill 7 day supply of hydrocodone due to insurance. Please call pt to advise.

## 2022-11-08 NOTE — BRIEF OP NOTE
Brief Postoperative Note      Patient:  Bethany Sabillon  YOB: 1940  MRN: [id number]    Date of Procedure: 9/14/2020    Pre-Op Diagnosis: HAND ABSCESS - LEFT    Post-Op Diagnosis: Same       Procedure(s):  INCISION AND DRAINAGE LEFT HAND ABSCESS    Surgeon(s):  Missael Ellis MD    Assistant:  * No surgical staff found *    Anesthesia: General    Estimated Blood Loss (mL): <  20 mL    Complications: None    Specimens:   ID Type Source Tests Collected by Time Destination   1 : left finger puncture site Swab Finger CULTURE, SURGICAL Missael Ellis MD 9/14/2020 1746    2 : left hand abscess Swab Hand CULTURE, SURGICAL Missael Ellis MD 9/14/2020 1747        Implants:  * No implants in log *      Drains: * No LDAs found *    Findings: Left hand and fifth finger swelling due to hook puncture site and associated cellulitis with minimal abscess formation    Electronically signed by Missael Ellis MD on 9/14/2020 at 6:41 PM
Quality 431: Preventive Care And Screening: Unhealthy Alcohol Use - Screening: Patient screened for unhealthy alcohol use using a single question and scores less than 2 times per year
Quality 130: Documentation Of Current Medications In The Medical Record: Current Medications Documented
Detail Level: Detailed
Quality 226: Preventive Care And Screening: Tobacco Use: Screening And Cessation Intervention: Patient screened for tobacco use and is an ex/non-smoker

## 2022-11-11 ENCOUNTER — TELEPHONE (OUTPATIENT)
Dept: CARDIOLOGY CLINIC | Age: 82
End: 2022-11-11

## 2022-11-14 ENCOUNTER — OFFICE VISIT (OUTPATIENT)
Dept: CARDIOLOGY CLINIC | Age: 82
End: 2022-11-14
Payer: MEDICARE

## 2022-11-14 VITALS
SYSTOLIC BLOOD PRESSURE: 126 MMHG | HEART RATE: 67 BPM | BODY MASS INDEX: 33.77 KG/M2 | DIASTOLIC BLOOD PRESSURE: 62 MMHG | HEIGHT: 69 IN | WEIGHT: 228 LBS

## 2022-11-14 DIAGNOSIS — G47.33 OBSTRUCTIVE SLEEP APNEA: ICD-10-CM

## 2022-11-14 DIAGNOSIS — I38 VALVULAR HEART DISEASE: ICD-10-CM

## 2022-11-14 DIAGNOSIS — Z95.0 PACEMAKER: ICD-10-CM

## 2022-11-14 DIAGNOSIS — E78.2 MIXED HYPERLIPIDEMIA: ICD-10-CM

## 2022-11-14 DIAGNOSIS — I49.5 SINOATRIAL NODE DYSFUNCTION (HCC): ICD-10-CM

## 2022-11-14 DIAGNOSIS — I10 ESSENTIAL HYPERTENSION: ICD-10-CM

## 2022-11-14 DIAGNOSIS — I25.10 CORONARY ARTERY DISEASE INVOLVING NATIVE CORONARY ARTERY OF NATIVE HEART WITHOUT ANGINA PECTORIS: Primary | ICD-10-CM

## 2022-11-14 DIAGNOSIS — R53.81 PHYSICAL DECONDITIONING: ICD-10-CM

## 2022-11-14 PROCEDURE — 1123F ACP DISCUSS/DSCN MKR DOCD: CPT | Performed by: CLINICAL NURSE SPECIALIST

## 2022-11-14 PROCEDURE — 93280 PM DEVICE PROGR EVAL DUAL: CPT | Performed by: CLINICAL NURSE SPECIALIST

## 2022-11-14 PROCEDURE — 99214 OFFICE O/P EST MOD 30 MIN: CPT | Performed by: CLINICAL NURSE SPECIALIST

## 2022-11-14 PROCEDURE — 3078F DIAST BP <80 MM HG: CPT | Performed by: CLINICAL NURSE SPECIALIST

## 2022-11-14 PROCEDURE — G8427 DOCREV CUR MEDS BY ELIG CLIN: HCPCS | Performed by: CLINICAL NURSE SPECIALIST

## 2022-11-14 PROCEDURE — G8417 CALC BMI ABV UP PARAM F/U: HCPCS | Performed by: CLINICAL NURSE SPECIALIST

## 2022-11-14 PROCEDURE — G8484 FLU IMMUNIZE NO ADMIN: HCPCS | Performed by: CLINICAL NURSE SPECIALIST

## 2022-11-14 PROCEDURE — 1036F TOBACCO NON-USER: CPT | Performed by: CLINICAL NURSE SPECIALIST

## 2022-11-14 PROCEDURE — 3074F SYST BP LT 130 MM HG: CPT | Performed by: CLINICAL NURSE SPECIALIST

## 2022-11-14 ASSESSMENT — ENCOUNTER SYMPTOMS
VOMITING: 0
WHEEZING: 0
CHEST TIGHTNESS: 0
NAUSEA: 0
COUGH: 0
SHORTNESS OF BREATH: 0
ABDOMINAL PAIN: 1
EYE REDNESS: 0
FACIAL SWELLING: 0

## 2022-11-14 NOTE — PROGRESS NOTES
[facility] [place] [address]  Phone: [phone]  Fax: [phone]    OFFICE VISIT:  2022    Cristo Reena - : 1940    Reason For Visit:  Rob Hillman is a 79 y.o. male who is here for Follow-up and Coronary Artery Disease (Patient states he is having SOA. )       Diagnosis Orders   1. Coronary artery disease involving native coronary artery of native heart without angina pectoris        2. Essential hypertension        3. Mixed hyperlipidemia        4. Valvular heart disease        5. Obstructive sleep apnea        6. Sinoatrial node dysfunction (HCC)        7. Pacemaker        8. 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 Patient had a pacemaker generator change in 2020. Issues were found with elevated RV threshold. Patient was sent to Dr. Colten Mccloud in [place] for  RV lead replacement 8/3/20. Patient had an injury to hand with a fishhook shortly thereafter and developed a systemic infection involving several surgeries and IV antibiotics. He has functional impairment of the left hand as a result of this injury. Patient was hospitalized 2021 with pneumonia related to [place]. He has had ongoing issues with shortness of breath and fatigue since this time. Patient denies any cardiac symptoms such as chest pain, unusual dyspnea, orthopnea, PND, edema, palpitations. He has chronic abdominal pain and follows with surgeon Dr. Jsesica Walker in [place]. His main complaints have to do with mobility issues and generalized arthritis pain    RITESH Washington CNP is PCP.   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 09/16/2020    Cellulitis of left hand and arm 09/14/2020    Abscess of left hand 09/14/2020    Pacemaker lead malfunction 08/05/2020    Pacemaker malfunction, initial encounter 06/29/2020    Bradycardia 03/17/2020    Near syncope 11/14/2019    History of syncope 11/14/2019    Sinoatrial node dysfunction ([address] 11/14/2019    Mixed hyperlipidemia 11/14/2019    S/P aortic valve replacement with bioprosthetic valve 08/22/2019    Grade I diastolic dysfunction 55/33/7657    Pacemaker 08/22/2019    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    Hyponatremia 06/11/2018    Hypercholesterolemia with hypertriglyceridemia 06/11/2018    Rheumatic aortic valve insufficiency     Moderate mitral stenosis     Pinched nerve in neck 04/18/2018    CPAP (continuous positive airway pressure) dependence 03/08/2018    BiPAP (biphasic positive airway pressure) dependence     Bilateral carpal tunnel syndrome 08/28/2017    Diabetic polyneuropathy associated with type 2 diabetes mellitus ([address] 08/29/2016    Orthostasis     Vertigo 07/26/2016    Type 2 diabetes mellitus with hyperglycemia, with long-term current use of insulin ([address] 02/17/2016    Obstructive sleep apnea 02/17/2016    Class 1 obesity due to excess calories in adult 02/17/2016    Restless legs syndrome 02/17/2016    Gastroesophageal reflux disease without esophagitis 02/17/2016    Essential hypertension 02/17/2016    Chronic bilateral low back pain without sciatica 07/16/2012    Other chest pain 04/30/2012     Past Medical History:   Diagnosis Date    Aortic valve stenosis     Arthritis     Chest tightness, discomfort, or pressure 4/30/2012    Chronic back pain     CKD (chronic kidney disease)     CPAP (continuous positive airway pressure) dependence     13cm    Diabetes ([address]     Diabetic polyneuropathy associated with type 2 diabetes mellitus ([address] 8/29/2016    GERD (gastroesophageal reflux Inject 50 Units into the skin 2 times daily 1 vial 1    aspirin EC 81 MG EC tablet Take 1 tablet by mouth 2 times daily for 21 days 42 tablet 0    pramipexole (MIRAPEX) 1.5 MG tablet 3 times daily       ezetimibe (ZETIA) 10 MG tablet Take 10 mg by mouth daily      clopidogrel (PLAVIX) 75 MG tablet Take 75 mg by mouth daily      Cholecalciferol (VITAMIN D3) 49797 units CAPS   11    HUMULIN R U-500 KWIKPEN 500 UNIT/ML SOPN concentrated injection pen   11    Empagliflozin-metFORMIN HCl ER  MG TB24 Take 1 tablet by mouth daily (with breakfast)      atenolol (TENORMIN) 25 MG tablet Take 25 mg by mouth daily      B-D UF III MINI PEN NEEDLES 31G X 5 MM MISC       furosemide (LASIX) 20 MG tablet Take 1 tablet by mouth as needed (For weight gain greater than 2.5 lbs in 24 hours.) (Patient taking differently: Take 20 mg by mouth daily) 30 tablet 0    esomeprazole (NEXIUM) 40 MG capsule Take 40 mg by mouth every morning (before breakfast). No current facility-administered medications for this visit. Allergies: Azithromycin, Metoprolol, Cyclobenzaprine, and Metoclopramide    Review of Systems  Review of Systems   Constitutional:  Positive for fatigue. Negative for activity change, diaphoresis, fever and unexpected weight change. HENT:  Negative for facial swelling and nosebleeds. Eyes:  Negative for redness and visual disturbance. Respiratory:  Negative for cough, chest tightness, shortness of breath and wheezing. Cardiovascular:  Negative for chest pain, palpitations and leg swelling. Gastrointestinal:  Positive for abdominal pain (RLQ, chronic). Negative for nausea and vomiting. Endocrine: Negative for cold intolerance and heat intolerance. Genitourinary:  Negative for dysuria and hematuria. Musculoskeletal:  Negative for arthralgias and myalgias. C/o chronic left hand injury, balance issues   Skin:  Negative for pallor and rash.    Neurological:  Negative for dizziness, seizures, syncope, weakness and light-headedness. Hematological:  Does not bruise/bleed easily. Psychiatric/Behavioral:  Negative for agitation. The patient is not nervous/anxious. Objective  Vital Signs - /62   Pulse 67   Ht 5' 9\" (1.753 m)   Wt 228 lb (103.4 kg)   BMI 33.67 kg/m²   Wt Readings from Last 3 Encounters:   11/14/22 228 lb (103.4 kg)   09/15/22 215 lb (97.5 kg)   05/09/22 224 lb (101.6 kg)       Physical Exam  Vitals and nursing note reviewed. Constitutional:       General: He is not in acute distress. Appearance: He is well-developed. He is obese. He is not diaphoretic. Comments: Deconditioned   HENT:      Head: Normocephalic and atraumatic. Right Ear: Hearing and external ear normal.      Left Ear: Hearing and external ear normal.      Nose: Nose normal.   Eyes:      General:         Right eye: No discharge. Left eye: No discharge. Pupils: Pupils are equal, round, and reactive to light. Neck:      Thyroid: No thyromegaly. Vascular: No carotid bruit or JVD. Trachea: No tracheal deviation. Cardiovascular:      Rate and Rhythm: Normal rate and regular rhythm. Heart sounds: Normal heart sounds. No murmur heard. No friction rub. No gallop. Pulmonary:      Effort: Pulmonary effort is normal. No respiratory distress. Breath sounds: Normal breath sounds. No wheezing or rales. Abdominal:      Palpations: Abdomen is soft. Tenderness: There is no abdominal tenderness. Musculoskeletal:         General: Swelling (left hand 1+) and deformity (left hand) present. Cervical back: Neck supple. No muscular tenderness. Right lower leg: No edema. Left lower leg: No edema. Comments: Abnormal gait, unsteady   Skin:     General: Skin is warm and dry. Findings: No rash. Neurological:      General: No focal deficit present. Mental Status: He is alert and oriented to person, place, and time. Cranial Nerves:  No cranial nerve deficit. Psychiatric:         Mood and Affect: Mood normal.         Behavior: Behavior normal.         Judgment: Judgment normal.       Data:  Echo 11/1/21  Conclusions      Summary   Suboptimal study, fair quality   Normal left ventricular chamber size and systolic function. Moderate concentric left ventricular hypertrophy. Left ventricular ejection fraction is visually estimated at 60%. The mitral valve leaflets are moderately to severely thickened and   calcified and appear restricted in motion. There is mild to moderate   mitral stenosis, mean gradient 4, mitral valve area by pressure half-time   1.8. Trace mitral regurgitation. A bioprosthetic aortic valve is reported as present but poorly visualized. No aortic regurgitation. Doppler interrogation demonstrates a mean   gradient of 15, probably normal      Signature      ----------------------------------------------------------------   Electronically signed by Shannan Ramachandran DO(Interpreting   physician) on 11/01/2021 09:30 AM   ----------------------------------------------------------------    Heart Cath 3/17/20  PCI procedure:LAD, Diag/Septal1, LAURI, LAD, LAURI    Conclusions    Single-vessel LAD proximal to mid bifurcation stenosis. Normal LV ejection fraction. Successful PCI to proximal to mid LAD and 1st diagonal utilizing \"collar   and transferred technique\". Proximal LAD stented with 3.0 by 18 mm resolute integrity (collar stent)   just to bifurcation with the 1st diagonal.   Mid LAD (2.5 x 14 mm resolute integrity) and 1st diagonal (2.5 x 14   millimeters resolute integrity) stented in kissing stent fashion with   proximal edge is just within distal edge of collar stent. Echo 3/18/20  Conclusions      Summary   Calcification and thickening of the mitral valve leaflets with reduced   mobility. With a PHT of 147 msec and a MVA calculated at 1.5 cm^2, suggesting mild   mitral stenosis.    Mild mitral regurgitation is present. Bioprosthetic valve in aortic position with increased velocities across   valve suggesting aortic stenosis (may be normal for bioprosthesis. No evidence of aortic valve regurgitation . Left ventricular size is normal .   Moderate concentric left ventricular hypertrophy. Left ventricular ejection fraction is visually estimated at 13%   Diastolic Dysfunction is present. Lab Results   Component Value Date    CHOL 159 (L) 05/10/2019    TRIG 129 05/10/2019    HDL 54 (L) 05/10/2019    LDLCALC 79 05/10/2019    LDLDIRECT 51 04/30/2012     Lab Results   Component Value Date    ALT 13 02/08/2022    AST 14 02/08/2022     Assessment:     Diagnosis Orders   1. Coronary artery disease involving native coronary artery of native heart without angina pectoris        2. Essential hypertension        3. Mixed hyperlipidemia        4. Valvular heart disease        5. Obstructive sleep apnea        6. Sinoatrial node dysfunction (HCC)        7. Pacemaker        8. Physical deconditioning              CAD-stable without symptoms of angina. Continue aspirin, atenolol, atorvastatin    Hypertension-stable on atenolol    Hyperlipidemia- continues on atorvastatin which is managed by his PCP. We will request most recent labs for review. Sinoatrial node dysfunction/pacemaker-  Pacemaker check showed adequate battery status @ 6.5 years estimated  Mode: DDDR. Lead impedances are stable  Pacing: AP 98.4%,  99.9%. Appropriate diagnostics and safety margins noted. Sustained arrythmia: None. Reprogramming done for sensitivity and threshold testing. Please see the scanned interrogation report    Valvular heart disease-history of aortic valve replacement with bioprosthetic valve in 2018. Last echo done in November 2021 showed normal functioning valve    Obstructive sleep apnea-wearing sleep equipment intermittently. Physical deconditioning-mobility is worsening.   Encourage patient to discuss physical therapy with PCP    Stable cardiovascular status. No evidence of overt heart failure,angina or dysrhythmia. Plan    Return in about 6 months (around 5/14/2023) for [facility] 3 months  Wear your sleep equipment  Consider physical therapy for mobility issues    Call with any questionsor concerns  Follow up with RITESH Vaughan - CNP for non cardiac problems  Report any new problems  Cardiovascular Fitness-Exercise as tolerated. Strive for 15 minutes of exercise most days of the week. Cardiac / HealthyDiet  Continue current medications as directed  Continue plan of treatment  It is always recommended that you bring your medicationsbottles with you to each visit - this is for your safety!        RITESH Treadwell

## 2022-11-14 NOTE — PATIENT INSTRUCTIONS
Return in about 6 months (around 5/14/2023) for APRN.    Remote [facility] 3 months  Wear your sleep equipment  Consider physical therapy for mobility issues

## 2022-11-29 RX ORDER — ATORVASTATIN CALCIUM 40 MG/1
TABLET, FILM COATED ORAL
Qty: 90 TABLET | Refills: 1 | OUTPATIENT
Start: 2022-11-29

## 2022-12-06 RX ORDER — ATORVASTATIN CALCIUM 40 MG/1
TABLET, FILM COATED ORAL
Qty: 90 TABLET | Refills: 1 | OUTPATIENT
Start: 2022-12-06

## 2022-12-12 RX ORDER — ATORVASTATIN CALCIUM 40 MG/1
TABLET, FILM COATED ORAL
Qty: 90 TABLET | Refills: 1 | Status: SHIPPED | OUTPATIENT
Start: 2022-12-12

## 2022-12-20 NOTE — TELEPHONE ENCOUNTER
Requested Prescriptions     Pending Prescriptions Disp Refills    citalopram (CELEXA) 20 MG tablet [Pharmacy Med Name: CITALOPRAM HBR 20 MG TABLET 20 Tablet] 60 tablet 5     Sig: TAKE ONE TABLET BY MOUTH DAILY       Last Office Visit: 9/15/2022  Next Office Visit: 3/16/2023  Last Medication Refill: 10/25/21 with 5 RF

## 2022-12-22 RX ORDER — CITALOPRAM 20 MG/1
TABLET ORAL
Qty: 60 TABLET | Refills: 5 | Status: SHIPPED | OUTPATIENT
Start: 2022-12-22

## 2022-12-22 RX ORDER — ATORVASTATIN CALCIUM 40 MG/1
TABLET, FILM COATED ORAL
Qty: 30 TABLET | Refills: 0 | OUTPATIENT
Start: 2022-12-22

## 2023-02-03 ENCOUNTER — OFFICE VISIT (OUTPATIENT)
Dept: UROLOGY | Facility: CLINIC | Age: 83
End: 2023-02-03
Payer: MEDICARE

## 2023-02-03 ENCOUNTER — PATIENT ROUNDING (BHMG ONLY) (OUTPATIENT)
Dept: UROLOGY | Facility: CLINIC | Age: 83
End: 2023-02-03
Payer: MEDICARE

## 2023-02-03 VITALS — HEIGHT: 69 IN | TEMPERATURE: 97.7 F | WEIGHT: 228 LBS | BODY MASS INDEX: 33.77 KG/M2

## 2023-02-03 DIAGNOSIS — N39.43 POST-VOID DRIBBLING: ICD-10-CM

## 2023-02-03 DIAGNOSIS — R21 RASH OF GENITAL AREA: Primary | ICD-10-CM

## 2023-02-03 DIAGNOSIS — N50.89 SCROTAL SWELLING: ICD-10-CM

## 2023-02-03 LAB
BILIRUB BLD-MCNC: NEGATIVE MG/DL
CLARITY, POC: CLEAR
COLOR UR: YELLOW
GLUCOSE UR STRIP-MCNC: ABNORMAL MG/DL
KETONES UR QL: NEGATIVE
LEUKOCYTE EST, POC: ABNORMAL
NITRITE UR-MCNC: NEGATIVE MG/ML
PH UR: 7 [PH] (ref 5–8)
PROT UR STRIP-MCNC: NEGATIVE MG/DL
RBC # UR STRIP: ABNORMAL /UL
SP GR UR: 1.01 (ref 1–1.03)
UROBILINOGEN UR QL: NORMAL

## 2023-02-03 PROCEDURE — 51798 US URINE CAPACITY MEASURE: CPT | Performed by: PHYSICIAN ASSISTANT

## 2023-02-03 PROCEDURE — 81001 URINALYSIS AUTO W/SCOPE: CPT | Performed by: PHYSICIAN ASSISTANT

## 2023-02-03 PROCEDURE — 99204 OFFICE O/P NEW MOD 45 MIN: CPT | Performed by: PHYSICIAN ASSISTANT

## 2023-02-03 RX ORDER — CLOTRIMAZOLE AND BETAMETHASONE DIPROPIONATE 10; .64 MG/G; MG/G
1 CREAM TOPICAL 2 TIMES DAILY
Qty: 1 EACH | Refills: 1 | Status: SHIPPED | OUTPATIENT
Start: 2023-02-03 | End: 2023-03-03

## 2023-02-03 RX ORDER — VIBEGRON 75 MG/1
1 TABLET, FILM COATED ORAL DAILY
Qty: 28 TABLET | Refills: 0 | COMMUNITY
Start: 2023-02-03 | End: 2023-03-03

## 2023-02-03 NOTE — PROGRESS NOTES
February 3, 2023    Hello, may I speak with Atul Jeong?    My name is Natasha      I am  with [facility] [place]  [address] [place] [address] [address], [address]  [facility] [address] [phone].    Before we get started may I verify your date of birth? 1940    I am calling to officially welcome you to our practice and ask about your recent visit. Is this a good time to talk? yes    Tell me about your visit with us. What things went well?  It was a good visit.  I like the doctor.       We're always looking for ways to make our patients' experiences even better. Do you have recommendations on ways we may improve?  no    Overall were you satisfied with your first visit to our practice? yes       I appreciate you taking the time to speak with me today. Is there anything else I can do for you? no      Thank you, and have a great day.

## 2023-02-14 DIAGNOSIS — Z95.0 PACEMAKER: Primary | ICD-10-CM

## 2023-02-14 DIAGNOSIS — I49.5 SINOATRIAL NODE DYSFUNCTION (HCC): ICD-10-CM

## 2023-02-14 PROCEDURE — 93296 REM INTERROG EVL PM/IDS: CPT | Performed by: CLINICAL NURSE SPECIALIST

## 2023-02-14 PROCEDURE — 93294 REM INTERROG EVL PM/LDLS PM: CPT | Performed by: CLINICAL NURSE SPECIALIST

## 2023-02-23 ENCOUNTER — TELEPHONE (OUTPATIENT)
Dept: UROLOGY | Facility: CLINIC | Age: 83
End: 2023-02-23
Payer: MEDICARE

## 2023-02-23 NOTE — TELEPHONE ENCOUNTER
Pt called today wanting several things.    1. The Gemtesa you gave him, he will be out of tomorrow. He will need more samples or a prescription called in to pharmacy if you want him to continue.     2. He wants his U/S results.    3. The cream you gave him is the same kind not a different kind. He thought you said in the office you were gonna give him a different one. But it's not working either way.

## 2023-02-24 ENCOUNTER — TELEPHONE (OUTPATIENT)
Dept: UROLOGY | Facility: CLINIC | Age: 83
End: 2023-02-24
Payer: MEDICARE

## 2023-02-24 DIAGNOSIS — N39.43 POST-VOID DRIBBLING: Primary | ICD-10-CM

## 2023-02-24 RX ORDER — VIBEGRON 75 MG/1
1 TABLET, FILM COATED ORAL DAILY
Qty: 30 TABLET | Refills: 1 | Status: SHIPPED | OUTPATIENT
Start: 2023-02-24 | End: 2023-03-31

## 2023-02-24 NOTE — TELEPHONE ENCOUNTER
Called pt to advise that Hal Stanley PA-C [facility]. Left voicemail with patient. If patient calls back, ok for HUB to tell the patient the message

## 2023-02-25 NOTE — TELEPHONE ENCOUNTER
I called patient and left a message I let him know that I had sent in a refill on the Gemtesa.  I also wanted to discuss the scrotal ultrasound he had done at [facility] which I had ordered and it showed bilateral large hydroceles and thickened scrotal wall or scrotal wall edema which I think is chronic in nature from irritation and itching.  I told him I would try to contact him next week on Monday to go over some of the findings on the ultrasound and recommendations I have.

## 2023-02-27 ENCOUNTER — TELEPHONE (OUTPATIENT)
Dept: UROLOGY | Facility: CLINIC | Age: 83
End: 2023-02-27
Payer: MEDICARE

## 2023-02-27 DIAGNOSIS — L29.1 SCROTAL ITCHING: Primary | ICD-10-CM

## 2023-02-27 RX ORDER — CLOBETASOL PROPIONATE 0.5 MG/G
1 CREAM TOPICAL 2 TIMES DAILY
Qty: 45 G | Refills: 1 | Status: SHIPPED | OUTPATIENT
Start: 2023-02-27 | End: 2023-03-13

## 2023-02-27 RX ORDER — HYDROXYZINE HYDROCHLORIDE 25 MG/1
25 TABLET, FILM COATED ORAL EVERY 8 HOURS PRN
Qty: 30 TABLET | Refills: 1 | Status: SHIPPED | OUTPATIENT
Start: 2023-02-27 | End: 2023-03-09

## 2023-02-27 NOTE — TELEPHONE ENCOUNTER
I called patient regarding his scrotal ultrasound results Pam discussed the findings and recommended treatment options.  I also told him that I had sent in a prescription for gemetsa 75 mg he has taken his proxy 1 months worth I wrote a prescription for 2 months like him to try for at least 3 months total and then return in 2 months to assess symptom change or improvement.  Regarding his scrotum that showed large bilateral hydroceles I do not think this is something that indicates going to require surgery he is not really bothered by the swelling or does not have any pain.  Regarding the scrotal skin edema or thickening due to the fact he has had chronic itching for few years could be lichen simplex.  I think the cycle of itching the scrotal skin and head of the penis is caused skin tissue thickening I recommend a topical stronger oral steroid and I am also going to prescribe Atarax to take as needed for itching.  He understood my recommendations and plan and he will return in approximately 2 months for follow-up.

## 2023-03-09 DIAGNOSIS — L29.1 SCROTAL ITCHING: ICD-10-CM

## 2023-03-09 RX ORDER — ATORVASTATIN CALCIUM 40 MG/1
TABLET, FILM COATED ORAL
Qty: 90 TABLET | Refills: 1 | OUTPATIENT
Start: 2023-03-09

## 2023-03-09 RX ORDER — HYDROXYZINE HYDROCHLORIDE 25 MG/1
25 TABLET, FILM COATED ORAL EVERY 8 HOURS PRN
Qty: 30 TABLET | Refills: 1 | Status: SHIPPED | OUTPATIENT
Start: 2023-03-09 | End: 2023-03-19

## 2023-03-16 ENCOUNTER — OFFICE VISIT (OUTPATIENT)
Dept: NEUROLOGY | Age: 83
End: 2023-03-16
Payer: MEDICARE

## 2023-03-16 VITALS
SYSTOLIC BLOOD PRESSURE: 107 MMHG | RESPIRATION RATE: 20 BRPM | HEIGHT: 70 IN | WEIGHT: 210 LBS | DIASTOLIC BLOOD PRESSURE: 60 MMHG | BODY MASS INDEX: 30.06 KG/M2 | HEART RATE: 62 BPM

## 2023-03-16 DIAGNOSIS — G25.81 RESTLESS LEGS SYNDROME: ICD-10-CM

## 2023-03-16 DIAGNOSIS — R41.3 MEMORY LOSS: ICD-10-CM

## 2023-03-16 DIAGNOSIS — G45.0 VERTEBROBASILAR ARTERY INSUFFICIENCY: ICD-10-CM

## 2023-03-16 DIAGNOSIS — G47.33 OBSTRUCTIVE SLEEP APNEA: ICD-10-CM

## 2023-03-16 DIAGNOSIS — E11.42 DIABETIC POLYNEUROPATHY ASSOCIATED WITH TYPE 2 DIABETES MELLITUS (HCC): Primary | ICD-10-CM

## 2023-03-16 PROCEDURE — G8427 DOCREV CUR MEDS BY ELIG CLIN: HCPCS | Performed by: PSYCHIATRY & NEUROLOGY

## 2023-03-16 PROCEDURE — 3074F SYST BP LT 130 MM HG: CPT | Performed by: PSYCHIATRY & NEUROLOGY

## 2023-03-16 PROCEDURE — 3078F DIAST BP <80 MM HG: CPT | Performed by: PSYCHIATRY & NEUROLOGY

## 2023-03-16 PROCEDURE — 1123F ACP DISCUSS/DSCN MKR DOCD: CPT | Performed by: PSYCHIATRY & NEUROLOGY

## 2023-03-16 PROCEDURE — G8484 FLU IMMUNIZE NO ADMIN: HCPCS | Performed by: PSYCHIATRY & NEUROLOGY

## 2023-03-16 PROCEDURE — G8417 CALC BMI ABV UP PARAM F/U: HCPCS | Performed by: PSYCHIATRY & NEUROLOGY

## 2023-03-16 PROCEDURE — 99213 OFFICE O/P EST LOW 20 MIN: CPT | Performed by: PSYCHIATRY & NEUROLOGY

## 2023-03-16 PROCEDURE — 1036F TOBACCO NON-USER: CPT | Performed by: PSYCHIATRY & NEUROLOGY

## 2023-03-16 NOTE — PATIENT INSTRUCTIONS
INSTRUCTIONS:  Take the Mirapex 1.5 mg three times a day  Take the hydrocodone in the late afternoon or evening and another at bedtime

## 2023-03-16 NOTE — PROGRESS NOTES
[facility]  [address] [place] [address] Drive, [address] [place] [address]  [place]  Phone [phone]  Fax [phone]     [facility] Follow Up Encounter  3/16/23 10:58 AM CDT    Information:   Patient Name: Star Alcocer  :   1940  Age:   79 y.o. MRN:   [id number]  Account #:  [de-identified]  Today:  3/16/23    Provider: Janina Hardy M.D. Chief Complaint:   Chief Complaint   Patient presents with    Follow-up       Subjective: Star Alcocer is a 79 y.o. man with a history of diabetic polyneuropathy, vertebrobasilar insufficiency, obstructive sleep apnea, and restless legs syndrome who is following up. He has numbness and tingling in his feet and up to the knees. He has burning sensation in his feet. He has had intermittent tingling and numbness in the hands. His RLS has been aggravating. His legs jump. It keeps him awake. He has previously been on gabapentin, Lyrica, Cymbalta, Sinemet, Requip. He takes hydrocodone and Mirapex presently. He has chronic back pain. He has urinary urgency for which he takes Myrbetric. He is getting PT. He has had no lightheadedness or syncope lately. He says his nose is stuffy and he has not been able to use his BiPAP.         Objective:     Past Medical History:  Past Medical History:   Diagnosis Date    Aortic valve stenosis     Arthritis     Chest tightness, discomfort, or pressure 2012    Chronic back pain     CKD (chronic kidney disease)     CPAP (continuous positive airway pressure) dependence     13cm    Diabetes [address]     Diabetic polyneuropathy associated with type 2 diabetes mellitus ([facility] 2016    GERD (gastroesophageal reflux disease)     HTN (hypertension)     Hyperlipemia     Left ventricular diastolic dysfunction     Mitral stenosis     Mitral valve stenosis     Neuropathy     Obstructive sleep apnea     AHI: 34.5 per PSG, 2013; AHI: 36.9 per PSG, 2015; AHI: 54.5 per PSG, 3/2017    Pinched nerve in neck     Restless legs syndrome

## 2023-03-28 NOTE — PROGRESS NOTES
Subjective    Mr. Jeong is 82 y.o. male    Chief Complaint: Scrotal Itching/post void leaking    History of Present Illness  Patient is an 82-year-old gentleman who presents for follow-up from 02/03/2023.  Patient has history of hydrocele scrotal swelling he has had for years scrotal ultrasound reveals bilateral large hydroceles.  Some scrotal wall thickening.  Had chronic scrotal itching dating back several years when he saw Dr. Harman 2018 he was having the same itching.  He has been on several different creams he thinks the Lotrisone may help he is also got red area involving the glans penis.    Patient also has some postvoid dribbling I gave him prescription for Gemtesa.  Patient thinks it might be mild improvement in the postvoid leaking.    The following portions of the patient's history were reviewed and updated as appropriate: allergies, current medications, past family history, past medical history, past social history, past surgical history and problem list.    Review of Systems   Constitutional: Negative.    HENT: Negative.    Eyes: Negative.    Respiratory: Negative.    Cardiovascular: Negative.    Gastrointestinal: Negative.    Endocrine: Negative.    Genitourinary:        Scrotal itching and postvoid dribbling   Musculoskeletal: Negative.    Skin: Negative.    Allergic/Immunologic: Negative.    Neurological: Negative.    Hematological: Negative.    Psychiatric/Behavioral: Negative.          Current Outpatient Medications:   •  aspirin 81 MG chewable tablet, Chew 1 tablet Daily., Disp: , Rfl:   •  atenolol (TENORMIN) 25 MG tablet, Take 1 tablet by mouth Daily., Disp: , Rfl:   •  atorvastatin (LIPITOR) 40 MG tablet, Take 1 tablet by mouth Every Morning., Disp: , Rfl:   •  Cholecalciferol (Vitamin D3) 1.25 MG (39643 UT) tablet, Take 1 tablet by mouth Every 30 (Thirty) Days. Takes the 1st day Q month, Disp: , Rfl:   •  citalopram (CeleXA) 10 MG tablet, Take 2 tablets by mouth Daily., Disp: , Rfl:   •   clopidogrel (PLAVIX) 75 MG tablet, Take 1 tablet by mouth Daily. Hold x 5 days a/ Day of procedure 6/15/21; [facility], Disp: , Rfl:   •  dapagliflozin-metformin HCl ER (XIGDUO XR)  MG tablet, Take 1 tablet by mouth Daily. - METFORMIN -, Disp: , Rfl:   •  dicyclomine (BENTYL) 10 MG capsule, Take 1 capsule by mouth 2 (two) times a day., Disp: , Rfl:   •  esomeprazole (nexIUM) 40 MG capsule, Take 1 capsule by mouth Every Morning Before Breakfast., Disp: , Rfl:   •  ezetimibe (ZETIA) 10 MG tablet, Take 1 tablet by mouth Daily., Disp: , Rfl:   •  furosemide (LASIX) 20 MG tablet, Take 1 tablet by mouth Every Morning., Disp: , Rfl:   •  Gemtesa 75 MG tablet, TAKE ONE TABLET BY MOUTH DAILY, Disp: 30 tablet, Rfl: 1  •  HumuLIN R U-500 KwikPen 500 UNIT/ML solution pen-injector CONCENTRATED injection, INJECT UP  UNITS 30 MINUTES BEFORE BREAKFAST AND UP TO 85 UNITS 30 MINUTES BEFORE SUPPER (Patient taking differently: Inject 80 Units under the skin into the appropriate area as directed. Give 30 minutes before Breakfast & 30 minutes before super), Disp: 12 mL, Rfl: 5  •  HYDROcodone-acetaminophen (NORCO)  MG per tablet, Take 1 tablet by mouth Every 8 (Eight) Hours As Needed for Moderate Pain., Disp: , Rfl:   •  Insulin Pen Needle 31G X 5 MM misc, , Disp: , Rfl:   •  pramipexole (MIRAPEX) 1.5 MG tablet, Take 1 tablet by mouth 3 (Three) Times a Day., Disp: , Rfl:   •  Mirabegron ER (MYRBETRIQ) 25 MG tablet sustained-release 24 hour 24 hr tablet, Take 1 tablet by mouth Daily. (Patient not taking: Reported on 4/5/2023), Disp: , Rfl:     Past Medical History:   Diagnosis Date   • Arthritis    • B12 deficiency 8/23/2017   • Coronary artery disease    • Deafness in left ear    • Embedded foreign body 09/2020    Embedded fish hook Left hand, w/ infection   • Foot ulcer    • GERD (gastroesophageal reflux disease)    • Hypertension associated with  "diabetes 8/23/2017   • Ingrown toenail    • Joint pain    • Mixed diabetic hyperlipidemia associated with type 2 diabetes mellitus 8/23/2017   • Neuropathy in diabetes    • Shoulder disorder     Left   • Type 2 diabetes mellitus with hyperglycemia, with long-term current use of insulin 8/23/2017   • Vitamin D deficiency 8/23/2017       Past Surgical History:   Procedure Laterality Date   • AORTIC VALVE REPAIR/REPLACEMENT  06/07/2018   • APPENDECTOMY     • BACK SURGERY     • CARDIAC CATHETERIZATION  03/19/2020    stent x3   • CATARACT EXTRACTION     • CHOLECYSTECTOMY     • CORONARY ARTERY BYPASS GRAFT     • INCISION AND DRAINAGE ABSCESS      Left hand   • INCISION AND DRAINAGE ARM Left 9/24/2020    Procedure: INCISION AND DRAINAGE LEFT HAND;  Surgeon: Ming Ramos MD;  Location: [facility] OR;  Service: Orthopedics;  Laterality: Left;   • INNER EAR SURGERY Left    • PACEMAKER IMPLANTATION  06/28/2020       Social History     Socioeconomic History   • Marital status: Single   Tobacco Use   • Smoking status: Former     Types: Cigarettes   • Smokeless tobacco: Never   Substance and Sexual Activity   • Alcohol use: Not Currently   • Drug use: No   • Sexual activity: Defer       Family History   Problem Relation Age of Onset   • Diabetes Mother    • Cancer Father    • Cancer Sister    • Cancer Brother    • Heart disease Brother    • Diabetes Brother        Objective    Ht 177.8 cm (70\")   Wt 97.1 kg (214 lb)   BMI 30.71 kg/m²     Physical Exam  Vitals reviewed.   Constitutional:       Appearance: Normal appearance.   HENT:      Head: Normocephalic and atraumatic.   Pulmonary:      Effort: Pulmonary effort is normal.   Genitourinary:     Comments: Enlarged scrotum which is unchanged from previous exam.  The excoriation of scrotal skin has now resolved there is still erythema of the scrotal skin.  The glans penis has an area of inflamed looking tissue close to the joint of the glans with the foreskin.  Skin:     General: " Skin is dry.   Neurological:      Mental Status: He is alert and oriented to person, place, and time.   Psychiatric:         Behavior: Behavior normal.             Results for orders placed or performed in visit on 02/03/23   POC Urinalysis Dipstick, Multipro    Specimen: Urine   Result Value Ref Range    Color Yellow Yellow, Straw, Dark Yellow, Ivon    Clarity, UA Clear Clear    Glucose,  mg/dL (A) Negative mg/dL    Bilirubin Negative Negative    Ketones, UA Negative Negative    Specific Gravity  1.015 1.005 - 1.030    Blood, UA Trace (A) Negative    pH, Urine 7.0 5.0 - 8.0    Protein, POC Negative Negative mg/dL    Urobilinogen, UA Normal Normal, 0.2 E.U./dL    Nitrite, UA Negative Negative    Leukocytes Small (1+) (A) Negative     Assessment and Plan    Diagnoses and all orders for this visit:    1. Scrotal itching (Primary)  -     Cancel: POC Urinalysis Dipstick, Multipro  -     Ambulatory Referral to Dermatology    2. Penile rash  -     Ambulatory Referral to Dermatology    His chronic itching and irritation of the scrotum of the scrotum looked improved from previous due to the fact there is no open areas due to itching however it is still erythematous he still bothered by itching I told him to continue the Lotrisone cream as previously prescribed I also want him to be referred to dermatology I think we need dermatology expertise as I have tried various treatments over the few years without success.  I would like him to return in approximately 6 weeks to evaluate the area on his glans it is a reddened nonpainful area.    Regarding his postvoid dribble I did explain this could be a difficult symptom to treat and may not resolve it has had slight improvement on the Gemtesa which I told him to continue.

## 2023-03-31 DIAGNOSIS — N39.43 POST-VOID DRIBBLING: ICD-10-CM

## 2023-03-31 RX ORDER — VIBEGRON 75 MG/1
TABLET, FILM COATED ORAL
Qty: 30 TABLET | Refills: 1 | Status: SHIPPED | OUTPATIENT
Start: 2023-03-31

## 2023-04-05 ENCOUNTER — OFFICE VISIT (OUTPATIENT)
Dept: UROLOGY | Facility: CLINIC | Age: 83
End: 2023-04-05
Payer: COMMERCIAL

## 2023-04-05 VITALS — BODY MASS INDEX: 30.64 KG/M2 | WEIGHT: 214 LBS | HEIGHT: 70 IN

## 2023-04-05 DIAGNOSIS — L29.1 SCROTAL ITCHING: Primary | ICD-10-CM

## 2023-04-05 DIAGNOSIS — R21 PENILE RASH: ICD-10-CM

## 2023-04-05 PROCEDURE — 99214 OFFICE O/P EST MOD 30 MIN: CPT | Performed by: PHYSICIAN ASSISTANT

## 2023-04-20 DIAGNOSIS — E11.42 DIABETIC POLYNEUROPATHY ASSOCIATED WITH TYPE 2 DIABETES MELLITUS (HCC): ICD-10-CM

## 2023-04-20 RX ORDER — HYDROCODONE BITARTRATE AND ACETAMINOPHEN 7.5; 325 MG/1; MG/1
1 TABLET ORAL EVERY 8 HOURS PRN
Qty: 90 TABLET | Refills: 0 | Status: SHIPPED | OUTPATIENT
Start: 2023-04-20 | End: 2023-05-20

## 2023-04-20 NOTE — TELEPHONE ENCOUNTER
Requested Prescriptions     Pending Prescriptions Disp Refills    HYDROcodone-acetaminophen (LORCET PLUS) 7.5-325 MG per tablet 90 tablet 0     Sig: Take 1 tablet by mouth every 8 hours as needed for Pain for up to 30 days.  Intended supply: 30 days       Last Office Visit:  3/16/2023  Next Office Visit:  9/21/2023  Last Medication Refill:  9/16/2022  Noelle Escalona up to date:  4/20/23    *RX updated to reflect   4/20/23  fill date*    Dr. Rosemarie Moreland patient

## 2023-04-20 NOTE — TELEPHONE ENCOUNTER
Rubi Ortiz is requesting a refill of his : -   HYDROcodone-acetaminophen (LORCET HD)  MG per tablet      Last Appt:  3/16/2023  Next Appt:   9/21/2023  Preferred pharmacy: [id number]  [place] [id number]

## 2023-04-28 DIAGNOSIS — L29.1 SCROTAL ITCHING: ICD-10-CM

## 2023-04-28 RX ORDER — HYDROXYZINE HYDROCHLORIDE 25 MG/1
TABLET, FILM COATED ORAL
Qty: 30 TABLET | Refills: 1 | Status: SHIPPED | OUTPATIENT
Start: 2023-04-28

## 2023-05-10 ENCOUNTER — OFFICE VISIT (OUTPATIENT)
Dept: CARDIOLOGY CLINIC | Age: 83
End: 2023-05-10
Payer: MEDICARE

## 2023-05-10 VITALS
DIASTOLIC BLOOD PRESSURE: 80 MMHG | WEIGHT: 212 LBS | HEIGHT: 69 IN | HEART RATE: 76 BPM | SYSTOLIC BLOOD PRESSURE: 130 MMHG | BODY MASS INDEX: 31.4 KG/M2

## 2023-05-10 DIAGNOSIS — I25.10 CORONARY ARTERY DISEASE INVOLVING NATIVE CORONARY ARTERY OF NATIVE HEART WITHOUT ANGINA PECTORIS: ICD-10-CM

## 2023-05-10 DIAGNOSIS — I10 ESSENTIAL HYPERTENSION: ICD-10-CM

## 2023-05-10 DIAGNOSIS — I38 VALVULAR HEART DISEASE: ICD-10-CM

## 2023-05-10 DIAGNOSIS — I49.5 SINOATRIAL NODE DYSFUNCTION (HCC): Primary | ICD-10-CM

## 2023-05-10 PROCEDURE — 3075F SYST BP GE 130 - 139MM HG: CPT | Performed by: INTERNAL MEDICINE

## 2023-05-10 PROCEDURE — G8427 DOCREV CUR MEDS BY ELIG CLIN: HCPCS | Performed by: INTERNAL MEDICINE

## 2023-05-10 PROCEDURE — G8417 CALC BMI ABV UP PARAM F/U: HCPCS | Performed by: INTERNAL MEDICINE

## 2023-05-10 PROCEDURE — 1036F TOBACCO NON-USER: CPT | Performed by: INTERNAL MEDICINE

## 2023-05-10 PROCEDURE — 99214 OFFICE O/P EST MOD 30 MIN: CPT | Performed by: INTERNAL MEDICINE

## 2023-05-10 PROCEDURE — 1123F ACP DISCUSS/DSCN MKR DOCD: CPT | Performed by: INTERNAL MEDICINE

## 2023-05-10 PROCEDURE — 93280 PM DEVICE PROGR EVAL DUAL: CPT | Performed by: INTERNAL MEDICINE

## 2023-05-10 PROCEDURE — 3079F DIAST BP 80-89 MM HG: CPT | Performed by: INTERNAL MEDICINE

## 2023-05-10 RX ORDER — BETAMETHASONE DIPROPIONATE 0.5 MG/G
OINTMENT TOPICAL
COMMUNITY
Start: 2023-05-04

## 2023-05-10 RX ORDER — AMOXICILLIN 500 MG/1
CAPSULE ORAL
COMMUNITY
End: 2023-05-10

## 2023-05-10 RX ORDER — VIBEGRON 75 MG/1
TABLET, FILM COATED ORAL DAILY
COMMUNITY

## 2023-05-10 ASSESSMENT — ENCOUNTER SYMPTOMS
COUGH: 0
SHORTNESS OF BREATH: 1
BLOOD IN STOOL: 0
BACK PAIN: 0
ABDOMINAL PAIN: 0
WHEEZING: 0
VOMITING: 0
ABDOMINAL DISTENTION: 0
DIARRHEA: 0

## 2023-05-10 NOTE — PROGRESS NOTES
Pacemaker interrogated  Presenting rhythm:  AP/, AP 97.5%,  99.8%  Battey voltage 5.6 years  Lead status:  Lead impedance within range and stable  Sensing:  P waves 1.3 mV,  R waves 11.1 mV  Thresholds:  Atrial 0.750 V @ 0.4ms, ventricular 1.000 V @ 0.4ms  Observations:  None  See scanned report for details  Reprogramming for sensitivity and threshold testing  Next [facility] appointment:  08/10/23

## 2023-05-10 NOTE — PROGRESS NOTES
[address] [facility]  Cardiology Office Note  [address] [place] [address] [address]  Phone: [phone]  Fax: [phone]                            Date:  5/10/2023  Patient: Dimas Conner  Age:  79 y.o., 1940    Referral: No ref.  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 09/16/2020     Priority: Low    Cellulitis of left hand and arm 09/14/2020     Priority: Low    Abscess of left hand 09/14/2020     Priority: Low    Pacemaker lead malfunction 08/05/2020     Priority: Low    Pacemaker malfunction, initial encounter 06/29/2020     Priority: Low    Bradycardia 03/17/2020     Priority: Low    Near syncope 11/14/2019     Priority: Low    History of syncope 11/14/2019     Priority: Low    Sinoatrial node dysfunction ([facility] [address] 11/14/2019     Priority: Low    Mixed hyperlipidemia 11/14/2019     Priority: Low    S/P aortic valve replacement with bioprosthetic valve 08/22/2019     Priority: Low    Grade I diastolic dysfunction 77/68/9047     Priority: Low    Pacemaker 08/22/2019     Priority: Low    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     Priority: Low    Hyponatremia 06/11/2018     Priority: Low    Hypercholesterolemia with hypertriglyceridemia 06/11/2018     Priority: Low    Rheumatic aortic valve insufficiency      Priority: Low    Moderate mitral stenosis      Priority: Low    Pinched nerve in neck 04/18/2018     Priority: Low    CPAP (continuous positive airway pressure) dependence 03/08/2018     Priority: Low    BiPAP (biphasic positive airway pressure) dependence      Priority: Low     Overview Note:     16cm/12cm      Bilateral carpal tunnel syndrome 08/28/2017     Priority: Low    Diabetic polyneuropathy associated with type 2 diabetes mellitus ([address] 08/29/2016

## 2023-05-16 NOTE — PROGRESS NOTES
Subjective    Mr. Jeong is 82 y.o. male    Chief Complaint: Scrotal itching    History of Present Illness  Patient I saw at 04/05/2023 with scrotal itching and irritated area on his glans penis is here for follow-up.  He was having also some postvoid leaking but this is improved with Gemtesa.  I did refer him to dermatology for the scrotal itching and irritation.  Have been chronic going on for several years.  Patient has had improvement in the area both in the scrotum and the glans penis he saw Dr. Tay dermatologist and he was placed on a cream that he mixes together clobetasol 0.05% and Ciciopirox 0.77 % topical ointment.    The following portions of the patient's history were reviewed and updated as appropriate: allergies, current medications, past family history, past medical history, past social history, past surgical history and problem list.    Review of Systems   Constitutional:  Negative for chills and fever.   Gastrointestinal:  Negative for abdominal pain, anal bleeding and blood in stool.   Genitourinary:  Positive for urgency. Negative for dysuria and hematuria.       Current Outpatient Medications:     aspirin 81 MG chewable tablet, Chew 1 tablet Daily., Disp: , Rfl:     atenolol (TENORMIN) 25 MG tablet, Take 1 tablet by mouth Daily., Disp: , Rfl:     atorvastatin (LIPITOR) 40 MG tablet, Take 1 tablet by mouth Every Morning., Disp: , Rfl:     betamethasone, augmented, (DIPROLENE) 0.05 % ointment, , Disp: , Rfl:     Cholecalciferol (Vitamin D3) 1.25 MG (39708 UT) tablet, Take 1 tablet by mouth Every 30 (Thirty) Days. Takes the 1st day Q month, Disp: , Rfl:     ciclopirox (LOPROX) 0.77 % cream, , Disp: , Rfl:     citalopram (CeleXA) 10 MG tablet, Take 2 tablets by mouth Daily., Disp: , Rfl:     clobetasol (TEMOVATE) 0.05 % ointment, , Disp: , Rfl:     clopidogrel (PLAVIX) 75 MG tablet, Take 1 tablet by mouth Daily. Hold x 5 days a/ Day of procedure 6/15/21; May resume thereafter, per [facility], Disp: , Rfl:     dapagliflozin-metformin HCl ER (XIGDUO XR)  MG tablet, Take 1 tablet by mouth Daily. - METFORMIN -, Disp: , Rfl:     dicyclomine (BENTYL) 10 MG capsule, Take 1 capsule by mouth 2 (two) times a day., Disp: , Rfl:     esomeprazole (nexIUM) 40 MG capsule, Take 1 capsule by mouth Every Morning Before Breakfast., Disp: , Rfl:     ezetimibe (ZETIA) 10 MG tablet, Take 1 tablet by mouth Daily., Disp: , Rfl:     furosemide (LASIX) 20 MG tablet, Take 1 tablet by mouth Every Morning., Disp: , Rfl:     Gemtesa 75 MG tablet, TAKE ONE TABLET BY MOUTH DAILY, Disp: 30 tablet, Rfl: 1    HumuLIN R U-500 KwikPen 500 UNIT/ML solution pen-injector CONCENTRATED injection, INJECT UP  UNITS 30 MINUTES BEFORE BREAKFAST AND UP TO 85 UNITS 30 MINUTES BEFORE SUPPER (Patient taking differently: Inject 80 Units under the skin into the appropriate area as directed. Give 30 minutes before Breakfast & 30 minutes before super), Disp: 12 mL, Rfl: 5    HYDROcodone-acetaminophen (NORCO)  MG per tablet, Take 1 tablet by mouth Every 8 (Eight) Hours As Needed for Moderate Pain., Disp: , Rfl:     hydrOXYzine (ATARAX) 25 MG tablet, TAKE ONE TABLET BY MOUTH EVERY 8 HOURS AS NEEDED FOR ITCHING FOR UP TO 10 DAYS **MAY MAKE DROWSY**, Disp: 30 tablet, Rfl: 1    Insulin Pen Needle 31G X 5 MM misc, , Disp: , Rfl:     Mirabegron ER (MYRBETRIQ) 25 MG tablet sustained-release 24 hour 24 hr tablet, Take 1 tablet by mouth Daily., Disp: , Rfl:     pramipexole (MIRAPEX) 1.5 MG tablet, Take 1 tablet by mouth 3 (Three) Times a Day., Disp: , Rfl:     Past Medical History:   Diagnosis Date    Arthritis     B12 deficiency 8/23/2017    Coronary artery disease     Deafness in left ear     Embedded foreign body 09/2020    Embedded fish hook Left hand, w/ infection    Foot ulcer     GERD (gastroesophageal reflux disease)     Hypertension associated with diabetes 8/23/2017    Ingrown toenail      "Joint pain     Mixed diabetic hyperlipidemia associated with type 2 diabetes mellitus 8/23/2017    Neuropathy in diabetes     Shoulder disorder     Left    Type 2 diabetes mellitus with hyperglycemia, with long-term current use of insulin 8/23/2017    Vitamin D deficiency 8/23/2017       Past Surgical History:   Procedure Laterality Date    AORTIC VALVE REPAIR/REPLACEMENT  06/07/2018    APPENDECTOMY      BACK SURGERY      CARDIAC CATHETERIZATION  03/19/2020    stent x3    CATARACT EXTRACTION      CHOLECYSTECTOMY      CORONARY ARTERY BYPASS GRAFT      INCISION AND DRAINAGE ABSCESS      Left hand    INCISION AND DRAINAGE ARM Left 9/24/2020    Procedure: INCISION AND DRAINAGE LEFT HAND;  Surgeon: Ming Ramos MD;  Location: [facility] OR;  Service: Orthopedics;  Laterality: Left;    INNER EAR SURGERY Left     PACEMAKER IMPLANTATION  06/28/2020       Social History     Socioeconomic History    Marital status: Single   Tobacco Use    Smoking status: Former     Types: Cigarettes    Smokeless tobacco: Never   Substance and Sexual Activity    Alcohol use: Not Currently    Drug use: No    Sexual activity: Defer       Family History   Problem Relation Age of Onset    Diabetes Mother     Cancer Father     Cancer Sister     Cancer Brother     Heart disease Brother     Diabetes Brother        Objective    Temp 97.3 °F (36.3 °C)   Ht 177.8 cm (70\")   Wt 96.3 kg (212 lb 3.2 oz)   BMI 30.45 kg/m²     Physical Exam  Vitals reviewed.   Constitutional:       Appearance: Normal appearance.   HENT:      Head: Normocephalic and atraumatic.   Pulmonary:      Effort: Pulmonary effort is normal.   Genitourinary:     Comments: The skin of the scrotum looks dramatically improved from previous visit there is no excoriation no open areas.  Just mild erythema.  Also the area involving the glans penis is improved from previous visit.  Neurological:      Mental Status: He is alert and oriented to person, place, and time.   Psychiatric:         " Mood and Affect: Mood normal.         Behavior: Behavior normal.           Results for orders placed or performed in visit on 05/18/23   POC Urinalysis Dipstick, Multipro    Specimen: Urine   Result Value Ref Range    Color Yellow Yellow, Straw, Dark Yellow, Ivon    Clarity, UA Cloudy (A) Clear    Glucose, UA >=1000 mg/dL (3+) (A) Negative mg/dL    Bilirubin Negative Negative    Ketones, UA Negative Negative    Specific Gravity  1.015 1.005 - 1.030    Blood, UA Small (A) Negative    pH, Urine 6.0 5.0 - 8.0    Protein, POC Negative Negative mg/dL    Urobilinogen, UA 2.0 E.U./dL (A) Normal, 0.2 E.U./dL    Nitrite, UA Positive (A) Negative    Leukocytes Small (1+) (A) Negative     Assessment and Plan    Diagnoses and all orders for this visit:    1. Scrotal itching (Primary)  -     POC Urinalysis Dipstick, Multipro    2. Penile rash            Patient has had considerable improvement. Also the area of the glans is significantly improved from previous.  He has had some improvement also in the leaking on Gemtesa which she will continue.  Urine suspicious for infection will send urine for culture and treat accordingly per results.

## 2023-05-17 ENCOUNTER — HOSPITAL ENCOUNTER (OUTPATIENT)
Dept: NON INVASIVE DIAGNOSTICS | Age: 83
Discharge: HOME | End: 2023-05-17
Payer: MEDICARE

## 2023-05-17 DIAGNOSIS — I49.5 SINOATRIAL NODE DYSFUNCTION (HCC): ICD-10-CM

## 2023-05-17 DIAGNOSIS — I25.10 CORONARY ARTERY DISEASE INVOLVING NATIVE CORONARY ARTERY OF NATIVE HEART WITHOUT ANGINA PECTORIS: ICD-10-CM

## 2023-05-17 DIAGNOSIS — I10 ESSENTIAL HYPERTENSION: ICD-10-CM

## 2023-05-17 LAB
LV EF: 45 %
LVEF MODALITY: NORMAL

## 2023-05-17 PROCEDURE — C8929 TTE W OR WO FOL WCON,DOPPLER: HCPCS

## 2023-05-17 PROCEDURE — 6360000004 HC RX CONTRAST MEDICATION: Performed by: INTERNAL MEDICINE

## 2023-05-17 RX ADMIN — PERFLUTREN 1.5 ML: 6.52 INJECTION, SUSPENSION INTRAVENOUS at 11:09

## 2023-05-18 ENCOUNTER — OFFICE VISIT (OUTPATIENT)
Dept: UROLOGY | Facility: CLINIC | Age: 83
End: 2023-05-18
Payer: MEDICARE

## 2023-05-18 VITALS — BODY MASS INDEX: 30.38 KG/M2 | HEIGHT: 70 IN | WEIGHT: 212.2 LBS | TEMPERATURE: 97.3 F

## 2023-05-18 DIAGNOSIS — R35.0 FREQUENCY OF URINATION: ICD-10-CM

## 2023-05-18 DIAGNOSIS — R21 PENILE RASH: ICD-10-CM

## 2023-05-18 DIAGNOSIS — L29.1 SCROTAL ITCHING: Primary | ICD-10-CM

## 2023-05-18 LAB
BILIRUB BLD-MCNC: NEGATIVE MG/DL
CLARITY, POC: ABNORMAL
COLOR UR: YELLOW
GLUCOSE UR STRIP-MCNC: ABNORMAL MG/DL
KETONES UR QL: NEGATIVE
LEUKOCYTE EST, POC: ABNORMAL
NITRITE UR-MCNC: POSITIVE MG/ML
PH UR: 6 [PH] (ref 5–8)
PROT UR STRIP-MCNC: NEGATIVE MG/DL
RBC # UR STRIP: ABNORMAL /UL
SP GR UR: 1.01 (ref 1–1.03)
UROBILINOGEN UR QL: ABNORMAL

## 2023-05-18 PROCEDURE — 87086 URINE CULTURE/COLONY COUNT: CPT | Performed by: PHYSICIAN ASSISTANT

## 2023-05-18 PROCEDURE — 87088 URINE BACTERIA CULTURE: CPT | Performed by: PHYSICIAN ASSISTANT

## 2023-05-18 PROCEDURE — 87186 SC STD MICRODIL/AGAR DIL: CPT | Performed by: PHYSICIAN ASSISTANT

## 2023-05-18 RX ORDER — BETAMETHASONE DIPROPIONATE 0.5 MG/G
OINTMENT TOPICAL
COMMUNITY
Start: 2023-05-04

## 2023-05-18 RX ORDER — CLOBETASOL PROPIONATE 0.5 MG/G
OINTMENT TOPICAL
COMMUNITY
Start: 2023-04-20

## 2023-05-19 ENCOUNTER — TELEPHONE (OUTPATIENT)
Dept: CARDIOLOGY CLINIC | Age: 83
End: 2023-05-19

## 2023-05-19 NOTE — TELEPHONE ENCOUNTER
----- Message from Miesha Segundo MD sent at 5/18/2023 11:29 PM CDT -----  Please let patient know I have reviewed his echocardiogram.  His aortic valve does show some mild to moderate stenosis which is slightly increased from 2021. We will need to repeat his echo next year to ensure no worsening.

## 2023-05-20 LAB — BACTERIA SPEC AEROBE CULT: ABNORMAL

## 2023-05-22 DIAGNOSIS — N39.0 URINARY TRACT INFECTION WITHOUT HEMATURIA, SITE UNSPECIFIED: Primary | ICD-10-CM

## 2023-05-22 RX ORDER — CEFDINIR 300 MG/1
300 CAPSULE ORAL 2 TIMES DAILY
Qty: 20 CAPSULE | Refills: 0 | Status: SHIPPED | OUTPATIENT
Start: 2023-05-22 | End: 2023-06-01

## 2023-05-26 DIAGNOSIS — N39.43 POST-VOID DRIBBLING: ICD-10-CM

## 2023-05-26 RX ORDER — VIBEGRON 75 MG/1
TABLET, FILM COATED ORAL
Qty: 30 TABLET | Refills: 1 | Status: SHIPPED | OUTPATIENT
Start: 2023-05-26

## 2023-06-20 RX ORDER — ATORVASTATIN CALCIUM 40 MG/1
TABLET, FILM COATED ORAL
Qty: 90 TABLET | Refills: 1 | OUTPATIENT
Start: 2023-06-20

## 2023-06-20 NOTE — TELEPHONE ENCOUNTER
Yadira Justice is requesting a refill of their   Requested Prescriptions     Pending Prescriptions Disp Refills    atorvastatin (LIPITOR) 40 MG tablet [Pharmacy Med Name: ATORVASTATIN 40 MG TABLET 40 Tablet] 90 tablet 1     Sig: TAKE ONE TABLET BY MOUTH NIGHTLY   . Please advise. Last Appt:  5/10/2023  Next Appt:   11/14/2023  Preferred pharmacy:     [facility], [address]       Patient states he is out of his medication

## 2023-06-22 NOTE — TELEPHONE ENCOUNTER
Pt son called back, he said the pt has been out of meds for a week. Also not sure why they need another appt after just being seen less than a month ago. Thanks !

## 2023-06-23 RX ORDER — ATORVASTATIN CALCIUM 40 MG/1
TABLET, FILM COATED ORAL
Qty: 30 TABLET | Refills: 0 | Status: SHIPPED | OUTPATIENT
Start: 2023-06-23

## 2023-07-05 ENCOUNTER — TELEPHONE (OUTPATIENT)
Dept: CARDIOLOGY CLINIC | Age: 83
End: 2023-07-05

## 2023-07-05 DIAGNOSIS — I10 ESSENTIAL HYPERTENSION: ICD-10-CM

## 2023-07-05 DIAGNOSIS — E78.2 MIXED HYPERLIPIDEMIA: Primary | ICD-10-CM

## 2023-07-05 NOTE — TELEPHONE ENCOUNTER
Patient called asking for lab orders to be sent to [facility] in [place]. Pt asked to be called when orders have been put in. Thank you!

## 2023-07-07 NOTE — TELEPHONE ENCOUNTER
Jenn Carver called concerning lab orders. Went to [facility] and they didn't have lab orders. Please advise.  F: [phone]

## 2023-07-25 DIAGNOSIS — N39.43 POST-VOID DRIBBLING: ICD-10-CM

## 2023-07-25 RX ORDER — VIBEGRON 75 MG/1
TABLET, FILM COATED ORAL
Qty: 30 TABLET | Refills: 1 | Status: SHIPPED | OUTPATIENT
Start: 2023-07-25

## 2023-07-28 RX ORDER — ATORVASTATIN CALCIUM 40 MG/1
TABLET, FILM COATED ORAL
Qty: 30 TABLET | Refills: 5 | Status: SHIPPED | OUTPATIENT
Start: 2023-07-28

## 2023-08-10 DIAGNOSIS — Z95.0 PACEMAKER: Primary | ICD-10-CM

## 2023-08-10 DIAGNOSIS — I49.5 SINOATRIAL NODE DYSFUNCTION (HCC): ICD-10-CM

## 2023-08-10 PROCEDURE — 93294 REM INTERROG EVL PM/LDLS PM: CPT | Performed by: CLINICAL NURSE SPECIALIST

## 2023-08-10 PROCEDURE — 93296 REM INTERROG EVL PM/IDS: CPT | Performed by: CLINICAL NURSE SPECIALIST

## 2023-08-18 NOTE — PROGRESS NOTES
Subjective      Chief Complaint: Scrotal Itching    History of Present Illness  Patient presents for 3-month follow-up he has a history of scrotal itching irritated area on his glans he has been on in the past Lotrisone cream I have also prescribed him Atarax for itching which he is requesting a refill up today.  He has seen Dr. Tay and has been seen a few times and prescribed creams however patient still has the symptoms but it has improved overall.  Also on Gemtesa for episodes of leaking.    The following portions of the patient's history were reviewed and updated as appropriate: allergies, current medications, past family history, past medical history, past social history, past surgical history and problem list.    Review of Systems   Constitutional:  Negative for chills and fever.   Gastrointestinal:  Negative for abdominal pain, anal bleeding and blood in stool.   Genitourinary:  Positive for frequency and urgency. Negative for dysuria and hematuria.   Skin:  Positive for rash (Head of penis).       Current Outpatient Medications:     aspirin 81 MG chewable tablet, Chew 1 tablet Daily., Disp: , Rfl:     atenolol (TENORMIN) 25 MG tablet, Take 1 tablet by mouth Daily., Disp: , Rfl:     atorvastatin (LIPITOR) 40 MG tablet, Take 1 tablet by mouth Every Morning., Disp: , Rfl:     B-D ULTRAFINE III SHORT PEN 31G X 8 MM misc, , Disp: , Rfl:     betamethasone, augmented, (DIPROLENE) 0.05 % ointment, , Disp: , Rfl:     Cholecalciferol (Vitamin D3) 1.25 MG (52034 UT) tablet, Take 1 tablet by mouth Every 30 (Thirty) Days. Takes the 1st day Q month, Disp: , Rfl:     ciclopirox (LOPROX) 0.77 % cream, , Disp: , Rfl:     citalopram (CeleXA) 20 MG tablet, , Disp: , Rfl:     clobetasol (TEMOVATE) 0.05 % ointment, , Disp: , Rfl:     clopidogrel (PLAVIX) 75 MG tablet, Take 1 tablet by mouth Daily. Hold x 5 days a/ Day of procedure 6/15/21; [facility] [place], Disp: ,  Rfl:     dapagliflozin-metformin HCl ER (XIGDUO XR)  MG tablet, Take 1 tablet by mouth Daily. - METFORMIN -, Disp: , Rfl:     dicyclomine (BENTYL) 10 MG capsule, Take 1 capsule by mouth 2 (two) times a day., Disp: , Rfl:     esomeprazole (nexIUM) 40 MG capsule, Take 1 capsule by mouth Every Morning Before Breakfast., Disp: , Rfl:     ezetimibe (ZETIA) 10 MG tablet, Take 1 tablet by mouth Daily., Disp: , Rfl:     furosemide (LASIX) 20 MG tablet, Take 1 tablet by mouth Every Morning., Disp: , Rfl:     Gemtesa 75 MG tablet, TAKE ONE TABLET BY MOUTH DAILY, Disp: 30 tablet, Rfl: 1    HumuLIN R U-500 KwikPen 500 UNIT/ML solution pen-injector CONCENTRATED injection, INJECT UP  UNITS 30 MINUTES BEFORE BREAKFAST AND UP TO 85 UNITS 30 MINUTES BEFORE SUPPER (Patient taking differently: Inject 80 Units under the skin into the appropriate area as directed. Give 30 minutes before Breakfast & 30 minutes before super), Disp: 12 mL, Rfl: 5    HYDROcodone-acetaminophen (NORCO)  MG per tablet, Take 1 tablet by mouth Every 8 (Eight) Hours As Needed for Moderate Pain., Disp: , Rfl:     hydrOXYzine (ATARAX) 25 MG tablet, TAKE ONE TABLET BY MOUTH EVERY 8 HOURS AS NEEDED FOR ITCHING FOR UP TO 10 DAYS **MAY MAKE DROWSY**, Disp: 30 tablet, Rfl: 1    Insulin Glargine (BASAGLAR KWIKPEN) 100 UNIT/ML injection pen, , Disp: , Rfl:     Insulin Pen Needle 31G X 5 MM misc, , Disp: , Rfl:     nystatin (MYCOSTATIN) 776069 UNIT/GM cream, , Disp: , Rfl:     pramipexole (MIRAPEX) 1.5 MG tablet, Take 1 tablet by mouth 3 (Three) Times a Day., Disp: , Rfl:     Synjardy XR  MG tablet sustained-release 24 hour, , Disp: , Rfl:     hydrOXYzine (ATARAX) 25 MG tablet, Take 1 tablet by mouth 3 (Three) Times a Day As Needed for Itching for up to 20 days., Disp: 30 tablet, Rfl: 1    Past Medical History:   Diagnosis Date    Arthritis     B12 deficiency 8/23/2017    Coronary artery disease     Deafness in left ear     Embedded foreign body  "09/2020    Embedded fish hook Left hand, w/ infection    Foot ulcer     GERD (gastroesophageal reflux disease)     Hypertension associated with diabetes 8/23/2017    Ingrown toenail     Joint pain     Mixed diabetic hyperlipidemia associated with type 2 diabetes mellitus 8/23/2017    Neuropathy in diabetes     Shoulder disorder     Left    Type 2 diabetes mellitus with hyperglycemia, with long-term current use of insulin 8/23/2017    Vitamin D deficiency 8/23/2017       Past Surgical History:   Procedure Laterality Date    AORTIC VALVE REPAIR/REPLACEMENT  06/07/2018    APPENDECTOMY      BACK SURGERY      CARDIAC CATHETERIZATION  03/19/2020    stent x3    CATARACT EXTRACTION      CHOLECYSTECTOMY      CORONARY ARTERY BYPASS GRAFT      INCISION AND DRAINAGE ABSCESS      Left hand    INCISION AND DRAINAGE ARM Left 9/24/2020    Procedure: INCISION AND DRAINAGE LEFT HAND;  Surgeon: Ming Ramos MD;  Location: [facility] OR;  Service: Orthopedics;  Laterality: Left;    INNER EAR SURGERY Left     PACEMAKER IMPLANTATION  06/28/2020       Social History     Socioeconomic History    Marital status: Single   Tobacco Use    Smoking status: Former     Types: Cigarettes    Smokeless tobacco: Never   Substance and Sexual Activity    Alcohol use: Not Currently    Drug use: No    Sexual activity: Defer       Family History   Problem Relation Age of Onset    Diabetes Mother     Cancer Father     Cancer Sister     Cancer Brother     Heart disease Brother     Diabetes Brother        Objective    Temp 97.4 øF (36.3 øC)   Ht 177.8 cm (70\")   Wt 97.3 kg (214 lb 6.4 oz)   BMI 30.76 kg/mý     Physical Exam  Vitals reviewed.   Constitutional:       Appearance: Normal appearance.   HENT:      Head: Normocephalic and atraumatic.   Pulmonary:      Effort: Pulmonary effort is normal.   Genitourinary:     Comments: Mildly erythematous scrotal skin no swelling no evidence of abscess cellulitis.  Some mild erythema of the foreskin and " glans.  Skin:     Coloration: Skin is not pale.   Neurological:      Mental Status: He is alert and oriented to person, place, and time.   Psychiatric:         Mood and Affect: Mood normal.         Behavior: Behavior normal.           Results for orders placed or performed in visit on 08/25/23   POC Urinalysis Dipstick, Multipro    Specimen: Urine   Result Value Ref Range    Color Yellow Yellow, Straw, Dark Yellow, Ivon    Clarity, UA Clear Clear    Glucose, UA >=1000 mg/dL (3+) (A) Negative mg/dL    Bilirubin Negative Negative    Ketones, UA Negative Negative    Specific Gravity  1.020 1.005 - 1.030    Blood, UA Trace (A) Negative    pH, Urine 7.0 5.0 - 8.0    Protein, POC Negative Negative mg/dL    Urobilinogen, UA 1 E.U./dL (A) Normal, 0.2 E.U./dL    Nitrite, UA Negative Negative    Leukocytes Negative Negative     IPSS Questionnaire (AUA-7):  Incomplete emptying  Over the past month, how often have you had a sensation of not emptying your bladder completely after you finish?: Almost always (08/25/23 0855)  Frequency  Over the past month, how often have you had to urinate again less than two hours after you finishing urinating ?: More than half the time (08/25/23 0855)  Intermittency  Over the past month, how often have you found you stopped and started again several time when you urinated ?: About half the time (08/25/23 0855)  Urgency  Over the last month, how difficult  have you found it to postpone urination ?: Almost always (08/25/23 0855)  Weak Stream  Over the past month, how often have you had a weak urinary stream ?: Less than half the time (08/25/23 0855)  Straining  Over the past month, how often have you had to push or strain to begin urination ?: Less than 1 time 5 (08/25/23 0855)  Nocturia  Over the past month, how many times did you most typically get up to urinate from the time you went to bed until the time you got up in the morning ?: More than half the time (08/25/23 0855)    Scores  Total  IPSS Score: 24 (08/25/23 0855)       Estimation of residual urine via abdominal ultrasound  Residual Urine: 523 ml  Indication: Incomplete Emptying  Position: Supine  Examination: Incremental scanning of the suprapubic area using 3 MHz transducer using copious amounts of acoustic gel.   Findings: An anechoic area was demonstrated which represented the bladder, with measurement of residual urine as noted. I inspected this myself. In that the residual urine was stable or insignificant, no treatment will be necessary at this time.       Assessment and Plan    Diagnoses and all orders for this visit:    1. Urinary tract infection without hematuria, site unspecified (Primary)  -     POC Urinalysis Dipstick, Multipro    2. Scrotal itching  -     hydrOXYzine (ATARAX) 25 MG tablet; Take 1 tablet by mouth 3 (Three) Times a Day As Needed for Itching for up to 20 days.  Dispense: 30 tablet; Refill: 1    3. Incomplete bladder emptying    Patient has continued scrotal itching which had been improved on Atarax which helps him stop itching which in turn helps with the irritation involving the scrotum and the penis I think some of his issue is incontinence and wearing pull-ups it causes irritation to the scrotum it is improved from previous is no areas of excoriation.  Is established with Dr. Tay also is given him some topical creams but he has had this chronically for over a year.  Also bladder scan was elevated at 523 mL previous bladder scan done last year was not elevated.  This could be causing some of his leaking and want him to return in a few weeks to recheck.  Probably should stop the Gemtesa.

## 2023-08-25 ENCOUNTER — TELEPHONE (OUTPATIENT)
Dept: UROLOGY | Facility: CLINIC | Age: 83
End: 2023-08-25
Payer: MEDICARE

## 2023-08-25 ENCOUNTER — OFFICE VISIT (OUTPATIENT)
Dept: UROLOGY | Facility: CLINIC | Age: 83
End: 2023-08-25
Payer: MEDICARE

## 2023-08-25 VITALS — HEIGHT: 70 IN | WEIGHT: 214.4 LBS | BODY MASS INDEX: 30.69 KG/M2 | TEMPERATURE: 97.4 F

## 2023-08-25 DIAGNOSIS — R33.9 INCOMPLETE BLADDER EMPTYING: ICD-10-CM

## 2023-08-25 DIAGNOSIS — N39.0 URINARY TRACT INFECTION WITHOUT HEMATURIA, SITE UNSPECIFIED: Primary | ICD-10-CM

## 2023-08-25 DIAGNOSIS — L29.1 SCROTAL ITCHING: ICD-10-CM

## 2023-08-25 LAB
BILIRUB BLD-MCNC: NEGATIVE MG/DL
CLARITY, POC: CLEAR
COLOR UR: YELLOW
GLUCOSE UR STRIP-MCNC: ABNORMAL MG/DL
KETONES UR QL: NEGATIVE
LEUKOCYTE EST, POC: NEGATIVE
NITRITE UR-MCNC: NEGATIVE MG/ML
PH UR: 7 [PH] (ref 5–8)
PROT UR STRIP-MCNC: NEGATIVE MG/DL
RBC # UR STRIP: ABNORMAL /UL
SP GR UR: 1.02 (ref 1–1.03)
UROBILINOGEN UR QL: ABNORMAL

## 2023-08-25 RX ORDER — CITALOPRAM 20 MG/1
TABLET ORAL
COMMUNITY
Start: 2023-08-18

## 2023-08-25 RX ORDER — NYSTATIN 100000 U/G
CREAM TOPICAL
COMMUNITY
Start: 2023-08-16

## 2023-08-25 RX ORDER — INSULIN GLARGINE 100 [IU]/ML
INJECTION, SOLUTION SUBCUTANEOUS
COMMUNITY
Start: 2023-06-12

## 2023-08-25 RX ORDER — PEN NEEDLE, DIABETIC 31 GX5/16"
NEEDLE, DISPOSABLE MISCELLANEOUS
COMMUNITY
Start: 2023-08-21

## 2023-08-25 RX ORDER — HYDROXYZINE HYDROCHLORIDE 25 MG/1
25 TABLET, FILM COATED ORAL 3 TIMES DAILY PRN
Qty: 30 TABLET | Refills: 1 | Status: SHIPPED | OUTPATIENT
Start: 2023-08-25 | End: 2023-09-14

## 2023-08-25 RX ORDER — EMPAGLIFLOZIN, METFORMIN HYDROCHLORIDE 10; 1000 MG/1; MG/1
TABLET, EXTENDED RELEASE ORAL
COMMUNITY
Start: 2023-07-12

## 2023-08-25 NOTE — TELEPHONE ENCOUNTER
Attempted to call pt with message from Hal:  Could you call this patient and let him know that I want him to stop the gemtesa. Also would like him to return in a few weeks to recheck his bladder scan due to the fact his bladder scan showed over 500 cc. Tell him that I did not realize that till after he had labs when I was reviewing his chart. Thanks     Left vm to call back  Ok for hub to read

## 2023-09-03 ENCOUNTER — HOSPITAL ENCOUNTER (EMERGENCY)
Age: 83
Discharge: HOME | End: 2023-09-03
Attending: EMERGENCY MEDICINE
Payer: MEDICARE

## 2023-09-03 ENCOUNTER — APPOINTMENT (OUTPATIENT)
Dept: GENERAL RADIOLOGY | Age: 83
End: 2023-09-03
Payer: MEDICARE

## 2023-09-03 VITALS
HEART RATE: 62 BPM | TEMPERATURE: 97.9 F | RESPIRATION RATE: 18 BRPM | SYSTOLIC BLOOD PRESSURE: 118 MMHG | WEIGHT: 212 LBS | BODY MASS INDEX: 31.31 KG/M2 | OXYGEN SATURATION: 95 % | DIASTOLIC BLOOD PRESSURE: 94 MMHG

## 2023-09-03 DIAGNOSIS — S81.801A WOUND OF RIGHT LOWER EXTREMITY, INITIAL ENCOUNTER: ICD-10-CM

## 2023-09-03 DIAGNOSIS — L03.115 CELLULITIS OF RIGHT LOWER EXTREMITY: Primary | ICD-10-CM

## 2023-09-03 LAB
ALBUMIN SERPL-MCNC: 4.9 G/DL (ref 3.5–5.2)
ALP SERPL-CCNC: 111 U/L (ref 40–130)
ALT SERPL-CCNC: 33 U/L (ref 5–41)
ANION GAP SERPL CALCULATED.3IONS-SCNC: 12 MMOL/L (ref 7–19)
AST SERPL-CCNC: 21 U/L (ref 5–40)
BILIRUB SERPL-MCNC: 0.9 MG/DL (ref 0.2–1.2)
BUN SERPL-MCNC: 23 MG/DL (ref 8–23)
CALCIUM SERPL-MCNC: 9.8 MG/DL (ref 8.8–10.2)
CHLORIDE SERPL-SCNC: 97 MMOL/L (ref 98–111)
CO2 SERPL-SCNC: 26 MMOL/L (ref 22–29)
CREAT SERPL-MCNC: 0.9 MG/DL (ref 0.5–1.2)
D DIMER PPP FEU-MCNC: 0.6 UG/ML FEU (ref 0–0.48)
ERYTHROCYTE [DISTWIDTH] IN BLOOD BY AUTOMATED COUNT: 13.2 % (ref 11.5–14.5)
GLUCOSE SERPL-MCNC: 260 MG/DL (ref 74–109)
HCT VFR BLD AUTO: 47.8 % (ref 42–52)
HGB BLD-MCNC: 16.6 G/DL (ref 14–18)
MCH RBC QN AUTO: 31.6 PG (ref 27–31)
MCHC RBC AUTO-ENTMCNC: 34.7 G/DL (ref 33–37)
MCV RBC AUTO: 91 FL (ref 80–94)
PLATELET # BLD AUTO: 149 K/UL (ref 130–400)
PMV BLD AUTO: 10.7 FL (ref 9.4–12.4)
POTASSIUM SERPL-SCNC: 5 MMOL/L (ref 3.5–5)
PROT SERPL-MCNC: 7.2 G/DL (ref 6.6–8.7)
RBC # BLD AUTO: 5.25 M/UL (ref 4.7–6.1)
SODIUM SERPL-SCNC: 135 MMOL/L (ref 136–145)
WBC # BLD AUTO: 7.1 K/UL (ref 4.8–10.8)

## 2023-09-03 PROCEDURE — 99284 EMERGENCY DEPT VISIT MOD MDM: CPT

## 2023-09-03 PROCEDURE — 85379 FIBRIN DEGRADATION QUANT: CPT

## 2023-09-03 PROCEDURE — 6360000002 HC RX W HCPCS: Performed by: EMERGENCY MEDICINE

## 2023-09-03 PROCEDURE — 90715 TDAP VACCINE 7 YRS/> IM: CPT | Performed by: EMERGENCY MEDICINE

## 2023-09-03 PROCEDURE — 73590 X-RAY EXAM OF LOWER LEG: CPT

## 2023-09-03 PROCEDURE — 85027 COMPLETE CBC AUTOMATED: CPT

## 2023-09-03 PROCEDURE — 90471 IMMUNIZATION ADMIN: CPT | Performed by: EMERGENCY MEDICINE

## 2023-09-03 PROCEDURE — 80053 COMPREHEN METABOLIC PANEL: CPT

## 2023-09-03 PROCEDURE — 36415 COLL VENOUS BLD VENIPUNCTURE: CPT

## 2023-09-03 RX ORDER — CEPHALEXIN 500 MG/1
500 CAPSULE ORAL 4 TIMES DAILY
Qty: 28 CAPSULE | Refills: 0 | Status: SHIPPED | OUTPATIENT
Start: 2023-09-03 | End: 2023-09-10

## 2023-09-03 RX ORDER — SULFAMETHOXAZOLE AND TRIMETHOPRIM 800; 160 MG/1; MG/1
1 TABLET ORAL 2 TIMES DAILY
Qty: 14 TABLET | Refills: 0 | Status: SHIPPED | OUTPATIENT
Start: 2023-09-03 | End: 2023-09-10

## 2023-09-03 RX ADMIN — TETANUS TOXOID, REDUCED DIPHTHERIA TOXOID AND ACELLULAR PERTUSSIS VACCINE, ADSORBED 0.5 ML: 5; 2.5; 8; 8; 2.5 SUSPENSION INTRAMUSCULAR at 14:28

## 2023-09-03 ASSESSMENT — ENCOUNTER SYMPTOMS
ABDOMINAL PAIN: 0
VOMITING: 0
SHORTNESS OF BREATH: 0
DIARRHEA: 0
EYE PAIN: 0

## 2023-09-03 ASSESSMENT — PAIN DESCRIPTION - LOCATION: LOCATION: LEG

## 2023-09-03 ASSESSMENT — PAIN SCALES - GENERAL: PAINLEVEL_OUTOF10: 5

## 2023-09-03 ASSESSMENT — PAIN DESCRIPTION - DESCRIPTORS: DESCRIPTORS: SHARP

## 2023-09-03 ASSESSMENT — PAIN DESCRIPTION - PAIN TYPE: TYPE: ACUTE PAIN

## 2023-09-03 ASSESSMENT — PAIN DESCRIPTION - ORIENTATION: ORIENTATION: ANTERIOR;RIGHT

## 2023-09-03 NOTE — ED PROVIDER NOTES
[facility] EMERGENCY DEPT  eMERGENCY dEPARTMENT eNCOUnter      Pt Name: Jadiel Weiss  MRN: [id number]  [facility] 1940  Date of evaluation: 9/3/2023  Provider: Aster Bach MD    CHIEF COMPLAINT       Chief Complaint   Patient presents with    Wound Check     Small wound to right shin, diabetic         HISTORY OF PRESENT ILLNESS   (Location/Symptom, Timing/Onset,Context/Setting, Quality, Duration, Modifying Factors, Severity)  Note limiting factors. Jadiel Weiss is a 79 y.o. male who presents to the emergency department due to right shin pain and wound. Patient has a history of diabetes and chronic kidney disease and neuropathy. Approximately 4 days ago scraped his shin on something and day or 2 later developed some mild redness around the wound that has gradually worsened slightly since. Over the last day or so has developed pain in the right lower leg anteriorly. No numbness or weakness other than the baseline numbness related to his neuropathy. No pain in the knee foot or ankle or more proximally in the leg. Had similar wound in his left arm several years ago that became severely infected which is what patient is trying to avoid and why he came to the ER today. No calf pain or swelling. No fevers chills or other constitutional symptoms    HPI    NursingNotes were reviewed. REVIEW OF SYSTEMS    (2-9 systems for level 4, 10 or more for level 5)     Review of Systems   Constitutional:  Negative for fever. Eyes:  Negative for pain. Respiratory:  Negative for shortness of breath. Cardiovascular:  Negative for chest pain, palpitations and leg swelling. Gastrointestinal:  Negative for abdominal pain, diarrhea and vomiting. Genitourinary:  Negative for dysuria. Skin:  Negative for rash. Neurological:  Negative for weakness and headaches. All other systems reviewed and are negative. A complete review of systems was performed and is negative except as noted above in the HPI.

## 2023-09-03 NOTE — ED NOTES
Pictures taken of wound on right anterior leg per MD request and with pt's verbal permission. Pt tolerated well. Wound dressed with nonadherent dressing and kerlix.       Luz Orta RN  09/03/23 [id number]

## 2023-09-05 NOTE — PROGRESS NOTES
Subjective    Mr. Jeong is 83 y.o. male    Chief Complaint: Incomplete Bladder Emptying    History of Present Illness  Patient returns for follow-up when I saw him 08/25/2023 he was having increasing episodes of leaking he has been on Gemtesa.  His bladder scan last visit was 523 mL.  Want him return today to recheck bladder scan.  His bladder scan today was 329 mL his urine is not infected looking.    The following portions of the patient's history were reviewed and updated as appropriate: allergies, current medications, past family history, past medical history, past social history, past surgical history and problem list.    Review of Systems   Genitourinary:  Positive for frequency and urgency.       Current Outpatient Medications:     aspirin 81 MG chewable tablet, Chew 1 tablet Daily., Disp: , Rfl:     atenolol (TENORMIN) 25 MG tablet, Take 1 tablet by mouth Daily., Disp: , Rfl:     atorvastatin (LIPITOR) 40 MG tablet, Take 1 tablet by mouth Every Morning., Disp: , Rfl:     B-D ULTRAFINE III SHORT PEN 31G X 8 MM misc, , Disp: , Rfl:     betamethasone, augmented, (DIPROLENE) 0.05 % ointment, , Disp: , Rfl:     cephalexin (KEFLEX) 500 MG capsule, Take 1 capsule by mouth., Disp: , Rfl:     Cholecalciferol (Vitamin D3) 1.25 MG (27390 UT) tablet, Take 1 tablet by mouth Every 30 (Thirty) Days. Takes the 1st day Q month, Disp: , Rfl:     ciclopirox (LOPROX) 0.77 % cream, , Disp: , Rfl:     citalopram (CeleXA) 20 MG tablet, , Disp: , Rfl:     clobetasol (TEMOVATE) 0.05 % ointment, , Disp: , Rfl:     clopidogrel (PLAVIX) 75 MG tablet, Take 1 tablet by mouth Daily. Hold x 5 days a/ Day of procedure 6/15/21; [facility] [place], Disp: , Rfl:     dapagliflozin-metformin HCl ER (XIGDUO XR)  MG tablet, Take 1 tablet by mouth Daily. - METFORMIN -, Disp: , Rfl:     dicyclomine (BENTYL) 10 MG capsule, Take 1 capsule by mouth 2 (two) times a day., Disp: , Rfl:      esomeprazole (nexIUM) 40 MG capsule, Take 1 capsule by mouth Every Morning Before Breakfast., Disp: , Rfl:     ezetimibe (ZETIA) 10 MG tablet, Take 1 tablet by mouth Daily., Disp: , Rfl:     furosemide (LASIX) 20 MG tablet, Take 1 tablet by mouth Every Morning., Disp: , Rfl:     HumuLIN R U-500 KwikPen 500 UNIT/ML solution pen-injector CONCENTRATED injection, INJECT UP  UNITS 30 MINUTES BEFORE BREAKFAST AND UP TO 85 UNITS 30 MINUTES BEFORE SUPPER (Patient taking differently: Inject 80 Units under the skin into the appropriate area as directed. Give 30 minutes before Breakfast & 30 minutes before super), Disp: 12 mL, Rfl: 5    HYDROcodone-acetaminophen (NORCO)  MG per tablet, Take 1 tablet by mouth Every 8 (Eight) Hours As Needed for Moderate Pain., Disp: , Rfl:     hydrOXYzine (ATARAX) 25 MG tablet, TAKE ONE TABLET BY MOUTH EVERY 8 HOURS AS NEEDED FOR ITCHING FOR UP TO 10 DAYS **MAY MAKE DROWSY**, Disp: 30 tablet, Rfl: 1    hydrOXYzine (ATARAX) 25 MG tablet, Take 1 tablet by mouth 3 (Three) Times a Day As Needed for Itching for up to 20 days., Disp: 30 tablet, Rfl: 1    Insulin Glargine (BASAGLAR KWIKPEN) 100 UNIT/ML injection pen, , Disp: , Rfl:     Insulin Pen Needle 31G X 5 MM misc, , Disp: , Rfl:     nystatin (MYCOSTATIN) 169191 UNIT/GM cream, , Disp: , Rfl:     pramipexole (MIRAPEX) 1.5 MG tablet, Take 1 tablet by mouth 3 (Three) Times a Day., Disp: , Rfl:     sulfamethoxazole-trimethoprim (BACTRIM DS,SEPTRA DS) 800-160 MG per tablet, Take 1 tablet by mouth., Disp: , Rfl:     Synjardy XR  MG tablet sustained-release 24 hour, , Disp: , Rfl:     Gemtesa 75 MG tablet, TAKE ONE TABLET BY MOUTH DAILY (Patient not taking: Reported on 9/11/2023), Disp: 30 tablet, Rfl: 1    tamsulosin (FLOMAX) 0.4 MG capsule 24 hr capsule, Take 1 capsule by mouth Every Night for 90 days., Disp: 30 capsule, Rfl: 2    Past Medical History:   Diagnosis Date    Arthritis     B12 deficiency 8/23/2017    Coronary  "artery disease     Deafness in left ear     Embedded foreign body 09/2020    Embedded fish hook Left hand, w/ infection    Foot ulcer     GERD (gastroesophageal reflux disease)     Hypertension associated with diabetes 8/23/2017    Ingrown toenail     Joint pain     Mixed diabetic hyperlipidemia associated with type 2 diabetes mellitus 8/23/2017    Neuropathy in diabetes     Shoulder disorder     Left    Type 2 diabetes mellitus with hyperglycemia, with long-term current use of insulin 8/23/2017    Vitamin D deficiency 8/23/2017       Past Surgical History:   Procedure Laterality Date    AORTIC VALVE REPAIR/REPLACEMENT  06/07/2018    APPENDECTOMY      BACK SURGERY      CARDIAC CATHETERIZATION  03/19/2020    stent x3    CATARACT EXTRACTION      CHOLECYSTECTOMY      CORONARY ARTERY BYPASS GRAFT      INCISION AND DRAINAGE ABSCESS      Left hand    INCISION AND DRAINAGE ARM Left 9/24/2020    Procedure: INCISION AND DRAINAGE LEFT HAND;  Surgeon: Ming Ramos MD;  Location: [facility] OR;  Service: Orthopedics;  Laterality: Left;    INNER EAR SURGERY Left     PACEMAKER IMPLANTATION  06/28/2020       Social History     Socioeconomic History    Marital status: Single   Tobacco Use    Smoking status: Former     Types: Cigarettes    Smokeless tobacco: Never   Substance and Sexual Activity    Alcohol use: Not Currently    Drug use: No    Sexual activity: Defer       Family History   Problem Relation Age of Onset    Diabetes Mother     Cancer Father     Cancer Sister     Cancer Brother     Heart disease Brother     Diabetes Brother        Objective    Temp 97 °F (36.1 °C)   Ht 177.8 cm (70\")   Wt 95.4 kg (210 lb 6.4 oz)   BMI 30.19 kg/m²     Physical Exam  Vitals reviewed.   Constitutional:       Appearance: Normal appearance.   HENT:      Head: Normocephalic and atraumatic.   Pulmonary:      Effort: Pulmonary effort is normal.   Skin:     Coloration: Skin is not pale.   Neurological:      Mental Status: He is alert. "   Psychiatric:         Mood and Affect: Mood normal.         Behavior: Behavior normal.           Results for orders placed or performed in visit on 09/11/23   POC Urinalysis Dipstick, Multipro    Specimen: Urine   Result Value Ref Range    Color Yellow Yellow, Straw, Dark Yellow, Ivon    Clarity, UA Clear Clear    Glucose,  mg/dL (A) Negative mg/dL    Bilirubin Negative Negative    Ketones, UA Negative Negative    Specific Gravity  1.015 1.005 - 1.030    Blood, UA Moderate (A) Negative    pH, Urine 6.0 5.0 - 8.0    Protein, POC Negative Negative mg/dL    Urobilinogen, UA Normal Normal, 0.2 E.U./dL    Nitrite, UA Negative Negative    Leukocytes Small (1+) (A) Negative     IPSS Questionnaire (AUA-7):  Incomplete emptying  Over the past month, how often have you had a sensation of not emptying your bladder completely after you finish?: More than half the time (09/11/23 1013)  Frequency  Over the past month, how often have you had to urinate again less than two hours after you finishing urinating ?: Almost always (09/11/23 1013)  Intermittency  Over the past month, how often have you found you stopped and started again several time when you urinated ?: Almost always (09/11/23 1013)  Urgency  Over the last month, how difficult  have you found it to postpone urination ?: Almost always (09/11/23 1013)  Weak Stream  Over the past month, how often have you had a weak urinary stream ?: Less than 1 time in 5 (09/11/23 1013)  Straining  Over the past month, how often have you had to push or strain to begin urination ?: Less than 1 time 5 (09/11/23 1013)  Nocturia  Over the past month, how many times did you most typically get up to urinate from the time you went to bed until the time you got up in the morning ?: 3 times (09/11/23 1013)  Quality of life due to urinary symptoms  If you were to spend the rest of your life with your urinary condition the way it is now, how would feel about that?: Mixed - about equally  satisfied (09/11/23 1013)    Scores  Total IPSS Score: 23 (09/11/23 1013)  Total Score = Symtomatic Level: severely symptomatic: 20-35 (09/11/23 1013)        Estimation of residual urine via abdominal ultrasound  Residual Urine: 329 ml  Indication: Retention  Position: Supine  Examination: Incremental scanning of the suprapubic area using 3 MHz transducer using copious amounts of acoustic gel.   Findings: An anechoic area was demonstrated which represented the bladder, with measurement of residual urine as noted. I inspected this myself. In that the residual urine was stable or insignificant, no treatment will be necessary at this time.       Assessment and Plan    Diagnoses and all orders for this visit:    1. Urinary tract infection without hematuria, site unspecified (Primary)  -     POC Urinalysis Dipstick, Multipro    2. Scrotal itching    3. Incomplete bladder emptying  -     tamsulosin (FLOMAX) 0.4 MG capsule 24 hr capsule; Take 1 capsule by mouth Every Night for 90 days.  Dispense: 30 capsule; Refill: 2    I repeat his bladder scan today it is improved from a previous but still elevated at 329 mL I feel this is the cause of his worsening frequency and urgency and at times incontinence I want him to stop the Gemtesa or continue off the Gemtesa.  I have also going to start him on tamsulosin to help with his bladder emptying.  Follow-up in 1 month.

## 2023-09-08 ENCOUNTER — HOSPITAL ENCOUNTER (OUTPATIENT)
Dept: NUCLEAR MEDICINE | Age: 83
Discharge: HOME | End: 2023-09-10
Attending: INTERNAL MEDICINE
Payer: MEDICARE

## 2023-09-08 DIAGNOSIS — I49.5 SINOATRIAL NODE DYSFUNCTION (HCC): ICD-10-CM

## 2023-09-08 DIAGNOSIS — I10 ESSENTIAL HYPERTENSION: ICD-10-CM

## 2023-09-08 DIAGNOSIS — I25.10 CORONARY ARTERY DISEASE INVOLVING NATIVE CORONARY ARTERY OF NATIVE HEART WITHOUT ANGINA PECTORIS: ICD-10-CM

## 2023-09-08 PROCEDURE — 3430000000 HC RX DIAGNOSTIC RADIOPHARMACEUTICAL: Performed by: INTERNAL MEDICINE

## 2023-09-08 PROCEDURE — 93017 CV STRESS TEST TRACING ONLY: CPT

## 2023-09-08 PROCEDURE — 93018 CV STRESS TEST I&R ONLY: CPT | Performed by: INTERNAL MEDICINE

## 2023-09-08 PROCEDURE — A9502 TC99M TETROFOSMIN: HCPCS | Performed by: INTERNAL MEDICINE

## 2023-09-08 PROCEDURE — 93016 CV STRESS TEST SUPVJ ONLY: CPT | Performed by: INTERNAL MEDICINE

## 2023-09-08 PROCEDURE — 6360000002 HC RX W HCPCS: Performed by: INTERNAL MEDICINE

## 2023-09-08 PROCEDURE — 78452 HT MUSCLE IMAGE SPECT MULT: CPT | Performed by: INTERNAL MEDICINE

## 2023-09-08 RX ORDER — REGADENOSON 0.08 MG/ML
0.4 INJECTION, SOLUTION INTRAVENOUS
Status: COMPLETED | OUTPATIENT
Start: 2023-09-08 | End: 2023-09-08

## 2023-09-08 RX ADMIN — REGADENOSON 0.4 MG: 0.08 INJECTION, SOLUTION INTRAVENOUS at 11:36

## 2023-09-08 RX ADMIN — TETROFOSMIN 24 MILLICURIE: 1.38 INJECTION, POWDER, LYOPHILIZED, FOR SOLUTION INTRAVENOUS at 12:00

## 2023-09-08 RX ADMIN — TETROFOSMIN 8 MILLICURIE: 1.38 INJECTION, POWDER, LYOPHILIZED, FOR SOLUTION INTRAVENOUS at 10:00

## 2023-09-11 ENCOUNTER — TELEPHONE (OUTPATIENT)
Dept: CARDIOLOGY CLINIC | Age: 83
End: 2023-09-11

## 2023-09-11 ENCOUNTER — OFFICE VISIT (OUTPATIENT)
Dept: UROLOGY | Facility: CLINIC | Age: 83
End: 2023-09-11
Payer: MEDICARE

## 2023-09-11 VITALS — TEMPERATURE: 97 F | WEIGHT: 210.4 LBS | BODY MASS INDEX: 30.12 KG/M2 | HEIGHT: 70 IN

## 2023-09-11 DIAGNOSIS — I49.5 SINOATRIAL NODE DYSFUNCTION (HCC): Primary | ICD-10-CM

## 2023-09-11 DIAGNOSIS — L29.1 SCROTAL ITCHING: ICD-10-CM

## 2023-09-11 DIAGNOSIS — R33.9 INCOMPLETE BLADDER EMPTYING: ICD-10-CM

## 2023-09-11 DIAGNOSIS — I25.10 CORONARY ARTERY DISEASE INVOLVING NATIVE CORONARY ARTERY OF NATIVE HEART WITHOUT ANGINA PECTORIS: ICD-10-CM

## 2023-09-11 DIAGNOSIS — N39.0 URINARY TRACT INFECTION WITHOUT HEMATURIA, SITE UNSPECIFIED: Primary | ICD-10-CM

## 2023-09-11 LAB
BILIRUB BLD-MCNC: NEGATIVE MG/DL
CLARITY, POC: CLEAR
COLOR UR: YELLOW
GLUCOSE UR STRIP-MCNC: ABNORMAL MG/DL
KETONES UR QL: NEGATIVE
LEUKOCYTE EST, POC: ABNORMAL
NITRITE UR-MCNC: NEGATIVE MG/ML
PH UR: 6 [PH] (ref 5–8)
PROT UR STRIP-MCNC: NEGATIVE MG/DL
RBC # UR STRIP: ABNORMAL /UL
SP GR UR: 1.01 (ref 1–1.03)
UROBILINOGEN UR QL: NORMAL

## 2023-09-11 PROCEDURE — 1159F MED LIST DOCD IN RCRD: CPT | Performed by: PHYSICIAN ASSISTANT

## 2023-09-11 PROCEDURE — 51798 US URINE CAPACITY MEASURE: CPT | Performed by: PHYSICIAN ASSISTANT

## 2023-09-11 PROCEDURE — 99214 OFFICE O/P EST MOD 30 MIN: CPT | Performed by: PHYSICIAN ASSISTANT

## 2023-09-11 PROCEDURE — 81001 URINALYSIS AUTO W/SCOPE: CPT | Performed by: PHYSICIAN ASSISTANT

## 2023-09-11 PROCEDURE — 1160F RVW MEDS BY RX/DR IN RCRD: CPT | Performed by: PHYSICIAN ASSISTANT

## 2023-09-11 RX ORDER — TAMSULOSIN HYDROCHLORIDE 0.4 MG/1
1 CAPSULE ORAL NIGHTLY
Qty: 30 CAPSULE | Refills: 2 | Status: SHIPPED | OUTPATIENT
Start: 2023-09-11 | End: 2023-12-10

## 2023-09-11 RX ORDER — CEPHALEXIN 500 MG/1
500 CAPSULE ORAL
COMMUNITY
Start: 2023-09-03 | End: 2023-09-11

## 2023-09-11 RX ORDER — HYDROXYZINE HYDROCHLORIDE 25 MG/1
TABLET, FILM COATED ORAL
Qty: 30 TABLET | Refills: 1 | Status: SHIPPED | OUTPATIENT
Start: 2023-09-11

## 2023-09-11 RX ORDER — SULFAMETHOXAZOLE AND TRIMETHOPRIM 800; 160 MG/1; MG/1
1 TABLET ORAL
COMMUNITY
Start: 2023-09-03 | End: 2023-09-11

## 2023-09-11 NOTE — TELEPHONE ENCOUNTER
----- Message from Emily Kim MD sent at 9/8/2023  9:55 PM CDT -----  Please let patient know that his stress test did not show any significant abnormality. Heart function however appears slightly reduced at 45-50%. Will need to be monitored. We will repeat an echo in 6 months.

## 2023-09-21 ENCOUNTER — OFFICE VISIT (OUTPATIENT)
Dept: NEUROLOGY | Age: 83
End: 2023-09-21
Payer: MEDICARE

## 2023-09-21 VITALS
RESPIRATION RATE: 20 BRPM | HEART RATE: 67 BPM | BODY MASS INDEX: 30.35 KG/M2 | WEIGHT: 212 LBS | SYSTOLIC BLOOD PRESSURE: 100 MMHG | HEIGHT: 70 IN | DIASTOLIC BLOOD PRESSURE: 60 MMHG

## 2023-09-21 DIAGNOSIS — M54.42 CHRONIC BILATERAL LOW BACK PAIN WITH BILATERAL SCIATICA: ICD-10-CM

## 2023-09-21 DIAGNOSIS — G89.29 CHRONIC BILATERAL LOW BACK PAIN WITH BILATERAL SCIATICA: ICD-10-CM

## 2023-09-21 DIAGNOSIS — M54.41 CHRONIC BILATERAL LOW BACK PAIN WITH BILATERAL SCIATICA: ICD-10-CM

## 2023-09-21 DIAGNOSIS — G47.33 OBSTRUCTIVE SLEEP APNEA: ICD-10-CM

## 2023-09-21 DIAGNOSIS — G45.0 VERTEBROBASILAR ARTERY INSUFFICIENCY: ICD-10-CM

## 2023-09-21 DIAGNOSIS — R41.3 MEMORY LOSS: ICD-10-CM

## 2023-09-21 DIAGNOSIS — E11.42 DIABETIC POLYNEUROPATHY ASSOCIATED WITH TYPE 2 DIABETES MELLITUS (HCC): ICD-10-CM

## 2023-09-21 DIAGNOSIS — G25.81 RESTLESS LEGS SYNDROME: Primary | ICD-10-CM

## 2023-09-21 PROCEDURE — G8417 CALC BMI ABV UP PARAM F/U: HCPCS | Performed by: PSYCHIATRY & NEUROLOGY

## 2023-09-21 PROCEDURE — 99214 OFFICE O/P EST MOD 30 MIN: CPT | Performed by: PSYCHIATRY & NEUROLOGY

## 2023-09-21 PROCEDURE — G8427 DOCREV CUR MEDS BY ELIG CLIN: HCPCS | Performed by: PSYCHIATRY & NEUROLOGY

## 2023-09-21 PROCEDURE — 1036F TOBACCO NON-USER: CPT | Performed by: PSYCHIATRY & NEUROLOGY

## 2023-09-21 PROCEDURE — 3078F DIAST BP <80 MM HG: CPT | Performed by: PSYCHIATRY & NEUROLOGY

## 2023-09-21 PROCEDURE — 3074F SYST BP LT 130 MM HG: CPT | Performed by: PSYCHIATRY & NEUROLOGY

## 2023-09-21 PROCEDURE — 1123F ACP DISCUSS/DSCN MKR DOCD: CPT | Performed by: PSYCHIATRY & NEUROLOGY

## 2023-09-21 RX ORDER — HYDROCODONE BITARTRATE AND ACETAMINOPHEN 10; 325 MG/1; MG/1
1 TABLET ORAL EVERY 6 HOURS PRN
Qty: 120 TABLET | Refills: 0 | Status: SHIPPED | OUTPATIENT
Start: 2023-09-21 | End: 2023-10-21

## 2023-09-21 RX ORDER — DAPAGLIFLOZIN 10 MG/1
TABLET, FILM COATED ORAL
COMMUNITY
Start: 2023-09-12

## 2023-09-21 RX ORDER — TAMSULOSIN HYDROCHLORIDE 0.4 MG/1
CAPSULE ORAL
COMMUNITY
Start: 2023-09-11

## 2023-09-21 RX ORDER — HYDROXYZINE HYDROCHLORIDE 25 MG/1
TABLET, FILM COATED ORAL
COMMUNITY
Start: 2023-04-28

## 2023-09-21 RX ORDER — NYSTATIN 100000 U/G
CREAM TOPICAL
COMMUNITY
Start: 2023-09-11

## 2023-09-28 NOTE — PROGRESS NOTES
Subjective    Mr. Jeong is 83 y.o. male    Chief Complaint: Incomplete Bladder Emptying     History of Present Illness  Patient returns for 1 month follow-up he was having increasing episodes of urge incontinence frequency urgency in August but he had a bladder scan of 523 mL.  And follow-up last month his bladder scan was 329 mL.  Darted him on tamsulosin which she is only been taking for the past 2 weeks objectively his bladder scan is much improved at 153 mL.  He remains off the Gemtesa.  His urine is clear.    The following portions of the patient's history were reviewed and updated as appropriate: allergies, current medications, past family history, past medical history, past social history, past surgical history and problem list.    Review of Systems   Constitutional:  Negative for chills and fever.   Gastrointestinal:  Negative for abdominal pain, anal bleeding and blood in stool.   Genitourinary:  Positive for difficulty urinating, frequency and urgency. Negative for dysuria and hematuria.        Dribbling         Current Outpatient Medications:     aspirin 81 MG chewable tablet, Chew 1 tablet Daily., Disp: , Rfl:     atenolol (TENORMIN) 25 MG tablet, Take 1 tablet by mouth Daily., Disp: , Rfl:     atorvastatin (LIPITOR) 40 MG tablet, Take 1 tablet by mouth Every Morning., Disp: , Rfl:     B-D ULTRAFINE III SHORT PEN 31G X 8 MM misc, , Disp: , Rfl:     betamethasone, augmented, (DIPROLENE) 0.05 % ointment, , Disp: , Rfl:     Cholecalciferol (Vitamin D3) 1.25 MG (02660 UT) tablet, Take 1 tablet by mouth Every 30 (Thirty) Days. Takes the 1st day Q month, Disp: , Rfl:     ciclopirox (LOPROX) 0.77 % cream, , Disp: , Rfl:     citalopram (CeleXA) 20 MG tablet, , Disp: , Rfl:     clobetasol (TEMOVATE) 0.05 % ointment, , Disp: , Rfl:     clopidogrel (PLAVIX) 75 MG tablet, Take 1 tablet by mouth Daily. Hold x 5 days a/ Day of procedure 6/15/21; [facility], Disp: , Rfl:     esomeprazole (nexIUM) 40 MG capsule, Take 1 capsule by mouth Every Morning Before Breakfast., Disp: , Rfl:     ezetimibe (ZETIA) 10 MG tablet, Take 1 tablet by mouth Daily., Disp: , Rfl:     Farxiga 10 MG tablet, Take 10 mg by mouth Daily., Disp: , Rfl:     furosemide (LASIX) 20 MG tablet, Take 1 tablet by mouth Every Morning., Disp: , Rfl:     HYDROcodone-acetaminophen (NORCO)  MG per tablet, Take 1 tablet by mouth Every 8 (Eight) Hours As Needed for Moderate Pain., Disp: , Rfl:     hydrOXYzine (ATARAX) 25 MG tablet, TAKE ONE TABLET BY MOUTH EVERY 8 HOURS AS NEEDED FOR ITCHING FOR UP TO 10 DAYS **MAY MAKE DROWSY**, Disp: 30 tablet, Rfl: 1    Insulin Glargine (BASAGLAR KWIKPEN) 100 UNIT/ML injection pen, , Disp: , Rfl:     Insulin Pen Needle 31G X 5 MM misc, , Disp: , Rfl:     NovoLIN R 100 UNIT/ML injection, Inject 5 Units under the skin into the appropriate area as directed 3 (Three) Times a Day Before Meals., Disp: , Rfl:     nystatin (MYCOSTATIN) 543021 UNIT/GM cream, , Disp: , Rfl:     pramipexole (MIRAPEX) 1.5 MG tablet, Take 1 tablet by mouth 3 (Three) Times a Day., Disp: , Rfl:     tamsulosin (FLOMAX) 0.4 MG capsule 24 hr capsule, Take 1 capsule by mouth Every Night for 90 days., Disp: 30 capsule, Rfl: 2    dapagliflozin-metformin HCl ER (XIGDUO XR)  MG tablet, Take 1 tablet by mouth Daily. - METFORMIN - (Patient not taking: Reported on 10/11/2023), Disp: , Rfl:     dicyclomine (BENTYL) 10 MG capsule, Take 1 capsule by mouth 2 (two) times a day. (Patient not taking: Reported on 10/11/2023), Disp: , Rfl:     Gemtesa 75 MG tablet, TAKE ONE TABLET BY MOUTH DAILY (Patient not taking: Reported on 9/11/2023), Disp: 30 tablet, Rfl: 1    HumuLIN R U-500 KwikPen 500 UNIT/ML solution pen-injector CONCENTRATED injection, INJECT UP  UNITS 30 MINUTES BEFORE BREAKFAST AND UP TO 85 UNITS 30 MINUTES BEFORE SUPPER (Patient not taking: Reported on 10/11/2023), Disp: 12 mL, Rfl:  5    hydrOXYzine (ATARAX) 25 MG tablet, TAKE ONE TABLET BY MOUTH THREE TIMES DAILY AS NEEDED FOR ITCHING **MAY MAKE DROWSY** (Patient not taking: Reported on 10/11/2023), Disp: 30 tablet, Rfl: 1    Synjardy XR  MG tablet sustained-release 24 hour, , Disp: , Rfl:     Past Medical History:   Diagnosis Date    Arthritis     B12 deficiency 8/23/2017    Coronary artery disease     Deafness in left ear     Embedded foreign body 09/2020    Embedded fish hook Left hand, w/ infection    Foot ulcer     GERD (gastroesophageal reflux disease)     Hypertension associated with diabetes 8/23/2017    Ingrown toenail     Joint pain     Mixed diabetic hyperlipidemia associated with type 2 diabetes mellitus 8/23/2017    Neuropathy in diabetes     Shoulder disorder     Left    Type 2 diabetes mellitus with hyperglycemia, with long-term current use of insulin 8/23/2017    Vitamin D deficiency 8/23/2017       Past Surgical History:   Procedure Laterality Date    AORTIC VALVE REPAIR/REPLACEMENT  06/07/2018    APPENDECTOMY      BACK SURGERY      CARDIAC CATHETERIZATION  03/19/2020    stent x3    CATARACT EXTRACTION      CHOLECYSTECTOMY      CORONARY ARTERY BYPASS GRAFT      INCISION AND DRAINAGE ABSCESS      Left hand    INCISION AND DRAINAGE ARM Left 9/24/2020    Procedure: INCISION AND DRAINAGE LEFT HAND;  Surgeon: Ming Ramos MD;  Location: [facility] OR;  Service: Orthopedics;  Laterality: Left;    INNER EAR SURGERY Left     PACEMAKER IMPLANTATION  06/28/2020       Social History     Socioeconomic History    Marital status: Single   Tobacco Use    Smoking status: Former     Types: Cigarettes    Smokeless tobacco: Never   Substance and Sexual Activity    Alcohol use: Not Currently    Drug use: No    Sexual activity: Defer       Family History   Problem Relation Age of Onset    Diabetes Mother     Cancer Father     Cancer Sister     Cancer Brother     Heart disease Brother     Diabetes Brother        Objective    Temp 97.1 øF  "(36.2 øC) (Temporal)   Ht 177.8 cm (70\")   Wt 93.4 kg (206 lb)   BMI 29.56 kg/mý     Physical Exam  Vitals reviewed.   Constitutional:       Appearance: Normal appearance.   HENT:      Head: Normocephalic and atraumatic.   Pulmonary:      Effort: Pulmonary effort is normal.   Neurological:      Mental Status: He is alert.   Psychiatric:         Mood and Affect: Mood normal.         Behavior: Behavior normal.             Results for orders placed or performed in visit on 10/11/23   POC Urinalysis Dipstick, Multipro    Specimen: Urine   Result Value Ref Range    Color Yellow Yellow, Straw, Dark Yellow, Ivon    Clarity, UA Clear Clear    Glucose, UA >=1000 mg/dL (3+) (A) Negative mg/dL    Bilirubin Negative Negative    Ketones, UA Negative Negative    Specific Gravity  1.015 1.005 - 1.030    Blood, UA Negative Negative    pH, Urine 7.0 5.0 - 8.0    Protein, POC Negative Negative mg/dL    Urobilinogen, UA Normal Normal, 0.2 E.U./dL    Nitrite, UA Negative Negative    Leukocytes Negative Negative     Estimation of residual urine via abdominal ultrasound  Residual Urine: 153 ml  Indication: incomplete emptying  Position: Supine  Examination: Incremental scanning of the suprapubic area using 3 MHz transducer using copious amounts of acoustic gel.   Findings: An anechoic area was demonstrated which represented the bladder, with measurement of residual urine as noted. I inspected this myself. In that the residual urine was stable or insignificant, no treatment will be necessary at this time.     IPSS Questionnaire (AUA-7):  Incomplete emptying  Over the past month, how often have you had a sensation of not emptying your bladder completely after you finish?: Almost always (10/11/23 1023)  Frequency  Over the past month, how often have you had to urinate again less than two hours after you finishing urinating ?: About half the time (10/11/23 1023)  Intermittency  Over the past month, how often have you found you stopped " and started again several time when you urinated ?: Almost always (10/11/23 1023)  Urgency  Over the last month, how difficult  have you found it to postpone urination ?: Almost always (10/11/23 1023)  Weak Stream  Over the past month, how often have you had a weak urinary stream ?: Less than 1 time in 5 (10/11/23 1023)  Straining  Over the past month, how often have you had to push or strain to begin urination ?: Less than half the time (10/11/23 1023)  Nocturia  Over the past month, how many times did you most typically get up to urinate from the time you went to bed until the time you got up in the morning ?: 3 times (10/11/23 1023)  Quality of life due to urinary symptoms  If you were to spend the rest of your life with your urinary condition the way it is now, how would feel about that?: Mostly dissatified (10/11/23 1023)    Scores  Total IPSS Score: 23 (10/11/23 1023)  Total Score = Symtomatic Level: severely symptomatic: 20-35 (10/11/23 1023)       Assessment and Plan    Diagnoses and all orders for this visit:    1. Incomplete bladder emptying (Primary)  -     POC Urinalysis Dipstick, Multipro    2.  Urgency of urination  His postvoid residual bladder scan is much improved from previous it is now 153 mL.  August it was over 500 and last month his postvoid residual bladder scan was 329 mL.  I had him stop the Gemtesa I would like him to continue off any overactive bladder medication.  He has only been taking the tamsulosin for approximately 2 weeks I want him to continue as I feel over time this will help his symptoms overall.  Follow-up in 3 months.

## 2023-10-11 ENCOUNTER — OFFICE VISIT (OUTPATIENT)
Dept: UROLOGY | Facility: CLINIC | Age: 83
End: 2023-10-11
Payer: MEDICARE

## 2023-10-11 VITALS — HEIGHT: 70 IN | TEMPERATURE: 97.1 F | BODY MASS INDEX: 29.49 KG/M2 | WEIGHT: 206 LBS

## 2023-10-11 DIAGNOSIS — R33.9 INCOMPLETE BLADDER EMPTYING: Primary | ICD-10-CM

## 2023-10-11 DIAGNOSIS — R39.15 URGENCY OF URINATION: ICD-10-CM

## 2023-10-11 LAB
BILIRUB BLD-MCNC: NEGATIVE MG/DL
CLARITY, POC: CLEAR
COLOR UR: YELLOW
GLUCOSE UR STRIP-MCNC: ABNORMAL MG/DL
KETONES UR QL: NEGATIVE
LEUKOCYTE EST, POC: NEGATIVE
NITRITE UR-MCNC: NEGATIVE MG/ML
PH UR: 7 [PH] (ref 5–8)
PROT UR STRIP-MCNC: NEGATIVE MG/DL
RBC # UR STRIP: NEGATIVE /UL
SP GR UR: 1.01 (ref 1–1.03)
UROBILINOGEN UR QL: NORMAL

## 2023-10-11 RX ORDER — DAPAGLIFLOZIN 10 MG/1
1 TABLET, FILM COATED ORAL DAILY
COMMUNITY
Start: 2023-09-12

## 2023-10-11 RX ORDER — HUMAN INSULIN 100 [IU]/ML
5 INJECTION, SOLUTION SUBCUTANEOUS
COMMUNITY
Start: 2023-09-25

## 2023-10-23 RX ORDER — CITALOPRAM 20 MG/1
TABLET ORAL
Qty: 60 TABLET | Refills: 5 | Status: SHIPPED | OUTPATIENT
Start: 2023-10-23

## 2023-10-23 NOTE — TELEPHONE ENCOUNTER
Requested Prescriptions     Pending Prescriptions Disp Refills    citalopram (CELEXA) 20 MG tablet [Pharmacy Med Name: CITALOPRAM HBR 20 MG TABLET 20 Tablet] 60 tablet 5     Sig: TAKE ONE TABLET BY MOUTH DAILY       Last Office Visit: 9/21/2023  Next Office Visit: 3/25/2024  Last Medication Refill:  12/22/23 with 5 RF

## 2023-11-09 ENCOUNTER — HOSPITAL ENCOUNTER (EMERGENCY)
Age: 83
Discharge: HOME | End: 2023-11-09
Attending: EMERGENCY MEDICINE
Payer: MEDICARE

## 2023-11-09 VITALS
HEIGHT: 70 IN | DIASTOLIC BLOOD PRESSURE: 82 MMHG | RESPIRATION RATE: 18 BRPM | SYSTOLIC BLOOD PRESSURE: 120 MMHG | BODY MASS INDEX: 30.06 KG/M2 | TEMPERATURE: 97.7 F | HEART RATE: 72 BPM | WEIGHT: 210 LBS | OXYGEN SATURATION: 95 %

## 2023-11-09 DIAGNOSIS — N30.00 ACUTE CYSTITIS WITHOUT HEMATURIA: ICD-10-CM

## 2023-11-09 DIAGNOSIS — R33.9 ACUTE ON CHRONIC URINARY RETENTION: Primary | ICD-10-CM

## 2023-11-09 LAB
BACTERIA URNS QL MICRO: ABNORMAL /HPF
BILIRUB UR QL STRIP: NEGATIVE
CLARITY UR: ABNORMAL
COLOR UR: YELLOW
CRYSTALS URNS MICRO: ABNORMAL /HPF
EPI CELLS #/AREA URNS AUTO: 0 /HPF (ref 0–5)
GLUCOSE UR STRIP.AUTO-MCNC: =>1000 MG/DL
HGB UR STRIP.AUTO-MCNC: ABNORMAL MG/L
HYALINE CASTS #/AREA URNS AUTO: 2 /HPF (ref 0–8)
KETONES UR STRIP.AUTO-MCNC: NEGATIVE MG/DL
LEUKOCYTE ESTERASE UR QL STRIP.AUTO: ABNORMAL
NITRITE UR QL STRIP.AUTO: NEGATIVE
PH UR STRIP.AUTO: 6.5 [PH] (ref 5–8)
PROT UR STRIP.AUTO-MCNC: NEGATIVE MG/DL
RBC #/AREA URNS AUTO: 2 /HPF (ref 0–4)
SP GR UR STRIP.AUTO: 1.03 (ref 1–1.03)
UROBILINOGEN UR STRIP.AUTO-MCNC: 1 E.U./DL
WBC #/AREA URNS AUTO: 167 /HPF (ref 0–5)

## 2023-11-09 PROCEDURE — 99284 EMERGENCY DEPT VISIT MOD MDM: CPT

## 2023-11-09 PROCEDURE — 81001 URINALYSIS AUTO W/SCOPE: CPT

## 2023-11-09 PROCEDURE — 96372 THER/PROPH/DIAG INJ SC/IM: CPT

## 2023-11-09 PROCEDURE — 2500000003 HC RX 250 WO HCPCS

## 2023-11-09 PROCEDURE — 51798 US URINE CAPACITY MEASURE: CPT

## 2023-11-09 PROCEDURE — 2500000003 HC RX 250 WO HCPCS: Performed by: EMERGENCY MEDICINE

## 2023-11-09 PROCEDURE — 6360000002 HC RX W HCPCS: Performed by: EMERGENCY MEDICINE

## 2023-11-09 RX ORDER — CEFTRIAXONE 1 G/1
500 INJECTION, POWDER, FOR SOLUTION INTRAMUSCULAR; INTRAVENOUS ONCE
Status: DISCONTINUED | OUTPATIENT
Start: 2023-11-09 | End: 2023-11-09

## 2023-11-09 RX ORDER — LIDOCAINE HYDROCHLORIDE 10 MG/ML
INJECTION, SOLUTION EPIDURAL; INFILTRATION; INTRACAUDAL; PERINEURAL
Status: COMPLETED
Start: 2023-11-09 | End: 2023-11-09

## 2023-11-09 RX ORDER — CEPHALEXIN 500 MG/1
500 CAPSULE ORAL 3 TIMES DAILY
Qty: 21 CAPSULE | Refills: 0 | Status: SHIPPED | OUTPATIENT
Start: 2023-11-09 | End: 2023-11-16

## 2023-11-09 RX ORDER — LIDOCAINE HYDROCHLORIDE 10 MG/ML
5 INJECTION, SOLUTION EPIDURAL; INFILTRATION; INTRACAUDAL; PERINEURAL ONCE
Status: COMPLETED | OUTPATIENT
Start: 2023-11-09 | End: 2023-11-09

## 2023-11-09 RX ADMIN — LIDOCAINE HYDROCHLORIDE 500 MG: 10 INJECTION, SOLUTION EPIDURAL; INFILTRATION; INTRACAUDAL; PERINEURAL at 15:29

## 2023-11-09 RX ADMIN — LIDOCAINE HYDROCHLORIDE 5 ML: 10 INJECTION, SOLUTION EPIDURAL; INFILTRATION; INTRACAUDAL; PERINEURAL at 15:29

## 2023-11-09 ASSESSMENT — ENCOUNTER SYMPTOMS
RESPIRATORY NEGATIVE: 1
ABDOMINAL PAIN: 1
EYES NEGATIVE: 1

## 2023-11-09 ASSESSMENT — PAIN - FUNCTIONAL ASSESSMENT: PAIN_FUNCTIONAL_ASSESSMENT: 0-10

## 2023-11-09 ASSESSMENT — PAIN SCALES - GENERAL: PAINLEVEL_OUTOF10: 8

## 2023-11-09 NOTE — ED NOTES
Pt unable to provide urine sample at this time pt given urinal and instructed to let staff know when able to provide sample      Jhonny Bravo RN  11/09/23 [id number]

## 2023-11-09 NOTE — ED PROVIDER NOTES
[facility] EMERGENCY DEPT  EMERGENCY DEPARTMENT ENCOUNTER      Pt Name: Marilyn Ramirez  MRN: [id number]  [address] [place] 1940  Date of evaluation: 11/9/2023  Provider: Milad Sherman MD    CHIEF COMPLAINT       Chief Complaint   Patient presents with    Urinary Retention     Since 2 am         HISTORY OF PRESENT ILLNESS   (Location/Symptom, Timing/Onset,Context/Setting, Quality, Duration, Modifying Factors, Severity)  Note limiting factors. Marilyn Ramirez is a 79 y.o. male who presents to the emergency department for evaluation after having difficulty urinating. Has not been able to get any urine out for the last several hours today. Has a history of chronic limitation in bladder emptying. Followed by urology at [facility] for this. At one point had postvoid residual greater than 500, most recently down to low 100s. HPI    NursingNotes were reviewed. REVIEW OF SYSTEMS    (2-9 systems for level 4, 10 or more for level 5)     Review of Systems   Constitutional: Negative. HENT: Negative. Eyes: Negative. Respiratory: Negative. Cardiovascular: Negative. Gastrointestinal:  Positive for abdominal pain. Genitourinary:  Positive for difficulty urinating. Musculoskeletal: Negative. Skin: Negative. Neurological: Negative. Hematological: Negative. Psychiatric/Behavioral: Negative. A complete review of systems was performed and is negative except as noted above in the HPI.        PAST MEDICAL HISTORY     Past Medical History:   Diagnosis Date    Aortic valve stenosis     Arthritis     Chest tightness, discomfort, or pressure 4/30/2012    Chronic back pain     CKD (chronic kidney disease)     CPAP (continuous positive airway pressure) dependence     13cm    Diabetes ([address])     Diabetic polyneuropathy associated with type 2 diabetes mellitus ([address]) 8/29/2016    GERD (gastroesophageal reflux disease)     HTN (hypertension)     Hyperlipemia     Left ventricular diastolic dysfunction     Mitral

## 2023-11-10 NOTE — PROGRESS NOTES
Subjective    Mr. Jeong is 83 y.o. male    Chief Complaint: ER follow up urinary retention with f/c    History of Present Illness    83-year-old male established patient in for ER follow-up due to episode of acute urinary retention brought requiring indwelling Choe catheter placement 11/9/2023.  Patient was recently seen in our office by MIS Villegas where bladder scan was completed and patient's postvoid residual had actually improved to within the 100s compared to greater than 500 previously.  Past 2 months patient was started on tamsulosin 0.4 mg nightly.  Patient reporting overall thought had been emptying well since visit with Hal until attempted to urinate for approximately 12 hours without any output therefore went to the ER.    Patient also reporting an ongoing james with balanitis.  Has previously been treated with Lotrisone as well as dermatology with compounding cream of clobetasol and ciciopirox.  Patient reporting nothing seems to be working.  Patient is diabetic insulin dependent with blood sugars that remain elevated.    The following portions of the patient's history were reviewed and updated as appropriate: allergies, current medications, past family history, past medical history, past social history, past surgical history and problem list.    Review of Systems   Constitutional:  Negative for chills and fever.   Gastrointestinal:  Negative for abdominal pain, anal bleeding and blood in stool.   Genitourinary:  Positive for decreased urine volume and difficulty urinating. Negative for dysuria and hematuria.         Current Outpatient Medications:     aspirin 81 MG chewable tablet, Chew 1 tablet Daily., Disp: , Rfl:     atenolol (TENORMIN) 25 MG tablet, Take 1 tablet by mouth Daily., Disp: , Rfl:     atorvastatin (LIPITOR) 40 MG tablet, Take 1 tablet by mouth Every Morning., Disp: , Rfl:     B-D ULTRAFINE III SHORT PEN 31G X 8 MM misc, , Disp: , Rfl:     betamethasone, augmented,  (DIPROLENE) 0.05 % ointment, , Disp: , Rfl:     Cholecalciferol (Vitamin D3) 1.25 MG (23522 UT) tablet, Take 1 tablet by mouth Every 30 (Thirty) Days. Takes the 1st day Q month, Disp: , Rfl:     citalopram (CeleXA) 20 MG tablet, , Disp: , Rfl:     esomeprazole (nexIUM) 40 MG capsule, Take 1 capsule by mouth Every Morning Before Breakfast., Disp: , Rfl:     ezetimibe (ZETIA) 10 MG tablet, Take 1 tablet by mouth Daily., Disp: , Rfl:     Farxiga 10 MG tablet, Take 10 mg by mouth Daily., Disp: , Rfl:     furosemide (LASIX) 20 MG tablet, Take 1 tablet by mouth Every Morning., Disp: , Rfl:     HumuLIN R U-500 KwikPen 500 UNIT/ML solution pen-injector CONCENTRATED injection, INJECT UP  UNITS 30 MINUTES BEFORE BREAKFAST AND UP TO 85 UNITS 30 MINUTES BEFORE SUPPER, Disp: 12 mL, Rfl: 5    hydrOXYzine (ATARAX) 25 MG tablet, TAKE ONE TABLET BY MOUTH EVERY 8 HOURS AS NEEDED FOR ITCHING FOR UP TO 10 DAYS **MAY MAKE DROWSY**, Disp: 30 tablet, Rfl: 1    Insulin Glargine (BASAGLAR KWIKPEN) 100 UNIT/ML injection pen, , Disp: , Rfl:     Insulin Pen Needle 31G X 5 MM misc, , Disp: , Rfl:     NovoLIN R 100 UNIT/ML injection, Inject 5 Units under the skin into the appropriate area as directed 3 (Three) Times a Day Before Meals., Disp: , Rfl:     nystatin (MYCOSTATIN) 317147 UNIT/GM cream, , Disp: , Rfl:     pramipexole (MIRAPEX) 1.5 MG tablet, Take 1 tablet by mouth 3 (Three) Times a Day., Disp: , Rfl:     ciclopirox (LOPROX) 0.77 % cream, , Disp: , Rfl:     clobetasol (TEMOVATE) 0.05 % ointment, , Disp: , Rfl:     clopidogrel (PLAVIX) 75 MG tablet, Take 1 tablet by mouth Daily. Hold x 5 days a/ Day of procedure 6/15/21; [facility] (Patient not taking: Reported on 11/16/2023), Disp: , Rfl:     dapagliflozin-metformin HCl ER (XIGDUO XR)  MG tablet, Take 1 tablet by mouth Daily. - METFORMIN - (Patient not taking: Reported on 10/11/2023), Disp: , Rfl:      dicyclomine (BENTYL) 10 MG capsule, Take 1 capsule by mouth 2 (two) times a day. (Patient not taking: Reported on 10/11/2023), Disp: , Rfl:     Gemtesa 75 MG tablet, TAKE ONE TABLET BY MOUTH DAILY (Patient not taking: Reported on 9/11/2023), Disp: 30 tablet, Rfl: 1    HYDROcodone-acetaminophen (NORCO)  MG per tablet, Take 1 tablet by mouth Every 8 (Eight) Hours As Needed for Moderate Pain., Disp: , Rfl:     hydrOXYzine (ATARAX) 25 MG tablet, TAKE ONE TABLET BY MOUTH THREE TIMES DAILY AS NEEDED FOR ITCHING **MAY MAKE DROWSY** (Patient not taking: Reported on 10/11/2023), Disp: 30 tablet, Rfl: 1    Synjardy XR  MG tablet sustained-release 24 hour, , Disp: , Rfl:     tamsulosin (FLOMAX) 0.4 MG capsule 24 hr capsule, Take 1 capsule by mouth 2 (Two) Times a Day for 360 days., Disp: 180 capsule, Rfl: 3    Past Medical History:   Diagnosis Date    Arthritis     B12 deficiency 8/23/2017    Coronary artery disease     Deafness in left ear     Embedded foreign body 09/2020    Embedded fish hook Left hand, w/ infection    Foot ulcer     GERD (gastroesophageal reflux disease)     Hypertension associated with diabetes 8/23/2017    Ingrown toenail     Joint pain     Mixed diabetic hyperlipidemia associated with type 2 diabetes mellitus 8/23/2017    Neuropathy in diabetes     Shoulder disorder     Left    Type 2 diabetes mellitus with hyperglycemia, with long-term current use of insulin 8/23/2017    Vitamin D deficiency 8/23/2017       Past Surgical History:   Procedure Laterality Date    AORTIC VALVE REPAIR/REPLACEMENT  06/07/2018    APPENDECTOMY      BACK SURGERY      CARDIAC CATHETERIZATION  03/19/2020    stent x3    CATARACT EXTRACTION      CHOLECYSTECTOMY      CORONARY ARTERY BYPASS GRAFT      INCISION AND DRAINAGE ABSCESS      Left hand    INCISION AND DRAINAGE ARM Left 9/24/2020    Procedure: INCISION AND DRAINAGE LEFT HAND;  Surgeon: Ming Ramos MD;  Location: [facility];  Service: Orthopedics;   "Laterality: Left;    INNER EAR SURGERY Left     PACEMAKER IMPLANTATION  06/28/2020       Social History     Socioeconomic History    Marital status: Single   Tobacco Use    Smoking status: Former     Types: Cigarettes    Smokeless tobacco: Never   Substance and Sexual Activity    Alcohol use: Not Currently    Drug use: No    Sexual activity: Defer       Family History   Problem Relation Age of Onset    Diabetes Mother     Cancer Father     Cancer Sister     Cancer Brother     Heart disease Brother     Diabetes Brother        Objective    Temp 97.2 °F (36.2 °C)   Ht 177.8 cm (70\")   Wt 93.4 kg (206 lb)   BMI 29.56 kg/m²     Physical Exam  Constitutional:       Appearance: Normal appearance.   Abdominal:      Tenderness: There is no right CVA tenderness or left CVA tenderness.   Genitourinary:     Comments: Indwelling Choe catheter in place draining clear yellow urine.   Skin:     General: Skin is warm and dry.   Neurological:      Mental Status: He is alert and oriented to person, place, and time.   Psychiatric:         Mood and Affect: Mood normal.         Behavior: Behavior normal.             Results for orders placed or performed in visit on 10/11/23   POC Urinalysis Dipstick, Multipro    Specimen: Urine   Result Value Ref Range    Color Yellow Yellow, Straw, Dark Yellow, Ivon    Clarity, UA Clear Clear    Glucose, UA >=1000 mg/dL (3+) (A) Negative mg/dL    Bilirubin Negative Negative    Ketones, UA Negative Negative    Specific Gravity  1.015 1.005 - 1.030    Blood, UA Negative Negative    pH, Urine 7.0 5.0 - 8.0    Protein, POC Negative Negative mg/dL    Urobilinogen, UA Normal Normal, 0.2 E.U./dL    Nitrite, UA Negative Negative    Leukocytes Negative Negative     Assessment and Plan    Diagnoses and all orders for this visit:    1. Acute urinary retention (Primary)  -     tamsulosin (FLOMAX) 0.4 MG capsule 24 hr capsule; Take 1 capsule by mouth 2 (Two) Times a Day for 360 days.  Dispense: 180 capsule; " Refill: 3        We will go ahead and remove indwelling Choe catheter at this time in hopes patient will be able to urinate on his own.  Have encouraged patient to increase the Flomax if tolerable to once in the morning and once in the evening.    We will have patient follow-up in 1 month to check a postvoid residual assuming able to urinate once catheter removed.  Have instructed patient if unable to urinate within 6 to 8 hours will have to report to the nearest emergency room to have catheter replaced.    If catheter has to be replaced patient will need to be scheduled with Dr. Harman for cystoscopy

## 2023-11-14 ENCOUNTER — OFFICE VISIT (OUTPATIENT)
Dept: CARDIOLOGY CLINIC | Age: 83
End: 2023-11-14
Payer: MEDICARE

## 2023-11-14 VITALS
DIASTOLIC BLOOD PRESSURE: 64 MMHG | SYSTOLIC BLOOD PRESSURE: 116 MMHG | WEIGHT: 210 LBS | HEART RATE: 84 BPM | OXYGEN SATURATION: 96 % | HEIGHT: 70 IN | BODY MASS INDEX: 30.06 KG/M2

## 2023-11-14 DIAGNOSIS — I49.5 SINOATRIAL NODE DYSFUNCTION (HCC): ICD-10-CM

## 2023-11-14 DIAGNOSIS — Z95.0 PACEMAKER: ICD-10-CM

## 2023-11-14 DIAGNOSIS — G47.33 OBSTRUCTIVE SLEEP APNEA: ICD-10-CM

## 2023-11-14 DIAGNOSIS — I38 VALVULAR HEART DISEASE: ICD-10-CM

## 2023-11-14 DIAGNOSIS — I48.0 PAROXYSMAL ATRIAL FIBRILLATION (HCC): ICD-10-CM

## 2023-11-14 DIAGNOSIS — I50.42 CHRONIC COMBINED SYSTOLIC AND DIASTOLIC HEART FAILURE DUE TO VALVULAR DISEASE (HCC): ICD-10-CM

## 2023-11-14 DIAGNOSIS — E78.2 MIXED HYPERLIPIDEMIA: ICD-10-CM

## 2023-11-14 DIAGNOSIS — I10 ESSENTIAL HYPERTENSION: ICD-10-CM

## 2023-11-14 DIAGNOSIS — I25.10 CORONARY ARTERY DISEASE INVOLVING NATIVE CORONARY ARTERY OF NATIVE HEART WITHOUT ANGINA PECTORIS: Primary | ICD-10-CM

## 2023-11-14 DIAGNOSIS — I38 CHRONIC COMBINED SYSTOLIC AND DIASTOLIC HEART FAILURE DUE TO VALVULAR DISEASE (HCC): ICD-10-CM

## 2023-11-14 DIAGNOSIS — I05.0 RHEUMATIC MITRAL STENOSIS: ICD-10-CM

## 2023-11-14 DIAGNOSIS — I06.0 RHEUMATIC AORTIC STENOSIS: ICD-10-CM

## 2023-11-14 PROCEDURE — 3078F DIAST BP <80 MM HG: CPT | Performed by: CLINICAL NURSE SPECIALIST

## 2023-11-14 PROCEDURE — 1036F TOBACCO NON-USER: CPT | Performed by: CLINICAL NURSE SPECIALIST

## 2023-11-14 PROCEDURE — G8417 CALC BMI ABV UP PARAM F/U: HCPCS | Performed by: CLINICAL NURSE SPECIALIST

## 2023-11-14 PROCEDURE — G8427 DOCREV CUR MEDS BY ELIG CLIN: HCPCS | Performed by: CLINICAL NURSE SPECIALIST

## 2023-11-14 PROCEDURE — 1123F ACP DISCUSS/DSCN MKR DOCD: CPT | Performed by: CLINICAL NURSE SPECIALIST

## 2023-11-14 PROCEDURE — 99214 OFFICE O/P EST MOD 30 MIN: CPT | Performed by: CLINICAL NURSE SPECIALIST

## 2023-11-14 PROCEDURE — 3074F SYST BP LT 130 MM HG: CPT | Performed by: CLINICAL NURSE SPECIALIST

## 2023-11-14 PROCEDURE — G8484 FLU IMMUNIZE NO ADMIN: HCPCS | Performed by: CLINICAL NURSE SPECIALIST

## 2023-11-14 PROCEDURE — 93280 PM DEVICE PROGR EVAL DUAL: CPT | Performed by: CLINICAL NURSE SPECIALIST

## 2023-11-14 ASSESSMENT — ENCOUNTER SYMPTOMS
WHEEZING: 0
EYE REDNESS: 0
COUGH: 0
FACIAL SWELLING: 0
NAUSEA: 0
ABDOMINAL PAIN: 1
VOMITING: 0
CHEST TIGHTNESS: 0

## 2023-11-14 NOTE — PROGRESS NOTES
native coronary artery of native heart without angina pectoris        2. 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 Rheumatic mitral stenosis        4. Rheumatic aortic stenosis        5. Valvular heart disease        6. Paroxysmal atrial fibrillation (HCC)        7. Essential hypertension        8. Mixed hyperlipidemia        9. Obstructive sleep apnea        10. Sinoatrial node dysfunction (HCC) [I49.5 (ICD-10-CM)]        11. Pacemaker                CAD-stable without symptoms of angina. Continue aspirin, atenolol, atorvastatin. No evidence of ischemia per Gennette Gold 9/8/2023, mild depression in LVEF    Chronic combined systolic heart failure NYHA class II-III, stage C, LVEF 45% in 5/23-mild drop in EF per echo earlier this year. Dr. Suresh Reyes suggested no changes. No signs of fluid volume overload. He continues on atenolol, Lasix and Laila. Blood pressure runs on the low side and would likely not tolerate up titration or addition medications at this time  Counseled on daily weights, reporting weight gain of 3lbs in 24hrs or 5lbs in a week, low sodium diet, and fluid restrictions 6 cupsor 48oz daily. Paroxysmal atrial fibrillation-new finding per device check today with a 3% occurring since September. [id number] score is 4. We will start anticoagulation with Eliquis 5 mg twice daily. Patient can stop his Plavix. He appears asymptomatic with the atrial fibrillation. Continue atenolol and continue to observe    Hypertension-stable on atenolol    Hyperlipidemia- continues on atorvastatin which is managed by his PCP. We will request most recent labs for review. Sinoatrial node dysfunction/pacemaker-  Pacemaker check showed adequate battery status @ 4.5 years estimated  Mode: DDDR. Lead impedances are stable  Pacing: AP 98.6%,  99.8%. Appropriate diagnostics and safety margins noted. Sustained arrythmia: None.   Reprogramming done for sensitivity

## 2023-11-14 NOTE — PATIENT INSTRUCTIONS
Return for Dr. Nesha Braden, as scheduled. Stop Plavix (Clopidogrel)  Start Eliquis 5mg twice a day for atrial fibrillation    Call with any questionsor concerns  Follow up with RITESH Pollock CNP for non cardiac problems  Report any new problems  Cardiovascular Fitness-Exercise as tolerated. Strive for 15 minutes of exercise most days of the week. Cardiac / HealthyDiet  Continue current medications as directed  Continue plan of treatment  It is always recommended that you bring your medicationsbottles with you to each visit - this is for your safety!

## 2023-11-16 ENCOUNTER — OFFICE VISIT (OUTPATIENT)
Dept: UROLOGY | Facility: CLINIC | Age: 83
End: 2023-11-16
Payer: MEDICARE

## 2023-11-16 VITALS — BODY MASS INDEX: 29.49 KG/M2 | HEIGHT: 70 IN | TEMPERATURE: 97.2 F | WEIGHT: 206 LBS

## 2023-11-16 DIAGNOSIS — R33.8 ACUTE URINARY RETENTION: Primary | ICD-10-CM

## 2023-11-16 RX ORDER — TAMSULOSIN HYDROCHLORIDE 0.4 MG/1
1 CAPSULE ORAL 2 TIMES DAILY
Qty: 180 CAPSULE | Refills: 3 | Status: SHIPPED | OUTPATIENT
Start: 2023-11-16 | End: 2024-11-10

## 2023-11-16 ASSESSMENT — ENCOUNTER SYMPTOMS: SHORTNESS OF BREATH: 1

## 2023-11-22 ENCOUNTER — HOSPITAL ENCOUNTER (OUTPATIENT)
Dept: MRI IMAGING | Age: 83
Discharge: HOME | End: 2023-11-22
Attending: PSYCHIATRY & NEUROLOGY
Payer: MEDICARE

## 2023-11-22 DIAGNOSIS — M54.41 CHRONIC BILATERAL LOW BACK PAIN WITH BILATERAL SCIATICA: ICD-10-CM

## 2023-11-22 DIAGNOSIS — M54.42 CHRONIC BILATERAL LOW BACK PAIN WITH BILATERAL SCIATICA: ICD-10-CM

## 2023-11-22 DIAGNOSIS — G89.29 CHRONIC BILATERAL LOW BACK PAIN WITH BILATERAL SCIATICA: ICD-10-CM

## 2023-11-22 PROCEDURE — 72148 MRI LUMBAR SPINE W/O DYE: CPT

## 2023-11-27 ENCOUNTER — TELEPHONE (OUTPATIENT)
Dept: NEUROLOGY | Age: 83
End: 2023-11-27

## 2023-11-27 DIAGNOSIS — L29.1 SCROTAL ITCHING: ICD-10-CM

## 2023-11-27 RX ORDER — HYDROXYZINE HYDROCHLORIDE 25 MG/1
TABLET, FILM COATED ORAL
Qty: 30 TABLET | Refills: 1 | Status: SHIPPED | OUTPATIENT
Start: 2023-11-27

## 2023-11-27 NOTE — TELEPHONE ENCOUNTER
Spoke to patient and gave MRI L spine results. MRI L spine looks good. No pressure on nerve roots. He voiced understanding.

## 2023-11-27 NOTE — TELEPHONE ENCOUNTER
----- Message from Jennifer Torres MD sent at 11/22/2023  3:33 PM CST -----  His MRI L spine looks good.   No pressure on nerve roots  ----- Message -----  [facility]  Sent: 11/22/2023  11:12 AM CST  To: Jennifer Torres MD

## 2023-11-29 DIAGNOSIS — I48.0 PAROXYSMAL ATRIAL FIBRILLATION (HCC): ICD-10-CM

## 2023-11-29 DIAGNOSIS — I49.5 SINOATRIAL NODE DYSFUNCTION (HCC): ICD-10-CM

## 2023-11-29 DIAGNOSIS — Z95.0 PACEMAKER: Primary | ICD-10-CM

## 2023-12-09 NOTE — H&P
1. Permanent pacemaker placement 12/2013, 100% atrial and ventricular paced with recent increase in RV lead threshold with capture failure and presyncopal episodes. 2.  Single-vessel disease involving bifurcation LAD/diagonal treated with complex bifurcation stents 3/19/2020 (proximal LAD 3.0 x 18 mm resolute, mid LAD 2.5 x 14 mm, D1 2.5 x 14 mm, CAT technique)  3. 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 4. Diabetes mellitus. 5. CK D stage III. 6. Obesity.     PRESENTATION: Leif Sahu is a [de-identified]y.o. year old male presents for follow-up evaluation. He has been doing significantly better. He is able to do a great deal of activities now without difficulty. He has no lightheadedness. He has been suffering with these symptoms for the last 2 years. He had presented 3/17/2020 with severe lightheadedness and presyncope and was found to have ventricular lead loss of capture with markedly elevated RV lead thresholds at 2.75 V from  Pacemaker interrogation does show an elevated threshold from prior value of 1.375 V. His ventricular lead output was increased to 5 V. He had an abnormal Lexiscan study and was found to have an obstructive proximal to mid LAD lesion involving a diagonal which was treated with drug-eluting stents. His pacemaker evaluation by [employer] engineers to suggest change in his battery before lead change. They believe the issues due to his battery which is reaching end-of-life.     REVIEW OF SYSTEMS:  Review of Systems   Constitutional: Negative for activity change, diaphoresis and fatigue. HENT: Negative for hearing loss, nosebleeds and tinnitus. Eyes: Negative for visual disturbance. Respiratory: Negative for cough, shortness of breath and wheezing. Cardiovascular: Negative for chest pain, palpitations and leg swelling.    Gastrointestinal: Negative for abdominal distention, abdominal pain, blood in stool, diarrhea and ventricular lead due to increased thresholds for unclear reasons, with increase in ventricular lead output done with capture, status post PCI 3/19/2020 with complex bifurcation stents to proximal to mid LAD/D1 here for follow-up evaluation.     1. He is doing extremely well post PCI and increase in lead output on his ventricular lead. He is completely pacer dependent. Pacer interrogation does show that his battery life is between 2 to 9 months. With the increase in output required his battery life is likely to be shortened. He is having another [facility] evaluation in a month. I would have him come in for threshold testing as well. 2.  He may proceed with routine activities and continue his medications including his Plavix uninterrupted. He will follow-up with me in 4 months.     4/22/2020 by Jennifer Andersen  Patient is here for pacemaker check only. Battery has reached recommended replacement time. RV thresholds have been high. Consideration for RV lead replacement. Due to the coronavirus, procedure determination form filled out and submitted to committee for pacemaker generator change and possible RV lead replacement.     Pacemaker check showed adequate battery status @ RRT  Mode: DDDR  Lead impedances are stable  Pacing:  AP99.5%,, %  Appropriate diagnostics and safety margins noted. Sustained arrythmia: none  Please see the scanned interrogation report    Patient being scheduled for pacemaker generator change. Risks, benefits, alternatives of permanent pacemaker generator change and possible lead revision discussed with the patient and full informed consent obtained.   Acceptable Mallampati score  Consent for moderate conscious sedation  ASA 3 Abormal VS: Temp > 100F or < 96.8F; SBP < 90 mmHG; HR > 120bpm; Resp > 24/min

## 2023-12-13 ENCOUNTER — TELEPHONE (OUTPATIENT)
Dept: UROLOGY | Facility: CLINIC | Age: 83
End: 2023-12-13
Payer: MEDICARE

## 2023-12-13 NOTE — TELEPHONE ENCOUNTER
Provider: LOTTIE RODRIGUEZ    Caller: IRNA PATTERSON    Relationship to Patient: Emergency Contact     Phone Number: [phone]    Reason for Call: RESCHEDULE APPOINTMENT    When was the patient last seen: 11/16/23    Notes: PATIENT IS UNABLE TO MAKE TOMORROW'S APPOINTMENT (12/14/23 @ 9:30 AM) DUE TO HAVING THE FLU. MOVED APPOINTMENT TO 12/28/23 @ 9:00 AM.

## 2023-12-14 NOTE — PROGRESS NOTES
Subjective    Mr. Jeong is 83 y.o. male    Chief Complaint: 1 month follow up  urinary Retention     History of Present Illness    83-year-old male established patient in for 1 month follow-up due to episode of acute urinary retention requiring indwelling Choe catheter placement 11/9/2023.  Tamsulosin was increased to 0.4 mg twice daily.  Denies any feeling of incomplete emptying or decreased urine output.  Patient's main complaint at present is urine frequency, urgency and dribbling with urination.  History of HoLAP 2008.    reporting an ongoing james with balanitis.  Has previously been treated with Lotrisone as well as dermatology with compounding cream of clobetasol and ciciopirox.  Patient reporting nothing seems to be working.  Patient is diabetic insulin dependent with blood sugars that remain elevated.  Urinalysis today showing greater than 1000 mg glucose.    The following portions of the patient's history were reviewed and updated as appropriate: allergies, current medications, past family history, past medical history, past social history, past surgical history and problem list.    Review of Systems   Constitutional:  Negative for chills, fatigue and fever.   Gastrointestinal:  Negative for nausea and vomiting.   Genitourinary:  Positive for decreased urine volume, difficulty urinating, frequency and urgency.         Current Outpatient Medications:     apixaban (ELIQUIS) 5 MG tablet tablet, Take 1 tablet by mouth 2 (Two) Times a Day., Disp: , Rfl:     aspirin 81 MG chewable tablet, Chew 1 tablet Daily., Disp: , Rfl:     atenolol (TENORMIN) 25 MG tablet, Take 1 tablet by mouth Daily., Disp: , Rfl:     atorvastatin (LIPITOR) 40 MG tablet, Take 1 tablet by mouth Every Morning., Disp: , Rfl:     B-D ULTRAFINE III SHORT PEN 31G X 8 MM misc, , Disp: , Rfl:     betamethasone, augmented, (DIPROLENE) 0.05 % ointment, , Disp: , Rfl:     Cholecalciferol (Vitamin D3) 1.25 MG (31858 UT) tablet, Take 1 tablet by  mouth Every 30 (Thirty) Days. Takes the 1st day Q month, Disp: , Rfl:     citalopram (CeleXA) 20 MG tablet, , Disp: , Rfl:     ezetimibe (ZETIA) 10 MG tablet, Take 1 tablet by mouth Daily., Disp: , Rfl:     Farxiga 10 MG tablet, Take 10 mg by mouth Daily., Disp: , Rfl:     furosemide (LASIX) 20 MG tablet, Take 1 tablet by mouth Every Morning., Disp: , Rfl:     HumuLIN R U-500 KwikPen 500 UNIT/ML solution pen-injector CONCENTRATED injection, INJECT UP  UNITS 30 MINUTES BEFORE BREAKFAST AND UP TO 85 UNITS 30 MINUTES BEFORE SUPPER, Disp: 12 mL, Rfl: 5    HYDROcodone-acetaminophen (NORCO)  MG per tablet, Take 1 tablet by mouth Every 8 (Eight) Hours As Needed for Moderate Pain., Disp: , Rfl:     hydrOXYzine (ATARAX) 25 MG tablet, TAKE ONE TABLET BY MOUTH EVERY 8 HOURS AS NEEDED FOR ITCHING FOR UP TO 10 DAYS **MAY MAKE DROWSY**, Disp: 30 tablet, Rfl: 1    hydrOXYzine (ATARAX) 25 MG tablet, TAKE ONE TABLET BY MOUTH THREE TIMES DAILY AS NEEDED FOR ITCHING **MAY MAKE DROWSY**, Disp: 30 tablet, Rfl: 1    Insulin Glargine (BASAGLAR KWIKPEN) 100 UNIT/ML injection pen, , Disp: , Rfl:     Insulin Pen Needle 31G X 5 MM misc, , Disp: , Rfl:     NovoLIN R 100 UNIT/ML injection, Inject 5 Units under the skin into the appropriate area as directed 3 (Three) Times a Day Before Meals., Disp: , Rfl:     nystatin (MYCOSTATIN) 791531 UNIT/GM cream, , Disp: , Rfl:     pramipexole (MIRAPEX) 1.5 MG tablet, Take 1 tablet by mouth 3 (Three) Times a Day., Disp: , Rfl:     tamsulosin (FLOMAX) 0.4 MG capsule 24 hr capsule, Take 1 capsule by mouth 2 (Two) Times a Day for 360 days., Disp: 180 capsule, Rfl: 3    ciclopirox (LOPROX) 0.77 % cream, , Disp: , Rfl:     clobetasol (TEMOVATE) 0.05 % ointment, , Disp: , Rfl:     clopidogrel (PLAVIX) 75 MG tablet, Take 1 tablet by mouth Daily. Hold x 5 days a/ Day of procedure 6/15/21; [facility] (Patient not taking: Reported on  11/16/2023), Disp: , Rfl:     dapagliflozin-metformin HCl ER (XIGDUO XR)  MG tablet, Take 1 tablet by mouth Daily. - METFORMIN - (Patient not taking: Reported on 10/11/2023), Disp: , Rfl:     dicyclomine (BENTYL) 10 MG capsule, Take 1 capsule by mouth 2 (two) times a day. (Patient not taking: Reported on 10/11/2023), Disp: , Rfl:     esomeprazole (nexIUM) 40 MG capsule, Take 1 capsule by mouth Every Morning Before Breakfast. (Patient not taking: Reported on 12/28/2023), Disp: , Rfl:     finasteride (PROSCAR) 5 MG tablet, Take 1 tablet by mouth Daily for 360 days., Disp: 90 tablet, Rfl: 3    Gemtesa 75 MG tablet, TAKE ONE TABLET BY MOUTH DAILY (Patient not taking: Reported on 9/11/2023), Disp: 30 tablet, Rfl: 1    Synjardy XR  MG tablet sustained-release 24 hour, , Disp: , Rfl:     Past Medical History:   Diagnosis Date    Arthritis     B12 deficiency 8/23/2017    Coronary artery disease     Deafness in left ear     Embedded foreign body 09/2020    Embedded fish hook Left hand, w/ infection    Foot ulcer     GERD (gastroesophageal reflux disease)     Hypertension associated with diabetes 8/23/2017    Ingrown toenail     Joint pain     Mixed diabetic hyperlipidemia associated with type 2 diabetes mellitus 8/23/2017    Neuropathy in diabetes     Shoulder disorder     Left    Type 2 diabetes mellitus with hyperglycemia, with long-term current use of insulin 8/23/2017    Vitamin D deficiency 8/23/2017       Past Surgical History:   Procedure Laterality Date    AORTIC VALVE REPAIR/REPLACEMENT  06/07/2018    APPENDECTOMY      BACK SURGERY      CARDIAC CATHETERIZATION  03/19/2020    stent x3    CATARACT EXTRACTION      CHOLECYSTECTOMY      CORONARY ARTERY BYPASS GRAFT      INCISION AND DRAINAGE ABSCESS      Left hand    INCISION AND DRAINAGE ARM Left 9/24/2020    Procedure: INCISION AND DRAINAGE LEFT HAND;  Surgeon: Ming Ramos MD;  Location: [facility] OR;  Service: Orthopedics;  Laterality: Left;    INNER  "EAR SURGERY Left     PACEMAKER IMPLANTATION  06/28/2020       Social History     Socioeconomic History    Marital status: Single   Tobacco Use    Smoking status: Former     Types: Cigarettes    Smokeless tobacco: Never   Substance and Sexual Activity    Alcohol use: Not Currently    Drug use: No    Sexual activity: Defer       Family History   Problem Relation Age of Onset    Diabetes Mother     Cancer Father     Cancer Sister     Cancer Brother     Heart disease Brother     Diabetes Brother        Objective    Temp 96.6 °F (35.9 °C)   Ht 177.8 cm (70\")   Wt 93.6 kg (206 lb 6.4 oz)   BMI 29.62 kg/m²     Physical Exam  Constitutional:       Appearance: Normal appearance.   Abdominal:      Tenderness: There is no right CVA tenderness or left CVA tenderness.   Genitourinary:     Prostate: Enlarged. Not tender and no nodules present.   Skin:     General: Skin is warm and dry.   Neurological:      Mental Status: He is alert and oriented to person, place, and time.   Psychiatric:         Mood and Affect: Mood normal.         Behavior: Behavior normal.             Results for orders placed or performed in visit on 12/14/23   POC Urinalysis Dipstick, Multipro    Specimen: Urine   Result Value Ref Range    Color Yellow Yellow, Straw, Dark Yellow, Ivon    Clarity, UA Clear Clear    Glucose, UA >=1000 mg/dL (3+) (A) Negative mg/dL    Bilirubin Negative Negative    Ketones, UA Negative Negative    Specific Gravity  1.015 1.005 - 1.030    Blood, UA Negative Negative    pH, Urine 6.5 5.0 - 8.0    Protein, POC Negative Negative mg/dL    Urobilinogen, UA Normal Normal, 0.2 E.U./dL    Nitrite, UA Negative Negative    Leukocytes Negative Negative   Estimation of residual urine via abdominal ultrasound  Residual Urine: 348 ml  Indication: retention  Position: Supine  Examination: Incremental scanning of the suprapubic area using 3 MHz transducer using copious amounts of acoustic gel.   Findings: An anechoic area was demonstrated " which represented the bladder, with measurement of residual urine as noted. I inspected this myself. In that the residual urine was stable or insignificant, no treatment will be necessary at this time.      Assessment and Plan    Diagnoses and all orders for this visit:    1. Urinary retention (Primary)  -     finasteride (PROSCAR) 5 MG tablet; Take 1 tablet by mouth Daily for 360 days.  Dispense: 90 tablet; Refill: 3      PVR today 348 mL-patient appears to still be retaining large volume urine however is not quite significant enough to warrant indwelling Choe catheter placement at present recommend patient continue twice daily dosing tamsulosin and will start patient on finasteride    History of [place] 2008    Follow-up 3 months PVR

## 2023-12-27 RX ORDER — ATORVASTATIN CALCIUM 40 MG/1
TABLET, FILM COATED ORAL
Qty: 30 TABLET | Refills: 4 | Status: SHIPPED | OUTPATIENT
Start: 2023-12-27

## 2023-12-28 ENCOUNTER — OFFICE VISIT (OUTPATIENT)
Dept: UROLOGY | Facility: CLINIC | Age: 83
End: 2023-12-28
Payer: MEDICARE

## 2023-12-28 ENCOUNTER — HOSPITAL ENCOUNTER (OUTPATIENT)
Dept: GENERAL RADIOLOGY | Facility: HOSPITAL | Age: 83
Discharge: HOME | End: 2023-12-28
Admitting: PHYSICAL MEDICINE & REHABILITATION
Payer: MEDICARE

## 2023-12-28 ENCOUNTER — TRANSCRIBE ORDERS (OUTPATIENT)
Dept: ADMINISTRATIVE | Facility: HOSPITAL | Age: 83
End: 2023-12-28
Payer: MEDICARE

## 2023-12-28 VITALS — TEMPERATURE: 96.6 F | BODY MASS INDEX: 29.55 KG/M2 | HEIGHT: 70 IN | WEIGHT: 206.4 LBS

## 2023-12-28 DIAGNOSIS — M75.00 ADHESIVE CAPSULITIS OF SHOULDER, UNSPECIFIED LATERALITY: Primary | ICD-10-CM

## 2023-12-28 DIAGNOSIS — M75.00 ADHESIVE CAPSULITIS OF SHOULDER, UNSPECIFIED LATERALITY: ICD-10-CM

## 2023-12-28 DIAGNOSIS — R33.9 URINARY RETENTION: Primary | ICD-10-CM

## 2023-12-28 PROCEDURE — 73030 X-RAY EXAM OF SHOULDER: CPT

## 2023-12-28 RX ORDER — FINASTERIDE 5 MG/1
5 TABLET, FILM COATED ORAL DAILY
Qty: 90 TABLET | Refills: 3 | Status: SHIPPED | OUTPATIENT
Start: 2023-12-28 | End: 2024-12-22

## 2024-01-16 DIAGNOSIS — L29.1 SCROTAL ITCHING: ICD-10-CM

## 2024-01-16 RX ORDER — HYDROXYZINE HYDROCHLORIDE 25 MG/1
TABLET, FILM COATED ORAL
Qty: 30 TABLET | Refills: 1 | Status: SHIPPED | OUTPATIENT
Start: 2024-01-16

## 2024-01-21 ENCOUNTER — HOSPITAL ENCOUNTER (EMERGENCY)
Age: 84
Discharge: HOME | End: 2024-01-21
Attending: EMERGENCY MEDICINE
Payer: MEDICARE

## 2024-01-21 VITALS
TEMPERATURE: 98 F | DIASTOLIC BLOOD PRESSURE: 78 MMHG | SYSTOLIC BLOOD PRESSURE: 138 MMHG | HEART RATE: 82 BPM | OXYGEN SATURATION: 98 % | WEIGHT: 210 LBS | BODY MASS INDEX: 30.13 KG/M2 | RESPIRATION RATE: 18 BRPM

## 2024-01-21 DIAGNOSIS — N48.1 BALANITIS: ICD-10-CM

## 2024-01-21 DIAGNOSIS — R31.29 MICROSCOPIC HEMATURIA: Primary | ICD-10-CM

## 2024-01-21 DIAGNOSIS — R33.9 CHRONIC RETENTION OF URINE: ICD-10-CM

## 2024-01-21 DIAGNOSIS — N47.2 PARAPHIMOSIS: ICD-10-CM

## 2024-01-21 LAB
ALBUMIN SERPL-MCNC: 4.2 G/DL (ref 3.5–5.2)
ALP SERPL-CCNC: 107 U/L (ref 40–130)
ALT SERPL-CCNC: 31 U/L (ref 5–41)
ANION GAP SERPL CALCULATED.3IONS-SCNC: 11 MMOL/L (ref 7–19)
AST SERPL-CCNC: 23 U/L (ref 5–40)
BACTERIA URNS QL MICRO: NEGATIVE /HPF
BILIRUB SERPL-MCNC: 0.6 MG/DL (ref 0.2–1.2)
BILIRUB UR QL STRIP: NEGATIVE
BUN SERPL-MCNC: 32 MG/DL (ref 8–23)
CALCIUM SERPL-MCNC: 9.5 MG/DL (ref 8.8–10.2)
CHLORIDE SERPL-SCNC: 99 MMOL/L (ref 98–111)
CLARITY UR: CLEAR
CO2 SERPL-SCNC: 24 MMOL/L (ref 22–29)
COLOR UR: YELLOW
CREAT SERPL-MCNC: 0.8 MG/DL (ref 0.5–1.2)
CRYSTALS URNS MICRO: ABNORMAL /HPF
EPI CELLS #/AREA URNS AUTO: 0 /HPF (ref 0–5)
ERYTHROCYTE [DISTWIDTH] IN BLOOD BY AUTOMATED COUNT: 14 % (ref 11.5–14.5)
GLUCOSE SERPL-MCNC: 299 MG/DL (ref 74–109)
GLUCOSE UR STRIP.AUTO-MCNC: =>1000 MG/DL
HCT VFR BLD AUTO: 45.4 % (ref 42–52)
HGB BLD-MCNC: 15 G/DL (ref 14–18)
HGB UR STRIP.AUTO-MCNC: ABNORMAL MG/L
HYALINE CASTS #/AREA URNS AUTO: 0 /HPF (ref 0–8)
KETONES UR STRIP.AUTO-MCNC: NEGATIVE MG/DL
LEUKOCYTE ESTERASE UR QL STRIP.AUTO: NEGATIVE
MCH RBC QN AUTO: 30.8 PG (ref 27–31)
MCHC RBC AUTO-ENTMCNC: 33 G/DL (ref 33–37)
MCV RBC AUTO: 93.2 FL (ref 80–94)
NITRITE UR QL STRIP.AUTO: NEGATIVE
PH UR STRIP.AUTO: 5.5 [PH] (ref 5–8)
PLATELET # BLD AUTO: 138 K/UL (ref 130–400)
PMV BLD AUTO: 10.2 FL (ref 9.4–12.4)
POTASSIUM SERPL-SCNC: 4.6 MMOL/L (ref 3.5–5)
PROT SERPL-MCNC: 6.6 G/DL (ref 6.6–8.7)
PROT UR STRIP.AUTO-MCNC: NEGATIVE MG/DL
RBC # BLD AUTO: 4.87 M/UL (ref 4.7–6.1)
RBC #/AREA URNS AUTO: 46 /HPF (ref 0–4)
SODIUM SERPL-SCNC: 134 MMOL/L (ref 136–145)
SP GR UR STRIP.AUTO: 1.02 (ref 1–1.03)
UROBILINOGEN UR STRIP.AUTO-MCNC: 0.2 E.U./DL
WBC # BLD AUTO: 8.9 K/UL (ref 4.8–10.8)
WBC #/AREA URNS AUTO: 1 /HPF (ref 0–5)

## 2024-01-21 PROCEDURE — 80053 COMPREHEN METABOLIC PANEL: CPT

## 2024-01-21 PROCEDURE — 81001 URINALYSIS AUTO W/SCOPE: CPT

## 2024-01-21 PROCEDURE — 36415 COLL VENOUS BLD VENIPUNCTURE: CPT

## 2024-01-21 PROCEDURE — 96374 THER/PROPH/DIAG INJ IV PUSH: CPT

## 2024-01-21 PROCEDURE — 99284 EMERGENCY DEPT VISIT MOD MDM: CPT

## 2024-01-21 PROCEDURE — 85027 COMPLETE CBC AUTOMATED: CPT

## 2024-01-21 PROCEDURE — 6360000002 HC RX W HCPCS: Performed by: EMERGENCY MEDICINE

## 2024-01-21 RX ORDER — FINASTERIDE 5 MG/1
5 TABLET, FILM COATED ORAL DAILY
COMMUNITY

## 2024-01-21 RX ORDER — MORPHINE SULFATE 4 MG/ML
4 INJECTION, SOLUTION INTRAMUSCULAR; INTRAVENOUS ONCE
Status: COMPLETED | OUTPATIENT
Start: 2024-01-21 | End: 2024-01-21

## 2024-01-21 RX ORDER — HYDROCODONE BITARTRATE AND ACETAMINOPHEN 10; 325 MG/1; MG/1
1 TABLET ORAL EVERY 6 HOURS PRN
COMMUNITY

## 2024-01-21 RX ADMIN — MORPHINE SULFATE 4 MG: 4 INJECTION, SOLUTION INTRAMUSCULAR; INTRAVENOUS at 12:52

## 2024-01-21 ASSESSMENT — PAIN SCALES - GENERAL: PAINLEVEL_OUTOF10: 8

## 2024-01-21 NOTE — ED PROVIDER NOTES
[facility] and patient had 348 mL on PVR so today's postvoid residuals not much different.  Patient does not feel he is having any increased difficulty in urinating.  No urology coverage here today.  Tried to contact [facility] but they are on diversion and could not get anyone with urology to speak with me.  Ultimately, spoke with Dr. Slater, on-call urologist at [place], who said no indication for catheter.  She said the patient's urinary retention sounds likely chronic and patient denies any increased difficulty urinating.  Paraphimosis was successfully reduced so Dr. Slater does not see that there is any indication for transfer.  Patient tells me he is currently on topical clotrimazole for his balanitis which he has dealt with chronically.  Dr. Slater recommended discharge home with plan for patient to follow-up with his urologist at [facility] as an outpatient.  Patient already on Flomax and finasteride so Dr. Slater said that there is really nothing to add in addition to these and the topical clotrimazole he is taking for the balanitis.  Told patient that follow-up is very important and return to ER for any change or worsening symptoms or new concerns.  Patient agreeable plan     CONSULTS:  None    PROCEDURES:  Unless otherwise notedbelow, none     Procedures    FINAL IMPRESSION     1. Microscopic hematuria    2. Balanitis    3. Paraphimosis    4. Chronic retention of urine          DISPOSITION/PLAN   DISPOSITION Decision To Discharge 01/21/2024 01:50:31 PM      PATIENT REFERRED TO:  @FUP@    DISCHARGE MEDICATIONS:  Discharge Medication List as of 1/21/2024  2:10 PM             (Please note that portions of this note were completed with a voice recognition program.  Efforts were made to edit the dictations butoccasionally words are mis-transcribed.)    Priyank Thomas MD (electronically signed)  [facility]Emer[facility] Physician          Priyank Thomas MD  01/21/24 [id number]

## 2024-01-21 NOTE — ED NOTES
Talked to kelly With [facility] said they are at [facility] to call back after 3:00 pm.

## 2024-01-23 ENCOUNTER — HOSPITAL ENCOUNTER (EMERGENCY)
Facility: HOSPITAL | Age: 84
Discharge: HOME | End: 2024-01-23
Attending: FAMILY MEDICINE | Admitting: FAMILY MEDICINE
Payer: MEDICARE

## 2024-01-23 VITALS
BODY MASS INDEX: 30.06 KG/M2 | SYSTOLIC BLOOD PRESSURE: 139 MMHG | WEIGHT: 210 LBS | RESPIRATION RATE: 16 BRPM | DIASTOLIC BLOOD PRESSURE: 62 MMHG | HEART RATE: 78 BPM | TEMPERATURE: 98 F | HEIGHT: 70 IN | OXYGEN SATURATION: 92 %

## 2024-01-23 DIAGNOSIS — N48.1 BALANITIS: Primary | ICD-10-CM

## 2024-01-23 DIAGNOSIS — L03.314 CELLULITIS OF GROIN: ICD-10-CM

## 2024-01-23 LAB
ALBUMIN SERPL-MCNC: 4.3 G/DL (ref 3.5–5.2)
ALBUMIN/GLOB SERPL: 1.7 G/DL
ALP SERPL-CCNC: 110 U/L (ref 39–117)
ALT SERPL W P-5'-P-CCNC: 48 U/L (ref 1–41)
ANION GAP SERPL CALCULATED.3IONS-SCNC: 12 MMOL/L (ref 5–15)
AST SERPL-CCNC: 27 U/L (ref 1–40)
BASOPHILS # BLD AUTO: 0.02 10*3/MM3 (ref 0–0.2)
BASOPHILS NFR BLD AUTO: 0.3 % (ref 0–1.5)
BILIRUB SERPL-MCNC: 0.8 MG/DL (ref 0–1.2)
BUN SERPL-MCNC: 23 MG/DL (ref 8–23)
BUN/CREAT SERPL: 27.7 (ref 7–25)
CALCIUM SPEC-SCNC: 9.2 MG/DL (ref 8.6–10.5)
CHLORIDE SERPL-SCNC: 103 MMOL/L (ref 98–107)
CO2 SERPL-SCNC: 26 MMOL/L (ref 22–29)
CREAT SERPL-MCNC: 0.83 MG/DL (ref 0.76–1.27)
D-LACTATE SERPL-SCNC: 1.1 MMOL/L (ref 0.5–2)
DEPRECATED RDW RBC AUTO: 47.4 FL (ref 37–54)
EGFRCR SERPLBLD CKD-EPI 2021: 86.3 ML/MIN/1.73
EOSINOPHIL # BLD AUTO: 0.07 10*3/MM3 (ref 0–0.4)
EOSINOPHIL NFR BLD AUTO: 1.2 % (ref 0.3–6.2)
ERYTHROCYTE [DISTWIDTH] IN BLOOD BY AUTOMATED COUNT: 14.3 % (ref 12.3–15.4)
GLOBULIN UR ELPH-MCNC: 2.6 GM/DL
GLUCOSE SERPL-MCNC: 115 MG/DL (ref 65–99)
HCT VFR BLD AUTO: 46.9 % (ref 37.5–51)
HGB BLD-MCNC: 15.8 G/DL (ref 13–17.7)
IMM GRANULOCYTES # BLD AUTO: 0.06 10*3/MM3 (ref 0–0.05)
IMM GRANULOCYTES NFR BLD AUTO: 1 % (ref 0–0.5)
LYMPHOCYTES # BLD AUTO: 0.94 10*3/MM3 (ref 0.7–3.1)
LYMPHOCYTES NFR BLD AUTO: 15.7 % (ref 19.6–45.3)
MAGNESIUM SERPL-MCNC: 2 MG/DL (ref 1.6–2.4)
MCH RBC QN AUTO: 30.4 PG (ref 26.6–33)
MCHC RBC AUTO-ENTMCNC: 33.7 G/DL (ref 31.5–35.7)
MCV RBC AUTO: 90.4 FL (ref 79–97)
MONOCYTES # BLD AUTO: 0.68 10*3/MM3 (ref 0.1–0.9)
MONOCYTES NFR BLD AUTO: 11.4 % (ref 5–12)
NEUTROPHILS NFR BLD AUTO: 4.22 10*3/MM3 (ref 1.7–7)
NEUTROPHILS NFR BLD AUTO: 70.4 % (ref 42.7–76)
NRBC BLD AUTO-RTO: 0 /100 WBC (ref 0–0.2)
PLATELET # BLD AUTO: 141 10*3/MM3 (ref 140–450)
PMV BLD AUTO: 10.1 FL (ref 6–12)
POTASSIUM SERPL-SCNC: 4 MMOL/L (ref 3.5–5.2)
PROT SERPL-MCNC: 6.9 G/DL (ref 6–8.5)
RBC # BLD AUTO: 5.19 10*6/MM3 (ref 4.14–5.8)
SODIUM SERPL-SCNC: 141 MMOL/L (ref 136–145)
WBC NRBC COR # BLD AUTO: 5.99 10*3/MM3 (ref 3.4–10.8)

## 2024-01-23 PROCEDURE — 85025 COMPLETE CBC W/AUTO DIFF WBC: CPT | Performed by: FAMILY MEDICINE

## 2024-01-23 PROCEDURE — 96367 TX/PROPH/DG ADDL SEQ IV INF: CPT

## 2024-01-23 PROCEDURE — 25010000002 MEROPENEM PER 100 MG: Performed by: FAMILY MEDICINE

## 2024-01-23 PROCEDURE — 87070 CULTURE OTHR SPECIMN AEROBIC: CPT | Performed by: FAMILY MEDICINE

## 2024-01-23 PROCEDURE — 87147 CULTURE TYPE IMMUNOLOGIC: CPT | Performed by: FAMILY MEDICINE

## 2024-01-23 PROCEDURE — 96365 THER/PROPH/DIAG IV INF INIT: CPT

## 2024-01-23 PROCEDURE — 87205 SMEAR GRAM STAIN: CPT | Performed by: FAMILY MEDICINE

## 2024-01-23 PROCEDURE — 80053 COMPREHEN METABOLIC PANEL: CPT | Performed by: FAMILY MEDICINE

## 2024-01-23 PROCEDURE — 25010000002 MORPHINE PER 10 MG: Performed by: FAMILY MEDICINE

## 2024-01-23 PROCEDURE — 25010000002 ONDANSETRON PER 1 MG: Performed by: FAMILY MEDICINE

## 2024-01-23 PROCEDURE — 87077 CULTURE AEROBIC IDENTIFY: CPT | Performed by: FAMILY MEDICINE

## 2024-01-23 PROCEDURE — 51798 US URINE CAPACITY MEASURE: CPT

## 2024-01-23 PROCEDURE — 36415 COLL VENOUS BLD VENIPUNCTURE: CPT

## 2024-01-23 PROCEDURE — 99283 EMERGENCY DEPT VISIT LOW MDM: CPT

## 2024-01-23 PROCEDURE — 87186 SC STD MICRODIL/AGAR DIL: CPT | Performed by: FAMILY MEDICINE

## 2024-01-23 PROCEDURE — 83605 ASSAY OF LACTIC ACID: CPT | Performed by: FAMILY MEDICINE

## 2024-01-23 PROCEDURE — 25010000002 FLUCONAZOLE PER 200 MG: Performed by: FAMILY MEDICINE

## 2024-01-23 PROCEDURE — 99284 EMERGENCY DEPT VISIT MOD MDM: CPT | Performed by: UROLOGY

## 2024-01-23 PROCEDURE — 96375 TX/PRO/DX INJ NEW DRUG ADDON: CPT

## 2024-01-23 PROCEDURE — 87040 BLOOD CULTURE FOR BACTERIA: CPT | Performed by: FAMILY MEDICINE

## 2024-01-23 PROCEDURE — 83735 ASSAY OF MAGNESIUM: CPT | Performed by: FAMILY MEDICINE

## 2024-01-23 RX ORDER — CIPROFLOXACIN 500 MG/1
500 TABLET, FILM COATED ORAL 2 TIMES DAILY
COMMUNITY
Start: 2024-01-22

## 2024-01-23 RX ORDER — CLOTRIMAZOLE AND BETAMETHASONE DIPROPIONATE 10; .64 MG/G; MG/G
1 CREAM TOPICAL 2 TIMES DAILY
Qty: 45 G | Refills: 1 | Status: SHIPPED | OUTPATIENT
Start: 2024-01-23 | End: 2024-03-08

## 2024-01-23 RX ORDER — FLUCONAZOLE 2 MG/ML
400 INJECTION, SOLUTION INTRAVENOUS ONCE
Status: COMPLETED | OUTPATIENT
Start: 2024-01-23 | End: 2024-01-23

## 2024-01-23 RX ORDER — VANCOMYCIN 2 GRAM/500 ML IN 0.9 % SODIUM CHLORIDE INTRAVENOUS
20 ONCE
Status: DISCONTINUED | OUTPATIENT
Start: 2024-01-23 | End: 2024-01-23

## 2024-01-23 RX ORDER — ONDANSETRON 2 MG/ML
4 INJECTION INTRAMUSCULAR; INTRAVENOUS ONCE
Status: COMPLETED | OUTPATIENT
Start: 2024-01-23 | End: 2024-01-23

## 2024-01-23 RX ORDER — SODIUM CHLORIDE 0.9 % (FLUSH) 0.9 %
10 SYRINGE (ML) INJECTION AS NEEDED
Status: DISCONTINUED | OUTPATIENT
Start: 2024-01-23 | End: 2024-01-23 | Stop reason: HOSPADM

## 2024-01-23 RX ORDER — FLUCONAZOLE 200 MG/1
200 TABLET ORAL DAILY
Qty: 5 TABLET | Refills: 0 | Status: SHIPPED | OUTPATIENT
Start: 2024-01-24 | End: 2024-01-29

## 2024-01-23 RX ORDER — METRONIDAZOLE 500 MG/1
500 TABLET ORAL 3 TIMES DAILY
Qty: 21 TABLET | Refills: 0 | Status: SHIPPED | OUTPATIENT
Start: 2024-01-23 | End: 2024-01-30

## 2024-01-23 RX ADMIN — ONDANSETRON 4 MG: 2 INJECTION INTRAMUSCULAR; INTRAVENOUS at 10:52

## 2024-01-23 RX ADMIN — FLUCONAZOLE IN SODIUM CHLORIDE 400 MG: 2 INJECTION, SOLUTION INTRAVENOUS at 11:45

## 2024-01-23 RX ADMIN — MORPHINE SULFATE 4 MG: 4 INJECTION, SOLUTION INTRAMUSCULAR; INTRAVENOUS at 10:52

## 2024-01-23 RX ADMIN — MEROPENEM 1000 MG: 1 INJECTION, POWDER, FOR SOLUTION INTRAVENOUS at 11:21

## 2024-01-23 NOTE — ED PROVIDER NOTES
HPI:    Patient is a 83-year-old white male who presents to the emergency room with a complaint of having worsening pain from his penis and scrotum.  Patient states he has been having some drainage and redness in this area for some time.  Patient states its gotten worse and he went to [facility] few days ago and was seen and was given a shot of something for pain and a urological referral that he has an appointment on the 26 of this month January 2024.  Patient states that he called his primary care provider because he did not get any antibiotics for his such infection in his scrotum and penis area therefore he was called in ciprofloxacin 500 mg to be taken twice a day.  This was done on Monday.  Patient however states the pain does get out of control to the point that he wanted to come to the emergency room for further evaluation.  He stated that he cannot wait to the appointment on the 26.  Pain in the area around 9 out of 10.       REVIEW OF SYSTEMS  CONSTITUTIONAL:  No complaints of fever, chills,or weakness  EYES:  No complaints of discharge   ENT: No complaints of sore throat or ear pain  CARDIOVASCULAR:  No complaints of chest pain, palpitations, or swelling  RESPIRATORY:  No complaints of cough or shortness of breath  GI:  No complaints of abdominal pain, nausea, vomiting, or diarrhea  MUSCULOSKELETAL:  No complaints of back pain  SKIN: Positive for redness and swelling and irritation in the groin  NEUROLOGIC:  No complaints of headache, focal weakness, or sensory changes  ENDOCRINE:  No complaints of polyuria or polydipsia  LYMPHATIC:  No complaints of swollen glands  GENITOURINARY: Positive for scrotal and penile pain      PAST MEDICAL HISTORY  Past Medical History:   Diagnosis Date    Arthritis     B12 deficiency 8/23/2017    Coronary artery disease     Deafness in left ear     Embedded foreign body 09/2020    Embedded fish hook Left hand, w/ infection    Foot ulcer     GERD (gastroesophageal reflux disease)      Hypertension associated with diabetes 8/23/2017    Ingrown toenail     Joint pain     Mixed diabetic hyperlipidemia associated with type 2 diabetes mellitus 8/23/2017    Neuropathy in diabetes     Shoulder disorder     Left    Type 2 diabetes mellitus with hyperglycemia, with long-term current use of insulin 8/23/2017    Vitamin D deficiency 8/23/2017       FAMILY HISTORY  Family History   Problem Relation Age of Onset    Diabetes Mother     Cancer Father     Cancer Sister     Cancer Brother     Heart disease Brother     Diabetes Brother        SOCIAL HISTORY  Social History     Socioeconomic History    Marital status: Single   Tobacco Use    Smoking status: Former     Types: Cigarettes    Smokeless tobacco: Never   Substance and Sexual Activity    Alcohol use: Not Currently    Drug use: No    Sexual activity: Defer       IMMUNIZATION HISTORY  Deferred to primary care physician.    SURGICAL HISTORY  Past Surgical History:   Procedure Laterality Date    AORTIC VALVE REPAIR/REPLACEMENT  06/07/2018    APPENDECTOMY      BACK SURGERY      CARDIAC CATHETERIZATION  03/19/2020    stent x3    CATARACT EXTRACTION      CHOLECYSTECTOMY      CORONARY ARTERY BYPASS GRAFT      INCISION AND DRAINAGE ABSCESS      Left hand    INCISION AND DRAINAGE ARM Left 9/24/2020    Procedure: INCISION AND DRAINAGE LEFT HAND;  Surgeon: Ming Ramos MD;  Location: [facility] OR;  Service: Orthopedics;  Laterality: Left;    INNER EAR SURGERY Left     PACEMAKER IMPLANTATION  06/28/2020       CURRENT MEDICATIONS    Current Facility-Administered Medications:     fluconazole (DIFLUCAN) IVPB 400 mg, 400 mg, Intravenous, Once, Pacheco Bravo Jr., MD, Last Rate: 100 mL/hr at 01/23/24 1145, 400 mg at 01/23/24 1145    [COMPLETED] Insert Peripheral IV, , , Once **AND** sodium chloride 0.9 % flush 10 mL, 10 mL, Intravenous, PRN, Pacheco Bravo Jr., MD    Current Outpatient Medications:     ciprofloxacin (CIPRO) 500 MG tablet, Take 1 tablet by  mouth 2 (Two) Times a Day., Disp: , Rfl:     apixaban (ELIQUIS) 5 MG tablet tablet, Take 1 tablet by mouth 2 (Two) Times a Day., Disp: , Rfl:     aspirin 81 MG chewable tablet, Chew 1 tablet Daily., Disp: , Rfl:     atenolol (TENORMIN) 25 MG tablet, Take 1 tablet by mouth Daily., Disp: , Rfl:     atorvastatin (LIPITOR) 40 MG tablet, Take 1 tablet by mouth Every Morning., Disp: , Rfl:     B-D ULTRAFINE III SHORT PEN 31G X 8 MM misc, , Disp: , Rfl:     betamethasone, augmented, (DIPROLENE) 0.05 % ointment, , Disp: , Rfl:     Cholecalciferol (Vitamin D3) 1.25 MG (54541 UT) tablet, Take 1 tablet by mouth Every 30 (Thirty) Days. Takes the 1st day Q month, Disp: , Rfl:     ciclopirox (LOPROX) 0.77 % cream, , Disp: , Rfl:     citalopram (CeleXA) 20 MG tablet, , Disp: , Rfl:     clobetasol (TEMOVATE) 0.05 % ointment, , Disp: , Rfl:     clopidogrel (PLAVIX) 75 MG tablet, Take 1 tablet by mouth Daily. Hold x 5 days a/ Day of procedure 6/15/21; [facility] (Patient not taking: Reported on 11/16/2023), Disp: , Rfl:     clotrimazole-betamethasone (LOTRISONE) 1-0.05 % cream, Apply 1 application  topically to the appropriate area as directed 2 (Two) Times a Day for 45 days. Apply to a thin layer to the tip of penis, Disp: 45 g, Rfl: 1    dapagliflozin-metformin HCl ER (XIGDUO XR)  MG tablet, Take 1 tablet by mouth Daily. - METFORMIN - (Patient not taking: Reported on 10/11/2023), Disp: , Rfl:     dicyclomine (BENTYL) 10 MG capsule, Take 1 capsule by mouth 2 (two) times a day. (Patient not taking: Reported on 10/11/2023), Disp: , Rfl:     esomeprazole (nexIUM) 40 MG capsule, Take 1 capsule by mouth Every Morning Before Breakfast. (Patient not taking: Reported on 12/28/2023), Disp: , Rfl:     ezetimibe (ZETIA) 10 MG tablet, Take 1 tablet by mouth Daily., Disp: , Rfl:     Farxiga 10 MG tablet, Take 10 mg by mouth Daily., Disp: , Rfl:     finasteride (PROSCAR) 5  MG tablet, Take 1 tablet by mouth Daily for 360 days., Disp: 90 tablet, Rfl: 3    [START ON 1/24/2024] fluconazole (DIFLUCAN) 200 MG tablet, Take 1 tablet by mouth Daily for 5 days., Disp: 5 tablet, Rfl: 0    furosemide (LASIX) 20 MG tablet, Take 1 tablet by mouth Every Morning., Disp: , Rfl:     Gemtesa 75 MG tablet, TAKE ONE TABLET BY MOUTH DAILY (Patient not taking: Reported on 9/11/2023), Disp: 30 tablet, Rfl: 1    HumuLIN R U-500 KwikPen 500 UNIT/ML solution pen-injector CONCENTRATED injection, INJECT UP  UNITS 30 MINUTES BEFORE BREAKFAST AND UP TO 85 UNITS 30 MINUTES BEFORE SUPPER, Disp: 12 mL, Rfl: 5    HYDROcodone-acetaminophen (NORCO)  MG per tablet, Take 1 tablet by mouth Every 8 (Eight) Hours As Needed for Moderate Pain., Disp: , Rfl:     hydrOXYzine (ATARAX) 25 MG tablet, TAKE ONE TABLET BY MOUTH EVERY 8 HOURS AS NEEDED FOR ITCHING FOR UP TO 10 DAYS **MAY MAKE DROWSY**, Disp: 30 tablet, Rfl: 1    hydrOXYzine (ATARAX) 25 MG tablet, TAKE ONE TABLET BY MOUTH THREE TIMES DAILY AS NEEDED FOR ITCHING **MAY MAKE DROWSY**, Disp: 30 tablet, Rfl: 1    Insulin Glargine (BASAGLAR KWIKPEN) 100 UNIT/ML injection pen, , Disp: , Rfl:     Insulin Pen Needle 31G X 5 MM misc, , Disp: , Rfl:     metroNIDAZOLE (FLAGYL) 500 MG tablet, Take 1 tablet by mouth 3 (Three) Times a Day for 7 days., Disp: 21 tablet, Rfl: 0    NovoLIN R 100 UNIT/ML injection, Inject 5 Units under the skin into the appropriate area as directed 3 (Three) Times a Day Before Meals., Disp: , Rfl:     nystatin (MYCOSTATIN) 580773 UNIT/GM cream, , Disp: , Rfl:     pramipexole (MIRAPEX) 1.5 MG tablet, Take 1 tablet by mouth 3 (Three) Times a Day., Disp: , Rfl:     Synjardy XR  MG tablet sustained-release 24 hour, , Disp: , Rfl:     tamsulosin (FLOMAX) 0.4 MG capsule 24 hr capsule, Take 1 capsule by mouth 2 (Two) Times a Day for 360 days., Disp: 180 capsule, Rfl: 3    ALLERGIES  Allergies   Allergen Reactions    Cyclobenzaprine Rash      -  "FLEXERIL -     Haemophilus Influenzae Vaccines Other (See Comments)     Pt states \"made him very sick\"    Metoclopramide Rash      - REGLAN -        Genitourinary EXAM    VITAL SIGNS:   /63   Pulse 81   Temp 98.4 °F (36.9 °C) (Oral)   Resp 16   Ht 177.8 cm (70\")   Wt 95.3 kg (210 lb)   SpO2 93%   BMI 30.13 kg/m²     Constitutional: Patient is alert and in no distress.  Patient with severe groin, scrotal, and penile discomfort.    ENT: There is a normal pharynx with no acute erythema or exudate and oral mucosa is moist.  Nose is clear with no drainage.  Tympanic membranes intact and non-erythemic    Cardiovascular: S1-S2 regular rate and rhythm no murmurs rubs or gallops pulses are equal bilaterally and there is no pitting edema    Respiratory: Patient is clear to auscultation bilaterally with no wheezing or rhonchi.  Chest wall is nontender.  There is no external lesions on the chest.  There is no crepitance    Gastrointestinal: Normal bowel sounds x4 no rebound or guarding no abdominal distention or fluid wave    Genitourinary: There is cellulitic changes in the area of the groin over the scrotum and penis itself.  The penis itself is retracted the head with cheesy discharge material around it and swelling of the glans itself.  This is concerning for balanitis.    Integument: No acute lesions noted color appears to be normal.    Linnea Coma Scale: Total score 15    Neurological: Patient is alert and oriented x4 in no acute findings noted.  Speech is fluent and cognition is normal.  No evidence of acute CVA.  Cranial nerves II through XII intact.  Patient with normal motor function as well as reflexes and sensation    Psychiatric: Normal affect and mood      RADIOLOGY/PROCEDURES        No orders to display          FUTURE APPOINTMENTS     Future Appointments   Date Time Provider Department Center   1/26/2024  9:00 AM Aggie Tavarez APRN [facility]   3/28/2024  1:00 PM Aggie Tavarez APRN " [facility]             Recent Results (from the past 24 hour(s))   Comprehensive Metabolic Panel    Collection Time: 01/23/24 10:45 AM    Specimen: Blood   Result Value Ref Range    Glucose 115 (H) 65 - 99 mg/dL    BUN 23 8 - 23 mg/dL    Creatinine 0.83 0.76 - 1.27 mg/dL    Sodium 141 136 - 145 mmol/L    Potassium 4.0 3.5 - 5.2 mmol/L    Chloride 103 98 - 107 mmol/L    CO2 26.0 22.0 - 29.0 mmol/L    Calcium 9.2 8.6 - 10.5 mg/dL    Total Protein 6.9 6.0 - 8.5 g/dL    Albumin 4.3 3.5 - 5.2 g/dL    ALT (SGPT) 48 (H) 1 - 41 U/L    AST (SGOT) 27 1 - 40 U/L    Alkaline Phosphatase 110 39 - 117 U/L    Total Bilirubin 0.8 0.0 - 1.2 mg/dL    Globulin 2.6 gm/dL    A/G Ratio 1.7 g/dL    BUN/Creatinine Ratio 27.7 (H) 7.0 - 25.0    Anion Gap 12.0 5.0 - 15.0 mmol/L    eGFR 86.3 >60.0 mL/min/1.73   Magnesium    Collection Time: 01/23/24 10:45 AM    Specimen: Blood   Result Value Ref Range    Magnesium 2.0 1.6 - 2.4 mg/dL   Lactic Acid, Plasma    Collection Time: 01/23/24 10:45 AM    Specimen: Blood   Result Value Ref Range    Lactate 1.1 0.5 - 2.0 mmol/L   CBC Auto Differential    Collection Time: 01/23/24 10:45 AM    Specimen: Blood   Result Value Ref Range    WBC 5.99 3.40 - 10.80 10*3/mm3    RBC 5.19 4.14 - 5.80 10*6/mm3    Hemoglobin 15.8 13.0 - 17.7 g/dL    Hematocrit 46.9 37.5 - 51.0 %    MCV 90.4 79.0 - 97.0 fL    MCH 30.4 26.6 - 33.0 pg    MCHC 33.7 31.5 - 35.7 g/dL    RDW 14.3 12.3 - 15.4 %    RDW-SD 47.4 37.0 - 54.0 fl    MPV 10.1 6.0 - 12.0 fL    Platelets 141 140 - 450 10*3/mm3    Neutrophil % 70.4 42.7 - 76.0 %    Lymphocyte % 15.7 (L) 19.6 - 45.3 %    Monocyte % 11.4 5.0 - 12.0 %    Eosinophil % 1.2 0.3 - 6.2 %    Basophil % 0.3 0.0 - 1.5 %    Immature Grans % 1.0 (H) 0.0 - 0.5 %    Neutrophils, Absolute 4.22 1.70 - 7.00 10*3/mm3    Lymphocytes, Absolute 0.94 0.70 - 3.10 10*3/mm3    Monocytes, Absolute 0.68 0.10 - 0.90 10*3/mm3    Eosinophils, Absolute 0.07 0.00 - 0.40 10*3/mm3    Basophils, Absolute 0.02 0.00 -  0.20 10*3/mm3    Immature Grans, Absolute 0.06 (H) 0.00 - 0.05 10*3/mm3    nRBC 0.0 0.0 - 0.2 /100 WBC           COURSE & MEDICAL DECISION MAKING     Patient's partial differential diagnosis can include: Severe balanitis, Carie's gangrene, groin cellulitis, Candida cellulitis, bacterial cellulitis, and other    Due to patient's presentation and severity of his symptoms.  Urologist Dr Arevalo was called in to evaluate the patient.  He is currently here now to evaluate and see the patient to give a disposition and recommendation.    Patient was seen and evaluated Dr Arevalo recommendation is to continue the ciprofloxacin 500 mg twice daily and also add Flagyl 5 mg 3 times daily 7 days as well as doing oral Diflucan.  Patient is currently finishing up his IV Diflucan and has finished his IV meropenem.  Patient will have a follow-up appointment outpatient with urology somewhere in the future be on the 26th of this month since he does not need to follow-up at that appointment she has been seen and evaluated here in the emergency room.  Any future procedures that may need to be performed will be determined at a future evaluation by urology.  If that comes to the point where there may need another procedure or such that would likely be at a tertiary center such as [place].    Patient's level of risk: Moderate        CRITICAL CARE    CRITICAL CARE: No    CRITICAL CARE TIME: None        Also Old charts were reviewed per [facility] EMR.  Pertinent details are summarized above.  All laboratory, radiologic, and EKG studies that were performed in the Emergency Department were a necessary part of the evaluation needed to exclude unstable or  emergent medical conditions:       Patient was hemodynamically and neurologically stable in the ED.   Pertinent studies were reviewed as above.     The patient received:  Medications   sodium chloride 0.9 % flush 10 mL (has no administration in time range)   fluconazole (DIFLUCAN) IVPB  400 mg (400 mg Intravenous New Bag 1/23/24 1145)   ondansetron (ZOFRAN) injection 4 mg (4 mg Intravenous Given 1/23/24 1052)   morphine injection 4 mg (4 mg Intravenous Given 1/23/24 1052)   meropenem (MERREM) 1,000 mg in sodium chloride 0.9 % 100 mL IVPB (0 mg Intravenous Stopped 1/23/24 1144)              ED Disposition       None              Dragon disclaimer:  Part of this note may be an electronic transcription/translation of spoken language to printed text using the Dragon Dictation System.     I have reviewed the patient’s prescription history via a prescription monitoring program.  This information is consistent with my knowledge of the patient’s controlled substance use history.    Patient evaluate during Coronavirus Pandemic. Isolation practices followed according to [facility] policy.    FINAL IMPRESSION   Diagnosis Plan   1. Balanitis  clotrimazole-betamethasone (LOTRISONE) 1-0.05 % cream      2. Cellulitis of groin              MD Lawrence Robert Jr, Thomas Mark Jr., MD  01/23/24 1216

## 2024-01-23 NOTE — CONSULTS
"Referring Provider: Lawrence  Reason for Consultation: Concern for penile and scrotal infection    Chief complaint: \"I'm hurting in my [penis]\"    Subjective     History of present illness:    Mr. Jeong is a pleasant 83-year-old who presents for genital pain. He was seen at [facility] recently where, per the patient, he was given a pain shot and sent home. Apparently, they also pulled back his \"foreskin\" which has been problematic lately.     The patient was circumcised in his 50s.     The patient has seen Dr. Tay with Dermatology for scrotal rash/excoriation. The patient reports he thinks the scrotum has problems because it is heavy and falls into the water in the toilet tank. He has placed a special seat on the toilet that is higher and prevents his scrotum from submerging.    Review of Systems  Pertinent items are noted in HPI, all other systems reviewed and negative     History  Past Medical History:   Diagnosis Date    Arthritis     B12 deficiency 8/23/2017    Coronary artery disease     Deafness in left ear     Embedded foreign body 09/2020    Embedded fish hook Left hand, w/ infection    Foot ulcer     GERD (gastroesophageal reflux disease)     Hypertension associated with diabetes 8/23/2017    Ingrown toenail     Joint pain     Mixed diabetic hyperlipidemia associated with type 2 diabetes mellitus 8/23/2017    Neuropathy in diabetes     Shoulder disorder     Left    Type 2 diabetes mellitus with hyperglycemia, with long-term current use of insulin 8/23/2017    Vitamin D deficiency 8/23/2017       Past Surgical History:   Procedure Laterality Date    AORTIC VALVE REPAIR/REPLACEMENT  06/07/2018    APPENDECTOMY      BACK SURGERY      CARDIAC CATHETERIZATION  03/19/2020    stent x3    CATARACT EXTRACTION      CHOLECYSTECTOMY      CORONARY ARTERY BYPASS GRAFT      INCISION AND DRAINAGE ABSCESS      Left hand    INCISION AND DRAINAGE ARM Left 9/24/2020    Procedure: INCISION AND DRAINAGE LEFT HAND;  Surgeon: " Ming Ramos MD;  Location:  [facility];  Service: Orthopedics;  Laterality: Left;    INNER EAR SURGERY Left     PACEMAKER IMPLANTATION  06/28/2020       Family History   Problem Relation Age of Onset    Diabetes Mother     Cancer Father     Cancer Sister     Cancer Brother     Heart disease Brother     Diabetes Brother        Social History     Socioeconomic History    Marital status: Single   Tobacco Use    Smoking status: Former     Types: Cigarettes    Smokeless tobacco: Never   Substance and Sexual Activity    Alcohol use: Not Currently    Drug use: No    Sexual activity: Defer       Current Facility-Administered Medications   Medication Dose Route Frequency Provider Last Rate Last Admin    fluconazole (DIFLUCAN) IVPB 400 mg  400 mg Intravenous Once Pacheco Bravo Jr., MD        meropenem (MERREM) 1,000 mg in sodium chloride 0.9 % 100 mL IVPB  1,000 mg Intravenous Once Pacheco Bravo Jr., MD   1,000 mg at 01/23/24 1121    sodium chloride 0.9 % flush 10 mL  10 mL Intravenous PRN Pacheco Bravo Jr., MD         Current Outpatient Medications   Medication Sig Dispense Refill    ciprofloxacin (CIPRO) 500 MG tablet Take 1 tablet by mouth 2 (Two) Times a Day.      apixaban (ELIQUIS) 5 MG tablet tablet Take 1 tablet by mouth 2 (Two) Times a Day.      aspirin 81 MG chewable tablet Chew 1 tablet Daily.      atenolol (TENORMIN) 25 MG tablet Take 1 tablet by mouth Daily.      atorvastatin (LIPITOR) 40 MG tablet Take 1 tablet by mouth Every Morning.      B-D ULTRAFINE III SHORT PEN 31G X 8 MM misc       betamethasone, augmented, (DIPROLENE) 0.05 % ointment       Cholecalciferol (Vitamin D3) 1.25 MG (44935 UT) tablet Take 1 tablet by mouth Every 30 (Thirty) Days. Takes the 1st day Q month      ciclopirox (LOPROX) 0.77 % cream  (Patient not taking: Reported on 11/16/2023)      citalopram (CeleXA) 20 MG tablet       clobetasol (TEMOVATE) 0.05 % ointment  (Patient not taking: Reported on 11/16/2023)       clopidogrel (PLAVIX) 75 MG tablet Take 1 tablet by mouth Daily. Hold x 5 days a/ Day of procedure 6/15/21; [facility] [place] (Patient not taking: Reported on 11/16/2023)      dapagliflozin-metformin HCl ER (XIGDUO XR)  MG tablet Take 1 tablet by mouth Daily. - METFORMIN - (Patient not taking: Reported on 10/11/2023)      dicyclomine (BENTYL) 10 MG capsule Take 1 capsule by mouth 2 (two) times a day. (Patient not taking: Reported on 10/11/2023)      esomeprazole (nexIUM) 40 MG capsule Take 1 capsule by mouth Every Morning Before Breakfast. (Patient not taking: Reported on 12/28/2023)      ezetimibe (ZETIA) 10 MG tablet Take 1 tablet by mouth Daily.      Farxiga 10 MG tablet Take 10 mg by mouth Daily.      finasteride (PROSCAR) 5 MG tablet Take 1 tablet by mouth Daily for 360 days. 90 tablet 3    furosemide (LASIX) 20 MG tablet Take 1 tablet by mouth Every Morning.      Gemtesa 75 MG tablet TAKE ONE TABLET BY MOUTH DAILY (Patient not taking: Reported on 9/11/2023) 30 tablet 1    HumuLIN R U-500 KwikPen 500 UNIT/ML solution pen-injector CONCENTRATED injection INJECT UP  UNITS 30 MINUTES BEFORE BREAKFAST AND UP TO 85 UNITS 30 MINUTES BEFORE SUPPER 12 mL 5    HYDROcodone-acetaminophen (NORCO)  MG per tablet Take 1 tablet by mouth Every 8 (Eight) Hours As Needed for Moderate Pain.      hydrOXYzine (ATARAX) 25 MG tablet TAKE ONE TABLET BY MOUTH EVERY 8 HOURS AS NEEDED FOR ITCHING FOR UP TO 10 DAYS **MAY MAKE DROWSY** 30 tablet 1    hydrOXYzine (ATARAX) 25 MG tablet TAKE ONE TABLET BY MOUTH THREE TIMES DAILY AS NEEDED FOR ITCHING **MAY MAKE DROWSY** 30 tablet 1    Insulin Glargine (BASAGLAR KWIKPEN) 100 UNIT/ML injection pen       Insulin Pen Needle 31G X 5 MM misc       NovoLIN R 100 UNIT/ML injection Inject 5 Units under the skin into the appropriate area as directed 3 (Three) Times a Day Before Meals.      nystatin (MYCOSTATIN) 550124 UNIT/GM  "cream       pramipexole (MIRAPEX) 1.5 MG tablet Take 1 tablet by mouth 3 (Three) Times a Day.      Synjardy XR  MG tablet sustained-release 24 hour  (Patient not taking: Reported on 10/11/2023)      tamsulosin (FLOMAX) 0.4 MG capsule 24 hr capsule Take 1 capsule by mouth 2 (Two) Times a Day for 360 days. 180 capsule 3        Allergies   Allergen Reactions    Cyclobenzaprine Rash      - FLEXERIL -     Haemophilus Influenzae Vaccines Other (See Comments)     Pt states \"made him very sick\"    Metoclopramide Rash      - REGLAN -        Objective     Vital Signs   Temp:  [98.4 °F (36.9 °C)] 98.4 °F (36.9 °C)  Heart Rate:  [78-88] 88  Resp:  [16] 16  BP: (116-150)/(62-93) 116/85  No intake or output data in the 24 hours ending 01/23/24 1136    Physical Exam:    General: Alert, cooperative, nontoxic, comfortable  Head:  Normocephalic, without obvious abnormality, atraumatic  Eyes:   Lids and lashes normal, no icterus, no pallor  Ears:  Ears appear intact with no abnormalities noted  Neck:  Supple  Back:  No costovertebral angle tenderness  Lungs: Unlabored respirations  Heart:  Regular rhythm and normal rate  Abdomen: Soft, nontender, nondistended  :  Phimosis of redundant penile shaft skin, some balanitis, webbing of the scrotal tissue, mild diffuse rash of the scrotum, likely bilateral hydroceles. NO nectrotic tissue noted on penis, scrotum, perineum.   Extremities: Moves all extremities well  Skin:  Warm and dry  Neurologic: Cranial nerves 2 - 12 grossly intact    Data Review:    Lactic Acid, Plasma (01/23/2024 10:45)    Comprehensive Metabolic Panel (01/23/2024 10:45)    CBC & Differential (01/23/2024 10:45)    US SCROTUM AND TESTICLES (02/08/2022 16:05 EST)     Assessment and Plan:    \"Phimosis\" with balanitis: Recommend continued ciprofloxacin that has been prescribed yesterday. Add metronidazole if possible. Clotrimazole/betamethasone BID (thin layer) to area of \"phimosis\" with gentle stretching over 4 - 8 " weeks. If unsuccessful, patient will likely need to be seen at a tertiary or academic center for potential reconstruction - he already has webbing of the scrotal skin from the skin being pulled up the shaft to the area of sirisha distal penile skin. My hope is that the skin will loosen and resolve with conservative measures. Would also recommend a short course of an oral antifungal.     Scrotal rash: Continue treatment as recommended by Dermatology. Instructed the patient to make a follow up appointment with them and to discuss the moisture exposure from toilet water and intermittent urinary incontinence.     BPH with incomplete emptying and urge urinary incontinence: Continue tamsulosin and finasteride. Further work up as an outpatient.     Bilateral hydroceles: Monitor. May need intervention but he may not be a surgical candidate. Further management as an outpatient.     UROLOGICAL DISPOSITION: Follow up in Urology in 2 weeks for interval exam.    I discussed the patient's findings and my recommendations with patient, family, and Dr. Bravo    (Please note that portions of this note were completed with a voice recognition program.)    Praneeth Munoz MD  01/23/24  11:36 CST    Time: Time spent: 60 minutes spent performing evaluation and management, chart review, and discussion with patient, > 50% of time spent in face-to-face encounter

## 2024-01-24 ENCOUNTER — TELEPHONE (OUTPATIENT)
Dept: UROLOGY | Facility: CLINIC | Age: 84
End: 2024-01-24
Payer: MEDICARE

## 2024-01-24 NOTE — TELEPHONE ENCOUNTER
----- Message from Praneeth Munoz MD sent at 1/23/2024 12:09 PM CST -----  Regarding: ER follow up  Change appointment for this week to 2 weeks

## 2024-01-24 NOTE — TELEPHONE ENCOUNTER
I left voicemail for patient to call back. I said that we cancelled the appointment with Aggie on 1/26. We can schedule him with Aggie 2/7/24 or after.

## 2024-01-26 ENCOUNTER — HOSPITAL ENCOUNTER (EMERGENCY)
Facility: HOSPITAL | Age: 84
Discharge: HOME | End: 2024-01-26
Attending: EMERGENCY MEDICINE
Payer: MEDICARE

## 2024-01-26 VITALS
TEMPERATURE: 97.8 F | DIASTOLIC BLOOD PRESSURE: 88 MMHG | WEIGHT: 210 LBS | OXYGEN SATURATION: 93 % | SYSTOLIC BLOOD PRESSURE: 132 MMHG | RESPIRATION RATE: 18 BRPM | HEART RATE: 68 BPM | HEIGHT: 70 IN | BODY MASS INDEX: 30.06 KG/M2

## 2024-01-26 DIAGNOSIS — N48.1 BALANITIS: Primary | ICD-10-CM

## 2024-01-26 LAB
ALBUMIN SERPL-MCNC: 4.1 G/DL (ref 3.5–5.2)
ALBUMIN/GLOB SERPL: 1.6 G/DL
ALP SERPL-CCNC: 111 U/L (ref 39–117)
ALT SERPL W P-5'-P-CCNC: 42 U/L (ref 1–41)
ANION GAP SERPL CALCULATED.3IONS-SCNC: 12 MMOL/L (ref 5–15)
AST SERPL-CCNC: 28 U/L (ref 1–40)
BASOPHILS # BLD AUTO: 0.02 10*3/MM3 (ref 0–0.2)
BASOPHILS NFR BLD AUTO: 0.3 % (ref 0–1.5)
BILIRUB SERPL-MCNC: 0.6 MG/DL (ref 0–1.2)
BUN SERPL-MCNC: 27 MG/DL (ref 8–23)
BUN/CREAT SERPL: 31.4 (ref 7–25)
CALCIUM SPEC-SCNC: 9 MG/DL (ref 8.6–10.5)
CHLORIDE SERPL-SCNC: 100 MMOL/L (ref 98–107)
CO2 SERPL-SCNC: 23 MMOL/L (ref 22–29)
CREAT SERPL-MCNC: 0.86 MG/DL (ref 0.76–1.27)
CRP SERPL-MCNC: 0.37 MG/DL (ref 0–0.5)
DEPRECATED RDW RBC AUTO: 46.7 FL (ref 37–54)
EGFRCR SERPLBLD CKD-EPI 2021: 85.4 ML/MIN/1.73
EOSINOPHIL # BLD AUTO: 0.07 10*3/MM3 (ref 0–0.4)
EOSINOPHIL NFR BLD AUTO: 1.1 % (ref 0.3–6.2)
ERYTHROCYTE [DISTWIDTH] IN BLOOD BY AUTOMATED COUNT: 14.1 % (ref 12.3–15.4)
GLOBULIN UR ELPH-MCNC: 2.6 GM/DL
GLUCOSE SERPL-MCNC: 210 MG/DL (ref 65–99)
HCT VFR BLD AUTO: 46.6 % (ref 37.5–51)
HGB BLD-MCNC: 15.7 G/DL (ref 13–17.7)
LYMPHOCYTES # BLD AUTO: 0.86 10*3/MM3 (ref 0.7–3.1)
LYMPHOCYTES NFR BLD AUTO: 14 % (ref 19.6–45.3)
MCH RBC QN AUTO: 30.5 PG (ref 26.6–33)
MCHC RBC AUTO-ENTMCNC: 33.7 G/DL (ref 31.5–35.7)
MCV RBC AUTO: 90.5 FL (ref 79–97)
MONOCYTES # BLD AUTO: 0.6 10*3/MM3 (ref 0.1–0.9)
MONOCYTES NFR BLD AUTO: 9.8 % (ref 5–12)
NEUTROPHILS NFR BLD AUTO: 4.54 10*3/MM3 (ref 1.7–7)
NEUTROPHILS NFR BLD AUTO: 74.1 % (ref 42.7–76)
PLATELET # BLD AUTO: 153 10*3/MM3 (ref 140–450)
PMV BLD AUTO: 10 FL (ref 6–12)
POTASSIUM SERPL-SCNC: 4.2 MMOL/L (ref 3.5–5.2)
PROCALCITONIN SERPL-MCNC: 0.03 NG/ML (ref 0–0.25)
PROT SERPL-MCNC: 6.7 G/DL (ref 6–8.5)
RBC # BLD AUTO: 5.15 10*6/MM3 (ref 4.14–5.8)
SODIUM SERPL-SCNC: 135 MMOL/L (ref 136–145)
WBC NRBC COR # BLD AUTO: 6.13 10*3/MM3 (ref 3.4–10.8)

## 2024-01-26 PROCEDURE — 25810000003 SODIUM CHLORIDE 0.9 % SOLUTION 250 ML FLEX CONT: Performed by: EMERGENCY MEDICINE

## 2024-01-26 PROCEDURE — 85025 COMPLETE CBC W/AUTO DIFF WBC: CPT | Performed by: EMERGENCY MEDICINE

## 2024-01-26 PROCEDURE — 36415 COLL VENOUS BLD VENIPUNCTURE: CPT

## 2024-01-26 PROCEDURE — 25010000002 VANCOMYCIN 1 G RECONSTITUTED SOLUTION 1 EACH VIAL: Performed by: EMERGENCY MEDICINE

## 2024-01-26 PROCEDURE — 80053 COMPREHEN METABOLIC PANEL: CPT | Performed by: EMERGENCY MEDICINE

## 2024-01-26 PROCEDURE — 84145 PROCALCITONIN (PCT): CPT | Performed by: EMERGENCY MEDICINE

## 2024-01-26 PROCEDURE — 99283 EMERGENCY DEPT VISIT LOW MDM: CPT

## 2024-01-26 PROCEDURE — 96365 THER/PROPH/DIAG IV INF INIT: CPT

## 2024-01-26 PROCEDURE — 86140 C-REACTIVE PROTEIN: CPT | Performed by: EMERGENCY MEDICINE

## 2024-01-26 RX ADMIN — VANCOMYCIN HYDROCHLORIDE 1000 MG: 1 INJECTION, POWDER, LYOPHILIZED, FOR SOLUTION INTRAVENOUS at 10:18

## 2024-01-26 NOTE — ED PROVIDER NOTES
Subjective   History of Present Illness  Patient is a 39-year-old gentleman who was seen in the ED culture of the wound was obtained which I think a culture from his penile shaft or glans and that grew multiple antibiotic resistant bacteria he was called today back to the ED for reevaluation the patient does not appear to be any distress he does not appear to be toxic.  He is got balanitis which may be infected there is no evidence of cellulitis.  There is no abscess formation there is no Carie's gangrene.    Penis Pain  Primary symptoms include penile pain.   Primary symptoms include no dysuria, no genital itching, no genital lesions, no genital rash, no priapism, and no scrotal pain. This is a recurrent problem. The problem occurs constantly. The problem has not changed since onset.Pertinent negatives include no anorexia, no diaphoresis, no nausea, no vomiting, no abdominal pain, no abdominal swelling, no frequency and no constipation. There has been no fever. Partner displays symptoms of an STD: no.       Review of Systems   Constitutional: Negative.  Negative for chills, diaphoresis, fatigue and fever.   HENT: Negative.  Negative for congestion.    Respiratory: Negative.  Negative for cough, chest tightness and stridor.    Cardiovascular: Negative.  Negative for chest pain.   Gastrointestinal: Negative.  Negative for abdominal distention, abdominal pain, anorexia, constipation, nausea and vomiting.   Endocrine: Negative.    Genitourinary:  Positive for penile pain. Negative for difficulty urinating, dysuria, flank pain and frequency.   Musculoskeletal: Negative.    Skin: Negative.  Negative for color change.   Neurological: Negative.  Negative for dizziness and headaches.   All other systems reviewed and are negative.      Past Medical History:   Diagnosis Date    Arthritis     B12 deficiency 8/23/2017    Coronary artery disease     Deafness in left ear     Embedded foreign body 09/2020    Embedded fish hook  "Left hand, w/ infection    Foot ulcer     GERD (gastroesophageal reflux disease)     Hypertension associated with diabetes 8/23/2017    Ingrown toenail     Joint pain     Mixed diabetic hyperlipidemia associated with type 2 diabetes mellitus 8/23/2017    Neuropathy in diabetes     Shoulder disorder     Left    Type 2 diabetes mellitus with hyperglycemia, with long-term current use of insulin 8/23/2017    Vitamin D deficiency 8/23/2017       Allergies   Allergen Reactions    Cyclobenzaprine Rash      - FLEXERIL -     Haemophilus Influenzae Vaccines Other (See Comments)     Pt states \"made him very sick\"    Metoclopramide Rash      - REGLAN -        Past Surgical History:   Procedure Laterality Date    AORTIC VALVE REPAIR/REPLACEMENT  06/07/2018    APPENDECTOMY      BACK SURGERY      CARDIAC CATHETERIZATION  03/19/2020    stent x3    CATARACT EXTRACTION      CHOLECYSTECTOMY      CORONARY ARTERY BYPASS GRAFT      INCISION AND DRAINAGE ABSCESS      Left hand    INCISION AND DRAINAGE ARM Left 9/24/2020    Procedure: INCISION AND DRAINAGE LEFT HAND;  Surgeon: Ming Ramos MD;  Location: [facility];  Service: Orthopedics;  Laterality: Left;    INNER EAR SURGERY Left     PACEMAKER IMPLANTATION  06/28/2020       Family History   Problem Relation Age of Onset    Diabetes Mother     Cancer Father     Cancer Sister     Cancer Brother     Heart disease Brother     Diabetes Brother        Social History     Socioeconomic History    Marital status: Single   Tobacco Use    Smoking status: Former     Types: Cigarettes    Smokeless tobacco: Never   Substance and Sexual Activity    Alcohol use: Not Currently    Drug use: No    Sexual activity: Defer           Objective   Physical Exam  Vitals and nursing note reviewed. Exam conducted with a chaperone present.   Constitutional:       General: He is awake. He is not in acute distress.     Appearance: Normal appearance. He is well-developed. He is not toxic-appearing.   HENT:      " Head: Normocephalic and atraumatic.      Nose:      Right Nostril: No epistaxis.      Left Nostril: No epistaxis.   Eyes:      General: Lids are normal. No scleral icterus.     Conjunctiva/sclera: Conjunctivae normal.      Pupils: Pupils are equal, round, and reactive to light.   Neck:      Vascular: No hepatojugular reflux or JVD.   Cardiovascular:      Rate and Rhythm: Normal rate and regular rhythm.      Chest Wall: PMI is not displaced.      Pulses: Normal pulses. No decreased pulses.      Heart sounds: Normal heart sounds. No murmur heard.  Pulmonary:      Effort: Pulmonary effort is normal. No accessory muscle usage or respiratory distress.      Breath sounds: Normal breath sounds. No decreased breath sounds or wheezing.   Abdominal:      General: Abdomen is flat. Bowel sounds are normal. There is no distension or abdominal bruit.      Palpations: Abdomen is soft. There is no shifting dullness, fluid wave, mass or pulsatile mass.      Tenderness: There is no abdominal tenderness. There is no right CVA tenderness, left CVA tenderness, guarding or rebound.      Hernia: No hernia is present.   Genitourinary:     Comments: Patient has got balanitis with no clinical evidence of paraphimosis.  Cannot reduce the prepuce completely he states that he was circumcised.  Scrotal exam is unremarkable.  There is no evidence of any Carie's gangrene  Musculoskeletal:         General: Normal range of motion.      Cervical back: Normal range of motion and neck supple. No rigidity.      Right lower leg: No edema.      Left lower leg: No edema.   Skin:     General: Skin is warm and dry.      Capillary Refill: Capillary refill takes less than 2 seconds.      Coloration: Skin is not cyanotic, jaundiced, mottled or pale.   Neurological:      General: No focal deficit present.      Mental Status: He is alert and oriented to person, place, and time. Mental status is at baseline.      GCS: GCS eye subscore is 4. GCS verbal subscore  is 5. GCS motor subscore is 6.      Cranial Nerves: No cranial nerve deficit.      Sensory: Sensation is intact.      Motor: Motor function is intact.      Deep Tendon Reflexes: Reflexes are normal and symmetric.   Psychiatric:         Behavior: Behavior normal. Behavior is cooperative.         Procedures           ED Course  ED Course as of 01/26/24 1257   Fri Jan 26, 2024   1250 Patient is a 83-year-old gentleman who has got balanitis the culture in question was taken from the skin and therefore is irrelevant clinical significant [TS]   1252 I have discussed this case with Tammy COWAN [TS]   1252 Had extensive discussion with the patient also urology recommendation was to keep the follow-up with them in the office.  I have explained to the patient that he is circumcised and he is got balanitis there is no excessive skin to remove and if surgical intervention is needed he may have to go to a tertiary care facility where a plastic reconstruction of the skin can you perform around his penis.  Patient states if that is the case he wants to go to [place].  I have tried explained to him again that this is not emergent transfer he does not any evidence of acute bacterial skin infection or cellulitis or gangrene his lab workup essentially is within normal limits with negative procalcitonin level he has been advised to use steroid and antifungal ointment on a regular basis to his glans and then follow-up with  as scheduled.  If the patient wants to obviously they can go to another hospital but I would not encourage or recommend that since he is already getting treated for his balanitis with medications appropriately and has a close follow the urologist and they can decide whether he needs a referral to a higher level of care.  The patient's family is agreeable with this plan of care the patient is agreeable with this plan of care is going to discharge home [TS]   1254 Antibiotics do not have to be changed [TS]       ED Course User Index  [TS] Kody Way MD                                             Medical Decision Making  Patient balanitis and the skin culture has grown antibiotic resistant bacteria.  I am not sure whether he needs antibiotics for this he has been on the antibiotics.  Does not appear distressed we will get some lab workup and give IV antibiotics which he was promised.    Problems Addressed:  Balanitis:     Details: Patient with balanitis rib pain no evidence of cellulitis no fasciitis.  No gangrene.  Discussed with urology will follow-up with urology as an outpatient    Amount and/or Complexity of Data Reviewed  Labs: ordered.     Details: Labs reviewed  Discussion of management or test interpretation with external provider(s): Discussed with Tammy COWAN recommendation was to have the patient follow-up with him as an outpatient    Risk  Risk Details: There is no clinical evidence of sepsis there is no fasciitis lab workup is normal procalcitonin is negative the patient has got balanitis is treated with steroids and topical antifungals and a follow-up with urology.        Final diagnoses:   Balanitis       ED Disposition  ED Disposition       ED Disposition   Discharge    Condition   Stable    Comment   --               Margareth Muniz, APRN  [address] [facility] [address] [phone]          Praneeth Munoz MD  [address] [address], [facility] [address]  [facility] [address] [phone]      Follow-up as scheduled         Medication List      No changes were made to your prescriptions during this visit.            Kody Way MD  01/26/24 0471       Kody Way MD  01/26/24 8555

## 2024-01-27 LAB
BACTERIA SPEC AEROBE CULT: ABNORMAL
GRAM STN SPEC: ABNORMAL

## 2024-01-28 LAB
BACTERIA SPEC AEROBE CULT: NORMAL
BACTERIA SPEC AEROBE CULT: NORMAL

## 2024-02-01 NOTE — PROGRESS NOTES
Subjective    Mr. Jeong is 83 y.o. male    Chief Complaint: ER follow up balanitis     History of Present Illness    83-year-old male established patient in for emergency room follow-up after patient presented on 3 separate occurrences 1 at [facility] and 2 at [facility] ER within the past 2 to 3 weeks due to penile pain and swelling. Hx of balanitis.treated through dermatology and our office. Had a culture completed and was placed on cipro, lotrisone, diflucan and flagyl. Has noted more difficulty over the past month with retracting foreskin. Overall since the completion of meds has noted improvement in the penile irritation. Was recently seen for urinary retention. Currently denies any feeling of incomplete emptying. Remains on flomax 2 daily and finasteride.   History of HoLAP 2008.      Has previously been treated with Lotrisone as well as dermatology with compounding cream of clobetasol and ciciopirox.   Patient is diabetic insulin dependent with blood sugars that remain elevated.      The following portions of the patient's history were reviewed and updated as appropriate: allergies, current medications, past family history, past medical history, past social history, past surgical history and problem list.    Review of Systems   Constitutional:  Negative for chills and fever.   Gastrointestinal:  Negative for abdominal pain, anal bleeding, blood in stool, nausea and vomiting.   Genitourinary:  Positive for dysuria, frequency, penile pain, penile swelling and urgency. Negative for hematuria.   Skin:  Positive for wound.         Current Outpatient Medications:     apixaban (ELIQUIS) 5 MG tablet tablet, Take 1 tablet by mouth 2 (Two) Times a Day., Disp: , Rfl:     aspirin 81 MG chewable tablet, Chew 1 tablet Daily., Disp: , Rfl:     atenolol (TENORMIN) 25 MG tablet, Take 1 tablet by mouth Daily., Disp: , Rfl:     atorvastatin (LIPITOR) 40 MG tablet, Take 1 tablet by mouth Every Morning., Disp: , Rfl:     B-D  ULTRAFINE III SHORT PEN 31G X 8 MM misc, , Disp: , Rfl:     betamethasone, augmented, (DIPROLENE) 0.05 % ointment, , Disp: , Rfl:     Cholecalciferol (Vitamin D3) 1.25 MG (07754 UT) tablet, Take 1 tablet by mouth Every 30 (Thirty) Days. Takes the 1st day Q month, Disp: , Rfl:     ciprofloxacin (CIPRO) 500 MG tablet, Take 1 tablet by mouth 2 (Two) Times a Day., Disp: , Rfl:     citalopram (CeleXA) 20 MG tablet, , Disp: , Rfl:     clotrimazole-betamethasone (LOTRISONE) 1-0.05 % cream, Apply 1 application  topically to the appropriate area as directed 2 (Two) Times a Day for 45 days. Apply to a thin layer to the tip of penis, Disp: 45 g, Rfl: 1    ezetimibe (ZETIA) 10 MG tablet, Take 1 tablet by mouth Daily., Disp: , Rfl:     Farxiga 10 MG tablet, Take 10 mg by mouth Daily., Disp: , Rfl:     finasteride (PROSCAR) 5 MG tablet, Take 1 tablet by mouth Daily for 360 days., Disp: 90 tablet, Rfl: 3    furosemide (LASIX) 20 MG tablet, Take 1 tablet by mouth Every Morning., Disp: , Rfl:     HumuLIN R U-500 KwikPen 500 UNIT/ML solution pen-injector CONCENTRATED injection, INJECT UP  UNITS 30 MINUTES BEFORE BREAKFAST AND UP TO 85 UNITS 30 MINUTES BEFORE SUPPER, Disp: 12 mL, Rfl: 5    HYDROcodone-acetaminophen (NORCO)  MG per tablet, Take 1 tablet by mouth Every 8 (Eight) Hours As Needed for Moderate Pain., Disp: , Rfl:     hydrOXYzine (ATARAX) 25 MG tablet, TAKE ONE TABLET BY MOUTH EVERY 8 HOURS AS NEEDED FOR ITCHING FOR UP TO 10 DAYS **MAY MAKE DROWSY**, Disp: 30 tablet, Rfl: 1    hydrOXYzine (ATARAX) 25 MG tablet, TAKE ONE TABLET BY MOUTH THREE TIMES DAILY AS NEEDED FOR ITCHING **MAY MAKE DROWSY**, Disp: 30 tablet, Rfl: 1    Insulin Glargine (BASAGLAR KWIKPEN) 100 UNIT/ML injection pen, , Disp: , Rfl:     Insulin Pen Needle 31G X 5 MM misc, , Disp: , Rfl:     NovoLIN R 100 UNIT/ML injection, Inject 5 Units under the skin into the appropriate area as directed 3 (Three) Times a Day Before Meals., Disp: , Rfl:      nystatin (MYCOSTATIN) 900634 UNIT/GM cream, , Disp: , Rfl:     pramipexole (MIRAPEX) 1.5 MG tablet, Take 1 tablet by mouth 3 (Three) Times a Day., Disp: , Rfl:     tamsulosin (FLOMAX) 0.4 MG capsule 24 hr capsule, Take 1 capsule by mouth 2 (Two) Times a Day for 360 days., Disp: 180 capsule, Rfl: 3    ciclopirox (LOPROX) 0.77 % cream, , Disp: , Rfl:     clobetasol (TEMOVATE) 0.05 % ointment, , Disp: , Rfl:     clopidogrel (PLAVIX) 75 MG tablet, Take 1 tablet by mouth Daily. Hold x 5 days a/ Day of procedure 6/15/21; [facility] (Patient not taking: Reported on 11/16/2023), Disp: , Rfl:     dapagliflozin-metformin HCl ER (XIGDUO XR)  MG tablet, Take 1 tablet by mouth Daily. - METFORMIN - (Patient not taking: Reported on 10/11/2023), Disp: , Rfl:     dicyclomine (BENTYL) 10 MG capsule, Take 1 capsule by mouth 2 (two) times a day. (Patient not taking: Reported on 10/11/2023), Disp: , Rfl:     esomeprazole (nexIUM) 40 MG capsule, Take 1 capsule by mouth Every Morning Before Breakfast. (Patient not taking: Reported on 12/28/2023), Disp: , Rfl:     Gemtesa 75 MG tablet, TAKE ONE TABLET BY MOUTH DAILY (Patient not taking: Reported on 9/11/2023), Disp: 30 tablet, Rfl: 1    Synjardy XR  MG tablet sustained-release 24 hour, , Disp: , Rfl:     Past Medical History:   Diagnosis Date    Arthritis     B12 deficiency 8/23/2017    Coronary artery disease     Deafness in left ear     Embedded foreign body 09/2020    Embedded fish hook Left hand, w/ infection    Foot ulcer     GERD (gastroesophageal reflux disease)     Hypertension associated with diabetes 8/23/2017    Ingrown toenail     Joint pain     Mixed diabetic hyperlipidemia associated with type 2 diabetes mellitus 8/23/2017    Neuropathy in diabetes     Shoulder disorder     Left    Type 2 diabetes mellitus with hyperglycemia, with long-term current use of insulin 8/23/2017    Vitamin D  "deficiency 8/23/2017       Past Surgical History:   Procedure Laterality Date    AORTIC VALVE REPAIR/REPLACEMENT  06/07/2018    APPENDECTOMY      BACK SURGERY      CARDIAC CATHETERIZATION  03/19/2020    stent x3    CATARACT EXTRACTION      CHOLECYSTECTOMY      CORONARY ARTERY BYPASS GRAFT      INCISION AND DRAINAGE ABSCESS      Left hand    INCISION AND DRAINAGE ARM Left 9/24/2020    Procedure: INCISION AND DRAINAGE LEFT HAND;  Surgeon: Ming Ramos MD;  Location: [facility] OR;  Service: Orthopedics;  Laterality: Left;    INNER EAR SURGERY Left     PACEMAKER IMPLANTATION  06/28/2020       Social History     Socioeconomic History    Marital status: Single   Tobacco Use    Smoking status: Former     Types: Cigarettes    Smokeless tobacco: Never   Substance and Sexual Activity    Alcohol use: Not Currently    Drug use: No    Sexual activity: Defer       Family History   Problem Relation Age of Onset    Diabetes Mother     Cancer Father     Cancer Sister     Cancer Brother     Heart disease Brother     Diabetes Brother        Objective    Temp 97.4 °F (36.3 °C)   Ht 177.8 cm (70\")   Wt 95.3 kg (210 lb)   BMI 30.13 kg/m²     Physical Exam  Constitutional:       Appearance: Normal appearance.   Abdominal:      Tenderness: There is no right CVA tenderness or left CVA tenderness.   Genitourinary:     Penis: Circumcised. Lesions present.       Comments: Difficult to fully retract foreskin, smegma noted within the forskin. Along the left lateral aspect of  glans penis small abrasion/crack in the skin noted with scant blood.   Neurological:      Mental Status: He is alert and oriented to person, place, and time.   Psychiatric:         Mood and Affect: Mood normal.         Behavior: Behavior normal.             Results for orders placed or performed during the hospital encounter of 01/26/24   Comprehensive Metabolic Panel    Specimen: Blood   Result Value Ref Range    Glucose 210 (H) 65 - 99 mg/dL    BUN 27 (H) 8 - 23 " mg/dL    Creatinine 0.86 0.76 - 1.27 mg/dL    Sodium 135 (L) 136 - 145 mmol/L    Potassium 4.2 3.5 - 5.2 mmol/L    Chloride 100 98 - 107 mmol/L    CO2 23.0 22.0 - 29.0 mmol/L    Calcium 9.0 8.6 - 10.5 mg/dL    Total Protein 6.7 6.0 - 8.5 g/dL    Albumin 4.1 3.5 - 5.2 g/dL    ALT (SGPT) 42 (H) 1 - 41 U/L    AST (SGOT) 28 1 - 40 U/L    Alkaline Phosphatase 111 39 - 117 U/L    Total Bilirubin 0.6 0.0 - 1.2 mg/dL    Globulin 2.6 gm/dL    A/G Ratio 1.6 g/dL    BUN/Creatinine Ratio 31.4 (H) 7.0 - 25.0    Anion Gap 12.0 5.0 - 15.0 mmol/L    eGFR 85.4 >60.0 mL/min/1.73   C-reactive Protein    Specimen: Blood   Result Value Ref Range    C-Reactive Protein 0.37 0.00 - 0.50 mg/dL   Procalcitonin    Specimen: Blood   Result Value Ref Range    Procalcitonin 0.03 0.00 - 0.25 ng/mL   CBC Auto Differential    Specimen: Blood   Result Value Ref Range    WBC 6.13 3.40 - 10.80 10*3/mm3    RBC 5.15 4.14 - 5.80 10*6/mm3    Hemoglobin 15.7 13.0 - 17.7 g/dL    Hematocrit 46.6 37.5 - 51.0 %    MCV 90.5 79.0 - 97.0 fL    MCH 30.5 26.6 - 33.0 pg    MCHC 33.7 31.5 - 35.7 g/dL    RDW 14.1 12.3 - 15.4 %    RDW-SD 46.7 37.0 - 54.0 fl    MPV 10.0 6.0 - 12.0 fL    Platelets 153 140 - 450 10*3/mm3    Neutrophil % 74.1 42.7 - 76.0 %    Lymphocyte % 14.0 (L) 19.6 - 45.3 %    Monocyte % 9.8 5.0 - 12.0 %    Eosinophil % 1.1 0.3 - 6.2 %    Basophil % 0.3 0.0 - 1.5 %    Neutrophils, Absolute 4.54 1.70 - 7.00 10*3/mm3    Lymphocytes, Absolute 0.86 0.70 - 3.10 10*3/mm3    Monocytes, Absolute 0.60 0.10 - 0.90 10*3/mm3    Eosinophils, Absolute 0.07 0.00 - 0.40 10*3/mm3    Basophils, Absolute 0.02 0.00 - 0.20 10*3/mm3   Estimation of residual urine via abdominal ultrasound  Residual Urine: 178 ml  Indication: retention  Position: Supine  Examination: Incremental scanning of the suprapubic area using 3 MHz transducer using copious amounts of acoustic gel.   Findings: An anechoic area was demonstrated which represented the bladder, with measurement of residual  urine as noted. I inspected this myself. In that the residual urine was stable or insignificant, no treatment will be necessary at this time.      Assessment and Plan    Diagnoses and all orders for this visit:    1. Balanitis (Primary)    2. Urinary retention [R33.9]      Urination improved PVR 178ml remains on flomaxx2 daily and finasteride -will continue     On assessment of penis- foreskin difficult to retract. Small open lesion noted to the left lateral aspect of glans penis accompanied with smegma.    Have encouraged continued use of lotrisone, no further need for oral bax at present    Encouraged keeping as clean and maria esther as possible and frequent retraction of foreskin     History of Brayden 2008     Follow-up 3 months

## 2024-02-07 ENCOUNTER — OFFICE VISIT (OUTPATIENT)
Dept: UROLOGY | Facility: CLINIC | Age: 84
End: 2024-02-07
Payer: MEDICARE

## 2024-02-07 VITALS — HEIGHT: 70 IN | BODY MASS INDEX: 30.06 KG/M2 | TEMPERATURE: 97.4 F | WEIGHT: 210 LBS

## 2024-02-07 DIAGNOSIS — N48.1 BALANITIS: Primary | ICD-10-CM

## 2024-02-07 DIAGNOSIS — R33.9 URINARY RETENTION: ICD-10-CM

## 2024-02-15 DIAGNOSIS — Z95.0 PACEMAKER: Primary | ICD-10-CM

## 2024-02-15 DIAGNOSIS — I48.0 PAROXYSMAL ATRIAL FIBRILLATION (HCC): ICD-10-CM

## 2024-02-15 DIAGNOSIS — I49.5 SINOATRIAL NODE DYSFUNCTION (HCC): ICD-10-CM

## 2024-02-22 DIAGNOSIS — L29.1 SCROTAL ITCHING: ICD-10-CM

## 2024-02-22 DIAGNOSIS — N48.1 BALANITIS: ICD-10-CM

## 2024-02-22 RX ORDER — HYDROXYZINE HYDROCHLORIDE 25 MG/1
TABLET, FILM COATED ORAL
Qty: 30 TABLET | Refills: 1 | Status: SHIPPED | OUTPATIENT
Start: 2024-02-22

## 2024-02-22 RX ORDER — CLOTRIMAZOLE AND BETAMETHASONE DIPROPIONATE 10; .64 MG/G; MG/G
CREAM TOPICAL
Qty: 45 G | Refills: 1 | Status: SHIPPED | OUTPATIENT
Start: 2024-02-22

## 2024-02-26 DIAGNOSIS — E11.42 DIABETIC POLYNEUROPATHY ASSOCIATED WITH TYPE 2 DIABETES MELLITUS (HCC): Primary | ICD-10-CM

## 2024-02-26 NOTE — TELEPHONE ENCOUNTER
Requested Prescriptions     Pending Prescriptions Disp Refills    HYDROcodone-acetaminophen (NORCO)  MG per tablet [Pharmacy Med Name: HYDROCODON-APAP   Tablet] 120 tablet 0     Sig: TAKE ONE TABLET BY MOUTH EVERY 6 HOURS AS NEEDED FOR PAIN **MAY MAKE DROWSY**       Last Office Visit:  9/21/2023  Next Office Visit:  3/25/2024  Last Medication Refill:   9/21/2023 with 0RF   Ministerio up to date:  2/26/2024     *RX updated to reflect   2/26/2024  fill date*

## 2024-02-27 RX ORDER — HYDROCODONE BITARTRATE AND ACETAMINOPHEN 10; 325 MG/1; MG/1
TABLET ORAL
Qty: 120 TABLET | Refills: 0 | Status: SHIPPED | OUTPATIENT
Start: 2024-02-27 | End: 2024-03-28

## 2024-03-10 ENCOUNTER — HOSPITAL ENCOUNTER (EMERGENCY)
Age: 84
Discharge: HOME | End: 2024-03-10
Attending: EMERGENCY MEDICINE
Payer: MEDICARE

## 2024-03-10 ENCOUNTER — APPOINTMENT (OUTPATIENT)
Dept: CT IMAGING | Age: 84
End: 2024-03-10
Payer: MEDICARE

## 2024-03-10 VITALS
WEIGHT: 200 LBS | BODY MASS INDEX: 28.7 KG/M2 | RESPIRATION RATE: 22 BRPM | DIASTOLIC BLOOD PRESSURE: 58 MMHG | HEART RATE: 61 BPM | TEMPERATURE: 98.2 F | OXYGEN SATURATION: 95 % | SYSTOLIC BLOOD PRESSURE: 117 MMHG

## 2024-03-10 DIAGNOSIS — R10.30 LOWER ABDOMINAL PAIN: ICD-10-CM

## 2024-03-10 DIAGNOSIS — R11.2 NAUSEA AND VOMITING, UNSPECIFIED VOMITING TYPE: ICD-10-CM

## 2024-03-10 DIAGNOSIS — J18.9 PNEUMONIA OF BOTH LOWER LOBES DUE TO INFECTIOUS ORGANISM: Primary | ICD-10-CM

## 2024-03-10 LAB
ALBUMIN SERPL-MCNC: 3.6 G/DL (ref 3.5–5.2)
ALP SERPL-CCNC: 92 U/L (ref 40–130)
ALT SERPL-CCNC: 23 U/L (ref 5–41)
ANION GAP SERPL CALCULATED.3IONS-SCNC: 13 MMOL/L (ref 7–19)
AST SERPL-CCNC: 19 U/L (ref 5–40)
BASOPHILS # BLD: 0 K/UL (ref 0–0.2)
BASOPHILS NFR BLD: 0.2 % (ref 0–1)
BILIRUB SERPL-MCNC: 1.2 MG/DL (ref 0.2–1.2)
BILIRUB UR QL STRIP: NEGATIVE
BUN SERPL-MCNC: 22 MG/DL (ref 8–23)
CALCIUM SERPL-MCNC: 8.6 MG/DL (ref 8.8–10.2)
CHLORIDE SERPL-SCNC: 99 MMOL/L (ref 98–111)
CLARITY UR: CLEAR
CO2 SERPL-SCNC: 20 MMOL/L (ref 22–29)
COLOR UR: YELLOW
CREAT SERPL-MCNC: 0.7 MG/DL (ref 0.5–1.2)
EOSINOPHIL # BLD: 0.1 K/UL (ref 0–0.6)
EOSINOPHIL NFR BLD: 1.1 % (ref 0–5)
ERYTHROCYTE [DISTWIDTH] IN BLOOD BY AUTOMATED COUNT: 14.5 % (ref 11.5–14.5)
GLUCOSE SERPL-MCNC: 177 MG/DL (ref 74–109)
GLUCOSE UR STRIP.AUTO-MCNC: =>1000 MG/DL
HCT VFR BLD AUTO: 43.6 % (ref 42–52)
HGB BLD-MCNC: 14.7 G/DL (ref 14–18)
HGB UR STRIP.AUTO-MCNC: NEGATIVE MG/L
IMM GRANULOCYTES # BLD: 0.1 K/UL
KETONES UR STRIP.AUTO-MCNC: 15 MG/DL
LEUKOCYTE ESTERASE UR QL STRIP.AUTO: NEGATIVE
LIPASE SERPL-CCNC: 14 U/L (ref 13–60)
LYMPHOCYTES # BLD: 0.5 K/UL (ref 1.1–4.5)
LYMPHOCYTES NFR BLD: 6.4 % (ref 20–40)
MCH RBC QN AUTO: 30.5 PG (ref 27–31)
MCHC RBC AUTO-ENTMCNC: 33.7 G/DL (ref 33–37)
MCV RBC AUTO: 90.5 FL (ref 80–94)
MONOCYTES # BLD: 0.8 K/UL (ref 0–0.9)
MONOCYTES NFR BLD: 9.6 % (ref 0–10)
NEUTROPHILS # BLD: 7 K/UL (ref 1.5–7.5)
NEUTS SEG NFR BLD: 82.1 % (ref 50–65)
NITRITE UR QL STRIP.AUTO: NEGATIVE
PH UR STRIP.AUTO: 6 [PH] (ref 5–8)
PLATELET # BLD AUTO: 137 K/UL (ref 130–400)
PMV BLD AUTO: 9.8 FL (ref 9.4–12.4)
POTASSIUM SERPL-SCNC: 4.2 MMOL/L (ref 3.5–5)
PROT SERPL-MCNC: 6.4 G/DL (ref 6.6–8.7)
PROT UR STRIP.AUTO-MCNC: NEGATIVE MG/DL
RBC # BLD AUTO: 4.82 M/UL (ref 4.7–6.1)
SODIUM SERPL-SCNC: 132 MMOL/L (ref 136–145)
SP GR UR STRIP.AUTO: 1.02 (ref 1–1.03)
UROBILINOGEN UR STRIP.AUTO-MCNC: 4 E.U./DL
WBC # BLD AUTO: 8.5 K/UL (ref 4.8–10.8)

## 2024-03-10 PROCEDURE — 96374 THER/PROPH/DIAG INJ IV PUSH: CPT

## 2024-03-10 PROCEDURE — 81003 URINALYSIS AUTO W/O SCOPE: CPT

## 2024-03-10 PROCEDURE — 85025 COMPLETE CBC W/AUTO DIFF WBC: CPT

## 2024-03-10 PROCEDURE — 6360000004 HC RX CONTRAST MEDICATION: Performed by: EMERGENCY MEDICINE

## 2024-03-10 PROCEDURE — 80053 COMPREHEN METABOLIC PANEL: CPT

## 2024-03-10 PROCEDURE — 96361 HYDRATE IV INFUSION ADD-ON: CPT

## 2024-03-10 PROCEDURE — 83690 ASSAY OF LIPASE: CPT

## 2024-03-10 PROCEDURE — 6360000002 HC RX W HCPCS: Performed by: EMERGENCY MEDICINE

## 2024-03-10 PROCEDURE — 99285 EMERGENCY DEPT VISIT HI MDM: CPT

## 2024-03-10 PROCEDURE — 74177 CT ABD & PELVIS W/CONTRAST: CPT

## 2024-03-10 PROCEDURE — 36415 COLL VENOUS BLD VENIPUNCTURE: CPT

## 2024-03-10 PROCEDURE — 2580000003 HC RX 258: Performed by: EMERGENCY MEDICINE

## 2024-03-10 RX ORDER — SODIUM CHLORIDE, SODIUM LACTATE, POTASSIUM CHLORIDE, AND CALCIUM CHLORIDE .6; .31; .03; .02 G/100ML; G/100ML; G/100ML; G/100ML
1000 INJECTION, SOLUTION INTRAVENOUS ONCE
Status: COMPLETED | OUTPATIENT
Start: 2024-03-10 | End: 2024-03-10

## 2024-03-10 RX ORDER — ONDANSETRON 2 MG/ML
4 INJECTION INTRAMUSCULAR; INTRAVENOUS ONCE
Status: COMPLETED | OUTPATIENT
Start: 2024-03-10 | End: 2024-03-10

## 2024-03-10 RX ORDER — CEFDINIR 300 MG/1
300 CAPSULE ORAL 2 TIMES DAILY
Qty: 20 CAPSULE | Refills: 0 | Status: SHIPPED | OUTPATIENT
Start: 2024-03-10 | End: 2024-03-20

## 2024-03-10 RX ORDER — ONDANSETRON 4 MG/1
4 TABLET, FILM COATED ORAL 3 TIMES DAILY PRN
Qty: 15 TABLET | Refills: 0 | Status: SHIPPED | OUTPATIENT
Start: 2024-03-10

## 2024-03-10 RX ADMIN — SODIUM CHLORIDE, POTASSIUM CHLORIDE, SODIUM LACTATE AND CALCIUM CHLORIDE 1000 ML: 600; 310; 30; 20 INJECTION, SOLUTION INTRAVENOUS at 15:05

## 2024-03-10 RX ADMIN — IOPAMIDOL 70 ML: 755 INJECTION, SOLUTION INTRAVENOUS at 15:01

## 2024-03-10 RX ADMIN — ONDANSETRON 4 MG: 2 INJECTION INTRAMUSCULAR; INTRAVENOUS at 14:49

## 2024-03-10 NOTE — ED PROVIDER NOTES
Hyperlipemia     Left ventricular diastolic dysfunction     Mitral stenosis     Mitral valve stenosis     Neuropathy     Obstructive sleep apnea     AHI: 34.5 per PSG, 6/2013; AHI: 36.9 per PSG, 7/2015; AHI: 54.5 per PSG, 3/2017    Pinched nerve in neck     Restless legs syndrome 2/17/2016         SURGICAL HISTORY       Past Surgical History:   Procedure Laterality Date    APPENDECTOMY      BACK SURGERY      4 Back surgeries    BLADDER SURGERY      CARDIAC CATHETERIZATION  4/30/12    EF > 50%    CATARACT REMOVAL      CHOLECYSTECTOMY      COLONOSCOPY      EYE SURGERY      implants placed both eyes    HAND SURGERY Left 9/14/2020    INCISION AND DRAINAGE LEFT HAND ABSCESS performed by Jean Pierre Bellamy MD at [facility] OR    OTHER SURGICAL HISTORY  08/03/2020    Right Ventricular lead replacement- Dr. Guy    PACEMAKER INSERTION      PACEMAKER PLACEMENT      NM RPLCMT PROST AORTIC VALVE OPEN XCP HOMOGRF/STENT N/A 6/7/2018    AORTIC VALVE REPLACEMENT TRANSESOPHAGEAL ECHOCARDIOGRAM performed by Chin Jacinto MD at [facility] OR    SPINE SURGERY           CURRENT MEDICATIONS     Discharge Medication List as of 3/10/2024  4:44 PM        CONTINUE these medications which have NOT CHANGED    Details   HYDROcodone-acetaminophen (NORCO)  MG per tablet TAKE ONE TABLET BY MOUTH EVERY 6 HOURS AS NEEDED FOR PAIN **MAY MAKE DROWSY**, Disp-120 tablet, R-0Normal      finasteride (PROSCAR) 5 MG tablet Take 1 tablet by mouth dailyHistorical Med      atorvastatin (LIPITOR) 40 MG tablet TAKE ONE TABLET BY MOUTH NIGHTLY, Disp-30 tablet, R-4Normal      apixaban (ELIQUIS) 5 MG TABS tablet Take 1 tablet by mouth 2 times daily, Disp-60 tablet, R-5Normal      insulin regular (HUMULIN R;NOVOLIN R) 100 UNIT/ML injection Inject 5 Units into the skin 3 times daily (before meals)Historical Med      citalopram (CELEXA) 20 MG tablet TAKE ONE TABLET BY MOUTH DAILY, Disp-60 tablet, R-5Normal      FARXIGA 10 MG tablet DAWHistorical Med      hydrOXYzine HCl

## 2024-03-25 ENCOUNTER — HOSPITAL ENCOUNTER (OUTPATIENT)
Dept: NON INVASIVE DIAGNOSTICS | Age: 84
Discharge: HOME | End: 2024-03-25
Attending: INTERNAL MEDICINE
Payer: MEDICARE

## 2024-03-25 ENCOUNTER — OFFICE VISIT (OUTPATIENT)
Dept: NEUROLOGY | Age: 84
End: 2024-03-25
Payer: MEDICARE

## 2024-03-25 VITALS
HEART RATE: 62 BPM | DIASTOLIC BLOOD PRESSURE: 62 MMHG | WEIGHT: 210 LBS | RESPIRATION RATE: 18 BRPM | BODY MASS INDEX: 30.06 KG/M2 | HEIGHT: 70 IN | SYSTOLIC BLOOD PRESSURE: 102 MMHG

## 2024-03-25 DIAGNOSIS — G25.81 RESTLESS LEGS SYNDROME: ICD-10-CM

## 2024-03-25 DIAGNOSIS — G47.33 OBSTRUCTIVE SLEEP APNEA: ICD-10-CM

## 2024-03-25 DIAGNOSIS — G45.0 VERTEBROBASILAR ARTERY INSUFFICIENCY: ICD-10-CM

## 2024-03-25 DIAGNOSIS — M54.42 CHRONIC BILATERAL LOW BACK PAIN WITH BILATERAL SCIATICA: ICD-10-CM

## 2024-03-25 DIAGNOSIS — G89.29 CHRONIC BILATERAL LOW BACK PAIN WITH BILATERAL SCIATICA: ICD-10-CM

## 2024-03-25 DIAGNOSIS — R41.3 MEMORY LOSS: ICD-10-CM

## 2024-03-25 DIAGNOSIS — E11.42 DIABETIC POLYNEUROPATHY ASSOCIATED WITH TYPE 2 DIABETES MELLITUS (HCC): Primary | ICD-10-CM

## 2024-03-25 DIAGNOSIS — I49.5 SINOATRIAL NODE DYSFUNCTION (HCC): ICD-10-CM

## 2024-03-25 DIAGNOSIS — M54.41 CHRONIC BILATERAL LOW BACK PAIN WITH BILATERAL SCIATICA: ICD-10-CM

## 2024-03-25 DIAGNOSIS — I25.10 CORONARY ARTERY DISEASE INVOLVING NATIVE CORONARY ARTERY OF NATIVE HEART WITHOUT ANGINA PECTORIS: ICD-10-CM

## 2024-03-25 PROCEDURE — 3078F DIAST BP <80 MM HG: CPT | Performed by: PSYCHIATRY & NEUROLOGY

## 2024-03-25 PROCEDURE — C8929 TTE W OR WO FOL WCON,DOPPLER: HCPCS

## 2024-03-25 PROCEDURE — G8427 DOCREV CUR MEDS BY ELIG CLIN: HCPCS | Performed by: PSYCHIATRY & NEUROLOGY

## 2024-03-25 PROCEDURE — 1123F ACP DISCUSS/DSCN MKR DOCD: CPT | Performed by: PSYCHIATRY & NEUROLOGY

## 2024-03-25 PROCEDURE — G8484 FLU IMMUNIZE NO ADMIN: HCPCS | Performed by: PSYCHIATRY & NEUROLOGY

## 2024-03-25 PROCEDURE — 99213 OFFICE O/P EST LOW 20 MIN: CPT | Performed by: PSYCHIATRY & NEUROLOGY

## 2024-03-25 PROCEDURE — 6360000004 HC RX CONTRAST MEDICATION: Performed by: INTERNAL MEDICINE

## 2024-03-25 PROCEDURE — 1036F TOBACCO NON-USER: CPT | Performed by: PSYCHIATRY & NEUROLOGY

## 2024-03-25 PROCEDURE — 3074F SYST BP LT 130 MM HG: CPT | Performed by: PSYCHIATRY & NEUROLOGY

## 2024-03-25 PROCEDURE — G8417 CALC BMI ABV UP PARAM F/U: HCPCS | Performed by: PSYCHIATRY & NEUROLOGY

## 2024-03-25 RX ADMIN — PERFLUTREN 1.5 ML: 6.52 INJECTION, SUSPENSION INTRAVENOUS at 14:16

## 2024-03-25 NOTE — PROGRESS NOTES
[facility]  [address], [address]  [place] [address]  Phone [phone]  Fax [phone]     [facility] Follow Up Encounter  3/25/24 10:46 AM CDT    Information:   Patient Name: Michael Szymanski  :   1940  Age:   83 y.o.  MRN:   [id number]  Account #:  [id number]  Today:  3/25/24    Provider: Parker Pace M.D.     Chief Complaint:   Chief Complaint   Patient presents with    Follow-up       Subjective:   Michael Szymanski is a 83 y.o. man with polyneuropathy, vertebrobasilar insufficiency, obstructive sleep apnea, and restless legs syndrome who is following up.   He has numbness and tingling in his feet and up to the knees.  He has burning sensation in his feet.  He has had intermittent tingling and numbness in the hands.  His RLS has been aggravating.  His legs jump.  It keeps him awake.  He has previously been on gabapentin, Lyrica, Cymbalta, Sinemet, Requip.  He takes hydrocodone and Mirapex presently.  He has chronic back pain.  He has not been using his BiPAP.  He complains of worsening low back pain.  He cannot stand or walk but for a couple minutes.  He gets severe pain in his proximal legs.  Last visit an MRI L spine was ordered.  It showed postoperative changes and some degenerative changes but no more than moderate and no significant spinal stenosis.      Objective:     Past Medical History:  Past Medical History:   Diagnosis Date    Aortic valve stenosis     Arthritis     Chest tightness, discomfort, or pressure 2012    Chronic back pain     CKD (chronic kidney disease)     CPAP (continuous positive airway pressure) dependence     13cm    Diabetes (HCC)     Diabetic polyneuropathy associated with type 2 diabetes mellitus (HCC) 2016    GERD (gastroesophageal reflux disease)     HTN (hypertension)     Hyperlipemia     Left ventricular diastolic dysfunction     Mitral stenosis     Mitral valve stenosis     Neuropathy     Obstructive sleep apnea     AHI: 34.5 per

## 2024-03-27 ENCOUNTER — TELEPHONE (OUTPATIENT)
Dept: CARDIOLOGY CLINIC | Age: 84
End: 2024-03-27

## 2024-03-27 NOTE — TELEPHONE ENCOUNTER
----- Message from Segun Cameron MD sent at 3/26/2024  6:10 PM CDT -----  Please let patient know that his echocardiogram was reviewed.  Stable findings.  Valve functioning with stable findings since 5 5/2023.  Follow-up as per prior appointment.

## 2024-04-03 RX ORDER — APIXABAN 5 MG/1
5 TABLET, FILM COATED ORAL 2 TIMES DAILY
Qty: 60 TABLET | Refills: 11 | Status: SHIPPED | OUTPATIENT
Start: 2024-04-03

## 2024-05-07 ENCOUNTER — OFFICE VISIT (OUTPATIENT)
Dept: UROLOGY | Facility: CLINIC | Age: 84
End: 2024-05-07
Payer: MEDICARE

## 2024-05-07 VITALS — HEIGHT: 70 IN | WEIGHT: 210 LBS | TEMPERATURE: 97.6 F | BODY MASS INDEX: 30.06 KG/M2

## 2024-05-07 DIAGNOSIS — N48.1 BALANITIS: ICD-10-CM

## 2024-05-07 DIAGNOSIS — N39.43 POST-VOID DRIBBLING: ICD-10-CM

## 2024-05-07 DIAGNOSIS — T14.8XXA SKIN EXCORIATION: ICD-10-CM

## 2024-05-07 DIAGNOSIS — R33.8 ACUTE URINARY RETENTION: Primary | ICD-10-CM

## 2024-05-07 LAB
BILIRUB BLD-MCNC: NEGATIVE MG/DL
CLARITY, POC: CLEAR
COLOR UR: YELLOW
GLUCOSE UR STRIP-MCNC: ABNORMAL MG/DL
KETONES UR QL: NEGATIVE
LEUKOCYTE EST, POC: NEGATIVE
NITRITE UR-MCNC: NEGATIVE MG/ML
PH UR: 6 [PH] (ref 5–8)
PROT UR STRIP-MCNC: NEGATIVE MG/DL
RBC # UR STRIP: NEGATIVE /UL
SP GR UR: 1.01 (ref 1–1.03)
UROBILINOGEN UR QL: NORMAL

## 2024-05-07 RX ORDER — VIBEGRON 75 MG/1
1 TABLET, FILM COATED ORAL DAILY
Qty: 30 TABLET | Refills: 1 | Status: SHIPPED | OUTPATIENT
Start: 2024-05-07

## 2024-05-07 RX ORDER — NYSTATIN 100000 U/G
CREAM TOPICAL AS NEEDED
Qty: 30 G | Refills: 5 | Status: SHIPPED | OUTPATIENT
Start: 2024-05-07

## 2024-05-17 DIAGNOSIS — Z95.0 PACEMAKER: Primary | ICD-10-CM

## 2024-05-17 DIAGNOSIS — I49.5 SINOATRIAL NODE DYSFUNCTION (HCC): ICD-10-CM

## 2024-05-22 RX ORDER — ATORVASTATIN CALCIUM 40 MG/1
TABLET, FILM COATED ORAL
Qty: 30 TABLET | Refills: 0 | Status: SHIPPED | OUTPATIENT
Start: 2024-05-22

## 2024-05-24 DIAGNOSIS — L29.1 SCROTAL ITCHING: ICD-10-CM

## 2024-05-24 RX ORDER — HYDROXYZINE HYDROCHLORIDE 25 MG/1
TABLET, FILM COATED ORAL
Qty: 30 TABLET | Refills: 1 | Status: SHIPPED | OUTPATIENT
Start: 2024-05-24

## 2024-06-07 ENCOUNTER — OFFICE VISIT (OUTPATIENT)
Dept: CARDIOLOGY CLINIC | Age: 84
End: 2024-06-07
Payer: MEDICARE

## 2024-06-07 VITALS
BODY MASS INDEX: 29.49 KG/M2 | HEART RATE: 90 BPM | HEIGHT: 70 IN | SYSTOLIC BLOOD PRESSURE: 120 MMHG | WEIGHT: 206 LBS | DIASTOLIC BLOOD PRESSURE: 64 MMHG

## 2024-06-07 DIAGNOSIS — I38 VALVULAR HEART DISEASE: ICD-10-CM

## 2024-06-07 DIAGNOSIS — I48.0 PAROXYSMAL ATRIAL FIBRILLATION (HCC): ICD-10-CM

## 2024-06-07 DIAGNOSIS — I10 ESSENTIAL HYPERTENSION: ICD-10-CM

## 2024-06-07 DIAGNOSIS — I25.10 CORONARY ARTERY DISEASE INVOLVING NATIVE CORONARY ARTERY OF NATIVE HEART WITHOUT ANGINA PECTORIS: ICD-10-CM

## 2024-06-07 DIAGNOSIS — Z95.0 PACEMAKER: ICD-10-CM

## 2024-06-07 DIAGNOSIS — I50.42 CHRONIC COMBINED SYSTOLIC AND DIASTOLIC HEART FAILURE DUE TO VALVULAR DISEASE (HCC): Primary | ICD-10-CM

## 2024-06-07 DIAGNOSIS — I38 CHRONIC COMBINED SYSTOLIC AND DIASTOLIC HEART FAILURE DUE TO VALVULAR DISEASE (HCC): Primary | ICD-10-CM

## 2024-06-07 PROCEDURE — 3074F SYST BP LT 130 MM HG: CPT | Performed by: INTERNAL MEDICINE

## 2024-06-07 PROCEDURE — 1123F ACP DISCUSS/DSCN MKR DOCD: CPT | Performed by: INTERNAL MEDICINE

## 2024-06-07 PROCEDURE — G8417 CALC BMI ABV UP PARAM F/U: HCPCS | Performed by: INTERNAL MEDICINE

## 2024-06-07 PROCEDURE — 1036F TOBACCO NON-USER: CPT | Performed by: INTERNAL MEDICINE

## 2024-06-07 PROCEDURE — 3078F DIAST BP <80 MM HG: CPT | Performed by: INTERNAL MEDICINE

## 2024-06-07 PROCEDURE — 99214 OFFICE O/P EST MOD 30 MIN: CPT | Performed by: INTERNAL MEDICINE

## 2024-06-07 PROCEDURE — G8427 DOCREV CUR MEDS BY ELIG CLIN: HCPCS | Performed by: INTERNAL MEDICINE

## 2024-06-07 RX ORDER — VIBEGRON 75 MG/1
75 TABLET, FILM COATED ORAL EVERY OTHER DAY
COMMUNITY

## 2024-06-07 ASSESSMENT — ENCOUNTER SYMPTOMS
BACK PAIN: 0
ABDOMINAL DISTENTION: 0
WHEEZING: 0
SHORTNESS OF BREATH: 0
COUGH: 0
DIARRHEA: 0
VOMITING: 0
BLOOD IN STOOL: 0
ABDOMINAL PAIN: 0

## 2024-06-07 NOTE — PROGRESS NOTES
9/2020, here for follow-up evaluation.    1.  Noted atrial fibrillation episode between November 2023 and February 2024 which has since reverted to sinus rhythm.  He is on Eliquis.  Does bruise easily but no overt bleeding.  2.  Pacemaker functioning well with 3.7 years of battery life.  99% ventricular pacing with 96% atrial pacing.  Normal lead impedances and thresholds.  3.  Chest pain symptoms that occurs when he wakes up in the morning does not appear anginal in nature with no exertional symptoms.  Possibly related to musculoskeletal issue when he gets up.  Appears to resolved within 30 seconds.  Continue to monitor in this regard.  4.  Can follow-up with nurse practitioner in 4 months    Orders:  No orders of the defined types were placed in this encounter.    No orders of the defined types were placed in this encounter.            Return for NP 4 mths; 8 mths; me 1 year.      Electronically signed by Segun Cameron MD on 6/7/2024 at 10:49 AM    [facility]      Thisdictation was generated by voice recognition computer software.  Although all attempts are made to edit the dictation for accuracy, there may be errors in the transcription that are not intended.

## 2024-06-24 DIAGNOSIS — L29.1 SCROTAL ITCHING: ICD-10-CM

## 2024-06-24 RX ORDER — HYDROXYZINE HYDROCHLORIDE 25 MG/1
TABLET, FILM COATED ORAL
Qty: 30 TABLET | Refills: 1 | Status: SHIPPED | OUTPATIENT
Start: 2024-06-24

## 2024-07-17 DIAGNOSIS — L29.1 SCROTAL ITCHING: ICD-10-CM

## 2024-07-17 RX ORDER — HYDROXYZINE HYDROCHLORIDE 25 MG/1
TABLET, FILM COATED ORAL
Qty: 30 TABLET | Refills: 1 | Status: SHIPPED | OUTPATIENT
Start: 2024-07-17

## 2024-07-29 RX ORDER — ATORVASTATIN CALCIUM 40 MG/1
TABLET, FILM COATED ORAL
Qty: 30 TABLET | Refills: 5 | Status: SHIPPED | OUTPATIENT
Start: 2024-07-29

## 2024-08-01 ENCOUNTER — APPOINTMENT (OUTPATIENT)
Dept: GENERAL RADIOLOGY | Age: 84
DRG: 638 | End: 2024-08-01
Payer: MEDICARE

## 2024-08-01 ENCOUNTER — APPOINTMENT (OUTPATIENT)
Dept: VASCULAR LAB | Age: 84
DRG: 638 | End: 2024-08-01
Payer: MEDICARE

## 2024-08-01 ENCOUNTER — HOSPITAL ENCOUNTER (INPATIENT)
Age: 84
LOS: 2 days | Discharge: HOME | DRG: 638 | End: 2024-08-03
Attending: INTERNAL MEDICINE | Admitting: INTERNAL MEDICINE
Payer: MEDICARE

## 2024-08-01 DIAGNOSIS — L03.116 CELLULITIS OF LEFT FOOT: Primary | ICD-10-CM

## 2024-08-01 DIAGNOSIS — L03.116 CELLULITIS OF LEFT LOWER EXTREMITY: ICD-10-CM

## 2024-08-01 LAB
ALBUMIN SERPL-MCNC: 4.7 G/DL (ref 3.5–5.2)
ALP SERPL-CCNC: 124 U/L (ref 40–130)
ALT SERPL-CCNC: 35 U/L (ref 5–41)
ANION GAP SERPL CALCULATED.3IONS-SCNC: 13 MMOL/L (ref 7–19)
AST SERPL-CCNC: 22 U/L (ref 5–40)
BASOPHILS # BLD: 0 K/UL (ref 0–0.2)
BASOPHILS NFR BLD: 0.2 % (ref 0–1)
BILIRUB SERPL-MCNC: 1.2 MG/DL (ref 0.2–1.2)
BUN SERPL-MCNC: 23 MG/DL (ref 8–23)
CALCIUM SERPL-MCNC: 10.3 MG/DL (ref 8.8–10.2)
CHLORIDE SERPL-SCNC: 93 MMOL/L (ref 98–111)
CHOLEST SERPL-MCNC: 117 MG/DL (ref 160–199)
CO2 SERPL-SCNC: 27 MMOL/L (ref 22–29)
CREAT SERPL-MCNC: 1 MG/DL (ref 0.5–1.2)
CRP SERPL HS-MCNC: 3.3 MG/DL (ref 0–0.5)
ECHO BSA: 2.11 M2
EOSINOPHIL # BLD: 0 K/UL (ref 0–0.6)
EOSINOPHIL NFR BLD: 0.3 % (ref 0–5)
ERYTHROCYTE [DISTWIDTH] IN BLOOD BY AUTOMATED COUNT: 13.7 % (ref 11.5–14.5)
ERYTHROCYTE [SEDIMENTATION RATE] IN BLOOD BY WESTERGREN METHOD: 7 MM/HR (ref 0–15)
GLUCOSE BLD-MCNC: 282 MG/DL (ref 70–99)
GLUCOSE BLD-MCNC: 327 MG/DL (ref 70–99)
GLUCOSE BLD-MCNC: 348 MG/DL (ref 70–99)
GLUCOSE SERPL-MCNC: 356 MG/DL (ref 74–109)
HCT VFR BLD AUTO: 51.5 % (ref 42–52)
HDLC SERPL-MCNC: 58 MG/DL (ref 55–121)
HGB BLD-MCNC: 17 G/DL (ref 14–18)
IMM GRANULOCYTES # BLD: 0.1 K/UL
LACTATE BLDV-SCNC: 1.4 MMOL/L (ref 0.5–1.9)
LDLC SERPL CALC-MCNC: 32 MG/DL
LYMPHOCYTES # BLD: 0.8 K/UL (ref 1.1–4.5)
LYMPHOCYTES NFR BLD: 6.7 % (ref 20–40)
MCH RBC QN AUTO: 30 PG (ref 27–31)
MCHC RBC AUTO-ENTMCNC: 33 G/DL (ref 33–37)
MCV RBC AUTO: 91 FL (ref 80–94)
MONOCYTES # BLD: 1.1 K/UL (ref 0–0.9)
MONOCYTES NFR BLD: 9.1 % (ref 0–10)
NEUTROPHILS # BLD: 9.7 K/UL (ref 1.5–7.5)
NEUTS SEG NFR BLD: 83 % (ref 50–65)
PERFORMED ON: ABNORMAL
PLATELET # BLD AUTO: 149 K/UL (ref 130–400)
PMV BLD AUTO: 9.7 FL (ref 9.4–12.4)
POTASSIUM SERPL-SCNC: 5.3 MMOL/L (ref 3.5–5)
PROT SERPL-MCNC: 8 G/DL (ref 6.6–8.7)
RBC # BLD AUTO: 5.66 M/UL (ref 4.7–6.1)
SODIUM SERPL-SCNC: 133 MMOL/L (ref 136–145)
TRIGL SERPL-MCNC: 134 MG/DL (ref 0–149)
VAS LEFT ATA DIST PSV: 87.3 CM/S
VAS LEFT ATA MID PSV: 66.3 CM/S
VAS LEFT ATA PROX PSV: 92.2 CM/S
VAS LEFT CFA PROX PSV: 113 CM/S
VAS LEFT PERONEAL DIST PSV: 91.1 CM/S
VAS LEFT PERONEAL MID PSV: 97.4 CM/S
VAS LEFT PERONEAL PROX PSV: 64.4 CM/S
VAS LEFT PFA PROX PSV: 81.8 CM/S
VAS LEFT POP A DIST PSV: 63.1 CM/S
VAS LEFT POP A PROX PSV: 77.9 CM/S
VAS LEFT POP A PROX VEL RATIO: 0.83
VAS LEFT PTA DIST PSV: 0 CM/S
VAS LEFT PTA MID PSV: 36.1 CM/S
VAS LEFT PTA PROX PSV: 40.8 CM/S
VAS LEFT SFA DIST PSV: 94.4 CM/S
VAS LEFT SFA DIST VEL RATIO: 0.84
VAS LEFT SFA MID PSV: 112 CM/S
VAS LEFT SFA MID VEL RATIO: 1.02
VAS LEFT SFA PROX PSV: 110 CM/S
VAS LEFT SFA PROX VEL RATIO: 0.97
VAS RIGHT ATA DIST PSV: 96.9 CM/S
VAS RIGHT ATA MID PSV: 43.1 CM/S
VAS RIGHT ATA PROX PSV: 81.6 CM/S
VAS RIGHT CFA PROX PSV: 94.6 CM/S
VAS RIGHT PERONEAL DIST PSV: 38.3 CM/S
VAS RIGHT PERONEAL MID PSV: 57.4 CM/S
VAS RIGHT PERONEAL PROX PSV: 41.7 CM/S
VAS RIGHT PFA PROX PSV: 52.2 CM/S
VAS RIGHT POP A DIST PSV: 58.8 CM/S
VAS RIGHT POP A PROX PSV: 41.2 CM/S
VAS RIGHT POP A PROX VEL RATIO: 0.56
VAS RIGHT PTA DIST PSV: 63.2 CM/S
VAS RIGHT PTA MID PSV: 59.7 CM/S
VAS RIGHT PTA PROX PSV: 58.4 CM/S
VAS RIGHT SFA DIST PSV: 73.5 CM/S
VAS RIGHT SFA DIST VEL RATIO: 0.92
VAS RIGHT SFA MID PSV: 80.1 CM/S
VAS RIGHT SFA MID VEL RATIO: 0.9
VAS RIGHT SFA PROX PSV: 90.9 CM/S
VAS RIGHT SFA PROX VEL RATIO: 1
WBC # BLD AUTO: 11.6 K/UL (ref 4.8–10.8)

## 2024-08-01 PROCEDURE — 87040 BLOOD CULTURE FOR BACTERIA: CPT

## 2024-08-01 PROCEDURE — 80053 COMPREHEN METABOLIC PANEL: CPT

## 2024-08-01 PROCEDURE — 80061 LIPID PANEL: CPT

## 2024-08-01 PROCEDURE — 6370000000 HC RX 637 (ALT 250 FOR IP): Performed by: NURSE PRACTITIONER

## 2024-08-01 PROCEDURE — 6360000002 HC RX W HCPCS: Performed by: PHYSICIAN ASSISTANT

## 2024-08-01 PROCEDURE — 2580000003 HC RX 258: Performed by: NURSE PRACTITIONER

## 2024-08-01 PROCEDURE — 2580000003 HC RX 258: Performed by: PHYSICIAN ASSISTANT

## 2024-08-01 PROCEDURE — 96365 THER/PROPH/DIAG IV INF INIT: CPT

## 2024-08-01 PROCEDURE — 93925 LOWER EXTREMITY STUDY: CPT

## 2024-08-01 PROCEDURE — 82962 GLUCOSE BLOOD TEST: CPT

## 2024-08-01 PROCEDURE — 85025 COMPLETE CBC W/AUTO DIFF WBC: CPT

## 2024-08-01 PROCEDURE — 93925 LOWER EXTREMITY STUDY: CPT | Performed by: SURGERY

## 2024-08-01 PROCEDURE — 36415 COLL VENOUS BLD VENIPUNCTURE: CPT

## 2024-08-01 PROCEDURE — 73630 X-RAY EXAM OF FOOT: CPT

## 2024-08-01 PROCEDURE — 86140 C-REACTIVE PROTEIN: CPT

## 2024-08-01 PROCEDURE — 1200000000 HC SEMI PRIVATE

## 2024-08-01 PROCEDURE — 85652 RBC SED RATE AUTOMATED: CPT

## 2024-08-01 PROCEDURE — 83605 ASSAY OF LACTIC ACID: CPT

## 2024-08-01 PROCEDURE — 99285 EMERGENCY DEPT VISIT HI MDM: CPT

## 2024-08-01 RX ORDER — ACETAMINOPHEN 325 MG/1
650 TABLET ORAL EVERY 6 HOURS PRN
Status: DISCONTINUED | OUTPATIENT
Start: 2024-08-01 | End: 2024-08-03 | Stop reason: HOSPADM

## 2024-08-01 RX ORDER — MAGNESIUM SULFATE IN WATER 40 MG/ML
2000 INJECTION, SOLUTION INTRAVENOUS PRN
Status: DISCONTINUED | OUTPATIENT
Start: 2024-08-01 | End: 2024-08-03 | Stop reason: HOSPADM

## 2024-08-01 RX ORDER — ACETAMINOPHEN 650 MG/1
650 SUPPOSITORY RECTAL EVERY 6 HOURS PRN
Status: DISCONTINUED | OUTPATIENT
Start: 2024-08-01 | End: 2024-08-03 | Stop reason: HOSPADM

## 2024-08-01 RX ORDER — TAMSULOSIN HYDROCHLORIDE 0.4 MG/1
0.4 CAPSULE ORAL DAILY
Status: DISCONTINUED | OUTPATIENT
Start: 2024-08-01 | End: 2024-08-03 | Stop reason: HOSPADM

## 2024-08-01 RX ORDER — CITALOPRAM 20 MG/1
20 TABLET ORAL DAILY
Status: DISCONTINUED | OUTPATIENT
Start: 2024-08-01 | End: 2024-08-03 | Stop reason: HOSPADM

## 2024-08-01 RX ORDER — TROSPIUM CHLORIDE 20 MG/1
20 TABLET, FILM COATED ORAL
Status: DISCONTINUED | OUTPATIENT
Start: 2024-08-01 | End: 2024-08-03 | Stop reason: HOSPADM

## 2024-08-01 RX ORDER — DULAGLUTIDE 1.5 MG/.5ML
1.5 INJECTION, SOLUTION SUBCUTANEOUS WEEKLY
COMMUNITY
Start: 2024-07-25

## 2024-08-01 RX ORDER — INSULIN LISPRO 100 [IU]/ML
0-4 INJECTION, SOLUTION INTRAVENOUS; SUBCUTANEOUS NIGHTLY
Status: DISCONTINUED | OUTPATIENT
Start: 2024-08-01 | End: 2024-08-03 | Stop reason: HOSPADM

## 2024-08-01 RX ORDER — FINASTERIDE 5 MG/1
5 TABLET, FILM COATED ORAL DAILY
Status: DISCONTINUED | OUTPATIENT
Start: 2024-08-01 | End: 2024-08-03 | Stop reason: HOSPADM

## 2024-08-01 RX ORDER — SODIUM CHLORIDE 9 MG/ML
INJECTION, SOLUTION INTRAVENOUS PRN
Status: DISCONTINUED | OUTPATIENT
Start: 2024-08-01 | End: 2024-08-03 | Stop reason: HOSPADM

## 2024-08-01 RX ORDER — ASPIRIN 81 MG/1
81 TABLET ORAL DAILY
Status: DISCONTINUED | OUTPATIENT
Start: 2024-08-01 | End: 2024-08-03 | Stop reason: HOSPADM

## 2024-08-01 RX ORDER — INSULIN LISPRO 100 [IU]/ML
0-8 INJECTION, SOLUTION INTRAVENOUS; SUBCUTANEOUS
Status: DISCONTINUED | OUTPATIENT
Start: 2024-08-01 | End: 2024-08-03 | Stop reason: HOSPADM

## 2024-08-01 RX ORDER — DEXTROSE MONOHYDRATE 100 MG/ML
INJECTION, SOLUTION INTRAVENOUS CONTINUOUS PRN
Status: DISCONTINUED | OUTPATIENT
Start: 2024-08-01 | End: 2024-08-03 | Stop reason: HOSPADM

## 2024-08-01 RX ORDER — EZETIMIBE 10 MG/1
10 TABLET ORAL DAILY
Status: DISCONTINUED | OUTPATIENT
Start: 2024-08-01 | End: 2024-08-03 | Stop reason: HOSPADM

## 2024-08-01 RX ORDER — ATORVASTATIN CALCIUM 40 MG/1
40 TABLET, FILM COATED ORAL NIGHTLY
Status: DISCONTINUED | OUTPATIENT
Start: 2024-08-01 | End: 2024-08-03 | Stop reason: HOSPADM

## 2024-08-01 RX ORDER — HYDROCODONE BITARTRATE AND ACETAMINOPHEN 5; 325 MG/1; MG/1
1 TABLET ORAL EVERY 4 HOURS PRN
Status: DISCONTINUED | OUTPATIENT
Start: 2024-08-01 | End: 2024-08-03 | Stop reason: HOSPADM

## 2024-08-01 RX ORDER — ATENOLOL 50 MG/1
25 TABLET ORAL DAILY
Status: DISCONTINUED | OUTPATIENT
Start: 2024-08-01 | End: 2024-08-03 | Stop reason: HOSPADM

## 2024-08-01 RX ORDER — POTASSIUM CHLORIDE 7.45 MG/ML
10 INJECTION INTRAVENOUS PRN
Status: DISCONTINUED | OUTPATIENT
Start: 2024-08-01 | End: 2024-08-03 | Stop reason: HOSPADM

## 2024-08-01 RX ORDER — SODIUM CHLORIDE 0.9 % (FLUSH) 0.9 %
5-40 SYRINGE (ML) INJECTION PRN
Status: DISCONTINUED | OUTPATIENT
Start: 2024-08-01 | End: 2024-08-03 | Stop reason: HOSPADM

## 2024-08-01 RX ORDER — PANTOPRAZOLE SODIUM 40 MG/1
40 TABLET, DELAYED RELEASE ORAL
Status: DISCONTINUED | OUTPATIENT
Start: 2024-08-02 | End: 2024-08-03 | Stop reason: HOSPADM

## 2024-08-01 RX ORDER — POLYETHYLENE GLYCOL 3350 17 G/17G
17 POWDER, FOR SOLUTION ORAL DAILY PRN
Status: DISCONTINUED | OUTPATIENT
Start: 2024-08-01 | End: 2024-08-03 | Stop reason: HOSPADM

## 2024-08-01 RX ORDER — SODIUM CHLORIDE 0.9 % (FLUSH) 0.9 %
5-40 SYRINGE (ML) INJECTION EVERY 12 HOURS SCHEDULED
Status: DISCONTINUED | OUTPATIENT
Start: 2024-08-01 | End: 2024-08-03 | Stop reason: HOSPADM

## 2024-08-01 RX ADMIN — CEFEPIME 2000 MG: 2 INJECTION, POWDER, FOR SOLUTION INTRAVENOUS at 09:36

## 2024-08-01 RX ADMIN — FINASTERIDE 5 MG: 5 TABLET, FILM COATED ORAL at 12:42

## 2024-08-01 RX ADMIN — SODIUM CHLORIDE: 9 INJECTION, SOLUTION INTRAVENOUS at 12:48

## 2024-08-01 RX ADMIN — PRAMIPEXOLE DIHYDROCHLORIDE 1.5 MG: 1 TABLET ORAL at 15:14

## 2024-08-01 RX ADMIN — TROSPIUM CHLORIDE 20 MG: 20 TABLET, FILM COATED ORAL at 15:17

## 2024-08-01 RX ADMIN — CITALOPRAM HYDROBROMIDE 20 MG: 20 TABLET ORAL at 12:42

## 2024-08-01 RX ADMIN — PRAMIPEXOLE DIHYDROCHLORIDE 1.5 MG: 1 TABLET ORAL at 20:06

## 2024-08-01 RX ADMIN — INSULIN LISPRO 6 UNITS: 100 INJECTION, SOLUTION INTRAVENOUS; SUBCUTANEOUS at 12:51

## 2024-08-01 RX ADMIN — ASPIRIN 81 MG: 81 TABLET, COATED ORAL at 12:42

## 2024-08-01 RX ADMIN — INSULIN LISPRO 4 UNITS: 100 INJECTION, SOLUTION INTRAVENOUS; SUBCUTANEOUS at 17:15

## 2024-08-01 RX ADMIN — TAMSULOSIN HYDROCHLORIDE 0.4 MG: 0.4 CAPSULE ORAL at 12:42

## 2024-08-01 RX ADMIN — CEFEPIME 2000 MG: 2 INJECTION, POWDER, FOR SOLUTION INTRAVENOUS at 20:18

## 2024-08-01 RX ADMIN — VANCOMYCIN HYDROCHLORIDE 2250 MG: 10 INJECTION, POWDER, LYOPHILIZED, FOR SOLUTION INTRAVENOUS at 12:50

## 2024-08-01 RX ADMIN — SODIUM ZIRCONIUM CYCLOSILICATE 10 G: 10 POWDER, FOR SUSPENSION ORAL at 12:37

## 2024-08-01 RX ADMIN — APIXABAN 5 MG: 5 TABLET, FILM COATED ORAL at 12:42

## 2024-08-01 RX ADMIN — INSULIN LISPRO 4 UNITS: 100 INJECTION, SOLUTION INTRAVENOUS; SUBCUTANEOUS at 20:18

## 2024-08-01 RX ADMIN — EZETIMIBE 10 MG: 10 TABLET ORAL at 12:42

## 2024-08-01 RX ADMIN — SODIUM CHLORIDE, PRESERVATIVE FREE 10 ML: 5 INJECTION INTRAVENOUS at 20:08

## 2024-08-01 RX ADMIN — ATORVASTATIN CALCIUM 40 MG: 40 TABLET, FILM COATED ORAL at 20:06

## 2024-08-01 RX ADMIN — SODIUM CHLORIDE: 9 INJECTION, SOLUTION INTRAVENOUS at 20:17

## 2024-08-01 RX ADMIN — APIXABAN 5 MG: 5 TABLET, FILM COATED ORAL at 20:06

## 2024-08-01 RX ADMIN — ATENOLOL 25 MG: 50 TABLET ORAL at 12:54

## 2024-08-01 RX ADMIN — HYDROCODONE BITARTRATE AND ACETAMINOPHEN 1 TABLET: 5; 325 TABLET ORAL at 15:17

## 2024-08-01 ASSESSMENT — PAIN - FUNCTIONAL ASSESSMENT
PAIN_FUNCTIONAL_ASSESSMENT: 0-10
PAIN_FUNCTIONAL_ASSESSMENT: ACTIVITIES ARE NOT PREVENTED

## 2024-08-01 ASSESSMENT — ENCOUNTER SYMPTOMS
PHOTOPHOBIA: 0
EYE ITCHING: 0
COUGH: 0
EYE DISCHARGE: 0
SHORTNESS OF BREATH: 0
COLOR CHANGE: 0
APNEA: 0
BACK PAIN: 0

## 2024-08-01 ASSESSMENT — PAIN DESCRIPTION - DESCRIPTORS
DESCRIPTORS: DISCOMFORT
DESCRIPTORS: PATIENT UNABLE TO DESCRIBE

## 2024-08-01 ASSESSMENT — PAIN DESCRIPTION - LOCATION
LOCATION: FOOT
LOCATION: FOOT

## 2024-08-01 ASSESSMENT — PAIN SCALES - GENERAL
PAINLEVEL_OUTOF10: 4
PAINLEVEL_OUTOF10: 5

## 2024-08-01 ASSESSMENT — PAIN DESCRIPTION - ORIENTATION
ORIENTATION: LEFT
ORIENTATION: RIGHT;LEFT

## 2024-08-01 NOTE — H&P
Ventricular lead replacement- Dr. Guy    PACEMAKER INSERTION      PACEMAKER PLACEMENT      ID RPLCMT PROST AORTIC VALVE OPEN XCP HOMOGRF/STENT N/A 2018    AORTIC VALVE REPLACEMENT TRANSESOPHAGEAL ECHOCARDIOGRAM performed by Chin Jacinto MD at [facility] OR    SPINE SURGERY          SOCIAL HISTORY:  Social History     Socioeconomic History    Marital status:      Spouse name: None    Number of children: None    Years of education: None    Highest education level: None   Tobacco Use    Smoking status: Former     Current packs/day: 0.00     Types: Cigarettes     Quit date: 1977     Years since quittin.0    Smokeless tobacco: Former   Vaping Use    Vaping Use: Never used   Substance and Sexual Activity    Alcohol use: No    Drug use: No    Sexual activity: Not Currently     Partners: Female        FAMILY HISTORY:  Family History   Problem Relation Age of Onset    Diabetes Mother     Cancer Father     Cancer Sister     Heart Disease Brother     Cancer Brother     Cancer Sister     Cancer Brother          ALLERGIES:  Allergies   Allergen Reactions    Azithromycin     Metoprolol     Cyclobenzaprine Rash    Metoclopramide Rash        PRIOR TO ADMISSION MEDS:  Not in a hospital admission.         14 point review of systems addressed with patient which is essentially negative except as specifically addressed above:    PHYSICAL EXAM:  /74   Pulse 84   Temp 98 °F (36.7 °C) (Oral)   Resp 16   Ht 1.778 m (5' 10\")   Wt 89.8 kg (198 lb)   SpO2 96%   BMI 28.41 kg/m²   No intake/output data recorded.      PHYSICAL EXAMINATION:    Vital Signs: Please see the chart   PETR:  Awake, alert, oriented x 3, patient appears tired and fatigued   Head/Eyes:  Normocephalic, atraumatic, EOMI and PERRLA bilaterally   ENT: Moist mucous membranes, nasal passages clear   Neck: Supple, full range of motion, no carotid bruit, trachea midline   Respiratory:   Bilateral fair air entry in both lung fields, mild B/L  definitive osteomyelitis.  If further evaluation is clinically indicated, MRI is recommended.  Left soft tissue swelling throughout the foot.    ______________________________________ Electronically signed by: KUMAR PRADO M.D. Date:     08/01/2024 Time:    09:27         Assessment and Plan:    Principal Problem:    Cellulitis of left foot  Active Problems:    Type 2 diabetes mellitus with hyperglycemia, with long-term current use of insulin (HCC)    Class 1 obesity due to excess calories in adult    CPAP (continuous positive airway pressure) dependence    Pacemaker  Resolved Problems:    * No resolved hospital problems. *   Principal Problem:    Cellulitis of left foot   Cefepime 2 g every 12 hours   Vancomycin per pharmacy protocol   Wound care consult   CRP and sed rate   Vascular studies lower extremities  Active Problems:    Type 2 diabetes mellitus with hyperglycemia, with long-term current use of insulin ([facility])   Medium dose insulin protocol   Accu-Chek 4 times daily  - Hypoglycemic protocol     Class 1 obesity due to excess calories in adult   Carb controlled diet    CPAP (continuous positive airway pressure) dependence   ` Continue compliance    Pacemaker   Noted  Resolved Problems:    * No resolved hospital problems. *        RITESH Lorenzana - CNP  10:39 AM 8/1/2024      DISCLAIMER: This note was created with electronic voice recognition which does have occasional errors.  If you have any questions regarding the content within the note please do not hesitate to contact me... Thanks.

## 2024-08-01 NOTE — PROGRESS NOTES
Michael Szymanski arrived to room # 330.   Presented with: CELLULITIS  Mental Status: Patient is oriented, alert, coherent, logical, thought processes intact, and able to concentrate and follow conversation.   Vitals:    08/01/24 1138   BP: (!) 158/86   Pulse: 79   Resp: 16   Temp: 97.2 °F (36.2 °C)   SpO2: 93%     Patient safety contract and falls prevention contract reviewed with patient Yes.  Oriented Patient to room.  Call light within reach. Yes.  Needs, issues or concerns expressed at this time: no.      Electronically signed by Chiqui King RN on 8/1/2024 at 11:43 AM

## 2024-08-01 NOTE — PROGRESS NOTES
Vascular lab preliminary  Bilateral arterial duplex scan completed.  Good arterial flow to both feet.  Distal left pta appears to be occluded in a small area. A branch is feeding very distal pta after occlusion.  Final report pending.

## 2024-08-01 NOTE — ED NOTES
ED TO INPATIENT SBAR HANDOFF    Patient Name: Michael Szymanski   : 1940  84 y.o.   Family/Caregiver Present: Yes  Code Status Order: Prior    C-SSRS: Risk of Suicide: No Risk  Sitter No  Restraints:         Situation  Chief Complaint:   Chief Complaint   Patient presents with    Wound Check     Left foot, red streaks     Patient Diagnosis: Cellulitis of left foot [L03.116]     Brief Description of Patient's Condition: presents with open wd to outside lt ft  Mental Status: oriented  Arrived from: home    Imaging:   XR FOOT LEFT (MIN 3 VIEWS)   Final Result   Scattered degenerative disease throughout the left foot with no osseous destruction or definitive osteomyelitis.  If further evaluation is clinically indicated, MRI is recommended.       Left soft tissue swelling throughout the foot.               ______________________________________    Electronically signed by: KUMAR PRADO M.D.   Date:     2024   Time:    09:27         COVID-19 Results:   Internal Administration   First Dose      Second Dose           Last COVID Lab SARS-CoV-2, NAAT (no units)   Date Value   2021 DETECTED (AA)           Abnormal labs:   Abnormal Labs Reviewed   CBC WITH AUTO DIFFERENTIAL - Abnormal; Notable for the following components:       Result Value    WBC 11.6 (*)     Neutrophils % 83.0 (*)     Lymphocytes % 6.7 (*)     Neutrophils Absolute 9.7 (*)     Lymphocytes Absolute 0.8 (*)     Monocytes Absolute 1.10 (*)     All other components within normal limits   COMPREHENSIVE METABOLIC PANEL W/ REFLEX TO MG FOR LOW K - Abnormal; Notable for the following components:    Sodium 133 (*)     Potassium reflex Magnesium 5.3 (*)     Chloride 93 (*)     Glucose 356 (*)     Calcium 10.3 (*)     All other components within normal limits     Background  Allergies:   Allergies   Allergen Reactions    Azithromycin     Metoprolol     Cyclobenzaprine Rash    Metoclopramide Rash     Current Medications:   Medications Administered

## 2024-08-01 NOTE — CARE COORDINATION
Case Management Assessment  Initial Evaluation    Date/Time of Evaluation: 8/1/2024 10:51 AM  Assessment Completed by: Jenn Heredia    If patient is discharged prior to next notation, then this note serves as note for discharge by case management.    Patient Name: Michael Szymanski                   YOB: 1940  Diagnosis: Cellulitis of left foot [L03.116]                   Date / Time: 8/1/2024  8:44 AM    Patient Admission Status: Inpatient   Readmission Risk (Low < 19, Mod (19-27), High > 27): No data recorded  Current PCP: Mandy Gonzalez APRN - CNP  PCP verified by CM? Yes    Chart Reviewed: Yes      History Provided by: Patient  Patient Orientation: Alert and Oriented    Patient Cognition: Alert    Hospitalization in the last 30 days (Readmission):  No    If yes, Readmission Assessment in CM Navigator will be completed.    Advance Directives:      Code Status: Prior   Patient's Primary Decision Maker is: Legal Next of Kin      Discharge Planning:    Patient lives with: Alone Type of Home: House  Primary Care Giver: Self  Patient Support Systems include: Children   Current Financial resources: Medicare  Current community resources: None  Current services prior to admission: C-pap            Current DME:              Type of Home Care services:  None    ADLS  Prior functional level: Mobility, Shopping, Housework, Assistance with the following:  Current functional level: Assistance with the following:, Mobility, Shopping, Housework    PT AM-PAC:   /24  OT AM-PAC:   /24    Family can provide assistance at DC: Yes  Would you like Case Management to discuss the discharge plan with any other family members/significant others, and if so, who? Yes  Plans to Return to Present Housing: Yes  Potential Assistance needed at discharge: N/A            Potential DME:    Patient expects to discharge to: House  Plan for transportation at discharge: Family    Financial    Payor: MEDICARE / Plan: MEDICARE PART A

## 2024-08-01 NOTE — ED PROVIDER NOTES
[facility] 3 LYRIC/VAS/MED  eMERGENCYdEPARTMENT eNCOUnter      Pt Name: Michael Szymanski  MRN: [id number]  Birthdate 1940  Date of evaluation: 8/1/2024  Provider:LAMINE Raya    CHIEF COMPLAINT       Chief Complaint   Patient presents with    Wound Check     Left foot, red streaks         HISTORY OF PRESENT ILLNESS  (Location/Symptom, Timing/Onset, Context/Setting, Quality, Duration, Modifying Factors, Severity.)   Michael Szymanski is a 84 y.o. male who presents to the emergency department with complaints of left foot injury blister sloughed off poorly controlled diabetes no necrosis but perfusion delay 4 seconds multiple toes. He hasn't stepped on something sees podiatry hx of MRSA prior hospitalization 2+ weeks from other infections. Streaking worsening 2 days.    HPI    Nursing Notes were reviewed and I agree.    REVIEW OF SYSTEMS    (2-9 systems for level 4, 10 or more for level 5)     Review of Systems   Constitutional:  Negative for activity change, appetite change, chills and fever.   HENT:  Negative for congestion and dental problem.    Eyes:  Negative for photophobia, discharge and itching.   Respiratory:  Negative for apnea, cough and shortness of breath.    Cardiovascular:  Negative for chest pain.   Musculoskeletal:  Negative for arthralgias, back pain, gait problem, myalgias and neck pain.   Skin:  Positive for wound. Negative for color change, pallor and rash.   Neurological:  Negative for dizziness, seizures and syncope.   Psychiatric/Behavioral:  Negative for agitation. The patient is not nervous/anxious.         Except as noted above the remainder of the review of systems was reviewed and negative.       PAST MEDICAL HISTORY     Past Medical History:   Diagnosis Date    Aortic valve stenosis     Arthritis     Chest tightness, discomfort, or pressure 4/30/2012    Chronic back pain     CKD (chronic kidney disease)     CPAP (continuous positive airway pressure) dependence     13cm    Diabetes (HCC)      Activity    Alcohol use: No    Drug use: No    Sexual activity: Not Currently     Partners: Female     Social Determinants of Health     Food Insecurity: No Food Insecurity (8/1/2024)    Hunger Vital Sign     Worried About Running Out of Food in the Last Year: Never true     Ran Out of Food in the Last Year: Never true   Transportation Needs: No Transportation Needs (8/1/2024)    PRAPARE - Transportation     Lack of Transportation (Medical): No     Lack of Transportation (Non-Medical): No    Social Connections ([facility])   Housing Stability: Low Risk  (8/1/2024)    Housing Stability Vital Sign     Unable to Pay for Housing in the Last Year: No     Number of Places Lived in the Last Year: 1     Unstable Housing in the Last Year: No       SCREENINGS    Francisco J Coma Scale  Eye Opening: Spontaneous  Best Verbal Response: Oriented  Best Motor Response: Obeys commands  Francisco J Coma Scale Score: 15      PHYSICAL EXAM    (up to 7 forlevel 4, 8 or more for level 5)     ED Triage Vitals [08/01/24 0849]   BP Temp Temp Source Pulse Respirations SpO2 Height Weight - Scale   128/73 98 °F (36.7 °C) Oral 86 15 95 % 1.778 m (5' 10\") 89.8 kg (198 lb)       Physical Exam  Constitutional:       General: He is not in acute distress.     Appearance: He is well-developed. He is not diaphoretic.   HENT:      Head: Normocephalic and atraumatic.      Right Ear: External ear normal.      Left Ear: External ear normal.   Eyes:      Pupils: Pupils are equal, round, and reactive to light.   Neck:      Trachea: No tracheal deviation.   Cardiovascular:      Rate and Rhythm: Normal rate and regular rhythm.      Heart sounds: Normal heart sounds. No murmur heard.  Pulmonary:      Effort: Pulmonary effort is normal.      Breath sounds: Normal breath sounds. No stridor. No wheezing.   Chest:      Chest wall: No tenderness.   Abdominal:      General: Bowel sounds are normal. There is no distension.      Palpations: Abdomen is soft.      Tenderness:

## 2024-08-02 LAB
ALBUMIN SERPL-MCNC: 4 G/DL (ref 3.5–5.2)
ALP SERPL-CCNC: 105 U/L (ref 40–130)
ALT SERPL-CCNC: 28 U/L (ref 5–41)
ANION GAP SERPL CALCULATED.3IONS-SCNC: 12 MMOL/L (ref 7–19)
AST SERPL-CCNC: 18 U/L (ref 5–40)
BACTERIA BLD CULT ORG #2: NORMAL
BACTERIA BLD CULT: NORMAL
BASOPHILS # BLD: 0 K/UL (ref 0–0.2)
BASOPHILS NFR BLD: 0.4 % (ref 0–1)
BILIRUB SERPL-MCNC: 0.7 MG/DL (ref 0.2–1.2)
BUN SERPL-MCNC: 21 MG/DL (ref 8–23)
CALCIUM SERPL-MCNC: 9.4 MG/DL (ref 8.8–10.2)
CHLORIDE SERPL-SCNC: 98 MMOL/L (ref 98–111)
CO2 SERPL-SCNC: 23 MMOL/L (ref 22–29)
CREAT SERPL-MCNC: 0.8 MG/DL (ref 0.5–1.2)
EOSINOPHIL # BLD: 0.1 K/UL (ref 0–0.6)
EOSINOPHIL NFR BLD: 0.7 % (ref 0–5)
ERYTHROCYTE [DISTWIDTH] IN BLOOD BY AUTOMATED COUNT: 13.6 % (ref 11.5–14.5)
GLUCOSE BLD-MCNC: 268 MG/DL (ref 70–99)
GLUCOSE BLD-MCNC: 272 MG/DL (ref 70–99)
GLUCOSE BLD-MCNC: 282 MG/DL (ref 70–99)
GLUCOSE BLD-MCNC: 297 MG/DL (ref 70–99)
GLUCOSE BLD-MCNC: 299 MG/DL (ref 70–99)
GLUCOSE SERPL-MCNC: 272 MG/DL (ref 74–109)
HBA1C MFR BLD: 10.4 % (ref 4–6)
HCT VFR BLD AUTO: 49.9 % (ref 42–52)
HGB BLD-MCNC: 16.8 G/DL (ref 14–18)
IMM GRANULOCYTES # BLD: 0.1 K/UL
LACTATE BLDV-SCNC: 1.4 MMOL/L (ref 0.5–1.9)
LYMPHOCYTES # BLD: 1 K/UL (ref 1.1–4.5)
LYMPHOCYTES NFR BLD: 12.6 % (ref 20–40)
MCH RBC QN AUTO: 30.4 PG (ref 27–31)
MCHC RBC AUTO-ENTMCNC: 33.7 G/DL (ref 33–37)
MCV RBC AUTO: 90.2 FL (ref 80–94)
MONOCYTES # BLD: 0.9 K/UL (ref 0–0.9)
MONOCYTES NFR BLD: 11.4 % (ref 0–10)
NEUTROPHILS # BLD: 6 K/UL (ref 1.5–7.5)
NEUTS SEG NFR BLD: 73.9 % (ref 50–65)
PERFORMED ON: ABNORMAL
PLATELET # BLD AUTO: 143 K/UL (ref 130–400)
PMV BLD AUTO: 9.7 FL (ref 9.4–12.4)
POTASSIUM SERPL-SCNC: 4.7 MMOL/L (ref 3.5–5)
PROT SERPL-MCNC: 7 G/DL (ref 6.6–8.7)
RBC # BLD AUTO: 5.53 M/UL (ref 4.7–6.1)
SODIUM SERPL-SCNC: 133 MMOL/L (ref 136–145)
WBC # BLD AUTO: 8.1 K/UL (ref 4.8–10.8)

## 2024-08-02 PROCEDURE — 6360000002 HC RX W HCPCS: Performed by: PHYSICIAN ASSISTANT

## 2024-08-02 PROCEDURE — 94760 N-INVAS EAR/PLS OXIMETRY 1: CPT

## 2024-08-02 PROCEDURE — 85025 COMPLETE CBC W/AUTO DIFF WBC: CPT

## 2024-08-02 PROCEDURE — 97161 PT EVAL LOW COMPLEX 20 MIN: CPT

## 2024-08-02 PROCEDURE — 2580000003 HC RX 258: Performed by: NURSE PRACTITIONER

## 2024-08-02 PROCEDURE — 1200000000 HC SEMI PRIVATE

## 2024-08-02 PROCEDURE — 97116 GAIT TRAINING THERAPY: CPT

## 2024-08-02 PROCEDURE — 80053 COMPREHEN METABOLIC PANEL: CPT

## 2024-08-02 PROCEDURE — 6370000000 HC RX 637 (ALT 250 FOR IP): Performed by: SURGERY

## 2024-08-02 PROCEDURE — 6370000000 HC RX 637 (ALT 250 FOR IP): Performed by: NURSE PRACTITIONER

## 2024-08-02 PROCEDURE — 83605 ASSAY OF LACTIC ACID: CPT

## 2024-08-02 PROCEDURE — 36415 COLL VENOUS BLD VENIPUNCTURE: CPT

## 2024-08-02 PROCEDURE — 2580000003 HC RX 258: Performed by: PHYSICIAN ASSISTANT

## 2024-08-02 PROCEDURE — 83036 HEMOGLOBIN GLYCOSYLATED A1C: CPT

## 2024-08-02 PROCEDURE — 82962 GLUCOSE BLOOD TEST: CPT

## 2024-08-02 RX ORDER — INSULIN GLARGINE 100 [IU]/ML
7 INJECTION, SOLUTION SUBCUTANEOUS 2 TIMES DAILY
Status: DISCONTINUED | OUTPATIENT
Start: 2024-08-02 | End: 2024-08-03

## 2024-08-02 RX ORDER — BISMUTH TRIBROMOPH/PETROLATUM 5"X9"
1 BANDAGE TOPICAL DAILY
Status: DISCONTINUED | OUTPATIENT
Start: 2024-08-02 | End: 2024-08-03 | Stop reason: HOSPADM

## 2024-08-02 RX ADMIN — ASPIRIN 81 MG: 81 TABLET, COATED ORAL at 08:36

## 2024-08-02 RX ADMIN — INSULIN LISPRO 4 UNITS: 100 INJECTION, SOLUTION INTRAVENOUS; SUBCUTANEOUS at 08:36

## 2024-08-02 RX ADMIN — ATORVASTATIN CALCIUM 40 MG: 40 TABLET, FILM COATED ORAL at 20:47

## 2024-08-02 RX ADMIN — TAMSULOSIN HYDROCHLORIDE 0.4 MG: 0.4 CAPSULE ORAL at 08:36

## 2024-08-02 RX ADMIN — TROSPIUM CHLORIDE 20 MG: 20 TABLET, FILM COATED ORAL at 05:13

## 2024-08-02 RX ADMIN — INSULIN GLARGINE 7 UNITS: 100 INJECTION, SOLUTION SUBCUTANEOUS at 20:47

## 2024-08-02 RX ADMIN — INSULIN LISPRO 4 UNITS: 100 INJECTION, SOLUTION INTRAVENOUS; SUBCUTANEOUS at 12:28

## 2024-08-02 RX ADMIN — SODIUM CHLORIDE, PRESERVATIVE FREE 10 ML: 5 INJECTION INTRAVENOUS at 22:44

## 2024-08-02 RX ADMIN — EZETIMIBE 10 MG: 10 TABLET ORAL at 08:36

## 2024-08-02 RX ADMIN — APIXABAN 5 MG: 5 TABLET, FILM COATED ORAL at 08:36

## 2024-08-02 RX ADMIN — HYDROCODONE BITARTRATE AND ACETAMINOPHEN 1 TABLET: 5; 325 TABLET ORAL at 17:36

## 2024-08-02 RX ADMIN — PRAMIPEXOLE DIHYDROCHLORIDE 1.5 MG: 1 TABLET ORAL at 20:46

## 2024-08-02 RX ADMIN — Medication 1 EACH: at 15:16

## 2024-08-02 RX ADMIN — SODIUM CHLORIDE, PRESERVATIVE FREE 10 ML: 5 INJECTION INTRAVENOUS at 08:48

## 2024-08-02 RX ADMIN — PRAMIPEXOLE DIHYDROCHLORIDE 1.5 MG: 1 TABLET ORAL at 08:36

## 2024-08-02 RX ADMIN — CITALOPRAM HYDROBROMIDE 20 MG: 20 TABLET ORAL at 08:36

## 2024-08-02 RX ADMIN — PANTOPRAZOLE SODIUM 40 MG: 40 TABLET, DELAYED RELEASE ORAL at 05:13

## 2024-08-02 RX ADMIN — VANCOMYCIN HYDROCHLORIDE 1500 MG: 10 INJECTION, POWDER, LYOPHILIZED, FOR SOLUTION INTRAVENOUS at 08:39

## 2024-08-02 RX ADMIN — TROSPIUM CHLORIDE 20 MG: 20 TABLET, FILM COATED ORAL at 17:31

## 2024-08-02 RX ADMIN — INSULIN LISPRO 4 UNITS: 100 INJECTION, SOLUTION INTRAVENOUS; SUBCUTANEOUS at 17:31

## 2024-08-02 RX ADMIN — SODIUM CHLORIDE: 9 INJECTION, SOLUTION INTRAVENOUS at 20:51

## 2024-08-02 RX ADMIN — COLLAGENASE SANTYL: 250 OINTMENT TOPICAL at 15:16

## 2024-08-02 RX ADMIN — INSULIN GLARGINE 7 UNITS: 100 INJECTION, SOLUTION SUBCUTANEOUS at 08:36

## 2024-08-02 RX ADMIN — ATENOLOL 25 MG: 50 TABLET ORAL at 08:35

## 2024-08-02 RX ADMIN — CEFEPIME 2000 MG: 2 INJECTION, POWDER, FOR SOLUTION INTRAVENOUS at 11:00

## 2024-08-02 RX ADMIN — HYDROCODONE BITARTRATE AND ACETAMINOPHEN 1 TABLET: 5; 325 TABLET ORAL at 02:58

## 2024-08-02 RX ADMIN — PRAMIPEXOLE DIHYDROCHLORIDE 1.5 MG: 1 TABLET ORAL at 15:14

## 2024-08-02 RX ADMIN — FINASTERIDE 5 MG: 5 TABLET, FILM COATED ORAL at 08:36

## 2024-08-02 RX ADMIN — APIXABAN 5 MG: 5 TABLET, FILM COATED ORAL at 20:47

## 2024-08-02 RX ADMIN — CEFEPIME 2000 MG: 2 INJECTION, POWDER, FOR SOLUTION INTRAVENOUS at 20:52

## 2024-08-02 ASSESSMENT — PAIN DESCRIPTION - DESCRIPTORS
DESCRIPTORS: ACHING
DESCRIPTORS: THROBBING

## 2024-08-02 ASSESSMENT — PAIN - FUNCTIONAL ASSESSMENT
PAIN_FUNCTIONAL_ASSESSMENT: PREVENTS OR INTERFERES SOME ACTIVE ACTIVITIES AND ADLS
PAIN_FUNCTIONAL_ASSESSMENT: PREVENTS OR INTERFERES SOME ACTIVE ACTIVITIES AND ADLS

## 2024-08-02 ASSESSMENT — PAIN DESCRIPTION - LOCATION
LOCATION: LEG
LOCATION: FOOT

## 2024-08-02 ASSESSMENT — PAIN SCALES - GENERAL
PAINLEVEL_OUTOF10: 8
PAINLEVEL_OUTOF10: 8

## 2024-08-02 ASSESSMENT — PAIN DESCRIPTION - ORIENTATION
ORIENTATION: RIGHT;LEFT
ORIENTATION: LEFT

## 2024-08-02 NOTE — PROGRESS NOTES
Physical Therapy  Facility/Department: [facility] 3 LYRIC/VAS/MED  Physical Therapy Initial Assessment    Name: Michael Szymanski  : 1940  MRN: [id number]  Date of Service: 2024    Discharge Recommendations:  Continue to assess pending progress, 24 hour supervision or assist          Patient Diagnosis(es): The primary encounter diagnosis was Cellulitis of left foot. A diagnosis of Cellulitis of left lower extremity was also pertinent to this visit.  Past Medical History:  has a past medical history of Aortic valve stenosis, Arthritis, Chest tightness, discomfort, or pressure, Chronic back pain, CKD (chronic kidney disease), CPAP (continuous positive airway pressure) dependence, Diabetes (HCC), Diabetic polyneuropathy associated with type 2 diabetes mellitus (HCC), GERD (gastroesophageal reflux disease), HTN (hypertension), Hyperlipemia, Left ventricular diastolic dysfunction, Mitral stenosis, Mitral valve stenosis, Neuropathy, Obstructive sleep apnea, Pinched nerve in neck, and Restless legs syndrome.  Past Surgical History:  has a past surgical history that includes Cardiac catheterization (12); Spine surgery; Cholecystectomy; Cataract removal; Appendectomy; pacemaker placement; Bladder surgery; back surgery; pr rplcmt prost aortic valve open xcp homogrf/stent (N/A, 2018); Colonoscopy; eye surgery; Pacemaker insertion; other surgical history (2020); and Hand surgery (Left, 2020).    Assessment   Body Structures, Functions, Activity Limitations Requiring Skilled Therapeutic Intervention: Decreased functional mobility ;Decreased ADL status;Decreased ROM;Decreased strength;Decreased endurance;Decreased balance;Increased pain  Assessment: Pt ABLE TO AMB SHORT DISTANCE IN ROOM WITH CANE. WILL PROGRESS AS TOLERATED, WATCHING ANY PRECAUTIONS FOR FOOT WOUNDS.  Requires PT Follow-Up: Yes  Activity Tolerance  Activity Tolerance: Patient tolerated treatment well     Plan   Physical Therapy Plan  General Plan:  Supervision  Sit to Supine: Supervision  Transfers  Sit to Stand: Stand by assistance;Contact guard assistance  Stand to Sit: Stand by assistance;Contact guard assistance  Bed to Chair: Stand by assistance;Contact guard assistance  Ambulation  Device: Single point cane  Assistance: Contact guard assistance  Quality of Gait: SLIGHTLY UNSTEADY WITH TURNS BUT NO LOB  Gait Deviations: Slow Judith;Decreased step length  Distance: 15'     Balance  Sitting - Dynamic: Good  Standing - Dynamic: Fair           OutComes Score                                                  AM-PAC - Mobility              Tinneti Score       Goals  Short Term Goals  Time Frame for Short Term Goals: 14 DAYS  Short Term Goal 1: BED MOB INDEPENDENT  Short Term Goal 2: TRANSFERS SUPERVISION  Short Term Goal 3: ' CANE SUPERVISION       Education  Patient Education  Education Given To: Patient;Family  Education Provided: Role of Therapy;Plan of Care  Barriers to Learning: None      Therapy Time   Individual Concurrent Group Co-treatment   Time In           Time Out           Minutes                   Melissa Kumar, PT

## 2024-08-02 NOTE — PROGRESS NOTES
4 Eyes Skin Assessment     NAME:  Michael Szymanski  YOB: 1940  MEDICAL RECORD NUMBER:  [id number]    The patient is being assessed for  Admission    I agree that at least one RN has performed a thorough Head to Toe Skin Assessment on the patient. ALL assessment sites listed below have been assessed.      Areas assessed by both nurses:    Head, Face, Ears, Shoulders, Back, Chest, Arms, Elbows, Hands, Sacrum. Buttock, Coccyx, Ischium, Legs. Feet and Heels, and Under Medical Devices         Does the Patient have a Wound? Yes wound(s) were present on assessment. LDA wound assessment was Initiated and completed by RN  No pressure injuries, cellulities to BLE       Johnny Prevention initiated by RN: No  Wound Care Orders initiated by RN: No    Pressure Injury (Stage 3,4, Unstageable, DTI, NWPT, and Complex wounds) if present, place Wound referral order by RN under : No    New Ostomies, if present place, Ostomy referral order under : No     Nurse 1 eSignature: Electronically signed by Chiqui King RN on 8/1/24 at 8:04 PM CDT    **SHARE this note so that the co-signing nurse can place an eSignature**    Nurse 2 eSignature: Electronically signed by Mayra Duffy RN on 8/1/24 at 8:05 PM CDT

## 2024-08-02 NOTE — PROGRESS NOTES
Back surgeries    BLADDER SURGERY      CARDIAC CATHETERIZATION  12    EF > 50%    CATARACT REMOVAL      CHOLECYSTECTOMY      COLONOSCOPY      EYE SURGERY      implants placed both eyes    HAND SURGERY Left 2020    INCISION AND DRAINAGE LEFT HAND ABSCESS performed by Jean Pierre Bellamy MD at [facility] OR    OTHER SURGICAL HISTORY  2020    Right Ventricular lead replacement- Dr. Guy    PACEMAKER INSERTION      PACEMAKER PLACEMENT      OK RPLCMT PROST AORTIC VALVE OPEN XCP HOMOGRF/STENT N/A 2018    AORTIC VALVE REPLACEMENT TRANSESOPHAGEAL ECHOCARDIOGRAM performed by Chin Jacinto MD at [facility] OR    SPINE SURGERY         FAMILY HISTORY    Family History   Problem Relation Age of Onset    Diabetes Mother     Cancer Father     Cancer Sister     Heart Disease Brother     Cancer Brother     Cancer Sister     Cancer Brother        SOCIAL HISTORY    Social History     Tobacco Use    Smoking status: Former     Current packs/day: 0.00     Types: Cigarettes     Quit date: 1977     Years since quittin.0    Smokeless tobacco: Former   Vaping Use    Vaping Use: Never used   Substance Use Topics    Alcohol use: No    Drug use: No       ALLERGIES    Allergies   Allergen Reactions    Azithromycin     Metoprolol     Cyclobenzaprine Rash    Metoclopramide Rash       MEDICATIONS    No current facility-administered medications on file prior to encounter.     Current Outpatient Medications on File Prior to Encounter   Medication Sig Dispense Refill    TRULICITY 1.5 MG/0.5ML SC injection Inject 0.5 mLs into the skin once a week      atorvastatin (LIPITOR) 40 MG tablet TAKE ONE TABLET BY MOUTH NIGHTLY 30 tablet 5    vibegron (GEMTESA) 75 MG TABS tablet Take 1 tablet by mouth daily      apixaban (ELIQUIS) 5 MG TABS tablet TAKE 1 TABLET BY MOUTH 2 TIMES DAILY 60 tablet 11    ondansetron (ZOFRAN) 4 MG tablet Take 1 tablet by mouth 3 times daily as needed for Nausea or Vomiting 15 tablet 0    finasteride (PROSCAR) 5 MG    Wound 08/01/24 Foot Right (Active)   Wound Image     08/01/24 1300   Wound Cleansed Soap and water 08/02/24 0848   Dressing/Treatment Open to air 08/02/24 0848   Number of days: 0       Wound 08/01/24 Left (Active)   Wound Image      08/01/24 1300   Wound Cleansed Soap and water 08/02/24 0848   Dressing/Treatment Open to air 08/02/24 0848   Number of days: 0     Incision 09/14/20 Finger (Comment which one) Left (Active)   Number of days: 1418       Incision 09/14/20 Hand Left;Posterior (Active)   Number of days: 1418         Response to treatment:  Well tolerated by patient.     Pain Assessment:  Severity:  0 / 10  Quality of pain: N/A  Wound Pain Timing/Severity: none  Premedicated: N/A    Plan   Plan of Care: Wound 08/01/24 Foot Right-Dressing/Treatment: Open to air  Wound 08/01/24 Left-Dressing/Treatment: Open to air    Specialty Bed Required : N/A   [] Low Air Loss   [] Pressure Redistribution  [] Fluid Immersion  [] Bariatric  [] Total Pressure Relief  [] Other:     Current Diet: ADULT DIET; Regular; 4 carb choices (60 gm/meal); Low Potassium (Less than 3000 mg/day)  Dietician consult:  N/A    Discharge Plan:  Placement for patient upon discharge: home with support    Patient appropriate for Outpatient Wound Care Center: N/A    Referrals:  []   [] Home Health Care  [] Supplies  [] Other    Patient/Caregiver Teaching:  Level of patient/caregiver understanding able to:   [] Indicates understanding       [] Needs reinforcement  [] Unsuccessful      [] Verbal Understanding  [] Demonstrated understanding       [] No evidence of learning  [] Refused teaching         [] N/A       Electronically signed by   Nenita Leal RN 8/2/2024

## 2024-08-02 NOTE — PLAN OF CARE
Problem: Discharge Planning  Goal: Discharge to home or other facility with appropriate resources  Outcome: Progressing  Flowsheets  Taken 8/1/2024 2205 by Nadine Perez RN  Discharge to home or other facility with appropriate resources:   Identify barriers to discharge with patient and caregiver   Arrange for needed discharge resources and transportation as appropriate   Identify discharge learning needs (meds, wound care, etc)   Arrange for interpreters to assist at discharge as needed   Refer to discharge planning if patient needs post-hospital services based on physician order or complex needs related to functional status, cognitive ability or social support system  Taken 8/1/2024 1300 by Chiqui King RN  Discharge to home or other facility with appropriate resources:   Identify barriers to discharge with patient and caregiver   Arrange for needed discharge resources and transportation as appropriate   Identify discharge learning needs (meds, wound care, etc)     Problem: Pain  Goal: Verbalizes/displays adequate comfort level or baseline comfort level  Outcome: Progressing     Problem: ABCDS Injury Assessment  Goal: Absence of physical injury  Outcome: Progressing     Problem: Safety - Adult  Goal: Free from fall injury  Outcome: Progressing     Problem: Chronic Conditions and Co-morbidities  Goal: Patient's chronic conditions and co-morbidity symptoms are monitored and maintained or improved  Outcome: Progressing  Flowsheets  Taken 8/1/2024 2205 by Nadine Perez, RN  Care Plan - Patient's Chronic Conditions and Co-Morbidity Symptoms are Monitored and Maintained or Improved:   Monitor and assess patient's chronic conditions and comorbid symptoms for stability, deterioration, or improvement   Collaborate with multidisciplinary team to address chronic and comorbid conditions and prevent exacerbation or deterioration   Update acute care plan with appropriate goals if chronic or comorbid symptoms are exacerbated

## 2024-08-02 NOTE — PROGRESS NOTES
CREATININE 1.0 0.8   GLUCOSE 356* 272*     Recent Labs     08/01/24  0930 08/02/24  0218   AST 22 18   ALT 35 28   BILITOT 1.2 0.7   ALKPHOS 124 105     Troponin T: No results for input(s): \"TROPONINI\" in the last 72 hours.  Pro-BNP: No results for input(s): \"BNP\" in the last 72 hours.  INR: No results for input(s): \"INR\" in the last 72 hours.  UA:No results for input(s): \"NITRITE\", \"COLORU\", \"PHUR\", \"LABCAST\", \"WBCUA\", \"RBCUA\", \"MUCUS\", \"TRICHOMONAS\", \"YEAST\", \"BACTERIA\", \"CLARITYU\", \"SPECGRAV\", \"LEUKOCYTESUR\", \"UROBILINOGEN\", \"BILIRUBINUR\", \"BLOODU\", \"GLUCOSEU\", \"AMORPHOUS\" in the last 72 hours.    Invalid input(s): \"KETONESU\"  A1C: No results for input(s): \"LABA1C\" in the last 72 hours.  ABG:No results for input(s): \"PHART\", \"DOF8CKU\", \"PO2ART\", \"VPF6SKJ\", \"BEART\", \"HGBAE\", \"T5OEYTXI\", \"CARBOXHGBART\" in the last 72 hours.    RAD:   Vascular duplex lower extremity arteries bilateral    Result Date: 8/1/2024  No significant arterial disease of right lower extremity Distal left posterior tibial artery appears to be occluded in a small area. A branch is feeding very distal pta after occlusion.     XR FOOT LEFT (MIN 3 VIEWS)    Result Date: 8/1/2024    EXAM:  THREE VIEWS OF THE LEFT FOOT  HISTORY: Concern for osteomyelitis with soft tissue wound.  COMPARISON:  None  FINDINGS: There is scattered osteophyte formation of the left foot.  There is a plantar heel spur.  There is no lytic or blastic lesion.  There is soft tissue edema and atherosclerotic disease.  There is narrowing and osteophyte formation of the first MTP joint.      Scattered degenerative disease throughout the left foot with no osseous destruction or definitive osteomyelitis.  If further evaluation is clinically indicated, MRI is recommended.  Left soft tissue swelling throughout the foot.    ______________________________________ Electronically signed by: KUMAR PRADO M.D. Date:     08/01/2024 Time:    09:27             Assessment/Plan   Principal  Problem:    Cellulitis of left foot  Active Problems:    Type 2 diabetes mellitus with hyperglycemia, with long-term current use of insulin (HCC)    Class 1 obesity due to excess calories in adult    Essential hypertension    CPAP (continuous positive airway pressure) dependence    BiPAP (biphasic positive airway pressure) dependence    Pacemaker  Resolved Problems:    * No resolved hospital problems. *    Principal Problem:    Cellulitis of left foot  Active Problems:    Type 2 diabetes mellitus with hyperglycemia, with long-term current use of insulin (HCC)    Class 1 obesity due to excess calories in adult    Essential hypertension    CPAP (continuous positive airway pressure) dependence    BiPAP (biphasic positive airway pressure) dependence    Pacemaker  Resolved Problems:    * No resolved hospital problems. *    Antibiotic: vancomycin  maxipime     DVT Prophylaxis: eliquis     GI prophylaxis:  protonix     Discharge planning: tbd       Further Orders per Clinical course/attending.     Electronically signed by RITESH Lorenzana CNP on 8/2/2024 at 2:10 PM       EMR Dragon/Transcription disclaimer:   Much of this encounter note is an electronic transcription/translation of spoken language to printed text. The electronic translation of spoken language may permit erroneous, or at times, nonsensical words or phrases to be inadvertently transcribed; although attempts have made to review the note for such errors, some may still exist.

## 2024-08-02 NOTE — PROGRESS NOTES
Physician Progress Note      PATIENT:               CUONG LU  CSN #:                  [id number]  :                       1940  ADMIT DATE:       2024 8:44 AM  DISCH DATE:  RESPONDING  PROVIDER #:        AZALEA BETANCOURT          QUERY TEXT:    Pt admitted with left foot cellulitis. Pt noted to have type 2 diabetes with   hyperglycemia. If possible, please document in progress notes and discharge   summary the relationship, if any, between cellulitis and DM.    The medical record reflects the following:  Risk Factors: foot wound, cellulitis, type 2 diabetes with hyperglycemia  Clinical Indicators: H&P- Presented to ED with left foot redness. Had a   blister on his foot that burst a few days ago. Developed redness and swelling.   Today, he has streaking up his leg to the knee. He endorses that his diabetes   is not in good control. Active problems: Cellulitis of left foot. Type 2   diabetes mellitus with hyperglycemia, with long term current use of insulin.  Treatment: Cefepime, Vancomycin, Humalog, wound care consult, laboratory   studies, vascular studies, accu-checks    Thank you,  Bernie Whiplpe, MSN, RN  Clinical Documentation Integrity  [phone]  Options provided:  -- Left foot cellulitis associated with Diabetes  -- Left foot cellulitis unrelated to Diabetes  -- Other - I will add my own diagnosis  -- Disagree - Not applicable / Not valid  -- Disagree - Clinically unable to determine / Unknown  -- Refer to Clinical Documentation Reviewer    PROVIDER RESPONSE TEXT:    Left foot cellulitis associated with Diabetes.    Query created by: Bernie Whipple on 2024 5:50 PM      Electronically signed by:  AZALEA BETANCOURT 2024 3:17 PM

## 2024-08-03 VITALS
BODY MASS INDEX: 28.35 KG/M2 | SYSTOLIC BLOOD PRESSURE: 136 MMHG | HEART RATE: 76 BPM | TEMPERATURE: 98.2 F | OXYGEN SATURATION: 96 % | DIASTOLIC BLOOD PRESSURE: 81 MMHG | WEIGHT: 198 LBS | RESPIRATION RATE: 16 BRPM | HEIGHT: 70 IN

## 2024-08-03 LAB
ALBUMIN SERPL-MCNC: 4 G/DL (ref 3.5–5.2)
ALP SERPL-CCNC: 103 U/L (ref 40–130)
ALT SERPL-CCNC: 28 U/L (ref 5–41)
ANION GAP SERPL CALCULATED.3IONS-SCNC: 11 MMOL/L (ref 7–19)
AST SERPL-CCNC: 18 U/L (ref 5–40)
BASOPHILS # BLD: 0 K/UL (ref 0–0.2)
BASOPHILS NFR BLD: 0.3 % (ref 0–1)
BILIRUB SERPL-MCNC: 0.6 MG/DL (ref 0.2–1.2)
BUN SERPL-MCNC: 20 MG/DL (ref 8–23)
CALCIUM SERPL-MCNC: 8.9 MG/DL (ref 8.8–10.2)
CHLORIDE SERPL-SCNC: 97 MMOL/L (ref 98–111)
CO2 SERPL-SCNC: 23 MMOL/L (ref 22–29)
CREAT SERPL-MCNC: 0.8 MG/DL (ref 0.5–1.2)
EOSINOPHIL # BLD: 0.1 K/UL (ref 0–0.6)
EOSINOPHIL NFR BLD: 1.9 % (ref 0–5)
ERYTHROCYTE [DISTWIDTH] IN BLOOD BY AUTOMATED COUNT: 13.7 % (ref 11.5–14.5)
GLUCOSE BLD-MCNC: 292 MG/DL (ref 70–99)
GLUCOSE BLD-MCNC: 301 MG/DL (ref 70–99)
GLUCOSE SERPL-MCNC: 264 MG/DL (ref 74–109)
HCT VFR BLD AUTO: 47.7 % (ref 42–52)
HGB BLD-MCNC: 16.2 G/DL (ref 14–18)
IMM GRANULOCYTES # BLD: 0.1 K/UL
LYMPHOCYTES # BLD: 0.8 K/UL (ref 1.1–4.5)
LYMPHOCYTES NFR BLD: 11.7 % (ref 20–40)
MCH RBC QN AUTO: 31.4 PG (ref 27–31)
MCHC RBC AUTO-ENTMCNC: 34 G/DL (ref 33–37)
MCV RBC AUTO: 92.4 FL (ref 80–94)
MONOCYTES # BLD: 0.8 K/UL (ref 0–0.9)
MONOCYTES NFR BLD: 11 % (ref 0–10)
NEUTROPHILS # BLD: 5.2 K/UL (ref 1.5–7.5)
NEUTS SEG NFR BLD: 74.4 % (ref 50–65)
PERFORMED ON: ABNORMAL
PERFORMED ON: ABNORMAL
PLATELET # BLD AUTO: 147 K/UL (ref 130–400)
PMV BLD AUTO: 10.5 FL (ref 9.4–12.4)
POTASSIUM SERPL-SCNC: 4.4 MMOL/L (ref 3.5–5)
PROT SERPL-MCNC: 6.2 G/DL (ref 6.6–8.7)
RBC # BLD AUTO: 5.16 M/UL (ref 4.7–6.1)
SODIUM SERPL-SCNC: 131 MMOL/L (ref 136–145)
VANCOMYCIN TROUGH SERPL-MCNC: 6.2 UG/ML (ref 10–20)
WBC # BLD AUTO: 6.9 K/UL (ref 4.8–10.8)

## 2024-08-03 PROCEDURE — 36415 COLL VENOUS BLD VENIPUNCTURE: CPT

## 2024-08-03 PROCEDURE — 85025 COMPLETE CBC W/AUTO DIFF WBC: CPT

## 2024-08-03 PROCEDURE — 80202 ASSAY OF VANCOMYCIN: CPT

## 2024-08-03 PROCEDURE — 6370000000 HC RX 637 (ALT 250 FOR IP): Performed by: NURSE PRACTITIONER

## 2024-08-03 PROCEDURE — 6360000002 HC RX W HCPCS: Performed by: PHYSICIAN ASSISTANT

## 2024-08-03 PROCEDURE — 80053 COMPREHEN METABOLIC PANEL: CPT

## 2024-08-03 PROCEDURE — 2580000003 HC RX 258: Performed by: PHYSICIAN ASSISTANT

## 2024-08-03 PROCEDURE — 2580000003 HC RX 258: Performed by: NURSE PRACTITIONER

## 2024-08-03 PROCEDURE — 94760 N-INVAS EAR/PLS OXIMETRY 1: CPT

## 2024-08-03 PROCEDURE — 82962 GLUCOSE BLOOD TEST: CPT

## 2024-08-03 RX ORDER — CEPHALEXIN 500 MG/1
500 CAPSULE ORAL 4 TIMES DAILY
Qty: 40 CAPSULE | Refills: 0 | Status: SHIPPED | OUTPATIENT
Start: 2024-08-03 | End: 2024-08-03

## 2024-08-03 RX ORDER — BISMUTH TRIBROMOPH/PETROLATUM 5"X9"
1 BANDAGE TOPICAL DAILY
Qty: 30 EACH | Refills: 0 | Status: SHIPPED | OUTPATIENT
Start: 2024-08-03 | End: 2024-08-03

## 2024-08-03 RX ORDER — CEPHALEXIN 500 MG/1
500 CAPSULE ORAL 4 TIMES DAILY
Qty: 40 CAPSULE | Refills: 0 | Status: SHIPPED | OUTPATIENT
Start: 2024-08-03 | End: 2024-08-13

## 2024-08-03 RX ORDER — INSULIN GLARGINE 100 [IU]/ML
10 INJECTION, SOLUTION SUBCUTANEOUS 2 TIMES DAILY
Status: DISCONTINUED | OUTPATIENT
Start: 2024-08-03 | End: 2024-08-03 | Stop reason: HOSPADM

## 2024-08-03 RX ORDER — INSULIN GLARGINE 100 [IU]/ML
10 INJECTION, SOLUTION SUBCUTANEOUS 2 TIMES DAILY
Qty: 1 EACH | Refills: 1 | Status: SHIPPED
Start: 2024-08-03

## 2024-08-03 RX ORDER — BISMUTH TRIBROMOPH/PETROLATUM 5"X9"
1 BANDAGE TOPICAL DAILY
Qty: 30 EACH | Refills: 0 | Status: SHIPPED | OUTPATIENT
Start: 2024-08-03 | End: 2024-09-02

## 2024-08-03 RX ADMIN — EZETIMIBE 10 MG: 10 TABLET ORAL at 08:44

## 2024-08-03 RX ADMIN — ASPIRIN 81 MG: 81 TABLET, COATED ORAL at 08:44

## 2024-08-03 RX ADMIN — FINASTERIDE 5 MG: 5 TABLET, FILM COATED ORAL at 08:44

## 2024-08-03 RX ADMIN — INSULIN LISPRO 2 UNITS: 100 INJECTION, SOLUTION INTRAVENOUS; SUBCUTANEOUS at 08:41

## 2024-08-03 RX ADMIN — TAMSULOSIN HYDROCHLORIDE 0.4 MG: 0.4 CAPSULE ORAL at 08:44

## 2024-08-03 RX ADMIN — APIXABAN 5 MG: 5 TABLET, FILM COATED ORAL at 08:44

## 2024-08-03 RX ADMIN — PRAMIPEXOLE DIHYDROCHLORIDE 1.5 MG: 1 TABLET ORAL at 08:42

## 2024-08-03 RX ADMIN — PANTOPRAZOLE SODIUM 40 MG: 40 TABLET, DELAYED RELEASE ORAL at 05:27

## 2024-08-03 RX ADMIN — HYDROCODONE BITARTRATE AND ACETAMINOPHEN 1 TABLET: 5; 325 TABLET ORAL at 10:10

## 2024-08-03 RX ADMIN — ATENOLOL 25 MG: 50 TABLET ORAL at 08:44

## 2024-08-03 RX ADMIN — INSULIN GLARGINE 10 UNITS: 100 INJECTION, SOLUTION SUBCUTANEOUS at 08:42

## 2024-08-03 RX ADMIN — CEFEPIME 2000 MG: 2 INJECTION, POWDER, FOR SOLUTION INTRAVENOUS at 08:37

## 2024-08-03 RX ADMIN — SODIUM CHLORIDE, PRESERVATIVE FREE 10 ML: 5 INJECTION INTRAVENOUS at 08:41

## 2024-08-03 RX ADMIN — TROSPIUM CHLORIDE 20 MG: 20 TABLET, FILM COATED ORAL at 05:27

## 2024-08-03 RX ADMIN — CITALOPRAM HYDROBROMIDE 20 MG: 20 TABLET ORAL at 08:44

## 2024-08-03 ASSESSMENT — PAIN DESCRIPTION - LOCATION
LOCATION: FOOT
LOCATION: FOOT

## 2024-08-03 ASSESSMENT — PAIN DESCRIPTION - ORIENTATION
ORIENTATION: LEFT
ORIENTATION: LEFT

## 2024-08-03 ASSESSMENT — PAIN SCALES - GENERAL
PAINLEVEL_OUTOF10: 6
PAINLEVEL_OUTOF10: 3

## 2024-08-03 ASSESSMENT — PAIN DESCRIPTION - DESCRIPTORS
DESCRIPTORS: DISCOMFORT
DESCRIPTORS: DISCOMFORT

## 2024-08-03 NOTE — PLAN OF CARE
Problem: Discharge Planning  Goal: Discharge to home or other facility with appropriate resources  8/3/2024 1058 by Neelima Andrade RN  Outcome: Progressing  8/3/2024 0052 by Nadine Perez RN  Outcome: Progressing     Problem: Pain  Goal: Verbalizes/displays adequate comfort level or baseline comfort level  8/3/2024 1058 by Neelima Andrade RN  Outcome: Progressing  8/3/2024 0052 by Nadine Perez RN  Outcome: Progressing     Problem: ABCDS Injury Assessment  Goal: Absence of physical injury  8/3/2024 1058 by Neelima Andrade RN  Outcome: Progressing  8/3/2024 0052 by Nadine Perez RN  Outcome: Progressing     Problem: Safety - Adult  Goal: Free from fall injury  8/3/2024 1058 by Neelima Andrade RN  Outcome: Progressing  8/3/2024 0052 by Nadine Peerz RN  Outcome: Progressing     Problem: Chronic Conditions and Co-morbidities  Goal: Patient's chronic conditions and co-morbidity symptoms are monitored and maintained or improved  8/3/2024 1058 by Neelima Andrade RN  Outcome: Progressing  8/3/2024 0052 by Nadine Perez RN  Outcome: Progressing

## 2024-08-03 NOTE — PROGRESS NOTES
[facility]      Progress Note    Patient:  Michael Szymanski  YOB: 1940  Date of Service: 8/3/2024  MRN: [id number]   Acct: [id number]   Primary Care Physician: Mandy Gonzalez APRN - CNP  Advance Directive: Full Code  Admit Date: 8/1/2024       Hospital Day: 2    Portions of this note have been copied forward, however, updated to reflect the most current clinical status of this patient.     CHIEF COMPLAINT left foot infection    SUBJECTIVE:  feels better today.  Still some pain but improved      CUMULATIVE HOSPITAL COURSE:    Michael Szymanski is an 84 y.o. male with PMH of CAD, T2DM, chronic back pain, HTN, Mitral valve replacement, SEBASTIEN, Restless leg.  Patient presented to the emergency room on date of admission complaining of left foot redness.  He had a blister on the top of his foot burst a few days ago he developed redness and swelling.  Today he has had streaking up his leg up to the knee.  He denies fever or drainage.  He does endorse that his diabetes is not in good control.  Patient also has a wound between his third and fourth finger on the left hand that is weeping.  Streaking in his leg has receded today.  Blood sugars somewhat better but still needs tighter glycemic control.  Lantus added 7 units twice daily.            Objective:   VITALS:  /63   Pulse 70   Temp 97.7 °F (36.5 °C) (Temporal)   Resp 18   Ht 1.778 m (5' 10\")   Wt 89.8 kg (198 lb)   SpO2 92%   BMI 28.41 kg/m²   24HR INTAKE/OUTPUT:  No intake or output data in the 24 hours ending 08/03/24 0801    \    Physical Exam  Vitals and nursing note reviewed.   Constitutional:       Appearance: He is ill-appearing.   HENT:      Head: Normocephalic and atraumatic.      Nose: Nose normal.      Mouth/Throat:      Mouth: Mucous membranes are moist.   Eyes:      Pupils: Pupils are equal, round, and reactive to light.   Cardiovascular:      Rate and Rhythm: Normal rate and regular rhythm.   Pulmonary:      Effort: Pulmonary

## 2024-08-03 NOTE — DISCHARGE INSTRUCTIONS
WOUND CARE LEFT HAND AND LEFT FOOT:    APPLY XEROFORM BETWEEN 1ST AND 2ND FINGERS ON LEFT HAND AND GREAT TOE AND 2 ND TOE ON LEFT FOOT.    LEFT GREAT TOE DIABETIC ULCER:  CLEANSE SKIN WITH SOAP AND WATER, ALLOW TO DRY.  APPLY SANTYL, NICKEL THICK, TO WOUND BED.  COVER WITH SALINE SOAKED GAUZE AND VASELINE GAUZE.  COVER WITH 4x4 FLUFF AND SECURE WITH ROLL GAUZE.    FOLLOW UP WITH WOUND CARE CLINIC AND DR. CONKLIN AS SCHEDULED.

## 2024-08-03 NOTE — DISCHARGE SUMMARY
Discharge Summary    NAME: Michael Szymanski  :  1940  MRN:  [id number]    Admit date:  2024  Discharge date:  2024    Admitting Physician:  Jorge Velasco MD    Advance Directive: Full Code    Consults: none    Primary Care Physician:  Mandy Gonzalez, APRN - CNP    Discharge Diagnoses:  Principal Problem:    Cellulitis of left foot  Active Problems:    Type 2 diabetes mellitus with hyperglycemia, with long-term current use of insulin (HCC)    Class 1 obesity due to excess calories in adult    Essential hypertension    CPAP (continuous positive airway pressure) dependence    BiPAP (biphasic positive airway pressure) dependence    Pacemaker  Resolved Problems:    * No resolved hospital problems. *      Significant Diagnostic Studies:   Vascular duplex lower extremity arteries bilateral    Result Date: 2024  No significant arterial disease of right lower extremity Distal left posterior tibial artery appears to be occluded in a small area. A branch is feeding very distal pta after occlusion.     XR FOOT LEFT (MIN 3 VIEWS)    Result Date: 2024    EXAM:  THREE VIEWS OF THE LEFT FOOT  HISTORY: Concern for osteomyelitis with soft tissue wound.  COMPARISON:  None  FINDINGS: There is scattered osteophyte formation of the left foot.  There is a plantar heel spur.  There is no lytic or blastic lesion.  There is soft tissue edema and atherosclerotic disease.  There is narrowing and osteophyte formation of the first MTP joint.      Scattered degenerative disease throughout the left foot with no osseous destruction or definitive osteomyelitis.  If further evaluation is clinically indicated, MRI is recommended.  Left soft tissue swelling throughout the foot.    ______________________________________ Electronically signed by: JORGE PRADO M.D. Date:     2024 Time:    09:27       Pertinent Labs:   CBC:  Recent Labs     24  0930 24  0218 24  0221   WBC 11.6* 8.1 6.9   HGB 17.0 16.8

## 2024-08-03 NOTE — PROGRESS NOTES
[facility]   Pharmacy Pharmacokinetic Monitoring Service - Vancomycin    Consulting Provider: Noble RAMAN   Indication: SSTI  Target Concentration: Goal AUC/BARBARA 400-600 mg*hr/L  Day of Therapy: 3  Additional Antimicrobials: Cefepime    Pertinent Laboratory Values:   Wt Readings from Last 1 Encounters:   08/01/24 89.8 kg (198 lb)     Temp Readings from Last 1 Encounters:   08/03/24 98.2 °F (36.8 °C) (Oral)     Estimated Creatinine Clearance: 76 mL/min (based on SCr of 0.8 mg/dL).  Recent Labs     08/02/24  0218 08/03/24  0221   CREATININE 0.8 0.8   BUN 21 20   WBC 8.1 6.9     Procalcitonin: 08/01/24 (3.30)    Pertinent Cultures:  Culture Date Source Results   08/01/24 Blood x 2 No growth   MRSA Nasal Swab: N/A. Non-respiratory infection.    Recent vancomycin administrations                     vancomycin (VANCOCIN) 1500 mg in sodium chloride 0.9 % 250 mL IVPB (mg) 1,500 mg New Bag 08/02/24 0839    vancomycin (VANCOCIN) 2,250 mg in sodium chloride 0.9 % 500 mL IVPB (mg) 2,250 mg New Bag 08/01/24 1250                    Assessment:  Date/Time Current Dose Concentration Timing of Concentration (h) AUC   08/03/24 1500 Q 24 6.2 23 H 11 M 442   Note: Serum concentrations collected for AUC dosing may appear elevated if collected in close proximity to the dose administered, this is not necessarily an indication of toxicity    Plan:  Current dosing regimen is sub-therapeutic  Increase dose to 1000 mg IV Q 12 hours.  Repeat vancomycin concentration ordered for 08/04/24 @ 0930   Pharmacy will continue to monitor patient and adjust therapy as indicated    Thank you for the consult,  Tino De La Vega [facility]  8/3/2024 8:56 AM

## 2024-08-03 NOTE — PLAN OF CARE
Problem: Discharge Planning  Goal: Discharge to home or other facility with appropriate resources  8/3/2024 0052 by Nadine Perez RN  Outcome: Progressing  8/2/2024 1245 by Mayra Duffy RN  Outcome: Progressing     Problem: Pain  Goal: Verbalizes/displays adequate comfort level or baseline comfort level  8/3/2024 0052 by Nadine Perez RN  Outcome: Progressing  8/2/2024 1245 by Mayra Duffy RN  Outcome: Progressing     Problem: ABCDS Injury Assessment  Goal: Absence of physical injury  8/3/2024 0052 by Nadine Perez RN  Outcome: Progressing  8/2/2024 1245 by Mayra Duffy RN  Outcome: Progressing     Problem: Safety - Adult  Goal: Free from fall injury  8/3/2024 0052 by Nadine Perez RN  Outcome: Progressing  8/2/2024 1245 by Mayra Duffy RN  Outcome: Progressing     Problem: Chronic Conditions and Co-morbidities  Goal: Patient's chronic conditions and co-morbidity symptoms are monitored and maintained or improved  8/3/2024 0052 by Nadine Perez RN  Outcome: Progressing  8/2/2024 1245 by Mayra Duffy RN  Outcome: Progressing

## 2024-08-03 NOTE — PLAN OF CARE
Problem: Discharge Planning  Goal: Discharge to home or other facility with appropriate resources  8/3/2024 1523 by Neelima Andrade RN  Outcome: Completed  8/3/2024 1058 by Neelima Andrade RN  Outcome: Progressing     Problem: Pain  Goal: Verbalizes/displays adequate comfort level or baseline comfort level  8/3/2024 1523 by Neelima Andrade RN  Outcome: Completed  8/3/2024 1058 by Neelima Andrade RN  Outcome: Progressing     Problem: ABCDS Injury Assessment  Goal: Absence of physical injury  8/3/2024 1523 by Neelima Andrade RN  Outcome: Completed  8/3/2024 1058 by Neelima Andrade RN  Outcome: Progressing     Problem: Safety - Adult  Goal: Free from fall injury  8/3/2024 1523 by Neelima Andrade RN  Outcome: Completed  8/3/2024 1058 by Neelima Andrade RN  Outcome: Progressing     Problem: Chronic Conditions and Co-morbidities  Goal: Patient's chronic conditions and co-morbidity symptoms are monitored and maintained or improved  8/3/2024 1523 by Neelima Andrade RN  Outcome: Completed  8/3/2024 1058 by Neelima Andrade RN  Outcome: Progressing

## 2024-08-03 NOTE — PROGRESS NOTES
Patient discharged home with family.  IV access discontinued.  Patient to call on Monday for appointments with vascular/wound care/ PCP    Dressing completed to left foot prior to discharge.  Surgical shoe given per request of patient.  Dr. Velasco gave order for shoe.     ABX recalled to [facility] pharmacy due to patient pharmacy closed at noon

## 2024-08-06 LAB
BACTERIA BLD CULT ORG #2: NORMAL
BACTERIA BLD CULT: NORMAL

## 2024-08-07 ENCOUNTER — HOSPITAL ENCOUNTER (OUTPATIENT)
Dept: WOUND CARE | Age: 84
Discharge: HOME | End: 2024-08-07
Payer: MEDICARE

## 2024-08-07 VITALS
RESPIRATION RATE: 16 BRPM | BODY MASS INDEX: 28.35 KG/M2 | HEART RATE: 91 BPM | WEIGHT: 198 LBS | TEMPERATURE: 96.3 F | HEIGHT: 70 IN | DIASTOLIC BLOOD PRESSURE: 70 MMHG | SYSTOLIC BLOOD PRESSURE: 117 MMHG

## 2024-08-07 DIAGNOSIS — E08.621 DIABETIC ULCER OF LEFT MIDFOOT ASSOCIATED WITH DIABETES MELLITUS DUE TO UNDERLYING CONDITION, WITH FAT LAYER EXPOSED (HCC): Primary | ICD-10-CM

## 2024-08-07 DIAGNOSIS — L97.422 DIABETIC ULCER OF LEFT MIDFOOT ASSOCIATED WITH TYPE 2 DIABETES MELLITUS, WITH FAT LAYER EXPOSED (HCC): ICD-10-CM

## 2024-08-07 DIAGNOSIS — E11.42 DIABETIC POLYNEUROPATHY ASSOCIATED WITH TYPE 2 DIABETES MELLITUS (HCC): Chronic | ICD-10-CM

## 2024-08-07 DIAGNOSIS — L97.422 DIABETIC ULCER OF LEFT MIDFOOT ASSOCIATED WITH DIABETES MELLITUS DUE TO UNDERLYING CONDITION, WITH FAT LAYER EXPOSED (HCC): Primary | ICD-10-CM

## 2024-08-07 DIAGNOSIS — I73.9 PAD (PERIPHERAL ARTERY DISEASE) (HCC): ICD-10-CM

## 2024-08-07 DIAGNOSIS — E11.621 DIABETIC ULCER OF LEFT MIDFOOT ASSOCIATED WITH TYPE 2 DIABETES MELLITUS, WITH FAT LAYER EXPOSED (HCC): ICD-10-CM

## 2024-08-07 PROCEDURE — 97597 DBRDMT OPN WND 1ST 20 CM/<: CPT

## 2024-08-07 PROCEDURE — 97597 DBRDMT OPN WND 1ST 20 CM/<: CPT | Performed by: NURSE PRACTITIONER

## 2024-08-07 PROCEDURE — 99215 OFFICE O/P EST HI 40 MIN: CPT | Performed by: NURSE PRACTITIONER

## 2024-08-07 PROCEDURE — 99214 OFFICE O/P EST MOD 30 MIN: CPT

## 2024-08-07 RX ORDER — GENTAMICIN SULFATE 1 MG/G
OINTMENT TOPICAL ONCE
OUTPATIENT
Start: 2024-08-07 | End: 2024-08-07

## 2024-08-07 RX ORDER — IBUPROFEN 200 MG
TABLET ORAL ONCE
OUTPATIENT
Start: 2024-08-07 | End: 2024-08-07

## 2024-08-07 RX ORDER — LIDOCAINE 40 MG/G
CREAM TOPICAL ONCE
OUTPATIENT
Start: 2024-08-07 | End: 2024-08-07

## 2024-08-07 RX ORDER — CLOBETASOL PROPIONATE 0.5 MG/G
OINTMENT TOPICAL ONCE
OUTPATIENT
Start: 2024-08-07 | End: 2024-08-07

## 2024-08-07 RX ORDER — SODIUM CHLOR/HYPOCHLOROUS ACID 0.033 %
SOLUTION, IRRIGATION IRRIGATION ONCE
OUTPATIENT
Start: 2024-08-07 | End: 2024-08-07

## 2024-08-07 RX ORDER — BETAMETHASONE DIPROPIONATE 0.5 MG/G
CREAM TOPICAL ONCE
OUTPATIENT
Start: 2024-08-07 | End: 2024-08-07

## 2024-08-07 RX ORDER — LIDOCAINE HYDROCHLORIDE 40 MG/ML
SOLUTION TOPICAL ONCE
OUTPATIENT
Start: 2024-08-07 | End: 2024-08-07

## 2024-08-07 RX ORDER — LIDOCAINE HYDROCHLORIDE 20 MG/ML
JELLY TOPICAL ONCE
OUTPATIENT
Start: 2024-08-07 | End: 2024-08-07

## 2024-08-07 RX ORDER — TRIAMCINOLONE ACETONIDE 1 MG/G
OINTMENT TOPICAL ONCE
OUTPATIENT
Start: 2024-08-07 | End: 2024-08-07

## 2024-08-07 RX ORDER — LIDOCAINE 50 MG/G
OINTMENT TOPICAL ONCE
OUTPATIENT
Start: 2024-08-07 | End: 2024-08-07

## 2024-08-07 RX ORDER — FLUCONAZOLE 150 MG/1
150 TABLET ORAL DAILY
Qty: 7 TABLET | Refills: 0 | Status: SHIPPED | OUTPATIENT
Start: 2024-08-07 | End: 2024-08-14

## 2024-08-07 RX ORDER — BACITRACIN ZINC AND POLYMYXIN B SULFATE 500; 1000 [USP'U]/G; [USP'U]/G
OINTMENT TOPICAL ONCE
OUTPATIENT
Start: 2024-08-07 | End: 2024-08-07

## 2024-08-07 RX ORDER — LIDOCAINE HYDROCHLORIDE 20 MG/ML
JELLY TOPICAL ONCE
Status: DISCONTINUED | OUTPATIENT
Start: 2024-08-07 | End: 2024-08-09 | Stop reason: HOSPADM

## 2024-08-07 RX ORDER — GINSENG 100 MG
CAPSULE ORAL ONCE
OUTPATIENT
Start: 2024-08-07 | End: 2024-08-07

## 2024-08-07 ASSESSMENT — PAIN SCALES - GENERAL: PAINLEVEL_OUTOF10: 10

## 2024-08-07 ASSESSMENT — PAIN DESCRIPTION - LOCATION: LOCATION: FOOT

## 2024-08-07 ASSESSMENT — PAIN DESCRIPTION - ORIENTATION: ORIENTATION: RIGHT;LEFT

## 2024-08-07 ASSESSMENT — VISUAL ACUITY: OU: 1

## 2024-08-07 ASSESSMENT — PAIN DESCRIPTION - DESCRIPTORS: DESCRIPTORS: ACHING

## 2024-08-07 NOTE — PLAN OF CARE
Problem: Chronic Conditions and Co-morbidities  Goal: Patient's chronic conditions and co-morbidity symptoms are monitored and maintained or improved  Outcome: Progressing     Problem: Pain  Goal: Verbalizes/displays adequate comfort level or baseline comfort level  Outcome: Progressing     Problem: Wound:  Goal: Will show signs of wound healing; wound closure and no evidence of infection  Description: Will show signs of wound healing; wound closure and no evidence of infection  Outcome: Progressing     Problem: Blood Glucose:  Goal: Ability to maintain appropriate glucose levels will improve  Description: Ability to maintain appropriate glucose levels will improve  Outcome: Progressing

## 2024-08-07 NOTE — PATIENT INSTRUCTIONS
[facility] Wound Care and Hyperbaric Oxygen Therapy   Physician Orders and Discharge Instructions  [address] [place] [address]  [address]  [place] [address]  Telephone: [phone]      FAX [phone]    NAME:  Michael Szymanski  YOB: 1940  MEDICAL RECORD NUMBER:  [id number]  DATE:  8/7/2024    Discharge condition: Stable    Discharge to: Home    Left via:Private automobile    Accompanied by:  child    [facility]    Dr. Wei appointment for September 4 at 11:30 at [facility]    Dressing Orders:  Left foot wound: Soap and water wash.  Apply a double layer of Xeroform (the yellow sticky dressing) over the wound bed, secure with dry gauze and rolled gauze daily.  Right foot wound: soap and water wash (ANTIFUNGAL WASH), apply a thick cream such as vaseline over the dry callus area daily. Wear a sock to hold the moisture in.    Treatment Orders:  Protein rich diet  Multivitamin  Keep glucose levels under 150    [facility] follow up visit ______1 week with Dr. Nieto_______________________  (Please note your next appointment above and if you are unable to keep, kindly give a 24 hour notice. Thank you.)          If you experience any of the following, please call the Wound Care Center during business hours:    * Increase in Pain  * Temperature over 101  * Increase in drainage from your wound  * Drainage with a foul odor  * Bleeding  * Increase in swelling  * Need for compression bandage changes due to slippage, breakthrough drainage.    If you need medical attention outside of the business hours of the Wound Care Centers please contact your PCP or go to the nearest emergency room.

## 2024-08-07 NOTE — DISCHARGE INSTRUCTIONS
[facility] Wound Care and Hyperbaric Oxygen Therapy   Physician Orders and Discharge Instructions  [address] [place] [address]  [address]  [place] [address]  Telephone: [phone]      FAX [phone]    NAME:  Michael Szymanski  YOB: 1940  MEDICAL RECORD NUMBER:  [id number]  DATE:  8/7/2024    Discharge condition: Stable    Discharge to: Home    Left via:Private automobile    Accompanied by:  child    [facility]    Dr. Wei appointment for September 4 at 11:30 at [facility]    Dressing Orders:  Left foot wound: Soap and water wash.  Apply a double layer of Xeroform (the yellow sticky dressing) over the wound bed, secure with dry gauze and rolled gauze daily.  Right foot wound: soap and water wash (ANTIFUNGAL WASH), apply a thick cream such as vaseline over the dry callus area daily. Wear a sock to hold the moisture in.    Treatment Orders:  Protein rich diet  Multivitamin  Keep glucose levels under 150    [facility] follow up visit ______1 week with Dr. Nieto_______________________  (Please note your next appointment above and if you are unable to keep, kindly give a 24 hour notice. Thank you.)          If you experience any of the following, please call the Wound Care Center during business hours:    * Increase in Pain  * Temperature over 101  * Increase in drainage from your wound  * Drainage with a foul odor  * Bleeding  * Increase in swelling  * Need for compression bandage changes due to slippage, breakthrough drainage.    If you need medical attention outside of the business hours of the Wound Care Centers please contact your PCP or go to the nearest emergency room.

## 2024-08-07 NOTE — PROGRESS NOTES
CARDIAC CATHETERIZATION  12    EF > 50%    CATARACT REMOVAL      CHOLECYSTECTOMY      COLONOSCOPY      EYE SURGERY      implants placed both eyes    HAND SURGERY Left 2020    INCISION AND DRAINAGE LEFT HAND ABSCESS performed by Jean Pierre Bellamy MD at [facility] OR    OTHER SURGICAL HISTORY  2020    Right Ventricular lead replacement- Dr. Guy    PACEMAKER INSERTION      PACEMAKER PLACEMENT      MN RPLCMT PROST AORTIC VALVE OPEN XCP HOMOGRF/STENT N/A 2018    AORTIC VALVE REPLACEMENT TRANSESOPHAGEAL ECHOCARDIOGRAM performed by Chin Jacinto MD at [facility] OR    SPINE SURGERY       Family History   Problem Relation Age of Onset    Diabetes Mother     Cancer Father     Cancer Sister     Heart Disease Brother     Cancer Brother     Cancer Sister     Cancer Brother      Social History     Tobacco Use    Smoking status: Former     Current packs/day: 0.00     Types: Cigarettes     Quit date: 1977     Years since quittin.0    Smokeless tobacco: Former   Substance Use Topics    Alcohol use: No         Review of Systems    Review of Systems   Skin:  Positive for wound.   All other systems reviewed and are negative.      All other review of systems are negative.    Physical Exam    /70   Pulse 91   Temp (!) 96.3 °F (35.7 °C) (Temporal)   Resp 16   Ht 1.778 m (5' 10\")   Wt 89.8 kg (198 lb)   BMI 28.41 kg/m²     Physical Exam  Vitals reviewed. Exam conducted with a chaperone present.   Constitutional:       Appearance: Normal appearance. He is normal weight.   HENT:      Head: Normocephalic and atraumatic.      Right Ear: External ear normal.      Left Ear: External ear normal.   Eyes:      General: Lids are normal. Lids are everted, no foreign bodies appreciated. Vision grossly intact. Gaze aligned appropriately.   Cardiovascular:      Rate and Rhythm: Normal rate and regular rhythm.      Pulses: Normal pulses.      Heart sounds: Normal heart sounds.   Pulmonary:      Effort: Pulmonary effort is

## 2024-08-16 ENCOUNTER — HOSPITAL ENCOUNTER (OUTPATIENT)
Dept: WOUND CARE | Age: 84
Discharge: HOME | End: 2024-08-16
Payer: MEDICARE

## 2024-08-16 VITALS
TEMPERATURE: 96.9 F | DIASTOLIC BLOOD PRESSURE: 64 MMHG | BODY MASS INDEX: 28.35 KG/M2 | SYSTOLIC BLOOD PRESSURE: 102 MMHG | HEART RATE: 94 BPM | WEIGHT: 198 LBS | HEIGHT: 70 IN | RESPIRATION RATE: 16 BRPM

## 2024-08-16 DIAGNOSIS — L97.422 DIABETIC ULCER OF LEFT MIDFOOT ASSOCIATED WITH TYPE 2 DIABETES MELLITUS, WITH FAT LAYER EXPOSED (HCC): Primary | ICD-10-CM

## 2024-08-16 DIAGNOSIS — I73.9 PAD (PERIPHERAL ARTERY DISEASE) (HCC): ICD-10-CM

## 2024-08-16 DIAGNOSIS — E11.621 DIABETIC ULCER OF LEFT MIDFOOT ASSOCIATED WITH TYPE 2 DIABETES MELLITUS, WITH FAT LAYER EXPOSED (HCC): Primary | ICD-10-CM

## 2024-08-16 PROBLEM — E78.2 HYPERCHOLESTEROLEMIA WITH HYPERTRIGLYCERIDEMIA: Status: ACTIVE | Noted: 2018-06-11

## 2024-08-16 PROBLEM — G56.03 BILATERAL CARPAL TUNNEL SYNDROME: Chronic | Status: ACTIVE | Noted: 2017-08-28

## 2024-08-16 PROCEDURE — 97597 DBRDMT OPN WND 1ST 20 CM/<: CPT

## 2024-08-16 RX ORDER — LIDOCAINE HYDROCHLORIDE 20 MG/ML
JELLY TOPICAL ONCE
Status: COMPLETED | OUTPATIENT
Start: 2024-08-16 | End: 2024-08-16

## 2024-08-16 RX ORDER — GENTAMICIN SULFATE 1 MG/G
OINTMENT TOPICAL ONCE
OUTPATIENT
Start: 2024-08-16 | End: 2024-08-16

## 2024-08-16 RX ORDER — BETAMETHASONE DIPROPIONATE 0.5 MG/G
CREAM TOPICAL ONCE
OUTPATIENT
Start: 2024-08-16 | End: 2024-08-16

## 2024-08-16 RX ORDER — SODIUM CHLOR/HYPOCHLOROUS ACID 0.033 %
SOLUTION, IRRIGATION IRRIGATION ONCE
OUTPATIENT
Start: 2024-08-16 | End: 2024-08-16

## 2024-08-16 RX ORDER — GINSENG 100 MG
CAPSULE ORAL ONCE
OUTPATIENT
Start: 2024-08-16 | End: 2024-08-16

## 2024-08-16 RX ORDER — CLOBETASOL PROPIONATE 0.5 MG/G
OINTMENT TOPICAL ONCE
OUTPATIENT
Start: 2024-08-16 | End: 2024-08-16

## 2024-08-16 RX ORDER — LIDOCAINE 40 MG/G
CREAM TOPICAL ONCE
OUTPATIENT
Start: 2024-08-16 | End: 2024-08-16

## 2024-08-16 RX ORDER — IBUPROFEN 200 MG
TABLET ORAL ONCE
OUTPATIENT
Start: 2024-08-16 | End: 2024-08-16

## 2024-08-16 RX ORDER — BACITRACIN ZINC AND POLYMYXIN B SULFATE 500; 1000 [USP'U]/G; [USP'U]/G
OINTMENT TOPICAL ONCE
OUTPATIENT
Start: 2024-08-16 | End: 2024-08-16

## 2024-08-16 RX ORDER — LIDOCAINE 50 MG/G
OINTMENT TOPICAL ONCE
OUTPATIENT
Start: 2024-08-16 | End: 2024-08-16

## 2024-08-16 RX ORDER — LIDOCAINE HYDROCHLORIDE 20 MG/ML
JELLY TOPICAL ONCE
OUTPATIENT
Start: 2024-08-16 | End: 2024-08-16

## 2024-08-16 RX ORDER — LIDOCAINE HYDROCHLORIDE 40 MG/ML
SOLUTION TOPICAL ONCE
OUTPATIENT
Start: 2024-08-16 | End: 2024-08-16

## 2024-08-16 RX ORDER — TRIAMCINOLONE ACETONIDE 1 MG/G
OINTMENT TOPICAL ONCE
OUTPATIENT
Start: 2024-08-16 | End: 2024-08-16

## 2024-08-16 RX ADMIN — LIDOCAINE HYDROCHLORIDE: 20 JELLY TOPICAL at 08:16

## 2024-08-16 ASSESSMENT — PAIN DESCRIPTION - ORIENTATION: ORIENTATION: LEFT

## 2024-08-16 ASSESSMENT — PAIN - FUNCTIONAL ASSESSMENT: PAIN_FUNCTIONAL_ASSESSMENT: PREVENTS OR INTERFERES SOME ACTIVE ACTIVITIES AND ADLS

## 2024-08-16 ASSESSMENT — PAIN DESCRIPTION - PAIN TYPE: TYPE: ACUTE PAIN

## 2024-08-16 ASSESSMENT — PAIN DESCRIPTION - LOCATION: LOCATION: FOOT

## 2024-08-16 ASSESSMENT — PAIN DESCRIPTION - ONSET: ONSET: ON-GOING

## 2024-08-16 ASSESSMENT — PAIN SCALES - GENERAL: PAINLEVEL_OUTOF10: 10

## 2024-08-16 ASSESSMENT — PAIN DESCRIPTION - DESCRIPTORS: DESCRIPTORS: ACHING;DISCOMFORT

## 2024-08-16 ASSESSMENT — PAIN DESCRIPTION - FREQUENCY: FREQUENCY: INTERMITTENT

## 2024-08-16 NOTE — PROGRESS NOTES
MEASUREMENTS    Wound 06/29/20 Knee Left 0hcU0pf, scabbed, reddened (Active)   Number of days: 1509       Wound 09/03/23 Pretibial Right (Active)   Number of days: 347       Wound 08/01/24 Left (Active)   Wound Image   08/16/24 0819   Wound Etiology Diabetic Henry 1 08/16/24 0819   Dressing Status Old drainage noted 08/16/24 0819   Wound Cleansed Soap and water 08/16/24 0819   Dressing/Treatment Xeroform 08/07/24 1350   Offloading for Diabetic Foot Ulcers Offloading ordered;Post op shoe 08/16/24 0819   Wound Length (cm) 1.9 cm 08/16/24 0819   Wound Width (cm) 1.9 cm 08/16/24 0819   Wound Depth (cm) 0.1 cm 08/16/24 0819   Wound Surface Area (cm^2) 3.61 cm^2 08/16/24 0819   Change in Wound Size % (l*w) 24.79 08/16/24 0819   Wound Volume (cm^3) 0.361 cm^3 08/16/24 0819   Wound Healing % 25 08/16/24 0819   Post-Procedure Length (cm) 1.9 cm 08/16/24 0827   Post-Procedure Width (cm) 1.9 cm 08/16/24 0827   Post-Procedure Depth (cm) 0.1 cm 08/16/24 0827   Post-Procedure Surface Area (cm^2) 3.61 cm^2 08/16/24 0827   Post-Procedure Volume (cm^3) 0.361 cm^3 08/16/24 0827   Distance Tunneling (cm) 0 cm 08/16/24 0819   Tunneling Position ___ O'Clock 0 08/16/24 0819   Undermining Starts ___ O'Clock 0 08/16/24 0819   Undermining Ends___ O'Clock 0 08/16/24 0819   Undermining Maxium Distance (cm) 0 08/16/24 0819   Wound Assessment Slough;Reydon/red 08/16/24 0819   Drainage Amount Moderate (25-50%) 08/16/24 0819   Drainage Description Serosanguinous 08/16/24 0819   Odor None 08/16/24 0819   Mariana-wound Assessment Hyperkeratosis (callous) 08/16/24 0819   Margins Defined edges 08/16/24 0819   Wound Thickness Description not for Pressure Injury Full thickness 08/16/24 0819   Number of days: 14             Estimated Blood Loss:  Minimal    Hemostasis Achieved:  by pressure    Procedural Pain:  0  / 10     Post Procedural Pain:  0 / 10     Response to treatment:  Well tolerated by patient.         Plan:     Problem List Items Addressed This

## 2024-08-16 NOTE — PATIENT INSTRUCTIONS
[facility] Wound Care and Hyperbaric Oxygen Therapy   Physician Orders and Discharge Instructions  [address] [place] [address]  [address]  [place] [address]  Telephone: [phone]      FAX [phone]    NAME:  Michael Szymanski  YOB: 1940  MEDICAL RECORD NUMBER:  [id number]  DATE:  8/16/2024    Discharge condition: Stable    Discharge to: Home    Left via:Private automobile    Accompanied by: Self    ECF/HHA: [facility]     Dr. Wei appointment for September 4 at 11:30 at [facility]     Dressing Orders: Left foot wound:   Soap and water wash.    Apply a double layer of Xeroform (the yellow sticky dressing) over the wound bed, secure with dry gauze and rolled gauze daily.    Right foot wound: soap and water wash (ANTIFUNGAL WASH), apply a thick cream such as vaseline over the dry callus area daily.   Wear a sock to hold the moisture in.     Treatment Orders:  Protein rich diet  Multivitamin  Keep glucose levels under 150    [facility] follow up visit ____________1 week_________________  (Please note your next appointment above and if you are unable to keep, kindly give a 24 hour notice. Thank you.)    If you experience any of the following, please call the Wound Care Center during business hours:    * Increase in Pain  * Temperature over 101  * Increase in drainage from your wound  * Drainage with a foul odor  * Bleeding  * Increase in swelling  * Need for compression bandage changes due to slippage, breakthrough drainage.    If you need medical attention outside of the business hours of the Wound Care Centers please contact your PCP or go to the nearest emergency room.

## 2024-08-19 ENCOUNTER — TRANSCRIBE ORDERS (OUTPATIENT)
Dept: ADMINISTRATIVE | Facility: HOSPITAL | Age: 84
End: 2024-08-19
Payer: MEDICARE

## 2024-08-19 ENCOUNTER — HOSPITAL ENCOUNTER (OUTPATIENT)
Dept: GENERAL RADIOLOGY | Facility: HOSPITAL | Age: 84
Discharge: HOME | End: 2024-08-19
Admitting: PHYSICAL MEDICINE & REHABILITATION
Payer: MEDICARE

## 2024-08-19 DIAGNOSIS — G89.29 OTHER CHRONIC PAIN: ICD-10-CM

## 2024-08-19 DIAGNOSIS — G89.29 OTHER CHRONIC PAIN: Primary | ICD-10-CM

## 2024-08-19 PROCEDURE — 73502 X-RAY EXAM HIP UNI 2-3 VIEWS: CPT

## 2024-08-20 DIAGNOSIS — I49.5 SINOATRIAL NODE DYSFUNCTION (HCC): ICD-10-CM

## 2024-08-20 DIAGNOSIS — Z95.0 PACEMAKER: Primary | ICD-10-CM

## 2024-08-20 PROCEDURE — 93294 REM INTERROG EVL PM/LDLS PM: CPT | Performed by: CLINICAL NURSE SPECIALIST

## 2024-08-20 PROCEDURE — 93296 REM INTERROG EVL PM/IDS: CPT | Performed by: CLINICAL NURSE SPECIALIST

## 2024-08-21 DIAGNOSIS — R33.8 ACUTE URINARY RETENTION: ICD-10-CM

## 2024-08-21 DIAGNOSIS — N39.43 POST-VOID DRIBBLING: ICD-10-CM

## 2024-08-21 RX ORDER — TAMSULOSIN HYDROCHLORIDE 0.4 MG/1
1 CAPSULE ORAL 2 TIMES DAILY
Qty: 180 CAPSULE | Refills: 3 | Status: SHIPPED | OUTPATIENT
Start: 2024-08-21

## 2024-08-21 RX ORDER — VIBEGRON 75 MG/1
1 TABLET, FILM COATED ORAL DAILY
Qty: 30 TABLET | Refills: 1 | Status: SHIPPED | OUTPATIENT
Start: 2024-08-21

## 2024-08-22 ENCOUNTER — HOSPITAL ENCOUNTER (OUTPATIENT)
Dept: WOUND CARE | Age: 84
Discharge: HOME | End: 2024-08-22
Payer: MEDICARE

## 2024-08-22 VITALS
DIASTOLIC BLOOD PRESSURE: 64 MMHG | HEART RATE: 91 BPM | RESPIRATION RATE: 18 BRPM | SYSTOLIC BLOOD PRESSURE: 100 MMHG | TEMPERATURE: 97.2 F

## 2024-08-22 DIAGNOSIS — E11.621 DIABETIC ULCER OF LEFT MIDFOOT ASSOCIATED WITH TYPE 2 DIABETES MELLITUS, WITH FAT LAYER EXPOSED (HCC): Primary | ICD-10-CM

## 2024-08-22 DIAGNOSIS — I73.9 PAD (PERIPHERAL ARTERY DISEASE) (HCC): ICD-10-CM

## 2024-08-22 DIAGNOSIS — L97.422 DIABETIC ULCER OF LEFT MIDFOOT ASSOCIATED WITH TYPE 2 DIABETES MELLITUS, WITH FAT LAYER EXPOSED (HCC): Primary | ICD-10-CM

## 2024-08-22 PROCEDURE — 97597 DBRDMT OPN WND 1ST 20 CM/<: CPT | Performed by: SURGERY

## 2024-08-22 PROCEDURE — 97597 DBRDMT OPN WND 1ST 20 CM/<: CPT

## 2024-08-22 RX ORDER — TRIAMCINOLONE ACETONIDE 1 MG/G
OINTMENT TOPICAL ONCE
OUTPATIENT
Start: 2024-08-22 | End: 2024-08-22

## 2024-08-22 RX ORDER — LIDOCAINE HYDROCHLORIDE 20 MG/ML
JELLY TOPICAL ONCE
OUTPATIENT
Start: 2024-08-22 | End: 2024-08-22

## 2024-08-22 RX ORDER — BETAMETHASONE DIPROPIONATE 0.5 MG/G
CREAM TOPICAL ONCE
OUTPATIENT
Start: 2024-08-22 | End: 2024-08-22

## 2024-08-22 RX ORDER — LIDOCAINE 40 MG/G
CREAM TOPICAL ONCE
OUTPATIENT
Start: 2024-08-22 | End: 2024-08-22

## 2024-08-22 RX ORDER — SODIUM CHLOR/HYPOCHLOROUS ACID 0.033 %
SOLUTION, IRRIGATION IRRIGATION ONCE
OUTPATIENT
Start: 2024-08-22 | End: 2024-08-22

## 2024-08-22 RX ORDER — LIDOCAINE 50 MG/G
OINTMENT TOPICAL ONCE
OUTPATIENT
Start: 2024-08-22 | End: 2024-08-22

## 2024-08-22 RX ORDER — GINSENG 100 MG
CAPSULE ORAL ONCE
OUTPATIENT
Start: 2024-08-22 | End: 2024-08-22

## 2024-08-22 RX ORDER — BACITRACIN ZINC AND POLYMYXIN B SULFATE 500; 1000 [USP'U]/G; [USP'U]/G
OINTMENT TOPICAL ONCE
OUTPATIENT
Start: 2024-08-22 | End: 2024-08-22

## 2024-08-22 RX ORDER — CLOBETASOL PROPIONATE 0.5 MG/G
OINTMENT TOPICAL ONCE
OUTPATIENT
Start: 2024-08-22 | End: 2024-08-22

## 2024-08-22 RX ORDER — LIDOCAINE HYDROCHLORIDE 40 MG/ML
SOLUTION TOPICAL ONCE
OUTPATIENT
Start: 2024-08-22 | End: 2024-08-22

## 2024-08-22 RX ORDER — IBUPROFEN 200 MG
TABLET ORAL ONCE
OUTPATIENT
Start: 2024-08-22 | End: 2024-08-22

## 2024-08-22 RX ORDER — LIDOCAINE HYDROCHLORIDE 20 MG/ML
JELLY TOPICAL ONCE
Status: COMPLETED | OUTPATIENT
Start: 2024-08-22 | End: 2024-08-22

## 2024-08-22 RX ORDER — GENTAMICIN SULFATE 1 MG/G
OINTMENT TOPICAL ONCE
OUTPATIENT
Start: 2024-08-22 | End: 2024-08-22

## 2024-08-22 RX ADMIN — LIDOCAINE HYDROCHLORIDE: 20 JELLY TOPICAL at 09:57

## 2024-08-22 ASSESSMENT — PAIN DESCRIPTION - ONSET: ONSET: ON-GOING

## 2024-08-22 ASSESSMENT — PAIN SCALES - GENERAL: PAINLEVEL_OUTOF10: 5

## 2024-08-22 ASSESSMENT — PAIN - FUNCTIONAL ASSESSMENT: PAIN_FUNCTIONAL_ASSESSMENT: PREVENTS OR INTERFERES SOME ACTIVE ACTIVITIES AND ADLS

## 2024-08-22 ASSESSMENT — PAIN DESCRIPTION - FREQUENCY: FREQUENCY: INTERMITTENT

## 2024-08-22 ASSESSMENT — PAIN DESCRIPTION - ORIENTATION: ORIENTATION: LEFT

## 2024-08-22 ASSESSMENT — PAIN DESCRIPTION - LOCATION: LOCATION: FOOT

## 2024-08-22 ASSESSMENT — PAIN DESCRIPTION - PAIN TYPE: TYPE: ACUTE PAIN

## 2024-08-22 ASSESSMENT — PAIN DESCRIPTION - DESCRIPTORS: DESCRIPTORS: ACHING;DISCOMFORT

## 2024-08-22 NOTE — PATIENT INSTRUCTIONS
[facility] Wound Care and Hyperbaric Oxygen Therapy   Physician Orders and Discharge Instructions  [address] [place] [address]  [address]  [place] [address]  Telephone: [phone]      FAX [phone]    NAME:  Michael Szymanski  YOB: 1940  MEDICAL RECORD NUMBER:  [id number]  DATE:  8/22/2024    Discharge condition: Stable    Discharge to: Home    Left via:Private automobile    Accompanied by: Self    ECF/H[facility]: [facility]    Dr. Wei appointment for September 4 at 11:30 at [facility]    Dressing Orders: Left foot wounds  Soap and water wash.  Apply a double layer of Xeroform (the yellow sticky dressing) over the wound bed, secure with dry gauze and rolled gauze daily.  Wear cast shoe    Right foot wound: soap and water wash (ANTIFUNGAL WASH), apply a thick cream such as vaseline over the dry callus area daily.  Wear a sock to hold the moisture in.    Treatment Orders:  Protein rich diet  Multivitamin  Keep glucose levels under 150    [facility] follow up visit _________2 weeks__________________  (Please note your next appointment above and if you are unable to keep, kindly give a 24 hour notice. Thank you.)    If you experience any of the following, please call the Wound Care Center during business hours:    * Increase in Pain  * Temperature over 101  * Increase in drainage from your wound  * Drainage with a foul odor  * Bleeding  * Increase in swelling  * Need for compression bandage changes due to slippage, breakthrough drainage.    If you need medical attention outside of the business hours of the Wound Care Centers please contact your PCP or go to the nearest emergency room.

## 2024-08-22 NOTE — PROGRESS NOTES
over the wound bed, secure with dry gauze and rolled  gauze daily.  Wear cast shoe  Right foot wound: soap and water wash (ANTIFUNGAL WASH), apply a thick cream such as vaseline over the dry callus  area daily.  Wear a sock to hold the moisture in.  Treatment Orders:  Protein rich diet  Multivitamin  Keep glucose levels under 150  [facility] follow up visit ___________1 week__________________  (Please note your next appointment above and if you are unable to keep, kindly give a 24 hour notice. Thank  you.)  If you experience any of the following, please call the Wound Care Center during business hours:  * Increase in Pain  * Temperature over 101  * Increase in drainage from your wound  * Drainage with a foul odor  * Bleeding  * Increase in swelling  * Need for compression bandage changes due to slippage, breakthrough drainage.  If you need medical attention outside of the business hours of the Wound Care Centers please contact your PCP  or go to the nearest emergency room    Electronically signed by Von Nieto MD on 8/22/2024 at 10:11 AM

## 2024-08-22 NOTE — PLAN OF CARE
Problem: Pain  Goal: Verbalizes/displays adequate comfort level or baseline comfort level  Outcome: Progressing     Problem: Wound:  Goal: Will show signs of wound healing; wound closure and no evidence of infection  Description: Will show signs of wound healing; wound closure and no evidence of infection  Outcome: Progressing     Problem: Blood Glucose:  Goal: Ability to maintain appropriate glucose levels will improve  Description: Ability to maintain appropriate glucose levels will improve  Outcome: Progressing     Problem: Chronic Conditions and Co-morbidities  Goal: Patient's chronic conditions and co-morbidity symptoms are monitored and maintained or improved  Outcome: Progressing

## 2024-09-04 ENCOUNTER — HOSPITAL ENCOUNTER (OUTPATIENT)
Dept: WOUND CARE | Age: 84
Discharge: HOME | End: 2024-09-04
Payer: MEDICARE

## 2024-09-04 VITALS
RESPIRATION RATE: 18 BRPM | HEART RATE: 86 BPM | SYSTOLIC BLOOD PRESSURE: 102 MMHG | TEMPERATURE: 97.1 F | DIASTOLIC BLOOD PRESSURE: 59 MMHG

## 2024-09-04 DIAGNOSIS — E11.621 DIABETIC ULCER OF LEFT MIDFOOT ASSOCIATED WITH TYPE 2 DIABETES MELLITUS, WITH FAT LAYER EXPOSED (HCC): Primary | ICD-10-CM

## 2024-09-04 DIAGNOSIS — L97.422 DIABETIC ULCER OF LEFT MIDFOOT ASSOCIATED WITH TYPE 2 DIABETES MELLITUS, WITH FAT LAYER EXPOSED (HCC): Primary | ICD-10-CM

## 2024-09-04 DIAGNOSIS — I73.9 PAD (PERIPHERAL ARTERY DISEASE) (HCC): ICD-10-CM

## 2024-09-04 PROCEDURE — 97597 DBRDMT OPN WND 1ST 20 CM/<: CPT

## 2024-09-04 PROCEDURE — 97597 DBRDMT OPN WND 1ST 20 CM/<: CPT | Performed by: SURGERY

## 2024-09-04 RX ORDER — LIDOCAINE HYDROCHLORIDE 20 MG/ML
JELLY TOPICAL ONCE
OUTPATIENT
Start: 2024-09-04 | End: 2024-09-04

## 2024-09-04 RX ORDER — SILVER SULFADIAZINE 10 MG/G
CREAM TOPICAL ONCE
OUTPATIENT
Start: 2024-09-04 | End: 2024-09-04

## 2024-09-04 RX ORDER — NEOMYCIN/BACITRACIN/POLYMYXINB 3.5-400-5K
OINTMENT (GRAM) TOPICAL ONCE
OUTPATIENT
Start: 2024-09-04 | End: 2024-09-04

## 2024-09-04 RX ORDER — LIDOCAINE HYDROCHLORIDE 40 MG/ML
SOLUTION TOPICAL ONCE
OUTPATIENT
Start: 2024-09-04 | End: 2024-09-04

## 2024-09-04 RX ORDER — BETAMETHASONE DIPROPIONATE 0.5 MG/G
CREAM TOPICAL ONCE
OUTPATIENT
Start: 2024-09-04 | End: 2024-09-04

## 2024-09-04 RX ORDER — GINSENG 100 MG
CAPSULE ORAL ONCE
OUTPATIENT
Start: 2024-09-04 | End: 2024-09-04

## 2024-09-04 RX ORDER — LIDOCAINE 50 MG/G
OINTMENT TOPICAL ONCE
OUTPATIENT
Start: 2024-09-04 | End: 2024-09-04

## 2024-09-04 RX ORDER — GENTAMICIN SULFATE 1 MG/G
OINTMENT TOPICAL ONCE
OUTPATIENT
Start: 2024-09-04 | End: 2024-09-04

## 2024-09-04 RX ORDER — SODIUM CHLOR/HYPOCHLOROUS ACID 0.033 %
SOLUTION, IRRIGATION IRRIGATION ONCE
OUTPATIENT
Start: 2024-09-04 | End: 2024-09-04

## 2024-09-04 RX ORDER — TRIAMCINOLONE ACETONIDE 1 MG/G
OINTMENT TOPICAL ONCE
OUTPATIENT
Start: 2024-09-04 | End: 2024-09-04

## 2024-09-04 RX ORDER — MUPIROCIN 20 MG/G
OINTMENT TOPICAL ONCE
OUTPATIENT
Start: 2024-09-04 | End: 2024-09-04

## 2024-09-04 RX ORDER — BACITRACIN ZINC AND POLYMYXIN B SULFATE 500; 1000 [USP'U]/G; [USP'U]/G
OINTMENT TOPICAL ONCE
OUTPATIENT
Start: 2024-09-04 | End: 2024-09-04

## 2024-09-04 RX ORDER — LIDOCAINE 40 MG/G
CREAM TOPICAL ONCE
OUTPATIENT
Start: 2024-09-04 | End: 2024-09-04

## 2024-09-04 RX ORDER — CLOBETASOL PROPIONATE 0.5 MG/G
OINTMENT TOPICAL ONCE
OUTPATIENT
Start: 2024-09-04 | End: 2024-09-04

## 2024-09-04 RX ORDER — LIDOCAINE HYDROCHLORIDE 20 MG/ML
JELLY TOPICAL ONCE
Status: COMPLETED | OUTPATIENT
Start: 2024-09-04 | End: 2024-09-04

## 2024-09-04 RX ADMIN — LIDOCAINE HYDROCHLORIDE: 20 JELLY TOPICAL at 10:00

## 2024-09-04 ASSESSMENT — PAIN DESCRIPTION - ONSET: ONSET: ON-GOING

## 2024-09-04 ASSESSMENT — PAIN DESCRIPTION - FREQUENCY: FREQUENCY: INTERMITTENT

## 2024-09-04 ASSESSMENT — PAIN DESCRIPTION - ORIENTATION: ORIENTATION: LEFT

## 2024-09-04 ASSESSMENT — PAIN DESCRIPTION - LOCATION: LOCATION: FOOT

## 2024-09-04 ASSESSMENT — PAIN - FUNCTIONAL ASSESSMENT: PAIN_FUNCTIONAL_ASSESSMENT: PREVENTS OR INTERFERES SOME ACTIVE ACTIVITIES AND ADLS

## 2024-09-04 ASSESSMENT — PAIN SCALES - GENERAL: PAINLEVEL_OUTOF10: 5

## 2024-09-04 ASSESSMENT — PAIN DESCRIPTION - DESCRIPTORS: DESCRIPTORS: ACHING;DISCOMFORT;STABBING

## 2024-09-04 ASSESSMENT — PAIN DESCRIPTION - PAIN TYPE: TYPE: CHRONIC PAIN

## 2024-09-04 NOTE — PROGRESS NOTES
Number of days: 1528       Wound 09/03/23 Pretibial Right (Active)   Number of days: 366       Wound 08/01/24 Left #1 Left foot wag 1 (Active)   Wound Image   09/04/24 0946   Wound Etiology Diabetic Henry 1 09/04/24 0946   Dressing Status New dressing applied 09/04/24 1036   Wound Cleansed Soap and water 09/04/24 0946   Dressing/Treatment Gauze dressing/dressing sponge;Roll gauze;Tape/Soft cloth adhesive tape;Xeroform 09/04/24 1036   Offloading for Diabetic Foot Ulcers Offloading ordered;Post op shoe 09/04/24 0946   Wound Length (cm) 0.9 cm 09/04/24 0946   Wound Width (cm) 1.3 cm 09/04/24 0946   Wound Depth (cm) 0.1 cm 09/04/24 0946   Wound Surface Area (cm^2) 1.17 cm^2 09/04/24 0946   Change in Wound Size % (l*w) 75.63 09/04/24 0946   Wound Volume (cm^3) 0.117 cm^3 09/04/24 0946   Wound Healing % 76 09/04/24 0946   Post-Procedure Length (cm) 0.9 cm 09/04/24 1028   Post-Procedure Width (cm) 1.3 cm 09/04/24 1028   Post-Procedure Depth (cm) 0.1 cm 09/04/24 1028   Post-Procedure Surface Area (cm^2) 1.17 cm^2 09/04/24 1028   Post-Procedure Volume (cm^3) 0.117 cm^3 09/04/24 1028   Distance Tunneling (cm) 0 cm 08/16/24 0819   Tunneling Position ___ O'Clock 0 08/16/24 0819   Undermining Starts ___ O'Clock 0 08/16/24 0819   Undermining Ends___ O'Clock 0 08/16/24 0819   Undermining Maxium Distance (cm) 0 08/16/24 0819   Wound Assessment Slough;Pink/red 09/04/24 0946   Drainage Amount Small (< 25%) 09/04/24 0946   Drainage Description Serosanguinous 09/04/24 0946   Odor None 09/04/24 0946   Mariana-wound Assessment Hyperkeratosis (callous) 09/04/24 0946   Margins Defined edges 09/04/24 0946   Wound Thickness Description not for Pressure Injury Full thickness 09/04/24 0946   Number of days: 33             Estimated Blood Loss:  Minimal    Hemostasis Achieved:  by pressure    Procedural Pain:  0  / 10     Post Procedural Pain:  0 / 10     Response to treatment:  Well tolerated by patient.         Plan:     Problem List Items

## 2024-09-04 NOTE — PATIENT INSTRUCTIONS
[facility] Wound Care and Hyperbaric Oxygen Therapy   Physician Orders and Discharge Instructions  [address] [place] [address]  [address]  [place] [address]  Telephone: [phone]      FAX [phone]    NAME:  Michael Szymanski  YOB: 1940  MEDICAL RECORD NUMBER:  [id number]  DATE:  9/4/2024    Discharge condition: Stable    Discharge to: Home    Left via:Private automobile    Accompanied by: Self    ECF/HHA: [facility]    Dr. Wei appointment for September 4 at 11:30 at [facility]    Dressing Orders: Left foot wounds  Soap and water wash.  Apply a double layer of Xeroform (the yellow sticky dressing) over the wound bed, secure with dry gauze and rolled gauze daily.  Wear cast shoe    Right foot wound: soap and water wash (ANTIFUNGAL WASH), apply a thick cream such as vaseline over the dry callus area daily.  Wear a sock to hold the moisture in.    Treatment Orders:  Protein rich diet  Multivitamin  Keep glucose levels under 150    [facility] follow up visit _________2 weeks__________________  (Please note your next appointment above and if you are unable to keep, kindly give a 24 hour notice. Thank you.)    If you experience any of the following, please call the Wound Care Center during business hours:    * Increase in Pain  * Temperature over 101  * Increase in drainage from your wound  * Drainage with a foul odor  * Bleeding  * Increase in swelling  * Need for compression bandage changes due to slippage, breakthrough drainage.    If you need medical attention outside of the business hours of the Wound Care Centers please contact your PCP or go to the nearest emergency room.

## 2024-09-18 ENCOUNTER — HOSPITAL ENCOUNTER (OUTPATIENT)
Dept: WOUND CARE | Age: 84
Discharge: HOME | End: 2024-09-18
Payer: MEDICARE

## 2024-09-18 VITALS
WEIGHT: 198 LBS | TEMPERATURE: 96.9 F | HEIGHT: 70 IN | BODY MASS INDEX: 28.35 KG/M2 | RESPIRATION RATE: 18 BRPM | HEART RATE: 81 BPM

## 2024-09-18 DIAGNOSIS — L97.422 DIABETIC ULCER OF LEFT MIDFOOT ASSOCIATED WITH TYPE 2 DIABETES MELLITUS, WITH FAT LAYER EXPOSED (HCC): Primary | ICD-10-CM

## 2024-09-18 DIAGNOSIS — E11.621 DIABETIC ULCER OF LEFT MIDFOOT ASSOCIATED WITH TYPE 2 DIABETES MELLITUS, WITH FAT LAYER EXPOSED (HCC): Primary | ICD-10-CM

## 2024-09-18 DIAGNOSIS — I73.9 PAD (PERIPHERAL ARTERY DISEASE) (HCC): ICD-10-CM

## 2024-09-18 PROCEDURE — 97597 DBRDMT OPN WND 1ST 20 CM/<: CPT

## 2024-09-18 PROCEDURE — 97597 DBRDMT OPN WND 1ST 20 CM/<: CPT | Performed by: SURGERY

## 2024-09-18 RX ORDER — LIDOCAINE HYDROCHLORIDE 20 MG/ML
JELLY TOPICAL ONCE
Status: COMPLETED | OUTPATIENT
Start: 2024-09-18 | End: 2024-09-18

## 2024-09-18 RX ORDER — GINSENG 100 MG
CAPSULE ORAL ONCE
OUTPATIENT
Start: 2024-09-18 | End: 2024-09-18

## 2024-09-18 RX ORDER — CLOBETASOL PROPIONATE 0.5 MG/G
OINTMENT TOPICAL ONCE
OUTPATIENT
Start: 2024-09-18 | End: 2024-09-18

## 2024-09-18 RX ORDER — BETAMETHASONE DIPROPIONATE 0.5 MG/G
CREAM TOPICAL ONCE
OUTPATIENT
Start: 2024-09-18 | End: 2024-09-18

## 2024-09-18 RX ORDER — LIDOCAINE HYDROCHLORIDE 20 MG/ML
JELLY TOPICAL ONCE
OUTPATIENT
Start: 2024-09-18 | End: 2024-09-18

## 2024-09-18 RX ORDER — LIDOCAINE HYDROCHLORIDE 40 MG/ML
SOLUTION TOPICAL ONCE
OUTPATIENT
Start: 2024-09-18 | End: 2024-09-18

## 2024-09-18 RX ORDER — SILVER SULFADIAZINE 10 MG/G
CREAM TOPICAL ONCE
OUTPATIENT
Start: 2024-09-18 | End: 2024-09-18

## 2024-09-18 RX ORDER — LIDOCAINE 40 MG/G
CREAM TOPICAL ONCE
OUTPATIENT
Start: 2024-09-18 | End: 2024-09-18

## 2024-09-18 RX ORDER — NEOMYCIN/BACITRACIN/POLYMYXINB 3.5-400-5K
OINTMENT (GRAM) TOPICAL ONCE
OUTPATIENT
Start: 2024-09-18 | End: 2024-09-18

## 2024-09-18 RX ORDER — SODIUM CHLOR/HYPOCHLOROUS ACID 0.033 %
SOLUTION, IRRIGATION IRRIGATION ONCE
OUTPATIENT
Start: 2024-09-18 | End: 2024-09-18

## 2024-09-18 RX ORDER — TRIAMCINOLONE ACETONIDE 1 MG/G
OINTMENT TOPICAL ONCE
OUTPATIENT
Start: 2024-09-18 | End: 2024-09-18

## 2024-09-18 RX ORDER — LIDOCAINE 50 MG/G
OINTMENT TOPICAL ONCE
OUTPATIENT
Start: 2024-09-18 | End: 2024-09-18

## 2024-09-18 RX ORDER — MUPIROCIN 20 MG/G
OINTMENT TOPICAL ONCE
OUTPATIENT
Start: 2024-09-18 | End: 2024-09-18

## 2024-09-18 RX ORDER — BACITRACIN ZINC AND POLYMYXIN B SULFATE 500; 1000 [USP'U]/G; [USP'U]/G
OINTMENT TOPICAL ONCE
OUTPATIENT
Start: 2024-09-18 | End: 2024-09-18

## 2024-09-18 RX ORDER — GENTAMICIN SULFATE 1 MG/G
OINTMENT TOPICAL ONCE
OUTPATIENT
Start: 2024-09-18 | End: 2024-09-18

## 2024-09-18 RX ADMIN — LIDOCAINE HYDROCHLORIDE: 20 JELLY TOPICAL at 09:55

## 2024-09-18 ASSESSMENT — PAIN DESCRIPTION - LOCATION: LOCATION: FOOT

## 2024-09-18 ASSESSMENT — PAIN - FUNCTIONAL ASSESSMENT: PAIN_FUNCTIONAL_ASSESSMENT: ACTIVITIES ARE NOT PREVENTED

## 2024-09-18 ASSESSMENT — PAIN DESCRIPTION - ORIENTATION: ORIENTATION: LEFT

## 2024-09-18 ASSESSMENT — PAIN SCALES - GENERAL: PAINLEVEL_OUTOF10: 5

## 2024-09-18 ASSESSMENT — PAIN DESCRIPTION - DESCRIPTORS: DESCRIPTORS: OTHER (COMMENT)

## 2024-09-20 DIAGNOSIS — N39.43 POST-VOID DRIBBLING: ICD-10-CM

## 2024-09-20 DIAGNOSIS — L29.1 SCROTAL ITCHING: ICD-10-CM

## 2024-09-20 RX ORDER — HYDROXYZINE HYDROCHLORIDE 25 MG/1
TABLET, FILM COATED ORAL
Qty: 30 TABLET | Refills: 1 | Status: SHIPPED | OUTPATIENT
Start: 2024-09-20

## 2024-09-20 RX ORDER — VIBEGRON 75 MG/1
1 TABLET, FILM COATED ORAL DAILY
Qty: 30 TABLET | Refills: 1 | Status: SHIPPED | OUTPATIENT
Start: 2024-09-20

## 2024-09-25 ENCOUNTER — HOSPITAL ENCOUNTER (OUTPATIENT)
Dept: WOUND CARE | Age: 84
Discharge: HOME | End: 2024-09-25
Payer: MEDICARE

## 2024-09-25 VITALS — RESPIRATION RATE: 18 BRPM | HEART RATE: 63 BPM | TEMPERATURE: 97.8 F

## 2024-09-25 DIAGNOSIS — E11.621 DIABETIC ULCER OF LEFT MIDFOOT ASSOCIATED WITH TYPE 2 DIABETES MELLITUS, WITH FAT LAYER EXPOSED (HCC): Primary | ICD-10-CM

## 2024-09-25 DIAGNOSIS — I73.9 PAD (PERIPHERAL ARTERY DISEASE) (HCC): ICD-10-CM

## 2024-09-25 DIAGNOSIS — L97.422 DIABETIC ULCER OF LEFT MIDFOOT ASSOCIATED WITH TYPE 2 DIABETES MELLITUS, WITH FAT LAYER EXPOSED (HCC): Primary | ICD-10-CM

## 2024-09-25 PROCEDURE — 97597 DBRDMT OPN WND 1ST 20 CM/<: CPT | Performed by: SURGERY

## 2024-09-25 PROCEDURE — 97597 DBRDMT OPN WND 1ST 20 CM/<: CPT

## 2024-09-25 RX ORDER — BACITRACIN ZINC AND POLYMYXIN B SULFATE 500; 1000 [USP'U]/G; [USP'U]/G
OINTMENT TOPICAL ONCE
OUTPATIENT
Start: 2024-09-25 | End: 2024-09-25

## 2024-09-25 RX ORDER — CLOBETASOL PROPIONATE 0.5 MG/G
OINTMENT TOPICAL ONCE
OUTPATIENT
Start: 2024-09-25 | End: 2024-09-25

## 2024-09-25 RX ORDER — LIDOCAINE HYDROCHLORIDE 40 MG/ML
SOLUTION TOPICAL ONCE
OUTPATIENT
Start: 2024-09-25 | End: 2024-09-25

## 2024-09-25 RX ORDER — MUPIROCIN 20 MG/G
OINTMENT TOPICAL ONCE
OUTPATIENT
Start: 2024-09-25 | End: 2024-09-25

## 2024-09-25 RX ORDER — GENTAMICIN SULFATE 1 MG/G
OINTMENT TOPICAL ONCE
OUTPATIENT
Start: 2024-09-25 | End: 2024-09-25

## 2024-09-25 RX ORDER — LIDOCAINE HYDROCHLORIDE 20 MG/ML
JELLY TOPICAL ONCE
OUTPATIENT
Start: 2024-09-25 | End: 2024-09-25

## 2024-09-25 RX ORDER — LIDOCAINE 40 MG/G
CREAM TOPICAL ONCE
OUTPATIENT
Start: 2024-09-25 | End: 2024-09-25

## 2024-09-25 RX ORDER — SODIUM CHLOR/HYPOCHLOROUS ACID 0.033 %
SOLUTION, IRRIGATION IRRIGATION ONCE
OUTPATIENT
Start: 2024-09-25 | End: 2024-09-25

## 2024-09-25 RX ORDER — TRIAMCINOLONE ACETONIDE 1 MG/G
OINTMENT TOPICAL ONCE
OUTPATIENT
Start: 2024-09-25 | End: 2024-09-25

## 2024-09-25 RX ORDER — NEOMYCIN/BACITRACIN/POLYMYXINB 3.5-400-5K
OINTMENT (GRAM) TOPICAL ONCE
OUTPATIENT
Start: 2024-09-25 | End: 2024-09-25

## 2024-09-25 RX ORDER — LIDOCAINE HYDROCHLORIDE 20 MG/ML
JELLY TOPICAL ONCE
Status: COMPLETED | OUTPATIENT
Start: 2024-09-25 | End: 2024-09-25

## 2024-09-25 RX ORDER — GINSENG 100 MG
CAPSULE ORAL ONCE
OUTPATIENT
Start: 2024-09-25 | End: 2024-09-25

## 2024-09-25 RX ORDER — BETAMETHASONE DIPROPIONATE 0.5 MG/G
CREAM TOPICAL ONCE
OUTPATIENT
Start: 2024-09-25 | End: 2024-09-25

## 2024-09-25 RX ORDER — LIDOCAINE 50 MG/G
OINTMENT TOPICAL ONCE
OUTPATIENT
Start: 2024-09-25 | End: 2024-09-25

## 2024-09-25 RX ORDER — SILVER SULFADIAZINE 10 MG/G
CREAM TOPICAL ONCE
OUTPATIENT
Start: 2024-09-25 | End: 2024-09-25

## 2024-09-25 RX ADMIN — LIDOCAINE HYDROCHLORIDE: 20 JELLY TOPICAL at 08:06

## 2024-09-25 ASSESSMENT — PAIN DESCRIPTION - PAIN TYPE: TYPE: CHRONIC PAIN

## 2024-09-25 ASSESSMENT — PAIN SCALES - GENERAL: PAINLEVEL_OUTOF10: 5

## 2024-09-25 ASSESSMENT — PAIN DESCRIPTION - LOCATION: LOCATION: FOOT

## 2024-09-25 ASSESSMENT — PAIN DESCRIPTION - FREQUENCY: FREQUENCY: INTERMITTENT

## 2024-09-25 ASSESSMENT — PAIN - FUNCTIONAL ASSESSMENT: PAIN_FUNCTIONAL_ASSESSMENT: PREVENTS OR INTERFERES SOME ACTIVE ACTIVITIES AND ADLS

## 2024-09-25 ASSESSMENT — PAIN DESCRIPTION - ORIENTATION: ORIENTATION: LEFT

## 2024-09-25 ASSESSMENT — PAIN DESCRIPTION - ONSET: ONSET: ON-GOING

## 2024-09-30 ENCOUNTER — OFFICE VISIT (OUTPATIENT)
Dept: NEUROLOGY | Age: 84
End: 2024-09-30
Payer: MEDICARE

## 2024-09-30 VITALS
SYSTOLIC BLOOD PRESSURE: 114 MMHG | WEIGHT: 199 LBS | HEART RATE: 62 BPM | RESPIRATION RATE: 18 BRPM | DIASTOLIC BLOOD PRESSURE: 61 MMHG | HEIGHT: 70 IN | BODY MASS INDEX: 28.49 KG/M2

## 2024-09-30 DIAGNOSIS — G45.0 VERTEBROBASILAR ARTERY INSUFFICIENCY: ICD-10-CM

## 2024-09-30 DIAGNOSIS — E11.42 DIABETIC POLYNEUROPATHY ASSOCIATED WITH TYPE 2 DIABETES MELLITUS (HCC): Primary | ICD-10-CM

## 2024-09-30 DIAGNOSIS — R41.3 MEMORY LOSS: ICD-10-CM

## 2024-09-30 DIAGNOSIS — G25.81 RESTLESS LEGS SYNDROME: ICD-10-CM

## 2024-09-30 PROCEDURE — 3046F HEMOGLOBIN A1C LEVEL >9.0%: CPT | Performed by: PSYCHIATRY & NEUROLOGY

## 2024-09-30 PROCEDURE — 1123F ACP DISCUSS/DSCN MKR DOCD: CPT | Performed by: PSYCHIATRY & NEUROLOGY

## 2024-09-30 PROCEDURE — 3074F SYST BP LT 130 MM HG: CPT | Performed by: PSYCHIATRY & NEUROLOGY

## 2024-09-30 PROCEDURE — 1036F TOBACCO NON-USER: CPT | Performed by: PSYCHIATRY & NEUROLOGY

## 2024-09-30 PROCEDURE — G8417 CALC BMI ABV UP PARAM F/U: HCPCS | Performed by: PSYCHIATRY & NEUROLOGY

## 2024-09-30 PROCEDURE — G8427 DOCREV CUR MEDS BY ELIG CLIN: HCPCS | Performed by: PSYCHIATRY & NEUROLOGY

## 2024-09-30 PROCEDURE — 3078F DIAST BP <80 MM HG: CPT | Performed by: PSYCHIATRY & NEUROLOGY

## 2024-09-30 PROCEDURE — 99213 OFFICE O/P EST LOW 20 MIN: CPT | Performed by: PSYCHIATRY & NEUROLOGY

## 2024-09-30 RX ORDER — HYDROCODONE BITARTRATE AND ACETAMINOPHEN 10; 325 MG/1; MG/1
1 TABLET ORAL EVERY 6 HOURS PRN
Qty: 120 TABLET | Refills: 0 | Status: SHIPPED | OUTPATIENT
Start: 2024-09-30 | End: 2024-10-30

## 2024-09-30 NOTE — PROGRESS NOTES
DROWSY**      tamsulosin (FLOMAX) 0.4 MG capsule       augmented betamethasone dipropionate (DIPROLENE-AF) 0.05 % ointment       aspirin EC 81 MG EC tablet Take 1 tablet by mouth 2 times daily for 21 days (Patient taking differently: Take 1 tablet by mouth daily) 42 tablet 0    pramipexole (MIRAPEX) 1.5 MG tablet 3 times daily       ezetimibe (ZETIA) 10 MG tablet Take 1 tablet by mouth daily      atenolol (TENORMIN) 25 MG tablet Take 1 tablet by mouth daily      B-D UF III MINI PEN NEEDLES 31G X 5 MM MISC       furosemide (LASIX) 20 MG tablet Take 1 tablet by mouth as needed (For weight gain greater than 2.5 lbs in 24 hours.) 30 tablet 0    esomeprazole (NEXIUM) 40 MG capsule Take 1 capsule by mouth every morning (before breakfast)       No current facility-administered medications for this visit.       Allergies:  Allergies as of 09/30/2024 - Fully Reviewed 09/30/2024   Allergen Reaction Noted    Azithromycin  07/24/2020    Metoprolol  07/24/2020    Cyclobenzaprine Rash 07/24/2020    Metoclopramide Rash 07/24/2020       Review of Systems:  Constitutional: negative for - chills and fever  Eyes:  negative for - visual disturbance and photophobia  HENMT: negative for - headaches and sinus pain  Respiratory: negative for - cough, hemoptysis, and shortness of breath  Cardiovascular: negative for - chest pain and palpitations  Gastrointestinal: negative for - blood in stools, constipation, diarrhea, nausea, and vomiting  Genito-Urinary: negative for - hematuria, urinary frequency, urinary urgency, and urinary retention  Musculoskeletal: positive for - joint pain, joint stiffness, and joint swelling  Hematological and Lymphatic: negative for - bleeding problems, abnormal bruising, and swollen lymph nodes  Endocrine:  negative for - polydipsia and polyphagia  Allergy/Immunology:  negative for - rhinorrhea, sinus congestion, hives  Integument:  negative for - negative for - rash, change in moles, new or changing

## 2024-10-02 ENCOUNTER — HOSPITAL ENCOUNTER (OUTPATIENT)
Dept: WOUND CARE | Age: 84
Discharge: HOME | End: 2024-10-02
Payer: MEDICARE

## 2024-10-02 VITALS
HEIGHT: 70 IN | BODY MASS INDEX: 28.49 KG/M2 | SYSTOLIC BLOOD PRESSURE: 106 MMHG | RESPIRATION RATE: 18 BRPM | WEIGHT: 199 LBS | DIASTOLIC BLOOD PRESSURE: 59 MMHG | TEMPERATURE: 97.3 F | HEART RATE: 87 BPM

## 2024-10-02 DIAGNOSIS — E11.621 DIABETIC ULCER OF LEFT MIDFOOT ASSOCIATED WITH TYPE 2 DIABETES MELLITUS, WITH FAT LAYER EXPOSED (HCC): Primary | ICD-10-CM

## 2024-10-02 DIAGNOSIS — L97.422 DIABETIC ULCER OF LEFT MIDFOOT ASSOCIATED WITH TYPE 2 DIABETES MELLITUS, WITH FAT LAYER EXPOSED (HCC): Primary | ICD-10-CM

## 2024-10-02 DIAGNOSIS — I73.9 PAD (PERIPHERAL ARTERY DISEASE) (HCC): ICD-10-CM

## 2024-10-02 PROCEDURE — 97597 DBRDMT OPN WND 1ST 20 CM/<: CPT | Performed by: SURGERY

## 2024-10-02 PROCEDURE — 97597 DBRDMT OPN WND 1ST 20 CM/<: CPT

## 2024-10-02 RX ORDER — GINSENG 100 MG
CAPSULE ORAL ONCE
OUTPATIENT
Start: 2024-10-02 | End: 2024-10-02

## 2024-10-02 RX ORDER — NEOMYCIN/BACITRACIN/POLYMYXINB 3.5-400-5K
OINTMENT (GRAM) TOPICAL ONCE
OUTPATIENT
Start: 2024-10-02 | End: 2024-10-02

## 2024-10-02 RX ORDER — LIDOCAINE HYDROCHLORIDE 20 MG/ML
JELLY TOPICAL ONCE
OUTPATIENT
Start: 2024-10-02 | End: 2024-10-02

## 2024-10-02 RX ORDER — MUPIROCIN 20 MG/G
OINTMENT TOPICAL ONCE
OUTPATIENT
Start: 2024-10-02 | End: 2024-10-02

## 2024-10-02 RX ORDER — LIDOCAINE HYDROCHLORIDE 40 MG/ML
SOLUTION TOPICAL ONCE
OUTPATIENT
Start: 2024-10-02 | End: 2024-10-02

## 2024-10-02 RX ORDER — LIDOCAINE 50 MG/G
OINTMENT TOPICAL ONCE
OUTPATIENT
Start: 2024-10-02 | End: 2024-10-02

## 2024-10-02 RX ORDER — LIDOCAINE HYDROCHLORIDE 20 MG/ML
JELLY TOPICAL ONCE
Status: COMPLETED | OUTPATIENT
Start: 2024-10-02 | End: 2024-10-02

## 2024-10-02 RX ORDER — BETAMETHASONE DIPROPIONATE 0.5 MG/G
CREAM TOPICAL ONCE
OUTPATIENT
Start: 2024-10-02 | End: 2024-10-02

## 2024-10-02 RX ORDER — LIDOCAINE 40 MG/G
CREAM TOPICAL ONCE
OUTPATIENT
Start: 2024-10-02 | End: 2024-10-02

## 2024-10-02 RX ORDER — SILVER SULFADIAZINE 10 MG/G
CREAM TOPICAL ONCE
OUTPATIENT
Start: 2024-10-02 | End: 2024-10-02

## 2024-10-02 RX ORDER — BACITRACIN ZINC AND POLYMYXIN B SULFATE 500; 1000 [USP'U]/G; [USP'U]/G
OINTMENT TOPICAL ONCE
OUTPATIENT
Start: 2024-10-02 | End: 2024-10-02

## 2024-10-02 RX ORDER — TRIAMCINOLONE ACETONIDE 1 MG/G
OINTMENT TOPICAL ONCE
OUTPATIENT
Start: 2024-10-02 | End: 2024-10-02

## 2024-10-02 RX ORDER — SODIUM CHLOR/HYPOCHLOROUS ACID 0.033 %
SOLUTION, IRRIGATION IRRIGATION ONCE
OUTPATIENT
Start: 2024-10-02 | End: 2024-10-02

## 2024-10-02 RX ORDER — GENTAMICIN SULFATE 1 MG/G
OINTMENT TOPICAL ONCE
OUTPATIENT
Start: 2024-10-02 | End: 2024-10-02

## 2024-10-02 RX ORDER — CLOBETASOL PROPIONATE 0.5 MG/G
OINTMENT TOPICAL ONCE
OUTPATIENT
Start: 2024-10-02 | End: 2024-10-02

## 2024-10-02 RX ADMIN — LIDOCAINE HYDROCHLORIDE: 20 JELLY TOPICAL at 08:39

## 2024-10-02 ASSESSMENT — PAIN - FUNCTIONAL ASSESSMENT: PAIN_FUNCTIONAL_ASSESSMENT: PREVENTS OR INTERFERES SOME ACTIVE ACTIVITIES AND ADLS

## 2024-10-02 ASSESSMENT — PAIN DESCRIPTION - PAIN TYPE: TYPE: CHRONIC PAIN;NEUROPATHIC PAIN

## 2024-10-02 ASSESSMENT — PAIN DESCRIPTION - ONSET: ONSET: ON-GOING

## 2024-10-02 ASSESSMENT — PAIN DESCRIPTION - ORIENTATION: ORIENTATION: LEFT

## 2024-10-02 ASSESSMENT — PAIN DESCRIPTION - FREQUENCY: FREQUENCY: INTERMITTENT

## 2024-10-02 ASSESSMENT — PAIN SCALES - GENERAL: PAINLEVEL_OUTOF10: 5

## 2024-10-02 ASSESSMENT — PAIN DESCRIPTION - LOCATION: LOCATION: FOOT

## 2024-10-02 ASSESSMENT — PAIN DESCRIPTION - DESCRIPTORS: DESCRIPTORS: BURNING;SHOOTING

## 2024-10-02 NOTE — PLAN OF CARE
Problem: Pain  Goal: Verbalizes/displays adequate comfort level or baseline comfort level  Outcome: Progressing     Problem: Wound:  Goal: Will show signs of wound healing; wound closure and no evidence of infection  Description: Will show signs of wound healing; wound closure and no evidence of infection  Outcome: Progressing     Problem: Weight control:  Goal: Ability to maintain an optimal weight for height and age will be supported  Description: Ability to maintain an optimal weight for height and age will be supported  Outcome: Progressing     Problem: Falls - Risk of:  Goal: Will remain free from falls  Description: Will remain free from falls  Outcome: Progressing     Problem: Blood Glucose:  Goal: Ability to maintain appropriate glucose levels will improve  Description: Ability to maintain appropriate glucose levels will improve  Outcome: Progressing

## 2024-10-02 NOTE — PATIENT INSTRUCTIONS
[facility] Wound Care and Hyperbaric Oxygen Therapy   Physician Orders and Discharge Instructions  [address] [place] [address] Drive  [address]  [place] [address]  Telephone: [phone]      FAX [phone]    NAME:  Michael Szymanski  YOB: 1940  MEDICAL RECORD NUMBER:  [id number]  DATE:  10/2/2024    Discharge condition: Stable    Discharge to: Home    Left via:Private automobile    Accompanied by: Family    ECF/HHA: [facility]    Dressing Orders: Left foot wounds  Soap and water wash.  Apply a double layer of Xeroform (the yellow sticky dressing) over the wound bed, secure with dry gauze and rolled gauze daily.  Wear cast shoe with Metatarsal pad cut out for the wound  Use Flexitol to both feet daily to reduce and prevent callus (can get at [facility] or [facility])  Protein rich diet  Multivitamin  Keep glucose levels under 150    [facility] follow up visit _____________1 week________________  (Please note your next appointment above and if you are unable to keep, kindly give a 24 hour notice. Thank you.)    If you experience any of the following, please call the Wound Care Center during business hours:    * Increase in Pain  * Temperature over 101  * Increase in drainage from your wound  * Drainage with a foul odor  * Bleeding  * Increase in swelling  * Need for compression bandage changes due to slippage, breakthrough drainage.    If you need medical attention outside of the business hours of the Wound Care Centers please contact your PCP or go to the nearest emergency room.

## 2024-10-02 NOTE — PROGRESS NOTES
[facility] Wound Care Center   Progress Note and Procedure Note      Michael Szymanski  MEDICAL RECORD NUMBER:  [id number]  AGE: 84 y.o.   GENDER: male  : 1940  EPISODE DATE:  10/2/2024    Subjective:     Chief Complaint   Patient presents with    Wound Check     Pt presents for recheck of left foot wound         HISTORY of PRESENT ILLNESS HPI     Michael Szymanski is a 84 y.o. male who presents today for wound/ulcer evaluation.   Wound Context: Pt with L plantar DFU here for eval/treat  Wound/Ulcer Pain Timing/Severity: constant  Quality of pain: sharp  Severity:  2  10   Modifying Factors: None  Associated Signs/Symptoms: numbness and tingling    Ulcer Identification:  Ulcer Type: diabetic  Contributing Factors: diabetes    Wound:  DFU        PAST MEDICAL HISTORY        Diagnosis Date    Aortic valve stenosis     Arthritis     Chest tightness, discomfort, or pressure 2012    Chronic back pain     CKD (chronic kidney disease)     CPAP (continuous positive airway pressure) dependence     13cm    Diabetes (HCC)     Diabetic polyneuropathy associated with type 2 diabetes mellitus (HCC) 2016    GERD (gastroesophageal reflux disease)     HTN (hypertension)     Hyperlipemia     Left ventricular diastolic dysfunction     Mitral stenosis     Mitral valve stenosis     Neuropathy     Obstructive sleep apnea     AHI: 34.5 per PSG, 2013; AHI: 36.9 per PSG, 2015; AHI: 54.5 per PSG, 3/2017    Pinched nerve in neck     Restless legs syndrome 2016       PAST SURGICAL HISTORY    Past Surgical History:   Procedure Laterality Date    APPENDECTOMY      BACK SURGERY      4 Back surgeries    BLADDER SURGERY      CARDIAC CATHETERIZATION  12    EF > 50%    CATARACT REMOVAL      CHOLECYSTECTOMY      COLONOSCOPY      EYE SURGERY      implants placed both eyes    HAND SURGERY Left 2020    INCISION AND DRAINAGE LEFT HAND ABSCESS performed by Jean Pierre Bellamy MD at [facility] OR    OTHER SURGICAL HISTORY  2020

## 2024-10-07 DIAGNOSIS — L29.1 SCROTAL ITCHING: ICD-10-CM

## 2024-10-07 RX ORDER — HYDROXYZINE HYDROCHLORIDE 25 MG/1
TABLET, FILM COATED ORAL
Qty: 30 TABLET | Refills: 1 | OUTPATIENT
Start: 2024-10-07

## 2024-10-08 ENCOUNTER — OFFICE VISIT (OUTPATIENT)
Dept: CARDIOLOGY CLINIC | Age: 84
End: 2024-10-08

## 2024-10-08 VITALS
HEIGHT: 70 IN | HEART RATE: 67 BPM | WEIGHT: 194 LBS | BODY MASS INDEX: 27.77 KG/M2 | DIASTOLIC BLOOD PRESSURE: 68 MMHG | SYSTOLIC BLOOD PRESSURE: 112 MMHG

## 2024-10-08 DIAGNOSIS — I49.5 SINOATRIAL NODE DYSFUNCTION (HCC): ICD-10-CM

## 2024-10-08 DIAGNOSIS — Z95.0 PACEMAKER: ICD-10-CM

## 2024-10-08 DIAGNOSIS — G47.33 OBSTRUCTIVE SLEEP APNEA: ICD-10-CM

## 2024-10-08 DIAGNOSIS — I50.42 CHRONIC COMBINED SYSTOLIC AND DIASTOLIC HEART FAILURE DUE TO VALVULAR DISEASE (HCC): ICD-10-CM

## 2024-10-08 DIAGNOSIS — I38 VALVULAR HEART DISEASE: ICD-10-CM

## 2024-10-08 DIAGNOSIS — I48.0 PAROXYSMAL ATRIAL FIBRILLATION (HCC): ICD-10-CM

## 2024-10-08 DIAGNOSIS — I06.0 RHEUMATIC AORTIC STENOSIS: ICD-10-CM

## 2024-10-08 DIAGNOSIS — I38 CHRONIC COMBINED SYSTOLIC AND DIASTOLIC HEART FAILURE DUE TO VALVULAR DISEASE (HCC): ICD-10-CM

## 2024-10-08 DIAGNOSIS — E78.2 MIXED HYPERLIPIDEMIA: ICD-10-CM

## 2024-10-08 DIAGNOSIS — I05.0 RHEUMATIC MITRAL STENOSIS: ICD-10-CM

## 2024-10-08 DIAGNOSIS — I10 ESSENTIAL HYPERTENSION: ICD-10-CM

## 2024-10-08 DIAGNOSIS — I25.10 CORONARY ARTERY DISEASE INVOLVING NATIVE CORONARY ARTERY OF NATIVE HEART WITHOUT ANGINA PECTORIS: Primary | ICD-10-CM

## 2024-10-08 ASSESSMENT — ENCOUNTER SYMPTOMS
SHORTNESS OF BREATH: 1
VOMITING: 0
NAUSEA: 0
FACIAL SWELLING: 0
ABDOMINAL PAIN: 1
CHEST TIGHTNESS: 0
EYE REDNESS: 0
WHEEZING: 0
COUGH: 0

## 2024-10-08 NOTE — PROGRESS NOTES
[facility]  [address] [address], [facility]  [address]  Phone: [phone]  Fax: [phone]    OFFICE VISIT:  10/8/2024    Michael Szymanski - : 1940    Reason For Visit:  Michael is a 84 y.o. male who is here for Follow-up (No cardiac symptoms/) and Coronary artery disease involving native coronary artery of       Diagnosis Orders   1. Coronary artery disease involving native coronary artery of native heart without angina pectoris        2. 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 Valvular heart disease        4. Paroxysmal atrial fibrillation (HCC)        5. Essential hypertension        6. Rheumatic mitral stenosis        7. Rheumatic aortic stenosis        8. Sinoatrial node dysfunction (HCC) [I49.5 (ICD-10-CM)]        9. Pacemaker        10. Mixed hyperlipidemia        11. 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 Patient had a pacemaker generator change in 2020.  Issues were found with elevated RV threshold.  Patient was sent to Dr. Guy in [place] for  RV lead replacement 8/3/20.  Patient had an injury to hand with a fishhook shortly thereafter and developed a systemic infection involving several surgeries and IV antibiotics.  He has functional impairment of the left hand as a result of this injury.    Patient was hospitalized 2021 with pneumonia related to COVID.  He has had ongoing issues with shortness of breath and fatigue since this time.    Other history includes chronic back pain, diabetes, sleep apnea, CKD, hypertension, hyperlipidemia.    He has developed a diabetic foot ulcer since his last visit here and is being seen at the wound care center.  He is wearing a boot

## 2024-10-09 ENCOUNTER — HOSPITAL ENCOUNTER (OUTPATIENT)
Dept: WOUND CARE | Age: 84
Discharge: HOME | End: 2024-10-09
Payer: MEDICARE

## 2024-10-09 VITALS
HEART RATE: 85 BPM | BODY MASS INDEX: 27.77 KG/M2 | WEIGHT: 194 LBS | DIASTOLIC BLOOD PRESSURE: 59 MMHG | SYSTOLIC BLOOD PRESSURE: 103 MMHG | RESPIRATION RATE: 18 BRPM | HEIGHT: 70 IN | TEMPERATURE: 96 F

## 2024-10-09 DIAGNOSIS — L97.422 DIABETIC ULCER OF LEFT MIDFOOT ASSOCIATED WITH TYPE 2 DIABETES MELLITUS, WITH FAT LAYER EXPOSED (HCC): Primary | Chronic | ICD-10-CM

## 2024-10-09 DIAGNOSIS — E11.621 DIABETIC ULCER OF LEFT MIDFOOT ASSOCIATED WITH TYPE 2 DIABETES MELLITUS, WITH FAT LAYER EXPOSED (HCC): Primary | Chronic | ICD-10-CM

## 2024-10-09 DIAGNOSIS — I73.9 PAD (PERIPHERAL ARTERY DISEASE) (HCC): ICD-10-CM

## 2024-10-09 PROCEDURE — 97597 DBRDMT OPN WND 1ST 20 CM/<: CPT

## 2024-10-09 PROCEDURE — 97597 DBRDMT OPN WND 1ST 20 CM/<: CPT | Performed by: SURGERY

## 2024-10-09 RX ORDER — LIDOCAINE HYDROCHLORIDE 20 MG/ML
JELLY TOPICAL ONCE
Status: COMPLETED | OUTPATIENT
Start: 2024-10-09 | End: 2024-10-09

## 2024-10-09 RX ORDER — LIDOCAINE HYDROCHLORIDE 20 MG/ML
JELLY TOPICAL ONCE
OUTPATIENT
Start: 2024-10-09 | End: 2024-10-09

## 2024-10-09 RX ORDER — TRIAMCINOLONE ACETONIDE 1 MG/G
OINTMENT TOPICAL ONCE
OUTPATIENT
Start: 2024-10-09 | End: 2024-10-09

## 2024-10-09 RX ORDER — NEOMYCIN/BACITRACIN/POLYMYXINB 3.5-400-5K
OINTMENT (GRAM) TOPICAL ONCE
OUTPATIENT
Start: 2024-10-09 | End: 2024-10-09

## 2024-10-09 RX ORDER — BACITRACIN ZINC AND POLYMYXIN B SULFATE 500; 1000 [USP'U]/G; [USP'U]/G
OINTMENT TOPICAL ONCE
OUTPATIENT
Start: 2024-10-09 | End: 2024-10-09

## 2024-10-09 RX ORDER — LIDOCAINE HYDROCHLORIDE 40 MG/ML
SOLUTION TOPICAL ONCE
OUTPATIENT
Start: 2024-10-09 | End: 2024-10-09

## 2024-10-09 RX ORDER — GINSENG 100 MG
CAPSULE ORAL ONCE
OUTPATIENT
Start: 2024-10-09 | End: 2024-10-09

## 2024-10-09 RX ORDER — LIDOCAINE 40 MG/G
CREAM TOPICAL ONCE
OUTPATIENT
Start: 2024-10-09 | End: 2024-10-09

## 2024-10-09 RX ORDER — CLOBETASOL PROPIONATE 0.5 MG/G
OINTMENT TOPICAL ONCE
OUTPATIENT
Start: 2024-10-09 | End: 2024-10-09

## 2024-10-09 RX ORDER — GENTAMICIN SULFATE 1 MG/G
OINTMENT TOPICAL ONCE
OUTPATIENT
Start: 2024-10-09 | End: 2024-10-09

## 2024-10-09 RX ORDER — BETAMETHASONE DIPROPIONATE 0.5 MG/G
CREAM TOPICAL ONCE
OUTPATIENT
Start: 2024-10-09 | End: 2024-10-09

## 2024-10-09 RX ORDER — MUPIROCIN 20 MG/G
OINTMENT TOPICAL ONCE
OUTPATIENT
Start: 2024-10-09 | End: 2024-10-09

## 2024-10-09 RX ORDER — LIDOCAINE 50 MG/G
OINTMENT TOPICAL ONCE
OUTPATIENT
Start: 2024-10-09 | End: 2024-10-09

## 2024-10-09 RX ORDER — SILVER SULFADIAZINE 10 MG/G
CREAM TOPICAL ONCE
OUTPATIENT
Start: 2024-10-09 | End: 2024-10-09

## 2024-10-09 RX ORDER — SODIUM CHLOR/HYPOCHLOROUS ACID 0.033 %
SOLUTION, IRRIGATION IRRIGATION ONCE
OUTPATIENT
Start: 2024-10-09 | End: 2024-10-09

## 2024-10-09 RX ADMIN — LIDOCAINE HYDROCHLORIDE: 20 JELLY TOPICAL at 09:35

## 2024-10-09 NOTE — PATIENT INSTRUCTIONS
[facility] Wound Care and Hyperbaric Oxygen Therapy   Physician Orders and Discharge Instructions  [address] [place] [address] Drive  [address]  [place] [address]  Telephone: [phone]      FAX [phone]    NAME:  Michael Szymanski  YOB: 1940  MEDICAL RECORD NUMBER:  [id number]  DATE:  10/9/2024    Discharge condition: Stable    Discharge to: Home    Left via:Private automobile    Accompanied by: Family    ECF/H[facility]: [facility]    Dressing Orders: Left foot wounds  Soap and water wash.  Apply a double layer of Xeroform (the yellow sticky dressing) over the wound bed, secure with dry gauze and rolled gauze daily.  Wear cast shoe with Metatarsal pad cut out for the wound  Use Flexitol to both feet daily to reduce and prevent callus (can get at [facility])  Protein rich diet  Multivitamin  Keep glucose levels under 150    [facility] follow up visit ___________2 weeks__________________  (Please note your next appointment above and if you are unable to keep, kindly give a 24 hour notice. Thank you.)    If you experience any of the following, please call the Wound Care Center during business hours:    * Increase in Pain  * Temperature over 101  * Increase in drainage from your wound  * Drainage with a foul odor  * Bleeding  * Increase in swelling  * Need for compression bandage changes due to slippage, breakthrough drainage.    If you need medical attention outside of the business hours of the Wound Care Centers please contact your PCP or go to the nearest emergency room.

## 2024-10-09 NOTE — PROGRESS NOTES
[facility] Wound Care Center   Progress Note and Procedure Note      Michael Szymanski  MEDICAL RECORD NUMBER:  [id number]  AGE: 84 y.o.   GENDER: male  : 1940  EPISODE DATE:  10/9/2024    Subjective:     Chief Complaint   Patient presents with    Wound Check         HISTORY of PRESENT ILLNESS HPI     Michael Szymanski is a 84 y.o. male who presents today for wound/ulcer evaluation.   Wound Context: Pt with DFU plantar L foot here for eval/treat  Wound/Ulcer Pain Timing/Severity: none  Quality of pain: N/A  Severity:  0 / 10   Modifying Factors: None  Associated Signs/Symptoms: none    Ulcer Identification:  Ulcer Type: diabetic  Contributing Factors: diabetes and chronic pressure    Wound:  DFU        PAST MEDICAL HISTORY        Diagnosis Date    Aortic valve stenosis     Arthritis     Chest tightness, discomfort, or pressure 2012    Chronic back pain     CKD (chronic kidney disease)     CPAP (continuous positive airway pressure) dependence     13cm    Diabetes (HCC)     Diabetic polyneuropathy associated with type 2 diabetes mellitus (HCC) 2016    GERD (gastroesophageal reflux disease)     HTN (hypertension)     Hyperlipemia     Left ventricular diastolic dysfunction     Mitral stenosis     Mitral valve stenosis     Neuropathy     Obstructive sleep apnea     AHI: 34.5 per PSG, 2013; AHI: 36.9 per PSG, 2015; AHI: 54.5 per PSG, 3/2017    Pinched nerve in neck     Restless legs syndrome 2016       PAST SURGICAL HISTORY    Past Surgical History:   Procedure Laterality Date    APPENDECTOMY      BACK SURGERY      4 Back surgeries    BLADDER SURGERY      CARDIAC CATHETERIZATION  12    EF > 50%    CATARACT REMOVAL      CHOLECYSTECTOMY      COLONOSCOPY      EYE SURGERY      implants placed both eyes    HAND SURGERY Left 2020    INCISION AND DRAINAGE LEFT HAND ABSCESS performed by Jean Pierre Bellamy MD at [facility] OR    OTHER SURGICAL HISTORY  2020    Right Ventricular lead replacement-

## 2024-10-14 DIAGNOSIS — R33.9 URINARY RETENTION: ICD-10-CM

## 2024-10-14 RX ORDER — FINASTERIDE 5 MG/1
5 TABLET, FILM COATED ORAL DAILY
Qty: 90 TABLET | Refills: 3 | Status: SHIPPED | OUTPATIENT
Start: 2024-10-14

## 2024-10-23 ENCOUNTER — HOSPITAL ENCOUNTER (OUTPATIENT)
Dept: WOUND CARE | Age: 84
Discharge: HOME | End: 2024-10-23
Payer: MEDICARE

## 2024-10-23 VITALS
BODY MASS INDEX: 27.77 KG/M2 | DIASTOLIC BLOOD PRESSURE: 57 MMHG | HEIGHT: 70 IN | TEMPERATURE: 96.7 F | SYSTOLIC BLOOD PRESSURE: 101 MMHG | WEIGHT: 194 LBS | HEART RATE: 84 BPM | RESPIRATION RATE: 16 BRPM

## 2024-10-23 DIAGNOSIS — I73.9 PAD (PERIPHERAL ARTERY DISEASE) (HCC): ICD-10-CM

## 2024-10-23 DIAGNOSIS — L97.422 DIABETIC ULCER OF LEFT MIDFOOT ASSOCIATED WITH TYPE 2 DIABETES MELLITUS, WITH FAT LAYER EXPOSED (HCC): Primary | ICD-10-CM

## 2024-10-23 DIAGNOSIS — E11.621 DIABETIC ULCER OF LEFT MIDFOOT ASSOCIATED WITH TYPE 2 DIABETES MELLITUS, WITH FAT LAYER EXPOSED (HCC): Primary | ICD-10-CM

## 2024-10-23 PROCEDURE — 99212 OFFICE O/P EST SF 10 MIN: CPT

## 2024-10-23 PROCEDURE — 99212 OFFICE O/P EST SF 10 MIN: CPT | Performed by: SURGERY

## 2024-10-23 RX ORDER — LIDOCAINE 40 MG/G
CREAM TOPICAL ONCE
OUTPATIENT
Start: 2024-10-23 | End: 2024-10-23

## 2024-10-23 RX ORDER — SILVER SULFADIAZINE 10 MG/G
CREAM TOPICAL ONCE
OUTPATIENT
Start: 2024-10-23 | End: 2024-10-23

## 2024-10-23 RX ORDER — LIDOCAINE 50 MG/G
OINTMENT TOPICAL ONCE
OUTPATIENT
Start: 2024-10-23 | End: 2024-10-23

## 2024-10-23 RX ORDER — MUPIROCIN 20 MG/G
OINTMENT TOPICAL ONCE
OUTPATIENT
Start: 2024-10-23 | End: 2024-10-23

## 2024-10-23 RX ORDER — BETAMETHASONE DIPROPIONATE 0.5 MG/G
CREAM TOPICAL ONCE
OUTPATIENT
Start: 2024-10-23 | End: 2024-10-23

## 2024-10-23 RX ORDER — BACITRACIN ZINC AND POLYMYXIN B SULFATE 500; 1000 [USP'U]/G; [USP'U]/G
OINTMENT TOPICAL ONCE
OUTPATIENT
Start: 2024-10-23 | End: 2024-10-23

## 2024-10-23 RX ORDER — LIDOCAINE HYDROCHLORIDE 40 MG/ML
SOLUTION TOPICAL ONCE
OUTPATIENT
Start: 2024-10-23 | End: 2024-10-23

## 2024-10-23 RX ORDER — CLOBETASOL PROPIONATE 0.5 MG/G
OINTMENT TOPICAL ONCE
OUTPATIENT
Start: 2024-10-23 | End: 2024-10-23

## 2024-10-23 RX ORDER — GINSENG 100 MG
CAPSULE ORAL ONCE
OUTPATIENT
Start: 2024-10-23 | End: 2024-10-23

## 2024-10-23 RX ORDER — LIDOCAINE HYDROCHLORIDE 20 MG/ML
JELLY TOPICAL ONCE
OUTPATIENT
Start: 2024-10-23 | End: 2024-10-23

## 2024-10-23 RX ORDER — SODIUM CHLOR/HYPOCHLOROUS ACID 0.033 %
SOLUTION, IRRIGATION IRRIGATION ONCE
OUTPATIENT
Start: 2024-10-23 | End: 2024-10-23

## 2024-10-23 RX ORDER — NEOMYCIN/BACITRACIN/POLYMYXINB 3.5-400-5K
OINTMENT (GRAM) TOPICAL ONCE
OUTPATIENT
Start: 2024-10-23 | End: 2024-10-23

## 2024-10-23 RX ORDER — TRIAMCINOLONE ACETONIDE 1 MG/G
OINTMENT TOPICAL ONCE
OUTPATIENT
Start: 2024-10-23 | End: 2024-10-23

## 2024-10-23 RX ORDER — GENTAMICIN SULFATE 1 MG/G
OINTMENT TOPICAL ONCE
OUTPATIENT
Start: 2024-10-23 | End: 2024-10-23

## 2024-10-23 ASSESSMENT — PAIN SCALES - GENERAL: PAINLEVEL_OUTOF10: 0

## 2024-10-23 NOTE — PATIENT INSTRUCTIONS
[facility] Wound Care and Hyperbaric Oxygen Therapy   Physician Orders and Discharge Instructions  [address] [place] [address]  [address]  [place] [address]  Telephone: [phone]      FAX [phone]    NAME:  Michael Szymanski  YOB: 1940  MEDICAL RECORD NUMBER:  [id number]  DATE:  10/23/2024    Discharge condition: Stable    Discharge to: Home    Left via:Private automobile    Accompanied by: Family    ECF/HHA: [facility]    Dressing Orders: Left foot wounds  Healed, Moisturize and Protect   Follow up with your Family Practitioner as Instructed     Diabetic Foot Care    Diabetes can lead to many different types of foot complications, including athlete's foot (a fungal infection), calluses, bunions and other foot deformities, or ulcers that can range from a surface wound to a deep infection.  You will need to check your feet twice daily and give them special care and attention.    1) Wash with lukewarm water and a mild soap.  Dry well between your toes. Do not soak your feet unless instructed to do so by a physician.        Moisturize your feet with a good thick cream like Eucerin Cream, Aquaphor or Cocoa Butter (in a jar) at least twice daily. Do not apply moisturizer between toes.    2)  Protect your feet and never go barefoot.  Wear slippers around the house. Treat even minor wounds and skin cracks as an urgent matter when you have diabetes.  Remember early treatment is essential to avoid more advanced problems.    3) Check your feet daily or have someone else check them if your eyesight is limited.  If you live alone try using a handheld mirror.  Watch for a red spot that persists or callus formation.     4)  Look and feel inside your shoes before putting them on.  Inspect the soles for nails or screws.       5)  If you form callus or thickened skin on your feet use Flexitol Heel Balm once daily alternating with your regular moisturizer.  Flexitol Heel Balm is

## 2024-10-23 NOTE — PROGRESS NOTES
[facility] Wound Care Center   Progress Note and Procedure Note      Michael Szymanski  MEDICAL RECORD NUMBER:  [id number]  AGE: 84 y.o.   GENDER: male  : 1940  EPISODE DATE:  10/23/2024    Subjective:     Chief Complaint   Patient presents with    Wound Check     Starting PT tomorrow for back         HISTORY of PRESENT ILLNESS HPI     Michael Szymanski is a 84 y.o. male who presents today for wound/ulcer evaluation.   History of Wound Context: Pt with L foot wound here for eval/treat  Wound/Ulcer Pain Timing/Severity: none  Quality of pain: N/A  Severity:  0 / 10   Modifying Factors: None  Associated Signs/Symptoms: none    Ulcer Identification:  Ulcer Type: pressure  Contributing Factors: chronic pressure    Wound:  pressure        PAST MEDICAL HISTORY        Diagnosis Date    Aortic valve stenosis     Arthritis     Chest tightness, discomfort, or pressure 2012    Chronic back pain     CKD (chronic kidney disease)     CPAP (continuous positive airway pressure) dependence     13cm    Diabetes (HCC)     Diabetic polyneuropathy associated with type 2 diabetes mellitus (HCC) 2016    GERD (gastroesophageal reflux disease)     HTN (hypertension)     Hyperlipemia     Left ventricular diastolic dysfunction     Mitral stenosis     Mitral valve stenosis     Neuropathy     Obstructive sleep apnea     AHI: 34.5 per PSG, 2013; AHI: 36.9 per PSG, 2015; AHI: 54.5 per PSG, 3/2017    Pinched nerve in neck     Restless legs syndrome 2016       PAST SURGICAL HISTORY    Past Surgical History:   Procedure Laterality Date    APPENDECTOMY      BACK SURGERY      4 Back surgeries    BLADDER SURGERY      CARDIAC CATHETERIZATION  12    EF > 50%    CATARACT REMOVAL      CHOLECYSTECTOMY      COLONOSCOPY      EYE SURGERY      implants placed both eyes    HAND SURGERY Left 2020    INCISION AND DRAINAGE LEFT HAND ABSCESS performed by Jean Pierre [place] MD at [facility] OR    OTHER SURGICAL HISTORY  2020    Right

## 2024-10-23 NOTE — PLAN OF CARE
Problem: Wound:  Goal: Will show signs of wound healing; wound closure and no evidence of infection  Description: Will show signs of wound healing; wound closure and no evidence of infection  Outcome: Progressing     Problem: Blood Glucose:  Goal: Ability to maintain appropriate glucose levels will improve  Description: Ability to maintain appropriate glucose levels will improve  Outcome: Progressing

## 2024-10-24 RX ORDER — CITALOPRAM HYDROBROMIDE 20 MG/1
TABLET ORAL
Qty: 60 TABLET | Refills: 5 | Status: SHIPPED | OUTPATIENT
Start: 2024-10-24

## 2024-10-24 NOTE — TELEPHONE ENCOUNTER
Requested Prescriptions     Pending Prescriptions Disp Refills    citalopram (CELEXA) 20 MG tablet [Pharmacy Med Name: CITALOPRAM HBR 20 MG TABLET 20 Tablet] 60 tablet 5     Sig: TAKE ONE TABLET BY MOUTH DAILY       Last Office Visit: 9/30/2024  Next Office Visit: 4/21/2025  Last Medication Refill:8/21/2024

## 2024-11-25 DIAGNOSIS — L29.1 SCROTAL ITCHING: ICD-10-CM

## 2024-11-25 RX ORDER — HYDROXYZINE HYDROCHLORIDE 25 MG/1
TABLET, FILM COATED ORAL
Qty: 30 TABLET | Refills: 1 | Status: SHIPPED | OUTPATIENT
Start: 2024-11-25

## 2024-12-05 ENCOUNTER — TRANSCRIBE ORDERS (OUTPATIENT)
Dept: ADMINISTRATIVE | Facility: HOSPITAL | Age: 84
End: 2024-12-05
Payer: MEDICARE

## 2024-12-05 DIAGNOSIS — M47.816 LUMBAR SPONDYLOSIS: Primary | ICD-10-CM

## 2024-12-19 ENCOUNTER — HOSPITAL ENCOUNTER (OUTPATIENT)
Dept: WOUND CARE | Age: 84
Discharge: HOME | End: 2024-12-19
Attending: NURSE PRACTITIONER
Payer: MEDICARE

## 2024-12-19 ENCOUNTER — HOSPITAL ENCOUNTER (OUTPATIENT)
Dept: GENERAL RADIOLOGY | Age: 84
Discharge: HOME | End: 2024-12-19
Payer: MEDICARE

## 2024-12-19 VITALS
DIASTOLIC BLOOD PRESSURE: 70 MMHG | RESPIRATION RATE: 18 BRPM | WEIGHT: 194 LBS | HEIGHT: 70 IN | BODY MASS INDEX: 27.77 KG/M2 | HEART RATE: 84 BPM | SYSTOLIC BLOOD PRESSURE: 130 MMHG | TEMPERATURE: 96.4 F

## 2024-12-19 DIAGNOSIS — E11.621 DIABETIC ULCER OF OTHER PART OF RIGHT FOOT ASSOCIATED WITH TYPE 2 DIABETES MELLITUS, WITH FAT LAYER EXPOSED (HCC): ICD-10-CM

## 2024-12-19 DIAGNOSIS — E11.65 TYPE 2 DIABETES MELLITUS WITH HYPERGLYCEMIA, WITH LONG-TERM CURRENT USE OF INSULIN (HCC): ICD-10-CM

## 2024-12-19 DIAGNOSIS — Z79.4 TYPE 2 DIABETES MELLITUS WITH HYPERGLYCEMIA, WITH LONG-TERM CURRENT USE OF INSULIN (HCC): ICD-10-CM

## 2024-12-19 DIAGNOSIS — L97.512 DIABETIC ULCER OF OTHER PART OF RIGHT FOOT ASSOCIATED WITH TYPE 2 DIABETES MELLITUS, WITH FAT LAYER EXPOSED (HCC): ICD-10-CM

## 2024-12-19 DIAGNOSIS — E11.621 DIABETIC ULCER OF OTHER PART OF RIGHT FOOT ASSOCIATED WITH TYPE 2 DIABETES MELLITUS, WITH FAT LAYER EXPOSED (HCC): Primary | ICD-10-CM

## 2024-12-19 DIAGNOSIS — E11.621 DIABETIC ULCER OF RIGHT MIDFOOT ASSOCIATED WITH TYPE 2 DIABETES MELLITUS, WITH FAT LAYER EXPOSED (HCC): ICD-10-CM

## 2024-12-19 DIAGNOSIS — L97.412 DIABETIC ULCER OF RIGHT MIDFOOT ASSOCIATED WITH TYPE 2 DIABETES MELLITUS, WITH FAT LAYER EXPOSED (HCC): ICD-10-CM

## 2024-12-19 DIAGNOSIS — B37.2 YEAST INFECTION OF THE SKIN: ICD-10-CM

## 2024-12-19 DIAGNOSIS — L97.512 DIABETIC ULCER OF OTHER PART OF RIGHT FOOT ASSOCIATED WITH TYPE 2 DIABETES MELLITUS, WITH FAT LAYER EXPOSED (HCC): Primary | ICD-10-CM

## 2024-12-19 PROCEDURE — 99213 OFFICE O/P EST LOW 20 MIN: CPT

## 2024-12-19 PROCEDURE — 97597 DBRDMT OPN WND 1ST 20 CM/<: CPT

## 2024-12-19 PROCEDURE — 73620 X-RAY EXAM OF FOOT: CPT

## 2024-12-19 RX ORDER — LIDOCAINE 40 MG/G
CREAM TOPICAL ONCE
OUTPATIENT
Start: 2024-12-19 | End: 2024-12-19

## 2024-12-19 RX ORDER — CLOBETASOL PROPIONATE 0.5 MG/G
OINTMENT TOPICAL ONCE
OUTPATIENT
Start: 2024-12-19 | End: 2024-12-19

## 2024-12-19 RX ORDER — LIDOCAINE HYDROCHLORIDE 20 MG/ML
JELLY TOPICAL ONCE
OUTPATIENT
Start: 2024-12-19 | End: 2024-12-19

## 2024-12-19 RX ORDER — MUPIROCIN 20 MG/G
OINTMENT TOPICAL ONCE
OUTPATIENT
Start: 2024-12-19 | End: 2024-12-19

## 2024-12-19 RX ORDER — BACITRACIN ZINC AND POLYMYXIN B SULFATE 500; 1000 [USP'U]/G; [USP'U]/G
OINTMENT TOPICAL ONCE
OUTPATIENT
Start: 2024-12-19 | End: 2024-12-19

## 2024-12-19 RX ORDER — GINSENG 100 MG
CAPSULE ORAL ONCE
OUTPATIENT
Start: 2024-12-19 | End: 2024-12-19

## 2024-12-19 RX ORDER — SILVER SULFADIAZINE 10 MG/G
CREAM TOPICAL ONCE
OUTPATIENT
Start: 2024-12-19 | End: 2024-12-19

## 2024-12-19 RX ORDER — TRIAMCINOLONE ACETONIDE 1 MG/G
OINTMENT TOPICAL ONCE
OUTPATIENT
Start: 2024-12-19 | End: 2024-12-19

## 2024-12-19 RX ORDER — LIDOCAINE 50 MG/G
OINTMENT TOPICAL ONCE
OUTPATIENT
Start: 2024-12-19 | End: 2024-12-19

## 2024-12-19 RX ORDER — NEOMYCIN/BACITRACIN/POLYMYXINB 3.5-400-5K
OINTMENT (GRAM) TOPICAL ONCE
OUTPATIENT
Start: 2024-12-19 | End: 2024-12-19

## 2024-12-19 RX ORDER — BETAMETHASONE DIPROPIONATE 0.5 MG/G
CREAM TOPICAL ONCE
OUTPATIENT
Start: 2024-12-19 | End: 2024-12-19

## 2024-12-19 RX ORDER — LIDOCAINE HYDROCHLORIDE 40 MG/ML
SOLUTION TOPICAL ONCE
OUTPATIENT
Start: 2024-12-19 | End: 2024-12-19

## 2024-12-19 RX ORDER — SODIUM CHLOR/HYPOCHLOROUS ACID 0.033 %
SOLUTION, IRRIGATION IRRIGATION ONCE
OUTPATIENT
Start: 2024-12-19 | End: 2024-12-19

## 2024-12-19 RX ORDER — LIDOCAINE HYDROCHLORIDE 20 MG/ML
JELLY TOPICAL ONCE
Status: DISCONTINUED | OUTPATIENT
Start: 2024-12-19 | End: 2024-12-21 | Stop reason: HOSPADM

## 2024-12-19 RX ORDER — GENTAMICIN SULFATE 1 MG/G
OINTMENT TOPICAL ONCE
OUTPATIENT
Start: 2024-12-19 | End: 2024-12-19

## 2024-12-19 ASSESSMENT — PAIN DESCRIPTION - LOCATION: LOCATION: FOOT

## 2024-12-19 ASSESSMENT — PAIN DESCRIPTION - DESCRIPTORS: DESCRIPTORS: ACHING

## 2024-12-19 ASSESSMENT — PAIN SCALES - GENERAL: PAINLEVEL_OUTOF10: 6

## 2024-12-19 ASSESSMENT — PAIN DESCRIPTION - ORIENTATION: ORIENTATION: RIGHT;LEFT

## 2024-12-19 ASSESSMENT — VISUAL ACUITY: OU: 1

## 2024-12-19 NOTE — PATIENT INSTRUCTIONS
[facility] Wound Care and Hyperbaric Oxygen Therapy   Physician Orders and Discharge Instructions  [address] [place] [address]  [address]  [place] [address]  Telephone: [phone]      FAX [phone]    NAME:  Michael Szymanski  YOB: 1940  MEDICAL RECORD NUMBER:  [id number]  DATE:  12/19/2024    Discharge condition: Stable    Discharge to: Home    Left via:Private automobile    Accompanied by:  child    ECF/H[facility]: [facility]     Patient will have XRAY of right foot prior to next visit.    Please go to Room 103 downstairs to register. Procedure will be completed in Room 103. No appointment is required.    Dressing Orders:   Left foot wounds: Soap and water wash- Selsen blue. Apply a double layer of Xeroform (the yellow sticky dressing) over the wound bed, secure with dry gauze and rolled gauze daily.  Wear cast shoe with Metatarsal pad cut out for the wound    Treatment:  Use Flexitol to both feet daily to reduce and prevent callus [facility]  Protein rich diet  Multivitamin  Keep glucose levels under 150    [facility] follow up visit ______2 weeks with Felipa then 4 weeks with Dr. Nieto_______________________  (Please note your next appointment above and if you are unable to keep, kindly give a 24 hour notice. Thank you.)          If you experience any of the following, please call the Wound Care Center during business hours:    * Increase in Pain  * Temperature over 101  * Increase in drainage from your wound  * Drainage with a foul odor  * Bleeding  * Increase in swelling  * Need for compression bandage changes due to slippage, breakthrough drainage.    If you need medical attention outside of the business hours of the Wound Care Centers please contact your PCP or go to the nearest emergency room.

## 2024-12-19 NOTE — PROGRESS NOTES
Patient Care Team:  Mandy Gonzalez APRN - CNP as PCP - General  Rd Harris MD Buford, Lisa, PA as Physician Assistant (Physician Assistant Medical)  Parker Pace MD as Neurologist (Neurology)  Segun Cameron MD as Consulting Physician (Interventional Cardiology)    TODAY'S DATE:  12/19/2024     HISTORY of PRESENTILLNESS HPI   Michael Szymanski is a 84 y.o. male who presents today for wound evaluation.  He reports he developed a wound on right foot.This started 1 week(s) ago. He believes this is  healing. He has been applying xeroform. He has not had  fever or chills. He has a history of diabetes mellitus (HGBA1c 10.4) and PAD but has been cleared by Dr. Wei. He also has athlete's feet, I recommended an antifungal wash.  He does have neuropathy also and cannot feel the wound.  Wound Type:diabetic  Wound Location: right foot  Modifying factors:diabetes, poor glucose control, chronic pressure, shear force, and neuropathy    Patient Active Problem List   Diagnosis Code    Other chest pain R07.89    Chronic bilateral low back pain without sciatica M54.50, G89.29    Type 2 diabetes mellitus with hyperglycemia, with long-term current use of insulin ([facility]) E11.65, Z79.4    Obstructive sleep apnea G47.33    Class 1 obesity due to excess calories in adult E66.811, E66.09    Restless legs syndrome G25.81    Gastroesophageal reflux disease without esophagitis K21.9    Essential hypertension I10    Vertigo R42    Orthostasis I95.1    Diabetic polyneuropathy associated with type 2 diabetes mellitus (HCC) E11.42    Bilateral carpal tunnel syndrome G56.03    CPAP (continuous positive airway pressure) dependence Z99.89    BiPAP (biphasic positive airway pressure) dependence Z99.89    Pinched nerve in neck G58.9    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

## 2024-12-19 NOTE — HOME CARE
Wound Care Supplies      Supply Company:     [employer] [address] [place] [address] f: [phone] f: [phone] p: [phone] [email or web address]      Ordering Center:     [facility]  [address] [place] [address] [address]  [facility] [id number] [phone]  WOUND CARE Dept: [phone]   FAX NUMBER [phone]    Patient Information:      Michael Szymanski  [address]  [facility] [address]   [phone]   : 1940  AGE: 84 y.o.     GENDER: male   EPISODE DATE: 2024    Insurance:      PRIMARY INSURANCE:  Plan: MEDICARE PART A AND B  Coverage: MEDICARE  Effective Date: 1978  Group Number: [unfilled]  Subscriber Number: [id number] - (Medicare)    Payer/Plan Subscr  Sex Relation Sub. Ins. ID Effective Group Num   1. MEDICARE - ME* TABITHAMICHAEL ELIAS 1940 Male Self [id number] 1978                                    [address]    2. UMR - UMR TABITHAMICHAEL ELIAS 1940 Male Self [id number] 11/1/15 [id number]                                   [address] [address]       Patient Wound Information:      Problem List Items Addressed This Visit          Endocrine    Type 2 diabetes mellitus with hyperglycemia, with long-term current use of insulin (HCC)    * (Principal) Diabetic ulcer of right midfoot associated with type 2 diabetes mellitus, with fat layer exposed (HCC)    Relevant Medications    lidocaine (XYLOCAINE) 2 % uro-jet (Start on 2024  9:45 AM)    Other Relevant Orders    Initiate Outpatient Wound Care Protocol       Other    Yeast infection of the skin    Relevant Medications    lidocaine (XYLOCAINE) 2 % uro-jet (Start on 2024  9:45 AM)    Other Relevant Orders    Initiate Outpatient Wound Care Protocol     Other Visit Diagnoses       Diabetic ulcer of other part of right foot associated with type 2 diabetes mellitus, with fat layer exposed (HCC)    -  Primary    Relevant Orders    XR FOOT RIGHT (2 VIEWS)            WOUNDS REQUIRING

## 2024-12-19 NOTE — DISCHARGE INSTRUCTIONS
[facility] Wound Care and Hyperbaric Oxygen Therapy   Physician Orders and Discharge Instructions  [address] [place] [address]  [address]  [place] [address]  Telephone: [phone]      FAX [phone]    NAME:  Michael Szymanski  YOB: 1940  MEDICAL RECORD NUMBER:  [id number]  DATE:  12/19/2024    Discharge condition: Stable    Discharge to: Home    Left via:Private automobile    Accompanied by:  child    ECF/[facility]: [facility]     Patient will have XRAY of right foot prior to next visit.    Please go to Room 103 downstairs to register. Procedure will be completed in Room 103. No appointment is required.    Dressing Orders:   Left foot wounds: Soap and water wash- Selsen blue. Apply a double layer of Xeroform (the yellow sticky dressing) over the wound bed, secure with dry gauze and rolled gauze daily.  Wear cast shoe with Metatarsal pad cut out for the wound    Treatment:  Use Flexitol to both feet daily to reduce and prevent callus [facility]  Protein rich diet  Multivitamin  Keep glucose levels under 150    [facility] follow up visit ______2 weeks with Felipa then 4 weeks with Dr. Nieto_______________________  (Please note your next appointment above and if you are unable to keep, kindly give a 24 hour notice. Thank you.)          If you experience any of the following, please call the Wound Care Center during business hours:    * Increase in Pain  * Temperature over 101  * Increase in drainage from your wound  * Drainage with a foul odor  * Bleeding  * Increase in swelling  * Need for compression bandage changes due to slippage, breakthrough drainage.    If you need medical attention outside of the business hours of the Wound Care Centers please contact your PCP or go to the nearest emergency room.

## 2024-12-20 DIAGNOSIS — L29.1 SCROTAL ITCHING: ICD-10-CM

## 2024-12-20 RX ORDER — HYDROXYZINE HYDROCHLORIDE 25 MG/1
TABLET, FILM COATED ORAL
Qty: 30 TABLET | Refills: 1 | Status: SHIPPED | OUTPATIENT
Start: 2024-12-20

## 2024-12-23 ENCOUNTER — HOSPITAL ENCOUNTER (OUTPATIENT)
Dept: MRI IMAGING | Facility: HOSPITAL | Age: 84
Discharge: HOME | End: 2024-12-23
Payer: MEDICARE

## 2024-12-23 DIAGNOSIS — M47.816 LUMBAR SPONDYLOSIS: ICD-10-CM

## 2024-12-23 PROCEDURE — 72148 MRI LUMBAR SPINE W/O DYE: CPT

## 2024-12-30 DIAGNOSIS — N39.43 POST-VOID DRIBBLING: ICD-10-CM

## 2024-12-30 RX ORDER — VIBEGRON 75 MG/1
1 TABLET, FILM COATED ORAL DAILY
Qty: 30 TABLET | Refills: 2 | Status: SHIPPED | OUTPATIENT
Start: 2024-12-30

## 2024-12-31 ENCOUNTER — HOSPITAL ENCOUNTER (OUTPATIENT)
Dept: WOUND CARE | Age: 84
Discharge: HOME | End: 2024-12-31
Payer: MEDICARE

## 2024-12-31 VITALS
SYSTOLIC BLOOD PRESSURE: 120 MMHG | HEART RATE: 75 BPM | DIASTOLIC BLOOD PRESSURE: 64 MMHG | TEMPERATURE: 97.1 F | RESPIRATION RATE: 18 BRPM

## 2024-12-31 DIAGNOSIS — E11.621 DIABETIC ULCER OF RIGHT MIDFOOT ASSOCIATED WITH TYPE 2 DIABETES MELLITUS, WITH FAT LAYER EXPOSED (HCC): Primary | ICD-10-CM

## 2024-12-31 DIAGNOSIS — B37.2 YEAST INFECTION OF THE SKIN: ICD-10-CM

## 2024-12-31 DIAGNOSIS — L97.412 DIABETIC ULCER OF RIGHT MIDFOOT ASSOCIATED WITH TYPE 2 DIABETES MELLITUS, WITH FAT LAYER EXPOSED (HCC): Primary | ICD-10-CM

## 2024-12-31 PROCEDURE — 97597 DBRDMT OPN WND 1ST 20 CM/<: CPT

## 2024-12-31 PROCEDURE — 97597 DBRDMT OPN WND 1ST 20 CM/<: CPT | Performed by: NURSE PRACTITIONER

## 2024-12-31 RX ORDER — TRIAMCINOLONE ACETONIDE 1 MG/G
OINTMENT TOPICAL ONCE
OUTPATIENT
Start: 2024-12-31 | End: 2024-12-31

## 2024-12-31 RX ORDER — LIDOCAINE HYDROCHLORIDE 20 MG/ML
JELLY TOPICAL ONCE
OUTPATIENT
Start: 2024-12-31 | End: 2024-12-31

## 2024-12-31 RX ORDER — CLOBETASOL PROPIONATE 0.5 MG/G
OINTMENT TOPICAL ONCE
OUTPATIENT
Start: 2024-12-31 | End: 2024-12-31

## 2024-12-31 RX ORDER — SODIUM CHLOR/HYPOCHLOROUS ACID 0.033 %
SOLUTION, IRRIGATION IRRIGATION ONCE
OUTPATIENT
Start: 2024-12-31 | End: 2024-12-31

## 2024-12-31 RX ORDER — MUPIROCIN 20 MG/G
OINTMENT TOPICAL ONCE
OUTPATIENT
Start: 2024-12-31 | End: 2024-12-31

## 2024-12-31 RX ORDER — SILVER SULFADIAZINE 10 MG/G
CREAM TOPICAL ONCE
OUTPATIENT
Start: 2024-12-31 | End: 2024-12-31

## 2024-12-31 RX ORDER — BACITRACIN ZINC AND POLYMYXIN B SULFATE 500; 1000 [USP'U]/G; [USP'U]/G
OINTMENT TOPICAL ONCE
OUTPATIENT
Start: 2024-12-31 | End: 2024-12-31

## 2024-12-31 RX ORDER — GENTAMICIN SULFATE 1 MG/G
OINTMENT TOPICAL ONCE
OUTPATIENT
Start: 2024-12-31 | End: 2024-12-31

## 2024-12-31 RX ORDER — LIDOCAINE HYDROCHLORIDE 40 MG/ML
SOLUTION TOPICAL ONCE
OUTPATIENT
Start: 2024-12-31 | End: 2024-12-31

## 2024-12-31 RX ORDER — BETAMETHASONE DIPROPIONATE 0.5 MG/G
CREAM TOPICAL ONCE
OUTPATIENT
Start: 2024-12-31 | End: 2024-12-31

## 2024-12-31 RX ORDER — GINSENG 100 MG
CAPSULE ORAL ONCE
OUTPATIENT
Start: 2024-12-31 | End: 2024-12-31

## 2024-12-31 RX ORDER — NEOMYCIN/BACITRACIN/POLYMYXINB 3.5-400-5K
OINTMENT (GRAM) TOPICAL ONCE
OUTPATIENT
Start: 2024-12-31 | End: 2024-12-31

## 2024-12-31 RX ORDER — LIDOCAINE 50 MG/G
OINTMENT TOPICAL ONCE
OUTPATIENT
Start: 2024-12-31 | End: 2024-12-31

## 2024-12-31 RX ORDER — LIDOCAINE 40 MG/G
CREAM TOPICAL ONCE
OUTPATIENT
Start: 2024-12-31 | End: 2024-12-31

## 2024-12-31 RX ORDER — LIDOCAINE HYDROCHLORIDE 20 MG/ML
JELLY TOPICAL ONCE
Status: COMPLETED | OUTPATIENT
Start: 2024-12-31 | End: 2024-12-31

## 2024-12-31 RX ADMIN — LIDOCAINE HYDROCHLORIDE: 20 JELLY TOPICAL at 09:02

## 2024-12-31 ASSESSMENT — PAIN DESCRIPTION - PAIN TYPE: TYPE: CHRONIC PAIN

## 2024-12-31 ASSESSMENT — PAIN DESCRIPTION - FREQUENCY: FREQUENCY: CONTINUOUS

## 2024-12-31 ASSESSMENT — PAIN SCALES - GENERAL: PAINLEVEL_OUTOF10: 6

## 2024-12-31 ASSESSMENT — PAIN - FUNCTIONAL ASSESSMENT: PAIN_FUNCTIONAL_ASSESSMENT: PREVENTS OR INTERFERES SOME ACTIVE ACTIVITIES AND ADLS

## 2024-12-31 ASSESSMENT — PAIN DESCRIPTION - LOCATION: LOCATION: FOOT;LEG

## 2024-12-31 ASSESSMENT — PAIN DESCRIPTION - DESCRIPTORS: DESCRIPTORS: ACHING;BURNING

## 2024-12-31 ASSESSMENT — PAIN DESCRIPTION - ONSET: ONSET: ON-GOING

## 2024-12-31 ASSESSMENT — PAIN DESCRIPTION - ORIENTATION: ORIENTATION: RIGHT

## 2024-12-31 NOTE — PROGRESS NOTES
List   Diagnosis Code    Other chest pain R07.89    Chronic bilateral low back pain without sciatica M54.50, G89.29    Type 2 diabetes mellitus with hyperglycemia, with long-term current use of insulin ([facility]) E11.65, Z79.4    Obstructive sleep apnea G47.33    Class 1 obesity due to excess calories in adult E66.811, E66.09    Restless legs syndrome G25.81    Gastroesophageal reflux disease without esophagitis K21.9    Essential hypertension I10    Vertigo R42    Orthostasis I95.1    Diabetic polyneuropathy associated with type 2 diabetes mellitus ([facility]) E11.42    Bilateral carpal tunnel syndrome G56.03    CPAP (continuous positive airway pressure) dependence Z99.89    BiPAP (biphasic positive airway pressure) dependence Z99.89    Pinched nerve in neck G58.9    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    S/P aortic valve replacement with bioprosthetic valve Z95.3    Grade I diastolic dysfunction I51.89    Pacemaker Z95.0    Near syncope R55    History of syncope Z87.898    Sinoatrial node dysfunction ([facility]) I49.5    Mixed hyperlipidemia E78.2    Pre-syncope R55    Bradycardia R00.1    Lightheadedness R42    Heart block I45.9    Pacemaker malfunction, initial encounter T82.111A    Pacemaker lead malfunction T82.110A    Cellulitis of left hand and arm L03.114    Abscess of left hand L02.512    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 A49.9    Cellulitis of left foot L03.116    Diabetic ulcer of right midfoot associated with type 2 diabetes mellitus, with fat layer exposed ([facility]) E11.621, L97.412    PAD (peripheral artery disease) ([facility]) I73.9    Yeast infection of the skin B37.2       Wound 12/19/24 Pedal Right wound 1- right foot wag 1 (Active)   Wound Image   12/31/24 0847   Wound Etiology Diabetic Henry 1 12/31/24 0847   Dressing Status Old drainage noted 12/31/24

## 2024-12-31 NOTE — PATIENT INSTRUCTIONS
[facility] Wound Care and Hyperbaric Oxygen Therapy   Physician Orders and Discharge Instructions  [address] [place] [address]  [address]  [place] [address]  Telephone: [phone]      FAX [phone]    NAME:  Michael Szymanski  YOB: 1940  MEDICAL RECORD NUMBER:  [id number]  DATE:  12/31/2024    Discharge condition: Stable    Discharge to: Home    Left via:Private automobile    Accompanied by:  child    ECF[facility]: [facility]       Dressing Orders:   Left foot wounds: Soap and water wash- Selsen blue. Apply a double layer of Xeroform (the yellow sticky dressing) over the wound bed, secure with dry gauze and rolled gauze daily.  Wear cast shoe with Metatarsal pad cut out for the wound    Treatment:  Use Flexitol to both feet daily to reduce and prevent callus (can get at [facility] or [facility])  Protein rich diet  Multivitamin  Keep glucose levels under 150    [facility] follow up visit ______1 week with Dr. Nieto_______________________  (Please note your next appointment above and if you are unable to keep, kindly give a 24 hour notice. Thank you.)          If you experience any of the following, please call the Wound Care Center during business hours:    * Increase in Pain  * Temperature over 101  * Increase in drainage from your wound  * Drainage with a foul odor  * Bleeding  * Increase in swelling  * Need for compression bandage changes due to slippage, breakthrough drainage.    If you need medical attention outside of the business hours of the Wound Care Centers please contact your PCP or go to the nearest emergency room.

## 2025-01-07 RX ORDER — ATORVASTATIN CALCIUM 40 MG/1
TABLET, FILM COATED ORAL
Qty: 30 TABLET | Refills: 5 | Status: SHIPPED | OUTPATIENT
Start: 2025-01-07

## 2025-01-08 ENCOUNTER — HOSPITAL ENCOUNTER (OUTPATIENT)
Dept: WOUND CARE | Age: 85
Discharge: HOME | End: 2025-01-08
Payer: MEDICARE

## 2025-01-08 VITALS
WEIGHT: 194 LBS | SYSTOLIC BLOOD PRESSURE: 114 MMHG | TEMPERATURE: 97.5 F | HEIGHT: 70 IN | HEART RATE: 84 BPM | DIASTOLIC BLOOD PRESSURE: 61 MMHG | BODY MASS INDEX: 27.77 KG/M2

## 2025-01-08 DIAGNOSIS — E11.621 DIABETIC ULCER OF RIGHT MIDFOOT ASSOCIATED WITH TYPE 2 DIABETES MELLITUS, WITH FAT LAYER EXPOSED (HCC): Primary | ICD-10-CM

## 2025-01-08 DIAGNOSIS — L97.412 DIABETIC ULCER OF RIGHT MIDFOOT ASSOCIATED WITH TYPE 2 DIABETES MELLITUS, WITH FAT LAYER EXPOSED (HCC): Primary | ICD-10-CM

## 2025-01-08 DIAGNOSIS — B37.2 YEAST INFECTION OF THE SKIN: ICD-10-CM

## 2025-01-08 PROCEDURE — 97597 DBRDMT OPN WND 1ST 20 CM/<: CPT

## 2025-01-08 PROCEDURE — 97597 DBRDMT OPN WND 1ST 20 CM/<: CPT | Performed by: SURGERY

## 2025-01-08 RX ORDER — LIDOCAINE HYDROCHLORIDE 40 MG/ML
SOLUTION TOPICAL ONCE
OUTPATIENT
Start: 2025-01-08 | End: 2025-01-08

## 2025-01-08 RX ORDER — BACITRACIN ZINC AND POLYMYXIN B SULFATE 500; 1000 [USP'U]/G; [USP'U]/G
OINTMENT TOPICAL ONCE
OUTPATIENT
Start: 2025-01-08 | End: 2025-01-08

## 2025-01-08 RX ORDER — NEOMYCIN/BACITRACIN/POLYMYXINB 3.5-400-5K
OINTMENT (GRAM) TOPICAL ONCE
OUTPATIENT
Start: 2025-01-08 | End: 2025-01-08

## 2025-01-08 RX ORDER — LIDOCAINE HYDROCHLORIDE 20 MG/ML
JELLY TOPICAL ONCE
OUTPATIENT
Start: 2025-01-08 | End: 2025-01-08

## 2025-01-08 RX ORDER — GENTAMICIN SULFATE 1 MG/G
OINTMENT TOPICAL ONCE
OUTPATIENT
Start: 2025-01-08 | End: 2025-01-08

## 2025-01-08 RX ORDER — LIDOCAINE HYDROCHLORIDE 20 MG/ML
JELLY TOPICAL ONCE
Status: COMPLETED | OUTPATIENT
Start: 2025-01-08 | End: 2025-01-08

## 2025-01-08 RX ORDER — CLOBETASOL PROPIONATE 0.5 MG/G
OINTMENT TOPICAL ONCE
OUTPATIENT
Start: 2025-01-08 | End: 2025-01-08

## 2025-01-08 RX ORDER — BETAMETHASONE DIPROPIONATE 0.5 MG/G
CREAM TOPICAL ONCE
OUTPATIENT
Start: 2025-01-08 | End: 2025-01-08

## 2025-01-08 RX ORDER — MUPIROCIN 20 MG/G
OINTMENT TOPICAL ONCE
OUTPATIENT
Start: 2025-01-08 | End: 2025-01-08

## 2025-01-08 RX ORDER — SILVER SULFADIAZINE 10 MG/G
CREAM TOPICAL ONCE
OUTPATIENT
Start: 2025-01-08 | End: 2025-01-08

## 2025-01-08 RX ORDER — LIDOCAINE 50 MG/G
OINTMENT TOPICAL ONCE
OUTPATIENT
Start: 2025-01-08 | End: 2025-01-08

## 2025-01-08 RX ORDER — TRIAMCINOLONE ACETONIDE 1 MG/G
OINTMENT TOPICAL ONCE
OUTPATIENT
Start: 2025-01-08 | End: 2025-01-08

## 2025-01-08 RX ORDER — LIDOCAINE 40 MG/G
CREAM TOPICAL ONCE
OUTPATIENT
Start: 2025-01-08 | End: 2025-01-08

## 2025-01-08 RX ORDER — SODIUM CHLOR/HYPOCHLOROUS ACID 0.033 %
SOLUTION, IRRIGATION IRRIGATION ONCE
OUTPATIENT
Start: 2025-01-08 | End: 2025-01-08

## 2025-01-08 RX ORDER — GINSENG 100 MG
CAPSULE ORAL ONCE
OUTPATIENT
Start: 2025-01-08 | End: 2025-01-08

## 2025-01-08 RX ADMIN — LIDOCAINE HYDROCHLORIDE: 20 JELLY TOPICAL at 08:45

## 2025-01-08 NOTE — PATIENT INSTRUCTIONS
[facility] Wound Care and Hyperbaric Oxygen Therapy   Physician Orders and Discharge Instructions  [address] [place] [address]  [address]  [place] [address]  Telephone: [phone]      FAX [phone]    NAME:  Michael Szymanski  YOB: 1940  MEDICAL RECORD NUMBER:  [id number]  DATE:  1/8/2025    Discharge condition: Stable    Discharge to: Home    Left via:Private automobile    Accompanied by: Child    ECF/[facility]: [facility]    Dressing Orders: Left foot wounds:   Soap and water wash  Apply Silver Alginate cut to fit the wound bed, cover with dry gauze, roll gauze and medipore tape, change daily   Wear off load shoe with Metatarsal pad cut out for the wound    Treatment:  Use Flexitol to both feet daily to reduce and prevent callus [facility]  Protein rich diet  Multivitamin  Keep glucose levels under 150    [facility] follow up visit ___________1 week__________________  (Please note your next appointment above and if you are unable to keep, kindly give a 24 hour notice. Thank you.)    If you experience any of the following, please call the Wound Care Center during business hours:    * Increase in Pain  * Temperature over 101  * Increase in drainage from your wound  * Drainage with a foul odor  * Bleeding  * Increase in swelling  * Need for compression bandage changes due to slippage, breakthrough drainage.    If you need medical attention outside of the business hours of the Wound Care Centers please contact your PCP or go to the nearest emergency room.

## 2025-01-08 NOTE — PROGRESS NOTES
[facility] Wound Care Center   Progress Note and Procedure Note      Michael Szymanski  MEDICAL RECORD NUMBER:  [id number]  AGE: 84 y.o.   GENDER: male  : 1940  EPISODE DATE:  2025    Subjective:     Chief Complaint   Patient presents with    Wound Check     Patient presents today for recheck right plantar foot wound.         HISTORY of PRESENT ILLNESS HPI     Michael Szymnaski is a 84 y.o. male who presents today for wound/ulcer evaluation.   Wound Context: Pt with R plantar foot wound here for eval/treat  Wound/Ulcer Pain Timing/Severity: none  Quality of pain: N/A  Severity:  0 / 10   Modifying Factors: None  Associated Signs/Symptoms: none    Ulcer Identification:  Ulcer Type: diabetic  Contributing Factors: diabetes    Wound:  DFU        PAST MEDICAL HISTORY        Diagnosis Date    Aortic valve stenosis     Arthritis     Chest tightness, discomfort, or pressure 2012    Chronic back pain     CKD (chronic kidney disease)     CPAP (continuous positive airway pressure) dependence     13cm    Diabetes (HCC)     Diabetic polyneuropathy associated with type 2 diabetes mellitus (HCC) 2016    GERD (gastroesophageal reflux disease)     HTN (hypertension)     Hyperlipemia     Left ventricular diastolic dysfunction     Mitral stenosis     Mitral valve stenosis     Neuropathy     Obstructive sleep apnea     AHI: 34.5 per PSG, 2013; AHI: 36.9 per PSG, 2015; AHI: 54.5 per PSG, 3/2017    Pinched nerve in neck     Restless legs syndrome 2016       PAST SURGICAL HISTORY    Past Surgical History:   Procedure Laterality Date    APPENDECTOMY      BACK SURGERY      4 Back surgeries    BLADDER SURGERY      CARDIAC CATHETERIZATION  12    EF > 50%    CATARACT REMOVAL      CHOLECYSTECTOMY      COLONOSCOPY      EYE SURGERY      implants placed both eyes    HAND SURGERY Left 2020    INCISION AND DRAINAGE LEFT HAND ABSCESS performed by Jean Pierre [place] MD at [facility] OR    OTHER SURGICAL HISTORY

## 2025-01-08 NOTE — PLAN OF CARE
Problem: Wound:  Goal: Will show signs of wound healing; wound closure and no evidence of infection  Description: Will show signs of wound healing; wound closure and no evidence of infection  1/8/2025 0925 by Marta Gomez RN  Outcome: Progressing  1/8/2025 0852 by Marta Gomez RN  Outcome: Progressing     Problem: Weight control:  Goal: Ability to maintain an optimal weight for height and age will be supported  Description: Ability to maintain an optimal weight for height and age will be supported  1/8/2025 0925 by Marta Gomez RN  Outcome: Progressing  1/8/2025 0852 by Marta Gomez RN  Outcome: Progressing     Problem: Falls - Risk of:  Goal: Will remain free from falls  Description: Will remain free from falls  1/8/2025 0925 by Marta Gomez RN  Outcome: Progressing  1/8/2025 0852 by Marta Gomez RN  Outcome: Progressing     Problem: Blood Glucose:  Goal: Ability to maintain appropriate glucose levels will improve  Description: Ability to maintain appropriate glucose levels will improve  1/8/2025 0925 by Marta Gomez RN  Outcome: Progressing  1/8/2025 0852 by Marta Gomez RN  Outcome: Progressing

## 2025-01-14 ENCOUNTER — APPOINTMENT (OUTPATIENT)
Dept: WOUND CARE | Age: 85
End: 2025-01-14
Payer: MEDICARE

## 2025-01-15 ENCOUNTER — HOSPITAL ENCOUNTER (OUTPATIENT)
Dept: WOUND CARE | Age: 85
Discharge: HOME | End: 2025-01-15
Attending: SURGERY
Payer: MEDICARE

## 2025-01-15 VITALS
HEART RATE: 81 BPM | BODY MASS INDEX: 27.77 KG/M2 | TEMPERATURE: 97.3 F | WEIGHT: 194 LBS | DIASTOLIC BLOOD PRESSURE: 61 MMHG | SYSTOLIC BLOOD PRESSURE: 112 MMHG | HEIGHT: 70 IN | RESPIRATION RATE: 18 BRPM

## 2025-01-15 DIAGNOSIS — I49.5 SINOATRIAL NODE DYSFUNCTION (HCC): ICD-10-CM

## 2025-01-15 DIAGNOSIS — L97.412 DIABETIC ULCER OF RIGHT MIDFOOT ASSOCIATED WITH TYPE 2 DIABETES MELLITUS, WITH FAT LAYER EXPOSED (HCC): Primary | ICD-10-CM

## 2025-01-15 DIAGNOSIS — E11.621 DIABETIC ULCER OF RIGHT MIDFOOT ASSOCIATED WITH TYPE 2 DIABETES MELLITUS, WITH FAT LAYER EXPOSED (HCC): Primary | ICD-10-CM

## 2025-01-15 DIAGNOSIS — B37.2 YEAST INFECTION OF THE SKIN: ICD-10-CM

## 2025-01-15 DIAGNOSIS — Z95.0 PACEMAKER: Primary | ICD-10-CM

## 2025-01-15 PROCEDURE — 97597 DBRDMT OPN WND 1ST 20 CM/<: CPT | Performed by: SURGERY

## 2025-01-15 PROCEDURE — 97597 DBRDMT OPN WND 1ST 20 CM/<: CPT

## 2025-01-15 RX ORDER — SILVER SULFADIAZINE 10 MG/G
CREAM TOPICAL ONCE
OUTPATIENT
Start: 2025-01-15 | End: 2025-01-15

## 2025-01-15 RX ORDER — BETAMETHASONE DIPROPIONATE 0.5 MG/G
CREAM TOPICAL ONCE
OUTPATIENT
Start: 2025-01-15 | End: 2025-01-15

## 2025-01-15 RX ORDER — NEOMYCIN/BACITRACIN/POLYMYXINB 3.5-400-5K
OINTMENT (GRAM) TOPICAL ONCE
OUTPATIENT
Start: 2025-01-15 | End: 2025-01-15

## 2025-01-15 RX ORDER — LIDOCAINE HYDROCHLORIDE 20 MG/ML
JELLY TOPICAL ONCE
OUTPATIENT
Start: 2025-01-15 | End: 2025-01-15

## 2025-01-15 RX ORDER — GINSENG 100 MG
CAPSULE ORAL ONCE
OUTPATIENT
Start: 2025-01-15 | End: 2025-01-15

## 2025-01-15 RX ORDER — MUPIROCIN 20 MG/G
OINTMENT TOPICAL ONCE
OUTPATIENT
Start: 2025-01-15 | End: 2025-01-15

## 2025-01-15 RX ORDER — LIDOCAINE HYDROCHLORIDE 20 MG/ML
JELLY TOPICAL ONCE
Status: COMPLETED | OUTPATIENT
Start: 2025-01-15 | End: 2025-01-15

## 2025-01-15 RX ORDER — LIDOCAINE HYDROCHLORIDE 40 MG/ML
SOLUTION TOPICAL ONCE
OUTPATIENT
Start: 2025-01-15 | End: 2025-01-15

## 2025-01-15 RX ORDER — CLOBETASOL PROPIONATE 0.5 MG/G
OINTMENT TOPICAL ONCE
OUTPATIENT
Start: 2025-01-15 | End: 2025-01-15

## 2025-01-15 RX ORDER — TRIAMCINOLONE ACETONIDE 1 MG/G
OINTMENT TOPICAL ONCE
OUTPATIENT
Start: 2025-01-15 | End: 2025-01-15

## 2025-01-15 RX ORDER — LIDOCAINE 40 MG/G
CREAM TOPICAL ONCE
OUTPATIENT
Start: 2025-01-15 | End: 2025-01-15

## 2025-01-15 RX ORDER — LIDOCAINE 50 MG/G
OINTMENT TOPICAL ONCE
OUTPATIENT
Start: 2025-01-15 | End: 2025-01-15

## 2025-01-15 RX ORDER — SODIUM CHLOR/HYPOCHLOROUS ACID 0.033 %
SOLUTION, IRRIGATION IRRIGATION ONCE
OUTPATIENT
Start: 2025-01-15 | End: 2025-01-15

## 2025-01-15 RX ORDER — BACITRACIN ZINC AND POLYMYXIN B SULFATE 500; 1000 [USP'U]/G; [USP'U]/G
OINTMENT TOPICAL ONCE
OUTPATIENT
Start: 2025-01-15 | End: 2025-01-15

## 2025-01-15 RX ORDER — GENTAMICIN SULFATE 1 MG/G
OINTMENT TOPICAL ONCE
OUTPATIENT
Start: 2025-01-15 | End: 2025-01-15

## 2025-01-15 RX ADMIN — LIDOCAINE HYDROCHLORIDE: 20 JELLY TOPICAL at 08:21

## 2025-01-15 ASSESSMENT — PAIN DESCRIPTION - ONSET: ONSET: ON-GOING

## 2025-01-15 ASSESSMENT — PAIN DESCRIPTION - LOCATION: LOCATION: FOOT;LEG

## 2025-01-15 ASSESSMENT — PAIN DESCRIPTION - FREQUENCY: FREQUENCY: CONTINUOUS

## 2025-01-15 ASSESSMENT — PAIN SCALES - GENERAL: PAINLEVEL_OUTOF10: 6

## 2025-01-15 ASSESSMENT — PAIN - FUNCTIONAL ASSESSMENT: PAIN_FUNCTIONAL_ASSESSMENT: PREVENTS OR INTERFERES SOME ACTIVE ACTIVITIES AND ADLS

## 2025-01-15 ASSESSMENT — PAIN DESCRIPTION - ORIENTATION: ORIENTATION: RIGHT

## 2025-01-15 ASSESSMENT — PAIN DESCRIPTION - DESCRIPTORS: DESCRIPTORS: ACHING;BURNING

## 2025-01-15 ASSESSMENT — PAIN DESCRIPTION - PAIN TYPE: TYPE: CHRONIC PAIN

## 2025-01-15 NOTE — PATIENT INSTRUCTIONS
[facility] Wound Care and Hyperbaric Oxygen Therapy   Physician Orders and Discharge Instructions  [address] [place] [address]  [address]  [place] [address]  Telephone: [phone]      FAX [phone]    NAME:  Michael Szymanski  YOB: 1940  MEDICAL RECORD NUMBER:  [id number]  DATE:  1/15/2025    Discharge condition: Stable    Discharge to: Home    Left via:Private automobile    Accompanied by: Son    ECF/H[facility]: [facility]    Dressing Orders: Left foot wounds:  Soap and water wash  Apply Silver Alginate cut to fit the wound bed, cover with dry gauze, roll gauze and medipore tape, change daily  Wear off load shoe with Metatarsal pad cut out for the wound  Treatment:  Use Flexitol to both feet daily to reduce and prevent callus [facility]  Protein rich diet  Multivitamin  Keep glucose levels under 150    [facility] follow up visit ___________1 week__________________  (Please note your next appointment above and if you are unable to keep, kindly give a 24 hour notice. Thank you.)    If you experience any of the following, please call the Wound Care Center during business hours:    * Increase in Pain  * Temperature over 101  * Increase in drainage from your wound  * Drainage with a foul odor  * Bleeding  * Increase in swelling  * Need for compression bandage changes due to slippage, breakthrough drainage.    If you need medical attention outside of the business hours of the Wound Care Centers please contact your PCP or go to the nearest emergency room.

## 2025-01-15 NOTE — PROGRESS NOTES
Dressing Status Old drainage noted 01/15/25 0821   Wound Cleansed Soap and water 01/15/25 0821   Dressing/Treatment Alginate 01/08/25 0939   Offloading for Diabetic Foot Ulcers Offloading ordered;Felt or foam;Forefoot offloading shoe 01/15/25 0821   Wound Length (cm) 0.5 cm 01/15/25 0821   Wound Width (cm) 1.4 cm 01/15/25 0821   Wound Depth (cm) 0.3 cm 01/15/25 0821   Wound Surface Area (cm^2) 0.7 cm^2 01/15/25 0821   Change in Wound Size % (l*w) 30 01/15/25 0821   Wound Volume (cm^3) 0.21 cm^3 01/15/25 0821   Wound Healing % 48 01/15/25 0821   Post-Procedure Length (cm) 0.5 cm 01/15/25 0823   Post-Procedure Width (cm) 1.4 cm 01/15/25 0823   Post-Procedure Depth (cm) 0.3 cm 01/15/25 0823   Post-Procedure Surface Area (cm^2) 0.7 cm^2 01/15/25 0823   Post-Procedure Volume (cm^3) 0.21 cm^3 01/15/25 0823   Distance Tunneling (cm) 0 cm 01/15/25 0821   Tunneling Position ___ O'Clock 0 01/15/25 0821   Undermining Starts ___ O'Clock 12 01/15/25 0821   Undermining Ends___ O'Clock 12 01/15/25 0821   Undermining Maxium Distance (cm) 0.2 01/15/25 0821   Wound Assessment Pink/red;Slough 01/15/25 0821   Drainage Amount Moderate (25-50%) 01/15/25 0821   Drainage Description Serosanguinous 01/15/25 0821   Odor None 01/15/25 0821   Mariana-wound Assessment Hyperkeratosis (callous);Fragile;Dry/flaky 01/15/25 0821   Margins Defined edges 01/15/25 0821   Wound Thickness Description not for Pressure Injury Full thickness 01/15/25 0821   Number of days: 26             Estimated Blood Loss:  Minimal    Hemostasis Achieved:  by pressure    Procedural Pain:  0  / 10     Post Procedural Pain:  0 / 10     Response to treatment:  Well tolerated by patient.         Plan:     Problem List Items Addressed This Visit       * (Principal) Diabetic ulcer of right midfoot associated with type 2 diabetes mellitus, with fat layer exposed (HCC) - Primary (Chronic)    Relevant Orders    Initiate Outpatient Wound Care Protocol    Yeast infection of the skin

## 2025-01-21 DIAGNOSIS — L29.1 SCROTAL ITCHING: ICD-10-CM

## 2025-01-21 RX ORDER — HYDROXYZINE HYDROCHLORIDE 25 MG/1
TABLET, FILM COATED ORAL
Qty: 30 TABLET | Refills: 1 | Status: SHIPPED | OUTPATIENT
Start: 2025-01-21

## 2025-01-22 ENCOUNTER — HOSPITAL ENCOUNTER (OUTPATIENT)
Dept: WOUND CARE | Age: 85
Discharge: HOME | End: 2025-01-22
Attending: SURGERY
Payer: MEDICARE

## 2025-01-22 VITALS
WEIGHT: 194 LBS | HEART RATE: 89 BPM | RESPIRATION RATE: 16 BRPM | DIASTOLIC BLOOD PRESSURE: 57 MMHG | TEMPERATURE: 97 F | SYSTOLIC BLOOD PRESSURE: 98 MMHG | BODY MASS INDEX: 27.77 KG/M2 | HEIGHT: 70 IN

## 2025-01-22 DIAGNOSIS — B37.2 YEAST INFECTION OF THE SKIN: ICD-10-CM

## 2025-01-22 DIAGNOSIS — L97.412 DIABETIC ULCER OF RIGHT MIDFOOT ASSOCIATED WITH TYPE 2 DIABETES MELLITUS, WITH FAT LAYER EXPOSED (HCC): Primary | ICD-10-CM

## 2025-01-22 DIAGNOSIS — E11.621 DIABETIC ULCER OF RIGHT MIDFOOT ASSOCIATED WITH TYPE 2 DIABETES MELLITUS, WITH FAT LAYER EXPOSED (HCC): Primary | ICD-10-CM

## 2025-01-22 PROCEDURE — 97597 DBRDMT OPN WND 1ST 20 CM/<: CPT | Performed by: SURGERY

## 2025-01-22 PROCEDURE — 97597 DBRDMT OPN WND 1ST 20 CM/<: CPT

## 2025-01-22 RX ORDER — SODIUM CHLOR/HYPOCHLOROUS ACID 0.033 %
SOLUTION, IRRIGATION IRRIGATION ONCE
OUTPATIENT
Start: 2025-01-22 | End: 2025-01-22

## 2025-01-22 RX ORDER — MUPIROCIN 20 MG/G
OINTMENT TOPICAL ONCE
OUTPATIENT
Start: 2025-01-22 | End: 2025-01-22

## 2025-01-22 RX ORDER — TRIAMCINOLONE ACETONIDE 1 MG/G
OINTMENT TOPICAL ONCE
OUTPATIENT
Start: 2025-01-22 | End: 2025-01-22

## 2025-01-22 RX ORDER — LIDOCAINE 50 MG/G
OINTMENT TOPICAL ONCE
OUTPATIENT
Start: 2025-01-22 | End: 2025-01-22

## 2025-01-22 RX ORDER — LIDOCAINE 40 MG/G
CREAM TOPICAL ONCE
OUTPATIENT
Start: 2025-01-22 | End: 2025-01-22

## 2025-01-22 RX ORDER — LIDOCAINE HYDROCHLORIDE 20 MG/ML
JELLY TOPICAL ONCE
OUTPATIENT
Start: 2025-01-22 | End: 2025-01-22

## 2025-01-22 RX ORDER — BETAMETHASONE DIPROPIONATE 0.5 MG/G
CREAM TOPICAL ONCE
OUTPATIENT
Start: 2025-01-22 | End: 2025-01-22

## 2025-01-22 RX ORDER — LIDOCAINE HYDROCHLORIDE 20 MG/ML
JELLY TOPICAL ONCE
Status: COMPLETED | OUTPATIENT
Start: 2025-01-22 | End: 2025-01-22

## 2025-01-22 RX ORDER — BACITRACIN ZINC AND POLYMYXIN B SULFATE 500; 1000 [USP'U]/G; [USP'U]/G
OINTMENT TOPICAL ONCE
OUTPATIENT
Start: 2025-01-22 | End: 2025-01-22

## 2025-01-22 RX ORDER — LIDOCAINE HYDROCHLORIDE 40 MG/ML
SOLUTION TOPICAL ONCE
OUTPATIENT
Start: 2025-01-22 | End: 2025-01-22

## 2025-01-22 RX ORDER — SILVER SULFADIAZINE 10 MG/G
CREAM TOPICAL ONCE
OUTPATIENT
Start: 2025-01-22 | End: 2025-01-22

## 2025-01-22 RX ORDER — CLOBETASOL PROPIONATE 0.5 MG/G
OINTMENT TOPICAL ONCE
OUTPATIENT
Start: 2025-01-22 | End: 2025-01-22

## 2025-01-22 RX ORDER — GINSENG 100 MG
CAPSULE ORAL ONCE
OUTPATIENT
Start: 2025-01-22 | End: 2025-01-22

## 2025-01-22 RX ORDER — NEOMYCIN/BACITRACIN/POLYMYXINB 3.5-400-5K
OINTMENT (GRAM) TOPICAL ONCE
OUTPATIENT
Start: 2025-01-22 | End: 2025-01-22

## 2025-01-22 RX ORDER — GENTAMICIN SULFATE 1 MG/G
OINTMENT TOPICAL ONCE
OUTPATIENT
Start: 2025-01-22 | End: 2025-01-22

## 2025-01-22 RX ADMIN — LIDOCAINE HYDROCHLORIDE: 20 JELLY TOPICAL at 08:18

## 2025-01-22 NOTE — PATIENT INSTRUCTIONS
[facility] Wound Care and Hyperbaric Oxygen Therapy   Physician Orders and Discharge Instructions  [address] [place] [address]  [address]  [place] [address]  Telephone: [phone]      FAX [phone]    NAME:  Michael Szymanski  YOB: 1940  MEDICAL RECORD NUMBER:  [id number]  DATE:  1/22/2025    Discharge condition: Stable    Discharge to: Home    Left via:Private automobile    Accompanied by: Son    ECF/HHA: [facility]    Dressing Orders: Left foot wounds:  Soap and water wash  Apply Silver Alginate cut to fit the wound bed, cover with dry gauze, roll gauze and medipore tape, change daily  Wear off load shoe with Metatarsal pad cut out for the wound  Use Flexitol to both feet daily to reduce and prevent callus (can get at [facility]'s or [facility])  Protein rich diet  Multivitamin  Keep glucose levels under 150    [facility] follow up visit ____________1 week_________________  (Please note your next appointment above and if you are unable to keep, kindly give a 24 hour notice. Thank you.)    If you experience any of the following, please call the Wound Care Center during business hours:    * Increase in Pain  * Temperature over 101  * Increase in drainage from your wound  * Drainage with a foul odor  * Bleeding  * Increase in swelling  * Need for compression bandage changes due to slippage, breakthrough drainage.    If you need medical attention outside of the business hours of the Wound Care Centers please contact your PCP or go to the nearest emergency room.

## 2025-01-22 NOTE — PROGRESS NOTES
[facility] Wound Care Center   Progress Note and Procedure Note      Michael Szymanski  MEDICAL RECORD NUMBER:  [id number]  AGE: 84 y.o.   GENDER: male  : 1940  EPISODE DATE:  2025    Subjective:     Chief Complaint   Patient presents with    Wound Check     Patient presents today for recheck right plantar foot wound.         HISTORY of PRESENT ILLNESS HPI     Michael Szymanski is a 84 y.o. male who presents today for wound/ulcer evaluation.   Wound Context: Pt with R plantar foot wound here for eval/treat  Wound/Ulcer Pain Timing/Severity: none  Quality of pain: N/A  Severity:  0 / 10   Modifying Factors: None  Associated Signs/Symptoms: none    Ulcer Identification:  Ulcer Type: diabetic  Contributing Factors: diabetes and shear force    Wound:  DFU        PAST MEDICAL HISTORY        Diagnosis Date    Aortic valve stenosis     Arthritis     Chest tightness, discomfort, or pressure 2012    Chronic back pain     CKD (chronic kidney disease)     CPAP (continuous positive airway pressure) dependence     13cm    Diabetes (HCC)     Diabetic polyneuropathy associated with type 2 diabetes mellitus (HCC) 2016    GERD (gastroesophageal reflux disease)     HTN (hypertension)     Hyperlipemia     Left ventricular diastolic dysfunction     Mitral stenosis     Mitral valve stenosis     Neuropathy     Obstructive sleep apnea     AHI: 34.5 per PSG, 2013; AHI: 36.9 per PSG, 2015; AHI: 54.5 per PSG, 3/2017    Pinched nerve in neck     Restless legs syndrome 2016       PAST SURGICAL HISTORY    Past Surgical History:   Procedure Laterality Date    APPENDECTOMY      BACK SURGERY      4 Back surgeries    BLADDER SURGERY      CARDIAC CATHETERIZATION  12    EF > 50%    CATARACT REMOVAL      CHOLECYSTECTOMY      COLONOSCOPY      EYE SURGERY      implants placed both eyes    HAND SURGERY Left 2020    INCISION AND DRAINAGE LEFT HAND ABSCESS performed by Jean Pierre Bellamy MD at [facility] OR    OTHER SURGICAL

## 2025-01-29 ENCOUNTER — HOSPITAL ENCOUNTER (OUTPATIENT)
Dept: WOUND CARE | Age: 85
Discharge: HOME | End: 2025-01-29
Attending: SURGERY
Payer: MEDICARE

## 2025-01-29 VITALS
TEMPERATURE: 97.3 F | HEIGHT: 70 IN | HEART RATE: 79 BPM | WEIGHT: 194 LBS | SYSTOLIC BLOOD PRESSURE: 102 MMHG | BODY MASS INDEX: 27.77 KG/M2 | DIASTOLIC BLOOD PRESSURE: 56 MMHG | RESPIRATION RATE: 16 BRPM

## 2025-01-29 DIAGNOSIS — E11.621 DIABETIC ULCER OF RIGHT MIDFOOT ASSOCIATED WITH TYPE 2 DIABETES MELLITUS, WITH FAT LAYER EXPOSED (HCC): Primary | ICD-10-CM

## 2025-01-29 DIAGNOSIS — B37.2 YEAST INFECTION OF THE SKIN: ICD-10-CM

## 2025-01-29 DIAGNOSIS — L97.412 DIABETIC ULCER OF RIGHT MIDFOOT ASSOCIATED WITH TYPE 2 DIABETES MELLITUS, WITH FAT LAYER EXPOSED (HCC): Primary | ICD-10-CM

## 2025-01-29 PROCEDURE — 97597 DBRDMT OPN WND 1ST 20 CM/<: CPT

## 2025-01-29 PROCEDURE — 97597 DBRDMT OPN WND 1ST 20 CM/<: CPT | Performed by: SURGERY

## 2025-01-29 RX ORDER — CLOBETASOL PROPIONATE 0.5 MG/G
OINTMENT TOPICAL ONCE
OUTPATIENT
Start: 2025-01-29 | End: 2025-01-29

## 2025-01-29 RX ORDER — LIDOCAINE 50 MG/G
OINTMENT TOPICAL ONCE
OUTPATIENT
Start: 2025-01-29 | End: 2025-01-29

## 2025-01-29 RX ORDER — MUPIROCIN 20 MG/G
OINTMENT TOPICAL ONCE
OUTPATIENT
Start: 2025-01-29 | End: 2025-01-29

## 2025-01-29 RX ORDER — LIDOCAINE HYDROCHLORIDE 40 MG/ML
SOLUTION TOPICAL ONCE
OUTPATIENT
Start: 2025-01-29 | End: 2025-01-29

## 2025-01-29 RX ORDER — SODIUM CHLOR/HYPOCHLOROUS ACID 0.033 %
SOLUTION, IRRIGATION IRRIGATION ONCE
OUTPATIENT
Start: 2025-01-29 | End: 2025-01-29

## 2025-01-29 RX ORDER — LIDOCAINE 40 MG/G
CREAM TOPICAL ONCE
OUTPATIENT
Start: 2025-01-29 | End: 2025-01-29

## 2025-01-29 RX ORDER — LIDOCAINE HYDROCHLORIDE 20 MG/ML
JELLY TOPICAL ONCE
Status: COMPLETED | OUTPATIENT
Start: 2025-01-29 | End: 2025-01-29

## 2025-01-29 RX ORDER — GINSENG 100 MG
CAPSULE ORAL ONCE
OUTPATIENT
Start: 2025-01-29 | End: 2025-01-29

## 2025-01-29 RX ORDER — SILVER SULFADIAZINE 10 MG/G
CREAM TOPICAL ONCE
OUTPATIENT
Start: 2025-01-29 | End: 2025-01-29

## 2025-01-29 RX ORDER — GENTAMICIN SULFATE 1 MG/G
OINTMENT TOPICAL ONCE
OUTPATIENT
Start: 2025-01-29 | End: 2025-01-29

## 2025-01-29 RX ORDER — BETAMETHASONE DIPROPIONATE 0.5 MG/G
CREAM TOPICAL ONCE
OUTPATIENT
Start: 2025-01-29 | End: 2025-01-29

## 2025-01-29 RX ORDER — NEOMYCIN/BACITRACIN/POLYMYXINB 3.5-400-5K
OINTMENT (GRAM) TOPICAL ONCE
OUTPATIENT
Start: 2025-01-29 | End: 2025-01-29

## 2025-01-29 RX ORDER — TRIAMCINOLONE ACETONIDE 1 MG/G
OINTMENT TOPICAL ONCE
OUTPATIENT
Start: 2025-01-29 | End: 2025-01-29

## 2025-01-29 RX ORDER — LIDOCAINE HYDROCHLORIDE 20 MG/ML
JELLY TOPICAL ONCE
OUTPATIENT
Start: 2025-01-29 | End: 2025-01-29

## 2025-01-29 RX ORDER — BACITRACIN ZINC AND POLYMYXIN B SULFATE 500; 1000 [USP'U]/G; [USP'U]/G
OINTMENT TOPICAL ONCE
OUTPATIENT
Start: 2025-01-29 | End: 2025-01-29

## 2025-01-29 RX ADMIN — LIDOCAINE HYDROCHLORIDE: 20 JELLY TOPICAL at 08:16

## 2025-01-29 ASSESSMENT — PAIN DESCRIPTION - ONSET: ONSET: ON-GOING

## 2025-01-29 ASSESSMENT — PAIN DESCRIPTION - FREQUENCY: FREQUENCY: CONTINUOUS

## 2025-01-29 ASSESSMENT — PAIN - FUNCTIONAL ASSESSMENT: PAIN_FUNCTIONAL_ASSESSMENT: PREVENTS OR INTERFERES SOME ACTIVE ACTIVITIES AND ADLS

## 2025-01-29 ASSESSMENT — PAIN DESCRIPTION - PAIN TYPE: TYPE: CHRONIC PAIN

## 2025-01-29 ASSESSMENT — PAIN DESCRIPTION - DESCRIPTORS: DESCRIPTORS: ACHING;BURNING;SORE;TENDER

## 2025-01-29 ASSESSMENT — PAIN SCALES - GENERAL: PAINLEVEL_OUTOF10: 7

## 2025-01-29 ASSESSMENT — PAIN DESCRIPTION - ORIENTATION: ORIENTATION: RIGHT

## 2025-01-29 ASSESSMENT — PAIN DESCRIPTION - LOCATION: LOCATION: FOOT

## 2025-01-29 NOTE — PATIENT INSTRUCTIONS
[facility] Wound Care and Hyperbaric Oxygen Therapy   Physician Orders and Discharge Instructions  [address] [place] [address]  [address]  [place] [address]  Telephone: [phone]      FAX [phone]    NAME:  Michael Szymanski  YOB: 1940  MEDICAL RECORD NUMBER:  [id number]  DATE:  1/29/2025    Discharge condition: Stable    Discharge to: Home    Left via:Private automobile    Accompanied by: Son    ECF/H[facility]: [facility]    Dressing Orders: Left foot wounds:  Soap and water wash  Apply Silver Alginate cut to fit the wound bed, cover with dry gauze, roll gauze and medipore tape, change daily  Wear off load shoe with Metatarsal pad cut out for the wound  Moisturize both feet daily to reduce and prevent callus   Protein rich diet  Multivitamin  Keep glucose levels under 150  Follow up with Dermatology     [facility] follow up visit ____________1 week_________________  (Please note your next appointment above and if you are unable to keep, kindly give a 24 hour notice. Thank you.)    If you experience any of the following, please call the Wound Care Center during business hours:    * Increase in Pain  * Temperature over 101  * Increase in drainage from your wound  * Drainage with a foul odor  * Bleeding  * Increase in swelling  * Need for compression bandage changes due to slippage, breakthrough drainage.    If you need medical attention outside of the business hours of the Wound Care Centers please contact your PCP or go to the nearest emergency room.

## 2025-01-29 NOTE — PROGRESS NOTES
[facility] Wound Care Center   Progress Note and Procedure Note      Michael Szymanski  MEDICAL RECORD NUMBER:  [id number]  AGE: 84 y.o.   GENDER: male  : 1940  EPISODE DATE:  2025    Subjective:     Chief Complaint   Patient presents with    Wound Check     Patient presents today for recheck right plantar foot wound.         HISTORY of PRESENT ILLNESS HPI     Michael Szymanski is a 84 y.o. male who presents today for wound/ulcer evaluation.   Wound Context: Pt with R plantar foot DFU here for eval/treat  Wound/Ulcer Pain Timing/Severity: none  Quality of pain: burning  Severity:  2 / 10   Modifying Factors: None  Associated Signs/Symptoms: none    Ulcer Identification:  Ulcer Type: diabetic  Contributing Factors: diabetes    Wound:  DM        PAST MEDICAL HISTORY        Diagnosis Date    Aortic valve stenosis     Arthritis     Chest tightness, discomfort, or pressure 2012    Chronic back pain     CKD (chronic kidney disease)     CPAP (continuous positive airway pressure) dependence     13cm    Diabetes (HCC)     Diabetic polyneuropathy associated with type 2 diabetes mellitus (HCC) 2016    GERD (gastroesophageal reflux disease)     HTN (hypertension)     Hyperlipemia     Left ventricular diastolic dysfunction     Mitral stenosis     Mitral valve stenosis     Neuropathy     Obstructive sleep apnea     AHI: 34.5 per PSG, 2013; AHI: 36.9 per PSG, 2015; AHI: 54.5 per PSG, 3/2017    Pinched nerve in neck     Restless legs syndrome 2016       PAST SURGICAL HISTORY    Past Surgical History:   Procedure Laterality Date    APPENDECTOMY      BACK SURGERY      4 Back surgeries    BLADDER SURGERY      CARDIAC CATHETERIZATION  12    EF > 50%    CATARACT REMOVAL      CHOLECYSTECTOMY      COLONOSCOPY      EYE SURGERY      implants placed both eyes    HAND SURGERY Left 2020    INCISION AND DRAINAGE LEFT HAND ABSCESS performed by Jean Pierre Bellamy MD at [facility] OR    OTHER SURGICAL HISTORY

## 2025-02-05 ENCOUNTER — HOSPITAL ENCOUNTER (OUTPATIENT)
Dept: WOUND CARE | Age: 85
Discharge: HOME | End: 2025-02-05
Attending: SURGERY
Payer: MEDICARE

## 2025-02-05 VITALS
RESPIRATION RATE: 16 BRPM | HEART RATE: 86 BPM | DIASTOLIC BLOOD PRESSURE: 61 MMHG | SYSTOLIC BLOOD PRESSURE: 106 MMHG | TEMPERATURE: 97.5 F

## 2025-02-05 DIAGNOSIS — L97.412 DIABETIC ULCER OF RIGHT MIDFOOT ASSOCIATED WITH TYPE 2 DIABETES MELLITUS, WITH FAT LAYER EXPOSED (HCC): Primary | ICD-10-CM

## 2025-02-05 DIAGNOSIS — E11.621 DIABETIC ULCER OF RIGHT MIDFOOT ASSOCIATED WITH TYPE 2 DIABETES MELLITUS, WITH FAT LAYER EXPOSED (HCC): Primary | ICD-10-CM

## 2025-02-05 DIAGNOSIS — B37.2 YEAST INFECTION OF THE SKIN: ICD-10-CM

## 2025-02-05 PROCEDURE — 97597 DBRDMT OPN WND 1ST 20 CM/<: CPT

## 2025-02-05 PROCEDURE — 97597 DBRDMT OPN WND 1ST 20 CM/<: CPT | Performed by: SURGERY

## 2025-02-05 RX ORDER — LIDOCAINE HYDROCHLORIDE 20 MG/ML
JELLY TOPICAL ONCE
OUTPATIENT
Start: 2025-02-05 | End: 2025-02-05

## 2025-02-05 RX ORDER — TRIAMCINOLONE ACETONIDE 1 MG/G
OINTMENT TOPICAL ONCE
OUTPATIENT
Start: 2025-02-05 | End: 2025-02-05

## 2025-02-05 RX ORDER — BETAMETHASONE DIPROPIONATE 0.5 MG/G
CREAM TOPICAL ONCE
OUTPATIENT
Start: 2025-02-05 | End: 2025-02-05

## 2025-02-05 RX ORDER — LIDOCAINE 50 MG/G
OINTMENT TOPICAL ONCE
OUTPATIENT
Start: 2025-02-05 | End: 2025-02-05

## 2025-02-05 RX ORDER — SILVER SULFADIAZINE 10 MG/G
CREAM TOPICAL ONCE
OUTPATIENT
Start: 2025-02-05 | End: 2025-02-05

## 2025-02-05 RX ORDER — BACITRACIN ZINC AND POLYMYXIN B SULFATE 500; 1000 [USP'U]/G; [USP'U]/G
OINTMENT TOPICAL ONCE
OUTPATIENT
Start: 2025-02-05 | End: 2025-02-05

## 2025-02-05 RX ORDER — GINSENG 100 MG
CAPSULE ORAL ONCE
OUTPATIENT
Start: 2025-02-05 | End: 2025-02-05

## 2025-02-05 RX ORDER — LIDOCAINE HYDROCHLORIDE 40 MG/ML
SOLUTION TOPICAL ONCE
OUTPATIENT
Start: 2025-02-05 | End: 2025-02-05

## 2025-02-05 RX ORDER — SODIUM CHLOR/HYPOCHLOROUS ACID 0.033 %
SOLUTION, IRRIGATION IRRIGATION ONCE
OUTPATIENT
Start: 2025-02-05 | End: 2025-02-05

## 2025-02-05 RX ORDER — NEOMYCIN/BACITRACIN/POLYMYXINB 3.5-400-5K
OINTMENT (GRAM) TOPICAL ONCE
OUTPATIENT
Start: 2025-02-05 | End: 2025-02-05

## 2025-02-05 RX ORDER — GENTAMICIN SULFATE 1 MG/G
OINTMENT TOPICAL ONCE
OUTPATIENT
Start: 2025-02-05 | End: 2025-02-05

## 2025-02-05 RX ORDER — LIDOCAINE HYDROCHLORIDE 20 MG/ML
JELLY TOPICAL ONCE
Status: COMPLETED | OUTPATIENT
Start: 2025-02-05 | End: 2025-02-05

## 2025-02-05 RX ORDER — MUPIROCIN 20 MG/G
OINTMENT TOPICAL ONCE
OUTPATIENT
Start: 2025-02-05 | End: 2025-02-05

## 2025-02-05 RX ORDER — CLOBETASOL PROPIONATE 0.5 MG/G
OINTMENT TOPICAL ONCE
OUTPATIENT
Start: 2025-02-05 | End: 2025-02-05

## 2025-02-05 RX ORDER — LIDOCAINE 40 MG/G
CREAM TOPICAL ONCE
OUTPATIENT
Start: 2025-02-05 | End: 2025-02-05

## 2025-02-05 RX ADMIN — LIDOCAINE HYDROCHLORIDE: 20 JELLY TOPICAL at 09:32

## 2025-02-05 ASSESSMENT — PAIN DESCRIPTION - DESCRIPTORS: DESCRIPTORS: BURNING

## 2025-02-05 ASSESSMENT — PAIN SCALES - GENERAL: PAINLEVEL_OUTOF10: 5

## 2025-02-05 ASSESSMENT — PAIN DESCRIPTION - LOCATION: LOCATION: FOOT

## 2025-02-05 ASSESSMENT — PAIN DESCRIPTION - ONSET: ONSET: ON-GOING

## 2025-02-05 ASSESSMENT — PAIN - FUNCTIONAL ASSESSMENT: PAIN_FUNCTIONAL_ASSESSMENT: PREVENTS OR INTERFERES SOME ACTIVE ACTIVITIES AND ADLS

## 2025-02-05 ASSESSMENT — PAIN DESCRIPTION - PAIN TYPE: TYPE: CHRONIC PAIN

## 2025-02-05 ASSESSMENT — PAIN DESCRIPTION - FREQUENCY: FREQUENCY: INTERMITTENT

## 2025-02-05 ASSESSMENT — PAIN DESCRIPTION - ORIENTATION: ORIENTATION: RIGHT

## 2025-02-05 NOTE — PROGRESS NOTES
[facility] Wound Care Center   Progress Note and Procedure Note      Michael Szymanski  MEDICAL RECORD NUMBER:  [id number]  AGE: 84 y.o.   GENDER: male  : 1940  EPISODE DATE:  2025    Subjective:     Chief Complaint   Patient presents with    Wound Check     Right foot wound         HISTORY of PRESENT ILLNESS HPI     Michael Szymanski is a 84 y.o. male who presents today for wound/ulcer evaluation.   Wound Context: Pt with R DFU here for eval/treat  Wound/Ulcer Pain Timing/Severity: none  Quality of pain: N/A  Severity:  0 / 10   Modifying Factors: None  Associated Signs/Symptoms: none    Ulcer Identification:  Ulcer Type: diabetic  Contributing Factors: diabetes, chronic pressure, and shear force    Wound:  DFU        PAST MEDICAL HISTORY        Diagnosis Date    Aortic valve stenosis     Arthritis     Chest tightness, discomfort, or pressure 2012    Chronic back pain     CKD (chronic kidney disease)     CPAP (continuous positive airway pressure) dependence     13cm    Diabetes (HCC)     Diabetic polyneuropathy associated with type 2 diabetes mellitus (HCC) 2016    GERD (gastroesophageal reflux disease)     HTN (hypertension)     Hyperlipemia     Left ventricular diastolic dysfunction     Mitral stenosis     Mitral valve stenosis     Neuropathy     Obstructive sleep apnea     AHI: 34.5 per PSG, 2013; AHI: 36.9 per PSG, 2015; AHI: 54.5 per PSG, 3/2017    Pinched nerve in neck     Restless legs syndrome 2016       PAST SURGICAL HISTORY    Past Surgical History:   Procedure Laterality Date    APPENDECTOMY      BACK SURGERY      4 Back surgeries    BLADDER SURGERY      CARDIAC CATHETERIZATION  12    EF > 50%    CATARACT REMOVAL      CHOLECYSTECTOMY      COLONOSCOPY      EYE SURGERY      implants placed both eyes    HAND SURGERY Left 2020    INCISION AND DRAINAGE LEFT HAND ABSCESS performed by Jean Pierre Bellamy MD at [facility] OR    OTHER SURGICAL HISTORY  2020    Right Ventricular

## 2025-02-05 NOTE — PLAN OF CARE
Problem: Pain  Goal: Verbalizes/displays adequate comfort level or baseline comfort level  Outcome: Progressing     Problem: Pain  Goal: Verbalizes/displays adequate comfort level or baseline comfort level  Outcome: Progressing     Problem: Wound:  Goal: Will show signs of wound healing; wound closure and no evidence of infection  Description: Will show signs of wound healing; wound closure and no evidence of infection  Outcome: Progressing     Problem: Weight control:  Goal: Ability to maintain an optimal weight for height and age will be supported  Description: Ability to maintain an optimal weight for height and age will be supported  Outcome: Progressing     Problem: Falls - Risk of:  Goal: Will remain free from falls  Description: Will remain free from falls  Outcome: Progressing     Problem: Blood Glucose:  Goal: Ability to maintain appropriate glucose levels will improve  Description: Ability to maintain appropriate glucose levels will improve  Outcome: Progressing

## 2025-02-05 NOTE — PATIENT INSTRUCTIONS
[facility] Wound Care and Hyperbaric Oxygen Therapy   Physician Orders and Discharge Instructions  [address] [place] [address]  [address]  [place] [address]  Telephone: [phone]      FAX [phone]    NAME:  Michael Szymanski  YOB: 1940  MEDICAL RECORD NUMBER:  [id number]  DATE:  2/5/2025    Discharge condition: Stable    Discharge to: Home    Left via:Private automobile    Accompanied by: Family    ECF/[facility]: [facility]    Accompanied by: Son    ECF/[facility]HA: [facility]    Dressing Orders: Right foot wound  Soap and water wash  Apply Silver Alginate cut to fit the wound bed, cover with dry gauze, roll gauze and medipore tape, change daily  Wear off load shoe with Metatarsal pad cut out for the wound  Moisturize both feet daily to reduce and prevent callus  Protein rich diet  Multivitamin  Keep glucose levels under 150    [facility] follow up visit _________1 week____________________  (Please note your next appointment above and if you are unable to keep, kindly give a 24 hour notice. Thank you.)    If you experience any of the following, please call the Wound Care Center during business hours:    * Increase in Pain  * Temperature over 101  * Increase in drainage from your wound  * Drainage with a foul odor  * Bleeding  * Increase in swelling  * Need for compression bandage changes due to slippage, breakthrough drainage.    If you need medical attention outside of the business hours of the Wound Care Centers please contact your PCP or go to the nearest emergency room.

## 2025-02-06 ENCOUNTER — TELEPHONE (OUTPATIENT)
Dept: WOUND CARE | Age: 85
End: 2025-02-06

## 2025-02-06 DIAGNOSIS — N39.43 POST-VOID DRIBBLING: ICD-10-CM

## 2025-02-06 NOTE — TELEPHONE ENCOUNTER
I spoke with Mr Stephon son, he states that the secondary insurance company will pay for the knee scooter if I call and give clinical for pre-certification, I have made several attempts to reach Antonette at [facility] and cannot reach her or anyone familiar with the case, the one person I did get to talk to states that he does not need pre authorization  for this type of equipment, Dr Nieto notified

## 2025-02-07 RX ORDER — VIBEGRON 75 MG/1
1 TABLET, FILM COATED ORAL DAILY
Qty: 30 TABLET | Refills: 3 | Status: SHIPPED | OUTPATIENT
Start: 2025-02-07

## 2025-02-10 ENCOUNTER — OFFICE VISIT (OUTPATIENT)
Age: 85
End: 2025-02-10

## 2025-02-10 VITALS
DIASTOLIC BLOOD PRESSURE: 70 MMHG | BODY MASS INDEX: 28.06 KG/M2 | HEIGHT: 70 IN | HEART RATE: 71 BPM | SYSTOLIC BLOOD PRESSURE: 104 MMHG | WEIGHT: 196 LBS

## 2025-02-10 DIAGNOSIS — E11.621 DIABETIC ULCER OF RIGHT MIDFOOT ASSOCIATED WITH TYPE 2 DIABETES MELLITUS, WITH FAT LAYER EXPOSED (HCC): Chronic | ICD-10-CM

## 2025-02-10 DIAGNOSIS — I50.42 CHRONIC COMBINED SYSTOLIC (CONGESTIVE) AND DIASTOLIC (CONGESTIVE) HEART FAILURE (HCC): ICD-10-CM

## 2025-02-10 DIAGNOSIS — E78.2 MIXED HYPERLIPIDEMIA: ICD-10-CM

## 2025-02-10 DIAGNOSIS — G47.33 OBSTRUCTIVE SLEEP APNEA: ICD-10-CM

## 2025-02-10 DIAGNOSIS — I49.5 SINOATRIAL NODE DYSFUNCTION (HCC): ICD-10-CM

## 2025-02-10 DIAGNOSIS — I25.10 CORONARY ARTERY DISEASE INVOLVING NATIVE CORONARY ARTERY OF NATIVE HEART WITHOUT ANGINA PECTORIS: Primary | ICD-10-CM

## 2025-02-10 DIAGNOSIS — I38 VALVULAR HEART DISEASE: ICD-10-CM

## 2025-02-10 DIAGNOSIS — Z95.0 PACEMAKER: ICD-10-CM

## 2025-02-10 DIAGNOSIS — Z95.2 S/P AVR: ICD-10-CM

## 2025-02-10 DIAGNOSIS — I05.0 MITRAL VALVE STENOSIS, UNSPECIFIED ETIOLOGY: ICD-10-CM

## 2025-02-10 DIAGNOSIS — I10 ESSENTIAL HYPERTENSION: ICD-10-CM

## 2025-02-10 DIAGNOSIS — I48.0 PAROXYSMAL ATRIAL FIBRILLATION (HCC): ICD-10-CM

## 2025-02-10 DIAGNOSIS — L97.412 DIABETIC ULCER OF RIGHT MIDFOOT ASSOCIATED WITH TYPE 2 DIABETES MELLITUS, WITH FAT LAYER EXPOSED (HCC): Chronic | ICD-10-CM

## 2025-02-10 RX ORDER — HYDROCODONE BITARTRATE AND ACETAMINOPHEN 10; 325 MG/1; MG/1
1 TABLET ORAL EVERY 6 HOURS PRN
COMMUNITY

## 2025-02-10 ASSESSMENT — ENCOUNTER SYMPTOMS
FACIAL SWELLING: 0
SHORTNESS OF BREATH: 1
ABDOMINAL PAIN: 1
EYE REDNESS: 0
VOMITING: 0
WHEEZING: 0
NAUSEA: 0
COUGH: 0
CHEST TIGHTNESS: 0

## 2025-02-10 NOTE — PATIENT INSTRUCTIONS
Return for Dr. Cameron, as scheduled.   Carelink 3mo  Keep diabetes under control to help prevent further heart disease.  Keep Hemoglobin A1C less than 7%.     - Weigh daily every morning after urinating (this is your dry weight).   Report weight gain of 3lbs or more in 24hrs or 5lbs in one week.  - Call for increasing shortness of breath or increasing swelling in feet and legs.    (This could mean you are retaining too much fluid)  - 2000mg low sodium diet  - Fluid restriction of 1500ml per day (about 6-8 cups (48-64oz) of fluid per day)

## 2025-02-10 NOTE — PROGRESS NOTES
[facility]  [address] [facility]  [address]  Phone: [phone]  Fax: [phone]    OFFICE VISIT:  2/10/2025    Michael Szymanski - : 1940    Reason For Visit:  Michael is a 84 y.o. male who is here for Follow-up (No cardiac symptoms) and Coronary artery disease involving native coronary artery of       Diagnosis Orders   1. Coronary artery disease involving native coronary artery of native heart without angina pectoris        2. Chronic combined systolic (congestive) and diastolic (congestive) heart failure (HCC)        3. Paroxysmal atrial fibrillation (HCC)        4. Essential hypertension        5. Mixed hyperlipidemia        6. Valvular heart disease        7. S/P AVR        8. Mitral valve stenosis, unspecified etiology        9. Sinoatrial node dysfunction (HCC)        10. Pacemaker        11. Obstructive sleep apnea        12. 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 Patient had a pacemaker generator change in 2020.  Issues were found with elevated RV threshold.  Patient was sent to Dr. Guy in [place] for  RV lead replacement 8/3/20.  Patient had an injury to hand with a fishhook shortly thereafter and developed a systemic infection involving several surgeries and IV antibiotics.  He has functional impairment of the left hand as a result of this injury.    Patient was hospitalized 2021 with pneumonia related to COVID.  He has had ongoing issues with shortness of breath and fatigue since this time.    Other history includes chronic back pain, diabetes, sleep apnea, CKD, hypertension, hyperlipidemia.    He continues to have issues with

## 2025-02-12 ENCOUNTER — HOSPITAL ENCOUNTER (OUTPATIENT)
Dept: WOUND CARE | Age: 85
Discharge: HOME | End: 2025-02-12
Attending: SURGERY
Payer: MEDICARE

## 2025-02-12 VITALS
WEIGHT: 196 LBS | DIASTOLIC BLOOD PRESSURE: 53 MMHG | BODY MASS INDEX: 28.06 KG/M2 | SYSTOLIC BLOOD PRESSURE: 94 MMHG | HEART RATE: 76 BPM | RESPIRATION RATE: 16 BRPM | HEIGHT: 70 IN | TEMPERATURE: 97.4 F

## 2025-02-12 DIAGNOSIS — L97.412 DIABETIC ULCER OF RIGHT MIDFOOT ASSOCIATED WITH TYPE 2 DIABETES MELLITUS, WITH FAT LAYER EXPOSED (HCC): Primary | ICD-10-CM

## 2025-02-12 DIAGNOSIS — E11.621 DIABETIC ULCER OF RIGHT MIDFOOT ASSOCIATED WITH TYPE 2 DIABETES MELLITUS, WITH FAT LAYER EXPOSED (HCC): Primary | ICD-10-CM

## 2025-02-12 DIAGNOSIS — B37.2 YEAST INFECTION OF THE SKIN: ICD-10-CM

## 2025-02-12 PROCEDURE — 97597 DBRDMT OPN WND 1ST 20 CM/<: CPT | Performed by: SURGERY

## 2025-02-12 PROCEDURE — 97597 DBRDMT OPN WND 1ST 20 CM/<: CPT

## 2025-02-12 RX ORDER — BETAMETHASONE DIPROPIONATE 0.5 MG/G
CREAM TOPICAL ONCE
OUTPATIENT
Start: 2025-02-12 | End: 2025-02-12

## 2025-02-12 RX ORDER — LIDOCAINE 50 MG/G
OINTMENT TOPICAL ONCE
OUTPATIENT
Start: 2025-02-12 | End: 2025-02-12

## 2025-02-12 RX ORDER — AMMONIUM LACTATE 12 G/100G
1 CREAM TOPICAL PRN
COMMUNITY

## 2025-02-12 RX ORDER — LIDOCAINE HYDROCHLORIDE 20 MG/ML
JELLY TOPICAL ONCE
Status: DISCONTINUED | OUTPATIENT
Start: 2025-02-12 | End: 2025-02-14 | Stop reason: HOSPADM

## 2025-02-12 RX ORDER — LIDOCAINE HYDROCHLORIDE 40 MG/ML
SOLUTION TOPICAL ONCE
OUTPATIENT
Start: 2025-02-12 | End: 2025-02-12

## 2025-02-12 RX ORDER — TRIAMCINOLONE ACETONIDE 1 MG/G
OINTMENT TOPICAL ONCE
OUTPATIENT
Start: 2025-02-12 | End: 2025-02-12

## 2025-02-12 RX ORDER — BACITRACIN ZINC AND POLYMYXIN B SULFATE 500; 1000 [USP'U]/G; [USP'U]/G
OINTMENT TOPICAL ONCE
OUTPATIENT
Start: 2025-02-12 | End: 2025-02-12

## 2025-02-12 RX ORDER — GENTAMICIN SULFATE 1 MG/G
OINTMENT TOPICAL ONCE
OUTPATIENT
Start: 2025-02-12 | End: 2025-02-12

## 2025-02-12 RX ORDER — LIDOCAINE HYDROCHLORIDE 20 MG/ML
JELLY TOPICAL ONCE
OUTPATIENT
Start: 2025-02-12 | End: 2025-02-12

## 2025-02-12 RX ORDER — CLOBETASOL PROPIONATE 0.5 MG/G
OINTMENT TOPICAL ONCE
OUTPATIENT
Start: 2025-02-12 | End: 2025-02-12

## 2025-02-12 RX ORDER — GINSENG 100 MG
CAPSULE ORAL ONCE
OUTPATIENT
Start: 2025-02-12 | End: 2025-02-12

## 2025-02-12 RX ORDER — SODIUM CHLOR/HYPOCHLOROUS ACID 0.033 %
SOLUTION, IRRIGATION IRRIGATION ONCE
OUTPATIENT
Start: 2025-02-12 | End: 2025-02-12

## 2025-02-12 RX ORDER — MUPIROCIN 20 MG/G
OINTMENT TOPICAL ONCE
OUTPATIENT
Start: 2025-02-12 | End: 2025-02-12

## 2025-02-12 RX ORDER — LIDOCAINE 40 MG/G
CREAM TOPICAL ONCE
OUTPATIENT
Start: 2025-02-12 | End: 2025-02-12

## 2025-02-12 RX ORDER — NEOMYCIN/BACITRACIN/POLYMYXINB 3.5-400-5K
OINTMENT (GRAM) TOPICAL ONCE
OUTPATIENT
Start: 2025-02-12 | End: 2025-02-12

## 2025-02-12 RX ORDER — SILVER SULFADIAZINE 10 MG/G
CREAM TOPICAL ONCE
OUTPATIENT
Start: 2025-02-12 | End: 2025-02-12

## 2025-02-12 ASSESSMENT — PAIN DESCRIPTION - FREQUENCY: FREQUENCY: INTERMITTENT

## 2025-02-12 ASSESSMENT — PAIN SCALES - GENERAL: PAINLEVEL_OUTOF10: 7

## 2025-02-12 ASSESSMENT — PAIN DESCRIPTION - PAIN TYPE: TYPE: CHRONIC PAIN

## 2025-02-12 ASSESSMENT — PAIN DESCRIPTION - DESCRIPTORS: DESCRIPTORS: BURNING;SHOOTING

## 2025-02-12 ASSESSMENT — PAIN DESCRIPTION - LOCATION: LOCATION: FOOT

## 2025-02-12 ASSESSMENT — PAIN DESCRIPTION - ORIENTATION: ORIENTATION: RIGHT;LEFT

## 2025-02-12 ASSESSMENT — PAIN - FUNCTIONAL ASSESSMENT: PAIN_FUNCTIONAL_ASSESSMENT: PREVENTS OR INTERFERES SOME ACTIVE ACTIVITIES AND ADLS

## 2025-02-12 ASSESSMENT — PAIN DESCRIPTION - ONSET: ONSET: ON-GOING

## 2025-02-12 NOTE — PATIENT INSTRUCTIONS
[facility] Wound Care and Hyperbaric Oxygen Therapy   Physician Orders and Discharge Instructions  [address] [place] [address]  [address]  [place] [address]  Telephone: [phone]      FAX [phone]    NAME:  Michael Szymanski  YOB: 1940  MEDICAL RECORD NUMBER:  [id number]  DATE:  2/12/2025    Discharge condition: Stable    Discharge to: Home    Left via:Private automobile    Accompanied by: Family    ECF/HHA: [facility]    Dressing Orders: Right foot wound  Soap and water wash  Apply Silver Alginate cut to fit the wound bed, cover with dry gauze, roll gauze and medipore tape, change daily  Wear off load shoe with Metatarsal pad cut out for the wound and knee scooter   Moisturize both feet daily to reduce and prevent callus  Protein rich diet  Multivitamin  Keep glucose levels under 150    [facility] follow up visit __________1 week___________________  (Please note your next appointment above and if you are unable to keep, kindly give a 24 hour notice. Thank you.)    If you experience any of the following, please call the Wound Care Center during business hours:    * Increase in Pain  * Temperature over 101  * Increase in drainage from your wound  * Drainage with a foul odor  * Bleeding  * Increase in swelling  * Need for compression bandage changes due to slippage, breakthrough drainage.    If you need medical attention outside of the business hours of the Wound Care Centers please contact your PCP or go to the nearest emergency room.

## 2025-02-12 NOTE — PROGRESS NOTES
tympanic membrane, external ear and ear canal normal bilaterally, nose without deformity, nasal mucosa and turbinates normal without polyps, lips teeth and gums normal  Neck: supple and non-tender without mass, no thyromegaly or thyroid nodules, no cervical lymphadenopathy  Pulmonary/Chest: clear to auscultation bilaterally- no wheezes, rales or rhonchi, normal air movement, no respiratory distress  Cardiovascular: normal rate, regular rhythm, normal S1 and S2, no murmurs, rubs, clicks, or gallops, distal pulses intact, no carotid bruits  Abdomen: soft, non-tender, non-distended, normal bowel sounds, no masses or organomegaly  Extremities: no cyanosis, clubbing or edema  Musculoskeletal: normal range of motion, no joint swelling, deformity or tenderness  Neurologic: reflexes normal and symmetric, no cranial nerve deficit, gait, coordination and speech normal, sensation of skin normal      Assessment:      Problem List Items Addressed This Visit       * (Principal) Diabetic ulcer of right midfoot associated with type 2 diabetes mellitus, with fat layer exposed (HCC) - Primary (Chronic)    Relevant Medications    lidocaine (XYLOCAINE) 2 % uro-jet    Other Relevant Orders    Initiate Outpatient Wound Care Protocol    Yeast infection of the skin    Relevant Medications    lidocaine (XYLOCAINE) 2 % uro-jet    Other Relevant Orders    Initiate Outpatient Wound Care Protocol        Procedure Note  Indications:  Based on my examination of this patient's wound(s)/ulcer(s) today, debridement is required to promote healing and evaluate the wound base.    Performed by: Von Nieto MD    Consent obtained:  Yes    Time out taken:  Yes    Pain Control: Anesthetic  Anesthetic: 2% Lidocaine Gel Topical       Debridement:Non-excisional Debridement    Using curette the wound(s)/ulcer(s) was/were sharply debrided down through and including the removal of epidermis and dermis.        Devitalized Tissue Debrided:  fibrin, biofilm,

## 2025-02-18 ENCOUNTER — HOSPITAL ENCOUNTER (OUTPATIENT)
Dept: WOUND CARE | Age: 85
Discharge: HOME | End: 2025-02-18
Attending: SURGERY
Payer: MEDICARE

## 2025-02-18 VITALS
HEART RATE: 81 BPM | RESPIRATION RATE: 18 BRPM | WEIGHT: 196 LBS | TEMPERATURE: 96.5 F | HEIGHT: 70 IN | DIASTOLIC BLOOD PRESSURE: 66 MMHG | BODY MASS INDEX: 28.06 KG/M2 | SYSTOLIC BLOOD PRESSURE: 114 MMHG

## 2025-02-18 DIAGNOSIS — E11.621 DIABETIC ULCER OF RIGHT MIDFOOT ASSOCIATED WITH TYPE 2 DIABETES MELLITUS, WITH FAT LAYER EXPOSED (HCC): Primary | ICD-10-CM

## 2025-02-18 DIAGNOSIS — L97.412 DIABETIC ULCER OF RIGHT MIDFOOT ASSOCIATED WITH TYPE 2 DIABETES MELLITUS, WITH FAT LAYER EXPOSED (HCC): Primary | ICD-10-CM

## 2025-02-18 DIAGNOSIS — B37.2 YEAST INFECTION OF THE SKIN: ICD-10-CM

## 2025-02-18 PROCEDURE — 97597 DBRDMT OPN WND 1ST 20 CM/<: CPT | Performed by: SURGERY

## 2025-02-18 PROCEDURE — 97597 DBRDMT OPN WND 1ST 20 CM/<: CPT

## 2025-02-18 RX ORDER — SILVER SULFADIAZINE 10 MG/G
CREAM TOPICAL ONCE
OUTPATIENT
Start: 2025-02-18 | End: 2025-02-18

## 2025-02-18 RX ORDER — LIDOCAINE HYDROCHLORIDE 40 MG/ML
SOLUTION TOPICAL ONCE
OUTPATIENT
Start: 2025-02-18 | End: 2025-02-18

## 2025-02-18 RX ORDER — TRIAMCINOLONE ACETONIDE 1 MG/G
OINTMENT TOPICAL ONCE
OUTPATIENT
Start: 2025-02-18 | End: 2025-02-18

## 2025-02-18 RX ORDER — SODIUM CHLOR/HYPOCHLOROUS ACID 0.033 %
SOLUTION, IRRIGATION IRRIGATION ONCE
OUTPATIENT
Start: 2025-02-18 | End: 2025-02-18

## 2025-02-18 RX ORDER — GENTAMICIN SULFATE 1 MG/G
OINTMENT TOPICAL ONCE
OUTPATIENT
Start: 2025-02-18 | End: 2025-02-18

## 2025-02-18 RX ORDER — MUPIROCIN 20 MG/G
OINTMENT TOPICAL ONCE
OUTPATIENT
Start: 2025-02-18 | End: 2025-02-18

## 2025-02-18 RX ORDER — CLOBETASOL PROPIONATE 0.5 MG/G
OINTMENT TOPICAL ONCE
OUTPATIENT
Start: 2025-02-18 | End: 2025-02-18

## 2025-02-18 RX ORDER — BACITRACIN ZINC AND POLYMYXIN B SULFATE 500; 1000 [USP'U]/G; [USP'U]/G
OINTMENT TOPICAL ONCE
OUTPATIENT
Start: 2025-02-18 | End: 2025-02-18

## 2025-02-18 RX ORDER — LIDOCAINE HYDROCHLORIDE 20 MG/ML
JELLY TOPICAL ONCE
Status: COMPLETED | OUTPATIENT
Start: 2025-02-18 | End: 2025-02-18

## 2025-02-18 RX ORDER — BETAMETHASONE DIPROPIONATE 0.5 MG/G
CREAM TOPICAL ONCE
OUTPATIENT
Start: 2025-02-18 | End: 2025-02-18

## 2025-02-18 RX ORDER — LIDOCAINE 50 MG/G
OINTMENT TOPICAL ONCE
OUTPATIENT
Start: 2025-02-18 | End: 2025-02-18

## 2025-02-18 RX ORDER — LIDOCAINE HYDROCHLORIDE 20 MG/ML
JELLY TOPICAL ONCE
OUTPATIENT
Start: 2025-02-18 | End: 2025-02-18

## 2025-02-18 RX ORDER — GINSENG 100 MG
CAPSULE ORAL ONCE
OUTPATIENT
Start: 2025-02-18 | End: 2025-02-18

## 2025-02-18 RX ORDER — NEOMYCIN/BACITRACIN/POLYMYXINB 3.5-400-5K
OINTMENT (GRAM) TOPICAL ONCE
OUTPATIENT
Start: 2025-02-18 | End: 2025-02-18

## 2025-02-18 RX ORDER — LIDOCAINE 40 MG/G
CREAM TOPICAL ONCE
OUTPATIENT
Start: 2025-02-18 | End: 2025-02-18

## 2025-02-18 RX ADMIN — LIDOCAINE HYDROCHLORIDE: 20 JELLY TOPICAL at 13:09

## 2025-02-18 ASSESSMENT — PAIN DESCRIPTION - ORIENTATION: ORIENTATION: RIGHT

## 2025-02-18 ASSESSMENT — PAIN SCALES - GENERAL: PAINLEVEL_OUTOF10: 6

## 2025-02-18 ASSESSMENT — PAIN DESCRIPTION - DESCRIPTORS: DESCRIPTORS: ACHING;STABBING

## 2025-02-18 ASSESSMENT — PAIN - FUNCTIONAL ASSESSMENT: PAIN_FUNCTIONAL_ASSESSMENT: ACTIVITIES ARE NOT PREVENTED

## 2025-02-18 NOTE — PATIENT INSTRUCTIONS
[facility] Wound Care and Hyperbaric Oxygen Therapy   Physician Orders and Discharge Instructions  [address] [place] [address]  [address]  [place] [address]  Telephone: [phone]      FAX [phone]    NAME:  Michael Szymanski  YOB: 1940  MEDICAL RECORD NUMBER:  [id number]  DATE:  2/18/2025    Discharge condition: Stable    Discharge to: Home    Left via:Private automobile    Accompanied by: Family    ECF/HHA: [facility]    Dressing Orders: Right foot wound  Soap and water wash  Apply Silver Alginate cut to fit the wound bed, cover with dry gauze, roll gauze and medipore tape, change daily  Wear off load shoe with Metatarsal pad cut out for the wound and knee scooter  Moisturize both feet daily to reduce and prevent callus  Protein rich diet  Multivitamin  Keep glucose levels under 150    [facility] follow up visit ____________1 week_________________  (Please note your next appointment above and if you are unable to keep, kindly give a 24 hour notice. Thank you.)    If you experience any of the following, please call the Wound Care Center during business hours:    * Increase in Pain  * Temperature over 101  * Increase in drainage from your wound  * Drainage with a foul odor  * Bleeding  * Increase in swelling  * Need for compression bandage changes due to slippage, breakthrough drainage.    If you need medical attention outside of the business hours of the Wound Care Centers please contact your PCP or go to the nearest emergency room.   
58

## 2025-02-18 NOTE — PROGRESS NOTES
[facility] Wound Care Center   Progress Note and Procedure Note      Michael Szymanski  MEDICAL RECORD NUMBER:  [id number]  AGE: 84 y.o.   GENDER: male  : 1940  EPISODE DATE:  2025    Subjective:     Chief Complaint   Patient presents with    Wound Check     Right foot wound check         HISTORY of PRESENT ILLNESS HPI     Michael Szymanski is a 84 y.o. male who presents today for wound/ulcer evaluation.   Wound Context: Pt with R DFU here for eval/treat  Wound/Ulcer Pain Timing/Severity: none  Quality of pain: N/A  Severity:  0 / 10   Modifying Factors: None  Associated Signs/Symptoms: none    Ulcer Identification:  Ulcer Type: diabetic  Contributing Factors: diabetes    Wound:  DFU        PAST MEDICAL HISTORY        Diagnosis Date    Aortic valve stenosis     Arthritis     Chest tightness, discomfort, or pressure 2012    Chronic back pain     CKD (chronic kidney disease)     Coronary artery disease involving native coronary artery of native heart without angina pectoris 2/10/2025    CPAP (continuous positive airway pressure) dependence     13cm    Diabetes (HCC)     Diabetic polyneuropathy associated with type 2 diabetes mellitus (HCC) 2016    GERD (gastroesophageal reflux disease)     HTN (hypertension)     Hyperlipemia     Left ventricular diastolic dysfunction     Mitral stenosis     Mitral valve stenosis     Neuropathy     Obstructive sleep apnea     AHI: 34.5 per PSG, 2013; AHI: 36.9 per PSG, 2015; AHI: 54.5 per PSG, 3/2017    Pinched nerve in neck     Restless legs syndrome 2016       PAST SURGICAL HISTORY    Past Surgical History:   Procedure Laterality Date    APPENDECTOMY      BACK SURGERY      4 Back surgeries    BLADDER SURGERY      CARDIAC CATHETERIZATION  12    EF > 50%    CATARACT REMOVAL      CHOLECYSTECTOMY      COLONOSCOPY      EYE SURGERY      implants placed both eyes    HAND SURGERY Left 2020    INCISION AND DRAINAGE LEFT HAND ABSCESS performed by Jean Pierre

## 2025-02-26 ENCOUNTER — HOSPITAL ENCOUNTER (OUTPATIENT)
Dept: WOUND CARE | Age: 85
Discharge: HOME | End: 2025-02-26
Attending: SURGERY
Payer: MEDICARE

## 2025-02-26 VITALS
DIASTOLIC BLOOD PRESSURE: 63 MMHG | TEMPERATURE: 97.1 F | RESPIRATION RATE: 18 BRPM | HEART RATE: 84 BPM | SYSTOLIC BLOOD PRESSURE: 115 MMHG

## 2025-02-26 DIAGNOSIS — B37.2 YEAST INFECTION OF THE SKIN: ICD-10-CM

## 2025-02-26 DIAGNOSIS — L97.412 DIABETIC ULCER OF RIGHT MIDFOOT ASSOCIATED WITH TYPE 2 DIABETES MELLITUS, WITH FAT LAYER EXPOSED (HCC): Primary | ICD-10-CM

## 2025-02-26 DIAGNOSIS — E11.621 DIABETIC ULCER OF RIGHT MIDFOOT ASSOCIATED WITH TYPE 2 DIABETES MELLITUS, WITH FAT LAYER EXPOSED (HCC): Primary | ICD-10-CM

## 2025-02-26 PROCEDURE — 97597 DBRDMT OPN WND 1ST 20 CM/<: CPT

## 2025-02-26 PROCEDURE — 97597 DBRDMT OPN WND 1ST 20 CM/<: CPT | Performed by: SURGERY

## 2025-02-26 RX ORDER — NEOMYCIN/BACITRACIN/POLYMYXINB 3.5-400-5K
OINTMENT (GRAM) TOPICAL ONCE
OUTPATIENT
Start: 2025-02-26 | End: 2025-02-26

## 2025-02-26 RX ORDER — LIDOCAINE HYDROCHLORIDE 20 MG/ML
JELLY TOPICAL ONCE
Status: COMPLETED | OUTPATIENT
Start: 2025-02-26 | End: 2025-02-26

## 2025-02-26 RX ORDER — LIDOCAINE 40 MG/G
CREAM TOPICAL ONCE
OUTPATIENT
Start: 2025-02-26 | End: 2025-02-26

## 2025-02-26 RX ORDER — LIDOCAINE HYDROCHLORIDE 40 MG/ML
SOLUTION TOPICAL ONCE
OUTPATIENT
Start: 2025-02-26 | End: 2025-02-26

## 2025-02-26 RX ORDER — GINSENG 100 MG
CAPSULE ORAL ONCE
OUTPATIENT
Start: 2025-02-26 | End: 2025-02-26

## 2025-02-26 RX ORDER — CLOBETASOL PROPIONATE 0.5 MG/G
OINTMENT TOPICAL ONCE
OUTPATIENT
Start: 2025-02-26 | End: 2025-02-26

## 2025-02-26 RX ORDER — MUPIROCIN 20 MG/G
OINTMENT TOPICAL ONCE
OUTPATIENT
Start: 2025-02-26 | End: 2025-02-26

## 2025-02-26 RX ORDER — LIDOCAINE 50 MG/G
OINTMENT TOPICAL ONCE
OUTPATIENT
Start: 2025-02-26 | End: 2025-02-26

## 2025-02-26 RX ORDER — GENTAMICIN SULFATE 1 MG/G
OINTMENT TOPICAL ONCE
OUTPATIENT
Start: 2025-02-26 | End: 2025-02-26

## 2025-02-26 RX ORDER — LIDOCAINE HYDROCHLORIDE 20 MG/ML
JELLY TOPICAL ONCE
OUTPATIENT
Start: 2025-02-26 | End: 2025-02-26

## 2025-02-26 RX ORDER — TRIAMCINOLONE ACETONIDE 1 MG/G
OINTMENT TOPICAL ONCE
OUTPATIENT
Start: 2025-02-26 | End: 2025-02-26

## 2025-02-26 RX ORDER — SILVER SULFADIAZINE 10 MG/G
CREAM TOPICAL ONCE
OUTPATIENT
Start: 2025-02-26 | End: 2025-02-26

## 2025-02-26 RX ORDER — BETAMETHASONE DIPROPIONATE 0.5 MG/G
CREAM TOPICAL ONCE
OUTPATIENT
Start: 2025-02-26 | End: 2025-02-26

## 2025-02-26 RX ORDER — BACITRACIN ZINC AND POLYMYXIN B SULFATE 500; 1000 [USP'U]/G; [USP'U]/G
OINTMENT TOPICAL ONCE
OUTPATIENT
Start: 2025-02-26 | End: 2025-02-26

## 2025-02-26 RX ORDER — SODIUM CHLOR/HYPOCHLOROUS ACID 0.033 %
SOLUTION, IRRIGATION IRRIGATION ONCE
OUTPATIENT
Start: 2025-02-26 | End: 2025-02-26

## 2025-02-26 RX ADMIN — LIDOCAINE HYDROCHLORIDE: 20 JELLY TOPICAL at 08:27

## 2025-02-26 ASSESSMENT — PAIN - FUNCTIONAL ASSESSMENT: PAIN_FUNCTIONAL_ASSESSMENT: PREVENTS OR INTERFERES SOME ACTIVE ACTIVITIES AND ADLS

## 2025-02-26 ASSESSMENT — PAIN SCALES - GENERAL: PAINLEVEL_OUTOF10: 9

## 2025-02-26 ASSESSMENT — PAIN DESCRIPTION - FREQUENCY: FREQUENCY: INTERMITTENT

## 2025-02-26 ASSESSMENT — PAIN DESCRIPTION - DESCRIPTORS: DESCRIPTORS: ACHING;STABBING

## 2025-02-26 ASSESSMENT — PAIN DESCRIPTION - PAIN TYPE: TYPE: CHRONIC PAIN

## 2025-02-26 ASSESSMENT — PAIN DESCRIPTION - ORIENTATION: ORIENTATION: RIGHT

## 2025-02-26 ASSESSMENT — PAIN DESCRIPTION - ONSET: ONSET: ON-GOING

## 2025-02-26 NOTE — PROGRESS NOTES
only:  Ulcer: Diabetic ulcer, fat layer exposed           Post Debridement Measurements:    Wound/Ulcer Descriptions are Pre Debridement --EXCEPT MEASUREMENTS    Wound 12/19/24 Pedal Right wound 1- right foot wag 1 (Active)   Wound Image   02/26/25 0821   Wound Etiology Diabetic Henry 1 02/26/25 0821   Dressing Status Old drainage noted 02/26/25 0821   Wound Cleansed Soap and water 02/26/25 0821   Dressing/Treatment Alginate with Ag;Gauze dressing/dressing sponge;Roll gauze 02/18/25 1340   Offloading for Diabetic Foot Ulcers Offloading ordered;Knee scooter 02/26/25 0821   Wound Length (cm) 0.2 cm 02/26/25 0821   Wound Width (cm) 0.7 cm 02/26/25 0821   Wound Depth (cm) 0.4 cm 02/26/25 0821   Wound Surface Area (cm^2) 0.14 cm^2 02/26/25 0821   Change in Wound Size % (l*w) 86 02/26/25 0821   Wound Volume (cm^3) 0.056 cm^3 02/26/25 0821   Wound Healing % 86 02/26/25 0821   Post-Procedure Length (cm) 0.4 cm 02/26/25 0825   Post-Procedure Width (cm) 0.7 cm 02/26/25 0825   Post-Procedure Depth (cm) 0.3 cm 02/26/25 0825   Post-Procedure Surface Area (cm^2) 0.28 cm^2 02/26/25 0825   Post-Procedure Volume (cm^3) 0.084 cm^3 02/26/25 0825   Distance Tunneling (cm) 0 cm 02/18/25 1316   Tunneling Position ___ O'Clock 0 02/18/25 1316   Undermining Starts ___ O'Clock 10 02/26/25 0821   Undermining Ends___ O'Clock 3 02/26/25 0821   Undermining Maxium Distance (cm) 0.3 02/26/25 0821   Wound Assessment Pink/red;Slough 02/26/25 0821   Drainage Amount Moderate (25-50%) 02/26/25 0821   Drainage Description Serosanguinous 02/26/25 0821   Odor None 02/26/25 0821   Mariana-wound Assessment Hyperkeratosis (callous) 02/26/25 0821   Margins Defined edges 02/26/25 0821   Wound Thickness Description not for Pressure Injury Full thickness 02/26/25 0821   Number of days: 68             Estimated Blood Loss:  Minimal    Hemostasis Achieved:  by pressure    Procedural Pain:  0  / 10     Post Procedural Pain:  0 / 10     Response to treatment:  Well

## 2025-02-26 NOTE — PATIENT INSTRUCTIONS
[facility] Wound Care and Hyperbaric Oxygen Therapy   Physician Orders and Discharge Instructions  [address] [place] [address]  [address]  [place] [address]  Telephone: [phone]      FAX [phone]    NAME:  Michael Szymanski  YOB: 1940  MEDICAL RECORD NUMBER:  [id number]  DATE:  2/26/2025    Discharge condition: Stable    Discharge to: Home    Left via:Private automobile    Accompanied by: Family        [facility] follow up visit ___________1 week__________________  (Please note your next appointment above and if you are unable to keep, kindly give a 24 hour notice. Thank you.)    If you experience any of the following, please call the Wound Care Center during business hours:    * Increase in Pain  * Temperature over 101  * Increase in drainage from your wound  * Drainage with a foul odor  * Bleeding  * Increase in swelling  * Need for compression bandage changes due to slippage, breakthrough drainage.    If you need medical attention outside of the business hours of the Wound Care Centers please contact your PCP or go to the nearest emergency room.

## 2025-03-05 ENCOUNTER — HOSPITAL ENCOUNTER (OUTPATIENT)
Dept: WOUND CARE | Age: 85
Discharge: HOME | End: 2025-03-05
Attending: SURGERY
Payer: MEDICARE

## 2025-03-05 VITALS
HEART RATE: 89 BPM | RESPIRATION RATE: 18 BRPM | SYSTOLIC BLOOD PRESSURE: 108 MMHG | TEMPERATURE: 97 F | DIASTOLIC BLOOD PRESSURE: 62 MMHG

## 2025-03-05 DIAGNOSIS — L97.412 DIABETIC ULCER OF RIGHT MIDFOOT ASSOCIATED WITH TYPE 2 DIABETES MELLITUS, WITH FAT LAYER EXPOSED (HCC): Primary | ICD-10-CM

## 2025-03-05 DIAGNOSIS — E11.621 DIABETIC ULCER OF RIGHT MIDFOOT ASSOCIATED WITH TYPE 2 DIABETES MELLITUS, WITH FAT LAYER EXPOSED (HCC): Primary | ICD-10-CM

## 2025-03-05 DIAGNOSIS — B37.2 YEAST INFECTION OF THE SKIN: ICD-10-CM

## 2025-03-05 PROCEDURE — 97597 DBRDMT OPN WND 1ST 20 CM/<: CPT | Performed by: SURGERY

## 2025-03-05 PROCEDURE — 97597 DBRDMT OPN WND 1ST 20 CM/<: CPT

## 2025-03-05 RX ORDER — MUPIROCIN 20 MG/G
OINTMENT TOPICAL ONCE
OUTPATIENT
Start: 2025-03-05 | End: 2025-03-05

## 2025-03-05 RX ORDER — NEOMYCIN/BACITRACIN/POLYMYXINB 3.5-400-5K
OINTMENT (GRAM) TOPICAL ONCE
OUTPATIENT
Start: 2025-03-05 | End: 2025-03-05

## 2025-03-05 RX ORDER — LIDOCAINE HYDROCHLORIDE 20 MG/ML
JELLY TOPICAL ONCE
OUTPATIENT
Start: 2025-03-05 | End: 2025-03-05

## 2025-03-05 RX ORDER — BETAMETHASONE DIPROPIONATE 0.5 MG/G
CREAM TOPICAL ONCE
OUTPATIENT
Start: 2025-03-05 | End: 2025-03-05

## 2025-03-05 RX ORDER — LIDOCAINE 50 MG/G
OINTMENT TOPICAL ONCE
OUTPATIENT
Start: 2025-03-05 | End: 2025-03-05

## 2025-03-05 RX ORDER — SILVER SULFADIAZINE 10 MG/G
CREAM TOPICAL ONCE
OUTPATIENT
Start: 2025-03-05 | End: 2025-03-05

## 2025-03-05 RX ORDER — LIDOCAINE 40 MG/G
CREAM TOPICAL ONCE
OUTPATIENT
Start: 2025-03-05 | End: 2025-03-05

## 2025-03-05 RX ORDER — SODIUM CHLOR/HYPOCHLOROUS ACID 0.033 %
SOLUTION, IRRIGATION IRRIGATION ONCE
OUTPATIENT
Start: 2025-03-05 | End: 2025-03-05

## 2025-03-05 RX ORDER — LIDOCAINE HYDROCHLORIDE 40 MG/ML
SOLUTION TOPICAL ONCE
OUTPATIENT
Start: 2025-03-05 | End: 2025-03-05

## 2025-03-05 RX ORDER — GINSENG 100 MG
CAPSULE ORAL ONCE
OUTPATIENT
Start: 2025-03-05 | End: 2025-03-05

## 2025-03-05 RX ORDER — LIDOCAINE HYDROCHLORIDE 20 MG/ML
JELLY TOPICAL ONCE
Status: COMPLETED | OUTPATIENT
Start: 2025-03-05 | End: 2025-03-05

## 2025-03-05 RX ORDER — CLOBETASOL PROPIONATE 0.5 MG/G
OINTMENT TOPICAL ONCE
OUTPATIENT
Start: 2025-03-05 | End: 2025-03-05

## 2025-03-05 RX ORDER — GENTAMICIN SULFATE 1 MG/G
OINTMENT TOPICAL ONCE
OUTPATIENT
Start: 2025-03-05 | End: 2025-03-05

## 2025-03-05 RX ORDER — TRIAMCINOLONE ACETONIDE 1 MG/G
OINTMENT TOPICAL ONCE
OUTPATIENT
Start: 2025-03-05 | End: 2025-03-05

## 2025-03-05 RX ORDER — BACITRACIN ZINC AND POLYMYXIN B SULFATE 500; 1000 [USP'U]/G; [USP'U]/G
OINTMENT TOPICAL ONCE
OUTPATIENT
Start: 2025-03-05 | End: 2025-03-05

## 2025-03-05 RX ADMIN — LIDOCAINE HYDROCHLORIDE: 20 JELLY TOPICAL at 08:29

## 2025-03-05 ASSESSMENT — PAIN DESCRIPTION - ONSET: ONSET: ON-GOING

## 2025-03-05 ASSESSMENT — PAIN DESCRIPTION - LOCATION: LOCATION: FOOT

## 2025-03-05 ASSESSMENT — PAIN DESCRIPTION - ORIENTATION: ORIENTATION: RIGHT;LEFT

## 2025-03-05 ASSESSMENT — PAIN DESCRIPTION - FREQUENCY: FREQUENCY: INTERMITTENT

## 2025-03-05 ASSESSMENT — PAIN SCALES - GENERAL: PAINLEVEL_OUTOF10: 8

## 2025-03-05 ASSESSMENT — PAIN DESCRIPTION - PAIN TYPE: TYPE: NEUROPATHIC PAIN

## 2025-03-05 NOTE — PROGRESS NOTES
[facility]   Progress Note and Procedure Note      Michael Szymanski  MEDICAL RECORD NUMBER:  [id number]  AGE: 84 y.o.   GENDER: male  : 1940  EPISODE DATE:  3/5/2025    Subjective:     Chief Complaint   Patient presents with    Wound Check     Right foot wound         HISTORY of PRESENT ILLNESS HPI     Michael Szymanski is a 84 y.o. male who presents today for wound/ulcer evaluation.   Wound Context: Pt with R plantar foot DFU  Wound/Ulcer Pain Timing/Severity: none  Quality of pain: N/A  Severity:  0 / 10   Modifying Factors: None  Associated Signs/Symptoms: none    Ulcer Identification:  Ulcer Type: diabetic  Contributing Factors: diabetes and shear force    Wound:  DFU        PAST MEDICAL HISTORY        Diagnosis Date    Aortic valve stenosis     Arthritis     Chest tightness, discomfort, or pressure 2012    Chronic back pain     CKD (chronic kidney disease)     Coronary artery disease involving native coronary artery of native heart without angina pectoris 2/10/2025    CPAP (continuous positive airway pressure) dependence     13cm    Diabetes (HCC)     Diabetic polyneuropathy associated with type 2 diabetes mellitus (HCC) 2016    GERD (gastroesophageal reflux disease)     HTN (hypertension)     Hyperlipemia     Left ventricular diastolic dysfunction     Mitral stenosis     Mitral valve stenosis     Neuropathy     Obstructive sleep apnea     AHI: 34.5 per PSG, 2013; AHI: 36.9 per PSG, 2015; AHI: 54.5 per PSG, 3/2017    Pinched nerve in neck     Restless legs syndrome 2016       PAST SURGICAL HISTORY    Past Surgical History:   Procedure Laterality Date    APPENDECTOMY      BACK SURGERY      4 Back surgeries    BLADDER SURGERY      CARDIAC CATHETERIZATION  12    EF > 50%    CATARACT REMOVAL      CHOLECYSTECTOMY      COLONOSCOPY      EYE SURGERY      implants placed both eyes    HAND SURGERY Left 2020    INCISION AND DRAINAGE LEFT HAND ABSCESS performed by

## 2025-03-05 NOTE — PATIENT INSTRUCTIONS
[facility] Wound Care and Hyperbaric Oxygen Therapy   Physician Orders and Discharge Instructions  [address] [place] [address]  [address]  [place] [address]  Telephone: [phone]      FAX [phone]    NAME:  Michael Szymanski  YOB: 1940  MEDICAL RECORD NUMBER:  [id number]  DATE:  3/5/2025    Discharge condition: Stable    Discharge to: Home    Left via:Private automobile    Accompanied by: Family    [facility]: [facility]    Dressing Orders: Right foot wound  Soap and water wash  Apply Silver Alginate cut to fit the wound bed, cover with dry gauze, roll gauze and medipore tape, change daily  Wear off load shoe with Metatarsal pad cut out for the wound and knee scooter  Moisturize both feet daily to reduce and prevent callus  Protein rich diet  Multivitamin  Keep glucose levels under 150    [facility] follow up visit ___________1 week__________________  (Please note your next appointment above and if you are unable to keep, kindly give a 24 hour notice. Thank you.)    If you experience any of the following, please call the Wound Care Center during business hours:    * Increase in Pain  * Temperature over 101  * Increase in drainage from your wound  * Drainage with a foul odor  * Bleeding  * Increase in swelling  * Need for compression bandage changes due to slippage, breakthrough drainage.    If you need medical attention outside of the business hours of the Wound Care Centers please contact your PCP or go to the nearest emergency room.

## 2025-03-12 ENCOUNTER — HOSPITAL ENCOUNTER (OUTPATIENT)
Dept: WOUND CARE | Age: 85
Discharge: HOME | End: 2025-03-12
Attending: SURGERY
Payer: MEDICARE

## 2025-03-12 VITALS
RESPIRATION RATE: 18 BRPM | TEMPERATURE: 97 F | DIASTOLIC BLOOD PRESSURE: 70 MMHG | HEART RATE: 74 BPM | HEIGHT: 70 IN | WEIGHT: 196 LBS | BODY MASS INDEX: 28.06 KG/M2 | SYSTOLIC BLOOD PRESSURE: 134 MMHG

## 2025-03-12 DIAGNOSIS — E11.621 DIABETIC ULCER OF RIGHT MIDFOOT ASSOCIATED WITH TYPE 2 DIABETES MELLITUS, WITH FAT LAYER EXPOSED (HCC): Primary | ICD-10-CM

## 2025-03-12 DIAGNOSIS — L97.412 DIABETIC ULCER OF RIGHT MIDFOOT ASSOCIATED WITH TYPE 2 DIABETES MELLITUS, WITH FAT LAYER EXPOSED (HCC): Primary | ICD-10-CM

## 2025-03-12 DIAGNOSIS — B37.2 YEAST INFECTION OF THE SKIN: ICD-10-CM

## 2025-03-12 PROCEDURE — 97597 DBRDMT OPN WND 1ST 20 CM/<: CPT

## 2025-03-12 PROCEDURE — 97597 DBRDMT OPN WND 1ST 20 CM/<: CPT | Performed by: SURGERY

## 2025-03-12 RX ORDER — MUPIROCIN 20 MG/G
OINTMENT TOPICAL ONCE
OUTPATIENT
Start: 2025-03-12 | End: 2025-03-12

## 2025-03-12 RX ORDER — LIDOCAINE HYDROCHLORIDE 20 MG/ML
JELLY TOPICAL ONCE
OUTPATIENT
Start: 2025-03-12 | End: 2025-03-12

## 2025-03-12 RX ORDER — LIDOCAINE HYDROCHLORIDE 40 MG/ML
SOLUTION TOPICAL ONCE
OUTPATIENT
Start: 2025-03-12 | End: 2025-03-12

## 2025-03-12 RX ORDER — LIDOCAINE HYDROCHLORIDE 20 MG/ML
JELLY TOPICAL ONCE
Status: COMPLETED | OUTPATIENT
Start: 2025-03-12 | End: 2025-03-12

## 2025-03-12 RX ORDER — CLOBETASOL PROPIONATE 0.5 MG/G
OINTMENT TOPICAL ONCE
OUTPATIENT
Start: 2025-03-12 | End: 2025-03-12

## 2025-03-12 RX ORDER — NEOMYCIN/BACITRACIN/POLYMYXINB 3.5-400-5K
OINTMENT (GRAM) TOPICAL ONCE
OUTPATIENT
Start: 2025-03-12 | End: 2025-03-12

## 2025-03-12 RX ORDER — LIDOCAINE 40 MG/G
CREAM TOPICAL ONCE
OUTPATIENT
Start: 2025-03-12 | End: 2025-03-12

## 2025-03-12 RX ORDER — GINSENG 100 MG
CAPSULE ORAL ONCE
OUTPATIENT
Start: 2025-03-12 | End: 2025-03-12

## 2025-03-12 RX ORDER — BACITRACIN ZINC AND POLYMYXIN B SULFATE 500; 1000 [USP'U]/G; [USP'U]/G
OINTMENT TOPICAL ONCE
OUTPATIENT
Start: 2025-03-12 | End: 2025-03-12

## 2025-03-12 RX ORDER — GENTAMICIN SULFATE 1 MG/G
OINTMENT TOPICAL ONCE
OUTPATIENT
Start: 2025-03-12 | End: 2025-03-12

## 2025-03-12 RX ORDER — SODIUM CHLOR/HYPOCHLOROUS ACID 0.033 %
SOLUTION, IRRIGATION IRRIGATION ONCE
OUTPATIENT
Start: 2025-03-12 | End: 2025-03-12

## 2025-03-12 RX ORDER — SILVER SULFADIAZINE 10 MG/G
CREAM TOPICAL ONCE
OUTPATIENT
Start: 2025-03-12 | End: 2025-03-12

## 2025-03-12 RX ORDER — TRIAMCINOLONE ACETONIDE 1 MG/G
OINTMENT TOPICAL ONCE
OUTPATIENT
Start: 2025-03-12 | End: 2025-03-12

## 2025-03-12 RX ORDER — BETAMETHASONE DIPROPIONATE 0.5 MG/G
CREAM TOPICAL ONCE
OUTPATIENT
Start: 2025-03-12 | End: 2025-03-12

## 2025-03-12 RX ORDER — LIDOCAINE 50 MG/G
OINTMENT TOPICAL ONCE
OUTPATIENT
Start: 2025-03-12 | End: 2025-03-12

## 2025-03-12 RX ADMIN — LIDOCAINE HYDROCHLORIDE: 20 JELLY TOPICAL at 08:17

## 2025-03-12 ASSESSMENT — PAIN - FUNCTIONAL ASSESSMENT: PAIN_FUNCTIONAL_ASSESSMENT: PREVENTS OR INTERFERES SOME ACTIVE ACTIVITIES AND ADLS

## 2025-03-12 ASSESSMENT — PAIN DESCRIPTION - LOCATION: LOCATION: SHOULDER

## 2025-03-12 ASSESSMENT — PAIN SCALES - GENERAL: PAINLEVEL_OUTOF10: 5

## 2025-03-12 ASSESSMENT — PAIN DESCRIPTION - ORIENTATION: ORIENTATION: RIGHT;LEFT

## 2025-03-12 ASSESSMENT — PAIN DESCRIPTION - FREQUENCY: FREQUENCY: INTERMITTENT

## 2025-03-12 ASSESSMENT — PAIN DESCRIPTION - DESCRIPTORS: DESCRIPTORS: DISCOMFORT

## 2025-03-12 ASSESSMENT — PAIN DESCRIPTION - ONSET: ONSET: ON-GOING

## 2025-03-12 ASSESSMENT — PAIN DESCRIPTION - PAIN TYPE: TYPE: CHRONIC PAIN

## 2025-03-12 NOTE — PROGRESS NOTES
[facility]   Progress Note and Procedure Note      Michael Szymanski  MEDICAL RECORD NUMBER:  [id number]  AGE: 84 y.o.   GENDER: male  : 1940  EPISODE DATE:  3/12/2025    Subjective:     Chief Complaint   Patient presents with    Wound Check     Patient presents today for recheck right foot wounds.         HISTORY of PRESENT ILLNESS HPI     Michael Szymanski is a 84 y.o. male who presents today for wound/ulcer evaluation.   Wound Context: Pt with R plantar foot wound here for eval/treat  Wound/Ulcer Pain Timing/Severity: none  Quality of pain: N/A  Severity:  0 / 10   Modifying Factors: None  Associated Signs/Symptoms: none    Ulcer Identification:  Ulcer Type: diabetic  Contributing Factors: diabetes, chronic pressure, and shear force    Wound:  DFU        PAST MEDICAL HISTORY        Diagnosis Date    Aortic valve stenosis     Arthritis     Chest tightness, discomfort, or pressure 2012    Chronic back pain     CKD (chronic kidney disease)     Coronary artery disease involving native coronary artery of native heart without angina pectoris 2/10/2025    CPAP (continuous positive airway pressure) dependence     13cm    Diabetes (HCC)     Diabetic polyneuropathy associated with type 2 diabetes mellitus (HCC) 2016    GERD (gastroesophageal reflux disease)     HTN (hypertension)     Hyperlipemia     Left ventricular diastolic dysfunction     Mitral stenosis     Mitral valve stenosis     Neuropathy     Obstructive sleep apnea     AHI: 34.5 per PSG, 2013; AHI: 36.9 per PSG, 2015; AHI: 54.5 per PSG, 3/2017    Pinched nerve in neck     Restless legs syndrome 2016       PAST SURGICAL HISTORY    Past Surgical History:   Procedure Laterality Date    APPENDECTOMY      BACK SURGERY      4 Back surgeries    BLADDER SURGERY      CARDIAC CATHETERIZATION  12    EF > 50%    CATARACT REMOVAL      CHOLECYSTECTOMY      COLONOSCOPY      EYE SURGERY      implants placed both eyes    HAND

## 2025-03-12 NOTE — PATIENT INSTRUCTIONS
[facility] Wound Care and Hyperbaric Oxygen Therapy   Physician Orders and Discharge Instructions  [address] [place] [address]  [address]  [place] [address]  Telephone: [phone]      FAX [phone]    NAME:  Michael Szymanski  YOB: 1940  MEDICAL RECORD NUMBER:  [id number]  DATE:  3/12/2025    Discharge condition: Stable    Discharge to: Home    Left via:Private automobile    Accompanied by: Family    Dressing Orders: Right foot wound  Soap and water wash  Apply Silver Alginate cut to fit the wound bed, cover with dry gauze, roll gauze and medipore tape, change daily  Wear off load shoe with Metatarsal pad cut out for the wound and knee scooter  Moisturize both feet daily to reduce and prevent callus  Protein rich diet  Multivitamin  Keep glucose levels under 150    [facility] follow up visit __________1 week___________________  (Please note your next appointment above and if you are unable to keep, kindly give a 24 hour notice. Thank you.)    If you experience any of the following, please call the Wound Care Center during business hours:    * Increase in Pain  * Temperature over 101  * Increase in drainage from your wound  * Drainage with a foul odor  * Bleeding  * Increase in swelling  * Need for compression bandage changes due to slippage, breakthrough drainage.    If you need medical attention outside of the business hours of the Wound Care Centers please contact your PCP or go to the nearest emergency room.

## 2025-03-13 DIAGNOSIS — L29.1 SCROTAL ITCHING: ICD-10-CM

## 2025-03-13 RX ORDER — HYDROXYZINE HYDROCHLORIDE 25 MG/1
TABLET, FILM COATED ORAL
Qty: 30 TABLET | Refills: 1 | Status: SHIPPED | OUTPATIENT
Start: 2025-03-13

## 2025-03-20 ENCOUNTER — HOSPITAL ENCOUNTER (OUTPATIENT)
Dept: WOUND CARE | Age: 85
Discharge: HOME | End: 2025-03-20
Attending: SURGERY
Payer: MEDICARE

## 2025-03-20 VITALS
TEMPERATURE: 97 F | RESPIRATION RATE: 18 BRPM | SYSTOLIC BLOOD PRESSURE: 121 MMHG | WEIGHT: 196 LBS | BODY MASS INDEX: 28.06 KG/M2 | HEART RATE: 84 BPM | HEIGHT: 70 IN | DIASTOLIC BLOOD PRESSURE: 67 MMHG

## 2025-03-20 DIAGNOSIS — B37.2 YEAST INFECTION OF THE SKIN: ICD-10-CM

## 2025-03-20 DIAGNOSIS — E11.621 DIABETIC ULCER OF RIGHT MIDFOOT ASSOCIATED WITH TYPE 2 DIABETES MELLITUS, WITH FAT LAYER EXPOSED: Primary | ICD-10-CM

## 2025-03-20 DIAGNOSIS — L97.412 DIABETIC ULCER OF RIGHT MIDFOOT ASSOCIATED WITH TYPE 2 DIABETES MELLITUS, WITH FAT LAYER EXPOSED: Primary | ICD-10-CM

## 2025-03-20 PROCEDURE — 97597 DBRDMT OPN WND 1ST 20 CM/<: CPT

## 2025-03-20 PROCEDURE — 97597 DBRDMT OPN WND 1ST 20 CM/<: CPT | Performed by: SURGERY

## 2025-03-20 RX ORDER — GENTAMICIN SULFATE 1 MG/G
OINTMENT TOPICAL ONCE
OUTPATIENT
Start: 2025-03-20 | End: 2025-03-20

## 2025-03-20 RX ORDER — LIDOCAINE HYDROCHLORIDE 20 MG/ML
JELLY TOPICAL ONCE
OUTPATIENT
Start: 2025-03-20 | End: 2025-03-20

## 2025-03-20 RX ORDER — BETAMETHASONE DIPROPIONATE 0.5 MG/G
CREAM TOPICAL ONCE
OUTPATIENT
Start: 2025-03-20 | End: 2025-03-20

## 2025-03-20 RX ORDER — GINSENG 100 MG
CAPSULE ORAL ONCE
OUTPATIENT
Start: 2025-03-20 | End: 2025-03-20

## 2025-03-20 RX ORDER — SODIUM CHLOR/HYPOCHLOROUS ACID 0.033 %
SOLUTION, IRRIGATION IRRIGATION ONCE
OUTPATIENT
Start: 2025-03-20 | End: 2025-03-20

## 2025-03-20 RX ORDER — TRIAMCINOLONE ACETONIDE 1 MG/G
OINTMENT TOPICAL ONCE
OUTPATIENT
Start: 2025-03-20 | End: 2025-03-20

## 2025-03-20 RX ORDER — LIDOCAINE HYDROCHLORIDE 20 MG/ML
JELLY TOPICAL ONCE
Status: COMPLETED | OUTPATIENT
Start: 2025-03-20 | End: 2025-03-20

## 2025-03-20 RX ORDER — MUPIROCIN 20 MG/G
OINTMENT TOPICAL ONCE
OUTPATIENT
Start: 2025-03-20 | End: 2025-03-20

## 2025-03-20 RX ORDER — LIDOCAINE HYDROCHLORIDE 40 MG/ML
SOLUTION TOPICAL ONCE
OUTPATIENT
Start: 2025-03-20 | End: 2025-03-20

## 2025-03-20 RX ORDER — LIDOCAINE 40 MG/G
CREAM TOPICAL ONCE
OUTPATIENT
Start: 2025-03-20 | End: 2025-03-20

## 2025-03-20 RX ORDER — SILVER SULFADIAZINE 10 MG/G
CREAM TOPICAL ONCE
OUTPATIENT
Start: 2025-03-20 | End: 2025-03-20

## 2025-03-20 RX ORDER — NEOMYCIN/BACITRACIN/POLYMYXINB 3.5-400-5K
OINTMENT (GRAM) TOPICAL ONCE
OUTPATIENT
Start: 2025-03-20 | End: 2025-03-20

## 2025-03-20 RX ORDER — CLOBETASOL PROPIONATE 0.5 MG/G
OINTMENT TOPICAL ONCE
OUTPATIENT
Start: 2025-03-20 | End: 2025-03-20

## 2025-03-20 RX ORDER — LIDOCAINE 50 MG/G
OINTMENT TOPICAL ONCE
OUTPATIENT
Start: 2025-03-20 | End: 2025-03-20

## 2025-03-20 RX ORDER — BACITRACIN ZINC AND POLYMYXIN B SULFATE 500; 1000 [USP'U]/G; [USP'U]/G
OINTMENT TOPICAL ONCE
OUTPATIENT
Start: 2025-03-20 | End: 2025-03-20

## 2025-03-20 RX ADMIN — LIDOCAINE HYDROCHLORIDE: 20 JELLY TOPICAL at 08:06

## 2025-03-20 NOTE — PLAN OF CARE
Problem: Wound:  Goal: Will show signs of wound healing; wound closure and no evidence of infection  Description: Will show signs of wound healing; wound closure and no evidence of infection  Outcome: Progressing     Problem: Falls - Risk of:  Goal: Will remain free from falls  Description: Will remain free from falls  Outcome: Progressing     Problem: Blood Glucose:  Goal: Ability to maintain appropriate glucose levels will improve  Description: Ability to maintain appropriate glucose levels will improve  Outcome: Progressing

## 2025-03-20 NOTE — PATIENT INSTRUCTIONS
[facility] Wound Care and Hyperbaric Oxygen Therapy   Physician Orders and Discharge Instructions  [address] [place] [address]  [address]  [place] [address]  Telephone: [phone]      FAX [phone]    NAME:  Michael Szymanski  YOB: 1940  MEDICAL RECORD NUMBER:  [id number]  DATE:  3/20/2025    Discharge condition: Stable    Discharge to: Home    Left via:Private automobile    Accompanied by: Family    Dressing Orders: Right foot wound  Soap and water wash  Apply Silver Alginate cut to fit the wound bed, cover with dry gauze, roll gauze and medipore tape, change daily  Wear off load shoe with Metatarsal pad cut out for the wound and knee scooter  Moisturize both feet daily to reduce and prevent callus  Protein rich diet  Multivitamin  Keep glucose levels under 150    [facility] follow up visit ___________2 weeks__________________  (Please note your next appointment above and if you are unable to keep, kindly give a 24 hour notice. Thank you.)    If you experience any of the following, please call the Wound Care Center during business hours:    * Increase in Pain  * Temperature over 101  * Increase in drainage from your wound  * Drainage with a foul odor  * Bleeding  * Increase in swelling  * Need for compression bandage changes due to slippage, breakthrough drainage.    If you need medical attention outside of the business hours of the Wound Care Centers please contact your PCP or go to the nearest emergency room.

## 2025-03-25 DIAGNOSIS — L29.1 SCROTAL ITCHING: ICD-10-CM

## 2025-03-25 RX ORDER — HYDROXYZINE HYDROCHLORIDE 25 MG/1
TABLET, FILM COATED ORAL
Qty: 30 TABLET | Refills: 2 | Status: SHIPPED | OUTPATIENT
Start: 2025-03-25

## 2025-04-02 ENCOUNTER — HOSPITAL ENCOUNTER (OUTPATIENT)
Dept: WOUND CARE | Age: 85
Discharge: HOME | End: 2025-04-02
Attending: SURGERY
Payer: MEDICARE

## 2025-04-02 VITALS
BODY MASS INDEX: 28.06 KG/M2 | HEART RATE: 88 BPM | SYSTOLIC BLOOD PRESSURE: 119 MMHG | RESPIRATION RATE: 18 BRPM | WEIGHT: 196 LBS | HEIGHT: 70 IN | DIASTOLIC BLOOD PRESSURE: 66 MMHG | TEMPERATURE: 97.1 F

## 2025-04-02 DIAGNOSIS — E11.621 DIABETIC ULCER OF RIGHT MIDFOOT ASSOCIATED WITH TYPE 2 DIABETES MELLITUS, WITH FAT LAYER EXPOSED: Primary | ICD-10-CM

## 2025-04-02 DIAGNOSIS — B37.2 YEAST INFECTION OF THE SKIN: ICD-10-CM

## 2025-04-02 DIAGNOSIS — L97.412 DIABETIC ULCER OF RIGHT MIDFOOT ASSOCIATED WITH TYPE 2 DIABETES MELLITUS, WITH FAT LAYER EXPOSED: Primary | ICD-10-CM

## 2025-04-02 PROCEDURE — 97597 DBRDMT OPN WND 1ST 20 CM/<: CPT

## 2025-04-02 PROCEDURE — 97597 DBRDMT OPN WND 1ST 20 CM/<: CPT | Performed by: SURGERY

## 2025-04-02 RX ORDER — BETAMETHASONE DIPROPIONATE 0.5 MG/G
CREAM TOPICAL ONCE
OUTPATIENT
Start: 2025-04-02 | End: 2025-04-02

## 2025-04-02 RX ORDER — CLOBETASOL PROPIONATE 0.5 MG/G
OINTMENT TOPICAL ONCE
OUTPATIENT
Start: 2025-04-02 | End: 2025-04-02

## 2025-04-02 RX ORDER — GINSENG 100 MG
CAPSULE ORAL ONCE
OUTPATIENT
Start: 2025-04-02 | End: 2025-04-02

## 2025-04-02 RX ORDER — NEOMYCIN/BACITRACIN/POLYMYXINB 3.5-400-5K
OINTMENT (GRAM) TOPICAL ONCE
OUTPATIENT
Start: 2025-04-02 | End: 2025-04-02

## 2025-04-02 RX ORDER — LIDOCAINE 50 MG/G
OINTMENT TOPICAL ONCE
OUTPATIENT
Start: 2025-04-02 | End: 2025-04-02

## 2025-04-02 RX ORDER — GENTAMICIN SULFATE 1 MG/G
OINTMENT TOPICAL ONCE
OUTPATIENT
Start: 2025-04-02 | End: 2025-04-02

## 2025-04-02 RX ORDER — LIDOCAINE HYDROCHLORIDE 20 MG/ML
JELLY TOPICAL ONCE
OUTPATIENT
Start: 2025-04-02 | End: 2025-04-02

## 2025-04-02 RX ORDER — BACITRACIN ZINC AND POLYMYXIN B SULFATE 500; 1000 [USP'U]/G; [USP'U]/G
OINTMENT TOPICAL ONCE
OUTPATIENT
Start: 2025-04-02 | End: 2025-04-02

## 2025-04-02 RX ORDER — SILVER SULFADIAZINE 10 MG/G
CREAM TOPICAL ONCE
OUTPATIENT
Start: 2025-04-02 | End: 2025-04-02

## 2025-04-02 RX ORDER — TRIAMCINOLONE ACETONIDE 1 MG/G
OINTMENT TOPICAL ONCE
OUTPATIENT
Start: 2025-04-02 | End: 2025-04-02

## 2025-04-02 RX ORDER — SODIUM CHLOR/HYPOCHLOROUS ACID 0.033 %
SOLUTION, IRRIGATION IRRIGATION ONCE
OUTPATIENT
Start: 2025-04-02 | End: 2025-04-02

## 2025-04-02 RX ORDER — LIDOCAINE HYDROCHLORIDE 20 MG/ML
JELLY TOPICAL ONCE
Status: COMPLETED | OUTPATIENT
Start: 2025-04-02 | End: 2025-04-02

## 2025-04-02 RX ORDER — LIDOCAINE HYDROCHLORIDE 40 MG/ML
SOLUTION TOPICAL ONCE
OUTPATIENT
Start: 2025-04-02 | End: 2025-04-02

## 2025-04-02 RX ORDER — MUPIROCIN 20 MG/G
OINTMENT TOPICAL ONCE
OUTPATIENT
Start: 2025-04-02 | End: 2025-04-02

## 2025-04-02 RX ORDER — LIDOCAINE 40 MG/G
CREAM TOPICAL ONCE
OUTPATIENT
Start: 2025-04-02 | End: 2025-04-02

## 2025-04-02 RX ADMIN — LIDOCAINE HYDROCHLORIDE: 20 JELLY TOPICAL at 08:06

## 2025-04-02 NOTE — PATIENT INSTRUCTIONS
[facility] Wound Care and Hyperbaric Oxygen Therapy   Physician Orders and Discharge Instructions  [address] [place] [address]  [address]  [place] [address]  Telephone: [phone]      FAX [phone]    NAME:  Michael Szymanski  YOB: 1940  MEDICAL RECORD NUMBER:  [id number]  DATE:  4/2/2025    Discharge condition: Stable    Discharge to: Home    Left via:Private automobile    Accompanied by: Family    Dressing Orders: Right foot wound  Soap and water wash  Apply Silver Alginate cut to fit the wound bed, cover with dry gauze, roll gauze and medipore tape, change daily  Wear off load shoe with Metatarsal pad cut out for the wound and knee scooter  Moisturize both feet daily to reduce and prevent callus  Protein rich diet  Multivitamin  Keep glucose levels under 150    [facility] follow up visit ___________2 weeks__________________  (Please note your next appointment above and if you are unable to keep, kindly give a 24 hour notice. Thank you.)    If you experience any of the following, please call the Wound Care Center during business hours:    * Increase in Pain  * Temperature over 101  * Increase in drainage from your wound  * Drainage with a foul odor  * Bleeding  * Increase in swelling  * Need for compression bandage changes due to slippage, breakthrough drainage.    If you need medical attention outside of the business hours of the Wound Care Centers please contact your PCP or go to the nearest emergency room.

## 2025-04-02 NOTE — PROGRESS NOTES
[facility]   Progress Note and Procedure Note      Mihcael Szymanski  MEDICAL RECORD NUMBER:  [id number]  AGE: 84 y.o.   GENDER: male  : 1940  EPISODE DATE:  2025    Subjective:     Chief Complaint   Patient presents with    Wound Check     Patient presents today for recheck right foot wound.         HISTORY of PRESENT ILLNESS HPI     Michael Szymanski is a 84 y.o. male who presents today for wound/ulcer evaluation.   Wound Context: Pt with R plantar foot wound here for eval/treat  Wound/Ulcer Pain Timing/Severity: none  Quality of pain: N/A  Severity:  0 / 10   Modifying Factors: None  Associated Signs/Symptoms: none    Ulcer Identification:  Ulcer Type: pressure  Contributing Factors: diabetes, chronic pressure, and shear force    Wound:  DFU        PAST MEDICAL HISTORY        Diagnosis Date    Aortic valve stenosis     Arthritis     Chest tightness, discomfort, or pressure 2012    Chronic back pain     CKD (chronic kidney disease)     Coronary artery disease involving native coronary artery of native heart without angina pectoris 2/10/2025    CPAP (continuous positive airway pressure) dependence     13cm    Diabetes (HCC)     Diabetic polyneuropathy associated with type 2 diabetes mellitus 2016    GERD (gastroesophageal reflux disease)     HTN (hypertension)     Hyperlipemia     Left ventricular diastolic dysfunction     Mitral stenosis     Mitral valve stenosis     Neuropathy     Obstructive sleep apnea     AHI: 34.5 per PSG, 2013; AHI: 36.9 per PSG, 2015; AHI: 54.5 per PSG, 3/2017    Pinched nerve in neck     Restless legs syndrome 2016       PAST SURGICAL HISTORY    Past Surgical History:   Procedure Laterality Date    APPENDECTOMY      BACK SURGERY      4 Back surgeries    BLADDER SURGERY      CARDIAC CATHETERIZATION  12    EF > 50%    CATARACT REMOVAL      CHOLECYSTECTOMY      COLONOSCOPY      EYE SURGERY      implants placed both eyes    HAND SURGERY

## 2025-04-16 ENCOUNTER — HOSPITAL ENCOUNTER (OUTPATIENT)
Dept: WOUND CARE | Age: 85
Discharge: HOME | End: 2025-04-16
Attending: SURGERY
Payer: MEDICARE

## 2025-04-16 VITALS
SYSTOLIC BLOOD PRESSURE: 99 MMHG | TEMPERATURE: 97 F | WEIGHT: 196 LBS | BODY MASS INDEX: 28.06 KG/M2 | DIASTOLIC BLOOD PRESSURE: 55 MMHG | RESPIRATION RATE: 18 BRPM | HEIGHT: 70 IN | HEART RATE: 79 BPM

## 2025-04-16 DIAGNOSIS — L97.412 DIABETIC ULCER OF RIGHT MIDFOOT ASSOCIATED WITH TYPE 2 DIABETES MELLITUS, WITH FAT LAYER EXPOSED: Primary | ICD-10-CM

## 2025-04-16 DIAGNOSIS — E11.621 DIABETIC ULCER OF RIGHT MIDFOOT ASSOCIATED WITH TYPE 2 DIABETES MELLITUS, WITH FAT LAYER EXPOSED: Primary | ICD-10-CM

## 2025-04-16 DIAGNOSIS — B37.2 YEAST INFECTION OF THE SKIN: ICD-10-CM

## 2025-04-16 PROCEDURE — 99212 OFFICE O/P EST SF 10 MIN: CPT

## 2025-04-16 PROCEDURE — 99212 OFFICE O/P EST SF 10 MIN: CPT | Performed by: SURGERY

## 2025-04-16 RX ORDER — LIDOCAINE 40 MG/G
CREAM TOPICAL ONCE
Status: CANCELLED | OUTPATIENT
Start: 2025-04-16 | End: 2025-04-16

## 2025-04-16 RX ORDER — TRIAMCINOLONE ACETONIDE 1 MG/G
OINTMENT TOPICAL ONCE
Status: CANCELLED | OUTPATIENT
Start: 2025-04-16 | End: 2025-04-16

## 2025-04-16 RX ORDER — LIDOCAINE HYDROCHLORIDE 20 MG/ML
JELLY TOPICAL ONCE
Status: CANCELLED | OUTPATIENT
Start: 2025-04-16 | End: 2025-04-16

## 2025-04-16 RX ORDER — GENTAMICIN SULFATE 1 MG/G
OINTMENT TOPICAL ONCE
Status: CANCELLED | OUTPATIENT
Start: 2025-04-16 | End: 2025-04-16

## 2025-04-16 RX ORDER — BETAMETHASONE DIPROPIONATE 0.5 MG/G
CREAM TOPICAL ONCE
Status: CANCELLED | OUTPATIENT
Start: 2025-04-16 | End: 2025-04-16

## 2025-04-16 RX ORDER — SILVER SULFADIAZINE 10 MG/G
CREAM TOPICAL ONCE
Status: CANCELLED | OUTPATIENT
Start: 2025-04-16 | End: 2025-04-16

## 2025-04-16 RX ORDER — BACITRACIN ZINC AND POLYMYXIN B SULFATE 500; 1000 [USP'U]/G; [USP'U]/G
OINTMENT TOPICAL ONCE
Status: CANCELLED | OUTPATIENT
Start: 2025-04-16 | End: 2025-04-16

## 2025-04-16 RX ORDER — CLOBETASOL PROPIONATE 0.5 MG/G
OINTMENT TOPICAL ONCE
Status: CANCELLED | OUTPATIENT
Start: 2025-04-16 | End: 2025-04-16

## 2025-04-16 RX ORDER — NEOMYCIN/BACITRACIN/POLYMYXINB 3.5-400-5K
OINTMENT (GRAM) TOPICAL ONCE
Status: CANCELLED | OUTPATIENT
Start: 2025-04-16 | End: 2025-04-16

## 2025-04-16 RX ORDER — MUPIROCIN 20 MG/G
OINTMENT TOPICAL ONCE
Status: CANCELLED | OUTPATIENT
Start: 2025-04-16 | End: 2025-04-16

## 2025-04-16 RX ORDER — GINSENG 100 MG
CAPSULE ORAL ONCE
Status: CANCELLED | OUTPATIENT
Start: 2025-04-16 | End: 2025-04-16

## 2025-04-16 RX ORDER — SODIUM CHLOR/HYPOCHLOROUS ACID 0.033 %
SOLUTION, IRRIGATION IRRIGATION ONCE
Status: CANCELLED | OUTPATIENT
Start: 2025-04-16 | End: 2025-04-16

## 2025-04-16 RX ORDER — LIDOCAINE 50 MG/G
OINTMENT TOPICAL ONCE
Status: CANCELLED | OUTPATIENT
Start: 2025-04-16 | End: 2025-04-16

## 2025-04-16 RX ORDER — LIDOCAINE HYDROCHLORIDE 40 MG/ML
SOLUTION TOPICAL ONCE
Status: CANCELLED | OUTPATIENT
Start: 2025-04-16 | End: 2025-04-16

## 2025-04-16 NOTE — PATIENT INSTRUCTIONS
[facility] Wound Care and Hyperbaric Oxygen Therapy   Physician Orders and Discharge Instructions  [address] [place] [address]  [address]  [place] [address]  Telephone: [phone]      FAX [phone]    NAME:  Michael Szymanski  YOB: 1940  MEDICAL RECORD NUMBER:  [id number]  DATE:  4/16/2025    Discharge condition: Stable    Discharge to: Home    Left via:Private automobile    Accompanied by: Family    Dressing Orders: Right foot wound  Healed, Moisturize and Protect    Diabetic Foot Care    Diabetes can lead to many different types of foot complications, including athlete's foot (a fungal infection), calluses, bunions and other foot deformities, or ulcers that can range from a surface wound to a deep infection.  You will need to check your feet twice daily and give them special care and attention.    1) Wash with lukewarm water and a mild soap.  Dry well between your toes. Do not soak your feet unless instructed to do so by a physician.        Moisturize your feet with a good thick cream like Eucerin Cream, Aquaphor or Cocoa Butter (in a jar) at least twice daily. Do not apply moisturizer between toes.    2)  Protect your feet and never go barefoot.  Wear slippers around the house. Treat even minor wounds and skin cracks as an urgent matter when you have diabetes.  Remember early treatment is essential to avoid more advanced problems.    3) Check your feet daily or have someone else check them if your eyesight is limited.  If you live alone try using a handheld mirror.  Watch for a red spot that persists or callus formation.     4)  Look and feel inside your shoes before putting them on.  Inspect the soles for nails or screws.       5)  If you form callus or thickened skin on your feet use Flexitol Heel Balm once daily alternating with your regular moisturizer.  Flexitol Heel [place] is available at [employer] ($12) and other pharmacies.    6) If you form callus this is a sign that this area

## 2025-04-16 NOTE — PROGRESS NOTES
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    Cellulitis of left foot L03.116    Diabetic ulcer of right midfoot associated with type 2 diabetes mellitus, with fat layer exposed E11.621, L97.412    PAD (peripheral artery disease) I73.9    Yeast infection of the skin B37.2    Coronary artery disease involving native coronary artery of native heart without angina pectoris I25.10    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             Wound 12/19/24 Pedal Right wound 1- right foot wag 1 (Active)   Wound Image   04/16/25 0805   Wound Etiology Diabetic Henry 1 04/16/25 0805   Dressing Status Dry;Intact 04/16/25 0805   Wound Cleansed Soap and water 04/16/25 0805   Dressing/Treatment Alginate with Ag;Gauze dressing/dressing sponge;Roll gauze;Tape/Soft cloth adhesive tape 04/02/25 0824   Offloading for Diabetic Foot Ulcers Offloading ordered;Knee scooter;Forefoot offloading shoe 04/16/25 0805   Wound Length (cm) 0 cm 04/16/25 0805   Wound Width (cm) 0 cm 04/16/25 0805   Wound Depth (cm) 0 cm 04/16/25 0805   Wound Surface Area (cm^2) 0 cm^2 04/16/25 0805   Change in Wound Size % (l*w) 100 04/16/25 0805   Wound Volume (cm^3) 0 cm^3 04/16/25 0805   Wound Healing % 100 04/16/25 0805   Post-Procedure Length (cm) 0.1 cm 04/02/25 0809   Post-Procedure Width (cm) 0.1 cm 04/02/25 0809   Post-Procedure Depth (cm) 0.1 cm 04/02/25 0809   Post-Procedure Surface Area (cm^2) 0.01 cm^2 04/02/25 0809   Post-Procedure Volume (cm^3) 0.001 cm^3 04/02/25 0809   Distance Tunneling (cm) 0 cm 04/16/25 0805   Tunneling Position ___ O'Clock 0 04/16/25 0805

## 2025-04-21 ENCOUNTER — OFFICE VISIT (OUTPATIENT)
Dept: NEUROLOGY | Age: 85
End: 2025-04-21
Payer: MEDICARE

## 2025-04-21 VITALS
WEIGHT: 196 LBS | HEART RATE: 78 BPM | RESPIRATION RATE: 16 BRPM | HEIGHT: 70 IN | DIASTOLIC BLOOD PRESSURE: 80 MMHG | BODY MASS INDEX: 28.06 KG/M2 | SYSTOLIC BLOOD PRESSURE: 148 MMHG

## 2025-04-21 DIAGNOSIS — G47.33 OBSTRUCTIVE SLEEP APNEA: ICD-10-CM

## 2025-04-21 DIAGNOSIS — E11.42 DIABETIC POLYNEUROPATHY ASSOCIATED WITH TYPE 2 DIABETES MELLITUS (HCC): Primary | ICD-10-CM

## 2025-04-21 DIAGNOSIS — G45.0 VERTEBROBASILAR ARTERY INSUFFICIENCY: ICD-10-CM

## 2025-04-21 DIAGNOSIS — G25.81 RESTLESS LEGS SYNDROME: ICD-10-CM

## 2025-04-21 PROCEDURE — 3079F DIAST BP 80-89 MM HG: CPT | Performed by: PSYCHIATRY & NEUROLOGY

## 2025-04-21 PROCEDURE — 1159F MED LIST DOCD IN RCRD: CPT | Performed by: PSYCHIATRY & NEUROLOGY

## 2025-04-21 PROCEDURE — 1123F ACP DISCUSS/DSCN MKR DOCD: CPT | Performed by: PSYCHIATRY & NEUROLOGY

## 2025-04-21 PROCEDURE — G8417 CALC BMI ABV UP PARAM F/U: HCPCS | Performed by: PSYCHIATRY & NEUROLOGY

## 2025-04-21 PROCEDURE — 1036F TOBACCO NON-USER: CPT | Performed by: PSYCHIATRY & NEUROLOGY

## 2025-04-21 PROCEDURE — 99213 OFFICE O/P EST LOW 20 MIN: CPT | Performed by: PSYCHIATRY & NEUROLOGY

## 2025-04-21 PROCEDURE — G8427 DOCREV CUR MEDS BY ELIG CLIN: HCPCS | Performed by: PSYCHIATRY & NEUROLOGY

## 2025-04-21 PROCEDURE — 3077F SYST BP >= 140 MM HG: CPT | Performed by: PSYCHIATRY & NEUROLOGY

## 2025-04-21 NOTE — PROGRESS NOTES
[facility] Neurology  [address], [address]  [place] [address]  Phone [phone]  Fax [phone]     [facility] Neurology Follow Up Encounter  25 10:35 AM CDT    Information:   Patient Name: Michael Szymanski  :   1940  Age:   84 y.o.  MRN:   [id number]  Account #:  [id number]  Today:  25    Provider: Parker Pace M.D.     Chief Complaint:   Chief Complaint   Patient presents with    Follow-up     Patient is here for follow up on neuropathy, patient states it is still bad       Subjective:   Michael Szymanski is a 84 y.o. man with diabetic polyneuropathy, restless legs syndrome, vertebrobasilar insufficiency, and obstructive sleep apnea who is following up.  He complains of burning numbness in his feet, lower legs, and hands.  He had a right foot wound that took months to heal.  He has some jerking in his legs.  He has had no syncope.  He has previously been on gabapentin, Lyrica, Cymbalta, amitriptyline, Sinemet.  He is presently taking Mirapex 1.5 mg TID.  He has lost considerable weight.  He does see pain management and is prescribed hydrocodone.  He takes it once and sometimes twice a day.  He has lost considerable weight.  He does not use his CPAP regularly.      Objective:     Past Medical History:  Past Medical History:   Diagnosis Date    Aortic valve stenosis     Arthritis     Chest tightness, discomfort, or pressure 2012    Chronic back pain     CKD (chronic kidney disease)     Coronary artery disease involving native coronary artery of native heart without angina pectoris 2/10/2025    CPAP (continuous positive airway pressure) dependence     13cm    Diabetes (HCC)     Diabetic polyneuropathy associated with type 2 diabetes mellitus 2016    GERD (gastroesophageal reflux disease)     HTN (hypertension)     Hyperlipemia     Left ventricular diastolic dysfunction     Mitral stenosis     Mitral valve stenosis     Neuropathy     Obstructive sleep apnea     AHI: 34.5 per PSG,

## 2025-04-22 NOTE — TELEPHONE ENCOUNTER
Letter to Galilea to sign.
Michelle-I just received a call from RITESH Beck, patient's PCP regarding need for clearance for an EGD/colon on this patient to be done by Dr. Raghu Lima. We just recently cleared him for shoulder manipulation earlier this month.   Please send letter to Dr. Raghu Lima stating that patient can hold Plavix 5 days prior to procedure and resume thereafter
coffee

## 2025-04-28 RX ORDER — APIXABAN 5 MG/1
5 TABLET, FILM COATED ORAL 2 TIMES DAILY
Qty: 60 TABLET | Refills: 12 | Status: SHIPPED | OUTPATIENT
Start: 2025-04-28

## 2025-05-06 ENCOUNTER — HOSPITAL ENCOUNTER (EMERGENCY)
Age: 85
Discharge: HOME | End: 2025-05-06
Attending: EMERGENCY MEDICINE
Payer: MEDICARE

## 2025-05-06 ENCOUNTER — APPOINTMENT (OUTPATIENT)
Dept: GENERAL RADIOLOGY | Age: 85
End: 2025-05-06
Payer: MEDICARE

## 2025-05-06 VITALS
WEIGHT: 190 LBS | RESPIRATION RATE: 13 BRPM | OXYGEN SATURATION: 97 % | BODY MASS INDEX: 27.26 KG/M2 | HEART RATE: 61 BPM | TEMPERATURE: 98 F | SYSTOLIC BLOOD PRESSURE: 128 MMHG | DIASTOLIC BLOOD PRESSURE: 87 MMHG

## 2025-05-06 DIAGNOSIS — R42 LIGHT HEADEDNESS: Primary | ICD-10-CM

## 2025-05-06 DIAGNOSIS — N39.43 POST-VOID DRIBBLING: ICD-10-CM

## 2025-05-06 LAB
ALBUMIN SERPL-MCNC: 4.4 G/DL (ref 3.5–5.2)
ALP SERPL-CCNC: 103 U/L (ref 40–129)
ALT SERPL-CCNC: 40 U/L (ref 10–50)
ANION GAP SERPL CALCULATED.3IONS-SCNC: 14 MMOL/L (ref 8–16)
AST SERPL-CCNC: 32 U/L (ref 10–50)
BASOPHILS # BLD: 0 K/UL (ref 0–0.2)
BASOPHILS NFR BLD: 0.2 % (ref 0–1)
BILIRUB SERPL-MCNC: 0.8 MG/DL (ref 0.2–1.2)
BILIRUB UR QL STRIP: NEGATIVE
BUN SERPL-MCNC: 26 MG/DL (ref 8–23)
CALCIUM SERPL-MCNC: 9.8 MG/DL (ref 8.8–10.2)
CHLORIDE SERPL-SCNC: 98 MMOL/L (ref 98–107)
CLARITY UR: CLEAR
CO2 SERPL-SCNC: 25 MMOL/L (ref 22–29)
COLOR UR: YELLOW
CREAT SERPL-MCNC: 0.9 MG/DL (ref 0.7–1.2)
EOSINOPHIL # BLD: 0.1 K/UL (ref 0–0.6)
EOSINOPHIL NFR BLD: 1.4 % (ref 0–5)
ERYTHROCYTE [DISTWIDTH] IN BLOOD BY AUTOMATED COUNT: 14.1 % (ref 11.5–14.5)
GLUCOSE SERPL-MCNC: 246 MG/DL (ref 70–99)
GLUCOSE UR STRIP.AUTO-MCNC: =>1000 MG/DL
HCT VFR BLD AUTO: 49.4 % (ref 42–52)
HGB BLD-MCNC: 16.8 G/DL (ref 14–18)
HGB UR STRIP.AUTO-MCNC: NEGATIVE MG/L
IMM GRANULOCYTES # BLD: 0.1 K/UL
KETONES UR STRIP.AUTO-MCNC: NEGATIVE MG/DL
LEUKOCYTE ESTERASE UR QL STRIP.AUTO: NEGATIVE
LYMPHOCYTES # BLD: 0.7 K/UL (ref 1.1–4.5)
LYMPHOCYTES NFR BLD: 7.1 % (ref 20–40)
MAGNESIUM SERPL-MCNC: 2.2 MG/DL (ref 1.6–2.4)
MCH RBC QN AUTO: 30.7 PG (ref 27–31)
MCHC RBC AUTO-ENTMCNC: 34 G/DL (ref 33–37)
MCV RBC AUTO: 90.3 FL (ref 80–94)
MONOCYTES # BLD: 0.7 K/UL (ref 0–0.9)
MONOCYTES NFR BLD: 7.1 % (ref 0–10)
NEUTROPHILS # BLD: 7.8 K/UL (ref 1.5–7.5)
NEUTS SEG NFR BLD: 83.3 % (ref 50–65)
NITRITE UR QL STRIP.AUTO: NEGATIVE
PH UR STRIP.AUTO: 6.5 [PH] (ref 5–8)
PLATELET # BLD AUTO: 140 K/UL (ref 130–400)
PMV BLD AUTO: 10 FL (ref 9.4–12.4)
POTASSIUM SERPL-SCNC: 4.5 MMOL/L (ref 3.5–5.1)
PROT SERPL-MCNC: 7.1 G/DL (ref 6.4–8.3)
PROT UR STRIP.AUTO-MCNC: NEGATIVE MG/DL
RBC # BLD AUTO: 5.47 M/UL (ref 4.7–6.1)
SODIUM SERPL-SCNC: 137 MMOL/L (ref 136–145)
SP GR UR STRIP.AUTO: 1.03 (ref 1–1.03)
UROBILINOGEN UR STRIP.AUTO-MCNC: 1 E.U./DL
WBC # BLD AUTO: 9.3 K/UL (ref 4.8–10.8)

## 2025-05-06 PROCEDURE — 85025 COMPLETE CBC W/AUTO DIFF WBC: CPT

## 2025-05-06 PROCEDURE — 80053 COMPREHEN METABOLIC PANEL: CPT

## 2025-05-06 PROCEDURE — 99285 EMERGENCY DEPT VISIT HI MDM: CPT

## 2025-05-06 PROCEDURE — 93005 ELECTROCARDIOGRAM TRACING: CPT | Performed by: EMERGENCY MEDICINE

## 2025-05-06 PROCEDURE — 71045 X-RAY EXAM CHEST 1 VIEW: CPT

## 2025-05-06 PROCEDURE — 36415 COLL VENOUS BLD VENIPUNCTURE: CPT

## 2025-05-06 PROCEDURE — 2580000003 HC RX 258: Performed by: EMERGENCY MEDICINE

## 2025-05-06 PROCEDURE — 83735 ASSAY OF MAGNESIUM: CPT

## 2025-05-06 PROCEDURE — 81003 URINALYSIS AUTO W/O SCOPE: CPT

## 2025-05-06 PROCEDURE — 96360 HYDRATION IV INFUSION INIT: CPT

## 2025-05-06 RX ORDER — SODIUM CHLORIDE, SODIUM LACTATE, POTASSIUM CHLORIDE, AND CALCIUM CHLORIDE .6; .31; .03; .02 G/100ML; G/100ML; G/100ML; G/100ML
1000 INJECTION, SOLUTION INTRAVENOUS ONCE
Status: COMPLETED | OUTPATIENT
Start: 2025-05-06 | End: 2025-05-06

## 2025-05-06 RX ORDER — FLUCONAZOLE 150 MG/1
TABLET ORAL
COMMUNITY
Start: 2025-04-29

## 2025-05-06 RX ADMIN — SODIUM CHLORIDE, SODIUM LACTATE, POTASSIUM CHLORIDE, AND CALCIUM CHLORIDE 1000 ML: .6; .31; .03; .02 INJECTION, SOLUTION INTRAVENOUS at 10:10

## 2025-05-06 ASSESSMENT — ENCOUNTER SYMPTOMS
RESPIRATORY NEGATIVE: 1
EYES NEGATIVE: 1

## 2025-05-06 ASSESSMENT — PAIN - FUNCTIONAL ASSESSMENT: PAIN_FUNCTIONAL_ASSESSMENT: NONE - DENIES PAIN

## 2025-05-06 NOTE — ED PROVIDER NOTES
[facility] EMERGENCY DEPARTMENT  EMERGENCY DEPARTMENT ENCOUNTER      Pt Name: Michael Szymanski  MRN: [id number]  Birthdate 1940  Date of evaluation: 5/6/2025  Provider: Zackery Calix Jr, MD    CHIEF COMPLAINT       Chief Complaint   Patient presents with    Dizziness    Fatigue     Pt states he felt ok this morning. Pt had 1 bout of diarrhea and began to feel dizzy and weak. Pt states he did not lose consciousness.. Pt states his weakness and dizziness has not improved.          HISTORY OF PRESENT ILLNESS   (Location/Symptom, Timing/Onset,Context/Setting, Quality, Duration, Modifying Factors, Severity)  Note limiting factors.   Michael Szymanski is a 84 y.o. male who presents to the emergency department for evaluation after feeling lightheaded this morning.  Says that he started feeling nauseous and had to go to the bathroom.  He went to the bathroom and started feeling lightheaded and dizzy.  Had 1 episode of diarrhea.  Says that he was feeling lightheaded and lowered himself down to the floor for a little while afraid that he might pass out.  Says that he never lost consciousness.  After few minutes he was able to stand up and walk to the kitchen, then take food outside to feed his dogs.  Says that he continued to have some mild lightheadedness intermittently but is not continuous.  He did not have any of the symptoms yesterday.  Says that he was outside fishing and caught numerous catfish throughout the day.  Says he does not feel that he had gotten significantly dehydrated. No chest pain, soa, etc  Has history of aortic stenosis, CKD, CAD, diabetes, hypertension, hyperlipidemia, LV diastolic dysfunction.    Prior records show last echo in March of last year that showed EF 50 to 55% with hypokinetic apical anterior septal wall and mild concentric LVH.  Bioprosthetic aortic valve with mild stenosis and no significant regurgitation.    HPI    NursingNotes were reviewed.    REVIEW OF SYSTEMS    (2-9 systems for

## 2025-05-07 LAB
EKG P AXIS: NORMAL DEGREES
EKG P-R INTERVAL: NORMAL MS
EKG Q-T INTERVAL: 340 MS
EKG QRS DURATION: 190 MS
EKG QTC CALCULATION (BAZETT): 453 MS
EKG T AXIS: -97 DEGREES

## 2025-05-07 PROCEDURE — 93010 ELECTROCARDIOGRAM REPORT: CPT | Performed by: INTERNAL MEDICINE

## 2025-05-07 RX ORDER — VIBEGRON 75 MG/1
1 TABLET, FILM COATED ORAL DAILY
Qty: 30 TABLET | Refills: 4 | OUTPATIENT
Start: 2025-05-07

## 2025-05-12 ENCOUNTER — OFFICE VISIT (OUTPATIENT)
Dept: NEUROSURGERY | Facility: CLINIC | Age: 85
End: 2025-05-12
Payer: MEDICARE

## 2025-05-12 VITALS — HEIGHT: 70 IN | BODY MASS INDEX: 28.63 KG/M2 | WEIGHT: 200 LBS

## 2025-05-12 DIAGNOSIS — E66.3 OVERWEIGHT WITH BODY MASS INDEX (BMI) OF 28 TO 28.9 IN ADULT: ICD-10-CM

## 2025-05-12 DIAGNOSIS — E11.42 DIABETIC PERIPHERAL NEUROPATHY: ICD-10-CM

## 2025-05-12 DIAGNOSIS — M96.1 FAILED BACK SYNDROME: Primary | ICD-10-CM

## 2025-05-12 DIAGNOSIS — Z78.9 NON-SMOKER: ICD-10-CM

## 2025-05-12 PROCEDURE — 1160F RVW MEDS BY RX/DR IN RCRD: CPT | Performed by: NEUROLOGICAL SURGERY

## 2025-05-12 PROCEDURE — 1159F MED LIST DOCD IN RCRD: CPT | Performed by: NEUROLOGICAL SURGERY

## 2025-05-12 PROCEDURE — 99203 OFFICE O/P NEW LOW 30 MIN: CPT | Performed by: NEUROLOGICAL SURGERY

## 2025-05-12 NOTE — PROGRESS NOTES
Primary Care Provider: Margareth Muniz APRN    Chief Complaint:   Chief Complaint   Patient presents with    surgery      Patient is discuss possible scs patient was sent from [facility] spine patient had MRI done on 12/23/2025. Patient states his pain is a 5/10 in his lower back pain when he gets up and sits down. Patient states pain goes down bow legs.       History of Present Illness    History of Present Illness  The patient presents for evaluation of back pain, peripheral neuropathy, and balance issues.    He was referred by [facility] for consideration of a spinal cord stimulator. He has a long-standing history of back pain, dating back to his first lumbar fusion in 1976, performed by Dr. Wallace at the [facility]. Subsequent surgeries included a discectomy and bilateral fusion through the abdomen. He also underwent a nerve block procedure under the care of Dr. Beauchamp. His current pain management regimen includes Lortab and gabapentin. He has not yet undergone a trial of a spinal cord stimulator at Dr. Chhaal's clinic. He expresses a desire to avoid further surgeries unless absolutely necessary.    He reports severe restless legs syndrome, characterized by intense cramping. Over the past 6 to 8 months, he has experienced a burning sensation in his feet and legs, predominantly in the evenings. This morning, he experienced a burning sensation in his feet and across the top of his legs. He also reports leg jerking when seated or during car travel. He has a history of diabetes and neuropathy.    He reports no bowel incontinence but does experience bladder incontinence, necessitating the use of Depends. He utilizes a cane for mobility and reports no active infections. He has been managing a hand infection for approximately 3 years, which is now resolved. He had a foot wound that was not infected but required treatment; this wound has since healed.    He has a pacemaker, heart valve replacement,  and stents in his heart. He is currently on Eliquis and has discontinued Plavix.    He reports balance issues upon standing from a chair and uses a rail for support when ascending stairs. He was non-weightbearing on one leg for about 3 months and is just now getting back to using it. He has an upcoming appointment with Dr. Chahal to discuss his candidacy for a spinal cord stimulator.    Past Surgical History: Lumbar fusion (1976), discectomy, bilateral fusion through the abdomen, heart valve replacement, stents placement.    Past Medical History: Diabetes, neuropathy, restless legs syndrome.       No data recorded     No data recorded     Review of Systems   Constitutional: Negative.    HENT: Negative.     Eyes: Negative.    Respiratory: Negative.     Cardiovascular: Negative.    Gastrointestinal: Negative.    Endocrine: Negative.    Genitourinary: Negative.    Musculoskeletal:  Positive for back pain, gait problem, myalgias, neck pain and neck stiffness.   Skin: Negative.    Allergic/Immunologic: Negative.    Neurological:  Positive for numbness.   Hematological: Negative.    Psychiatric/Behavioral: Negative.         Past Medical History:   Diagnosis Date    Arthritis     B12 deficiency 8/23/2017    Coronary artery disease     Deafness in left ear     Embedded foreign body 09/2020    Embedded fish hook Left hand, w/ infection    Foot ulcer     GERD (gastroesophageal reflux disease)     Hypertension associated with diabetes 8/23/2017    Ingrown toenail     Joint pain     Mixed diabetic hyperlipidemia associated with type 2 diabetes mellitus 8/23/2017    Neuropathy in diabetes     Shoulder disorder     Left    Type 2 diabetes mellitus with hyperglycemia, with long-term current use of insulin 8/23/2017    Vitamin D deficiency 8/23/2017       Past Surgical History:   Procedure Laterality Date    AORTIC VALVE REPAIR/REPLACEMENT  06/07/2018    APPENDECTOMY      BACK SURGERY  [facility], Dr Brian    CARDIAC  CATHETERIZATION  03/19/2020    stent x3    CATARACT EXTRACTION      CHOLECYSTECTOMY      CORONARY ARTERY BYPASS GRAFT      INCISION AND DRAINAGE ABSCESS      Left hand    INCISION AND DRAINAGE ARM Left 09/24/2020    Procedure: INCISION AND DRAINAGE LEFT HAND;  Surgeon: Ming Ramos MD;  Location: [facility] OR;  Service: Orthopedics;  Laterality: Left;    INNER EAR SURGERY Left     PACEMAKER IMPLANTATION  06/28/2020       Family History: family history includes Cancer in his brother, father, and sister; Diabetes in his brother and mother; Heart disease in his brother.    Social History:  reports that he has quit smoking. His smoking use included cigarettes. He has never been exposed to tobacco smoke. He has never used smokeless tobacco. He reports that he does not currently use alcohol. He reports that he does not use drugs.    Medications:    Current Outpatient Medications:     apixaban (ELIQUIS) 5 MG tablet tablet, Take 1 tablet by mouth 2 (Two) Times a Day., Disp: , Rfl:     aspirin 81 MG chewable tablet, Chew 1 tablet Daily., Disp: , Rfl:     atenolol (TENORMIN) 25 MG tablet, Take 1 tablet by mouth Daily., Disp: , Rfl:     atorvastatin (LIPITOR) 40 MG tablet, Take 1 tablet by mouth Every Morning., Disp: , Rfl:     B-D ULTRAFINE III SHORT PEN 31G X 8 MM misc, , Disp: , Rfl:     betamethasone, augmented, (DIPROLENE) 0.05 % ointment, , Disp: , Rfl:     Cholecalciferol (Vitamin D3) 1.25 MG (82865 UT) tablet, Take 1 tablet by mouth Every 30 (Thirty) Days. Takes the 1st day Q month, Disp: , Rfl:     ciclopirox (LOPROX) 0.77 % cream, , Disp: , Rfl:     ciprofloxacin (CIPRO) 500 MG tablet, Take 1 tablet by mouth 2 (Two) Times a Day., Disp: , Rfl:     citalopram (CeleXA) 20 MG tablet, , Disp: , Rfl:     clobetasol (TEMOVATE) 0.05 % ointment, , Disp: , Rfl:     clopidogrel (PLAVIX) 75 MG tablet, Take 1 tablet by mouth Daily. Hold x 5 days a/ Day of procedure 6/15/21; [facility] [place], Disp: , Rfl:     clotrimazole-betamethasone (LOTRISONE) 1-0.05 % cream, APPLY A THIN LAYER TO THE TIP OF PENIS TWICE A DAY AS DIRECTED, Disp: 45 g, Rfl: 1    dapagliflozin-metformin HCl ER (XIGDUO XR)  MG tablet, Take 1 tablet by mouth Daily. - METFORMIN -, Disp: , Rfl:     dicyclomine (BENTYL) 10 MG capsule, Take 1 capsule by mouth 2 (two) times a day., Disp: , Rfl:     esomeprazole (nexIUM) 40 MG capsule, Take 1 capsule by mouth Every Morning Before Breakfast., Disp: , Rfl:     ezetimibe (ZETIA) 10 MG tablet, Take 1 tablet by mouth Daily., Disp: , Rfl:     Farxiga 10 MG tablet, Take 10 mg by mouth Daily., Disp: , Rfl:     finasteride (PROSCAR) 5 MG tablet, TAKE ONE TABLET BY MOUTH DAILY, Disp: 90 tablet, Rfl: 3    furosemide (LASIX) 20 MG tablet, Take 1 tablet by mouth Every Morning., Disp: , Rfl:     Gemtesa 75 MG tablet, TAKE ONE TABLET BY MOUTH DAILY., Disp: 30 tablet, Rfl: 3    HumuLIN R U-500 KwikPen 500 UNIT/ML solution pen-injector CONCENTRATED injection, INJECT UP  UNITS 30 MINUTES BEFORE BREAKFAST AND UP TO 85 UNITS 30 MINUTES BEFORE SUPPER, Disp: 12 mL, Rfl: 5    HYDROcodone-acetaminophen (NORCO)  MG per tablet, Take 1 tablet by mouth Every 8 (Eight) Hours As Needed for Moderate Pain., Disp: , Rfl:     hydrOXYzine (ATARAX) 25 MG tablet, TAKE ONE TABLET BY MOUTH THREE TIMES DAILY AS NEEDED FOR ITCHING **MAY MAKE DROWSY**, Disp: 30 tablet, Rfl: 2    Insulin Glargine (BASAGLAR KWIKPEN) 100 UNIT/ML injection pen, , Disp: , Rfl:     Insulin Pen Needle 31G X 5 MM misc, , Disp: , Rfl:     NovoLIN R 100 UNIT/ML injection, Inject 5 Units under the skin into the appropriate area as directed 3 (Three) Times a Day Before Meals., Disp: , Rfl:     nystatin (MYCOSTATIN) 866221 UNIT/GM cream, Apply  topically to the appropriate area as directed As Needed (scrotal irritation)., Disp: 30 g, Rfl: 5    pramipexole (MIRAPEX) 1.5 MG tablet, Take 1 tablet by mouth 3 (Three)  Times a Day., Disp: , Rfl:     Synjardy XR  MG tablet sustained-release 24 hour, , Disp: , Rfl:     tamsulosin (FLOMAX) 0.4 MG capsule 24 hr capsule, TAKE 1 CAPSULE BY MOUTH 2 (TWO) TIMES A DAY, Disp: 180 capsule, Rfl: 3    Allergies:  Cyclobenzaprine, Haemophilus influenzae vaccines, and Metoclopramide    Objective   Physical Exam  Eyes:      General: Lids are normal.      Extraocular Movements: Extraocular movements intact.      Pupils: Pupils are equal, round, and reactive to light.   Neurological:      Coordination: Coordination is intact.      Deep Tendon Reflexes:      Reflex Scores:       Tricep reflexes are 2+ on the right side and 2+ on the left side.       Bicep reflexes are 2+ on the right side and 2+ on the left side.       Brachioradialis reflexes are 2+ on the right side and 2+ on the left side.       Patellar reflexes are 1+ on the right side and 1+ on the left side.       Achilles reflexes are 1+ on the right side and 1+ on the left side.  Psychiatric:         Speech: Speech normal.       Neurological Exam  Mental Status  Awake, alert and oriented to person, place and time. Speech is normal. Language is fluent with no aphasia. Attention and concentration are normal.    Cranial Nerves  CN II: Visual acuity is normal.  CN III, IV, VI: Extraocular movements intact bilaterally. Normal lids and orbits bilaterally. Pupils equal round and reactive to light bilaterally.  CN V: Facial sensation is normal.  CN VII: Full and symmetric facial movement.  CN IX, X: Palate elevates symmetrically  CN XI: Shoulder shrug strength is normal.    Motor  Normal muscle bulk throughout. Normal muscle tone.                                               Right                     Left  Toe extension                        5                          5                                             Right                     Left  Deltoid                                   5                          5   Biceps                                    5                          5   Triceps                                  5                          5   Wrist extensor                       5                          3   Finger flexor                          5                          4-   Iliopsoas                               5                          5   Quadriceps                           5                          5   Gastrocnemius                     5                           5   Anterior tibialis                      5                          5    Sensory  Light touch abnormality:   Decreased from mid shin downwards bilaterally in a stocking glove distribution..    Reflexes                                            Right                      Left  Brachioradialis                    2+                         2+  Biceps                                 2+                         2+  Triceps                                2+                         2+  Patellar                                1+                         1+  Achilles                                1+                         1+  Right Plantar: mute  Left Plantar: mute    Right pathological reflexes: Aline's absent. Ankle clonus absent.  Left pathological reflexes: Aline's absent. Ankle clonus absent.    Coordination    Finger-to-nose, rapid alternating movements and heel-to-shin normal bilaterally without dysmetria.    Gait  Casual gait: Wide stance. Reduced stride length. Antalgic gait.  Kyphotic.      Imaging: (independent review and interpretation)  XR Chest 1 View  Result Date: 5/6/2025  1.  No acute cardiopulmonary process. 2.  Cardiomegaly and postsurgical changes. ______________________________________ Electronically signed by: ISAIAS FIERRO M.D. Date:     05/06/2025 Time:    09:31         MRI of the lumbar spine reviewed.  No evidence of high-grade stenosis.  Previous interbody fusions from L2-S1.    ASSESSMENT and PLAN  Atul Zapata is a 84 y.o. male  with a significant comorbidity of type 2 diabetes, hypertension, hyperlipidemia, vitamin D deficiency, cellulitis and osteomyelitis of left hand, and multiple prior lumbar spine surgeries. He presents with a new problem of chronic back pain and bilateral lower extremity pain numbness and tingling. Physical exam findings of stocking glove distribution numbness in the lower extremities and antalgic gait with a cane.  His imaging shows multiple prior surgeries from L2-S1, last one performed with Dr. Beauchamp.    Assessment & Plan  1. Failed back syndrome.     - History of lumbar fusion surgery in 1976 and subsequent surgeries, including disk removal and anterior fusion. Due to diabetes, history of infection, and multiple heart surgeries, further lumbar surgery is not recommended. Conservative therapy or spinal cord stimulator implantation are the remaining options. A spinal cord stimulator trial will be arranged at [facility]. If the trial is successful, a permanent implant will be considered.    2. Peripheral neuropathy.     - Reports burning sensations in feet and legs, mostly in the evenings, consistent with peripheral neuropathy. This condition can also be treated with a spinal cord stimulator. The same trial for the spinal cord stimulator will be used to assess its effectiveness for both failed back syndrome and peripheral neuropathy.    3. Balance issues.     - Experiences balance issues, particularly when getting up from a chair or walking. This is likely related to inner ear problems and will not be improved by the spinal cord stimulator.    4. Bladder incontinence.     - Reports bladder incontinence, needing to rush to the bathroom. This issue will be monitored, and no immediate changes to the current management plan are necessary.    5. Medication management.     - Currently taking Eliquis and has been taken off Plavix. This will be verified to ensure accurate medication records.          Diagnoses and  all orders for this visit:    1. Failed back syndrome (Primary)    2. Diabetic peripheral neuropathy    3. Non-smoker    4. Overweight with body mass index (BMI) of 28 to 28.9 in adult        Return if symptoms worsen or fail to improve.    Thank you for this Consultation and the opportunity to participate in Atul's care.    Sincerely,  Zeyad Pacheco MD    I spent 24 minutes caring for Atul on this date of service. This time includes time spent by me in the following activities: preparing for the visit, reviewing tests, obtaining and/or reviewing a separately obtained history, performing a medically appropriate examination and/or evaluation, counseling and educating the patient/family/caregiver, ordering medications, tests, or procedures, referring and communicating with other health care professionals, documenting information in the medical record, independently interpreting results and communicating that information with the patient/family/caregiver, and/or care coordination.     Medical Decision Making (2/3)  Problem Points (2,3,4 or more)  Undiagnosed new problem (Mod)  Chronic Stable, 2 or more (Mod)  Data Points (2,3,4 or more)  CATEGORY 1  INDEPENDENT INTERPRETATION Imaging = 1  Independent Historian required  CATEGORY 2  Independent interpretation of test performed by another physician/NPP (Cat 2)  CATEGORY 3  Risk (Low, Mod, High)  LOW    E/M = MDM 2 out of 3   or   TIME  New Level 3 - 64766 = Low + (Cat1=2pts OR Cat2) + Low Risk   or   20-29 minutes

## 2025-05-14 ENCOUNTER — RESULTS FOLLOW-UP (OUTPATIENT)
Dept: CARDIOLOGY CLINIC | Age: 85
End: 2025-05-14

## 2025-05-14 DIAGNOSIS — I49.5 SINOATRIAL NODE DYSFUNCTION (HCC): ICD-10-CM

## 2025-05-14 DIAGNOSIS — Z95.0 PACEMAKER: Primary | ICD-10-CM

## 2025-06-04 ENCOUNTER — TELEPHONE (OUTPATIENT)
Dept: CARDIOLOGY CLINIC | Age: 85
End: 2025-06-04

## 2025-06-04 NOTE — TELEPHONE ENCOUNTER
I called the patient and left a VM for them to return call to the office so we can get their appt with Dr. Cameron rescheduled. He will not be in and they may r/s with a NP or with his first available appt date and time. 6/4/25 AH

## 2025-06-17 ENCOUNTER — TELEPHONE (OUTPATIENT)
Dept: CARDIOLOGY CLINIC | Age: 85
End: 2025-06-17

## 2025-06-17 NOTE — TELEPHONE ENCOUNTER
Date: TBD    Cardiologist: Rakesh    Procedure: Pain Management Procedure    Surgeon: [facility]    Last Office Visit: 10/8/24  Reason for office visit and medical concerns addressed at this office visit: aortic valve disease, chf, cad, dm, htn, hyperlipidemia, pacemaker, pad, sa node dysfunction, ckd, afib    Testing Performed and Date of Service:  3/25/24 Echo LV is normal in size with borderline normal LV systolic function. LV   ejection fraction estimated at 50-55%. Mid to apical anteroseptal wall   appears hypokinetic. Diastolic function is indeterminant. Mild concentric   LVH.   RV is normal in size with normal systolic function.   Left atrium is severely dilated.   Right atrium is mild to moderately dilated.   Bioprosthetic valve in aortic position with probable mild stenosis (mean   gradient 23 mmHg). No significant regurgitation.   Mitral valve leaflets are severely thickened and calcified with reduced   leaflet mobility. Mild to moderate mitral stenosis (mean gradient 5-6   mmHg). No significant regurgitation.   No significant pericardial effusion. RVSP estimated at 30 mmHg.       Does the patient have a stent? If so, what type?  LAURI 2020    Current Medications: gemtesa, trulicity, flomax, mirapex, insulin, hydroxyzine, lasix, norco, finasteride, farxiga, zetia, nexium, eliquis, celexa, atorvastatin, atenolol, aspirin    Is the patient currently taking an anticoagulant? If so, what is the diagnosis the patient has been given to warrant the need for the anticoagulant? Eliquis for afib    Additional Notes: requesting to hold eliquis

## 2025-06-23 ENCOUNTER — OFFICE VISIT (OUTPATIENT)
Age: 85
End: 2025-06-23

## 2025-06-23 VITALS
HEIGHT: 70 IN | WEIGHT: 206 LBS | SYSTOLIC BLOOD PRESSURE: 122 MMHG | BODY MASS INDEX: 29.49 KG/M2 | DIASTOLIC BLOOD PRESSURE: 62 MMHG | HEART RATE: 78 BPM

## 2025-06-23 DIAGNOSIS — I25.10 CORONARY ARTERY DISEASE INVOLVING NATIVE CORONARY ARTERY OF NATIVE HEART WITHOUT ANGINA PECTORIS: Primary | ICD-10-CM

## 2025-06-23 DIAGNOSIS — I49.5 SINOATRIAL NODE DYSFUNCTION (HCC): ICD-10-CM

## 2025-06-23 DIAGNOSIS — Z95.0 PACEMAKER: ICD-10-CM

## 2025-06-23 DIAGNOSIS — I10 ESSENTIAL HYPERTENSION: ICD-10-CM

## 2025-06-23 DIAGNOSIS — I38 VALVULAR HEART DISEASE: ICD-10-CM

## 2025-06-23 DIAGNOSIS — I48.0 PAROXYSMAL ATRIAL FIBRILLATION (HCC): ICD-10-CM

## 2025-06-23 DIAGNOSIS — G89.29 OTHER CHRONIC PAIN: ICD-10-CM

## 2025-06-23 DIAGNOSIS — Z95.2 S/P AVR: ICD-10-CM

## 2025-06-23 DIAGNOSIS — E78.2 MIXED HYPERLIPIDEMIA: ICD-10-CM

## 2025-06-23 DIAGNOSIS — I50.42 CHRONIC COMBINED SYSTOLIC (CONGESTIVE) AND DIASTOLIC (CONGESTIVE) HEART FAILURE (HCC): ICD-10-CM

## 2025-06-23 DIAGNOSIS — I05.0 MITRAL VALVE STENOSIS, UNSPECIFIED ETIOLOGY: ICD-10-CM

## 2025-06-23 ASSESSMENT — ENCOUNTER SYMPTOMS
BACK PAIN: 1
COUGH: 0
CHEST TIGHTNESS: 0
FACIAL SWELLING: 0
ABDOMINAL PAIN: 1
VOMITING: 0
WHEEZING: 0
EYE REDNESS: 0
SHORTNESS OF BREATH: 1
NAUSEA: 0

## 2025-06-23 NOTE — PROGRESS NOTES
[facility]  [address] [address], [facility]  [address]  Phone: [phone]  Fax: [phone]    OFFICE VISIT:  2025    Michael Szymanski - : 1940    Reason For Visit:  Michael is a 85 y.o. male who is here for Follow-up (No cardiac symptoms) and Coronary artery disease involving native coronary artery of       Diagnosis Orders   1. Coronary artery disease involving native coronary artery of native heart without angina pectoris        2. Valvular heart disease  Echo (TTE) complete (PRN contrast/bubble/strain/3D)      3. S/P AVR  Echo (TTE) complete (PRN contrast/bubble/strain/3D)      4. Chronic combined systolic (congestive) and diastolic (congestive) heart failure (HCC)        5. Paroxysmal atrial fibrillation (HCC)        6. Essential hypertension        7. Mixed hyperlipidemia        8. Mitral valve stenosis, unspecified etiology        9. Sinoatrial node dysfunction (HCC)        10. Pacemaker        11. 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 Patient had a pacemaker generator change in 2020.  Issues were found with elevated RV threshold.  Patient was sent to Dr. Guy in [place] for  RV lead replacement 8/3/20.  Patient had an injury to hand with a fishhook shortly thereafter and developed a systemic infection involving several surgeries and IV antibiotics.  He has functional impairment of the left hand as a result of this injury.    Patient was hospitalized 2021 with pneumonia related to COVID.  He has had ongoing issues with shortness of breath and fatigue since this time.    Other history includes chronic back pain, diabetes, sleep apnea, CKD, hypertension, hyperlipidemia, diabetes with intermittent foot wounds.    Chronic

## 2025-06-23 NOTE — PATIENT INSTRUCTIONS
Return in about 6 months (around 12/23/2025) for APRN.     Can hold eliqis 2-3 days prior to procedure    Carelink 3mo    Keep diabetes under control to help prevent further heart disease.  Keep Hemoglobin A1C less than 7%.     Echo    - Weigh daily every morning after urinating (this is your dry weight).   Report weight gain of 3lbs or more in 24hrs or 5lbs in one week.  - Call for increasing shortness of breath or increasing swelling in feet and legs.    (This could mean you are retaining too much fluid)  - 2000mg low sodium diet  - Fluid restriction of 1500ml per day (about 6-8 cups (48-64oz) of fluid per day)

## 2025-06-30 ENCOUNTER — HOSPITAL ENCOUNTER (OUTPATIENT)
Age: 85
Discharge: HOME | End: 2025-07-02
Payer: MEDICARE

## 2025-06-30 ENCOUNTER — RESULTS FOLLOW-UP (OUTPATIENT)
Dept: CARDIOLOGY CLINIC | Age: 85
End: 2025-06-30

## 2025-06-30 DIAGNOSIS — Z95.2 S/P AVR: ICD-10-CM

## 2025-06-30 DIAGNOSIS — I38 VALVULAR HEART DISEASE: ICD-10-CM

## 2025-06-30 LAB
ECHO AO ASC DIAM: 2.6 CM
ECHO AO ROOT DIAM: 1.4 CM
ECHO AO SINUS VALSALVA DIAM: 2.3 CM
ECHO AO ST JNCT DIAM: 1.7 CM
ECHO AV AREA PEAK VELOCITY: 1.7 CM2
ECHO AV AREA VTI: 1.5 CM2
ECHO AV MEAN GRADIENT: 12 MMHG
ECHO AV MEAN VELOCITY: 1.6 M/S
ECHO AV PEAK GRADIENT: 22 MMHG
ECHO AV PEAK VELOCITY: 2.3 M/S
ECHO AV VELOCITY RATIO: 0.61
ECHO AV VTI: 53.2 CM
ECHO EST RA PRESSURE: 3 MMHG
ECHO IVC PROX: 2 CM
ECHO LA AREA 2C: 26.9 CM2
ECHO LA AREA 4C: 23.4 CM2
ECHO LA DIAMETER: 3.7 CM
ECHO LA MAJOR AXIS: 5.9 CM
ECHO LA MINOR AXIS: 5.6 CM
ECHO LA TO AORTIC ROOT RATIO: 2.64
ECHO LA VOL BP: 92 ML (ref 18–58)
ECHO LA VOL MOD A2C: 106 ML (ref 18–58)
ECHO LA VOL MOD A4C: 77 ML (ref 18–58)
ECHO LV E' LATERAL VELOCITY: 4.46 CM/S
ECHO LV E' SEPTAL VELOCITY: 3.7 CM/S
ECHO LV EDV A2C: 100 ML
ECHO LV EDV A4C: 114 ML
ECHO LV EF PHYSICIAN: 50 %
ECHO LV EJECTION FRACTION A2C: 54 %
ECHO LV EJECTION FRACTION A4C: 50 %
ECHO LV EJECTION FRACTION BIPLANE: 52 % (ref 55–100)
ECHO LV ESV A2C: 46 ML
ECHO LV ESV A4C: 57 ML
ECHO LV FRACTIONAL SHORTENING: 25 % (ref 28–44)
ECHO LV INTERNAL DIMENSION DIASTOLIC: 5.5 CM (ref 4.2–5.9)
ECHO LV INTERNAL DIMENSION SYSTOLIC: 4.1 CM
ECHO LV IVSD: 1.3 CM (ref 0.6–1)
ECHO LV MASS 2D: 242 G (ref 88–224)
ECHO LV POSTERIOR WALL DIASTOLIC: 0.9 CM (ref 0.6–1)
ECHO LV RELATIVE WALL THICKNESS RATIO: 0.33
ECHO LVOT AREA: 2.8 CM2
ECHO LVOT AV VTI INDEX: 0.53
ECHO LVOT DIAM: 1.9 CM
ECHO LVOT MEAN GRADIENT: 4 MMHG
ECHO LVOT PEAK GRADIENT: 8 MMHG
ECHO LVOT PEAK VELOCITY: 1.4 M/S
ECHO LVOT SV: 79.3 ML
ECHO LVOT VTI: 28 CM
ECHO MV A VELOCITY: 0.86 M/S
ECHO MV AREA VTI: 1.2 CM2
ECHO MV E DECELERATION TIME (DT): 462 MS
ECHO MV E VELOCITY: 1.91 M/S
ECHO MV E/A RATIO: 2.22
ECHO MV E/E' LATERAL: 42.83
ECHO MV E/E' RATIO (AVERAGED): 47.22
ECHO MV E/E' SEPTAL: 51.62
ECHO MV LVOT VTI INDEX: 2.41
ECHO MV MAX VELOCITY: 2 M/S
ECHO MV MEAN GRADIENT: 7 MMHG
ECHO MV MEAN VELOCITY: 1.1 M/S
ECHO MV PEAK GRADIENT: 16 MMHG
ECHO MV VTI: 67.5 CM
ECHO RA AREA 4C: 16.5 CM2
ECHO RA VOLUME: 42 ML
ECHO RIGHT VENTRICULAR SYSTOLIC PRESSURE (RVSP): 35 MMHG
ECHO RV BASAL DIMENSION: 3.5 CM
ECHO RV INTERNAL DIMENSION: 2.3 CM
ECHO RV LONGITUDINAL DIMENSION: 6.8 CM
ECHO RV MID DIMENSION: 2.4 CM
ECHO RV TAPSE: 2 CM (ref 1.7–?)
ECHO TV REGURGITANT MAX VELOCITY: 2.82 M/S
ECHO TV REGURGITANT PEAK GRADIENT: 32 MMHG

## 2025-06-30 PROCEDURE — C8929 TTE W OR WO FOL WCON,DOPPLER: HCPCS

## 2025-06-30 PROCEDURE — 93306 TTE W/DOPPLER COMPLETE: CPT | Performed by: INTERNAL MEDICINE

## 2025-06-30 PROCEDURE — 6360000004 HC RX CONTRAST MEDICATION: Performed by: INTERNAL MEDICINE

## 2025-06-30 RX ADMIN — SULFUR HEXAFLUORIDE 2 ML: 60.7; .19; .19 INJECTION, POWDER, LYOPHILIZED, FOR SUSPENSION INTRAVENOUS; INTRAVESICAL at 08:33

## 2025-07-08 RX ORDER — ATORVASTATIN CALCIUM 40 MG/1
40 TABLET, FILM COATED ORAL NIGHTLY
Qty: 30 TABLET | Refills: 5 | Status: SHIPPED | OUTPATIENT
Start: 2025-07-08

## 2025-07-16 ENCOUNTER — TELEPHONE (OUTPATIENT)
Dept: UROLOGY | Age: 85
End: 2025-07-16

## 2025-07-16 NOTE — TELEPHONE ENCOUNTER
Patient son Parker  is calling  for status of referral  Please return call to update Parker  on the referral status. Parker preferred call back time is [phone]    Thank you!

## 2025-07-16 NOTE — TELEPHONE ENCOUNTER
returned call to patients son. Office has not received records or referral for patient. Patients son was advised to reach out to referring providers office to make sure they have been faxed. Once received and reviewed we will contact patient for appt.

## 2025-07-29 ENCOUNTER — OFFICE VISIT (OUTPATIENT)
Dept: UROLOGY | Age: 85
End: 2025-07-29

## 2025-07-29 VITALS — HEIGHT: 70 IN | TEMPERATURE: 97.9 F | BODY MASS INDEX: 28.35 KG/M2 | WEIGHT: 198 LBS

## 2025-07-29 DIAGNOSIS — N40.1 BPH WITH OBSTRUCTION/LOWER URINARY TRACT SYMPTOMS: ICD-10-CM

## 2025-07-29 DIAGNOSIS — Z80.42 FAMILY HX OF PROSTATE CANCER: ICD-10-CM

## 2025-07-29 DIAGNOSIS — R97.20 ELEVATED PSA: Primary | ICD-10-CM

## 2025-07-29 DIAGNOSIS — N13.8 BPH WITH OBSTRUCTION/LOWER URINARY TRACT SYMPTOMS: ICD-10-CM

## 2025-07-29 LAB
BACTERIA URINE, POC: NORMAL
BILIRUBIN URINE: 0 MG/DL
BLOOD, URINE: POSITIVE
CASTS URINE, POC: NORMAL
CLARITY, UA: CLEAR
COLOR, UA: YELLOW
CRYSTALS URINE, POC: NORMAL
EPI CELLS URINE, POC: NORMAL
GLUCOSE URINE: 1000
KETONES, URINE: NEGATIVE
LEUKOCYTE EST, POC: NORMAL
NITRITE, URINE: NEGATIVE
PH UA: 6 (ref 4.5–8)
PROTEIN UA: NEGATIVE
RBC URINE, POC: 3
SPECIFIC GRAVITY UA: 1.01 (ref 1–1.03)
UROBILINOGEN, URINE: NORMAL
WBC URINE, POC: NORMAL
YEAST URINE, POC: NORMAL

## 2025-07-29 ASSESSMENT — ENCOUNTER SYMPTOMS
ABDOMINAL DISTENTION: 0
BACK PAIN: 0
NAUSEA: 0
ABDOMINAL PAIN: 0
VOMITING: 0

## 2025-07-29 NOTE — PROGRESS NOTES
Michael Szymanski is a 85 y.o., male, New patient who presents today   Chief Complaint   Patient presents with    New Patient     I am here as a new patient I have been seen at [facility]  I am here for elevated psa       Patient presents for evaluation of elevated PSA.  This was recently drawn by his primary care provider and noted to be elevated at 25.8, but corrected to 51.6 for finasteride.  Patient is accompanied by his son today.  I do not have previous PSA values for evaluation.  He is maintained on Flomax as well as finasteride which he has been on for several years and Gemtesa which he has been on for about 1 year.  He reports his father and brother both had prostate cancer.  He is uncertain if they  from the disease or something else.    Patient was previously established at [facility].  He was last seen there about a year ago for treatment of balanitis.  He also has prior history of HoLEP in  although he does not recall who performed the surgery.  It appears from chart review he had an additional episode of urinary retention in 2023 requiring Coy catheter placement.    25  Psa 25.8    REVIEW OF SYSTEMS:  Review of Systems   Constitutional:  Negative for chills and fever.   Gastrointestinal:  Negative for abdominal distention, abdominal pain, nausea and vomiting.   Genitourinary:  Positive for frequency and urgency. Negative for difficulty urinating, dysuria, flank pain and hematuria.   Musculoskeletal:  Negative for back pain and gait problem.   Psychiatric/Behavioral:  Negative for agitation and confusion.        PHYSICAL EXAM:  Temp 97.9 °F (36.6 °C) (Temporal)   Ht 1.778 m (5' 10\")   Wt 89.8 kg (198 lb)   BMI 28.41 kg/m²   Physical Exam  Vitals and nursing note reviewed.   Constitutional:       General: He is not in acute distress.     Appearance: Normal appearance. He is not ill-appearing.   Pulmonary:      Effort: Pulmonary effort is normal. No respiratory

## 2025-08-11 ENCOUNTER — OFFICE VISIT (OUTPATIENT)
Dept: UROLOGY | Age: 85
End: 2025-08-11
Payer: MEDICARE

## 2025-08-11 VITALS — BODY MASS INDEX: 29.06 KG/M2 | WEIGHT: 203 LBS | HEIGHT: 70 IN | TEMPERATURE: 97.9 F

## 2025-08-11 DIAGNOSIS — N40.1 BPH WITH OBSTRUCTION/LOWER URINARY TRACT SYMPTOMS: ICD-10-CM

## 2025-08-11 DIAGNOSIS — Z80.42 FAMILY HX OF PROSTATE CANCER: ICD-10-CM

## 2025-08-11 DIAGNOSIS — N13.8 BPH WITH OBSTRUCTION/LOWER URINARY TRACT SYMPTOMS: ICD-10-CM

## 2025-08-11 DIAGNOSIS — R97.20 ELEVATED PSA: Primary | ICD-10-CM

## 2025-08-11 LAB
BACTERIA URINE, POC: NORMAL
BILIRUBIN URINE: 0 MG/DL
BLOOD, URINE: POSITIVE
CASTS URINE, POC: NORMAL
CLARITY, UA: CLEAR
COLOR, UA: YELLOW
CRYSTALS URINE, POC: NORMAL
EPI CELLS URINE, POC: NORMAL
GLUCOSE URINE: 1000
KETONES, URINE: NEGATIVE
LEUKOCYTE EST, POC: NORMAL
NITRITE, URINE: NEGATIVE
PH UA: 6 (ref 4.5–8)
PROTEIN UA: NEGATIVE
RBC URINE, POC: 4
SPECIFIC GRAVITY UA: 1.01 (ref 1–1.03)
UROBILINOGEN, URINE: NORMAL
WBC URINE, POC: NORMAL
YEAST URINE, POC: NORMAL

## 2025-08-11 PROCEDURE — 81001 URINALYSIS AUTO W/SCOPE: CPT | Performed by: NURSE PRACTITIONER

## 2025-08-11 PROCEDURE — 1123F ACP DISCUSS/DSCN MKR DOCD: CPT | Performed by: NURSE PRACTITIONER

## 2025-08-11 PROCEDURE — 1036F TOBACCO NON-USER: CPT | Performed by: NURSE PRACTITIONER

## 2025-08-11 PROCEDURE — 1159F MED LIST DOCD IN RCRD: CPT | Performed by: NURSE PRACTITIONER

## 2025-08-11 PROCEDURE — 51798 US URINE CAPACITY MEASURE: CPT | Performed by: NURSE PRACTITIONER

## 2025-08-11 PROCEDURE — G8417 CALC BMI ABV UP PARAM F/U: HCPCS | Performed by: NURSE PRACTITIONER

## 2025-08-11 PROCEDURE — G8427 DOCREV CUR MEDS BY ELIG CLIN: HCPCS | Performed by: NURSE PRACTITIONER

## 2025-08-11 PROCEDURE — 99215 OFFICE O/P EST HI 40 MIN: CPT | Performed by: NURSE PRACTITIONER

## 2025-08-11 PROCEDURE — 1160F RVW MEDS BY RX/DR IN RCRD: CPT | Performed by: NURSE PRACTITIONER

## 2025-08-11 ASSESSMENT — ENCOUNTER SYMPTOMS
ABDOMINAL PAIN: 0
BACK PAIN: 1
NAUSEA: 0
ABDOMINAL DISTENTION: 0
VOMITING: 0

## 2025-08-13 LAB — BACTERIA UR CULT: NORMAL

## 2025-08-25 DIAGNOSIS — R33.8 ACUTE URINARY RETENTION: ICD-10-CM

## 2025-08-25 RX ORDER — TAMSULOSIN HYDROCHLORIDE 0.4 MG/1
1 CAPSULE ORAL 2 TIMES DAILY
Qty: 180 CAPSULE | Refills: 3 | OUTPATIENT
Start: 2025-08-25

## 2025-09-03 RX ORDER — CITALOPRAM HYDROBROMIDE 20 MG/1
20 TABLET ORAL DAILY
Qty: 30 TABLET | Refills: 1 | Status: SHIPPED | OUTPATIENT
Start: 2025-09-03

## (undated) DEVICE — NON-STERILE (14 X 30CM) COVER: Brand: CIV-FLEX™ TRANSDUCER COVER

## (undated) DEVICE — SYSTEM SKIN CLSR 22CM DERMBND PRINEO

## (undated) DEVICE — SOLUTION IV IRRIG POUR BRL 0.9% SODIUM CHL 2F7124

## (undated) DEVICE — SOLUTION IV IRRIG WATER 1000ML POUR BRL 2F7114

## (undated) DEVICE — PK EXTRM 30

## (undated) DEVICE — SHORT LENS-STERILE

## (undated) DEVICE — Y-TYPE TUR/BLADDER IRRIGATION SET, REGULATING CLAMP

## (undated) DEVICE — GLV SURG PREMIERPRO ORTHO LTX PF SZ8.5 BRN

## (undated) DEVICE — KIT INTRO 8.5FR L4IN PERC POLYUR SHTH RADPQ W/ INTEGR

## (undated) DEVICE — SUTURE NONABSORBABLE MONOFILAMENT 4-0 RB-1 36 IN BLU PROLENE 8557H

## (undated) DEVICE — SUTURE ETHBND EXCEL SZ 2-0 L30IN PLEDG 7X3X1.5MM PXX41

## (undated) DEVICE — WRAP AROUND LENS-STERLIE

## (undated) DEVICE — BLANKET THER AD W24XL60IN FAB COVERING SUP SFT ULT THN LTWT

## (undated) DEVICE — SUTURE PROL SZ 4-0 L36IN NONABSORBABLE BLU L26MM SH 1/2 CIR 8521H

## (undated) DEVICE — PLEDGET SURG W0.375XL0.062IN THK1.5MM WHT SFT PTFE RECT

## (undated) DEVICE — DRAIN SURG SGL COLL PT TB FOR ATS BG OASIS

## (undated) DEVICE — SOLUTION CARDPLG H2O DIL 1000 ML CARD PERF VIAFLX

## (undated) DEVICE — DRAIN SURG 19FR SIL RND HUBLESS W/ 0.25IN BEND TRCR BLAK

## (undated) DEVICE — PK TURNOVER RM ADV

## (undated) DEVICE — ZINACTIVE USE 2539609 APPLICATOR MEDICATED 10.5 CC SOLUTION HI LT ORNG CHLORAPREP

## (undated) DEVICE — SOLUTION IV 250ML 0.9% SOD CHL PH 5 INJ USP VIAFLX PLAS

## (undated) DEVICE — 3M™ TEGADERM™ TRANSPARENT FILM DRESSING FRAME STYLE, 1628, 6 IN X 8 IN (15 CM X 20 CM), 10/CT 8CT/CASE: Brand: 3M™ TEGADERM™

## (undated) DEVICE — COR-KNOT MINI® COMBO KITBASE PACKAGE TYPE - KITEACH STERILE PACKAGE KIT CONTAINS (2) SINGLE PATIENT USE COR-KNOT MINI® DEVICES AND (12) COR-KNOT® QUICK LOADS®.: Brand: COR-KNOT MINI®

## (undated) DEVICE — 4-PORT MANIFOLD: Brand: NEPTUNE 2

## (undated) DEVICE — DEVICE RESUS AD TB L40IN SELF INFL MASK TEXT BG DBL SWVL EL

## (undated) DEVICE — DISCONTINUED USE 327525 CABLE SURGICAL

## (undated) DEVICE — BLADE SAW W6.35XL32MM STRNM CUT STRNOTMY

## (undated) DEVICE — BANDAGE COMPR W4INXL15FT BGE E SGL LAYERED CLP CLSR

## (undated) DEVICE — CABLE THRESHOLD DISP FOR PACE ANALZR MERLIN

## (undated) DEVICE — SOLUTION IV 1000ML 0.9% SOD CHL PH 5 INJ USP VIAFLX PLAS

## (undated) DEVICE — NEEDLE ECHOGENIC 22GA L2IN INSUL W/ 30DEG BVL EXTN SET

## (undated) DEVICE — SURGICAL PROCEDURE PACK OPN HRT LOURDES HOSP

## (undated) DEVICE — CONNECTOR DRNGE W3/8-0.5XH3/16XL3/16IN 2:1 SIL CARD STR

## (undated) DEVICE — DIFFUSER GAS CO2 DISP DEV CARBONAID

## (undated) DEVICE — MEDI-TRACE CADENCE ADULT DEFIBRILLATION ELECTRODE (10 PR/PK) - PHYSIO-CONTROL: Brand: MEDI-TRACE CADENCE

## (undated) DEVICE — SOLUTION IV 500ML 0.9% SOD CHL PH 5 INJ USP VIAFLX PLAS

## (undated) DEVICE — GUIDE SUT STR EDGE

## (undated) DEVICE — GLV SURG DERMASSURE GRN LF PF 8.5

## (undated) DEVICE — COR-KNOT® QUICK LOAD® SINGLES: Brand: COR-KNOT® QUICK LOAD®

## (undated) DEVICE — DISPOSABLE TOURNIQUET CUFF SINGLE BLADDER, SINGLE PORT AND QUICK CONNECT CONNECTOR: Brand: COLOR CUFF

## (undated) DEVICE — SS SUTURE, 3 PER SLEEVE: Brand: MYO/WIRE II

## (undated) DEVICE — CATHETER THRMDIL 7.5FR L110CM PROXIMAL/DISTAL PRT L30CM STD

## (undated) DEVICE — ROYAL SILK SURGICAL GOWN, XXL: Brand: CONVERTORS

## (undated) DEVICE — SWAB CULT AERO TWIN MD

## (undated) DEVICE — YANKAUER,TAPERED BULBOUS TIP,W/O VENT: Brand: MEDLINE

## (undated) DEVICE — ACCESSORY TOWEL PACK: Brand: MEDLINE INDUSTRIES, INC.

## (undated) DEVICE — Z INACTIVE USE 2662641 SOLUTION IV 1000ML 140MEQ/L SOD 5MEQ/L K 3MEQ/L MG 27MEQ/L

## (undated) DEVICE — STRIP WND PK W0.25INXL5YD IODO GZ TIGHTLY WVN CURAD

## (undated) DEVICE — SUTURE ETHLN SZ 3-0 L18IN NONABSORBABLE BLK FS-1 L24MM 3/8 663H

## (undated) DEVICE — CYSTO/BLADDER IRRIGATION SET, REGULATING CLAMP

## (undated) DEVICE — DRESSING FOAM W4XL12IN SIL RECT ADH WTRPRF FLM BK W/ BORD

## (undated) DEVICE — GEL US 20GM NONIRRITATING OVERWRAPPED FILE PCH TRNSMIT

## (undated) DEVICE — GLOVE SURG SZ 85 L12IN FNGR THK94MIL TRNSLUC YEL LTX

## (undated) DEVICE — GLOVE SURG SZ 8 L11.6IN THK9.8MIL STRW LTX POLYMER BEAD CUF

## (undated) DEVICE — CVR BRD ARM 13X30

## (undated) DEVICE — CLAMP INSERT: Brand: STEALTH® CLAMP INSERT

## (undated) DEVICE — GLOVE SURG SZ 9 L12IN FNGR THK13MIL BRN LTX SYN POLYMER W

## (undated) DEVICE — SUTURE ETHLN SZ 2-0 L30IN NONABSORBABLE BLK L36MM FSLX 3/8 1674H

## (undated) DEVICE — GLOVE SURG SZ 65 L12IN FNGR THK79MIL GRN LTX FREE

## (undated) DEVICE — PLEDGET SURG W3.5XL7MM THK1.5MM WHT PTFE RECT SFT TFE

## (undated) DEVICE — SUTURE N ABSRB BRAIDED 2-0 RB1 30 IN COAT WHT ETHBND EXCEL MX523

## (undated) DEVICE — SURGICAL PROCEDURE PACK LOWER EXTREMITY LOURDES HOSP

## (undated) DEVICE — E-Z CLEAN, NON-STICK, PTFE COATED, ELECTROSURGICAL BLADE ELECTRODE, MODIFIED EXTENDED INSULATION, 2.5 INCH (6.35 CM): Brand: MEGADYNE

## (undated) DEVICE — Z INACTIVE USE 2535480 CLIP LIG M BLU TI HRT SHP WIRE HORZ 180 PER BX